# Patient Record
Sex: FEMALE | Race: WHITE | Employment: OTHER | URBAN - METROPOLITAN AREA
[De-identification: names, ages, dates, MRNs, and addresses within clinical notes are randomized per-mention and may not be internally consistent; named-entity substitution may affect disease eponyms.]

---

## 2017-01-04 ENCOUNTER — TELEPHONE (OUTPATIENT)
Dept: RHEUMATOLOGY | Age: 82
End: 2017-01-04

## 2017-01-04 NOTE — TELEPHONE ENCOUNTER
Patient informed she will need to send her bills to \[facility]\"" directly for the Actemra Infusion for patient assistance. Patient states she is aware of this,and has already signed proof of income for \[facility]\"" and sent the form back to the program. Patient informed the bills are handled by [address] there if she has any problems.

## 2017-01-09 RX ORDER — SODIUM CHLORIDE 9 MG/ML
25 INJECTION, SOLUTION INTRAVENOUS CONTINUOUS
Status: DISPENSED | OUTPATIENT
Start: 2017-01-12 | End: 2017-01-12

## 2017-01-09 RX ORDER — ACETAMINOPHEN 325 MG/1
650 TABLET ORAL
Status: ACTIVE | OUTPATIENT
Start: 2017-01-12 | End: 2017-01-12

## 2017-01-09 RX ORDER — DIPHENHYDRAMINE HYDROCHLORIDE 50 MG/ML
25 INJECTION, SOLUTION INTRAMUSCULAR; INTRAVENOUS
Status: ACTIVE | OUTPATIENT
Start: 2017-01-12 | End: 2017-01-12

## 2017-01-12 ENCOUNTER — HOSPITAL ENCOUNTER (EMERGENCY)
Age: 82
Discharge: HOME | End: 2017-01-12
Attending: EMERGENCY MEDICINE
Payer: MEDICARE

## 2017-01-12 ENCOUNTER — HOSPITAL ENCOUNTER (OUTPATIENT)
Dept: INFUSION THERAPY | Age: 82
Discharge: HOME | End: 2017-01-12
Payer: MEDICARE

## 2017-01-12 ENCOUNTER — TELEPHONE (OUTPATIENT)
Dept: RHEUMATOLOGY | Age: 82
End: 2017-01-12

## 2017-01-12 VITALS
RESPIRATION RATE: 18 BRPM | SYSTOLIC BLOOD PRESSURE: 232 MMHG | TEMPERATURE: 97.1 F | BODY MASS INDEX: 25.61 KG/M2 | DIASTOLIC BLOOD PRESSURE: 82 MMHG | WEIGHT: 140 LBS | HEART RATE: 93 BPM

## 2017-01-12 VITALS
HEIGHT: 62 IN | BODY MASS INDEX: 25.76 KG/M2 | SYSTOLIC BLOOD PRESSURE: 192 MMHG | RESPIRATION RATE: 15 BRPM | HEART RATE: 85 BPM | TEMPERATURE: 98.4 F | WEIGHT: 140 LBS | DIASTOLIC BLOOD PRESSURE: 77 MMHG | OXYGEN SATURATION: 98 %

## 2017-01-12 DIAGNOSIS — I15.9 SECONDARY HYPERTENSION: Primary | ICD-10-CM

## 2017-01-12 LAB
ALBUMIN SERPL BCP-MCNC: 3.5 G/DL (ref 3.5–5)
ALBUMIN/GLOB SERPL: 1 {RATIO} (ref 1.1–2.2)
ALP SERPL-CCNC: 80 U/L (ref 45–117)
ALT SERPL-CCNC: 22 U/L (ref 12–78)
ANION GAP BLD CALC-SCNC: 7 MMOL/L (ref 5–15)
AST SERPL W P-5'-P-CCNC: 19 U/L (ref 15–37)
BASOPHILS # BLD AUTO: 0 K/UL (ref 0–0.1)
BASOPHILS # BLD: 0 % (ref 0–1)
BILIRUB SERPL-MCNC: 0.5 MG/DL (ref 0.2–1)
BUN SERPL-MCNC: 16 MG/DL (ref 6–20)
BUN/CREAT SERPL: 17 (ref 12–20)
CALCIUM SERPL-MCNC: 8.5 MG/DL (ref 8.5–10.1)
CHLORIDE SERPL-SCNC: 103 MMOL/L (ref 97–108)
CO2 SERPL-SCNC: 30 MMOL/L (ref 21–32)
CREAT SERPL-MCNC: 0.95 MG/DL (ref 0.55–1.02)
CRP SERPL-MCNC: 2.69 MG/DL
EOSINOPHIL # BLD: 0.3 K/UL (ref 0–0.4)
EOSINOPHIL NFR BLD: 4 % (ref 0–7)
ERYTHROCYTE [DISTWIDTH] IN BLOOD BY AUTOMATED COUNT: 15.3 % (ref 11.5–14.5)
ERYTHROCYTE [SEDIMENTATION RATE] IN BLOOD: 52 MM/HR (ref 0–30)
GLOBULIN SER CALC-MCNC: 3.6 G/DL (ref 2–4)
GLUCOSE SERPL-MCNC: 112 MG/DL (ref 65–100)
HCT VFR BLD AUTO: 34.4 % (ref 35–47)
HGB BLD-MCNC: 10.8 G/DL (ref 11.5–16)
LYMPHOCYTES # BLD AUTO: 13 % (ref 12–49)
LYMPHOCYTES # BLD: 0.9 K/UL (ref 0.8–3.5)
MCH RBC QN AUTO: 29.3 PG (ref 26–34)
MCHC RBC AUTO-ENTMCNC: 31.4 G/DL (ref 30–36.5)
MCV RBC AUTO: 93.5 FL (ref 80–99)
MONOCYTES # BLD: 0.7 K/UL (ref 0–1)
MONOCYTES NFR BLD AUTO: 9 % (ref 5–13)
NEUTS SEG # BLD: 5.5 K/UL (ref 1.8–8)
NEUTS SEG NFR BLD AUTO: 74 % (ref 32–75)
PLATELET # BLD AUTO: 259 K/UL (ref 150–400)
POTASSIUM SERPL-SCNC: 4.2 MMOL/L (ref 3.5–5.1)
PROT SERPL-MCNC: 7.1 G/DL (ref 6.4–8.2)
RBC # BLD AUTO: 3.68 M/UL (ref 3.8–5.2)
SODIUM SERPL-SCNC: 140 MMOL/L (ref 136–145)
WBC # BLD AUTO: 7.5 K/UL (ref 3.6–11)

## 2017-01-12 PROCEDURE — 80053 COMPREHEN METABOLIC PANEL: CPT | Performed by: INTERNAL MEDICINE

## 2017-01-12 PROCEDURE — 99201 HC NEW PT LEVEL I: CPT

## 2017-01-12 PROCEDURE — 99285 EMERGENCY DEPT VISIT HI MDM: CPT

## 2017-01-12 PROCEDURE — 36415 COLL VENOUS BLD VENIPUNCTURE: CPT | Performed by: INTERNAL MEDICINE

## 2017-01-12 PROCEDURE — 96413 CHEMO IV INFUSION 1 HR: CPT

## 2017-01-12 PROCEDURE — 74011250636 HC RX REV CODE- 250/636

## 2017-01-12 PROCEDURE — 85652 RBC SED RATE AUTOMATED: CPT | Performed by: INTERNAL MEDICINE

## 2017-01-12 PROCEDURE — 74011250636 HC RX REV CODE- 250/636: Performed by: INTERNAL MEDICINE

## 2017-01-12 PROCEDURE — 86140 C-REACTIVE PROTEIN: CPT | Performed by: INTERNAL MEDICINE

## 2017-01-12 PROCEDURE — 85025 COMPLETE CBC W/AUTO DIFF WBC: CPT | Performed by: INTERNAL MEDICINE

## 2017-01-12 PROCEDURE — 74011250637 HC RX REV CODE- 250/637: Performed by: NURSE PRACTITIONER

## 2017-01-12 PROCEDURE — 74011000258 HC RX REV CODE- 258: Performed by: INTERNAL MEDICINE

## 2017-01-12 RX ORDER — LISINOPRIL 20 MG/1
20 TABLET ORAL
Status: COMPLETED | OUTPATIENT
Start: 2017-01-12 | End: 2017-01-12

## 2017-01-12 RX ORDER — AMLODIPINE BESYLATE 5 MG/1
5 TABLET ORAL
Status: COMPLETED | OUTPATIENT
Start: 2017-01-12 | End: 2017-01-12

## 2017-01-12 RX ADMIN — AMLODIPINE BESYLATE 5 MG: 5 TABLET ORAL at 18:08

## 2017-01-12 RX ADMIN — SODIUM CHLORIDE 25 ML/HR: 900 INJECTION, SOLUTION INTRAVENOUS at 12:40

## 2017-01-12 RX ADMIN — LISINOPRIL 20 MG: 20 TABLET ORAL at 18:08

## 2017-01-12 RX ADMIN — TOCILIZUMAB 250 MG: 20 INJECTION, SOLUTION, CONCENTRATE INTRAVENOUS at 14:40

## 2017-01-12 NOTE — PROGRESS NOTES
1400  Bedside shift report received from Nancy Scott RN. Patient is resting comfortably and waiting for medications to arrive from pharmacy. Medications received:  NS IV  Actemra IV    At completion of Actemra infusion patient is flushed and notes having a headache. Vitals obtained and BP is 187/83. Patient states that she missed her afternoon dose of BP medication today because she was here. Patient rested in reclined position for 30 minutes and BP re-checked. BP increased to 242/100. Rescue squad called and patient was escorted to [facility] by ambulance. Patient's daughter was notified and is meeting patient at the hospital.   PIV flushed and removed per protocol. Patient Vitals for the past 12 hrs:   Temp Pulse Resp BP   01/12/17 1650 - - - (!) 232/82   01/12/17 1622 - - - (!) 242/100   01/12/17 1540 97.1 °F (36.2 °C) 93 - 187/83   01/12/17 1158 97.7 °F (36.5 °C) 71 18 148/74       1700  Patient left [facility] via ambulance.

## 2017-01-12 NOTE — TELEPHONE ENCOUNTER
Pt presents for Actemra Infusion. Per Dr. Magui Silver, okay to treat as long as no fever, infection or any other acute problems. Order faxed.

## 2017-01-12 NOTE — DISCHARGE INSTRUCTIONS

## 2017-01-12 NOTE — PROGRESS NOTES
Outpatient Infusion Center Nursing Progress Note    1115  Patient arrived to [facility]  accompanied by self for scheduled actemra  Cycle 1 PT complaint of pain in left arm. PIV placed per policy    Vitals Blood pressure 148/74, pulse 71, temperature 97.7 °F (36.5 °C), resp. rate 18. Call made to MD office to request OK to treat, order received. labs   Recent Results (from the past 12 hour(s))   CBC WITH AUTOMATED DIFF    Collection Time: 01/12/17 12:36 PM   Result Value Ref Range    WBC 7.5 3.6 - 11.0 K/uL    RBC 3.68 (L) 3.80 - 5.20 M/uL    HGB 10.8 (L) 11.5 - 16.0 g/dL    HCT 34.4 (L) 35.0 - 47.0 %    MCV 93.5 80.0 - 99.0 FL    MCH 29.3 26.0 - 34.0 PG    MCHC 31.4 30.0 - 36.5 g/dL    RDW 15.3 (H) 11.5 - 14.5 %    PLATELET 947 799 - 118 K/uL    NEUTROPHILS 74 32 - 75 %    LYMPHOCYTES 13 12 - 49 %    MONOCYTES 9 5 - 13 %    EOSINOPHILS 4 0 - 7 %    BASOPHILS 0 0 - 1 %    ABS. NEUTROPHILS 5.5 1.8 - 8.0 K/UL    ABS. LYMPHOCYTES 0.9 0.8 - 3.5 K/UL    ABS. MONOCYTES 0.7 0.0 - 1.0 K/UL    ABS. EOSINOPHILS 0.3 0.0 - 0.4 K/UL    ABS. BASOPHILS 0.0 0.0 - 0.1 K/UL       1345  Labs reviewed and Actemra ordered. Chairside report given to Sheila Baker RN, who verbalized understanding.

## 2017-01-12 NOTE — ED TRIAGE NOTES
biba elevated bp over treatment after elevated bp at [facility]  Pt felt headache.  Pt has headache with atmeric before just restarted last month(dec 15) tolerated dec 15th dose no s/e

## 2017-01-12 NOTE — TELEPHONE ENCOUNTER
R'cvd v/m from [facility] @ [facility] requesting an Wilberto Lapidus to treat as needed\" order. Pt is having infusion today. Returned call and l/v/m in an attempt to obtain specifics.

## 2017-01-12 NOTE — TELEPHONE ENCOUNTER
Order for Actemra has been placed. Please clarify why we need an additional order. Patient was in ER at end of December: please make sure no acute infections or ongoing new concerns.

## 2017-01-13 ENCOUNTER — TELEPHONE (OUTPATIENT)
Dept: RHEUMATOLOGY | Age: 82
End: 2017-01-13

## 2017-01-13 NOTE — ED NOTES
Patient given discharge instruction by provider. Verbalized understanding, pt discharge home with family.

## 2017-01-13 NOTE — DISCHARGE INSTRUCTIONS
We hope that we have addressed all of your medical concerns. The examination and treatment you received in the Emergency Department were for an emergent problem and were not intended as complete care. It is important that you follow up with your healthcare provider(s) for ongoing care. If your symptoms worsen or do not improve as expected, and you are unable to reach your usual health care provider(s), you should return to the Emergency Department. Today's healthcare is undergoing tremendous change, and patient satisfaction surveys are one of the many tools to assess the quality of medical care. You may receive a survey from the [employer] regarding your experience in the Emergency Department. I hope that your experience has been completely positive, particularly the medical care that I provided. As such, please participate in the survey; anything less than excellent does not meet my expectations or intentions. [facility] and [employer] participate in nationally recognized quality of care measures. If your blood pressure is greater than 120/80, as reported below, we urge that you seek medical care to address the potential of high blood pressure, commonly known as hypertension. Hypertension can be hereditary or can be caused by certain medical conditions, pain, stress, or \"white coat syndrome. \"       Please make an appointment with your health care provider(s) for follow up of your Emergency Department visit. VITALS:   Patient Vitals for the past 8 hrs:   Temp Pulse Resp BP SpO2   01/12/17 1900 - 88 16 197/84 99 %   01/12/17 1830 - 87 17 195/76 100 %   01/12/17 1815 - 76 16 193/68 100 %   01/12/17 1800 - 82 14 199/76 97 %   01/12/17 1730 98.4 °F (36.9 °C) 88 22 197/79 99 %          Thank you for allowing us to provide you with medical care today. We realize that you have many choices for your emergency care needs.   Please choose us in the future for any continued health care needs. Ella Atkinson, [facility].   Office: [phone]            Recent Results (from the past 24 hour(s))   METABOLIC PANEL, COMPREHENSIVE    Collection Time: 01/12/17 12:26 PM   Result Value Ref Range    Sodium 140 136 - 145 mmol/L    Potassium 4.2 3.5 - 5.1 mmol/L    Chloride 103 97 - 108 mmol/L    CO2 30 21 - 32 mmol/L    Anion gap 7 5 - 15 mmol/L    Glucose 112 (H) 65 - 100 mg/dL    BUN 16 6 - 20 MG/DL    Creatinine 0.95 0.55 - 1.02 MG/DL    BUN/Creatinine ratio 17 12 - 20      GFR est AA >60 >60 ml/min/1.73m2    GFR est non-AA 56 (L) >60 ml/min/1.73m2    Calcium 8.5 8.5 - 10.1 MG/DL    Bilirubin, total 0.5 0.2 - 1.0 MG/DL    ALT 22 12 - 78 U/L    AST 19 15 - 37 U/L    Alk. phosphatase 80 45 - 117 U/L    Protein, total 7.1 6.4 - 8.2 g/dL    Albumin 3.5 3.5 - 5.0 g/dL    Globulin 3.6 2.0 - 4.0 g/dL    A-G Ratio 1.0 (L) 1.1 - 2.2     SED RATE (ESR)    Collection Time: 01/12/17 12:26 PM   Result Value Ref Range    Sed rate, automated 52 (H) 0 - 30 mm/hr   C REACTIVE PROTEIN, QT    Collection Time: 01/12/17 12:26 PM   Result Value Ref Range    C-Reactive protein 2.69 (H) <0.60 mg/dL   CBC WITH AUTOMATED DIFF    Collection Time: 01/12/17 12:36 PM   Result Value Ref Range    WBC 7.5 3.6 - 11.0 K/uL    RBC 3.68 (L) 3.80 - 5.20 M/uL    HGB 10.8 (L) 11.5 - 16.0 g/dL    HCT 34.4 (L) 35.0 - 47.0 %    MCV 93.5 80.0 - 99.0 FL    MCH 29.3 26.0 - 34.0 PG    MCHC 31.4 30.0 - 36.5 g/dL    RDW 15.3 (H) 11.5 - 14.5 %    PLATELET 993 497 - 511 K/uL    NEUTROPHILS 74 32 - 75 %    LYMPHOCYTES 13 12 - 49 %    MONOCYTES 9 5 - 13 %    EOSINOPHILS 4 0 - 7 %    BASOPHILS 0 0 - 1 %    ABS. NEUTROPHILS 5.5 1.8 - 8.0 K/UL    ABS. LYMPHOCYTES 0.9 0.8 - 3.5 K/UL    ABS. MONOCYTES 0.7 0.0 - 1.0 K/UL    ABS. EOSINOPHILS 0.3 0.0 - 0.4 K/UL    ABS. BASOPHILS 0.0 0.0 - 0.1 K/UL       No results found.

## 2017-01-13 NOTE — ED PROVIDER NOTES
HPI Comments: The pt is an 80year old female who presents from the infusion center having received her medication for RA suppression. She arrived at the center with a BP of 138/86 and then developed a BP of 198/99 after treatment. Pt is rather annoyed bc she was at the infusion center for six hours, missing lunch, dinner, and her afternoon and evening doses of antihypertensive agents. She states that one year ago when she was getting the infusions she was sent to the ED multiple times for HTN post infusions. Her cardiologist prescribed a regimen that had been working up until this afternoon. Pt denies fevers, chills, night sweats, chest pain, pressure, SOB, HAAS, PND, orthopnea, abdominal pain, n/v/d, melena, hematuria, dysuria, constipation, HA, dizziness, and syncope        Past Medical History:    Arthritis, rheumatoid (HCC)                                   Diabetes ([facility] [address]                                                Diverticulitis                                                Hard of hearing                                               Hypertension                                                  Hypothyroid                                                   Osteoporosis, post-menopausal                                 Past Surgical History:    HX HYSTERECTOMY                                                HX HEMORRHOIDECTOMY                                            HX THYROIDECTOMY                                               PCP:  Marquez Smith MD      Patient is a 80 y.o. female presenting with hypertension. The history is provided by the patient. Hypertension    This is a new problem. The current episode started 1 to 2 hours ago. The problem has not changed since onset. Pertinent negatives include no chest pain, no orthopnea, no palpitations, no PND, no anxiety, no confusion, no malaise/fatigue, no blurred vision, no headaches, no tinnitus, no neck pain, no peripheral edema, no dizziness, no shortness of breath, no nausea and no vomiting. Associated agents: transfusion for RA. Past Medical History:   Diagnosis Date    Arthritis, rheumatoid ([address]     Diabetes ([facility] [address]     Diverticulitis     Hard of hearing     Hypertension     Hypothyroid     Osteoporosis, post-menopausal        Past Surgical History:   Procedure Laterality Date    Hx hysterectomy      Hx hemorrhoidectomy      Hx thyroidectomy           Family History:   Problem Relation Age of Onset    Heart Disease Mother     Diabetes Father     Cancer Brother      brain cancer    Heart Disease Brother     Diabetes Brother     Other Other      scleroderma       Social History     Social History    Marital status:      Spouse name: N/A    Number of children: N/A    Years of education: N/A     Occupational History    Not on file. Social History Main Topics    Smoking status: Former Smoker     Types: Cigarettes     Quit date: 12/8/1976    Smokeless tobacco: Never Used    Alcohol use Yes      Comment: Waylon time 1 glass wine    Drug use: No    Sexual activity: No     Other Topics Concern    Not on file     Social History Narrative    ** Merged History Encounter **              ALLERGIES: Amoxicillin and Hydrochlorothiazide    Review of Systems   Constitutional: Negative for activity change, appetite change, chills, diaphoresis, fatigue, fever, malaise/fatigue and unexpected weight change. HENT: Negative for congestion, ear pain, rhinorrhea, sinus pressure, sore throat and tinnitus. Eyes: Negative for blurred vision, photophobia, pain, discharge and visual disturbance. Respiratory: Negative for apnea, cough, choking, chest tightness, shortness of breath, wheezing and stridor. Cardiovascular: Negative for chest pain, palpitations, orthopnea, leg swelling and PND. Gastrointestinal: Negative for abdominal pain, constipation, diarrhea, nausea and vomiting.    Endocrine: Negative for polydipsia, polyphagia and polyuria. Genitourinary: Negative for decreased urine volume, dyspareunia, dysuria, enuresis, flank pain, frequency, hematuria and urgency. Musculoskeletal: Positive for arthralgias. Negative for back pain, gait problem, myalgias and neck pain. Chronic   Skin: Negative for color change, pallor, rash and wound. Allergic/Immunologic: Negative for immunocompromised state. Neurological: Negative for dizziness, seizures, syncope, weakness, light-headedness and headaches. Hematological: Does not bruise/bleed easily. Psychiatric/Behavioral: Negative for agitation and confusion. The patient is not nervous/anxious. Vitals:    01/12/17 1815 01/12/17 1830 01/12/17 1900 01/12/17 1918   BP: 193/68 195/76 197/84 192/77   Pulse: 76 87 88 85   Resp: 16 17 16 15   Temp:       SpO2: 100% 100% 99% 98%   Weight:       Height:                Physical Exam   Constitutional: She is oriented to person, place, and time. She appears well-developed and well-nourished. No distress. HENT:   Head: Normocephalic. Right Ear: External ear normal.   Left Ear: External ear normal.   Mouth/Throat: Oropharynx is clear and moist. No oropharyngeal exudate. Eyes: Conjunctivae and EOM are normal. Pupils are equal, round, and reactive to light. Right eye exhibits no discharge. Left eye exhibits no discharge. No scleral icterus. Neck: Normal range of motion. Neck supple. No JVD present. No tracheal deviation present. No thyromegaly present. Cardiovascular: Normal rate, regular rhythm, normal heart sounds and intact distal pulses. Exam reveals no gallop and no friction rub. No murmur heard. Pulmonary/Chest: Effort normal and breath sounds normal. No stridor. No respiratory distress. She has no wheezes. She has no rales. She exhibits no tenderness. Abdominal: Soft. Bowel sounds are normal. She exhibits no distension and no mass. There is no tenderness. There is no rebound and no guarding.    Musculoskeletal: Normal range of motion. She exhibits no edema or tenderness. Lymphadenopathy:     She has no cervical adenopathy. Neurological: She is alert and oriented to person, place, and time. She displays normal reflexes. No cranial nerve deficit. Coordination normal.   Skin: Skin is warm and dry. No rash noted. She is not diaphoretic. No erythema. No pallor. Psychiatric: She has a normal mood and affect. Her behavior is normal. Judgment and thought content normal.   Nursing note and vitals reviewed. MDM  Number of Diagnoses or Management Options  Secondary hypertension:   Diagnosis management comments:    * norvasc and lisinopril   * meal tray       Amount and/or Complexity of Data Reviewed  Clinical lab tests: reviewed  Review and summarize past medical records: yes  Discuss the patient with other providers: yes    Risk of Complications, Morbidity, and/or Mortality  General comments:    - stable, ambulatory pt in NAD    Patient Progress  Patient progress: stable    ED Course       Procedures        1919 PM  Pt has been reevaluated. There are no new complaints, changes, or physical findings at this time. Medications have been reviewed w/ pt and/or family. Pt and/or family's questions have been answered. Pt and/or family expressed good understanding of the dx/tx/rx and is in agreement with plan of care. Pt instructed and agreed to f/u w/ PCP, Cardiologist, and Rheumatologist and to return to ED upon further deterioration. Pt is ready for discharge. LABORATORY TESTS:  No results found for this or any previous visit (from the past 12 hour(s)). IMAGING RESULTS:  No orders to display     No results found. MEDICATIONS GIVEN:  Medications   amLODIPine (NORVASC) tablet 5 mg (5 mg Oral Given 1/12/17 1808)   lisinopril (PRINIVIL, ZESTRIL) tablet 20 mg (20 mg Oral Given 1/12/17 1808)       IMPRESSION:  1. Secondary hypertension        PLAN:  1.    Discharge Medication List as of 1/12/2017  7:12 PM      CONTINUE these medications which have NOT CHANGED    Details   methotrexate (RHEUMATREX) 2.5 mg tablet Take 25 mg by mouth every Tuesday., Historical Med      amLODIPine (NORVASC) 5 mg tablet Take 5 mg by mouth daily. , Historical Med      folic acid (FOLVITE) 1 mg tablet Take 1 mg by mouth every fourty-eight (48) hours. , Historical Med      LISINOPRIL PO Take 20 mg by mouth two (2) times a day., Historical Med      esomeprazole (NEXIUM) 20 mg capsule Take 1 Cap by mouth daily. , Print, Disp-30 Cap, R-0      Menthol/Camphor/Antarth Cb#1 11-11 % crea by Apply Externally route as needed (sore neck/shoulder). , Historical Med      levothyroxine (SYNTHROID) 75 mcg tablet Take 75 mcg by mouth Daily (before breakfast). , Historical Med      acetaminophen (TYLENOL) 325 mg tablet Take 325 mg by mouth daily as needed for Pain., Historical Med      aspirin delayed-release 81 mg tablet Take 81 mg by mouth every other day., Historical Med      TOCILIZUMAB (ACTEMRA IV) by IntraVENous route every thirty (30) days. , Historical Med           2. Follow-up Information     Follow up With Details Comments [address] Angie Anders MD In 1 day  [address] [facility]  [phone]      Your Cardiologist In 1 day          3.  Supportive care     Return to ED if worse       Carlos Pepe NP  12:37 PM

## 2017-01-13 NOTE — TELEPHONE ENCOUNTER
----- Message from Nitin Gallardo MD sent at 1/12/2017  5:56 PM EST -----  Please check on her status (she was sent to ER instead of getting Actemra) due to elevated BP.

## 2017-01-19 ENCOUNTER — OFFICE VISIT (OUTPATIENT)
Dept: RHEUMATOLOGY | Age: 82
End: 2017-01-19

## 2017-01-19 VITALS
RESPIRATION RATE: 16 BRPM | TEMPERATURE: 97.8 F | HEIGHT: 62 IN | HEART RATE: 72 BPM | BODY MASS INDEX: 26.13 KG/M2 | DIASTOLIC BLOOD PRESSURE: 54 MMHG | OXYGEN SATURATION: 97 % | SYSTOLIC BLOOD PRESSURE: 140 MMHG | WEIGHT: 142 LBS

## 2017-01-19 DIAGNOSIS — I10 ESSENTIAL HYPERTENSION: Chronic | ICD-10-CM

## 2017-01-19 DIAGNOSIS — M81.0 OSTEOPOROSIS, POST-MENOPAUSAL: ICD-10-CM

## 2017-01-19 DIAGNOSIS — E78.5 HYPERLIPIDEMIA, UNSPECIFIED HYPERLIPIDEMIA TYPE: ICD-10-CM

## 2017-01-19 DIAGNOSIS — M05.79 RHEUMATOID ARTHRITIS INVOLVING MULTIPLE SITES WITH POSITIVE RHEUMATOID FACTOR (HCC): Primary | Chronic | ICD-10-CM

## 2017-01-19 DIAGNOSIS — Z79.60 LONG-TERM USE OF IMMUNOSUPPRESSANT MEDICATION: ICD-10-CM

## 2017-01-19 DIAGNOSIS — N18.3 CKD (CHRONIC KIDNEY DISEASE), STAGE 3 (MODERATE): ICD-10-CM

## 2017-01-19 RX ORDER — FLUTICASONE PROPIONATE 50 MCG
SPRAY, SUSPENSION (ML) NASAL
Refills: 1 | COMMUNITY
Start: 2016-12-12 | End: 2019-03-23 | Stop reason: SDUPTHER

## 2017-01-19 RX ORDER — PANTOPRAZOLE SODIUM 40 MG/1
TABLET, DELAYED RELEASE ORAL
Refills: 3 | COMMUNITY
Start: 2016-12-12 | End: 2017-06-23

## 2017-01-19 RX ORDER — RANITIDINE HCL 75 MG
75 TABLET ORAL
COMMUNITY
End: 2020-11-30

## 2017-01-19 RX ORDER — METHOTREXATE 2.5 MG/1
TABLET ORAL
Qty: 25 TAB | Refills: 3 | Status: SHIPPED | OUTPATIENT
Start: 2017-01-19 | End: 2017-09-05 | Stop reason: SDUPTHER

## 2017-01-19 RX ORDER — ALENDRONATE SODIUM 70 MG/1
TABLET ORAL
Qty: 4 TAB | Refills: 11 | Status: SHIPPED | OUTPATIENT
Start: 2017-01-19 | End: 2017-06-23

## 2017-01-19 NOTE — PROGRESS NOTES
HISTORY OF PRESENT ILLNESS  Patricia Enriquez is a 80 y.o. female. She presents with her daughter. HPI Patient presents for follow up of rheumatoid arthritis. \"I'm doing all right. \" She has pain in the left knee and left shoulder. She has no joint swelling. She has AM stiffness of 15-20 minutes. She has no fevers. She has had no recent infections. She is taking methotrexate 25 mg orally once weekly. She presented for her Actemra infusion last week and received her medication but then was sent to the ER due to a marked elevation in blood pressure. She had not taken her mid-day blood pressure medication and had not eaten before hand. Her blood pressure has been okay this week (130's-140's at home). Regarding osteoporosis, she gets perhaps 2-3 servings of a calcium rich food daily. She is taking a multivitamin daily. Current Outpatient Prescriptions   Medication Sig Dispense Refill    fluticasone (FLONASE) 50 mcg/actuation nasal spray INSTILL 1 SPRAY IN EACH NOSTRIL ONCE A DAY NASALLY 30 DAY(S)  1    pantoprazole (PROTONIX) 40 mg tablet 1 TABLET ONCE A DAY ORALLY 30 DAY(S)  3    raNITIdine (ZANTAC 75) 75 mg tablet Take 75 mg by mouth two (2) times a day.  methotrexate (RHEUMATREX) 2.5 mg tablet Take 25 mg by mouth every Tuesday.  amLODIPine (NORVASC) 5 mg tablet Take 5 mg by mouth daily.  folic acid (FOLVITE) 1 mg tablet Take 1 mg by mouth every fourty-eight (48) hours.  LISINOPRIL PO Take 20 mg by mouth two (2) times a day.  Menthol/Camphor/Antarth Cb#1 11-11 % crea by Apply Externally route as needed (sore neck/shoulder).  levothyroxine (SYNTHROID) 75 mcg tablet Take 75 mcg by mouth Daily (before breakfast).  acetaminophen (TYLENOL) 325 mg tablet Take 325 mg by mouth daily as needed for Pain.  aspirin delayed-release 81 mg tablet Take 81 mg by mouth every other day.  TOCILIZUMAB (ACTEMRA IV) by IntraVENous route every thirty (30) days.        Allergies   Allergen Reactions    Amoxicillin Rash    Hydrochlorothiazide Other (comments)     \"Caused pain everywhere\"     Review of Systems   Constitutional: Negative for fever and weight loss. Eyes: Negative for blurred vision. Cardiovascular: Negative for leg swelling. Gastrointestinal: Negative for abdominal pain. Musculoskeletal: Positive for joint pain. Skin: Negative for rash. Physical Exam   Vitals reviewed. Constitutional: She is oriented to person, place, and time. She appears well-developed and well-nourished. No distress. HENT:   Mouth/Throat: Oropharynx is clear and moist. No oropharyngeal exudate. Full dentures   Eyes: Conjunctivae are normal.   Neck: Normal range of motion. Neck supple. Cardiovascular: Regular rhythm.   -no edema   Pulmonary/Chest: Effort normal and breath sounds normal. No respiratory distress. Abdominal: Soft.   -mild distention  -no organomegaly  -no tenderness    Musculoskeletal:   -mild subluxation at [facility] each hand    -hyperextension left 2nd PIP (hand)  -synovitis right 3rd MCP with tenderness left knee  -peripheral joints FROM  Lymphadenopathy:   She has no cervical adenopathy. Neurological: She is alert and oriented to person, place, and time. She exhibits normal muscle tone. Gait normal   Skin: Skin is warm and dry. Faint, lightly scaled erythematous placque lateral aspect right lower thigh  Psychiatric: She has a normal mood and affect.  Judgment normal.   Lab Results   Component Value Date/Time    WBC 7.5 01/12/2017 12:36 PM    HGB 10.8 01/12/2017 12:36 PM    HCT 34.4 01/12/2017 12:36 PM    PLATELET 570 98/31/0696 12:36 PM    MCV 93.5 01/12/2017 12:36 PM     Lab Results   Component Value Date/Time    Sodium 140 01/12/2017 12:26 PM    Potassium 4.2 01/12/2017 12:26 PM    Chloride 103 01/12/2017 12:26 PM    CO2 30 01/12/2017 12:26 PM    Anion gap 7 01/12/2017 12:26 PM    Glucose 112 01/12/2017 12:26 PM    BUN 16 01/12/2017 12:26 PM    Creatinine 0.95 01/12/2017 12:26 PM    BUN/Creatinine ratio 17 01/12/2017 12:26 PM    GFR est AA >60 01/12/2017 12:26 PM    GFR est non-AA 56 01/12/2017 12:26 PM    Calcium 8.5 01/12/2017 12:26 PM    Bilirubin, total 0.5 01/12/2017 12:26 PM    ALT 22 01/12/2017 12:26 PM    AST 19 01/12/2017 12:26 PM    Alk. phosphatase 80 01/12/2017 12:26 PM    Protein, total 7.1 01/12/2017 12:26 PM    Albumin 3.5 01/12/2017 12:26 PM    Globulin 3.6 01/12/2017 12:26 PM    A-G Ratio 1.0 01/12/2017 12:26 PM     Lab Results   Component Value Date/Time    C-Reactive protein 2.69 01/12/2017 12:26 PM     ESR 52  Lab Results   Component Value Date/Time    Cholesterol, total 204 12/08/2016 10:27 AM    HDL Cholesterol 40 12/08/2016 10:27 AM    LDL, calculated 136 12/08/2016 10:27 AM    VLDL, calculated 28 12/08/2016 10:27 AM    Triglyceride 139 12/08/2016 10:27 AM     DXA recently with T-score 1/3 radius -3.2. L1-2 T-score -2.5    MHAQ 0.375  CDAI 1/19/2017   Swollen Joint Count 1   Tender Joint Count 2   Physician Assessment (0-10) 3   Patient Assessment (0-10) 2.5   CDAI 8.5       ASSESSMENT and PLAN    ICD-10-CM ICD-9-CM    1. Rheumatoid arthritis involving multiple sites with positive rheumatoid factor ([facility] [address]): CCP and RF positive by report. Deformities on hand exam. Methotrexate 25 mg weekly for many years. Actemra infusions since March (4 mg/kg) have helped. M05.79 714.0 C REACTIVE PROTEIN, QT  -given CKD and good joint exam, lower methotrexate to 12.5 mg weekly  -Actemra 4 mg/kg every 4 weeks      SED RATE (ESR)   2. Osteoporosis, post-menopausal: no fracture history. DXA recently with T-score 1/3 radius -3.2. L1-2 T-score -2.5 M81.0 733.01 -calcium 1200 mg daily total  -vitamin D3 5627-6036 units daily  -alendronate 70 mg weekly. Reviewed potential adverse effects and proper mode of taking. 3. Hyperlipidemia, unspecified hyperlipidemia type:  E78.5 272.4 LIPID PANEL-after Actemra  Review with PCP   4.  Essential hypertension: marked elevation after Actemra (but had not taken afternoon dose). Better recently. I10 401.9 -monitor BP at home and with PCP  -continue current therapy   5. CKD (chronic kidney disease), stage 3 (moderate): eGFR 40's O53.0 805.5 METABOLIC PANEL, COMPREHENSIVE  -monitor; no NSAIDS  -lower methotrexate as noted above   6.  Long-term use of immunosuppressant medication B88.907 F63.61 METABOLIC PANEL, COMPREHENSIVE      CBC WITH AUTOMATED DIFF      LIPID PANEL Labs prior to next Actemra

## 2017-01-19 NOTE — MR AVS SNAPSHOT
Visit Information     Date & Time Provider Department Dept. Phone Encounter #    1/19/2017  1:30 PM Angeline Britt MD [facility] [phone]      Upcoming Health Maintenance        Date Due    FOOT EXAM Q1 2/13/1940    MICROALBUMIN Q1 2/13/1940    EYE EXAM RETINAL OR DILATED Q1 2/13/1940    DTaP/Tdap/Td series (1 - Tdap) 2/13/1951    ZOSTER VACCINE AGE 60> 2/13/1990    GLAUCOMA SCREENING Q2Y 2/13/1995    Pneumococcal 65+ Low/Medium Risk (1 of 2 - PCV13) 2/13/1995    MEDICARE YEARLY EXAM 2/13/1995    HEMOGLOBIN A1C Q6M 10/9/2016    LIPID PANEL Q1 12/8/2017      Allergies as of 1/19/2017  Review Complete On: 1/19/2017 By: Angeline Britt MD       Severity Noted Reaction Type Reactions    Amoxicillin  08/08/2011    Rash    Hydrochlorothiazide  06/21/2016    Other (comments)    \"Caused pain everywhere\"      Current Immunizations  Reviewed on 1/19/2017    Name Date    Influenza Vaccine 10/8/2016       Reviewed by Angeline Britt MD on 1/19/2017 at  1:37 PM    Reviewed by Jona Shay LPN on 7/14/7450 at  1:42 PM   You Were Diagnosed With        Codes Comments    Rheumatoid arthritis involving multiple sites with positive rheumatoid factor ([facility]    -  Primary ICD-10-CM: M05.79  ICD-9-CM: 714.0     Osteoporosis, post-menopausal     ICD-10-CM: M81.0  ICD-9-CM: 733.01     Hyperlipidemia, unspecified hyperlipidemia type     ICD-10-CM: E78.5  ICD-9-CM: 272.4     Essential hypertension     ICD-10-CM: I10  ICD-9-CM: 401.9     CKD (chronic kidney disease), stage 3 (moderate)     ICD-10-CM: N18.3  ICD-9-CM: 922. 3     Long-term use of immunosuppressant medication     ICD-10-CM: Z79.899  ICD-9-CM: V58.69       Vitals     BP Pulse Temp Resp Height(growth percentile) Weight(growth percentile)    140/54 (BP 1 Location: Right arm, BP Patient Position: Sitting) 72 97.8 °F (36.6 °C) (Oral) 16 5' 2\" (1.575 m) 142 lb (64.4 kg)    SpO2 BMI OB Status Smoking Status          97% 25.97 kg/m2 Postmenopausal Former Smoker      Vitals History      BMI and BSA Data     Body Mass Index Body Surface Area    25.97 kg/m 2 1.68 m 2         Preferred Pharmacy       Pharmacy Name Phone    [facility] [address] [phone]         Your Updated Medication List          This list is accurate as of: 1/19/17  2:17 PM.  Always use your most recent med list.                acetaminophen 325 mg tablet   Commonly known as:  TYLENOL   Take 325 mg by mouth daily as needed for Pain. ACTEMRA IV   by IntraVENous route every thirty (30) days. alendronate 70 mg tablet   Commonly known as:  FOSAMAX   70 mg once weekly. Please review proper mode of taking       amLODIPine 5 mg tablet   Commonly known as:  NORVASC   Take 5 mg by mouth daily. aspirin delayed-release 81 mg tablet   Take 81 mg by mouth every other day. fluticasone 50 mcg/actuation nasal spray   Commonly known as:  FLONASE   INSTILL 1 SPRAY IN EACH NOSTRIL ONCE A DAY NASALLY 30 DAY(S)       folic acid 1 mg tablet   Commonly known as:  FOLVITE   Take 1 mg by mouth every fourty-eight (48) hours. levothyroxine 75 mcg tablet   Commonly known as:  SYNTHROID   Take 75 mcg by mouth Daily (before breakfast). LISINOPRIL PO   Take 20 mg by mouth two (2) times a day. Menthol/Camphor/Antarth Cb#1 11-11 % Crea   by Apply Externally route as needed (sore neck/shoulder). methotrexate 2.5 mg tablet   Commonly known as:  RHEUMATREX   New dose. Lower to 12.5 mg (5 tabs) once weekly. Please use new RX for next refill       pantoprazole 40 mg tablet   Commonly known as:  PROTONIX   1 TABLET ONCE A DAY ORALLY 30 DAY(S)       ZANTAC 75 75 mg tablet   Generic drug:  raNITIdine   Take 75 mg by mouth two (2) times a day. Prescriptions Sent to Pharmacy        Refills    methotrexate (RHEUMATREX) 2.5 mg tablet 3    Sig: New dose. Lower to 12.5 mg (5 tabs) once weekly.  Please use new RX for next refill    Class: Normal    Pharmacy:  [facility] [address] Ph #: [phone]    alendronate (FOSAMAX) 70 mg tablet 11    Si mg once weekly. Please review proper mode of taking    Class: Normal    Pharmacy: [address] [facility] Ph #: [phone]      To-Do List     2017     Lab:  C REACTIVE PROTEIN, QT        2017     Lab:  CBC WITH AUTOMATED DIFF        2017     Lab:  LIPID PANEL        2017     Lab:  METABOLIC PANEL, COMPREHENSIVE        2017     Lab:  SED RATE (ESR)        2017 10:00 AM     Appointment with [address] at [place] [address] ([phone])         Patient Instructions    Consider pneumonia vaccine    Methotrexate decrease to 5 pills once weekly    Alendronate 70 mg weekly: You would take this once weekly, first thing in the AM on an empty stomach (no other food, drink, or medication). Take with 8 ounces of plain water. Take medication sitting down or standing up. Do not lie down, eat anything, or take any medication for at least 30 minutes after taking the pill. Labs 2 days before infusion       Introducing hospitals & HEALTH SERVICES! [employer] introduces Department of Health and Human Services patient portal. Now you can access parts of your medical record, email your doctor's office, and request medication refills online. 1. In your internet browser, go to [email or web address]   2. Click on the First Time User? Click Here link in the Sign In box. You will see the New Member Sign Up page. 3. Enter your Department of Health and Human Services Access Code exactly as it appears below. You will not need to use this code after youve completed the sign-up process. If you do not sign up before the expiration date, you must request a new code. · Department of Health and Human Services Access Code: [email or web address]  Expires: 3/8/2017  8:38 AM    4.  Enter the last four digits of your Social Security Number (xxxx) and Date of Birth (mm/dd/yyyy) as indicated and click Submit. You will be taken to the next sign-up page. 5. Create a [employer] ID. This will be your [employer] login ID and cannot be changed, so think of one that is secure and easy to remember. 6. Create a [employer] password. You can change your password at any time. 7. Enter your Password Reset Question and Answer. This can be used at a later time if you forget your password. 8. Enter your e-mail address. You will receive e-mail notification when new information is available in [address] 9. Click Sign Up. You can now view and download portions of your medical record. 10. Click the Download Summary menu link to download a portable copy of your medical information. If you have questions, please visit the Frequently Asked Questions section of the [employer] website. Remember, [employer] is NOT to be used for urgent needs. For medical emergencies, dial 911. Now available from your iPhone and Android! Please provide this summary of care documentation to your next provider. Your primary care clinician is listed as Zaki Larios. If you have any questions after today's visit, please call [phone].

## 2017-01-19 NOTE — PATIENT INSTRUCTIONS
Consider pneumonia vaccine    Methotrexate decrease to 5 pills once weekly    Alendronate 70 mg weekly: You would take this once weekly, first thing in the AM on an empty stomach (no other food, drink, or medication). Take with 8 ounces of plain water. Take medication sitting down or standing up. Do not lie down, eat anything, or take any medication for at least 30 minutes after taking the pill.     Labs 2 days before infusion

## 2017-01-26 ENCOUNTER — APPOINTMENT (OUTPATIENT)
Dept: INFUSION THERAPY | Age: 82
End: 2017-01-26

## 2017-01-30 ENCOUNTER — DOCUMENTATION ONLY (OUTPATIENT)
Dept: RHEUMATOLOGY | Age: 82
End: 2017-01-30

## 2017-01-31 ENCOUNTER — TELEPHONE (OUTPATIENT)
Dept: RHEUMATOLOGY | Age: 82
End: 2017-01-31

## 2017-01-31 NOTE — TELEPHONE ENCOUNTER
Patient would like a call back from nurse regarding prescription fosamax states can't take it [phone]

## 2017-02-01 NOTE — TELEPHONE ENCOUNTER
L/v/m per Dr. Darren Beltrán, to hold off on taking the medication for now. Also stated treatment options will be discussed at the next office visit.

## 2017-02-01 NOTE — TELEPHONE ENCOUNTER
Returned call to patient and was informed that the patient has been experiencing GI upset with diarrhea after taking the fosamax. The patient has only taken 1 dose of the fosamax (1/21/17)and has not resumed since. The patient stated that her symptoms(nausea, upset stomach with diarrhea) have subsided.

## 2017-02-06 RX ORDER — ACETAMINOPHEN 325 MG/1
650 TABLET ORAL
Status: DISPENSED | OUTPATIENT
Start: 2017-02-09 | End: 2017-02-09

## 2017-02-06 RX ORDER — DIPHENHYDRAMINE HYDROCHLORIDE 50 MG/ML
25 INJECTION, SOLUTION INTRAMUSCULAR; INTRAVENOUS
Status: DISPENSED | OUTPATIENT
Start: 2017-02-09 | End: 2017-02-09

## 2017-02-06 RX ORDER — SODIUM CHLORIDE 9 MG/ML
25 INJECTION, SOLUTION INTRAVENOUS CONTINUOUS
Status: DISPENSED | OUTPATIENT
Start: 2017-02-09 | End: 2017-02-09

## 2017-02-07 LAB
ALBUMIN SERPL-MCNC: 4.1 G/DL (ref 3.5–4.7)
ALBUMIN/GLOB SERPL: 1.6 {RATIO} (ref 1.1–2.5)
ALP SERPL-CCNC: 75 IU/L (ref 39–117)
ALT SERPL-CCNC: 17 IU/L (ref 0–32)
AST SERPL-CCNC: 20 IU/L (ref 0–40)
BASOPHILS # BLD AUTO: 0 X10E3/UL (ref 0–0.2)
BASOPHILS NFR BLD AUTO: 0 %
BILIRUB SERPL-MCNC: 0.8 MG/DL (ref 0–1.2)
BUN SERPL-MCNC: 17 MG/DL (ref 8–27)
BUN/CREAT SERPL: 17 (ref 11–26)
CALCIUM SERPL-MCNC: 9.4 MG/DL (ref 8.7–10.3)
CHLORIDE SERPL-SCNC: 99 MMOL/L (ref 96–106)
CHOLEST SERPL-MCNC: 253 MG/DL (ref 100–199)
CO2 SERPL-SCNC: 25 MMOL/L (ref 18–29)
CREAT SERPL-MCNC: 1.01 MG/DL (ref 0.57–1)
CRP SERPL-MCNC: 2.7 MG/L (ref 0–4.9)
EOSINOPHIL # BLD AUTO: 0.3 X10E3/UL (ref 0–0.4)
EOSINOPHIL NFR BLD AUTO: 4 %
ERYTHROCYTE [DISTWIDTH] IN BLOOD BY AUTOMATED COUNT: 15.6 % (ref 12.3–15.4)
ERYTHROCYTE [SEDIMENTATION RATE] IN BLOOD BY WESTERGREN METHOD: 5 MM/HR (ref 0–40)
GLOBULIN SER CALC-MCNC: 2.6 G/DL (ref 1.5–4.5)
GLUCOSE SERPL-MCNC: 99 MG/DL (ref 65–99)
HCT VFR BLD AUTO: 35.2 % (ref 34–46.6)
HDLC SERPL-MCNC: 42 MG/DL
HGB BLD-MCNC: 11.7 G/DL (ref 11.1–15.9)
IMM GRANULOCYTES # BLD: 0 X10E3/UL (ref 0–0.1)
IMM GRANULOCYTES NFR BLD: 0 %
LDLC SERPL CALC-MCNC: 182 MG/DL (ref 0–99)
LYMPHOCYTES # BLD AUTO: 1.1 X10E3/UL (ref 0.7–3.1)
LYMPHOCYTES NFR BLD AUTO: 19 %
MCH RBC QN AUTO: 30.1 PG (ref 26.6–33)
MCHC RBC AUTO-ENTMCNC: 33.2 G/DL (ref 31.5–35.7)
MCV RBC AUTO: 91 FL (ref 79–97)
MONOCYTES # BLD AUTO: 0.5 X10E3/UL (ref 0.1–0.9)
MONOCYTES NFR BLD AUTO: 7 %
NEUTROPHILS # BLD AUTO: 4.2 X10E3/UL (ref 1.4–7)
NEUTROPHILS NFR BLD AUTO: 70 %
PLATELET # BLD AUTO: 202 X10E3/UL (ref 150–379)
POTASSIUM SERPL-SCNC: 4.3 MMOL/L (ref 3.5–5.2)
PROT SERPL-MCNC: 6.7 G/DL (ref 6–8.5)
RBC # BLD AUTO: 3.89 X10E6/UL (ref 3.77–5.28)
SODIUM SERPL-SCNC: 139 MMOL/L (ref 134–144)
TRIGL SERPL-MCNC: 143 MG/DL (ref 0–149)
VLDLC SERPL CALC-MCNC: 29 MG/DL (ref 5–40)
WBC # BLD AUTO: 6.1 X10E3/UL (ref 3.4–10.8)

## 2017-02-07 NOTE — PROGRESS NOTES
Called and spoke with Patient. Informed patient of results/instructions. Patient voiced understanding and agreed to follow Dr. Farhan Sanchez instructions. Labs forwarded to pcp.

## 2017-02-07 NOTE — PROGRESS NOTES
Please forward to PCP. Inflammation markers much improved. Kidney function mildly ow but stable. Increased cholesterol: please review with PCP.

## 2017-02-09 ENCOUNTER — HOSPITAL ENCOUNTER (OUTPATIENT)
Dept: INFUSION THERAPY | Age: 82
Discharge: HOME | End: 2017-02-09
Payer: MEDICARE

## 2017-02-09 VITALS
WEIGHT: 140 LBS | HEART RATE: 64 BPM | RESPIRATION RATE: 18 BRPM | TEMPERATURE: 97 F | OXYGEN SATURATION: 100 % | SYSTOLIC BLOOD PRESSURE: 134 MMHG | BODY MASS INDEX: 25.61 KG/M2 | DIASTOLIC BLOOD PRESSURE: 65 MMHG

## 2017-02-09 PROCEDURE — 74011250637 HC RX REV CODE- 250/637: Performed by: INTERNAL MEDICINE

## 2017-02-09 PROCEDURE — 74011000258 HC RX REV CODE- 258: Performed by: INTERNAL MEDICINE

## 2017-02-09 PROCEDURE — 96375 TX/PRO/DX INJ NEW DRUG ADDON: CPT

## 2017-02-09 PROCEDURE — 96413 CHEMO IV INFUSION 1 HR: CPT

## 2017-02-09 PROCEDURE — 74011250636 HC RX REV CODE- 250/636: Performed by: INTERNAL MEDICINE

## 2017-02-09 PROCEDURE — 74011250636 HC RX REV CODE- 250/636

## 2017-02-09 RX ORDER — SODIUM CHLORIDE 0.9 % (FLUSH) 0.9 %
5-10 SYRINGE (ML) INJECTION AS NEEDED
Status: ACTIVE | OUTPATIENT
Start: 2017-02-09 | End: 2017-02-09

## 2017-02-09 RX ADMIN — ACETAMINOPHEN 650 MG: 325 TABLET ORAL at 10:47

## 2017-02-09 RX ADMIN — SODIUM CHLORIDE 25 ML/HR: 900 INJECTION, SOLUTION INTRAVENOUS at 10:45

## 2017-02-09 RX ADMIN — TOCILIZUMAB 250 MG: 20 INJECTION, SOLUTION, CONCENTRATE INTRAVENOUS at 11:23

## 2017-02-09 RX ADMIN — DIPHENHYDRAMINE HYDROCHLORIDE 25 MG: 50 INJECTION INTRAMUSCULAR; INTRAVENOUS at 10:47

## 2017-02-09 RX ADMIN — Medication 10 ML: at 10:15

## 2017-02-09 NOTE — PROGRESS NOTES
Outpatient Infusion Center Short Visit Progress Note    1000 Pt admit to [facility] for Actemra ambulatory in stable condition. Assessment completed. No new concerns voiced. PIV with positive blood return and flush. Patient Vitals for the past 12 hrs:   Temp Pulse Resp BP SpO2   02/09/17 1300 97 °F (36.1 °C) 64 18 134/65 -   02/09/17 1200 97.1 °F (36.2 °C) 63 18 144/71 -   02/09/17 1015 97.9 °F (36.6 °C) 75 18 186/85 100 %       Medications:  NS IV  Benadryl IV  Tylenol PO  Actemra IV    1250 Pt tolerated treatment well. PIV with positive blood return and flush. D/c home ambulatory in no distress.  Pt aware of next appointment scheduled for 3/9/17 at 1000

## 2017-02-23 ENCOUNTER — TELEPHONE (OUTPATIENT)
Dept: RHEUMATOLOGY | Age: 82
End: 2017-02-23

## 2017-02-23 ENCOUNTER — APPOINTMENT (OUTPATIENT)
Dept: INFUSION THERAPY | Age: 82
End: 2017-02-23

## 2017-02-23 NOTE — TELEPHONE ENCOUNTER
Returned call to [place] and advised that medication delivery will be arranged with pharmacy at the [facility]).

## 2017-03-03 ENCOUNTER — DOCUMENTATION ONLY (OUTPATIENT)
Dept: RHEUMATOLOGY | Age: 82
End: 2017-03-03

## 2017-03-06 RX ORDER — ACETAMINOPHEN 325 MG/1
650 TABLET ORAL
Status: ACTIVE | OUTPATIENT
Start: 2017-03-09 | End: 2017-03-09

## 2017-03-06 RX ORDER — DIPHENHYDRAMINE HYDROCHLORIDE 50 MG/ML
25 INJECTION, SOLUTION INTRAMUSCULAR; INTRAVENOUS
Status: ACTIVE | OUTPATIENT
Start: 2017-03-09 | End: 2017-03-09

## 2017-03-06 RX ORDER — SODIUM CHLORIDE 9 MG/ML
25 INJECTION, SOLUTION INTRAVENOUS CONTINUOUS
Status: DISPENSED | OUTPATIENT
Start: 2017-03-09 | End: 2017-03-09

## 2017-03-08 ENCOUNTER — TELEPHONE (OUTPATIENT)
Dept: RHEUMATOLOGY | Age: 82
End: 2017-03-08

## 2017-03-08 NOTE — TELEPHONE ENCOUNTER
Returned call to patient and answered questions regarding her infusion which was scheduled for tomorrow. The patient is still on anti-biotic therapy for a bladder infection(last day) The patient was advised to wait a few more days until the antibiotics are finished and out of her system. The patient was also asked to make sure she has no signs of infection or fever and that she is feeling well. The patient stated she would contact the infusion center and have her infusion rescheduled. The patient voiced understanding of this.

## 2017-03-09 ENCOUNTER — HOSPITAL ENCOUNTER (OUTPATIENT)
Dept: INFUSION THERAPY | Age: 82
Discharge: HOME | End: 2017-03-09
Payer: MEDICARE

## 2017-03-15 RX ORDER — SODIUM CHLORIDE 9 MG/ML
25 INJECTION, SOLUTION INTRAVENOUS CONTINUOUS
Status: DISPENSED | OUTPATIENT
Start: 2017-03-21 | End: 2017-03-21

## 2017-03-15 RX ORDER — DIPHENHYDRAMINE HYDROCHLORIDE 50 MG/ML
25 INJECTION, SOLUTION INTRAMUSCULAR; INTRAVENOUS
Status: ACTIVE | OUTPATIENT
Start: 2017-03-21 | End: 2017-03-21

## 2017-03-15 RX ORDER — ACETAMINOPHEN 325 MG/1
650 TABLET ORAL
Status: ACTIVE | OUTPATIENT
Start: 2017-03-21 | End: 2017-03-21

## 2017-03-21 ENCOUNTER — HOSPITAL ENCOUNTER (OUTPATIENT)
Dept: INFUSION THERAPY | Age: 82
Discharge: HOME | End: 2017-03-21
Payer: MEDICARE

## 2017-03-21 VITALS
HEART RATE: 79 BPM | DIASTOLIC BLOOD PRESSURE: 80 MMHG | WEIGHT: 143.8 LBS | TEMPERATURE: 98.1 F | BODY MASS INDEX: 26.3 KG/M2 | RESPIRATION RATE: 18 BRPM | SYSTOLIC BLOOD PRESSURE: 196 MMHG | OXYGEN SATURATION: 98 %

## 2017-03-21 PROCEDURE — 74011250636 HC RX REV CODE- 250/636

## 2017-03-21 PROCEDURE — 74011250636 HC RX REV CODE- 250/636: Performed by: INTERNAL MEDICINE

## 2017-03-21 PROCEDURE — 74011000258 HC RX REV CODE- 258: Performed by: INTERNAL MEDICINE

## 2017-03-21 PROCEDURE — 96413 CHEMO IV INFUSION 1 HR: CPT

## 2017-03-21 RX ADMIN — SODIUM CHLORIDE 25 ML/HR: 900 INJECTION, SOLUTION INTRAVENOUS at 11:20

## 2017-03-21 RX ADMIN — TOCILIZUMAB 250 MG: 20 INJECTION, SOLUTION, CONCENTRATE INTRAVENOUS at 12:00

## 2017-03-21 NOTE — PROGRESS NOTES
Outpatient Infusion Center Short Visit Progress Note    7952 Pt admit to [facility] for Actemra ambulatory in stable condition. Assessment completed. No new concerns voiced. PIV with positive blood return and flush. Patient Vitals for the past 12 hrs:   Temp Pulse Resp BP SpO2   03/21/17 1318 98.1 °F (36.7 °C) 79 18 196/80 98 %   03/21/17 1101 98 °F (36.7 °C) 71 18 144/76 99 %       Medications:  NS IV  Actemra IV    1320 Pt tolerated treatment well. PIV with positive blood return and flush. D/c home ambulatory in no distress.  Pt aware of next appointment scheduled for 4/18 11am

## 2017-03-23 ENCOUNTER — APPOINTMENT (OUTPATIENT)
Dept: INFUSION THERAPY | Age: 82
End: 2017-03-23

## 2017-04-06 ENCOUNTER — TELEPHONE (OUTPATIENT)
Dept: RHEUMATOLOGY | Age: 82
End: 2017-04-06

## 2017-04-06 NOTE — TELEPHONE ENCOUNTER
Returned call to \[facility]\"" Access to Care and informed them that the patient is receiving their infusions [facility]). The \[facility]\"" Access to [address] [address]sem Yip stated that their faxes should have been address to [address] [place] [address] and not [facility]. I gave them the fax and telephone number for [address] [place] [address]. I was told to please disregard the fax rc'vd which stated they needed information on coordinating shipment for the patient's Actemra.

## 2017-04-12 RX ORDER — DIPHENHYDRAMINE HYDROCHLORIDE 50 MG/ML
25 INJECTION, SOLUTION INTRAMUSCULAR; INTRAVENOUS
Status: ACTIVE | OUTPATIENT
Start: 2017-04-18 | End: 2017-04-18

## 2017-04-12 RX ORDER — SODIUM CHLORIDE 9 MG/ML
25 INJECTION, SOLUTION INTRAVENOUS CONTINUOUS
Status: DISPENSED | OUTPATIENT
Start: 2017-04-18 | End: 2017-04-18

## 2017-04-12 RX ORDER — ACETAMINOPHEN 325 MG/1
650 TABLET ORAL
Status: ACTIVE | OUTPATIENT
Start: 2017-04-18 | End: 2017-04-18

## 2017-04-14 ENCOUNTER — HOSPITAL ENCOUNTER (EMERGENCY)
Age: 82
Discharge: HOME | End: 2017-04-14
Attending: EMERGENCY MEDICINE | Admitting: EMERGENCY MEDICINE
Payer: MEDICARE

## 2017-04-14 VITALS
HEIGHT: 62 IN | DIASTOLIC BLOOD PRESSURE: 65 MMHG | OXYGEN SATURATION: 98 % | HEART RATE: 70 BPM | TEMPERATURE: 98.5 F | BODY MASS INDEX: 27.05 KG/M2 | RESPIRATION RATE: 17 BRPM | WEIGHT: 147 LBS | SYSTOLIC BLOOD PRESSURE: 173 MMHG

## 2017-04-14 DIAGNOSIS — I15.9 SECONDARY HYPERTENSION: Primary | ICD-10-CM

## 2017-04-14 DIAGNOSIS — T50.905A MEDICATION SIDE EFFECT, INITIAL ENCOUNTER: ICD-10-CM

## 2017-04-14 PROCEDURE — 96374 THER/PROPH/DIAG INJ IV PUSH: CPT

## 2017-04-14 PROCEDURE — 99284 EMERGENCY DEPT VISIT MOD MDM: CPT

## 2017-04-14 PROCEDURE — 74011000250 HC RX REV CODE- 250: Performed by: EMERGENCY MEDICINE

## 2017-04-14 RX ORDER — AMLODIPINE BESYLATE 5 MG/1
10 TABLET ORAL DAILY
Qty: 60 TAB | Refills: 0 | Status: SHIPPED | OUTPATIENT
Start: 2017-04-14 | End: 2020-11-30

## 2017-04-14 RX ORDER — ENALAPRILAT 1.25 MG/ML
1.25 INJECTION INTRAVENOUS
Status: COMPLETED | OUTPATIENT
Start: 2017-04-14 | End: 2017-04-14

## 2017-04-14 RX ADMIN — ENALAPRILAT 1.25 MG: 2.5 INJECTION INTRAVENOUS at 12:41

## 2017-04-14 NOTE — ED TRIAGE NOTES
Patient presents with 4-5 day history of sinus pressure/sore throat. Has been taking allergy medication this week. This morning took own BP and it was 194/86. Patient experiencing anxiety.

## 2017-04-14 NOTE — DISCHARGE INSTRUCTIONS
Side Effects of Medicine: Care Instructions  Your Care Instructions  Medicines are a big part of treatment for many health problems. But all medicines have side effects. Often these are mild problems. They might include a dry mouth or upset stomach. But sometimes medicines can cause dangerous side effects. One example is a bad allergic reaction. The best treatment will depend on what side effects you have. If you have a serious side effect, you may need to stop taking the medicine. You may also need to take another medicine to treat the side effect. If you have a mild side effect, it may go away after you take the medicine for a while. The doctor has checked you carefully, but problems can develop later. If you notice any problems or new symptoms, get medical treatment right away. Follow-up care is a key part of your treatment and safety. Be sure to make and go to all appointments, and call your doctor if you are having problems. It's also a good idea to know your test results and keep a list of the medicines you take. How can you care for yourself at home? · Be safe with medicines. Take your medicines exactly as prescribed. Call your doctor if you think you are having a problem with your medicine. · Call your doctor if side effects bother you and you wonder if you should keep taking a medicine. Your doctor may be able to lower your dose or change your medicine. Do not suddenly quit taking your medicine unless a doctor tells you to. · Make sure your doctor has a list of all the medicines, vitamins, supplements, and herbal remedies you take. Ask about side effects. When should you call for help? Call 911 anytime you think you may need emergency care. For example, call if:  · You have symptoms of a severe allergic reaction. These may include:  ¨ Sudden raised, red areas (hives) all over your body. ¨ Swelling of the throat, mouth, lips, or tongue. ¨ Trouble breathing.   ¨ Passing out (losing consciousness). Or you may feel very lightheaded or suddenly feel weak, confused, or restless. Call your doctor now or seek immediate medical care if:  · You have symptoms of an allergic reaction, such as:  ¨ A rash or hives (raised, red areas on the skin). ¨ Itching. ¨ Swelling. ¨ Belly pain, nausea, or vomiting. Watch closely for changes in your health, and be sure to contact your doctor if:  · You think you are having a new problem with your medicine. · You do not get better as expected. Where can you learn more? Go to http://tej-leidy.info/. Enter D152 in the search box to learn more about \"Side Effects of Medicine: Care Instructions. \"  Current as of: August 14, 2016  Content Version: 11.2  © 3502-3114 [employer]. Care instructions adapted under license by [employer] (which disclaims liability or warranty for this information). If you have questions about a medical condition or this instruction, always ask your healthcare professional. [place] [address] any warranty or liability for your use of this information. We hope that we have addressed all of your medical concerns. The examination and treatment you received in the Emergency Department were for an emergent problem and were not intended as complete care. It is important that you follow up with your healthcare provider(s) for ongoing care. If your symptoms worsen or do not improve as expected, and you are unable to reach your usual health care provider(s), you should return to the Emergency Department. Today's healthcare is undergoing tremendous change, and patient satisfaction surveys are one of the many tools to assess the quality of medical care. You may receive a survey from the [employer] organization regarding your experience in the Emergency Department. I hope that your experience has been completely positive, particularly the medical care that I provided.   As such, please participate in the survey; anything less than excellent does not meet my expectations or intentions. [facility] and [facility]3] participate in nationally recognized quality of care measures. If your blood pressure is greater than 120/80, as reported below, we urge that you seek medical care to address the potential of high blood pressure, commonly known as hypertension. Hypertension can be hereditary or can be caused by certain medical conditions, pain, stress, or \"white coat syndrome. \"       Please make an appointment with your health care provider(s) for follow up of your Emergency Department visit. VITALS:   Patient Vitals for the past 8 hrs:   Temp Pulse Resp BP SpO2   04/14/17 1300 - 72 18 174/77 93 %   04/14/17 1245 - 79 16 178/82 96 %   04/14/17 1240 - - - - 97 %   04/14/17 1239 - - - 186/76 -   04/14/17 1215 - 79 17 (!) 207/94 98 %   04/14/17 1206 98.5 °F (36.9 °C) 92 18 (!) 249/94 98 %          Thank you for allowing us to provide you with medical care today. We realize that you have many choices for your emergency care needs. Please choose us in the future for any continued health care needs. Erasto Marsh, [address] [place] [address] [address]   Office: [phone]

## 2017-04-14 NOTE — ED PROVIDER NOTES
HPI Comments: 80 y.o. female with past medical history significant for rheumatoid arthritis, HTN, hypothyroid, diabetes, post-menopausal osteoporosis, and diverticulitis who presents from home via EMS with chief complaint of hypertension. Pt states that she has been having seasonal allergies for a couple weeks now and was prescribed an OTC allergy medication by Dr. Jackie Jose. Pt reports experiencing sx such as hearing loss in her R ear, throat tightness, and eye redness/itching. She reports taking one dose of the medication this morning, and started to feel anxious and states that she had some tingling and numbness in her L arm. Pt took her blood pressure and reports that it was 195/86 and took a half dose of xanax for the anxiety. Per EMS, patient's blood pressure was 203/82 en route. Pt states that she has had several episodes of elevated blood pressure since last May. She reports receiving an Actemra infusion once every month. There are no other acute medical concerns at this time. Social hx: former smoker, rare EtOH use, no drug use  PCP: Doug Torres MD  Cardiology: Sourav Mayberry MD    Note written by Darin Holcomb, as dictated by Layla Christianson MD 11:55 PM      The history is provided by the patient. No  was used.         Past Medical History:   Diagnosis Date    Arthritis, rheumatoid (HCC)     Diabetes ([address]     Diverticulitis     Hard of hearing     Hypertension     Hypothyroid     Osteoporosis, post-menopausal        Past Surgical History:   Procedure Laterality Date    HX HEMORRHOIDECTOMY      HX HYSTERECTOMY      HX THYROIDECTOMY           Family History:   Problem Relation Age of Onset    Heart Disease Mother     Diabetes Father     Cancer Brother      brain cancer    Heart Disease Brother     Diabetes Brother     Other Other      scleroderma       Social History     Social History    Marital status:      Spouse name: N/A  Number of children: N/A    Years of education: N/A     Occupational History    Not on file. Social History Main Topics    Smoking status: Former Smoker     Types: Cigarettes     Quit date: 12/8/1976    Smokeless tobacco: Never Used    Alcohol use Yes      Comment: Waylon time 1 glass wine    Drug use: No    Sexual activity: No     Other Topics Concern    Not on file     Social History Narrative    ** Merged History Encounter **              ALLERGIES: Alendronate; Amoxicillin; and Hydrochlorothiazide    Review of Systems   Constitutional: Negative for chills and fever. HENT: Positive for hearing loss (R ear). Negative for ear pain and sore throat. Throat tightness   Eyes: Positive for redness and itching. Negative for photophobia and pain. Respiratory: Negative for chest tightness and shortness of breath. Cardiovascular: Negative for chest pain and leg swelling. Gastrointestinal: Negative for abdominal pain, nausea and vomiting. Genitourinary: Negative for dysuria and flank pain. Musculoskeletal: Negative for back pain and neck pain. Skin: Negative for rash and wound. Neurological: Positive for numbness (L arm). Negative for dizziness, light-headedness and headaches. Psychiatric/Behavioral: The patient is nervous/anxious. All other systems reviewed and are negative. There were no vitals filed for this visit. Physical Exam   Constitutional: She is oriented to person, place, and time. She appears well-developed and well-nourished. No distress. HENT:   Head: Normocephalic and atraumatic. Mouth/Throat: Oropharynx is clear and moist.   Eyes: Conjunctivae and EOM are normal. Pupils are equal, round, and reactive to light. Neck: Normal range of motion. Cardiovascular: Normal rate, regular rhythm, normal heart sounds and intact distal pulses. No murmur heard. Pulmonary/Chest: Effort normal and breath sounds normal. No stridor. No respiratory distress. Abdominal: Soft. Bowel sounds are normal. There is no tenderness. Musculoskeletal: Normal range of motion. She exhibits no edema or tenderness. Neurological: She is alert and oriented to person, place, and time. No cranial nerve deficit. Skin: Skin is warm and dry. She is not diaphoretic. Psychiatric: Her mood appears anxious. Nursing note and vitals reviewed. MDM  Number of Diagnoses or Management Options  Medication side effect, initial encounter:   Secondary hypertension:   Diagnosis management comments: Patient with HTN and taking claritin and flonase - patient denies chest pain or SOB but got anxious when her BP went high and took ativan.     Patient improved after meds in the ED - d/c home to f/u with her doctor  Will have her stop claritin and will increase her amlodipine     ED Course       Procedures

## 2017-04-18 ENCOUNTER — HOSPITAL ENCOUNTER (OUTPATIENT)
Dept: INFUSION THERAPY | Age: 82
Discharge: HOME | End: 2017-04-18
Payer: MEDICARE

## 2017-04-18 VITALS
OXYGEN SATURATION: 98 % | TEMPERATURE: 97.8 F | BODY MASS INDEX: 28.86 KG/M2 | WEIGHT: 147 LBS | SYSTOLIC BLOOD PRESSURE: 159 MMHG | HEART RATE: 76 BPM | HEIGHT: 60 IN | DIASTOLIC BLOOD PRESSURE: 62 MMHG | RESPIRATION RATE: 16 BRPM

## 2017-04-18 LAB
ALBUMIN SERPL BCP-MCNC: 3.6 G/DL (ref 3.5–5)
ALBUMIN/GLOB SERPL: 0.9 {RATIO} (ref 1.1–2.2)
ALP SERPL-CCNC: 82 U/L (ref 45–117)
ALT SERPL-CCNC: 28 U/L (ref 12–78)
ANION GAP BLD CALC-SCNC: 8 MMOL/L (ref 5–15)
AST SERPL W P-5'-P-CCNC: 28 U/L (ref 15–37)
BASO+EOS+MONOS # BLD AUTO: 0.5 K/UL (ref 0.2–1.2)
BASO+EOS+MONOS # BLD AUTO: 7 % (ref 3.2–16.9)
BILIRUB SERPL-MCNC: 0.8 MG/DL (ref 0.2–1)
BUN SERPL-MCNC: 12 MG/DL (ref 6–20)
BUN/CREAT SERPL: 11 (ref 12–20)
CALCIUM SERPL-MCNC: 9.2 MG/DL (ref 8.5–10.1)
CHLORIDE SERPL-SCNC: 102 MMOL/L (ref 97–108)
CO2 SERPL-SCNC: 29 MMOL/L (ref 21–32)
CREAT SERPL-MCNC: 1.06 MG/DL (ref 0.55–1.02)
CRP SERPL-MCNC: 5.07 MG/DL
DIFFERENTIAL METHOD BLD: ABNORMAL
ERYTHROCYTE [DISTWIDTH] IN BLOOD BY AUTOMATED COUNT: 14.9 % (ref 11.8–15.8)
ERYTHROCYTE [SEDIMENTATION RATE] IN BLOOD: 44 MM/HR (ref 0–30)
GLOBULIN SER CALC-MCNC: 3.8 G/DL (ref 2–4)
GLUCOSE SERPL-MCNC: 110 MG/DL (ref 65–100)
HCT VFR BLD AUTO: 33.3 % (ref 35–47)
HGB BLD-MCNC: 11.1 G/DL (ref 11.5–16)
LYMPHOCYTES # BLD AUTO: 16 % (ref 12–49)
LYMPHOCYTES # BLD: 1.2 K/UL (ref 0.8–3.5)
MCH RBC QN AUTO: 30.3 PG (ref 26–34)
MCHC RBC AUTO-ENTMCNC: 33.3 G/DL (ref 30–36.5)
MCV RBC AUTO: 91 FL (ref 80–99)
NEUTS SEG # BLD: 5.4 K/UL (ref 1.8–8)
NEUTS SEG NFR BLD AUTO: 77 % (ref 32–75)
PLATELET # BLD AUTO: 244 K/UL (ref 150–400)
POTASSIUM SERPL-SCNC: 4.5 MMOL/L (ref 3.5–5.1)
PROT SERPL-MCNC: 7.4 G/DL (ref 6.4–8.2)
RBC # BLD AUTO: 3.66 M/UL (ref 3.8–5.2)
SODIUM SERPL-SCNC: 139 MMOL/L (ref 136–145)
WBC # BLD AUTO: 7.1 K/UL (ref 3.6–11)

## 2017-04-18 PROCEDURE — 74011250636 HC RX REV CODE- 250/636

## 2017-04-18 PROCEDURE — 36415 COLL VENOUS BLD VENIPUNCTURE: CPT | Performed by: INTERNAL MEDICINE

## 2017-04-18 PROCEDURE — 85652 RBC SED RATE AUTOMATED: CPT | Performed by: INTERNAL MEDICINE

## 2017-04-18 PROCEDURE — 74011250636 HC RX REV CODE- 250/636: Performed by: INTERNAL MEDICINE

## 2017-04-18 PROCEDURE — 74011000258 HC RX REV CODE- 258: Performed by: INTERNAL MEDICINE

## 2017-04-18 PROCEDURE — 85025 COMPLETE CBC W/AUTO DIFF WBC: CPT | Performed by: INTERNAL MEDICINE

## 2017-04-18 PROCEDURE — 86140 C-REACTIVE PROTEIN: CPT | Performed by: INTERNAL MEDICINE

## 2017-04-18 PROCEDURE — 80053 COMPREHEN METABOLIC PANEL: CPT | Performed by: INTERNAL MEDICINE

## 2017-04-18 PROCEDURE — 96413 CHEMO IV INFUSION 1 HR: CPT

## 2017-04-18 RX ADMIN — SODIUM CHLORIDE 25 ML/HR: 900 INJECTION, SOLUTION INTRAVENOUS at 14:00

## 2017-04-18 RX ADMIN — TOCILIZUMAB 250 MG: 20 INJECTION, SOLUTION, CONCENTRATE INTRAVENOUS at 14:53

## 2017-04-18 NOTE — PROGRESS NOTES
[facility] OPIC VISIT NOTE    1100 hrs   Pt arrived at [facility] ambulatory and in no distress for Tolicizumab (q 4 wks. )  Assessment completed, c/o intermittent knee pain r/t RA however denied pain during assessment; uses prescribed opiods for moderate to severe pain; educated pt on fall precautions; pt verbalized understanding. RAC PIV #22 established w/o difficulty. Positive blood return noted and labs drawn. Blood pressure 116/63, pulse 69, temperature 97.8 °F (36.6 °C), resp. rate 16, height 5' (1.524 m), weight 66.7 kg (147 lb), SpO2 96 %. Recent Results (from the past 12 hour(s))   CBC WITH 3 PART DIFF    Collection Time: 04/18/17 11:14 AM   Result Value Ref Range    WBC 7.1 3.6 - 11.0 K/uL    RBC 3.66 (L) 3.80 - 5.20 M/uL    HGB 11.1 (L) 11.5 - 16.0 g/dL    HCT 33.3 (L) 35.0 - 47.0 %    MCV 91.0 80.0 - 99.0 FL    MCH 30.3 26.0 - 34.0 PG    MCHC 33.3 30.0 - 36.5 g/dL    RDW 14.9 11.8 - 15.8 %    PLATELET 294 497 - 246 K/uL    NEUTROPHILS 77 (H) 32 - 75 %    MIXED CELLS 7 3.2 - 16.9 %    LYMPHOCYTES 16 12 - 49 %    ABS. NEUTROPHILS 5.4 1.8 - 8.0 K/UL    ABS. MIXED CELLS 0.5 0.2 - 1.2 K/uL    ABS.  LYMPHOCYTES 1.2 0.8 - 3.5 K/UL    DF AUTOMATED     METABOLIC PANEL, COMPREHENSIVE    Collection Time: 04/18/17 11:14 AM   Result Value Ref Range    Sodium 139 136 - 145 mmol/L    Potassium 4.5 3.5 - 5.1 mmol/L    Chloride 102 97 - 108 mmol/L    CO2 29 21 - 32 mmol/L    Anion gap 8 5 - 15 mmol/L    Glucose 110 (H) 65 - 100 mg/dL    BUN 12 6 - 20 MG/DL    Creatinine 1.06 (H) 0.55 - 1.02 MG/DL    BUN/Creatinine ratio 11 (L) 12 - 20      GFR est AA 59 (L) >60 ml/min/1.73m2    GFR est non-AA 49 (L) >60 ml/min/1.73m2    Calcium 9.2 8.5 - 10.1 MG/DL    Bilirubin, total 0.8 0.2 - 1.0 MG/DL    ALT (SGPT) 28 12 - 78 U/L    AST (SGOT) 28 15 - 37 U/L    Alk. phosphatase 82 45 - 117 U/L    Protein, total 7.4 6.4 - 8.2 g/dL    Albumin 3.6 3.5 - 5.0 g/dL    Globulin 3.8 2.0 - 4.0 g/dL    A-G Ratio 0.9 (L) 1.1 - 2.2     SED RATE (ESR)    Collection Time: 04/18/17 11:14 AM   Result Value Ref Range    Sed rate, automated 44 (H) 0 - 30 mm/hr   C REACTIVE PROTEIN, QT    Collection Time: 04/18/17 11:14 AM   Result Value Ref Range    C-Reactive protein 5.07 (H) <0.60 mg/dL         Medications received: Tolicizumab IV    Tolerated treatment well, no adverse reaction noted. PIV flushed, positive blood return noted. No redness or swelling noted. PIV removed; bandaid dressing applied. Blood pressure 159/62, pulse 76, temperature 97.8 °F (36.6 °C), resp. rate 16, height 5' (1.524 m), weight 66.7 kg (147 lb), SpO2 98 %. 1600 hrs   D/C'd from [facility] ambulatory and in no distress.  Next appointment is 05/16/17 @ 11 am.

## 2017-04-18 NOTE — PROGRESS NOTES
Inflammation markers elevated. Mild, stable anemia. Kidney function mildly low and stable. Please make sure she has follow up scheduled.

## 2017-04-19 NOTE — PROGRESS NOTES
Called and spoke with the patient. Informed them of the results/orders. Understanding was verbalized. Pt will be calling to make f/u appt.

## 2017-05-09 ENCOUNTER — OFFICE VISIT (OUTPATIENT)
Dept: RHEUMATOLOGY | Age: 82
End: 2017-05-09

## 2017-05-09 VITALS
HEIGHT: 61 IN | RESPIRATION RATE: 16 BRPM | SYSTOLIC BLOOD PRESSURE: 156 MMHG | DIASTOLIC BLOOD PRESSURE: 76 MMHG | BODY MASS INDEX: 27.94 KG/M2 | WEIGHT: 148 LBS | HEART RATE: 76 BPM | OXYGEN SATURATION: 99 % | TEMPERATURE: 96.7 F

## 2017-05-09 DIAGNOSIS — N18.3 CKD (CHRONIC KIDNEY DISEASE), STAGE 3 (MODERATE): ICD-10-CM

## 2017-05-09 DIAGNOSIS — Z79.899 LONG-TERM USE OF HIGH-RISK MEDICATION: ICD-10-CM

## 2017-05-09 DIAGNOSIS — M05.79 RHEUMATOID ARTHRITIS INVOLVING MULTIPLE SITES WITH POSITIVE RHEUMATOID FACTOR (HCC): Primary | Chronic | ICD-10-CM

## 2017-05-09 DIAGNOSIS — M81.0 OSTEOPOROSIS, POST-MENOPAUSAL: ICD-10-CM

## 2017-05-09 RX ORDER — LISINOPRIL 20 MG/1
TABLET ORAL
Refills: 1 | COMMUNITY
Start: 2017-01-30 | End: 2020-11-30

## 2017-05-09 NOTE — MR AVS SNAPSHOT
Visit Information     Date & Time Provider Department Dept. Phone Encounter #    5/9/2017 11:00 AM Errol Carter MD [facility] [id number]      Follow-up Instructions     Return in about 10 weeks (around 7/18/2017). Upcoming Health Maintenance        Date Due    FOOT EXAM Q1 2/13/1940    MICROALBUMIN Q1 2/13/1940    EYE EXAM RETINAL OR DILATED Q1 2/13/1940    DTaP/Tdap/Td series (1 - Tdap) 2/13/1951    ZOSTER VACCINE AGE 60> 2/13/1990    GLAUCOMA SCREENING Q2Y 2/13/1995    Pneumococcal 65+ Low/Medium Risk (1 of 2 - PCV13) 2/13/1995    MEDICARE YEARLY EXAM 2/13/1995    HEMOGLOBIN A1C Q6M 10/9/2016    INFLUENZA AGE 9 TO ADULT 8/1/2017    LIPID PANEL Q1 2/6/2018      Allergies as of 5/9/2017  Review Complete On: 5/9/2017 By: Brenda Abraham PA-C       Severity Noted Reaction Type Reactions    Alendronate  02/01/2017    Diarrhea    Amoxicillin  08/08/2011    Rash    Hydrochlorothiazide  06/21/2016    Other (comments)    \"Caused pain everywhere\"      Current Immunizations  Reviewed on 5/9/2017    Name Date    Influenza Vaccine 10/8/2016       Reviewed by Annia Mike LPN on 9/8/6893 at 97:76 AM    Reviewed by Annia Mike LPN on 3/1/8106 at 31:75 AM   You Were Diagnosed With        Codes Comments    Rheumatoid arthritis involving multiple sites with positive rheumatoid factor [facility]    -  Primary ICD-10-CM: M05.79  ICD-9-CM: 714.0     Long-term use of high-risk medication     ICD-10-CM: Z79.899  ICD-9-CM: V58.69     Osteoporosis, post-menopausal     ICD-10-CM: M81.0  ICD-9-CM: 733.01     CKD (chronic kidney disease), stage 3 (moderate)     ICD-10-CM: N18.3  ICD-9-CM: 013. 3       Vitals     BP Pulse Temp Resp Height(growth percentile) Weight(growth percentile)    156/76 (BP 1 Location: Right arm, BP Patient Position: Sitting) 76 96.7 °F (35.9 °C) (Oral) 16 5' 1\" (1.549 m) 148 lb (67.1 kg)    SpO2 BMI OB Status Smoking Status          99% 27.96 kg/m2 Postmenopausal Former Smoker      Vitals History      BMI and BSA Data     Body Mass Index Body Surface Area    27.96 kg/m 2 1.7 m 2         Preferred Pharmacy       Pharmacy Name Phone    [facility] [address] [phone]         Your Updated Medication List          This list is accurate as of: 5/9/17 12:36 PM.  Always use your most recent med list.                acetaminophen 325 mg tablet   Commonly known as:  TYLENOL   Take 325 mg by mouth daily as needed for Pain. ACTEMRA IV   by IntraVENous route every thirty (30) days. alendronate 70 mg tablet   Commonly known as:  FOSAMAX   70 mg once weekly. Please review proper mode of taking       amLODIPine 5 mg tablet   Commonly known as:  NORVASC   Take 2 Tabs by mouth daily. aspirin delayed-release 81 mg tablet   Take 81 mg by mouth every other day. fluticasone 50 mcg/actuation nasal spray   Commonly known as:  FLONASE   INSTILL 1 SPRAY IN EACH NOSTRIL ONCE A DAY NASALLY 30 DAY(S)       folic acid 1 mg tablet   Commonly known as:  FOLVITE   Take 1 mg by mouth every fourty-eight (48) hours. levothyroxine 75 mcg tablet   Commonly known as:  SYNTHROID   Take 75 mcg by mouth Daily (before breakfast). * LISINOPRIL PO   Take 20 mg by mouth two (2) times a day. * lisinopril 20 mg tablet   Commonly known as:  PRINIVIL, ZESTRIL   TAKE 1 TABLET BY MOUTH TWICE A DAY       Menthol/Camphor/Antarth Cb#1 11-11 % Crea   by Apply Externally route as needed (sore neck/shoulder). methotrexate 2.5 mg tablet   Commonly known as:  RHEUMATREX   New dose. Lower to 12.5 mg (5 tabs) once weekly. Please use new RX for next refill       pantoprazole 40 mg tablet   Commonly known as:  PROTONIX   1 TABLET ONCE A DAY ORALLY 30 DAY(S)       ZANTAC 75 75 mg tablet   Generic drug:  raNITIdine   Take 75 mg by mouth two (2) times a day. * Notice:   This list has 2 medication(s) that are the same as other medications prescribed for you. Read the directions carefully, and ask your doctor or other care provider to review them with you. Follow-up Instructions     Return in about 10 weeks (around 7/18/2017). To-Do List     05/09/2017     Lab:  C REACTIVE PROTEIN, QT        05/09/2017     Lab:  CBC WITH AUTOMATED DIFF        05/09/2017     Lab:  METABOLIC PANEL, COMPREHENSIVE        05/09/2017     Lab:  SED RATE (ESR)        05/16/2017 11:00 AM     Appointment with [address] at [place] [address] ([phone])         Patient Instructions    Labs 2 weeks after next Actemra infusion. Read about Prolia and we will check authorization. Stay on same dose of 5 pills methotrexate. Introducing [facility]! Joce Webb introduces [employer] patient portal. Now you can access parts of your medical record, email your doctor's office, and request medication refills online. 1. In your internet browser, go to [email or web address]   2. Click on the First Time User? Click Here link in the Sign In box. You will see the New Member Sign Up page. 3. Enter your [employer] Access Code exactly as it appears below. You will not need to use this code after youve completed the sign-up process. If you do not sign up before the expiration date, you must request a new code. · [employer] Access Code: [email or web address]  Expires: 6/8/2017 10:47 AM    4. Enter the last four digits of your Social Security Number (xxxx) and Date of Birth (mm/dd/yyyy) as indicated and click Submit. You will be taken to the next sign-up page. 5. Create a [employer] ID. This will be your [employer] login ID and cannot be changed, so think of one that is secure and easy to remember. 6. Create a [employer] password. You can change your password at any time. 7. Enter your Password Reset Question and Answer. This can be used at a later time if you forget your password. 8. Enter your e-mail address.  You will receive e-mail notification when new information is available in [address] 9. Click Sign Up. You can now view and download portions of your medical record. 10. Click the Download Summary menu link to download a portable copy of your medical information. If you have questions, please visit the Frequently Asked Questions section of the [employer] website. Remember, [employer] is NOT to be used for urgent needs. For medical emergencies, dial 911. Now available from your iPhone and Android! Please provide this summary of care documentation to your next provider. Your primary care clinician is listed as Lamberto Cohen. If you have any questions after today's visit, please call [phone].

## 2017-05-09 NOTE — PROGRESS NOTES
HISTORY OF PRESENT ILLNESS  Honey iL is a 80 y.o. female. She is accompanied by her daughter. HPI  She presents for follow up seropositive rheumatoid arthritis, on methotrexate 12.5 mg po (reduced from 25 mg after last visit in Jan), and Actemra q4wks (last infusion 4/18/17). No flare since last visit. No joint swelling. She will get some pain in left shoulder at full overhead motion, and some left knee pain. She has AM stiffness x 15-20 min. No recent fever or infection since last visit. Normal appetite and po intake. She will walk down stairs and inside the hallways at her senior apartment complex, and enjoys walking while shopping! Regarding osteoporosis, she gets perhaps 2-3 servings of a calcium rich food daily. She is taking a multivitamin daily. She tried the alendronate after last visit but had diarrhea, so she stopped it. She reports having an ER visit last month due to elevated blood pressure, thought to be related to allergy medicines. She has increased her amlodipine from 5 mg to 10 mg and stopped claritin. Review of Systems   Constitutional: Negative for fever and weight loss. HENT: Negative for sore throat. Eyes: Negative for blurred vision. Respiratory: Positive for cough. Negative for sputum production. Cardiovascular: Negative for leg swelling. Gastrointestinal: Negative for abdominal pain, blood in stool, melena, nausea and vomiting. Musculoskeletal: Negative for falls. Skin: Negative for rash. Endo/Heme/Allergies: Negative for polydipsia. Does not bruise/bleed easily. Allergies   Allergen Reactions    Alendronate Diarrhea    Amoxicillin Rash    Hydrochlorothiazide Other (comments)     \"Caused pain everywhere\"       Current Outpatient Prescriptions   Medication Sig Dispense Refill    lisinopril (PRINIVIL, ZESTRIL) 20 mg tablet TAKE 1 TABLET BY MOUTH TWICE A DAY  1    amLODIPine (NORVASC) 5 mg tablet Take 2 Tabs by mouth daily.  60 Tab 0    fluticasone (FLONASE) 50 mcg/actuation nasal spray INSTILL 1 SPRAY IN EACH NOSTRIL ONCE A DAY NASALLY 30 DAY(S)  1    raNITIdine (ZANTAC 75) 75 mg tablet Take 75 mg by mouth two (2) times a day.  methotrexate (RHEUMATREX) 2.5 mg tablet New dose. Lower to 12.5 mg (5 tabs) once weekly. Please use new RX for next refill 25 Tab 3    folic acid (FOLVITE) 1 mg tablet Take 1 mg by mouth every fourty-eight (48) hours.  LISINOPRIL PO Take 20 mg by mouth two (2) times a day.  levothyroxine (SYNTHROID) 75 mcg tablet Take 75 mcg by mouth Daily (before breakfast).  acetaminophen (TYLENOL) 325 mg tablet Take 325 mg by mouth daily as needed for Pain.  aspirin delayed-release 81 mg tablet Take 81 mg by mouth every other day.  TOCILIZUMAB (ACTEMRA IV) by IntraVENous route every thirty (30) days.  pantoprazole (PROTONIX) 40 mg tablet 1 TABLET ONCE A DAY ORALLY 30 DAY(S)  3    alendronate (FOSAMAX) 70 mg tablet 70 mg once weekly. Please review proper mode of taking 4 Tab 11    Menthol/Camphor/Antarth Cb#1 11-11 % crea by Apply Externally route as needed (sore neck/shoulder). Past medical, surgical, and family hx reviewed. Vitals:    05/09/17 1059   BP: 156/76   Pulse: 76   Resp: 16   Temp: 96.7 °F (35.9 °C)   TempSrc: Oral   SpO2: 99%   Weight: 148 lb (67.1 kg)   Height: 5' 1\" (1.549 m)   PainSc:   5   PainLoc: Shoulder       Physical Exam   Constitutional: She is oriented to person, place, and time. She appears well-developed and well-nourished. HENT:   Full dentures; mouth is dry   Eyes: Conjunctivae are normal. Pupils are equal, round, and reactive to light. Neck: Neck supple. Cardiovascular: Normal rate and regular rhythm. Pulmonary/Chest: Effort normal and breath sounds normal. She has no wheezes. Abdominal: Soft.  Bowel sounds are normal.   Musculoskeletal:   -no peripheral joint tenderness or acute synovitis  -chronic changes with synovial thickening B MCPs  -mild subluxation at [facility] each hand   -hyperextension left hand PIP 2   -left shoulder not tender with full overhead abduction  -left knee not tender, flexion 90 degrees, no swelling   Lymphadenopathy:     She has no cervical adenopathy. Neurological: She is alert and oriented to person, place, and time. Skin: Skin is warm and dry. No rash noted. Psychiatric: She has a normal mood and affect. Thought content normal.   Vitals reviewed. Lab Results   Component Value Date/Time    Sed rate (ESR) 5 02/06/2017 08:22 AM     Lab Results   Component Value Date/Time    C-Reactive protein 5.07 04/18/2017 11:14 AM         Lab Results   Component Value Date/Time    WBC 7.1 04/18/2017 11:14 AM    HGB 11.1 04/18/2017 11:14 AM    HCT 33.3 04/18/2017 11:14 AM    PLATELET 517 00/77/9525 11:14 AM    MCV 91.0 04/18/2017 11:14 AM     Lab Results   Component Value Date/Time    Sodium 139 04/18/2017 11:14 AM    Potassium 4.5 04/18/2017 11:14 AM    Chloride 102 04/18/2017 11:14 AM    CO2 29 04/18/2017 11:14 AM    Anion gap 8 04/18/2017 11:14 AM    Glucose 110 04/18/2017 11:14 AM    BUN 12 04/18/2017 11:14 AM    Creatinine 1.06 04/18/2017 11:14 AM    BUN/Creatinine ratio 11 04/18/2017 11:14 AM    GFR est AA 59 04/18/2017 11:14 AM    GFR est non-AA 49 04/18/2017 11:14 AM    Calcium 9.2 04/18/2017 11:14 AM    Bilirubin, total 0.8 04/18/2017 11:14 AM    AST (SGOT) 28 04/18/2017 11:14 AM    Alk. phosphatase 82 04/18/2017 11:14 AM    Protein, total 7.4 04/18/2017 11:14 AM    Albumin 3.6 04/18/2017 11:14 AM    Globulin 3.8 04/18/2017 11:14 AM    A-G Ratio 0.9 04/18/2017 11:14 AM    ALT (SGPT) 28 04/18/2017 11:14 AM       ASSESSMENT and PLAN    ICD-10-CM ICD-9-CM    1. Rheumatoid arthritis involving multiple sites with positive rheumatoid factor (HCC) - CCP and RF positive by report. Deformities on hand exam. Methotrexate 25 mg weekly for many years. Actemra infusions since March 2016 (4 mg/kg) have helped.  She lowered methotrexate to 12.5 mg after last visit in Jan without flare. M05.79 714.0 C REACTIVE PROTEIN, QT    Continue current methotrexate 12.5 mg    Continue Actemra infusions 4 mg/kg q4wks        SED RATE (ESR)   2. Long-term use of high-risk medication A61.345 S32.79 METABOLIC PANEL, COMPREHENSIVE      CBC WITH AUTOMATED DIFF    Next labs 2 wks after May Actemra infsuion, and then q8wks     3. Osteoporosis, post-menopausal - no fracture history. DXA recently with T-score 1/3 radius -3.2. L1-2 T-score -2.5. She tried alendronate starting Jan 2017, but stopped due to diarrhea. Leery of reclast due to CKD. M81.0 733.01 We reviewed Prolia, risks, benefits, side effects. She would like to proceed with Prolia investigation for administering at infusion center     Vitamin D3 1000 units daily    Moderate dietary calcium intake, suggest calcium 600 mg daily supplement 2 week prior and 1 week after if she does in fact begin prolia     4. CKD (chronic kidney disease), stage 3 (moderate) - eGFR 40's F12.2 977.9 METABOLIC PANEL, COMPREHENSIVE    Continue to monitor    Lowered methotrexate from 25 mg po to 12.5 mg in Jan 2017           Follow-up Disposition:  Return in about 10 weeks (around 7/18/2017). I have reviewed and discussed all findings with Dr. Brendon Villanueva, who also examined the patient, and he agrees with the assessment and plan. ADDENDUM: I have seen and examined the patient. I have discussed the relevant findings with Ms. Silva and concur with the assessment and plan. Arthritis well controlled with methotrexate 12.5 mg weekly and Actemra 4 mg/kg every 4 weeks. Tolerating therapy. As long as doing well with stable labs, can obtain these every 8 weeks (and in between infusions). Discussed care for osteoporosis and will investigate Prolia 60 mg every 6 months therapy.   Leslie Contreras MD

## 2017-05-09 NOTE — PATIENT INSTRUCTIONS
Labs 2 weeks after next Actemra infusion. Read about Prolia and we will check authorization. Stay on same dose of 5 pills methotrexate.

## 2017-05-11 RX ORDER — SODIUM CHLORIDE 9 MG/ML
25 INJECTION, SOLUTION INTRAVENOUS CONTINUOUS
Status: DISPENSED | OUTPATIENT
Start: 2017-05-16 | End: 2017-05-16

## 2017-05-11 RX ORDER — DIPHENHYDRAMINE HYDROCHLORIDE 50 MG/ML
25 INJECTION, SOLUTION INTRAMUSCULAR; INTRAVENOUS
Status: ACTIVE | OUTPATIENT
Start: 2017-05-16 | End: 2017-05-16

## 2017-05-11 RX ORDER — ACETAMINOPHEN 325 MG/1
650 TABLET ORAL
Status: ACTIVE | OUTPATIENT
Start: 2017-05-16 | End: 2017-05-16

## 2017-05-16 ENCOUNTER — HOSPITAL ENCOUNTER (OUTPATIENT)
Dept: INFUSION THERAPY | Age: 82
Discharge: HOME | End: 2017-05-16
Payer: MEDICARE

## 2017-05-16 VITALS
HEIGHT: 60 IN | RESPIRATION RATE: 16 BRPM | SYSTOLIC BLOOD PRESSURE: 150 MMHG | DIASTOLIC BLOOD PRESSURE: 69 MMHG | TEMPERATURE: 97.7 F | BODY MASS INDEX: 28.8 KG/M2 | OXYGEN SATURATION: 99 % | HEART RATE: 72 BPM | WEIGHT: 146.7 LBS

## 2017-05-16 LAB
ALBUMIN SERPL BCP-MCNC: 3.6 G/DL (ref 3.5–5)
ANION GAP BLD CALC-SCNC: 11 MMOL/L (ref 5–15)
BUN SERPL-MCNC: 17 MG/DL (ref 6–20)
BUN/CREAT SERPL: 17 (ref 12–20)
CALCIUM SERPL-MCNC: 8.9 MG/DL (ref 8.5–10.1)
CHLORIDE SERPL-SCNC: 103 MMOL/L (ref 97–108)
CO2 SERPL-SCNC: 25 MMOL/L (ref 21–32)
CREAT SERPL-MCNC: 1.03 MG/DL (ref 0.55–1.02)
GLUCOSE SERPL-MCNC: 97 MG/DL (ref 65–100)
PHOSPHATE SERPL-MCNC: 3.7 MG/DL (ref 2.6–4.7)
POTASSIUM SERPL-SCNC: 4.1 MMOL/L (ref 3.5–5.1)
SODIUM SERPL-SCNC: 139 MMOL/L (ref 136–145)

## 2017-05-16 PROCEDURE — 80069 RENAL FUNCTION PANEL: CPT | Performed by: INTERNAL MEDICINE

## 2017-05-16 PROCEDURE — 74011250636 HC RX REV CODE- 250/636

## 2017-05-16 PROCEDURE — 74011250636 HC RX REV CODE- 250/636: Performed by: INTERNAL MEDICINE

## 2017-05-16 PROCEDURE — 96372 THER/PROPH/DIAG INJ SC/IM: CPT

## 2017-05-16 PROCEDURE — 96365 THER/PROPH/DIAG IV INF INIT: CPT

## 2017-05-16 PROCEDURE — 74011000258 HC RX REV CODE- 258: Performed by: INTERNAL MEDICINE

## 2017-05-16 PROCEDURE — 36415 COLL VENOUS BLD VENIPUNCTURE: CPT | Performed by: INTERNAL MEDICINE

## 2017-05-16 RX ORDER — SODIUM CHLORIDE 0.9 % (FLUSH) 0.9 %
10 SYRINGE (ML) INJECTION AS NEEDED
Status: ACTIVE | OUTPATIENT
Start: 2017-05-16 | End: 2017-05-16

## 2017-05-16 RX ADMIN — Medication 10 ML: at 14:45

## 2017-05-16 RX ADMIN — TOCILIZUMAB 250 MG: 20 INJECTION, SOLUTION, CONCENTRATE INTRAVENOUS at 13:33

## 2017-05-16 RX ADMIN — DENOSUMAB 60 MG: 60 INJECTION SUBCUTANEOUS at 13:02

## 2017-05-16 RX ADMIN — SODIUM CHLORIDE 25 ML/HR: 900 INJECTION, SOLUTION INTRAVENOUS at 12:58

## 2017-05-16 NOTE — PROGRESS NOTES
[facility] VISIT NOTE    1050 hrs   Pt arrived at [facility] ambulatory and in no distress for Actemra/Prolia. Assessment completed, pt voiced no new concerns at this time. First dose education reviewed for Prolia; all questions and concerns addressed. Left #24 PIV established with no difficulty. Positive blood return noted and labs drawn. No redness or swelling noted. Recent Results (from the past 12 hour(s))   RENAL FUNCTION PANEL    Collection Time: 05/16/17 10:56 AM   Result Value Ref Range    Sodium 139 136 - 145 mmol/L    Potassium 4.1 3.5 - 5.1 mmol/L    Chloride 103 97 - 108 mmol/L    CO2 25 21 - 32 mmol/L    Anion gap 11 5 - 15 mmol/L    Glucose 97 65 - 100 mg/dL    BUN 17 6 - 20 MG/DL    Creatinine 1.03 (H) 0.55 - 1.02 MG/DL    BUN/Creatinine ratio 17 12 - 20      GFR est AA >60 >60 ml/min/1.73m2    GFR est non-AA 51 (L) >60 ml/min/1.73m2    Calcium 8.9 8.5 - 10.1 MG/DL    Phosphorus 3.7 2.6 - 4.7 MG/DL    Albumin 3.6 3.5 - 5.0 g/dL       Medications received:  Prolia SC  Actemra IV    Tolerated treatment well, no adverse reaction noted. PIV flushed per protocol. Positive blood return noted. Removed and bandaid dressing applied. Patient Vitals for the past 12 hrs:   Temp Pulse Resp BP SpO2   05/16/17 1445 97.7 °F (36.5 °C) 72 16 150/69 99 %   05/16/17 1127 98.3 °F (36.8 °C) 75 16 145/72 96 %       1450 hrs  D/C'd from [facility] ambulatory and in no distress.  Next appointment is 06/13/17 @ 11 am.

## 2017-05-17 ENCOUNTER — TELEPHONE (OUTPATIENT)
Dept: RHEUMATOLOGY | Age: 82
End: 2017-05-17

## 2017-05-17 NOTE — TELEPHONE ENCOUNTER
Called and spoke with Ms. Patsy Armendariz and gave instructions per Dr. Ramiro Brady:  [facility] sure eating well, no pain, no fevers. PCP if return of symptoms. Call with update tomorrow please. \"  She verbalized understanding of this.

## 2017-05-17 NOTE — TELEPHONE ENCOUNTER
Make sure eating well, no pain, no fevers. PCP if return of symptoms. Call with update tomorrow please.

## 2017-06-02 NOTE — LETTER
6/12/2017 4:28 PM    Ms. Peter Keith      Dear Noman Salvador:    Please find your most recent results below. Resulted Orders   CBC WITH AUTOMATED DIFF   Result Value Ref Range    WBC 6.0 3.4 - 10.8 x10E3/uL    RBC 3.90 3.77 - 5.28 x10E6/uL    HGB 11.8 11.1 - 15.9 g/dL    HCT 34.9 34.0 - 46.6 %    MCV 90 79 - 97 fL    MCH 30.3 26.6 - 33.0 pg    MCHC 33.8 31.5 - 35.7 g/dL    RDW 15.6 (H) 12.3 - 15.4 %    PLATELET 770 630 - 397 x10E3/uL    NEUTROPHILS 62 %    Lymphocytes 23 %    MONOCYTES 9 %    EOSINOPHILS 5 %    BASOPHILS 1 %    ABS. NEUTROPHILS 3.8 1.4 - 7.0 x10E3/uL    Abs Lymphocytes 1.4 0.7 - 3.1 x10E3/uL    ABS. MONOCYTES 0.5 0.1 - 0.9 x10E3/uL    ABS. EOSINOPHILS 0.3 0.0 - 0.4 x10E3/uL    ABS. BASOPHILS 0.0 0.0 - 0.2 x10E3/uL    IMMATURE GRANULOCYTES 0 %    ABS. IMM. GRANS. 0.0 0.0 - 0.1 x10E3/uL    Narrative    Performed at:  [address]  [id number]  : Rafa Jones MD, Phone:  [phone]   METABOLIC PANEL, COMPREHENSIVE   Result Value Ref Range    Glucose 99 65 - 99 mg/dL    BUN 11 8 - 27 mg/dL    Creatinine 0.93 0.57 - 1.00 mg/dL    GFR est non-AA 55 (L) >59 mL/min/1.73    GFR est AA 64 >59 mL/min/1.73    BUN/Creatinine ratio 12 12 - 28    Sodium 140 134 - 144 mmol/L    Potassium 4.6 3.5 - 5.2 mmol/L    Chloride 101 96 - 106 mmol/L    CO2 24 18 - 29 mmol/L    Calcium 9.0 8.7 - 10.3 mg/dL    Protein, total 6.8 6.0 - 8.5 g/dL    Albumin 4.2 3.5 - 4.7 g/dL    GLOBULIN, TOTAL 2.6 1.5 - 4.5 g/dL    A-G Ratio 1.6 1.2 - 2.2    Bilirubin, total 0.5 0.0 - 1.2 mg/dL    Alk.  phosphatase 85 39 - 117 IU/L    AST (SGOT) 20 0 - 40 IU/L    ALT (SGPT) 23 0 - 32 IU/L    Narrative    Performed at:  [address]  [id number]  : Rafa Jones MD, Phone:  [phone]   SED RATE (ESR)   Result Value Ref Range    Sed rate (ESR) 4 0 - 40 mm/hr    Narrative    Performed at:  [address]  [id number]  : Sagar Delacruz MD, Phone:  [phone]   C REACTIVE PROTEIN, QT   Result Value Ref Range    C-Reactive Protein, Qt 1.1 0.0 - 4.9 mg/L    Narrative    Performed at:  [address]  [id number]  : Sagar Dealcruz MD, Phone:  [phone]       RECOMMENDATIONS/COMMENTS:  Your labs look great, and inflammation markers are much improved when checked 2 weeks after your infusion.     Please call me if you have any questions: [phone]        Sincerely,      Jennifer Silva PA-C

## 2017-06-03 LAB
ALBUMIN SERPL-MCNC: 4.2 G/DL (ref 3.5–4.7)
ALBUMIN/GLOB SERPL: 1.6 {RATIO} (ref 1.2–2.2)
ALP SERPL-CCNC: 85 IU/L (ref 39–117)
ALT SERPL-CCNC: 23 IU/L (ref 0–32)
AST SERPL-CCNC: 20 IU/L (ref 0–40)
BASOPHILS # BLD AUTO: 0 X10E3/UL (ref 0–0.2)
BASOPHILS NFR BLD AUTO: 1 %
BILIRUB SERPL-MCNC: 0.5 MG/DL (ref 0–1.2)
BUN SERPL-MCNC: 11 MG/DL (ref 8–27)
BUN/CREAT SERPL: 12 (ref 12–28)
CALCIUM SERPL-MCNC: 9 MG/DL (ref 8.7–10.3)
CHLORIDE SERPL-SCNC: 101 MMOL/L (ref 96–106)
CO2 SERPL-SCNC: 24 MMOL/L (ref 18–29)
CREAT SERPL-MCNC: 0.93 MG/DL (ref 0.57–1)
CRP SERPL-MCNC: 1.1 MG/L (ref 0–4.9)
EOSINOPHIL # BLD AUTO: 0.3 X10E3/UL (ref 0–0.4)
EOSINOPHIL NFR BLD AUTO: 5 %
ERYTHROCYTE [DISTWIDTH] IN BLOOD BY AUTOMATED COUNT: 15.6 % (ref 12.3–15.4)
ERYTHROCYTE [SEDIMENTATION RATE] IN BLOOD BY WESTERGREN METHOD: 4 MM/HR (ref 0–40)
GLOBULIN SER CALC-MCNC: 2.6 G/DL (ref 1.5–4.5)
GLUCOSE SERPL-MCNC: 99 MG/DL (ref 65–99)
HCT VFR BLD AUTO: 34.9 % (ref 34–46.6)
HGB BLD-MCNC: 11.8 G/DL (ref 11.1–15.9)
IMM GRANULOCYTES # BLD: 0 X10E3/UL (ref 0–0.1)
IMM GRANULOCYTES NFR BLD: 0 %
LYMPHOCYTES # BLD AUTO: 1.4 X10E3/UL (ref 0.7–3.1)
LYMPHOCYTES NFR BLD AUTO: 23 %
MCH RBC QN AUTO: 30.3 PG (ref 26.6–33)
MCHC RBC AUTO-ENTMCNC: 33.8 G/DL (ref 31.5–35.7)
MCV RBC AUTO: 90 FL (ref 79–97)
MONOCYTES # BLD AUTO: 0.5 X10E3/UL (ref 0.1–0.9)
MONOCYTES NFR BLD AUTO: 9 %
NEUTROPHILS # BLD AUTO: 3.8 X10E3/UL (ref 1.4–7)
NEUTROPHILS NFR BLD AUTO: 62 %
PLATELET # BLD AUTO: 221 X10E3/UL (ref 150–379)
POTASSIUM SERPL-SCNC: 4.6 MMOL/L (ref 3.5–5.2)
PROT SERPL-MCNC: 6.8 G/DL (ref 6–8.5)
RBC # BLD AUTO: 3.9 X10E6/UL (ref 3.77–5.28)
SODIUM SERPL-SCNC: 140 MMOL/L (ref 134–144)
WBC # BLD AUTO: 6 X10E3/UL (ref 3.4–10.8)

## 2017-06-08 RX ORDER — ACETAMINOPHEN 325 MG/1
650 TABLET ORAL
Status: ACTIVE | OUTPATIENT
Start: 2017-06-13 | End: 2017-06-13

## 2017-06-08 RX ORDER — SODIUM CHLORIDE 9 MG/ML
25 INJECTION, SOLUTION INTRAVENOUS CONTINUOUS
Status: DISPENSED | OUTPATIENT
Start: 2017-06-13 | End: 2017-06-13

## 2017-06-08 RX ORDER — DIPHENHYDRAMINE HYDROCHLORIDE 50 MG/ML
25 INJECTION, SOLUTION INTRAMUSCULAR; INTRAVENOUS
Status: ACTIVE | OUTPATIENT
Start: 2017-06-13 | End: 2017-06-13

## 2017-06-08 NOTE — PROGRESS NOTES
Attempt to reach, and received message \"The person you are trying to reach is unavailable right now. Try your call again later\". I would like to tell her that her labs look great, and inflammation markers are much improved when checked 2 weeks after her infusion.

## 2017-06-12 NOTE — PROGRESS NOTES
Attempted to reach pt. Unable to leave message. Received message: \"The person you are trying to reach is unavailable right now. Try your call again later. \"  Letter sent.

## 2017-06-13 ENCOUNTER — HOSPITAL ENCOUNTER (OUTPATIENT)
Dept: INFUSION THERAPY | Age: 82
Discharge: HOME | End: 2017-06-13
Payer: MEDICARE

## 2017-06-13 VITALS
WEIGHT: 149.9 LBS | TEMPERATURE: 97.8 F | OXYGEN SATURATION: 100 % | DIASTOLIC BLOOD PRESSURE: 76 MMHG | RESPIRATION RATE: 16 BRPM | HEART RATE: 66 BPM | SYSTOLIC BLOOD PRESSURE: 155 MMHG | BODY MASS INDEX: 29.28 KG/M2

## 2017-06-13 PROCEDURE — 74011250636 HC RX REV CODE- 250/636: Performed by: INTERNAL MEDICINE

## 2017-06-13 PROCEDURE — 74011250636 HC RX REV CODE- 250/636

## 2017-06-13 PROCEDURE — 74011000258 HC RX REV CODE- 258: Performed by: INTERNAL MEDICINE

## 2017-06-13 PROCEDURE — 96365 THER/PROPH/DIAG IV INF INIT: CPT

## 2017-06-13 RX ADMIN — TOCILIZUMAB 250 MG: 20 INJECTION, SOLUTION, CONCENTRATE INTRAVENOUS at 12:18

## 2017-06-13 RX ADMIN — SODIUM CHLORIDE 25 ML/HR: 900 INJECTION, SOLUTION INTRAVENOUS at 12:20

## 2017-06-13 NOTE — PROGRESS NOTES
[facility] Nursing Progress Note:    1100 [facility] Screening assessment done/see flowsheet . Pt has had this before and knows what to expect. No signs/symptoms of infection. PIV placed and treatment begun. Patient received infusion without difficulty. Medications this visit:  -toclizumab IV    Patient Vitals for the past 12 hrs:   Temp Pulse Resp BP SpO2   06/13/17 1321 97.8 °F (36.6 °C) 66 16 155/76 100 %   06/13/17 1245 98.2 °F (36.8 °C) 67 16 153/70 -   06/13/17 1100 98.5 °F (36.9 °C) 75 16 124/71 -           1340 Patient left ambulatory in no distress, accompanied by family.

## 2017-06-23 ENCOUNTER — APPOINTMENT (OUTPATIENT)
Dept: GENERAL RADIOLOGY | Age: 82
End: 2017-06-23
Attending: EMERGENCY MEDICINE
Payer: MEDICARE

## 2017-06-23 ENCOUNTER — HOSPITAL ENCOUNTER (EMERGENCY)
Age: 82
Discharge: HOME | End: 2017-06-23
Attending: EMERGENCY MEDICINE
Payer: MEDICARE

## 2017-06-23 VITALS
SYSTOLIC BLOOD PRESSURE: 188 MMHG | RESPIRATION RATE: 16 BRPM | HEART RATE: 88 BPM | HEIGHT: 60 IN | TEMPERATURE: 98.1 F | OXYGEN SATURATION: 100 % | BODY MASS INDEX: 29.25 KG/M2 | DIASTOLIC BLOOD PRESSURE: 84 MMHG | WEIGHT: 149 LBS

## 2017-06-23 DIAGNOSIS — R10.30 ABDOMINAL PAIN, LOWER: Primary | ICD-10-CM

## 2017-06-23 LAB
ALBUMIN SERPL BCP-MCNC: 3.9 G/DL (ref 3.5–5)
ALBUMIN/GLOB SERPL: 1.1 {RATIO} (ref 1.1–2.2)
ALP SERPL-CCNC: 79 U/L (ref 45–117)
ALT SERPL-CCNC: 40 U/L (ref 12–78)
ANION GAP BLD CALC-SCNC: 9 MMOL/L (ref 5–15)
APPEARANCE UR: CLEAR
AST SERPL W P-5'-P-CCNC: 26 U/L (ref 15–37)
BACTERIA URNS QL MICRO: NEGATIVE /HPF
BASOPHILS # BLD AUTO: 0 K/UL (ref 0–0.1)
BASOPHILS # BLD: 0 % (ref 0–1)
BILIRUB SERPL-MCNC: 0.5 MG/DL (ref 0.2–1)
BILIRUB UR QL: NEGATIVE
BUN SERPL-MCNC: 15 MG/DL (ref 6–20)
BUN/CREAT SERPL: 13 (ref 12–20)
CALCIUM SERPL-MCNC: 10 MG/DL (ref 8.5–10.1)
CHLORIDE SERPL-SCNC: 101 MMOL/L (ref 97–108)
CO2 SERPL-SCNC: 29 MMOL/L (ref 21–32)
COLOR UR: NORMAL
CREAT SERPL-MCNC: 1.2 MG/DL (ref 0.55–1.02)
EOSINOPHIL # BLD: 0.2 K/UL (ref 0–0.4)
EOSINOPHIL NFR BLD: 3 % (ref 0–7)
EPITH CASTS URNS QL MICRO: NORMAL /LPF
ERYTHROCYTE [DISTWIDTH] IN BLOOD BY AUTOMATED COUNT: 14.5 % (ref 11.5–14.5)
GLOBULIN SER CALC-MCNC: 3.4 G/DL (ref 2–4)
GLUCOSE SERPL-MCNC: 125 MG/DL (ref 65–100)
GLUCOSE UR STRIP.AUTO-MCNC: NEGATIVE MG/DL
HCT VFR BLD AUTO: 38.3 % (ref 35–47)
HGB BLD-MCNC: 12.8 G/DL (ref 11.5–16)
HGB UR QL STRIP: NEGATIVE
HYALINE CASTS URNS QL MICRO: NORMAL /LPF (ref 0–5)
KETONES UR QL STRIP.AUTO: NEGATIVE MG/DL
LEUKOCYTE ESTERASE UR QL STRIP.AUTO: NEGATIVE
LIPASE SERPL-CCNC: 134 U/L (ref 73–393)
LYMPHOCYTES # BLD AUTO: 20 % (ref 12–49)
LYMPHOCYTES # BLD: 1.6 K/UL (ref 0.8–3.5)
MCH RBC QN AUTO: 30.6 PG (ref 26–34)
MCHC RBC AUTO-ENTMCNC: 33.4 G/DL (ref 30–36.5)
MCV RBC AUTO: 91.6 FL (ref 80–99)
MONOCYTES # BLD: 0.5 K/UL (ref 0–1)
MONOCYTES NFR BLD AUTO: 6 % (ref 5–13)
NEUTS SEG # BLD: 5.7 K/UL (ref 1.8–8)
NEUTS SEG NFR BLD AUTO: 71 % (ref 32–75)
NITRITE UR QL STRIP.AUTO: NEGATIVE
PH UR STRIP: 7 [PH] (ref 5–8)
PLATELET # BLD AUTO: 249 K/UL (ref 150–400)
POTASSIUM SERPL-SCNC: 4 MMOL/L (ref 3.5–5.1)
PROT SERPL-MCNC: 7.3 G/DL (ref 6.4–8.2)
PROT UR STRIP-MCNC: NEGATIVE MG/DL
RBC # BLD AUTO: 4.18 M/UL (ref 3.8–5.2)
RBC #/AREA URNS HPF: NORMAL /HPF (ref 0–5)
SODIUM SERPL-SCNC: 139 MMOL/L (ref 136–145)
SP GR UR REFRACTOMETRY: 1.01 (ref 1–1.03)
UROBILINOGEN UR QL STRIP.AUTO: 0.2 EU/DL (ref 0.2–1)
WBC # BLD AUTO: 8.1 K/UL (ref 3.6–11)
WBC URNS QL MICRO: NORMAL /HPF (ref 0–4)

## 2017-06-23 PROCEDURE — 83690 ASSAY OF LIPASE: CPT | Performed by: EMERGENCY MEDICINE

## 2017-06-23 PROCEDURE — 85025 COMPLETE CBC W/AUTO DIFF WBC: CPT | Performed by: EMERGENCY MEDICINE

## 2017-06-23 PROCEDURE — 99284 EMERGENCY DEPT VISIT MOD MDM: CPT

## 2017-06-23 PROCEDURE — 74011250636 HC RX REV CODE- 250/636: Performed by: EMERGENCY MEDICINE

## 2017-06-23 PROCEDURE — 96361 HYDRATE IV INFUSION ADD-ON: CPT

## 2017-06-23 PROCEDURE — 96375 TX/PRO/DX INJ NEW DRUG ADDON: CPT

## 2017-06-23 PROCEDURE — 96374 THER/PROPH/DIAG INJ IV PUSH: CPT

## 2017-06-23 PROCEDURE — 74020 XR ABD FLAT/ ERECT: CPT

## 2017-06-23 PROCEDURE — 80053 COMPREHEN METABOLIC PANEL: CPT | Performed by: EMERGENCY MEDICINE

## 2017-06-23 PROCEDURE — 36415 COLL VENOUS BLD VENIPUNCTURE: CPT | Performed by: EMERGENCY MEDICINE

## 2017-06-23 PROCEDURE — 74011250637 HC RX REV CODE- 250/637: Performed by: EMERGENCY MEDICINE

## 2017-06-23 PROCEDURE — 96372 THER/PROPH/DIAG INJ SC/IM: CPT

## 2017-06-23 PROCEDURE — 81001 URINALYSIS AUTO W/SCOPE: CPT | Performed by: EMERGENCY MEDICINE

## 2017-06-23 RX ORDER — ONDANSETRON 2 MG/ML
4 INJECTION INTRAMUSCULAR; INTRAVENOUS
Status: COMPLETED | OUTPATIENT
Start: 2017-06-23 | End: 2017-06-23

## 2017-06-23 RX ORDER — MORPHINE SULFATE 2 MG/ML
2 INJECTION, SOLUTION INTRAMUSCULAR; INTRAVENOUS
Status: COMPLETED | OUTPATIENT
Start: 2017-06-23 | End: 2017-06-23

## 2017-06-23 RX ORDER — ONDANSETRON 4 MG/1
4 TABLET, ORALLY DISINTEGRATING ORAL
Qty: 30 TAB | Refills: 0 | Status: SHIPPED | OUTPATIENT
Start: 2017-06-23 | End: 2017-09-18

## 2017-06-23 RX ORDER — ONDANSETRON 4 MG/1
4 TABLET, ORALLY DISINTEGRATING ORAL
Status: COMPLETED | OUTPATIENT
Start: 2017-06-23 | End: 2017-06-23

## 2017-06-23 RX ORDER — DICYCLOMINE HYDROCHLORIDE 20 MG/1
20 TABLET ORAL EVERY 6 HOURS
Qty: 20 TAB | Refills: 0 | Status: SHIPPED | OUTPATIENT
Start: 2017-06-23 | End: 2017-06-28

## 2017-06-23 RX ORDER — DICYCLOMINE HYDROCHLORIDE 10 MG/ML
20 INJECTION INTRAMUSCULAR
Status: COMPLETED | OUTPATIENT
Start: 2017-06-23 | End: 2017-06-23

## 2017-06-23 RX ADMIN — ONDANSETRON 4 MG: 2 INJECTION INTRAMUSCULAR; INTRAVENOUS at 18:38

## 2017-06-23 RX ADMIN — SODIUM CHLORIDE 1000 ML: 900 INJECTION, SOLUTION INTRAVENOUS at 18:08

## 2017-06-23 RX ADMIN — Medication 2 MG: at 18:40

## 2017-06-23 RX ADMIN — ONDANSETRON 4 MG: 4 TABLET, ORALLY DISINTEGRATING ORAL at 19:43

## 2017-06-23 RX ADMIN — DICYCLOMINE HYDROCHLORIDE 20 MG: 20 INJECTION, SOLUTION INTRAMUSCULAR at 18:44

## 2017-06-23 NOTE — ED PROVIDER NOTES
HPI Comments: 80 y.o. female with past medical history significant for HTN, diabetes diverticulitis who presents from home via EMS with chief complaint of abdominal pain. Patient complains of sharp, lower abdominal pain that began approx 1 hour ago. Patient denies any changes with her bowels, stating she has had 2 normal BM's today. Patient states she feels a \"pressure when I go and only a little comes out. \" Patient states her last colonoscopy found a polyp. Patient also complains of nausea and vomiting, stating she vomited green emesis. Patient received 4 mg of Zofran en route by EMS. There are no other acute medical concerns at this time. Social hx: former smoker, rare alcohol drinker  PCP: Gerson Avery MD    Note written by Darin Haque, as dictated by Elijah Otto MD 5:58 PM           The history is provided by the patient. No  was used. Past Medical History:   Diagnosis Date    Arthritis, rheumatoid (HCC)     Diabetes ([facility] [address]     Diverticulitis     Hard of hearing     Hypertension     Hypothyroid     Osteoporosis, post-menopausal        Past Surgical History:   Procedure Laterality Date    HX HEMORRHOIDECTOMY      HX HYSTERECTOMY      HX THYROIDECTOMY           Family History:   Problem Relation Age of Onset    Heart Disease Mother     Diabetes Father     Cancer Brother      brain cancer    Heart Disease Brother     Diabetes Brother     Other Other      scleroderma       Social History     Social History    Marital status:      Spouse name: N/A    Number of children: N/A    Years of education: N/A     Occupational History    Not on file.      Social History Main Topics    Smoking status: Former Smoker     Types: Cigarettes     Quit date: 12/8/1976    Smokeless tobacco: Never Used    Alcohol use Yes      Comment: Waylon time 1 glass wine    Drug use: No    Sexual activity: No     Other Topics Concern    Not on file Social History Narrative    ** Merged History Encounter **              ALLERGIES: Alendronate; Amoxicillin; and Hydrochlorothiazide    Review of Systems   Constitutional: Negative for chills and fever. HENT: Negative for ear pain and sore throat. Eyes: Negative for photophobia and pain. Respiratory: Negative for chest tightness and shortness of breath. Cardiovascular: Negative for chest pain and leg swelling. Gastrointestinal: Positive for abdominal pain, nausea and vomiting. Genitourinary: Negative for dysuria and flank pain. Musculoskeletal: Negative for back pain and neck pain. Skin: Negative for rash and wound. Neurological: Negative for dizziness, light-headedness and headaches. Vitals:    06/23/17 1747   BP: 188/84   Pulse: 88   Resp: 16   Temp: 98.1 °F (36.7 °C)   SpO2: 100%   Weight: 67.6 kg (149 lb)   Height: 5' (1.524 m)            Physical Exam   Constitutional: She is oriented to person, place, and time. She appears well-developed and well-nourished. HENT:   Head: Normocephalic and atraumatic. Mouth/Throat: Oropharynx is clear and moist.   Eyes: Conjunctivae and EOM are normal. Pupils are equal, round, and reactive to light. Neck: Normal range of motion. Cardiovascular: Normal rate, regular rhythm, normal heart sounds and intact distal pulses. No murmur heard. Pulmonary/Chest: Effort normal and breath sounds normal. No stridor. No respiratory distress. Abdominal: Soft. Bowel sounds are normal. There is tenderness. There is no rebound and no guarding. Musculoskeletal: Normal range of motion. She exhibits no edema, tenderness or deformity. Neurological: She is alert and oriented to person, place, and time. No cranial nerve deficit. Skin: Skin is warm and dry. She is not diaphoretic. Psychiatric: She has a normal mood and affect. Nursing note and vitals reviewed.        MDM  Number of Diagnoses or Management Options  Abdominal pain, lower:   Diagnosis management comments: Patient with lower abdominal pressure and pain - check labs, x-rays and re-eval.    Patient labs and x-rays neg, will be d/c home with meds for symptoms and have f/u with her doctors. Return if symptoms worsen       Amount and/or Complexity of Data Reviewed  Clinical lab tests: ordered and reviewed  Tests in the radiology section of CPT®: ordered and reviewed  Independent visualization of images, tracings, or specimens: yes      ED Course       Procedures    LABS REVIEWED:  Labs Reviewed   METABOLIC PANEL, COMPREHENSIVE - Abnormal; Notable for the following:        Result Value    Glucose 125 (*)     Creatinine 1.20 (*)     GFR est AA 52 (*)     GFR est non-AA 42 (*)     All other components within normal limits   CBC WITH AUTOMATED DIFF   LIPASE   URINALYSIS W/MICROSCOPIC       IMAGING REVIEWED:  Xr Abd Flat/ Erect    Result Date: 6/23/2017  Exam: 2 view abdomen Indication: Abdominal pain and vomiting There is a 5/6/2016 Supine and upright views of the abdomen demonstrate a normal bowel gas pattern. Lung bases are clear. No free air. S-shaped scoliosis of the thoracolumbar spine is again noted. Vascular calcification is evident. Impression: Normal bowel gas pattern.

## 2017-06-23 NOTE — DISCHARGE INSTRUCTIONS
Abdominal Pain: Care Instructions  Your Care Instructions    Abdominal pain has many possible causes. Some aren't serious and get better on their own in a few days. Others need more testing and treatment. If your pain continues or gets worse, you need to be rechecked and may need more tests to find out what is wrong. You may need surgery to correct the problem. Don't ignore new symptoms, such as fever, nausea and vomiting, urination problems, pain that gets worse, and dizziness. These may be signs of a more serious problem. Your doctor may have recommended a follow-up visit in the next 8 to 12 hours. If you are not getting better, you may need more tests or treatment. The doctor has checked you carefully, but problems can develop later. If you notice any problems or new symptoms, get medical treatment right away. Follow-up care is a key part of your treatment and safety. Be sure to make and go to all appointments, and call your doctor if you are having problems. It's also a good idea to know your test results and keep a list of the medicines you take. How can you care for yourself at home? · Rest until you feel better. · To prevent dehydration, drink plenty of fluids, enough so that your urine is light yellow or clear like water. Choose water and other caffeine-free clear liquids until you feel better. If you have kidney, heart, or liver disease and have to limit fluids, talk with your doctor before you increase the amount of fluids you drink. · If your stomach is upset, eat mild foods, such as rice, dry toast or crackers, bananas, and applesauce. Try eating several small meals instead of two or three large ones. · Wait until 48 hours after all symptoms have gone away before you have spicy foods, alcohol, and drinks that contain caffeine. · Do not eat foods that are high in fat. · Avoid anti-inflammatory medicines such as aspirin, ibuprofen (Advil, Motrin), and naproxen (Aleve).  These can cause stomach upset. Talk to your doctor if you take daily aspirin for another health problem. When should you call for help? Call 911 anytime you think you may need emergency care. For example, call if:  · You passed out (lost consciousness). · You pass maroon or very bloody stools. · You vomit blood or what looks like coffee grounds. · You have new, severe belly pain. Call your doctor now or seek immediate medical care if:  · Your pain gets worse, especially if it becomes focused in one area of your belly. · You have a new or higher fever. · Your stools are black and look like tar, or they have streaks of blood. · You have unexpected vaginal bleeding. · You have symptoms of a urinary tract infection. These may include:  ¨ Pain when you urinate. ¨ Urinating more often than usual.  ¨ Blood in your urine. · You are dizzy or lightheaded, or you feel like you may faint. Watch closely for changes in your health, and be sure to contact your doctor if:  · You are not getting better after 1 day (24 hours). Where can you learn more? Go to http://te[employer]Fundraiser.comleidy.info/. Enter Q857 in the search box to learn more about \"Abdominal Pain: Care Instructions. \"  Current as of: March 20, 2017  Content Version: 11.3  © 8947-9044 [employer]. Care instructions adapted under license by [employer] (which disclaims liability or warranty for this information). If you have questions about a medical condition or this instruction, always ask your healthcare professional. [place] [address] any warranty or liability for your use of this information. We hope that we have addressed all of your medical concerns. The examination and treatment you received in the Emergency Department were for an emergent problem and were not intended as complete care. It is important that you follow up with your healthcare provider(s) for ongoing care.  If your symptoms worsen or do not improve as expected, and you are unable to reach your usual health care provider(s), you should return to the Emergency Department. Today's healthcare is undergoing tremendous change, and patient satisfaction surveys are one of the many tools to assess the quality of medical care. You may receive a survey from the [employer] regarding your experience in the Emergency Department. I hope that your experience has been completely positive, particularly the medical care that I provided. As such, please participate in the survey; anything less than excellent does not meet my expectations or intentions. [facility] and [employer] participate in nationally recognized quality of care measures. If your blood pressure is greater than 120/80, as reported below, we urge that you seek medical care to address the potential of high blood pressure, commonly known as hypertension. Hypertension can be hereditary or can be caused by certain medical conditions, pain, stress, or \"white coat syndrome. \"       Please make an appointment with your health care provider(s) for follow up of your Emergency Department visit. VITALS:   Patient Vitals for the past 8 hrs:   Temp Pulse Resp BP SpO2   06/23/17 1747 98.1 °F (36.7 °C) 88 16 188/84 100 %          Thank you for allowing us to provide you with medical care today. We realize that you have many choices for your emergency care needs. Please choose us in the future for any continued health care needs. Juarez Acosta, [address] [place] [address] 20.   Office: [phone]            Recent Results (from the past 24 hour(s))   CBC WITH AUTOMATED DIFF    Collection Time: 06/23/17  6:04 PM   Result Value Ref Range    WBC 8.1 3.6 - 11.0 K/uL    RBC 4.18 3.80 - 5.20 M/uL    HGB 12.8 11.5 - 16.0 g/dL    HCT 38.3 35.0 - 47.0 %    MCV 91.6 80.0 - 99.0 FL    MCH 30.6 26.0 - 34.0 PG MCHC 33.4 30.0 - 36.5 g/dL    RDW 14.5 11.5 - 14.5 %    PLATELET 242 697 - 206 K/uL    NEUTROPHILS 71 32 - 75 %    LYMPHOCYTES 20 12 - 49 %    MONOCYTES 6 5 - 13 %    EOSINOPHILS 3 0 - 7 %    BASOPHILS 0 0 - 1 %    ABS. NEUTROPHILS 5.7 1.8 - 8.0 K/UL    ABS. LYMPHOCYTES 1.6 0.8 - 3.5 K/UL    ABS. MONOCYTES 0.5 0.0 - 1.0 K/UL    ABS. EOSINOPHILS 0.2 0.0 - 0.4 K/UL    ABS. BASOPHILS 0.0 0.0 - 0.1 K/UL   METABOLIC PANEL, COMPREHENSIVE    Collection Time: 06/23/17  6:04 PM   Result Value Ref Range    Sodium 139 136 - 145 mmol/L    Potassium 4.0 3.5 - 5.1 mmol/L    Chloride 101 97 - 108 mmol/L    CO2 29 21 - 32 mmol/L    Anion gap 9 5 - 15 mmol/L    Glucose 125 (H) 65 - 100 mg/dL    BUN 15 6 - 20 MG/DL    Creatinine 1.20 (H) 0.55 - 1.02 MG/DL    BUN/Creatinine ratio 13 12 - 20      GFR est AA 52 (L) >60 ml/min/1.73m2    GFR est non-AA 42 (L) >60 ml/min/1.73m2    Calcium 10.0 8.5 - 10.1 MG/DL    Bilirubin, total 0.5 0.2 - 1.0 MG/DL    ALT (SGPT) 40 12 - 78 U/L    AST (SGOT) 26 15 - 37 U/L    Alk. phosphatase 79 45 - 117 U/L    Protein, total 7.3 6.4 - 8.2 g/dL    Albumin 3.9 3.5 - 5.0 g/dL    Globulin 3.4 2.0 - 4.0 g/dL    A-G Ratio 1.1 1.1 - 2.2     LIPASE    Collection Time: 06/23/17  6:04 PM   Result Value Ref Range    Lipase 134 73 - 393 U/L       Xr Abd Flat/ Erect    Result Date: 6/23/2017  Exam: 2 view abdomen Indication: Abdominal pain and vomiting There is a 5/6/2016 Supine and upright views of the abdomen demonstrate a normal bowel gas pattern. Lung bases are clear. No free air. S-shaped scoliosis of the thoracolumbar spine is again noted. Vascular calcification is evident. Impression: Normal bowel gas pattern.

## 2017-07-03 ENCOUNTER — TELEPHONE (OUTPATIENT)
Dept: RHEUMATOLOGY | Age: 82
End: 2017-07-03

## 2017-07-03 DIAGNOSIS — R79.89 ABNORMAL BLOOD CREATININE LEVEL: Primary | ICD-10-CM

## 2017-07-03 NOTE — LETTER
7/3/2017 1:15 PM    MsAlonzo Green Inna      Dear Ms. Lay Box:    Barbara Ayala tried calling you to check on how you are feeling  Please call our office at [phone]. We have some questions regarding your Actemera. Also Dr. Jesus Cisneros has some lab work is wants you to have done soon. Thank you.         Sincerely,      Lars Morris MD

## 2017-07-03 NOTE — TELEPHONE ENCOUNTER
----- Message from Darren Zimmerman PA-C sent at 6/30/2017  3:49 PM EDT -----  Regarding: methotrexate refill  Di Glover, I reviewed her chart last night regarding the methotrexate refill and saw she was in ER with abdominal pain last week,. She is also on Actemra. And her Cr went up a little to 1.2. Dr. Marcia Rowe is aware and asked that we call and check on her - Is she better and has the abdominal pain resolved?  Is she doing normal po intake?   -she needs to be hydrating really well  -If all back to normal and hydrating well, then continue current meds and recheck BMP in 2 wks  -If still abdominal pain, then stop Actemra until that gets worked up by PCP, with recheck BMP in 2 wks  -if not normal po intake and thus can't stay well hydrated, then also stop methotrexate until resolved with recheck BMP in 2 wks

## 2017-07-03 NOTE — TELEPHONE ENCOUNTER
Thank you. Please try to reach throughout week (alternate number on contact form?). If unable to reach, please forward a letter.

## 2017-07-05 RX ORDER — METHOTREXATE 2.5 MG/1
TABLET ORAL
Qty: 25 TAB | Refills: 3 | OUTPATIENT
Start: 2017-07-05

## 2017-07-06 RX ORDER — SODIUM CHLORIDE 9 MG/ML
25 INJECTION, SOLUTION INTRAVENOUS CONTINUOUS
Status: DISPENSED | OUTPATIENT
Start: 2017-07-11 | End: 2017-07-11

## 2017-07-06 RX ORDER — ACETAMINOPHEN 325 MG/1
650 TABLET ORAL
Status: ACTIVE | OUTPATIENT
Start: 2017-07-11 | End: 2017-07-11

## 2017-07-06 RX ORDER — DIPHENHYDRAMINE HYDROCHLORIDE 50 MG/ML
25 INJECTION, SOLUTION INTRAMUSCULAR; INTRAVENOUS
Status: ACTIVE | OUTPATIENT
Start: 2017-07-11 | End: 2017-07-11

## 2017-07-06 NOTE — TELEPHONE ENCOUNTER
Spoke with patient states she feel better, and is back to drinking well, and abdominal pain is gone. Patient instructed to have BMP done on Friday. Patient states she is taking her Methotrexate without any problem. Patient is scheduled for Actemra infusion on Tuesday at 11:00 a.m if her labs are ok per Jennifer Silva's note.  Will fax [employer] [facility] slip today to [facility].

## 2017-07-08 LAB
BUN SERPL-MCNC: 12 MG/DL (ref 8–27)
BUN/CREAT SERPL: 14 (ref 12–28)
CALCIUM SERPL-MCNC: 8.9 MG/DL (ref 8.7–10.3)
CHLORIDE SERPL-SCNC: 101 MMOL/L (ref 96–106)
CO2 SERPL-SCNC: 26 MMOL/L (ref 18–29)
CREAT SERPL-MCNC: 0.83 MG/DL (ref 0.57–1)
GLUCOSE SERPL-MCNC: 100 MG/DL (ref 65–99)
POTASSIUM SERPL-SCNC: 4.7 MMOL/L (ref 3.5–5.2)
SODIUM SERPL-SCNC: 139 MMOL/L (ref 134–144)

## 2017-07-11 ENCOUNTER — HOSPITAL ENCOUNTER (OUTPATIENT)
Dept: INFUSION THERAPY | Age: 82
Discharge: HOME | End: 2017-07-11
Payer: MEDICARE

## 2017-07-11 NOTE — TELEPHONE ENCOUNTER
Her repeat BMP shows improved Cr. She may proceed with her Actemra infusion. Have labs (CBC, CMP, ESR, CRP) again 10-14 days after this infusion, and then if she is stable q8 wks.

## 2017-07-13 NOTE — TELEPHONE ENCOUNTER
Patient given Jennifer Silva's lab results, and  instructions per her note on 7/11/2017. Patient will call for lab slip to be faxed in 10-14 days to her choice of [facility]. Patient understood.

## 2017-07-14 RX ORDER — ACETAMINOPHEN 325 MG/1
650 TABLET ORAL
Status: ACTIVE | OUTPATIENT
Start: 2017-07-19 | End: 2017-07-19

## 2017-07-14 RX ORDER — SODIUM CHLORIDE 9 MG/ML
25 INJECTION, SOLUTION INTRAVENOUS CONTINUOUS
Status: DISPENSED | OUTPATIENT
Start: 2017-07-19 | End: 2017-07-19

## 2017-07-14 RX ORDER — DIPHENHYDRAMINE HYDROCHLORIDE 50 MG/ML
25 INJECTION, SOLUTION INTRAMUSCULAR; INTRAVENOUS
Status: ACTIVE | OUTPATIENT
Start: 2017-07-19 | End: 2017-07-19

## 2017-07-19 ENCOUNTER — HOSPITAL ENCOUNTER (OUTPATIENT)
Dept: INFUSION THERAPY | Age: 82
Discharge: HOME | End: 2017-07-19
Payer: MEDICARE

## 2017-07-19 VITALS
BODY MASS INDEX: 28.87 KG/M2 | WEIGHT: 147.8 LBS | SYSTOLIC BLOOD PRESSURE: 144 MMHG | TEMPERATURE: 98.5 F | HEART RATE: 70 BPM | DIASTOLIC BLOOD PRESSURE: 71 MMHG | RESPIRATION RATE: 16 BRPM

## 2017-07-19 PROCEDURE — 74011000258 HC RX REV CODE- 258: Performed by: INTERNAL MEDICINE

## 2017-07-19 PROCEDURE — 74011250636 HC RX REV CODE- 250/636

## 2017-07-19 PROCEDURE — 74011250636 HC RX REV CODE- 250/636: Performed by: INTERNAL MEDICINE

## 2017-07-19 PROCEDURE — 96413 CHEMO IV INFUSION 1 HR: CPT

## 2017-07-19 RX ADMIN — TOCILIZUMAB 250 MG: 20 INJECTION, SOLUTION, CONCENTRATE INTRAVENOUS at 11:56

## 2017-07-19 RX ADMIN — SODIUM CHLORIDE 25 ML/HR: 900 INJECTION, SOLUTION INTRAVENOUS at 11:00

## 2017-08-02 ENCOUNTER — TELEPHONE (OUTPATIENT)
Dept: RHEUMATOLOGY | Age: 82
End: 2017-08-02

## 2017-08-02 DIAGNOSIS — M05.79 RHEUMATOID ARTHRITIS INVOLVING MULTIPLE SITES WITH POSITIVE RHEUMATOID FACTOR (HCC): Primary | Chronic | ICD-10-CM

## 2017-08-02 NOTE — TELEPHONE ENCOUNTER
Pt states she was told to call back 14 days after infusion. Per notes written by JOSE RAUL Rodriguez, patient was to have labs after 10-14 days after infusion.  Lab slip faxed to [address] [facility] per pt request.    Genesis Vega RN

## 2017-08-02 NOTE — TELEPHONE ENCOUNTER
Patient would like a return call states she was suppose to call in 14 days after her infusion [phone]

## 2017-08-05 LAB
ALBUMIN SERPL-MCNC: 4.2 G/DL (ref 3.5–4.7)
ALBUMIN/GLOB SERPL: 1.7 {RATIO} (ref 1.2–2.2)
ALP SERPL-CCNC: 49 IU/L (ref 39–117)
ALT SERPL-CCNC: 34 IU/L (ref 0–32)
AST SERPL-CCNC: 30 IU/L (ref 0–40)
BASOPHILS # BLD AUTO: 0 X10E3/UL (ref 0–0.2)
BASOPHILS NFR BLD AUTO: 0 %
BILIRUB SERPL-MCNC: 0.7 MG/DL (ref 0–1.2)
BUN SERPL-MCNC: 11 MG/DL (ref 8–27)
BUN/CREAT SERPL: 12 (ref 12–28)
CALCIUM SERPL-MCNC: 9.2 MG/DL (ref 8.7–10.3)
CHLORIDE SERPL-SCNC: 99 MMOL/L (ref 96–106)
CO2 SERPL-SCNC: 23 MMOL/L (ref 18–29)
CREAT SERPL-MCNC: 0.91 MG/DL (ref 0.57–1)
CRP SERPL-MCNC: 1.4 MG/L (ref 0–4.9)
EOSINOPHIL # BLD AUTO: 0.2 X10E3/UL (ref 0–0.4)
EOSINOPHIL NFR BLD AUTO: 4 %
ERYTHROCYTE [DISTWIDTH] IN BLOOD BY AUTOMATED COUNT: 15.1 % (ref 12.3–15.4)
ERYTHROCYTE [SEDIMENTATION RATE] IN BLOOD BY WESTERGREN METHOD: 9 MM/HR (ref 0–40)
GLOBULIN SER CALC-MCNC: 2.5 G/DL (ref 1.5–4.5)
GLUCOSE SERPL-MCNC: 102 MG/DL (ref 65–99)
HCT VFR BLD AUTO: 35.9 % (ref 34–46.6)
HGB BLD-MCNC: 11.8 G/DL (ref 11.1–15.9)
IMM GRANULOCYTES # BLD: 0 X10E3/UL (ref 0–0.1)
IMM GRANULOCYTES NFR BLD: 0 %
LYMPHOCYTES # BLD AUTO: 1.1 X10E3/UL (ref 0.7–3.1)
LYMPHOCYTES NFR BLD AUTO: 20 %
MCH RBC QN AUTO: 29.9 PG (ref 26.6–33)
MCHC RBC AUTO-ENTMCNC: 32.9 G/DL (ref 31.5–35.7)
MCV RBC AUTO: 91 FL (ref 79–97)
MONOCYTES # BLD AUTO: 0.4 X10E3/UL (ref 0.1–0.9)
MONOCYTES NFR BLD AUTO: 8 %
NEUTROPHILS # BLD AUTO: 3.8 X10E3/UL (ref 1.4–7)
NEUTROPHILS NFR BLD AUTO: 68 %
PLATELET # BLD AUTO: 197 X10E3/UL (ref 150–379)
POTASSIUM SERPL-SCNC: 4.6 MMOL/L (ref 3.5–5.2)
PROT SERPL-MCNC: 6.7 G/DL (ref 6–8.5)
RBC # BLD AUTO: 3.94 X10E6/UL (ref 3.77–5.28)
SODIUM SERPL-SCNC: 137 MMOL/L (ref 134–144)
WBC # BLD AUTO: 5.6 X10E3/UL (ref 3.4–10.8)

## 2017-08-07 NOTE — TELEPHONE ENCOUNTER
Called patient at [phone] and made her aware that \" Glucose 102. Rest unremarkable. Please make sure she has follow up scheduled within next 4-6 weeks, sooner if needed. \" Patient voiced understanding and scheduled a follow up for Monday 9/18/17 at 1030.

## 2017-08-07 NOTE — TELEPHONE ENCOUNTER
Glucose 102. Rest unremarkable. Please make sure she has follow up scheduled within next 4-6 weeks, sooner if needed.

## 2017-08-08 ENCOUNTER — APPOINTMENT (OUTPATIENT)
Dept: INFUSION THERAPY | Age: 82
End: 2017-08-08
Payer: MEDICARE

## 2017-08-09 RX ORDER — DIPHENHYDRAMINE HYDROCHLORIDE 50 MG/ML
25 INJECTION, SOLUTION INTRAMUSCULAR; INTRAVENOUS
Status: DISPENSED | OUTPATIENT
Start: 2017-08-15 | End: 2017-08-15

## 2017-08-09 RX ORDER — SODIUM CHLORIDE 9 MG/ML
25 INJECTION, SOLUTION INTRAVENOUS CONTINUOUS
Status: DISPENSED | OUTPATIENT
Start: 2017-08-15 | End: 2017-08-15

## 2017-08-09 RX ORDER — ACETAMINOPHEN 325 MG/1
650 TABLET ORAL
Status: DISPENSED | OUTPATIENT
Start: 2017-08-15 | End: 2017-08-15

## 2017-08-15 ENCOUNTER — HOSPITAL ENCOUNTER (OUTPATIENT)
Dept: INFUSION THERAPY | Age: 82
Discharge: HOME | End: 2017-08-15
Payer: MEDICARE

## 2017-08-15 VITALS
TEMPERATURE: 96.9 F | HEART RATE: 72 BPM | HEIGHT: 60 IN | WEIGHT: 147.6 LBS | BODY MASS INDEX: 28.98 KG/M2 | RESPIRATION RATE: 18 BRPM | DIASTOLIC BLOOD PRESSURE: 62 MMHG | SYSTOLIC BLOOD PRESSURE: 128 MMHG

## 2017-08-15 PROCEDURE — 96375 TX/PRO/DX INJ NEW DRUG ADDON: CPT

## 2017-08-15 PROCEDURE — 74011250636 HC RX REV CODE- 250/636: Performed by: INTERNAL MEDICINE

## 2017-08-15 PROCEDURE — 74011250636 HC RX REV CODE- 250/636

## 2017-08-15 PROCEDURE — 96413 CHEMO IV INFUSION 1 HR: CPT

## 2017-08-15 PROCEDURE — 74011000258 HC RX REV CODE- 258: Performed by: INTERNAL MEDICINE

## 2017-08-15 RX ADMIN — SODIUM CHLORIDE 25 ML/HR: 900 INJECTION, SOLUTION INTRAVENOUS at 11:15

## 2017-08-15 RX ADMIN — TOCILIZUMAB 250 MG: 20 INJECTION, SOLUTION, CONCENTRATE INTRAVENOUS at 11:38

## 2017-08-15 RX ADMIN — DIPHENHYDRAMINE HYDROCHLORIDE 25 MG: 50 INJECTION INTRAMUSCULAR; INTRAVENOUS at 11:15

## 2017-08-15 NOTE — PROGRESS NOTES
[facility]fusion [facility] - Chemotherapy Progress Note    1100 Pt admit to [facility] for actemra q4 weeks ambulatory in stable condition. Assessment completed. No new concerns voiced. PIV with positive blood return and flush. Labs reviewed from 7/19/17    Patient Vitals for the past 12 hrs:   Temp Pulse Resp BP   08/15/17 1255 96.9 °F (36.1 °C) 72 18 128/62   08/15/17 1100 97 °F (36.1 °C) 87 18 151/75       Medications:  NS IV  Benadryl IVP  Actemra IV    1300 Pt tolerated treatment well. PIV maintained positive blood return throughout treatment, flushed with positive blood return at conclusion and deaccessed. Gauze and pressure bandage applied. D/c home ambulatory in no distress.  Pt aware of next appointment scheduled for 9/12/17 at 0900

## 2017-08-29 ENCOUNTER — TELEPHONE (OUTPATIENT)
Dept: RHEUMATOLOGY | Age: 82
End: 2017-08-29

## 2017-08-29 NOTE — TELEPHONE ENCOUNTER
Returned call gave patient number to [employer]ET[employer] NAG[facility] Patient Assistance program.

## 2017-09-04 RX ORDER — ACETAMINOPHEN 325 MG/1
650 TABLET ORAL
Status: DISCONTINUED | OUTPATIENT
Start: 2017-09-11 | End: 2017-09-04

## 2017-09-04 RX ORDER — DIPHENHYDRAMINE HYDROCHLORIDE 50 MG/ML
25 INJECTION, SOLUTION INTRAMUSCULAR; INTRAVENOUS
Status: DISCONTINUED | OUTPATIENT
Start: 2017-09-11 | End: 2017-09-04

## 2017-09-04 RX ORDER — ACETAMINOPHEN 325 MG/1
650 TABLET ORAL
Status: ACTIVE | OUTPATIENT
Start: 2017-09-12 | End: 2017-09-12

## 2017-09-04 RX ORDER — SODIUM CHLORIDE 9 MG/ML
25 INJECTION, SOLUTION INTRAVENOUS CONTINUOUS
Status: DISPENSED | OUTPATIENT
Start: 2017-09-12 | End: 2017-09-12

## 2017-09-04 RX ORDER — SODIUM CHLORIDE 9 MG/ML
25 INJECTION, SOLUTION INTRAVENOUS CONTINUOUS
Status: DISCONTINUED | OUTPATIENT
Start: 2017-09-11 | End: 2017-09-04

## 2017-09-04 RX ORDER — DIPHENHYDRAMINE HYDROCHLORIDE 50 MG/ML
25 INJECTION, SOLUTION INTRAMUSCULAR; INTRAVENOUS
Status: ACTIVE | OUTPATIENT
Start: 2017-09-12 | End: 2017-09-12

## 2017-09-05 ENCOUNTER — TELEPHONE (OUTPATIENT)
Dept: RHEUMATOLOGY | Age: 82
End: 2017-09-05

## 2017-09-05 RX ORDER — METHOTREXATE 2.5 MG/1
TABLET ORAL
Qty: 25 TAB | Refills: 2 | Status: SHIPPED | OUTPATIENT
Start: 2017-09-05 | End: 2020-11-30

## 2017-09-12 ENCOUNTER — HOSPITAL ENCOUNTER (OUTPATIENT)
Dept: INFUSION THERAPY | Age: 82
Discharge: HOME | End: 2017-09-12
Payer: MEDICARE

## 2017-09-15 RX ORDER — ACETAMINOPHEN 325 MG/1
650 TABLET ORAL
Status: ACTIVE | OUTPATIENT
Start: 2017-09-21 | End: 2017-09-21

## 2017-09-15 RX ORDER — DIPHENHYDRAMINE HYDROCHLORIDE 50 MG/ML
25 INJECTION, SOLUTION INTRAMUSCULAR; INTRAVENOUS
Status: ACTIVE | OUTPATIENT
Start: 2017-09-21 | End: 2017-09-21

## 2017-09-15 RX ORDER — SODIUM CHLORIDE 9 MG/ML
25 INJECTION, SOLUTION INTRAVENOUS CONTINUOUS
Status: DISPENSED | OUTPATIENT
Start: 2017-09-21 | End: 2017-09-21

## 2017-09-18 ENCOUNTER — OFFICE VISIT (OUTPATIENT)
Dept: RHEUMATOLOGY | Age: 82
End: 2017-09-18

## 2017-09-18 VITALS
HEART RATE: 80 BPM | DIASTOLIC BLOOD PRESSURE: 67 MMHG | BODY MASS INDEX: 29.17 KG/M2 | HEIGHT: 60 IN | WEIGHT: 148.6 LBS | TEMPERATURE: 98.2 F | OXYGEN SATURATION: 96 % | SYSTOLIC BLOOD PRESSURE: 153 MMHG | RESPIRATION RATE: 18 BRPM

## 2017-09-18 DIAGNOSIS — Z79.60 LONG-TERM USE OF IMMUNOSUPPRESSANT MEDICATION: ICD-10-CM

## 2017-09-18 DIAGNOSIS — R74.01 ELEVATED ALT MEASUREMENT: ICD-10-CM

## 2017-09-18 DIAGNOSIS — M05.79 RHEUMATOID ARTHRITIS INVOLVING MULTIPLE SITES WITH POSITIVE RHEUMATOID FACTOR (HCC): Primary | Chronic | ICD-10-CM

## 2017-09-18 DIAGNOSIS — E03.9 ACQUIRED HYPOTHYROIDISM: Chronic | ICD-10-CM

## 2017-09-18 DIAGNOSIS — M79.651 RIGHT THIGH PAIN: ICD-10-CM

## 2017-09-18 DIAGNOSIS — M81.0 OSTEOPOROSIS, POST-MENOPAUSAL: ICD-10-CM

## 2017-09-18 DIAGNOSIS — N18.3 CHRONIC KIDNEY DISEASE (CKD), STAGE 3 (MODERATE): ICD-10-CM

## 2017-09-18 PROBLEM — N18.9 CHRONIC KIDNEY DISEASE (CKD): Status: ACTIVE | Noted: 2017-09-18

## 2017-09-18 RX ORDER — GLUCOSAMINE SULFATE 1500 MG
2000 POWDER IN PACKET (EA) ORAL DAILY
COMMUNITY

## 2017-09-18 NOTE — MR AVS SNAPSHOT
Visit Information     Date & Time Provider Department Dept. Phone Encounter #    9/18/2017 10:30 AM Katherin Pallas, MD [address] [facility] [phone] [id number]      Follow-up Instructions     Return in about 3 months (around 12/18/2017). Upcoming Health Maintenance        Date Due    FOOT EXAM Q1 2/13/1940    MICROALBUMIN Q1 2/13/1940    EYE EXAM RETINAL OR DILATED Q1 [birth year]/1940    DTaP/Tdap/Td series (1 - Tdap) 2/13/1951    ZOSTER VACCINE AGE 60> 12/13/1989    GLAUCOMA SCREENING Q2Y 2/13/1995    Pneumococcal 65+ Low/Medium Risk (1 of 2 - PCV13) 2/13/1995    MEDICARE YEARLY EXAM 2/13/1995    HEMOGLOBIN A1C Q6M 10/9/2016    INFLUENZA AGE 9 TO ADULT 8/1/2017    LIPID PANEL Q1 2/6/2018      Allergies as of 9/18/2017  Review Complete On: 9/18/2017 By: Katherin Pallas, MD       Severity Noted Reaction Type Reactions    Alendronate  02/01/2017    Diarrhea    Amoxicillin  08/08/2011    Rash    Hydrochlorothiazide  06/21/2016    Other (comments)    \"Caused pain everywhere\"      Current Immunizations  Reviewed on 9/18/2017    Name Date    Influenza Vaccine 10/8/2016       Reviewed by Katherin Pallas, MD on 9/18/2017 at 11:34 AM   You Were Diagnosed With        Codes Comments    Rheumatoid arthritis involving multiple sites with positive rheumatoid factor ([facility]    -  Primary ICD-10-CM: M05.79  ICD-9-CM: 714.0     Osteoporosis, post-menopausal     ICD-10-CM: M81.0  ICD-9-CM: 733.01     Long-term use of immunosuppressant medication     ICD-10-CM: Z79.899  ICD-9-CM: V58.69     Chronic kidney disease (CKD), stage 3 (moderate)     ICD-10-CM: N18.3  ICD-9-CM: 364. 3     Elevated ALT measurement     ICD-10-CM: R74.0  ICD-9-CM: 790.4     Right thigh pain     ICD-10-CM: M79.651  ICD-9-CM: 729.5     Acquired hypothyroidism     ICD-10-CM: E03.9  ICD-9-CM: 244. 9       Vitals     BP Pulse Temp Resp Height(growth percentile) Weight(growth percentile)    153/67 (BP 1 Location: Left arm, BP Patient Position: Sitting) 80 98.2 °F (36.8 °C) (Oral) 18 5' (1.524 m) 148 lb 9.6 oz (67.4 kg)    SpO2 BMI OB Status Smoking Status          96% 29.02 kg/m2 Postmenopausal Former Smoker      Vitals History      BMI and BSA Data     Body Mass Index Body Surface Area    29.02 kg/m 2 1.69 m 2         Preferred Pharmacy       Pharmacy Name Phone    [facility]/PHARMACY [address], [address] [place] [address] [phone]         Your Updated Medication List          This list is accurate as of: 9/18/17 11:37 AM.  Always use your most recent med list.                acetaminophen 325 mg tablet   Commonly known as:  TYLENOL   Take 325 mg by mouth daily as needed for Pain. ACTEMRA IV   by IntraVENous route every thirty (30) days. amLODIPine 5 mg tablet   Commonly known as:  NORVASC   Take 2 Tabs by mouth daily. aspirin delayed-release 81 mg tablet   Take 81 mg by mouth every other day. fluticasone 50 mcg/actuation nasal spray   Commonly known as:  FLONASE   INSTILL 1 SPRAY IN EACH NOSTRIL ONCE A DAY NASALLY 30 DAY(S)       folic acid 1 mg tablet   Commonly known as:  FOLVITE   Take 1 mg by mouth daily. levothyroxine 75 mcg tablet   Commonly known as:  SYNTHROID   Take 75 mcg by mouth Daily (before breakfast). lisinopril 20 mg tablet   Commonly known as:  PRINIVIL, ZESTRIL   TAKE 1 TABLET BY MOUTH TWICE A DAY       methotrexate 2.5 mg tablet   Commonly known as:  RHEUMATREX   12.5 mg (5 tabs) once weekly       VITAMIN D3 1,000 unit Cap   Generic drug:  cholecalciferol   Take  by mouth daily. ZANTAC 75 75 mg tablet   Generic drug:  raNITIdine   Take 75 mg by mouth two (2) times daily as needed.                We Performed the Following     C REACTIVE PROTEIN, QT [48289 CPT(R)]     CBC WITH AUTOMATED DIFF [44424 CPT(R)]     LIPID PANEL [91685 CPT(R)]     METABOLIC PANEL, COMPREHENSIVE [98476 CPT(R)]     SED RATE (ESR) [17357 CPT(R)]     TSH 3RD GENERATION [41125 CPT(R)]     VITAMIN D, Anna Lynn [[id number] Custom]       Follow-up Instructions     Return in about 3 months (around 12/18/2017). To-Do List     09/18/2017     Imaging:  XR FEMUR RT 2 VS        09/21/2017 11:00 AM     Appointment with [address] at [place] [address] ([phone])       12/12/2017 1:00 PM     Appointment with [address] at [place] [address] ([phone])         Patient Instructions    Labs and xrays soon    Call if increasing right thigh pain    Discuss Prolia at next visit         Introducing [facility] SERVICES! Maggie Ybarra introduces [employer] patient portal. Now you can access parts of your medical record, email your doctor's office, and request medication refills online. 1. In your internet browser, go to [email or web address]   2. Click on the First Time User? Click Here link in the Sign In box. You will see the New Member Sign Up page. 3. Enter your [employer] Access Code exactly as it appears below. You will not need to use this code after youve completed the sign-up process. If you do not sign up before the expiration date, you must request a new code. · [employer] Access Code: [email or web address]  Expires: 12/17/2017 11:37 AM    4. Enter the last four digits of your Social Security Number (xxxx) and Date of Birth (mm/dd/yyyy) as indicated and click Submit. You will be taken to the next sign-up page. 5. Create a [employer] ID. This will be your [employer] login ID and cannot be changed, so think of one that is secure and easy to remember. 6. Create a [employer] password. You can change your password at any time. 7. Enter your Password Reset Question and Answer. This can be used at a later time if you forget your password. 8. Enter your e-mail address. You will receive e-mail notification when new information is available in [address] 9. Click Sign Up. You can now view and download portions of your medical record.   10. Click the Download Summary menu link to download a portable copy of your medical information. If you have questions, please visit the Frequently Asked Questions section of the [employer] website. Remember, [employer] is NOT to be used for urgent needs. For medical emergencies, dial 911. Now available from your iPhone and Android! Please provide this summary of care documentation to your next provider. Your primary care clinician is listed as Charlotte Styles. If you have any questions after today's visit, please call [phone].

## 2017-09-18 NOTE — PROGRESS NOTES
HISTORY OF PRESENT ILLNESS  Jose Ayers is a 80 y.o. female. HPI Patient presents for follow up of rheumatoid arthritis. She had to reschedule her infusion from the 12th (thought she had a bladder infection; she saw her PCP and was not found to have an infection; no current abdominal pain; mainly urinary frequency). She is scheduled for the infusion this Thursday. She has noted a flare of overall aching over the past 3 days. She notes right thigh pain since June (after Prolia). Pain improves with local massage. She has no joint swelling. She has AM stiffness of 35 minutes. She has no fevers. She is taking methotrexate 12.5 mg orally once weekly. She is receiving Actemra 4 mg/kg infusion every 4 weeks. Her blood pressure has been \"good\" at home (130's-140's). Regarding osteoporosis, she gets perhaps 1-3 servings of a calcium rich food daily. She is taking a multivitamin daily with vitamin D daily as well. Current Outpatient Prescriptions   Medication Sig Dispense Refill    cholecalciferol (VITAMIN D3) 1,000 unit cap Take  by mouth daily.  methotrexate (RHEUMATREX) 2.5 mg tablet 12.5 mg (5 tabs) once weekly 25 Tab 2    lisinopril (PRINIVIL, ZESTRIL) 20 mg tablet TAKE 1 TABLET BY MOUTH TWICE A DAY  1    amLODIPine (NORVASC) 5 mg tablet Take 2 Tabs by mouth daily. 60 Tab 0    fluticasone (FLONASE) 50 mcg/actuation nasal spray INSTILL 1 SPRAY IN EACH NOSTRIL ONCE A DAY NASALLY 30 DAY(S)  1    raNITIdine (ZANTAC 75) 75 mg tablet Take 75 mg by mouth two (2) times daily as needed.  folic acid (FOLVITE) 1 mg tablet Take 1 mg by mouth daily.  levothyroxine (SYNTHROID) 75 mcg tablet Take 75 mcg by mouth Daily (before breakfast).  acetaminophen (TYLENOL) 325 mg tablet Take 325 mg by mouth daily as needed for Pain.  aspirin delayed-release 81 mg tablet Take 81 mg by mouth every other day.  TOCILIZUMAB (ACTEMRA IV) by IntraVENous route every thirty (30) days. Facility-Administered Medications Ordered in Other Visits   Medication Dose Route Frequency Provider Last Rate Last Dose    [START ON 9/21/2017] tocilizumab (ACTEMRA) 250 mg in 0.9% sodium chloride 100 mL, overfill volume 10 mL infusion  250 mg IntraVENous ONCE MD Jhonny Terrazas ON 9/21/2017] diphenhydrAMINE (BENADRYL) injection 25 mg  25 mg IntraVENous Q4H PRN Dilcia Mendieta MD        [START ON 9/21/2017] acetaminophen (TYLENOL) tablet 650 mg  650 mg Oral Q4H PRN MD Jhonny Terrazas ON 9/21/2017] 0.9% sodium chloride infusion  25 mL/hr IntraVENous CONTINUOUS Dilcia Mendieta MD         Allergies   Allergen Reactions    Alendronate Diarrhea    Amoxicillin Rash    Hydrochlorothiazide Other (comments)     \"Caused pain everywhere\"       Review of Systems   Constitutional: Negative for fever and weight loss. Gastrointestinal: Positive for abdominal pain. Skin: Positive for rash. Physical Exam   Vitals reviewed. Constitutional: She is oriented to person, place, and time. She appears well-developed and well-nourished. No distress. HENT:   Mouth/Throat: Oropharynx is clear and moist. No oropharyngeal exudate. Full dentures   Eyes: Conjunctivae are normal.   Neck: Normal range of motion. Neck supple. Cardiovascular: Regular rhythm.   -no edema   Pulmonary/Chest: Effort normal and breath sounds normal. No respiratory distress. Abdominal: Soft.   -mild distention  -no organomegaly  -mild RLQ tenderness    Musculoskeletal:   -mild subluxation at [facility] each hand (L>R)    -hyperextension left 2nd-4th PIPs (hand)  -synovitis right 3rd MCP with synovial thickening 2nd and 4th MCPS  -peripheral joints FROM  -nodular thickening right second finger flexor tendon proximal to MCP  -no tenderness, erythema, or swelling over right thigh  Lymphadenopathy:   She has no cervical adenopathy. Neurological: She is alert and oriented to person, place, and time. She exhibits normal muscle tone. Gait normal   Skin: Skin is warm and dry. No acute rash  Psychiatric: She has a normal mood and affect. Judgment normal.     Lab Results   Component Value Date/Time    WBC 5.6 08/04/2017 08:46 AM    HGB 11.8 08/04/2017 08:46 AM    HCT 35.9 08/04/2017 08:46 AM    PLATELET 506 22/78/7416 08:46 AM    MCV 91 08/04/2017 08:46 AM     Lab Results   Component Value Date/Time    Sed rate (ESR) 9 08/04/2017 08:46 AM     Lab Results   Component Value Date/Time    C-Reactive protein 5.07 04/18/2017 11:14 AM     Lab Results   Component Value Date/Time    Sodium 137 08/04/2017 08:46 AM    Potassium 4.6 08/04/2017 08:46 AM    Chloride 99 08/04/2017 08:46 AM    CO2 23 08/04/2017 08:46 AM    Anion gap 9 06/23/2017 06:04 PM    Glucose 102 08/04/2017 08:46 AM    BUN 11 08/04/2017 08:46 AM    Creatinine 0.91 08/04/2017 08:46 AM    BUN/Creatinine ratio 12 08/04/2017 08:46 AM    GFR est AA 66 08/04/2017 08:46 AM    GFR est non-AA 57 08/04/2017 08:46 AM    Calcium 9.2 08/04/2017 08:46 AM    Bilirubin, total 0.7 08/04/2017 08:46 AM    AST (SGOT) 30 08/04/2017 08:46 AM    Alk. phosphatase 49 08/04/2017 08:46 AM    Protein, total 6.7 08/04/2017 08:46 AM    Albumin 4.2 08/04/2017 08:46 AM    Globulin 3.4 06/23/2017 06:04 PM    A-G Ratio 1.7 08/04/2017 08:46 AM    ALT (SGPT) 34 08/04/2017 08:46 AM     Lab Results   Component Value Date/Time    Cholesterol, total 253 02/06/2017 08:22 AM    HDL Cholesterol 42 02/06/2017 08:22 AM    LDL, calculated 182 02/06/2017 08:22 AM    VLDL, calculated 29 02/06/2017 08:22 AM    Triglyceride 143 02/06/2017 08:22 AM     MHAQ 1.000    CDAI 9/18/2017 1/19/2017   Swollen Joint Count 3 1   Tender Joint Count 1 2   Physician Assessment (0-10) 3 3   Patient Assessment (0-10) 6 2.5   CDAI 13 8.5       ASSESSMENT and PLAN    ICD-10-CM ICD-9-CM    1. Rheumatoid arthritis involving multiple sites with positive rheumatoid factor ([facility] [address]): CCP and RF positive by report.  Deformities on hand exam. Methotrexate 25 mg weekly for many years. Actemra infusions since March 2016 (4 mg/kg) have helped. She lowered methotrexate to 12.5 mg since earlier in the year. Overall doing well with some increased pain recently, pending infusion later in the week (see HPI). M05.79 714.0 C REACTIVE PROTEIN, QT  Methotrexate 12.5 mg weekly  Actemra 4 mg/kg every 4 weeks        SED RATE (ESR)      LIPID PANEL   2. Osteoporosis, post-menopausal: no fracture history. DXA most recently with T-score 1/3 radius -3.2. L1-2 T-score -2.5. She tried alendronate starting Jan 2017, but stopped due to diarrhea. Leery of reclast due to CKD. Had Prolia in June (see HPI). M81.0 733.01 VITAMIN D, 25 HYROXY PANEL  Calcium 1200 mg daily total  Vitamin D3 9947-9455 units daily  Review at next appointment regarding thigh pain prior to next Prolia   3. Long-term use of immunosuppressant medication O78.695 X57.73 METABOLIC PANEL, COMPREHENSIVE      CBC WITH AUTOMATED DIFF      LIPID PANEL  Flu shot soon. Make sure pneumonia vaccine up to date. 4. Chronic kidney disease (CKD), stage 3 (moderate) P17.7 364.3 METABOLIC PANEL, COMPREHENSIVE   5. Elevated ALT measurement: mild. Recent check. A52.7 424.0 METABOLIC PANEL, COMPREHENSIVE   6. Right thigh pain: noted since June. No focal tenderness or abnormality. Felt present since Prolia. Able to ameliorate by topical massage of area. Given duration of therapy and history, AFF low on DDX. M79.651 729.5 XR FEMUR RT 2 VS  Call if worsening pain   See above regarding Prolia   7.  Acquired hypothyroidism E03.9 244.9 TSH 3RD GENERATION

## 2017-09-20 ENCOUNTER — HOSPITAL ENCOUNTER (OUTPATIENT)
Dept: GENERAL RADIOLOGY | Age: 82
Discharge: HOME | End: 2017-09-20
Payer: MEDICARE

## 2017-09-20 DIAGNOSIS — M79.651 RIGHT THIGH PAIN: ICD-10-CM

## 2017-09-20 PROCEDURE — 73552 X-RAY EXAM OF FEMUR 2/>: CPT

## 2017-09-20 NOTE — PROGRESS NOTES
Femur xray no bony abnormalities reported. If persistent or increasing pain over anterior thigh, please call for further evaluation.

## 2017-09-21 ENCOUNTER — HOSPITAL ENCOUNTER (OUTPATIENT)
Dept: INFUSION THERAPY | Age: 82
Discharge: HOME | End: 2017-09-21
Payer: MEDICARE

## 2017-09-21 VITALS
OXYGEN SATURATION: 96 % | HEIGHT: 60 IN | TEMPERATURE: 98 F | BODY MASS INDEX: 28.78 KG/M2 | WEIGHT: 146.6 LBS | DIASTOLIC BLOOD PRESSURE: 58 MMHG | HEART RATE: 69 BPM | SYSTOLIC BLOOD PRESSURE: 123 MMHG | RESPIRATION RATE: 18 BRPM

## 2017-09-21 PROCEDURE — 74011250636 HC RX REV CODE- 250/636

## 2017-09-21 PROCEDURE — 74011250636 HC RX REV CODE- 250/636: Performed by: INTERNAL MEDICINE

## 2017-09-21 PROCEDURE — 74011000258 HC RX REV CODE- 258: Performed by: INTERNAL MEDICINE

## 2017-09-21 PROCEDURE — 96413 CHEMO IV INFUSION 1 HR: CPT

## 2017-09-21 RX ADMIN — SODIUM CHLORIDE 25 ML/HR: 900 INJECTION, SOLUTION INTRAVENOUS at 11:07

## 2017-09-21 RX ADMIN — TOCILIZUMAB 250 MG: 20 INJECTION, SOLUTION, CONCENTRATE INTRAVENOUS at 11:59

## 2017-09-21 NOTE — PROGRESS NOTES
Outpatient Infusion Center - Chemotherapy Progress Note    1100 Pt admit to [facility] for Actemra ambulatory in stable condition. Assessment completed. No new concerns voiced. PIV started in right arm with positive blood return. Line flushed, pt connected to IV fluids at [facility]. Visit Vitals    /58    Pulse 69    Temp 98 °F (36.7 °C)    Resp 18    Ht 5' (1.524 m)    Wt 66.5 kg (146 lb 9.6 oz)    SpO2 96%    Breastfeeding No    BMI 28.63 kg/m2       Medications:  NS @ KVO  Actemra    1315 Pt tolerated treatment well. PIV maintained positive blood return throughout treatment, flushed with positive blood return at conclusion and removed prior to discharge. D/c home ambulatory in no distress. Pt aware of next [facility] appointment scheduled for 10/19/17.

## 2017-09-22 NOTE — PROGRESS NOTES
Spoke with patient after verifying name and  regarding Dr. Isaac Sellers recommendations. Writer informed patient of Dr. Isaac Sellers recommendations. Patient given an opportunity to ask questions, repeated information, and verbalized understanding.

## 2017-09-23 LAB
25(OH)D2 SERPL-MCNC: 3.5 NG/ML
25(OH)D3 SERPL-MCNC: 21 NG/ML
25(OH)D3+25(OH)D2 SERPL-MCNC: 25 NG/ML
ALBUMIN SERPL-MCNC: 3.6 G/DL (ref 3.5–4.7)
ALBUMIN/GLOB SERPL: 1.3 {RATIO} (ref 1.2–2.2)
ALP SERPL-CCNC: 53 IU/L (ref 39–117)
ALT SERPL-CCNC: 18 IU/L (ref 0–32)
AST SERPL-CCNC: 19 IU/L (ref 0–40)
BASOPHILS # BLD AUTO: 0 X10E3/UL (ref 0–0.2)
BASOPHILS NFR BLD AUTO: 0 %
BILIRUB SERPL-MCNC: 0.4 MG/DL (ref 0–1.2)
BUN SERPL-MCNC: 12 MG/DL (ref 8–27)
BUN/CREAT SERPL: 13 (ref 12–28)
CALCIUM SERPL-MCNC: 8.6 MG/DL (ref 8.7–10.3)
CHLORIDE SERPL-SCNC: 96 MMOL/L (ref 96–106)
CHOLEST SERPL-MCNC: 191 MG/DL (ref 100–199)
CO2 SERPL-SCNC: 22 MMOL/L (ref 18–29)
CREAT SERPL-MCNC: 0.95 MG/DL (ref 0.57–1)
CRP SERPL-MCNC: 65.5 MG/L (ref 0–4.9)
EOSINOPHIL # BLD AUTO: 0.3 X10E3/UL (ref 0–0.4)
EOSINOPHIL NFR BLD AUTO: 4 %
ERYTHROCYTE [DISTWIDTH] IN BLOOD BY AUTOMATED COUNT: 15.4 % (ref 12.3–15.4)
ERYTHROCYTE [SEDIMENTATION RATE] IN BLOOD BY WESTERGREN METHOD: 43 MM/HR (ref 0–40)
GLOBULIN SER CALC-MCNC: 2.7 G/DL (ref 1.5–4.5)
GLUCOSE SERPL-MCNC: 105 MG/DL (ref 65–99)
HCT VFR BLD AUTO: 31.4 % (ref 34–46.6)
HDLC SERPL-MCNC: 33 MG/DL
HGB BLD-MCNC: 10.3 G/DL (ref 11.1–15.9)
IMM GRANULOCYTES # BLD: 0 X10E3/UL (ref 0–0.1)
IMM GRANULOCYTES NFR BLD: 0 %
LDLC SERPL CALC-MCNC: 128 MG/DL (ref 0–99)
LYMPHOCYTES # BLD AUTO: 0.7 X10E3/UL (ref 0.7–3.1)
LYMPHOCYTES NFR BLD AUTO: 11 %
MCH RBC QN AUTO: 28.7 PG (ref 26.6–33)
MCHC RBC AUTO-ENTMCNC: 32.8 G/DL (ref 31.5–35.7)
MCV RBC AUTO: 88 FL (ref 79–97)
MONOCYTES # BLD AUTO: 0.3 X10E3/UL (ref 0.1–0.9)
MONOCYTES NFR BLD AUTO: 4 %
NEUTROPHILS # BLD AUTO: 5.8 X10E3/UL (ref 1.4–7)
NEUTROPHILS NFR BLD AUTO: 81 %
PLATELET # BLD AUTO: 434 X10E3/UL (ref 150–379)
POTASSIUM SERPL-SCNC: 5.2 MMOL/L (ref 3.5–5.2)
PROT SERPL-MCNC: 6.3 G/DL (ref 6–8.5)
RBC # BLD AUTO: 3.59 X10E6/UL (ref 3.77–5.28)
SODIUM SERPL-SCNC: 133 MMOL/L (ref 134–144)
TRIGL SERPL-MCNC: 148 MG/DL (ref 0–149)
TSH SERPL DL<=0.005 MIU/L-ACNC: 1.03 UIU/ML (ref 0.45–4.5)
VLDLC SERPL CALC-MCNC: 30 MG/DL (ref 5–40)
WBC # BLD AUTO: 7.1 X10E3/UL (ref 3.4–10.8)

## 2017-09-25 NOTE — PROGRESS NOTES
Vitamin D mildly low. Please take 2000 units vitamin D3 twice daily for 3 months, then 2000 units once daily. Mildly low sodium: please review with PCP. Mild anemia: likely related to increased inflammation markers that have likely improved if feeling better after infusion of Actemra. If feeling better, I would still obtain CBC, CMP, ESR 3 days prior to next Actemra to make sure anemia has improved (hold off on infusion until labs reported).

## 2017-09-28 NOTE — PROGRESS NOTES
Spoke with patient using 2 identifiers informing her per Dr. Marcia Rowe, Vitamin D mildly low, take 2000 units of Vitamin D3 twice daily for 3 months, then 2000 units once daily. Mildly low sodium, review with PCP. Mild anemia: likely related increased inflammation markers that have likely improved if feeling better after infusion of Actemra. If feeling better(patient states she does), obtain CBC, CMP, ESR 3 days prior to next Actemra to make sure anemia has improved. Hold off on infusion until labs reported. Patient states understanding and agrees with plan.     Laureano Campbell RN

## 2017-10-16 RX ORDER — SODIUM CHLORIDE 9 MG/ML
25 INJECTION, SOLUTION INTRAVENOUS CONTINUOUS
Status: DISPENSED | OUTPATIENT
Start: 2017-10-19 | End: 2017-10-19

## 2017-10-16 RX ORDER — ACETAMINOPHEN 325 MG/1
650 TABLET ORAL
Status: ACTIVE | OUTPATIENT
Start: 2017-10-19 | End: 2017-10-19

## 2017-10-16 RX ORDER — DIPHENHYDRAMINE HYDROCHLORIDE 50 MG/ML
25 INJECTION, SOLUTION INTRAMUSCULAR; INTRAVENOUS
Status: ACTIVE | OUTPATIENT
Start: 2017-10-19 | End: 2017-10-19

## 2017-10-17 LAB
ALBUMIN SERPL-MCNC: 4.1 G/DL (ref 3.5–4.7)
ALBUMIN/GLOB SERPL: 1.5 {RATIO} (ref 1.2–2.2)
ALP SERPL-CCNC: 51 IU/L (ref 39–117)
ALT SERPL-CCNC: 20 IU/L (ref 0–32)
AST SERPL-CCNC: 17 IU/L (ref 0–40)
BASOPHILS # BLD AUTO: 0 X10E3/UL (ref 0–0.2)
BASOPHILS NFR BLD AUTO: 0 %
BILIRUB SERPL-MCNC: 0.6 MG/DL (ref 0–1.2)
BUN SERPL-MCNC: 12 MG/DL (ref 8–27)
BUN/CREAT SERPL: 14 (ref 12–28)
CALCIUM SERPL-MCNC: 9.2 MG/DL (ref 8.7–10.3)
CHLORIDE SERPL-SCNC: 97 MMOL/L (ref 96–106)
CO2 SERPL-SCNC: 24 MMOL/L (ref 18–29)
CREAT SERPL-MCNC: 0.85 MG/DL (ref 0.57–1)
CRP SERPL-MCNC: 33.3 MG/L (ref 0–4.9)
EOSINOPHIL # BLD AUTO: 0.3 X10E3/UL (ref 0–0.4)
EOSINOPHIL NFR BLD AUTO: 4 %
ERYTHROCYTE [DISTWIDTH] IN BLOOD BY AUTOMATED COUNT: 16.1 % (ref 12.3–15.4)
ERYTHROCYTE [SEDIMENTATION RATE] IN BLOOD BY WESTERGREN METHOD: 27 MM/HR (ref 0–40)
GLOBULIN SER CALC-MCNC: 2.7 G/DL (ref 1.5–4.5)
GLUCOSE SERPL-MCNC: 94 MG/DL (ref 65–99)
HCT VFR BLD AUTO: 34.9 % (ref 34–46.6)
HGB BLD-MCNC: 11.5 G/DL (ref 11.1–15.9)
IMM GRANULOCYTES # BLD: 0 X10E3/UL (ref 0–0.1)
IMM GRANULOCYTES NFR BLD: 0 %
LYMPHOCYTES # BLD AUTO: 1.2 X10E3/UL (ref 0.7–3.1)
LYMPHOCYTES NFR BLD AUTO: 14 %
MCH RBC QN AUTO: 29.6 PG (ref 26.6–33)
MCHC RBC AUTO-ENTMCNC: 33 G/DL (ref 31.5–35.7)
MCV RBC AUTO: 90 FL (ref 79–97)
MONOCYTES # BLD AUTO: 0.7 X10E3/UL (ref 0.1–0.9)
MONOCYTES NFR BLD AUTO: 9 %
NEUTROPHILS # BLD AUTO: 5.9 X10E3/UL (ref 1.4–7)
NEUTROPHILS NFR BLD AUTO: 73 %
PLATELET # BLD AUTO: 236 X10E3/UL (ref 150–379)
POTASSIUM SERPL-SCNC: 4.4 MMOL/L (ref 3.5–5.2)
PROT SERPL-MCNC: 6.8 G/DL (ref 6–8.5)
RBC # BLD AUTO: 3.89 X10E6/UL (ref 3.77–5.28)
SODIUM SERPL-SCNC: 138 MMOL/L (ref 134–144)
WBC # BLD AUTO: 8.1 X10E3/UL (ref 3.4–10.8)

## 2017-10-19 ENCOUNTER — HOSPITAL ENCOUNTER (OUTPATIENT)
Dept: INFUSION THERAPY | Age: 82
Discharge: HOME | End: 2017-10-19
Payer: MEDICARE

## 2017-10-19 VITALS
BODY MASS INDEX: 28.92 KG/M2 | TEMPERATURE: 98.1 F | HEART RATE: 73 BPM | DIASTOLIC BLOOD PRESSURE: 61 MMHG | OXYGEN SATURATION: 98 % | WEIGHT: 148.1 LBS | SYSTOLIC BLOOD PRESSURE: 146 MMHG | RESPIRATION RATE: 16 BRPM

## 2017-10-19 PROCEDURE — 74011250636 HC RX REV CODE- 250/636

## 2017-10-19 PROCEDURE — 96365 THER/PROPH/DIAG IV INF INIT: CPT

## 2017-10-19 PROCEDURE — 74011000258 HC RX REV CODE- 258: Performed by: INTERNAL MEDICINE

## 2017-10-19 PROCEDURE — 74011250636 HC RX REV CODE- 250/636: Performed by: INTERNAL MEDICINE

## 2017-10-19 RX ORDER — SODIUM CHLORIDE 0.9 % (FLUSH) 0.9 %
10 SYRINGE (ML) INJECTION AS NEEDED
Status: ACTIVE | OUTPATIENT
Start: 2017-10-19 | End: 2017-10-20

## 2017-10-19 RX ADMIN — Medication 10 ML: at 11:31

## 2017-10-19 RX ADMIN — TOCILIZUMAB 250 MG: 20 INJECTION, SOLUTION, CONCENTRATE INTRAVENOUS at 12:15

## 2017-10-19 RX ADMIN — Medication 10 ML: at 13:20

## 2017-10-19 NOTE — PROGRESS NOTES
[facility] VISIT NOTE    1110  Pt arrived at [facility] ambulatory and in no distress for Q 4 week Actemra. Assessment completed, pt c/o right hip and bilateral knee pain rated at 8/10 on numeric pain scale today. No other acute concerns voiced at this time. Blood pressure 157/75, pulse 82, temperature 98.7 °F (37.1 °C), resp. rate 18, weight 67.2 kg (148 lb 1.6 oz), SpO2 98 %. 24g PIV placed to right AC without difficulty. Positive blood return noted. Medications received:  NS IV  Actemra IV    Patient declined to stay for 30 minute observation following infusion. Tolerated treatment well, no adverse reaction noted. PIV flushed and removed per protocol. Patient Vitals for the past 12 hrs:   Temp Pulse Resp BP SpO2   10/19/17 1315 98.1 °F (36.7 °C) 73 16 146/61 -   10/19/17 1245 98.2 °F (36.8 °C) 72 16 131/76 -   10/19/17 1113 98.7 °F (37.1 °C) 82 18 157/75 98 %       1320  D/C'd from [facility] ambulatory and in no distress. Next appointment is 11/16/17 at 1100.

## 2017-10-20 NOTE — PROGRESS NOTES
The results were reviewed and a letter was sent. Elevated inflammatory marker (CRP), suggestive of active inflammation.  All remaining labs are normal.

## 2017-11-13 RX ORDER — ACETAMINOPHEN 325 MG/1
650 TABLET ORAL
Status: ACTIVE | OUTPATIENT
Start: 2017-11-16 | End: 2017-11-16

## 2017-11-13 RX ORDER — DIPHENHYDRAMINE HYDROCHLORIDE 50 MG/ML
25 INJECTION, SOLUTION INTRAMUSCULAR; INTRAVENOUS
Status: ACTIVE | OUTPATIENT
Start: 2017-11-16 | End: 2017-11-16

## 2017-11-16 ENCOUNTER — HOSPITAL ENCOUNTER (OUTPATIENT)
Dept: INFUSION THERAPY | Age: 82
Discharge: HOME | End: 2017-11-16
Payer: MEDICARE

## 2017-11-16 VITALS
RESPIRATION RATE: 16 BRPM | DIASTOLIC BLOOD PRESSURE: 64 MMHG | SYSTOLIC BLOOD PRESSURE: 148 MMHG | HEART RATE: 65 BPM | TEMPERATURE: 97.3 F

## 2017-11-16 LAB
ANION GAP BLD CALC-SCNC: 12 MMOL/L (ref 5–15)
BUN BLD-MCNC: 13 MG/DL (ref 9–20)
CA-I BLD-MCNC: 1.22 MMOL/L (ref 1.12–1.32)
CHLORIDE BLD-SCNC: 102 MMOL/L (ref 98–107)
CO2 BLD-SCNC: 28 MMOL/L (ref 21–32)
CREAT BLD-MCNC: 1 MG/DL (ref 0.6–1.3)
GLUCOSE BLD-MCNC: 120 MG/DL (ref 65–100)
HCT VFR BLD CALC: 34 % (ref 35–47)
HGB BLD-MCNC: 11.6 GM/DL (ref 11.5–16)
POTASSIUM BLD-SCNC: 4 MMOL/L (ref 3.5–5.1)
SERVICE CMNT-IMP: ABNORMAL
SODIUM BLD-SCNC: 137 MMOL/L (ref 136–145)

## 2017-11-16 PROCEDURE — 96413 CHEMO IV INFUSION 1 HR: CPT

## 2017-11-16 PROCEDURE — 96365 THER/PROPH/DIAG IV INF INIT: CPT

## 2017-11-16 PROCEDURE — 80047 BASIC METABLC PNL IONIZED CA: CPT

## 2017-11-16 PROCEDURE — 74011000258 HC RX REV CODE- 258: Performed by: INTERNAL MEDICINE

## 2017-11-16 PROCEDURE — 74011250636 HC RX REV CODE- 250/636: Performed by: INTERNAL MEDICINE

## 2017-11-16 PROCEDURE — 74011250636 HC RX REV CODE- 250/636

## 2017-11-16 RX ADMIN — TOCILIZUMAB 250 MG: 20 INJECTION, SOLUTION, CONCENTRATE INTRAVENOUS at 12:22

## 2017-11-16 NOTE — PROGRESS NOTES
Outpatient Infusion Center - Chemotherapy Progress Note    1100 Pt admit to [facility] for q4wk Actemra infusion. Pt ambulatory in stable condition. Assessment completed. No new concerns voiced. Continues with joint pain secondary to RA. Reports pain has been less the last two days. IV obtained in left FA #24 and BMP obtained for Prolia injection. Pt then refused Prolia injection stating \"it made my thighs hurt too bad the last time and I'm not taking it anymore\". Pt states she informed Dr. Anton Macdonald about not continuing Prolia and will remind on her next office visit in December. Chemotherapy Flowsheet 11/16/2017   Date 11/16/2017   Drug / Regimen Actemra   Notes Refused Prolia       Visit Vitals    /64    Pulse 65    Temp 97.3 °F (36.3 °C)    Resp 16    Breastfeeding No     Medications: Actemra IV    1325 Pt tolerated treatment well. Left FA IV maintained positive blood return throughout treatment. Flushed and discontinued at end of treatment. D/C home ambulatory in no distress.  Pt aware of next appointment scheduled for 12/12/17 @ 1pm.

## 2017-12-12 ENCOUNTER — HOSPITAL ENCOUNTER (OUTPATIENT)
Dept: INFUSION THERAPY | Age: 82
Discharge: HOME | End: 2017-12-12
Payer: MEDICARE

## 2017-12-12 VITALS
DIASTOLIC BLOOD PRESSURE: 68 MMHG | HEART RATE: 77 BPM | RESPIRATION RATE: 16 BRPM | SYSTOLIC BLOOD PRESSURE: 151 MMHG | TEMPERATURE: 98 F

## 2017-12-12 LAB
ANION GAP BLD CALC-SCNC: 16 MMOL/L (ref 5–15)
BUN BLD-MCNC: 14 MG/DL (ref 9–20)
CA-I BLD-MCNC: 1.2 MMOL/L (ref 1.12–1.32)
CHLORIDE BLD-SCNC: 100 MMOL/L (ref 98–107)
CO2 BLD-SCNC: 27 MMOL/L (ref 21–32)
CREAT BLD-MCNC: 1.1 MG/DL (ref 0.6–1.3)
GLUCOSE BLD-MCNC: 132 MG/DL (ref 65–100)
HCT VFR BLD CALC: 33 % (ref 35–47)
HGB BLD-MCNC: 11.2 GM/DL (ref 11.5–16)
PHOSPHATE SERPL-MCNC: 4.3 MG/DL (ref 2.6–4.7)
POTASSIUM BLD-SCNC: 3.7 MMOL/L (ref 3.5–5.1)
SERVICE CMNT-IMP: ABNORMAL
SODIUM BLD-SCNC: 138 MMOL/L (ref 136–145)

## 2017-12-12 PROCEDURE — 84100 ASSAY OF PHOSPHORUS: CPT | Performed by: INTERNAL MEDICINE

## 2017-12-12 PROCEDURE — 74011250636 HC RX REV CODE- 250/636: Performed by: INTERNAL MEDICINE

## 2017-12-12 PROCEDURE — 96372 THER/PROPH/DIAG INJ SC/IM: CPT

## 2017-12-12 PROCEDURE — 80047 BASIC METABLC PNL IONIZED CA: CPT

## 2017-12-12 PROCEDURE — 36415 COLL VENOUS BLD VENIPUNCTURE: CPT | Performed by: INTERNAL MEDICINE

## 2017-12-12 RX ADMIN — DENOSUMAB 60 MG: 60 INJECTION SUBCUTANEOUS at 13:28

## 2017-12-12 NOTE — PROGRESS NOTES
Outpatient Infusion Center Short Visit Progress Note    1300 Pt admit to [facility] for Prolia SQ ambulatory in stable condition. Assessment completed. No new concerns voiced. Visit Vitals    /68    Pulse 77    Temp 98 °F (36.7 °C)    Resp 16     Recent Results (from the past 12 hour(s))   POC CHEM8    Collection Time: 12/12/17  1:22 PM   Result Value Ref Range    Calcium, ionized (POC) 1.20 1. 12 - 1.32 MMOL/L    Sodium (POC) 138 136 - 145 MMOL/L    Potassium (POC) 3.7 3.5 - 5.1 MMOL/L    Chloride (POC) 100 98 - 107 MMOL/L    CO2 (POC) 27 21 - 32 MMOL/L    Anion gap (POC) 16 (H) 5 - 15 mmol/L    Glucose (POC) 132 (H) 65 - 100 MG/DL    BUN (POC) 14 9 - 20 MG/DL    Creatinine (POC) 1.1 0.6 - 1.3 MG/DL    GFRAA, POC 57 (L) >60 ml/min/1.73m2    GFRNA, POC 47 (L) >60 ml/min/1.73m2    Hemoglobin (POC) 11.2 (L) 11.5 - 16.0 GM/DL    Hematocrit (POC) 33 (L) 35.0 - 47.0 %    Comment Comment Not Indicated. L  Peripheral stick with positive blood return. Medications:  Prolia SQ    1335 Pt tolerated treatment well. D/c home ambulatory in no distress. Pt will be seeing a new rheumatologist in January and they will discuss the patients actemera infusions and prolia shots and wether the pt will be continuing.     Neftaly Ellison RN

## 2017-12-19 ENCOUNTER — APPOINTMENT (OUTPATIENT)
Dept: INFUSION THERAPY | Age: 82
End: 2017-12-19
Payer: MEDICARE

## 2018-01-07 ENCOUNTER — HOSPITAL ENCOUNTER (EMERGENCY)
Age: 83
Discharge: HOME | End: 2018-01-07
Attending: EMERGENCY MEDICINE
Payer: MEDICARE

## 2018-01-07 ENCOUNTER — APPOINTMENT (OUTPATIENT)
Dept: GENERAL RADIOLOGY | Age: 83
End: 2018-01-07
Attending: PHYSICIAN ASSISTANT
Payer: MEDICARE

## 2018-01-07 VITALS
SYSTOLIC BLOOD PRESSURE: 159 MMHG | OXYGEN SATURATION: 100 % | HEIGHT: 61 IN | BODY MASS INDEX: 27.75 KG/M2 | DIASTOLIC BLOOD PRESSURE: 61 MMHG | HEART RATE: 98 BPM | RESPIRATION RATE: 20 BRPM | TEMPERATURE: 97.8 F | WEIGHT: 147 LBS

## 2018-01-07 DIAGNOSIS — R52 BODY ACHES: ICD-10-CM

## 2018-01-07 DIAGNOSIS — R05.9 COUGH: Primary | ICD-10-CM

## 2018-01-07 LAB
ALBUMIN SERPL-MCNC: 3.1 G/DL (ref 3.5–5)
ALBUMIN/GLOB SERPL: 0.8 {RATIO} (ref 1.1–2.2)
ALP SERPL-CCNC: 65 U/L (ref 45–117)
ALT SERPL-CCNC: 20 U/L (ref 12–78)
ANION GAP SERPL CALC-SCNC: 6 MMOL/L (ref 5–15)
AST SERPL-CCNC: 19 U/L (ref 15–37)
BASOPHILS # BLD: 0 K/UL (ref 0–0.1)
BASOPHILS NFR BLD: 0 % (ref 0–1)
BILIRUB SERPL-MCNC: 0.4 MG/DL (ref 0.2–1)
BUN SERPL-MCNC: 10 MG/DL (ref 6–20)
BUN/CREAT SERPL: 10 (ref 12–20)
CALCIUM SERPL-MCNC: 8.9 MG/DL (ref 8.5–10.1)
CHLORIDE SERPL-SCNC: 102 MMOL/L (ref 97–108)
CO2 SERPL-SCNC: 28 MMOL/L (ref 21–32)
CREAT SERPL-MCNC: 1.05 MG/DL (ref 0.55–1.02)
EOSINOPHIL # BLD: 0.1 K/UL (ref 0–0.4)
EOSINOPHIL NFR BLD: 2 % (ref 0–7)
ERYTHROCYTE [DISTWIDTH] IN BLOOD BY AUTOMATED COUNT: 14.8 % (ref 11.5–14.5)
FLUAV AG NPH QL IA: NEGATIVE
FLUBV AG NOSE QL IA: NEGATIVE
GLOBULIN SER CALC-MCNC: 4 G/DL (ref 2–4)
GLUCOSE SERPL-MCNC: 127 MG/DL (ref 65–100)
HCT VFR BLD AUTO: 33.3 % (ref 35–47)
HGB BLD-MCNC: 10.8 G/DL (ref 11.5–16)
LYMPHOCYTES # BLD: 1.1 K/UL (ref 0.8–3.5)
LYMPHOCYTES NFR BLD: 12 % (ref 12–49)
MCH RBC QN AUTO: 28.7 PG (ref 26–34)
MCHC RBC AUTO-ENTMCNC: 32.4 G/DL (ref 30–36.5)
MCV RBC AUTO: 88.6 FL (ref 80–99)
MONOCYTES # BLD: 0.6 K/UL (ref 0–1)
MONOCYTES NFR BLD: 7 % (ref 5–13)
NEUTS SEG # BLD: 7.4 K/UL (ref 1.8–8)
NEUTS SEG NFR BLD: 79 % (ref 32–75)
PLATELET # BLD AUTO: 274 K/UL (ref 150–400)
POTASSIUM SERPL-SCNC: 4.8 MMOL/L (ref 3.5–5.1)
PROT SERPL-MCNC: 7.1 G/DL (ref 6.4–8.2)
RBC # BLD AUTO: 3.76 M/UL (ref 3.8–5.2)
SODIUM SERPL-SCNC: 136 MMOL/L (ref 136–145)
TROPONIN I SERPL-MCNC: <0.04 NG/ML
WBC # BLD AUTO: 9.3 K/UL (ref 3.6–11)

## 2018-01-07 PROCEDURE — 99285 EMERGENCY DEPT VISIT HI MDM: CPT

## 2018-01-07 PROCEDURE — 80053 COMPREHEN METABOLIC PANEL: CPT | Performed by: PHYSICIAN ASSISTANT

## 2018-01-07 PROCEDURE — 84484 ASSAY OF TROPONIN QUANT: CPT | Performed by: PHYSICIAN ASSISTANT

## 2018-01-07 PROCEDURE — 71046 X-RAY EXAM CHEST 2 VIEWS: CPT

## 2018-01-07 PROCEDURE — 93005 ELECTROCARDIOGRAM TRACING: CPT

## 2018-01-07 PROCEDURE — 36415 COLL VENOUS BLD VENIPUNCTURE: CPT | Performed by: PHYSICIAN ASSISTANT

## 2018-01-07 PROCEDURE — 85025 COMPLETE CBC W/AUTO DIFF WBC: CPT | Performed by: PHYSICIAN ASSISTANT

## 2018-01-07 PROCEDURE — 87804 INFLUENZA ASSAY W/OPTIC: CPT | Performed by: PHYSICIAN ASSISTANT

## 2018-01-07 RX ORDER — BENZONATATE 100 MG/1
100 CAPSULE ORAL
Qty: 30 CAP | Refills: 0 | Status: SHIPPED | OUTPATIENT
Start: 2018-01-07 | End: 2018-01-14

## 2018-01-07 RX ORDER — PREDNISONE 20 MG/1
TABLET ORAL
Qty: 30 TAB | Refills: 0 | Status: SHIPPED | OUTPATIENT
Start: 2018-01-07 | End: 2018-12-21

## 2018-01-07 NOTE — ED TRIAGE NOTES
Pt arrived via EMS from [facility]. C/o flu-like s/s, generalized body aches and CP x2 days, relieved by Tylenol. En route, EKG was NSR, CP 7/10, tx with 324 ASA.

## 2018-01-07 NOTE — DISCHARGE INSTRUCTIONS

## 2018-01-07 NOTE — ED PROVIDER NOTES
HPI Comments: 79 y/o female with PMHx of DM, HTN, hypothyroidism, rheumatoid arthritis and diverticulitis, presenting with complaint of cough and body aches. She reports an onset of cough, nasal congestion, post-nasal drip, generalized body aches and chest pain 2 days ago. She states her rheumatologist retired so she has been out of her Acetemra for the past month, and she thought maybe the chest pain and body aches were due to a flare in her RA. Her pain is 7/10, aching, and radiates to her shoulders and upper back. She endorses some diaphoresis yesterday, but denies fevers, chills, shortness of breath, nausea, vomiting or light-headedness. The history is provided by the patient. Past Medical History:   Diagnosis Date    Arthritis, rheumatoid (HCC)     Diabetes ([facility] [address]     Diverticulitis     Hard of hearing     Hypertension     Hypothyroid     Osteoporosis, post-menopausal        Past Surgical History:   Procedure Laterality Date    HX HEMORRHOIDECTOMY      HX HYSTERECTOMY      HX THYROIDECTOMY           Family History:   Problem Relation Age of Onset    Heart Disease Mother     Diabetes Father     Cancer Brother      brain cancer    Heart Disease Brother     Diabetes Brother     Other Other      scleroderma       Social History     Social History    Marital status:      Spouse name: N/A    Number of children: N/A    Years of education: N/A     Occupational History    Not on file. Social History Main Topics    Smoking status: Former Smoker     Types: Cigarettes     Quit date: 12/8/1976    Smokeless tobacco: Never Used    Alcohol use Yes      Comment: Waylon time 1 glass wine    Drug use: No    Sexual activity: No     Other Topics Concern    Not on file     Social History Narrative    ** Merged History Encounter **              ALLERGIES: Alendronate; Amoxicillin; and Hydrochlorothiazide    Review of Systems   Constitutional: Positive for diaphoresis.  Negative for chills and fever. HENT: Positive for congestion and postnasal drip. Respiratory: Positive for cough. Negative for shortness of breath. Cardiovascular: Positive for chest pain. Gastrointestinal: Negative for abdominal pain, diarrhea, nausea and vomiting. Musculoskeletal: Positive for myalgias. Skin: Negative for wound. Neurological: Negative for syncope and light-headedness. All other systems reviewed and are negative. Vitals:    01/07/18 1032 01/07/18 1043   BP: 145/61    Pulse: 89    Resp: 16    Temp: 97.8 °F (36.6 °C)    SpO2: 97% 99%   Weight: 66.7 kg (147 lb)    Height: 5' 1\" (1.549 m)             Physical Exam   Constitutional: She is oriented to person, place, and time. She appears well-developed and well-nourished. No distress. HENT:   Head: Normocephalic and atraumatic. Eyes: Conjunctivae and EOM are normal.   Neck: Normal range of motion. Neck supple. Cardiovascular: Normal rate, regular rhythm and normal heart sounds. Pulmonary/Chest: Effort normal. No respiratory distress. She has no wheezes. She has rales. Abdominal: Soft. She exhibits no distension. There is no tenderness. Musculoskeletal: Normal range of motion. Neurological: She is alert and oriented to person, place, and time. Skin: Skin is warm and dry. She is not diaphoretic. Nursing note and vitals reviewed. MDM  Number of Diagnoses or Management Options  Body aches:   Cough:      Amount and/or Complexity of Data Reviewed  Clinical lab tests: ordered and reviewed  Tests in the radiology section of CPT®: ordered and reviewed  Discuss the patient with other providers: yes (Dr. Sg Lopez, ED attending)  Independent visualization of images, tracings, or specimens: yes (CXR)    Patient Progress  Patient progress: stable    ED Course       Procedures      79 y/o female with PMHx of DM, HTN, hypothyroidism, rheumatoid arthritis and diverticulitis, presenting with complaint of cough and body aches.  Based on history and exam as described above, DDx includes influenza, non-specific viral infection, pneumonia, pain from rheumatoid arthritis, vs less likely ACS or arrhythmia. Patient given aspirin by EMS. Will obtain EKG, CXR, CBC, CMP, troponin. ED EKG interpretation:  Rhythm: normal sinus rhythm; and regular . Rate (approx.): 76; Axis: normal; ST/T wave: normal.  Interpreted by Dr. Oscar Robert, 11:45 AM     CXR, read by radiology and independently visualized and interpreted by myself, reveals no evidence of pneumonia or other acute cardiopulmonary abnormalities. Labs reveal baseline mild renal dysfunction, but are otherwise unremarkable. Influenza negative, troponin negative. Safe for discharge home, with Rx for tessalon for cough, and prednisone taper until the patient can follow up with a new rheumatologist. Strict ED return precautions discussed and provided in writing at time of discharge. The patient verbalized understanding and agreement with this plan.

## 2018-01-07 NOTE — ED NOTES
[facility] called for LENARD denson 30 mins. Pt in waiting room. MD reviewed discharge instructions and options with patient; patient verbalized understanding. Pt. Ambulated to exit without difficulty and in no signs of acute distress. Patient was counseled on medications prescribed at discharge and will follow up as discussed.

## 2018-01-08 LAB
ATRIAL RATE: 76 BPM
CALCULATED P AXIS, ECG09: 27 DEGREES
CALCULATED R AXIS, ECG10: 44 DEGREES
CALCULATED T AXIS, ECG11: 57 DEGREES
DIAGNOSIS, 93000: NORMAL
P-R INTERVAL, ECG05: 204 MS
Q-T INTERVAL, ECG07: 390 MS
QRS DURATION, ECG06: 76 MS
QTC CALCULATION (BEZET), ECG08: 438 MS
VENTRICULAR RATE, ECG03: 76 BPM

## 2018-03-09 ENCOUNTER — HOSPITAL ENCOUNTER (EMERGENCY)
Age: 83
Discharge: HOME | End: 2018-03-10
Attending: EMERGENCY MEDICINE
Payer: MEDICARE

## 2018-03-09 DIAGNOSIS — R79.89 ELEVATED SERUM CREATININE: ICD-10-CM

## 2018-03-09 DIAGNOSIS — E87.1 HYPONATREMIA: ICD-10-CM

## 2018-03-09 DIAGNOSIS — J06.9 ACUTE UPPER RESPIRATORY INFECTION: Primary | ICD-10-CM

## 2018-03-09 DIAGNOSIS — R42 VERTIGO: ICD-10-CM

## 2018-03-09 DIAGNOSIS — H93.8X3 CONGESTION OF BOTH EARS: ICD-10-CM

## 2018-03-09 LAB
ALBUMIN SERPL-MCNC: 3 G/DL (ref 3.5–5)
ALBUMIN/GLOB SERPL: 0.9 {RATIO} (ref 1.1–2.2)
ALP SERPL-CCNC: 49 U/L (ref 45–117)
ALT SERPL-CCNC: 17 U/L (ref 12–78)
ANION GAP SERPL CALC-SCNC: 14 MMOL/L (ref 5–15)
APPEARANCE UR: CLEAR
AST SERPL-CCNC: 14 U/L (ref 15–37)
BACTERIA URNS QL MICRO: NEGATIVE /HPF
BASOPHILS # BLD: 0.1 K/UL (ref 0–0.1)
BASOPHILS NFR BLD: 0 % (ref 0–1)
BILIRUB SERPL-MCNC: 1 MG/DL (ref 0.2–1)
BILIRUB UR QL: NEGATIVE
BUN SERPL-MCNC: 14 MG/DL (ref 6–20)
BUN/CREAT SERPL: 8 (ref 12–20)
CALCIUM SERPL-MCNC: 7.6 MG/DL (ref 8.5–10.1)
CHLORIDE SERPL-SCNC: 94 MMOL/L (ref 97–108)
CO2 SERPL-SCNC: 20 MMOL/L (ref 21–32)
COLOR UR: ABNORMAL
CREAT SERPL-MCNC: 1.74 MG/DL (ref 0.55–1.02)
DIFFERENTIAL METHOD BLD: ABNORMAL
EOSINOPHIL # BLD: 0.1 K/UL (ref 0–0.4)
EOSINOPHIL NFR BLD: 1 % (ref 0–7)
EPITH CASTS URNS QL MICRO: ABNORMAL /LPF
ERYTHROCYTE [DISTWIDTH] IN BLOOD BY AUTOMATED COUNT: 15.2 % (ref 11.5–14.5)
GLOBULIN SER CALC-MCNC: 3.5 G/DL (ref 2–4)
GLUCOSE SERPL-MCNC: 123 MG/DL (ref 65–100)
GLUCOSE UR STRIP.AUTO-MCNC: NEGATIVE MG/DL
HCT VFR BLD AUTO: 32.9 % (ref 35–47)
HGB BLD-MCNC: 11.4 G/DL (ref 11.5–16)
HGB UR QL STRIP: ABNORMAL
IMM GRANULOCYTES # BLD: 0.1 K/UL (ref 0–0.04)
IMM GRANULOCYTES NFR BLD AUTO: 1 % (ref 0–0.5)
KETONES UR QL STRIP.AUTO: NEGATIVE MG/DL
LEUKOCYTE ESTERASE UR QL STRIP.AUTO: NEGATIVE
LYMPHOCYTES # BLD: 1.5 K/UL (ref 0.8–3.5)
LYMPHOCYTES NFR BLD: 10 % (ref 12–49)
MCH RBC QN AUTO: 29.8 PG (ref 26–34)
MCHC RBC AUTO-ENTMCNC: 34.7 G/DL (ref 30–36.5)
MCV RBC AUTO: 85.9 FL (ref 80–99)
MONOCYTES # BLD: 1.2 K/UL (ref 0–1)
MONOCYTES NFR BLD: 8 % (ref 5–13)
NEUTS SEG # BLD: 11.9 K/UL (ref 1.8–8)
NEUTS SEG NFR BLD: 80 % (ref 32–75)
NITRITE UR QL STRIP.AUTO: NEGATIVE
NRBC # BLD: 0 K/UL (ref 0–0.01)
NRBC BLD-RTO: 0 PER 100 WBC
PH UR STRIP: 7 [PH] (ref 5–8)
PLATELET # BLD AUTO: 251 K/UL (ref 150–400)
PMV BLD AUTO: 9.8 FL (ref 8.9–12.9)
POTASSIUM SERPL-SCNC: 3.6 MMOL/L (ref 3.5–5.1)
PROT SERPL-MCNC: 6.5 G/DL (ref 6.4–8.2)
PROT UR STRIP-MCNC: NEGATIVE MG/DL
RBC # BLD AUTO: 3.83 M/UL (ref 3.8–5.2)
RBC #/AREA URNS HPF: ABNORMAL /HPF (ref 0–5)
SODIUM SERPL-SCNC: 128 MMOL/L (ref 136–145)
SP GR UR REFRACTOMETRY: <1.005 (ref 1–1.03)
UROBILINOGEN UR QL STRIP.AUTO: 0.2 EU/DL (ref 0.2–1)
WBC # BLD AUTO: 14.9 K/UL (ref 3.6–11)
WBC URNS QL MICRO: ABNORMAL /HPF (ref 0–4)

## 2018-03-09 PROCEDURE — 99285 EMERGENCY DEPT VISIT HI MDM: CPT

## 2018-03-09 PROCEDURE — 96374 THER/PROPH/DIAG INJ IV PUSH: CPT

## 2018-03-09 PROCEDURE — 96361 HYDRATE IV INFUSION ADD-ON: CPT

## 2018-03-09 PROCEDURE — 74011250636 HC RX REV CODE- 250/636: Performed by: EMERGENCY MEDICINE

## 2018-03-09 PROCEDURE — 81001 URINALYSIS AUTO W/SCOPE: CPT | Performed by: NURSE PRACTITIONER

## 2018-03-09 PROCEDURE — 85025 COMPLETE CBC W/AUTO DIFF WBC: CPT | Performed by: NURSE PRACTITIONER

## 2018-03-09 PROCEDURE — 36415 COLL VENOUS BLD VENIPUNCTURE: CPT | Performed by: NURSE PRACTITIONER

## 2018-03-09 PROCEDURE — 74011250636 HC RX REV CODE- 250/636: Performed by: NURSE PRACTITIONER

## 2018-03-09 PROCEDURE — 80053 COMPREHEN METABOLIC PANEL: CPT | Performed by: NURSE PRACTITIONER

## 2018-03-09 RX ORDER — ONDANSETRON 2 MG/ML
4 INJECTION INTRAMUSCULAR; INTRAVENOUS
Status: COMPLETED | OUTPATIENT
Start: 2018-03-09 | End: 2018-03-09

## 2018-03-09 RX ORDER — MECLIZINE HYDROCHLORIDE 25 MG/1
25 TABLET ORAL
Status: COMPLETED | OUTPATIENT
Start: 2018-03-09 | End: 2018-03-09

## 2018-03-09 RX ADMIN — SODIUM CHLORIDE 1000 ML: 900 INJECTION, SOLUTION INTRAVENOUS at 23:33

## 2018-03-09 RX ADMIN — MECLIZINE HYDROCHLORIDE 25 MG: 25 TABLET ORAL at 22:38

## 2018-03-09 RX ADMIN — ONDANSETRON 4 MG: 2 INJECTION INTRAMUSCULAR; INTRAVENOUS at 22:21

## 2018-03-10 VITALS
TEMPERATURE: 98.7 F | BODY MASS INDEX: 28.86 KG/M2 | DIASTOLIC BLOOD PRESSURE: 64 MMHG | SYSTOLIC BLOOD PRESSURE: 175 MMHG | RESPIRATION RATE: 17 BRPM | HEIGHT: 60 IN | OXYGEN SATURATION: 98 % | HEART RATE: 88 BPM | WEIGHT: 147 LBS

## 2018-03-10 RX ORDER — OXYMETAZOLINE HCL 0.05 %
2 SPRAY, NON-AEROSOL (ML) NASAL 2 TIMES DAILY
Qty: 1 EACH | Refills: 0 | Status: SHIPPED | OUTPATIENT
Start: 2018-03-10 | End: 2018-03-13

## 2018-03-10 RX ORDER — MECLIZINE HCL 25MG 25 MG/1
25 TABLET, CHEWABLE ORAL
Qty: 12 TAB | Refills: 0 | Status: SHIPPED | OUTPATIENT
Start: 2018-03-10 | End: 2018-03-20

## 2018-03-10 NOTE — ED NOTES
Assisted patient to [facility] at this time, yellow socks in place, steady gait, pt. States feels much better.

## 2018-03-10 NOTE — ED TRIAGE NOTES
Pt. Yue Bath in by EMS for right lower back pain, dizziness, states was dx with diverticulosis per her PCP, dry heaving in route. 99.1 per EMS temp.

## 2018-03-10 NOTE — DISCHARGE INSTRUCTIONS
Dizziness: Care Instructions  Your Care Instructions  Dizziness is the feeling of unsteadiness or fuzziness in your head. It is different than having vertigo, which is a feeling that the room is spinning or that you are moving or falling. It is also different from lightheadedness, which is the feeling that you are about to faint. It can be hard to know what causes dizziness. Some people feel dizzy when they have migraine headaches. Sometimes bouts of flu can make you feel dizzy. Some medical conditions, such as heart problems or high blood pressure, can make you feel dizzy. Many medicines can cause dizziness, including medicines for high blood pressure, pain, or anxiety. If a medicine causes your symptoms, your doctor may recommend that you stop or change the medicine. If it is a problem with your heart, you may need medicine to help your heart work better. If there is no clear reason for your symptoms, your doctor may suggest watching and waiting for a while to see if the dizziness goes away on its own. Follow-up care is a key part of your treatment and safety. Be sure to make and go to all appointments, and call your doctor if you are having problems. It's also a good idea to know your test results and keep a list of the medicines you take. How can you care for yourself at home? · If your doctor recommends or prescribes medicine, take it exactly as directed. Call your doctor if you think you are having a problem with your medicine. · Do not drive while you feel dizzy. · Try to prevent falls. Steps you can take include:  ¨ Using nonskid mats, adding grab bars near the tub, and using night-lights. ¨ Clearing your home so that walkways are free of anything you might trip on. ¨ Letting family and friends know that you have been feeling dizzy. This will help them know how to help you. When should you call for help? Call 911 anytime you think you may need emergency care.  For example, call if:  ? · You passed out (lost consciousness). ? · You have dizziness along with symptoms of a heart attack. These may include:  ¨ Chest pain or pressure, or a strange feeling in the chest.  ¨ Sweating. ¨ Shortness of breath. ¨ Nausea or vomiting. ¨ Pain, pressure, or a strange feeling in the back, neck, jaw, or upper belly or in one or both shoulders or arms. ¨ Lightheadedness or sudden weakness. ¨ A fast or irregular heartbeat. ? · You have symptoms of a stroke. These may include:  ¨ Sudden numbness, tingling, weakness, or loss of movement in your face, arm, or leg, especially on only one side of your body. ¨ Sudden vision changes. ¨ Sudden trouble speaking. ¨ Sudden confusion or trouble understanding simple statements. ¨ Sudden problems with walking or balance. ¨ A sudden, severe headache that is different from past headaches. ?Call your doctor now or seek immediate medical care if:  ? · You feel dizzy and have a fever, headache, or ringing in your ears. ? · You have new or increased nausea and vomiting. ? · Your dizziness does not go away or comes back. ? Watch closely for changes in your health, and be sure to contact your doctor if:  ? · You do not get better as expected. Where can you learn more? Go to http://tej-leidy.info/. Enter L670 in the search box to learn more about \"Dizziness: Care Instructions. \"  Current as of: March 20, 2017  Content Version: 11.4  © 4953-2804 [employer]. Care instructions adapted under license by [employer] (which disclaims liability or warranty for this information). If you have questions about a medical condition or this instruction, always ask your healthcare professional. [place] [address] any warranty or liability for your use of this information. Electrolyte Imbalance: Care Instructions  Your Care Instructions  Electrolytes are minerals in your blood.  They include sodium, potassium, calcium, and magnesium. When they are not at the right levels, you can feel very ill. You may not know what is causing it, but you know something is wrong. You may feel weak or numb, have muscle spasms, or twitch. Your heart may beat fast. Symptoms are different with each mineral. Too much is as bad as too little. Minerals help keep your body working as it should. Vomiting, diarrhea, and fever can cause you to lose minerals. A problem with your kidneys can tip a mineral out of balance. So can taking certain medicines. Your doctor may do more tests. He or she may change your medicine and diet. If you are low in one or more minerals, they may be given through a tube into your vein (IV). Your doctor may have you take or drink special fluids or pills. If your kidneys are failing, your blood may be filtered. This is called dialysis. It can put the minerals back in balance. Follow-up care is a key part of your treatment and safety. Be sure to make and go to all appointments, and call your doctor if you are having problems. It's also a good idea to know your test results and keep a list of the medicines you take. How can you care for yourself at home? · Take your medicines exactly as prescribed. Call your doctor if you have any problems with your medicines. You will get more details on the specific medicines your doctor prescribes. · Do not take any medicine without talking to your doctor first. This includes prescription, over-the-counter, and herbal medicines. · If you have kidney, heart, or liver disease and have to limit fluids, talk with your doctor before you increase the amount of fluids you drink. · Your doctor or dietitian may give you a diet plan to help balance your minerals. Follow the diet carefully. When should you call for help? Call 911 anytime you think you may need emergency care. For example, call if:  ? · You passed out (lost consciousness). ? · Your heartbeat seems to be irregular.  It might be speeding up and then slowing down or skipping beats. ?Call your doctor now or seek immediate medical care if:  ? · You have muscle aches. ? · You feel very weak. ? · You are confused or cannot think clearly. ? Watch closely for changes in your health, and be sure to contact your doctor if:  ? · You do not get better as expected. Where can you learn more? Go to http://tej-leidy.info/. Enter J386 in the search box to learn more about \"Electrolyte Imbalance: Care Instructions. \"  Current as of: May 12, 2017  Content Version: 11.4  © 5800-7871 [employer]. Care instructions adapted under license by [employer] (which disclaims liability or warranty for this information). If you have questions about a medical condition or this instruction, always ask your healthcare professional. [address] any warranty or liability for your use of this information. Saline Nasal Washes: Care Instructions  Your Care Instructions  Saline nasal washes help keep the nasal passages open by washing out thick or dried mucus. This simple remedy can help relieve symptoms of allergies, sinusitis, and colds. It also can make the nose feel more comfortable by keeping the mucous membranes moist. You may notice a little burning sensation in your nose the first few times you use the solution, but this usually gets better in a few days. Follow-up care is a key part of your treatment and safety. Be sure to make and go to all appointments, and call your doctor if you are having problems. It's also a good idea to know your test results and keep a list of the medicines you take. How can you care for yourself at home? · You can buy premixed saline solution in a squeeze bottle or other sinus rinse products at a drugstore. Read and follow the instructions on the label.   · You also can make your own saline solution by adding 1 teaspoon of salt and 1 teaspoon of baking soda to 2 cups of distilled water. · If you use a homemade solution, pour a small amount into a clean bowl. Using a rubber bulb syringe, squeeze the syringe and place the tip in the salt water. Pull a small amount of the salt water into the syringe by relaxing your hand. · Sit down with your head tilted slightly back. Do not lie down. Put the tip of the bulb syringe or the squeeze bottle a little way into one of your nostrils. Gently drip or squirt a few drops into the nostril. Repeat with the other nostril. Some sneezing and gagging are normal at first.  · Gently blow your nose. · Wipe the syringe or bottle tip clean after each use. · Repeat this 2 or 3 times a day. · Use nasal washes gently if you have nosebleeds often. When should you call for help? Watch closely for changes in your health, and be sure to contact your doctor if:  ? · You often get nosebleeds. ? · You have problems doing the nasal washes. Where can you learn more? Go to http://tej-leidy.info/. Enter [id number] in the search box to learn more about \"Saline Nasal Washes: Care Instructions. \"  Current as of: May 12, 2017  Content Version: 11.4  © 7517-1821 [employer]. Care instructions adapted under license by [employer] (which disclaims liability or warranty for this information). If you have questions about a medical condition or this instruction, always ask your healthcare professional. [place] [address] any warranty or liability for your use of this information. Upper Respiratory Infection (Cold): Care Instructions  Your Care Instructions    An upper respiratory infection, or URI, is an infection of the nose, sinuses, or throat. URIs are spread by coughs, sneezes, and direct contact. The common cold is the most frequent kind of URI. The flu and sinus infections are other kinds of URIs. Almost all URIs are caused by viruses. Antibiotics won't cure them.  But you can treat most infections with home care. This may include drinking lots of fluids and taking over-the-counter pain medicine. You will probably feel better in 4 to 10 days. The doctor has checked you carefully, but problems can develop later. If you notice any problems or new symptoms, get medical treatment right away. Follow-up care is a key part of your treatment and safety. Be sure to make and go to all appointments, and call your doctor if you are having problems. It's also a good idea to know your test results and keep a list of the medicines you take. How can you care for yourself at home? · To prevent dehydration, drink plenty of fluids, enough so that your urine is light yellow or clear like water. Choose water and other caffeine-free clear liquids until you feel better. If you have kidney, heart, or liver disease and have to limit fluids, talk with your doctor before you increase the amount of fluids you drink. · Take an over-the-counter pain medicine, such as acetaminophen (Tylenol), ibuprofen (Advil, Motrin), or naproxen (Aleve). Read and follow all instructions on the label. · Before you use cough and cold medicines, check the label. These medicines may not be safe for young children or for people with certain health problems. · Be careful when taking over-the-counter cold or flu medicines and Tylenol at the same time. Many of these medicines have acetaminophen, which is Tylenol. Read the labels to make sure that you are not taking more than the recommended dose. Too much acetaminophen (Tylenol) can be harmful. · Get plenty of rest.  · Do not smoke or allow others to smoke around you. If you need help quitting, talk to your doctor about stop-smoking programs and medicines. These can increase your chances of quitting for good. When should you call for help? Call 911 anytime you think you may need emergency care. For example, call if:  ? · You have severe trouble breathing.    ?Call your doctor now or seek immediate medical care if:  ? · You seem to be getting much sicker. ? · You have new or worse trouble breathing. ? · You have a new or higher fever. ? · You have a new rash. ? Watch closely for changes in your health, and be sure to contact your doctor if:  ? · You have a new symptom, such as a sore throat, an earache, or sinus pain. ? · You cough more deeply or more often, especially if you notice more mucus or a change in the color of your mucus. ? · You do not get better as expected. Where can you learn more? Go to http://tej-leidy.info/. Enter G296 in the search box to learn more about \"Upper Respiratory Infection (Cold): Care Instructions. \"  Current as of: May 12, 2017  Content Version: 11.4  © 1948-2555 [employer]. Care instructions adapted under license by [employer] (which disclaims liability or warranty for this information). If you have questions about a medical condition or this instruction, always ask your healthcare professional. [address] any warranty or liability for your use of this information.

## 2018-03-10 NOTE — ED PROVIDER NOTES
HPI Comments: Aneesh Montalvo is a 80 y.o. female with Hx of RA, [place],diveticulitis, [place], DMII, HTN, hypothyroid,  who presents via EMS to [facility] ED with cc of congestion, productive cough, and \" feeling woozy. \" Pt reports a \"rush\" of nasal congestion and rhinorrhea this morning w/ associative \"wooziness\" but not dizziness since this morning. Pt reports that she also feels fullness in her ears, but no ear pain or HA. She denies any fevers, chills, body aches. Onset of s/sx this morning. Of note, pt was also started on Cipro/ Flagyl yesterday afternoon for tx of presumed diverticulitis by her PCP. She states that she had LLQ abdominal pain with nausea/ NB emesis and loose stool which stated earlier this week. She did not have any confirming CT scan to verify diverticulitis. Pt states she has only taken 1 dose of Cipro/ Flagyl. Was able to tolerate PO w/o difficulty today. Had \"dry heaves\" en route to ED. Pt reports she also had a phone call from her PCP today stating that she had concerns about pt renal function. Pt reports that she \"drank at list 2 liters of water\" today and \" only peed 4 times\" which is concerning to her. No abdominal pain, pelvic pain, or urinary symptoms. PCP: Sanjeev Espinal MD    There are no other complaints, changes or physical findings at this time. The history is provided by the patient.         Past Medical History:   Diagnosis Date    Arthritis, rheumatoid (HCC)     Diabetes ([address]     Diverticulitis     Hard of hearing     Hypertension     Hypothyroid     Osteoporosis, post-menopausal        Past Surgical History:   Procedure Laterality Date    HX HEMORRHOIDECTOMY      HX HYSTERECTOMY      HX THYROIDECTOMY           Family History:   Problem Relation Age of Onset    Heart Disease Mother     Diabetes Father     Cancer Brother      brain cancer    Heart Disease Brother     Diabetes Brother     Other Other      scleroderma       Social History Social History    Marital status:      Spouse name: N/A    Number of children: N/A    Years of education: N/A     Occupational History    Not on file. Social History Main Topics    Smoking status: Former Smoker     Types: Cigarettes     Quit date: 12/8/1976    Smokeless tobacco: Never Used    Alcohol use Yes      Comment: Waylon time 1 glass wine    Drug use: No    Sexual activity: No     Other Topics Concern    Not on file     Social History Narrative    ** Merged History Encounter **              ALLERGIES: Alendronate; Amoxicillin; and Hydrochlorothiazide    Review of Systems   Constitutional: Negative for activity change, appetite change, chills and fever. HENT: Positive for congestion, ear pain (fullness ) and rhinorrhea. Negative for sinus pressure, sneezing and sore throat. Eyes: Negative for pain, discharge and visual disturbance. Respiratory: Negative for cough and shortness of breath. Cardiovascular: Negative for chest pain. Gastrointestinal: Negative for abdominal pain, diarrhea, nausea and vomiting. Genitourinary: Negative for dysuria, flank pain, frequency and urgency. Musculoskeletal: Negative for arthralgias, back pain, gait problem, joint swelling, myalgias and neck pain. Skin: Negative for color change and rash. Neurological: Positive for dizziness. Negative for speech difficulty, weakness, light-headedness, numbness and headaches. Psychiatric/Behavioral: Negative for agitation, behavioral problems and confusion. All other systems reviewed and are negative. Vitals:    03/09/18 2315 03/09/18 2330 03/09/18 2345 03/10/18 0000   BP: 150/72 138/64 152/60 175/64   Pulse:       Resp:       Temp:       SpO2: 95% 93% 98% 98%   Weight:       Height:                Physical Exam   Constitutional: She is oriented to person, place, and time. She appears well-developed and well-nourished. No distress. HENT:   Head: Normocephalic and atraumatic.    Right Ear: External ear normal. Tympanic membrane is injected. Left Ear: External ear normal. Tympanic membrane is injected. Nose: Mucosal edema and rhinorrhea present. Mouth/Throat: Oropharynx is clear and moist. No oropharyngeal exudate. Eyes: Conjunctivae and EOM are normal. Pupils are equal, round, and reactive to light. Neck: Normal range of motion. Neck supple. Cardiovascular: Normal rate, regular rhythm, normal heart sounds and intact distal pulses. Pulmonary/Chest: Effort normal and breath sounds normal.   Abdominal: Soft. Bowel sounds are normal. There is no tenderness. There is no rebound and no guarding. Musculoskeletal: Normal range of motion. Neurological: She is alert and oriented to person, place, and time. Skin: Skin is warm and dry. Psychiatric: She has a normal mood and affect. Her behavior is normal. Judgment and thought content normal.   Nursing note and vitals reviewed. MDM  Number of Diagnoses or Management Options  Acute upper respiratory infection:   Congestion of both ears:   Elevated serum creatinine:   Hyponatremia:   Vertigo:   Diagnosis management comments: DDx: URI, vertigo, ARF/ MIKE, UTI      79 yo F presents w/ multi-system complaints. Primarily felt dizzy and had congestion/ rhinorrhea this morning. Vertigo likely 2/2 sinus/ ear fullness. Pt given Meclizine w/ resolution of s/sx in ED. Low Na in ED, given NS/ 1 Liter. Had elevated SCr as well. Advised on salt intake, not overly drink h20 and f/u with PCP on Monday. Reasons to return to ED reviewed.         Amount and/or Complexity of Data Reviewed  Clinical lab tests: ordered and reviewed  Review and summarize past medical records: yes  Discuss the patient with other providers: yes          ED Course       Procedures    LABORATORY TESTS:  Recent Results (from the past 12 hour(s))   CBC WITH AUTOMATED DIFF    Collection Time: 03/09/18 10:22 PM   Result Value Ref Range    WBC 14.9 (H) 3.6 - 11.0 K/uL    RBC 3.83 3.80 - 5.20 M/uL    HGB 11.4 (L) 11.5 - 16.0 g/dL    HCT 32.9 (L) 35.0 - 47.0 %    MCV 85.9 80.0 - 99.0 FL    MCH 29.8 26.0 - 34.0 PG    MCHC 34.7 30.0 - 36.5 g/dL    RDW 15.2 (H) 11.5 - 14.5 %    PLATELET 458 245 - 315 K/uL    MPV 9.8 8.9 - 12.9 FL    NRBC 0.0 0  WBC    ABSOLUTE NRBC 0.00 0.00 - 0.01 K/uL    NEUTROPHILS 80 (H) 32 - 75 %    LYMPHOCYTES 10 (L) 12 - 49 %    MONOCYTES 8 5 - 13 %    EOSINOPHILS 1 0 - 7 %    BASOPHILS 0 0 - 1 %    IMMATURE GRANULOCYTES 1 (H) 0.0 - 0.5 %    ABS. NEUTROPHILS 11.9 (H) 1.8 - 8.0 K/UL    ABS. LYMPHOCYTES 1.5 0.8 - 3.5 K/UL    ABS. MONOCYTES 1.2 (H) 0.0 - 1.0 K/UL    ABS. EOSINOPHILS 0.1 0.0 - 0.4 K/UL    ABS. BASOPHILS 0.1 0.0 - 0.1 K/UL    ABS. IMM. GRANS. 0.1 (H) 0.00 - 0.04 K/UL    DF AUTOMATED     METABOLIC PANEL, COMPREHENSIVE    Collection Time: 03/09/18 10:22 PM   Result Value Ref Range    Sodium 128 (L) 136 - 145 mmol/L    Potassium 3.6 3.5 - 5.1 mmol/L    Chloride 94 (L) 97 - 108 mmol/L    CO2 20 (L) 21 - 32 mmol/L    Anion gap 14 5 - 15 mmol/L    Glucose 123 (H) 65 - 100 mg/dL    BUN 14 6 - 20 MG/DL    Creatinine 1.74 (H) 0.55 - 1.02 MG/DL    BUN/Creatinine ratio 8 (L) 12 - 20      GFR est AA 33 (L) >60 ml/min/1.73m2    GFR est non-AA 28 (L) >60 ml/min/1.73m2    Calcium 7.6 (L) 8.5 - 10.1 MG/DL    Bilirubin, total 1.0 0.2 - 1.0 MG/DL    ALT (SGPT) 17 12 - 78 U/L    AST (SGOT) 14 (L) 15 - 37 U/L    Alk.  phosphatase 49 45 - 117 U/L    Protein, total 6.5 6.4 - 8.2 g/dL    Albumin 3.0 (L) 3.5 - 5.0 g/dL    Globulin 3.5 2.0 - 4.0 g/dL    A-G Ratio 0.9 (L) 1.1 - 2.2     URINALYSIS W/ RFLX MICROSCOPIC    Collection Time: 03/09/18 10:37 PM   Result Value Ref Range    Color YELLOW/STRAW      Appearance CLEAR CLEAR      Specific gravity <1.005 1.003 - 1.030    pH (UA) 7.0 5.0 - 8.0      Protein NEGATIVE  NEG mg/dL    Glucose NEGATIVE  NEG mg/dL    Ketone NEGATIVE  NEG mg/dL    Bilirubin NEGATIVE  NEG      Blood MODERATE (A) NEG      Urobilinogen 0.2 0.2 - 1.0 EU/dL Nitrites NEGATIVE  NEG      Leukocyte Esterase NEGATIVE  NEG      WBC 0-4 0 - 4 /hpf    RBC 5-10 0 - 5 /hpf    Epithelial cells FEW FEW /lpf    Bacteria NEGATIVE  NEG /hpf       IMAGING RESULTS:  No orders to display       MEDICATIONS GIVEN:  Medications   meclizine (ANTIVERT) tablet 25 mg (25 mg Oral Given 3/9/18 2238)   ondansetron (ZOFRAN) injection 4 mg (4 mg IntraVENous Given 3/9/18 2221)   sodium chloride 0.9 % bolus infusion 1,000 mL (0 mL IntraVENous IV Completed 3/10/18 0025)       IMPRESSION:  1. Acute upper respiratory infection    2. Congestion of both ears    3. Vertigo    4. Hyponatremia    5. Elevated serum creatinine        PLAN:  1. Discharge Medication List as of 3/10/2018 12:25 AM      START taking these medications    Details   meclizine (ANTIVERT) 25 mg chewable tablet Take 1 Tab by mouth three (3) times daily as needed for up to 10 days. , Print, Disp-12 Tab, R-0      oxymetazoline (AFRIN, OXYMETAZOLINE,) 0.05 % nasal spray 2 Sprays by Both Nostrils route two (2) times a day for 3 days. , Print, Disp-1 Each, R-0         CONTINUE these medications which have NOT CHANGED    Details   predniSONE (DELTASONE) 20 mg tablet Take 3 pills for 5 days, then 2 pills for 5 days, then 1 pill for 5 days. Take with breakfast., Print, Disp-30 Tab, R-0      cholecalciferol (VITAMIN D3) 1,000 unit cap Take  by mouth daily. , Historical Med      methotrexate (RHEUMATREX) 2.5 mg tablet 12.5 mg (5 tabs) once weekly, Normal, Disp-25 Tab, R-2      lisinopril (PRINIVIL, ZESTRIL) 20 mg tablet TAKE 1 TABLET BY MOUTH TWICE A DAY, Historical Med, R-1      amLODIPine (NORVASC) 5 mg tablet Take 2 Tabs by mouth daily. , Normal, Disp-60 Tab, R-0      fluticasone (FLONASE) 50 mcg/actuation nasal spray INSTILL 1 SPRAY IN EACH NOSTRIL ONCE A DAY NASALLY 30 DAY(S), Historical Med, R-1      raNITIdine (ZANTAC 75) 75 mg tablet Take 75 mg by mouth two (2) times daily as needed., Historical Med      folic acid (FOLVITE) 1 mg tablet Take 1 mg by mouth daily. , Historical Med      levothyroxine (SYNTHROID) 75 mcg tablet Take 75 mcg by mouth Daily (before breakfast). , Historical Med      acetaminophen (TYLENOL) 325 mg tablet Take 325 mg by mouth daily as needed for Pain., Historical Med      aspirin delayed-release 81 mg tablet Take 81 mg by mouth every other day., Historical Med      TOCILIZUMAB (ACTEMRA IV) by IntraVENous route every thirty (30) days. , Historical Med           2. Follow-up Information     Follow up With Details Comments Contact Info    Gita Mccord MD Schedule an appointment as soon as possible for a visit please follow up with your primary care provider on Monday morning  [facility] [id number]  [phone]      [facility] EMERGENCY DEPT Go to As needed, If symptoms worsen [address] [facility]  [phone]        3. Return to ED if worse   Discharge Note:    The patient is ready for discharge. The patient's signs, symptoms, diagnosis, and discharge instruction have been discussed and the patient has conveyed their understanding. The patient is to follow up as recommended or return to the ER should their symptoms worsen. Plan has been discussed and the patient is in agreement.     Wilner Chaves NP

## 2018-06-12 ENCOUNTER — HOSPITAL ENCOUNTER (OUTPATIENT)
Dept: INFUSION THERAPY | Age: 83
End: 2018-06-12

## 2018-12-11 ENCOUNTER — APPOINTMENT (OUTPATIENT)
Dept: INFUSION THERAPY | Age: 83
End: 2018-12-11

## 2018-12-21 ENCOUNTER — HOSPITAL ENCOUNTER (EMERGENCY)
Age: 83
Discharge: HOME | End: 2018-12-21
Attending: EMERGENCY MEDICINE
Payer: MEDICARE

## 2018-12-21 ENCOUNTER — APPOINTMENT (OUTPATIENT)
Dept: GENERAL RADIOLOGY | Age: 83
End: 2018-12-21
Attending: NURSE PRACTITIONER
Payer: MEDICARE

## 2018-12-21 VITALS
HEIGHT: 61 IN | RESPIRATION RATE: 19 BRPM | TEMPERATURE: 98.6 F | BODY MASS INDEX: 26.81 KG/M2 | DIASTOLIC BLOOD PRESSURE: 53 MMHG | HEART RATE: 87 BPM | SYSTOLIC BLOOD PRESSURE: 156 MMHG | OXYGEN SATURATION: 97 % | WEIGHT: 142 LBS

## 2018-12-21 DIAGNOSIS — M79.604 RIGHT LEG PAIN: Primary | ICD-10-CM

## 2018-12-21 DIAGNOSIS — M05.79 RHEUMATOID ARTHRITIS INVOLVING MULTIPLE SITES WITH POSITIVE RHEUMATOID FACTOR (HCC): Chronic | ICD-10-CM

## 2018-12-21 LAB
ALBUMIN SERPL-MCNC: 2.9 G/DL (ref 3.5–5)
ALBUMIN/GLOB SERPL: 0.7 {RATIO} (ref 1.1–2.2)
ALP SERPL-CCNC: 75 U/L (ref 45–117)
ALT SERPL-CCNC: 28 U/L (ref 12–78)
ANION GAP SERPL CALC-SCNC: 7 MMOL/L (ref 5–15)
AST SERPL-CCNC: 16 U/L (ref 15–37)
BASOPHILS # BLD: 0 K/UL (ref 0–0.1)
BASOPHILS NFR BLD: 0 % (ref 0–1)
BILIRUB SERPL-MCNC: 0.4 MG/DL (ref 0.2–1)
BUN SERPL-MCNC: 14 MG/DL (ref 6–20)
BUN/CREAT SERPL: 12 (ref 12–20)
CALCIUM SERPL-MCNC: 8.9 MG/DL (ref 8.5–10.1)
CHLORIDE SERPL-SCNC: 97 MMOL/L (ref 97–108)
CO2 SERPL-SCNC: 27 MMOL/L (ref 21–32)
COMMENT, HOLDF: NORMAL
CREAT SERPL-MCNC: 1.15 MG/DL (ref 0.55–1.02)
DIFFERENTIAL METHOD BLD: ABNORMAL
EOSINOPHIL # BLD: 0.3 K/UL (ref 0–0.4)
EOSINOPHIL NFR BLD: 2 % (ref 0–7)
ERYTHROCYTE [DISTWIDTH] IN BLOOD BY AUTOMATED COUNT: 14.9 % (ref 11.5–14.5)
ERYTHROCYTE [SEDIMENTATION RATE] IN BLOOD: 67 MM/HR (ref 0–30)
GLOBULIN SER CALC-MCNC: 4 G/DL (ref 2–4)
GLUCOSE SERPL-MCNC: 143 MG/DL (ref 65–100)
HCT VFR BLD AUTO: 31.1 % (ref 35–47)
HGB BLD-MCNC: 10.2 G/DL (ref 11.5–16)
IMM GRANULOCYTES # BLD: 0.1 K/UL (ref 0–0.04)
IMM GRANULOCYTES NFR BLD AUTO: 1 % (ref 0–0.5)
LYMPHOCYTES # BLD: 1.2 K/UL (ref 0.8–3.5)
LYMPHOCYTES NFR BLD: 8 % (ref 12–49)
MCH RBC QN AUTO: 28.9 PG (ref 26–34)
MCHC RBC AUTO-ENTMCNC: 32.8 G/DL (ref 30–36.5)
MCV RBC AUTO: 88.1 FL (ref 80–99)
MONOCYTES # BLD: 1 K/UL (ref 0–1)
MONOCYTES NFR BLD: 7 % (ref 5–13)
NEUTS SEG # BLD: 12 K/UL (ref 1.8–8)
NEUTS SEG NFR BLD: 82 % (ref 32–75)
NRBC # BLD: 0 K/UL (ref 0–0.01)
NRBC BLD-RTO: 0 PER 100 WBC
PLATELET # BLD AUTO: 428 K/UL (ref 150–400)
PMV BLD AUTO: 9.2 FL (ref 8.9–12.9)
POTASSIUM SERPL-SCNC: 4.1 MMOL/L (ref 3.5–5.1)
PROT SERPL-MCNC: 6.9 G/DL (ref 6.4–8.2)
RBC # BLD AUTO: 3.53 M/UL (ref 3.8–5.2)
SAMPLES BEING HELD,HOLD: NORMAL
SODIUM SERPL-SCNC: 131 MMOL/L (ref 136–145)
URATE SERPL-MCNC: 4.3 MG/DL (ref 2.6–6)
WBC # BLD AUTO: 14.6 K/UL (ref 3.6–11)

## 2018-12-21 PROCEDURE — 85652 RBC SED RATE AUTOMATED: CPT

## 2018-12-21 PROCEDURE — 85025 COMPLETE CBC W/AUTO DIFF WBC: CPT

## 2018-12-21 PROCEDURE — 96374 THER/PROPH/DIAG INJ IV PUSH: CPT

## 2018-12-21 PROCEDURE — 73610 X-RAY EXAM OF ANKLE: CPT

## 2018-12-21 PROCEDURE — 84550 ASSAY OF BLOOD/URIC ACID: CPT

## 2018-12-21 PROCEDURE — 96375 TX/PRO/DX INJ NEW DRUG ADDON: CPT

## 2018-12-21 PROCEDURE — 99284 EMERGENCY DEPT VISIT MOD MDM: CPT

## 2018-12-21 PROCEDURE — 93971 EXTREMITY STUDY: CPT

## 2018-12-21 PROCEDURE — 74011250636 HC RX REV CODE- 250/636: Performed by: NURSE PRACTITIONER

## 2018-12-21 PROCEDURE — 74011250637 HC RX REV CODE- 250/637: Performed by: NURSE PRACTITIONER

## 2018-12-21 PROCEDURE — 86141 C-REACTIVE PROTEIN HS: CPT

## 2018-12-21 PROCEDURE — 80053 COMPREHEN METABOLIC PANEL: CPT

## 2018-12-21 PROCEDURE — 36415 COLL VENOUS BLD VENIPUNCTURE: CPT

## 2018-12-21 RX ORDER — METHYLPREDNISOLONE 4 MG/1
TABLET ORAL
Qty: 1 DOSE PACK | Refills: 0 | Status: SHIPPED | OUTPATIENT
Start: 2018-12-21 | End: 2020-11-30

## 2018-12-21 RX ORDER — OXYCODONE HYDROCHLORIDE 5 MG/1
10 TABLET ORAL
Status: COMPLETED | OUTPATIENT
Start: 2018-12-21 | End: 2018-12-21

## 2018-12-21 RX ORDER — KETOROLAC TROMETHAMINE 30 MG/ML
15 INJECTION, SOLUTION INTRAMUSCULAR; INTRAVENOUS
Status: COMPLETED | OUTPATIENT
Start: 2018-12-21 | End: 2018-12-21

## 2018-12-21 RX ADMIN — OXYCODONE HYDROCHLORIDE 10 MG: 5 TABLET ORAL at 20:23

## 2018-12-21 RX ADMIN — KETOROLAC TROMETHAMINE 15 MG: 30 INJECTION, SOLUTION INTRAMUSCULAR at 22:19

## 2018-12-21 RX ADMIN — METHYLPREDNISOLONE SODIUM SUCCINATE 20 MG: 40 INJECTION, POWDER, FOR SOLUTION INTRAMUSCULAR; INTRAVENOUS at 22:21

## 2018-12-22 LAB — CRP SERPL HS-MCNC: >9.5 MG/L

## 2018-12-22 NOTE — DISCHARGE INSTRUCTIONS
Rheumatoid Arthritis: Care Instructions  Your Care Instructions    Arthritis is a common health problem in which the joints are inflamed. There are many types of arthritis. In rheumatoid arthritis, the body's own immune system attacks the joints. This causes pain, stiffness, and swelling in the joints, especially in the hands and feet. It can become hard to open jars, write, and do other daily tasks. Sometimes rheumatoid arthritis can also cause bumps to form under the skin. Over time, rheumatoid arthritis can damage and deform joints. Early treatment with medicines may reduce your chances of having a lasting disability. Follow-up care is a key part of your treatment and safety. Be sure to make and go to all appointments, and call your doctor if you are having problems. It's also a good idea to know your test results and keep a list of the medicines you take. How can you care for yourself at home? · If your doctor recommends it, get more exercise. Walking is a good choice. If your knees or ankles hurt, try riding a stationary bike or swimming. · Move each joint gently through its full range of motion once or twice a day. · Rest joints when they are sore or overworked. Short rest breaks may help more than staying in bed. · Reach and stay at a healthy weight. Regular exercise and a healthy diet will help you do this. Extra weight can strain the joints, especially the knees and hips, and make the pain worse. Losing even a few pounds may help. · Get enough calcium and vitamin D to help prevent osteoporosis, which causes thin bones. Talk to your doctor about how much you should take. · Protect your joints from injury. Do not overuse them. Try to limit or avoid activities that cause joint pain or swelling. Use special kitchen tools and other self-help devices as well as walkers, splints, or canes if needed. · Use heat to ease pain. Take warm showers or baths. Use hot packs or a heating pad set on low.  Sleep under a warm electric blanket. · Put ice or a cold pack on the area for 10 to 20 minutes at a time. Put a thin cloth between the ice and your skin. · Take pain medicines exactly as directed. ? If the doctor gave you a prescription medicine for pain, take it as prescribed. ? If you are not taking a prescription pain medicine, ask your doctor if you can take an over-the-counter medicine. · Take an active role in managing your condition. Set up a treatment plan with your doctor, and learn as much as you can about rheumatoid arthritis. This will help you control pain and stay active. When should you call for help? Call your doctor now or seek immediate medical care if:    · You have a fever or a rash along with joint pain.     · You have joint pain that is so severe that you cannot use the joint at all.     · You have sudden swelling, redness, or pain in one or more joints, and you do not know why.     · You have back or neck pain along with weakness in your arms or legs.     · You have a loss of bowel or bladder control.    Watch closely for changes in your health, and be sure to contact your doctor if:    · You have joint pain that lasts for more than 6 weeks.     · You have side effects from your arthritis medicines, such as stomach pain, nausea, heartburn, or dark and tarlike stools. Where can you learn more? Go to http://tej-leidy.info/. Enter K205 in the search box to learn more about \"Rheumatoid Arthritis: Care Instructions. \"  Current as of: June 11, 2018  Content Version: 11.8  © 6456-5839 [employer]. Care instructions adapted under license by [email or web address] (which disclaims liability or warranty for this information). If you have questions about a medical condition or this instruction, always ask your healthcare professional. [place] [address] any warranty or liability for your use of this information.            Leg Pain: Care Instructions  Your Care Instructions  Many things can cause leg pain. Too much exercise or overuse can cause a muscle cramp (or charley horse). You can get leg cramps from not eating a balanced diet that has enough potassium, calcium, and other minerals. If you do not drink enough fluids or are taking certain medicines, you may develop leg cramps. Other causes of leg pain include injuries, blood flow problems, nerve damage, and twisted and enlarged veins (varicose veins). You can usually ease pain with self-care. Your doctor may recommend that you rest your leg and keep it elevated. Follow-up care is a key part of your treatment and safety. Be sure to make and go to all appointments, and call your doctor if you are having problems. It's also a good idea to know your test results and keep a list of the medicines you take. How can you care for yourself at home? · Take pain medicines exactly as directed. ? If the doctor gave you a prescription medicine for pain, take it as prescribed. ? If you are not taking a prescription pain medicine, ask your doctor if you can take an over-the-counter medicine. · Take any other medicines exactly as prescribed. Call your doctor if you think you are having a problem with your medicine. · Rest your leg while you have pain, and avoid standing for long periods of time. · Prop up your leg at or above the level of your heart when possible. · Make sure you are eating a balanced diet that is rich in calcium, potassium, and magnesium, especially if you are pregnant. · If directed by your doctor, put ice or a cold pack on the area for 10 to 20 minutes at a time. Put a thin cloth between the ice and your skin. · Your leg may be in a splint, a brace, or an elastic bandage, and you may have crutches to help you walk. Follow your doctor's directions about how long to wear supports and how to use the crutches. When should you call for help?   Call 911 anytime you think you may need emergency care. For example, call if:    · You have sudden chest pain and shortness of breath, or you cough up blood.     · Your leg is cool or pale or changes color.    Call your doctor now or seek immediate medical care if:    · You have increasing or severe pain.     · Your leg suddenly feels weak and you cannot move it.     · You have signs of a blood clot, such as:  ? Pain in your calf, back of the knee, thigh, or groin. ? Redness and swelling in your leg or groin.     · You have signs of infection, such as:  ? Increased pain, swelling, warmth, or redness. ? Red streaks leading from the sore area. ? Pus draining from a place on your leg. ? A fever.     · You cannot bear weight on your leg.    Watch closely for changes in your health, and be sure to contact your doctor if:    · You do not get better as expected. Where can you learn more? Go to http://tej-leidy.info/. Enter I577 in the search box to learn more about \"Leg Pain: Care Instructions. \"  Current as of: November 20, 2017  Content Version: 11.8  © 1428-2739 [employer]. Care instructions adapted under license by [facility] [facility] (which disclaims liability or warranty for this information). If you have questions about a medical condition or this instruction, always ask your healthcare professional. [address] any warranty or liability for your use of this information.

## 2018-12-22 NOTE — CONSULTS
ORTHO CONSULT    Subjective:     Date of Consultation:  2018    Referring Physician:  Dr. Neville Rowland is a 80 y.o. female we are consulted to see for R ankle pain/swelling. Requested to eval for septic joint. Pt. Denies pain at this time, states she woke up with pain this morning which has not improved over the day. States worse this evening but has improved since arrival to ER. Denies fever. States not seeing Rheum at this time. STates PCP has reduced her Methotrexate to 5mg from 10mg. Patient Active Problem List    Diagnosis Date Noted    Chronic kidney disease (CKD) 2017    Osteoporosis, post-menopausal     Chest pain 2016    DM type 2 (diabetes mellitus, type 2) ([facility] [address] 2016    HTN (hypertension) 2016    Hypothyroid 2016    RA (rheumatoid arthritis) ([facility] [address] 2016    Abnormal chest x-ray 2016     Family History   Problem Relation Age of Onset    Heart Disease Mother     Diabetes Father     Cancer Brother         brain cancer    Heart Disease Brother     Diabetes Brother     Other Other         scleroderma      Social History     Tobacco Use    Smoking status: Former Smoker     Types: Cigarettes     Last attempt to quit: 1976     Years since quittin.0    Smokeless tobacco: Never Used   Substance Use Topics    Alcohol use: Yes     Comment: [place] time 1 glass wine     Past Medical History:   Diagnosis Date    Arthritis, rheumatoid ([facility] [address]     Diabetes ([facility] [address]     Diverticulitis     Hard of hearing     Hypertension     Hypothyroid     Osteoporosis, post-menopausal       Past Surgical History:   Procedure Laterality Date    HX HEMORRHOIDECTOMY      HX HYSTERECTOMY      HX THYROIDECTOMY        Prior to Admission medications    Medication Sig Start Date End Date Taking?  Authorizing Provider   methylPREDNISolone (MEDROL, VITA,) 4 mg tablet Take by mouth as directed 18  Yes Brian Zavala NP cholecalciferol (VITAMIN D3) 1,000 unit cap Take  by mouth daily. Provider, Historical   methotrexate (RHEUMATREX) 2.5 mg tablet 12.5 mg (5 tabs) once weekly 9/5/17   Teresa Kendall MD   lisinopril (PRINIVIL, ZESTRIL) 20 mg tablet TAKE 1 TABLET BY MOUTH TWICE A DAY 1/30/17   Provider, Historical   amLODIPine (NORVASC) 5 mg tablet Take 2 Tabs by mouth daily. 4/14/17   Fabi Burr MD   fluticasone (FLONASE) 50 mcg/actuation nasal spray INSTILL 1 SPRAY IN EACH NOSTRIL ONCE A DAY NASALLY 30 DAY(S) 12/12/16   Provider, Historical   raNITIdine (ZANTAC 75) 75 mg tablet Take 75 mg by mouth two (2) times daily as needed. Provider, Historical   folic acid (FOLVITE) 1 mg tablet Take 1 mg by mouth daily. Provider, Historical   levothyroxine (SYNTHROID) 75 mcg tablet Take 75 mcg by mouth Daily (before breakfast). Provider, Historical   acetaminophen (TYLENOL) 325 mg tablet Take 325 mg by mouth daily as needed for Pain. Provider, Historical   aspirin delayed-release 81 mg tablet Take 81 mg by mouth every other day. Provider, Historical   TOCILIZUMAB (ACTEMRA IV) by IntraVENous route every thirty (30) days. Provider, Historical     Current Facility-Administered Medications   Medication Dose Route Frequency    ketorolac (TORADOL) injection 15 mg  15 mg IntraVENous NOW    methylPREDNISolone (PF) (SOLU-MEDROL) injection 20 mg  20 mg IntraVENous NOW     Current Outpatient Medications   Medication Sig    methylPREDNISolone (MEDROL, VITA,) 4 mg tablet Take by mouth as directed    cholecalciferol (VITAMIN D3) 1,000 unit cap Take  by mouth daily.  methotrexate (RHEUMATREX) 2.5 mg tablet 12.5 mg (5 tabs) once weekly    lisinopril (PRINIVIL, ZESTRIL) 20 mg tablet TAKE 1 TABLET BY MOUTH TWICE A DAY    amLODIPine (NORVASC) 5 mg tablet Take 2 Tabs by mouth daily.     fluticasone (FLONASE) 50 mcg/actuation nasal spray INSTILL 1 SPRAY IN EACH NOSTRIL ONCE A DAY NASALLY 30 DAY(S)    raNITIdine (ZANTAC 75) 75 mg tablet Take 75 mg by mouth two (2) times daily as needed.  folic acid (FOLVITE) 1 mg tablet Take 1 mg by mouth daily.  levothyroxine (SYNTHROID) 75 mcg tablet Take 75 mcg by mouth Daily (before breakfast).  acetaminophen (TYLENOL) 325 mg tablet Take 325 mg by mouth daily as needed for Pain.  aspirin delayed-release 81 mg tablet Take 81 mg by mouth every other day.  TOCILIZUMAB (ACTEMRA IV) by IntraVENous route every thirty (30) days. Allergies   Allergen Reactions    Alendronate Diarrhea    Amoxicillin Rash    Hydrochlorothiazide Other (comments)     \"Caused pain everywhere\"        Review of Systems:  A comprehensive review of systems was negative except for that written in the HPI. Objective:     Visit Vitals  /53   Pulse 87   Temp 98.6 °F (37 °C)   Resp 19   Ht 5' 1\" (1.549 m)   Wt 64.4 kg (142 lb)   SpO2 97%   BMI 26.83 kg/m²       EXAM: ESR 57, WBC 14, afebrile. I examined pt's R ankle. Mild erythema, no pain with ROM of the ankle on exam. Mild effusion, skin intact. DP pulses = BLE's. XR with OA of the ankle. XR Results (most recent):  Results from Hospital Encounter encounter on 12/21/18   XR ANKLE RT MIN 3 V    Narrative EXAM: XR ANKLE RT MIN 3 V    INDICATION: Sudden onset today of swelling/ pain. COMPARISON: None. FINDINGS: Three views of the right ankle demonstrate moderate diffuse osteopenia  without demonstration of acute fracture or dislocation. No ankle joint effusion  is shown. There is mild soft tissue swelling laterally and anteriorly at the  ankle. .      Impression IMPRESSION: Soft tissue swelling. Osteopenia. No acute fracture or dislocation. Assessment/Plan:     Encounter Diagnoses     ICD-10-CM ICD-9-CM   1. Right leg pain M79.604 729.5   2. Rheumatoid arthritis involving multiple sites with positive rheumatoid factor (HCC) M05.79 714.0     Rheumatologic flare up R ankle.     No evidence of septic joint on exal.    ER PA will prescribe Medrol for home, f/u next week with PCP to discuss Methotrexate dosage. Rest and ice. Boot for stability with ambulation since lives alone. Return to ER if symptoms worsen. Dr. Latosha Cowan agrees with plan. Thank you for allowing us to take part in this patients care.       CHINA Talbot-C    Orthopaedic Surgery PA

## 2018-12-22 NOTE — ED PROVIDER NOTES
80 y.o. female with past medical history significant for RA, HTN, s/p thyroidectomy, DM, and osteoporosis presents via EMS from [facility] with chief complaint of lateral RLE pain, 8/10 in severity, onset suddenly today w/ some redness. Pt reports associated right ankle swelling. She notes that the pain radiates up to her thigh with movement. Pt indicates that she has taken Tylenol for the pain, with last dose at 6 PM. Of note, pt has also been congested. She denies any h/o PE/DVT. Pt denies any recent antibiotics, or being on any anticoagulation or antiplatelet therapy. Pt denies any trauma, fevers, chills, numbness, loss of appetite, urinary sx, or constipation currently. There are no other acute medical concerns at this time. Social hx: Patient denies current Tobacco use. Reports occasional EtOH use. Denies illicit drug abuse. PCP: Anisa Maher MD    Note written by Roderick Webster, as dictated by Jia Mccullough NP, 7:43 PM        The history is provided by the patient. No  was used.         Past Medical History:   Diagnosis Date    Arthritis, rheumatoid (HCC)     Diabetes ([facility] [address]     Diverticulitis     Hard of hearing     Hypertension     Hypothyroid     Osteoporosis, post-menopausal        Past Surgical History:   Procedure Laterality Date    HX HEMORRHOIDECTOMY      HX HYSTERECTOMY      HX THYROIDECTOMY           Family History:   Problem Relation Age of Onset    Heart Disease Mother     Diabetes Father     Cancer Brother         brain cancer    Heart Disease Brother     Diabetes Brother     Other Other         scleroderma       Social History     Socioeconomic History    Marital status:      Spouse name: Not on file    Number of children: Not on file    Years of education: Not on file    Highest education level: Not on file   Social Needs    Financial resource strain: Not on file    Food insecurity - worry: Not on file    Food insecurity - inability: Not on file    Transportation needs - medical: Not on file   [employer] needs - non-medical: Not on file   Occupational History    Not on file   Tobacco Use    Smoking status: Former Smoker     Types: Cigarettes     Last attempt to quit: 1976     Years since quittin.0    Smokeless tobacco: Never Used   Substance and Sexual Activity    Alcohol use: Yes     Comment: [place] time 1 glass wine    Drug use: No    Sexual activity: No   Other Topics Concern    Not on file   Social History Narrative    ** Merged History Encounter **              ALLERGIES: Alendronate; Amoxicillin; and Hydrochlorothiazide    Review of Systems   Constitutional: Negative for appetite change, chills and fever. HENT: Positive for congestion. Gastrointestinal: Negative for constipation. Genitourinary: Negative for difficulty urinating, dysuria and hematuria. Musculoskeletal: Positive for joint swelling (right ankle) and myalgias (RLE). Neurological: Negative for numbness. All other systems reviewed and are negative. Vitals:    18 1933   BP: 147/64   Pulse: 87   Resp: 19   Temp: 98.6 °F (37 °C)   SpO2: 97%   Weight: 64.4 kg (142 lb)   Height: 5' 1\" (1.549 m)            Physical Exam   Constitutional: She is oriented to person, place, and time. She appears well-developed and well-nourished. No distress. HENT:   Head: Normocephalic and atraumatic. Right Ear: External ear normal.   Left Ear: External ear normal.   Nose: Nose normal.   Mouth/Throat: Oropharynx is clear and moist. No oropharyngeal exudate. Eyes: Conjunctivae and EOM are normal. Pupils are equal, round, and reactive to light. Neck: Normal range of motion. Neck supple. Cardiovascular: Normal rate, regular rhythm, normal heart sounds and intact distal pulses. Pulmonary/Chest: Effort normal and breath sounds normal.   Abdominal: Soft. There is no tenderness.  There is no rebound and no guarding. Musculoskeletal: Normal range of motion. See photo     Lateral redness w/ TTP present on the RLE; skin warm to touch; ROM WNR    Neurological: She is alert and oriented to person, place, and time. Skin: Skin is warm and dry. Psychiatric: She has a normal mood and affect. Her behavior is normal. Judgment and thought content normal.   Nursing note and vitals reviewed. MDM  Number of Diagnoses or Management Options  Rheumatoid arthritis involving multiple sites with positive rheumatoid factor [facility]):   Right leg pain:   Diagnosis management comments: DDx: thrombophlebitis, RA, septic joint     81 yo F presents w/ localized redness/ swelling to the R ankle w/o any hx of trauma. Felt to be r/t inflammatory process per ortho, not septic joint. Started on Steroids, advised pt on discretionary use of Motrin for pain management. (-) duplex and minimal effusion on ankle x-ray. Pt w/o any pain at time of discharge. Advised to see PCP ASAP (who manages her RA currently). Amount and/or Complexity of Data Reviewed  Clinical lab tests: ordered and reviewed  Tests in the radiology section of CPT®: ordered and reviewed  Review and summarize past medical records: yes  Discuss the patient with other providers: yes MD Yfn Becker Draft MD- ortho )           Procedures  CONSULT NOTE:  8:45 PM Raven Osorio NP, spoke with Dr. Manuela Cheung and CHINA Rodriguez, Consult for Orthopedics via [facility]. Discussed available diagnostic tests and clinical findings; they will see the patient. LABORATORY TESTS:  Recent Results (from the past 12 hour(s))   SAMPLES BEING HELD    Collection Time: 12/21/18  7:41 PM   Result Value Ref Range    SAMPLES BEING HELD SST. MINDI     COMMENT        Add-on orders for these samples will be processed based on acceptable specimen integrity and analyte stability, which may vary by analyte.    CBC WITH AUTOMATED DIFF    Collection Time: 12/21/18  7:41 PM Result Value Ref Range    WBC 14.6 (H) 3.6 - 11.0 K/uL    RBC 3.53 (L) 3.80 - 5.20 M/uL    HGB 10.2 (L) 11.5 - 16.0 g/dL    HCT 31.1 (L) 35.0 - 47.0 %    MCV 88.1 80.0 - 99.0 FL    MCH 28.9 26.0 - 34.0 PG    MCHC 32.8 30.0 - 36.5 g/dL    RDW 14.9 (H) 11.5 - 14.5 %    PLATELET 610 (H) 976 - 400 K/uL    MPV 9.2 8.9 - 12.9 FL    NRBC 0.0 0  WBC    ABSOLUTE NRBC 0.00 0.00 - 0.01 K/uL    NEUTROPHILS 82 (H) 32 - 75 %    LYMPHOCYTES 8 (L) 12 - 49 %    MONOCYTES 7 5 - 13 %    EOSINOPHILS 2 0 - 7 %    BASOPHILS 0 0 - 1 %    IMMATURE GRANULOCYTES 1 (H) 0.0 - 0.5 %    ABS. NEUTROPHILS 12.0 (H) 1.8 - 8.0 K/UL    ABS. LYMPHOCYTES 1.2 0.8 - 3.5 K/UL    ABS. MONOCYTES 1.0 0.0 - 1.0 K/UL    ABS. EOSINOPHILS 0.3 0.0 - 0.4 K/UL    ABS. BASOPHILS 0.0 0.0 - 0.1 K/UL    ABS. IMM. GRANS. 0.1 (H) 0.00 - 0.04 K/UL    DF AUTOMATED     METABOLIC PANEL, COMPREHENSIVE    Collection Time: 12/21/18  7:41 PM   Result Value Ref Range    Sodium 131 (L) 136 - 145 mmol/L    Potassium 4.1 3.5 - 5.1 mmol/L    Chloride 97 97 - 108 mmol/L    CO2 27 21 - 32 mmol/L    Anion gap 7 5 - 15 mmol/L    Glucose 143 (H) 65 - 100 mg/dL    BUN 14 6 - 20 MG/DL    Creatinine 1.15 (H) 0.55 - 1.02 MG/DL    BUN/Creatinine ratio 12 12 - 20      GFR est AA 54 (L) >60 ml/min/1.73m2    GFR est non-AA 45 (L) >60 ml/min/1.73m2    Calcium 8.9 8.5 - 10.1 MG/DL    Bilirubin, total 0.4 0.2 - 1.0 MG/DL    ALT (SGPT) 28 12 - 78 U/L    AST (SGOT) 16 15 - 37 U/L    Alk.  phosphatase 75 45 - 117 U/L    Protein, total 6.9 6.4 - 8.2 g/dL    Albumin 2.9 (L) 3.5 - 5.0 g/dL    Globulin 4.0 2.0 - 4.0 g/dL    A-G Ratio 0.7 (L) 1.1 - 2.2     SED RATE (ESR)    Collection Time: 12/21/18  7:41 PM   Result Value Ref Range    Sed rate, automated 67 (H) 0 - 30 mm/hr   CRP, HIGH SENSITIVITY    Collection Time: 12/21/18  7:41 PM   Result Value Ref Range    CRP, High sensitivity >9.5 mg/L   URIC ACID    Collection Time: 12/21/18  7:41 PM   Result Value Ref Range    Uric acid 4.3 2.6 - 6.0 MG/DL IMAGING RESULTS:  DUPLEX LOWER EXT VENOUS RIGHT         XR ANKLE RT MIN 3 V   Final Result   IMPRESSION: Soft tissue swelling. Osteopenia. No acute fracture or dislocation. MEDICATIONS GIVEN:  Medications   oxyCODONE IR (ROXICODONE) tablet 10 mg (10 mg Oral Given 12/21/18 2023)   ketorolac (TORADOL) injection 15 mg (15 mg IntraVENous Given 12/21/18 2219)   methylPREDNISolone (PF) (SOLU-MEDROL) injection 20 mg (20 mg IntraVENous Given 12/21/18 2221)       IMPRESSION:  1. Right leg pain    2. Rheumatoid arthritis involving multiple sites with positive rheumatoid factor ([facility] [address]        PLAN:  1. Discharge Medication List as of 12/21/2018 10:06 PM      START taking these medications    Details   methylPREDNISolone (MEDROL, VITA,) 4 mg tablet Take by mouth as directed, Print, Disp-1 Dose Pack, R-0         CONTINUE these medications which have NOT CHANGED    Details   cholecalciferol (VITAMIN D3) 1,000 unit cap Take  by mouth daily. , Historical Med      methotrexate (RHEUMATREX) 2.5 mg tablet 12.5 mg (5 tabs) once weekly, Normal, Disp-25 Tab, R-2      lisinopril (PRINIVIL, ZESTRIL) 20 mg tablet TAKE 1 TABLET BY MOUTH TWICE A DAY, Historical Med, R-1      amLODIPine (NORVASC) 5 mg tablet Take 2 Tabs by mouth daily. , Normal, Disp-60 Tab, R-0      fluticasone (FLONASE) 50 mcg/actuation nasal spray INSTILL 1 SPRAY IN EACH NOSTRIL ONCE A DAY NASALLY 30 DAY(S), Historical Med, R-1      raNITIdine (ZANTAC 75) 75 mg tablet Take 75 mg by mouth two (2) times daily as needed., Historical Med      folic acid (FOLVITE) 1 mg tablet Take 1 mg by mouth daily. , Historical Med      levothyroxine (SYNTHROID) 75 mcg tablet Take 75 mcg by mouth Daily (before breakfast). , Historical Med      acetaminophen (TYLENOL) 325 mg tablet Take 325 mg by mouth daily as needed for Pain., Historical Med      aspirin delayed-release 81 mg tablet Take 81 mg by mouth every other day., Historical Med      TOCILIZUMAB (ACTEMRA IV) by IntraVENous route every thirty (30) days. , Historical Med         STOP taking these medications       predniSONE (DELTASONE) 20 mg tablet Comments:   Reason for Stoppin.   Follow-up Information     Follow up With Specialties Details Why Contact Info    Samantha Crisostomo MD Family Practice Schedule an appointment as soon as possible for a visit  [address] [facility]  [phone] [address] [facility] DEPT Emergency Medicine Go to As needed, If symptoms worsen [address] [facility]  [phone]        3. Return to ED if worse   Discharge Note:    The patient is ready for discharge. The patient's signs, symptoms, diagnosis, and discharge instruction have been discussed and the patient has conveyed their understanding. The patient is to follow up as recommended or return to the ER should their symptoms worsen. Plan has been discussed and the patient is in agreement.     Jose E Whyte NP

## 2018-12-22 NOTE — PROCEDURES
[facility]  *** FINAL REPORT ***    Name: Km Pittman  MRN: [id number]    Inpatient  : 1930  HIS Order #: [id number]  [id number] [facility] Visit #: [id number]  Date: 21 Dec 2018    TYPE OF TEST: Peripheral Venous Testing    REASON FOR TEST  Pain in limb, Limb swelling    Right Leg:-  Deep venous thrombosis:           No  Superficial venous thrombosis:    No  Deep venous insufficiency:        No  Superficial venous insufficiency: No      INTERPRETATION/FINDINGS  PROCEDURE:  RIGHT LOWER EXTREMITY  VENOUS DUPLEX. Evaluation of lower  extremity veins with ultrasound (B-mode imaging, pulsed Doppler, color   Doppler). Includes the common femoral, deep femoral, femoral,  popliteal, posterior tibial, peroneal, and great saphenous veins. Other veins, for example the gastrocnemius and soleal veins, may also  be visualized. FINDINGS: Byron Soles scale and color flow duplex images of the veins in the  right lower extremity demonstrate normal compressibility, spontaneous  and augmented flow profiles, and absence of filling defects throughout   the deep and superficial veins in the right lower extremity. CONCLUSION:Right lower extremity venous duplex negative for deep  venous thrombosis or thrombophlebitis. Left common femoral vein is  thrombus free. ADDITIONAL COMMENTS    I have personally reviewed the data relevant to the interpretation of  this  study. TECHNOLOGIST: George Has  Signed: 2018 09:56 PM    PHYSICIAN: Serge Lucero.  Annabella Pascual MD  Signed: 2018 03:05 PM

## 2019-03-13 ENCOUNTER — APPOINTMENT (OUTPATIENT)
Dept: CT IMAGING | Age: 84
End: 2019-03-13
Attending: PHYSICIAN ASSISTANT
Payer: MEDICARE

## 2019-03-13 ENCOUNTER — APPOINTMENT (OUTPATIENT)
Dept: GENERAL RADIOLOGY | Age: 84
End: 2019-03-13
Attending: PHYSICIAN ASSISTANT
Payer: MEDICARE

## 2019-03-13 ENCOUNTER — HOSPITAL ENCOUNTER (EMERGENCY)
Age: 84
Discharge: HOME | End: 2019-03-13
Attending: EMERGENCY MEDICINE | Admitting: EMERGENCY MEDICINE
Payer: MEDICARE

## 2019-03-13 VITALS
RESPIRATION RATE: 19 BRPM | WEIGHT: 138 LBS | BODY MASS INDEX: 26.06 KG/M2 | TEMPERATURE: 99.3 F | HEIGHT: 61 IN | HEART RATE: 70 BPM | DIASTOLIC BLOOD PRESSURE: 63 MMHG | OXYGEN SATURATION: 97 % | SYSTOLIC BLOOD PRESSURE: 159 MMHG

## 2019-03-13 DIAGNOSIS — R42 DIZZINESS: Primary | ICD-10-CM

## 2019-03-13 LAB
ALBUMIN SERPL-MCNC: 3.5 G/DL (ref 3.5–5)
ALBUMIN/GLOB SERPL: 0.8 {RATIO} (ref 1.1–2.2)
ALP SERPL-CCNC: 86 U/L (ref 45–117)
ALT SERPL-CCNC: 31 U/L (ref 12–78)
ANION GAP SERPL CALC-SCNC: 6 MMOL/L (ref 5–15)
APPEARANCE UR: CLEAR
AST SERPL-CCNC: 29 U/L (ref 15–37)
BACTERIA URNS QL MICRO: NEGATIVE /HPF
BASOPHILS # BLD: 0 K/UL (ref 0–0.1)
BASOPHILS NFR BLD: 0 % (ref 0–1)
BILIRUB SERPL-MCNC: 0.5 MG/DL (ref 0.2–1)
BILIRUB UR QL: NEGATIVE
BNP SERPL-MCNC: 137 PG/ML
BUN SERPL-MCNC: 13 MG/DL (ref 6–20)
BUN/CREAT SERPL: 13 (ref 12–20)
CALCIUM SERPL-MCNC: 9 MG/DL (ref 8.5–10.1)
CHLORIDE SERPL-SCNC: 99 MMOL/L (ref 97–108)
CO2 SERPL-SCNC: 28 MMOL/L (ref 21–32)
COLOR UR: NORMAL
COMMENT, HOLDF: NORMAL
CREAT SERPL-MCNC: 1 MG/DL (ref 0.55–1.02)
DIFFERENTIAL METHOD BLD: ABNORMAL
EOSINOPHIL # BLD: 0.2 K/UL (ref 0–0.4)
EOSINOPHIL NFR BLD: 3 % (ref 0–7)
EPITH CASTS URNS QL MICRO: NORMAL /LPF
ERYTHROCYTE [DISTWIDTH] IN BLOOD BY AUTOMATED COUNT: 16 % (ref 11.5–14.5)
GLOBULIN SER CALC-MCNC: 4.3 G/DL (ref 2–4)
GLUCOSE SERPL-MCNC: 125 MG/DL (ref 65–100)
GLUCOSE UR STRIP.AUTO-MCNC: NEGATIVE MG/DL
HCT VFR BLD AUTO: 34.6 % (ref 35–47)
HGB BLD-MCNC: 11.2 G/DL (ref 11.5–16)
HGB UR QL STRIP: NEGATIVE
HYALINE CASTS URNS QL MICRO: NORMAL /LPF (ref 0–5)
IMM GRANULOCYTES # BLD AUTO: 0 K/UL (ref 0–0.04)
IMM GRANULOCYTES NFR BLD AUTO: 0 % (ref 0–0.5)
KETONES UR QL STRIP.AUTO: NEGATIVE MG/DL
LEUKOCYTE ESTERASE UR QL STRIP.AUTO: NEGATIVE
LYMPHOCYTES # BLD: 1.4 K/UL (ref 0.8–3.5)
LYMPHOCYTES NFR BLD: 19 % (ref 12–49)
MCH RBC QN AUTO: 28 PG (ref 26–34)
MCHC RBC AUTO-ENTMCNC: 32.4 G/DL (ref 30–36.5)
MCV RBC AUTO: 86.5 FL (ref 80–99)
MONOCYTES # BLD: 0.3 K/UL (ref 0–1)
MONOCYTES NFR BLD: 5 % (ref 5–13)
NEUTS SEG # BLD: 5.2 K/UL (ref 1.8–8)
NEUTS SEG NFR BLD: 73 % (ref 32–75)
NITRITE UR QL STRIP.AUTO: NEGATIVE
NRBC # BLD: 0 K/UL (ref 0–0.01)
NRBC BLD-RTO: 0 PER 100 WBC
PH UR STRIP: 7 [PH] (ref 5–8)
PLATELET # BLD AUTO: 328 K/UL (ref 150–400)
PMV BLD AUTO: 9.7 FL (ref 8.9–12.9)
POTASSIUM SERPL-SCNC: 4 MMOL/L (ref 3.5–5.1)
PROT SERPL-MCNC: 7.8 G/DL (ref 6.4–8.2)
PROT UR STRIP-MCNC: NEGATIVE MG/DL
RBC # BLD AUTO: 4 M/UL (ref 3.8–5.2)
RBC #/AREA URNS HPF: NORMAL /HPF (ref 0–5)
SAMPLES BEING HELD,HOLD: NORMAL
SODIUM SERPL-SCNC: 133 MMOL/L (ref 136–145)
SP GR UR REFRACTOMETRY: <1.005 (ref 1–1.03)
TROPONIN I SERPL-MCNC: <0.05 NG/ML
UR CULT HOLD, URHOLD: NORMAL
UROBILINOGEN UR QL STRIP.AUTO: 0.2 EU/DL (ref 0.2–1)
WBC # BLD AUTO: 7.2 K/UL (ref 3.6–11)
WBC URNS QL MICRO: NORMAL /HPF (ref 0–4)

## 2019-03-13 PROCEDURE — 83880 ASSAY OF NATRIURETIC PEPTIDE: CPT

## 2019-03-13 PROCEDURE — 70450 CT HEAD/BRAIN W/O DYE: CPT

## 2019-03-13 PROCEDURE — 99285 EMERGENCY DEPT VISIT HI MDM: CPT

## 2019-03-13 PROCEDURE — 80053 COMPREHEN METABOLIC PANEL: CPT

## 2019-03-13 PROCEDURE — 71046 X-RAY EXAM CHEST 2 VIEWS: CPT

## 2019-03-13 PROCEDURE — 81001 URINALYSIS AUTO W/SCOPE: CPT

## 2019-03-13 PROCEDURE — 84484 ASSAY OF TROPONIN QUANT: CPT

## 2019-03-13 PROCEDURE — 96374 THER/PROPH/DIAG INJ IV PUSH: CPT

## 2019-03-13 PROCEDURE — 74011250636 HC RX REV CODE- 250/636: Performed by: PHYSICIAN ASSISTANT

## 2019-03-13 PROCEDURE — 96361 HYDRATE IV INFUSION ADD-ON: CPT

## 2019-03-13 PROCEDURE — 85025 COMPLETE CBC W/AUTO DIFF WBC: CPT

## 2019-03-13 PROCEDURE — 93005 ELECTROCARDIOGRAM TRACING: CPT

## 2019-03-13 PROCEDURE — 36415 COLL VENOUS BLD VENIPUNCTURE: CPT

## 2019-03-13 RX ORDER — MECLIZINE HYDROCHLORIDE 25 MG/1
25 TABLET ORAL
Status: COMPLETED | OUTPATIENT
Start: 2019-03-13 | End: 2019-03-13

## 2019-03-13 RX ORDER — MECLIZINE HYDROCHLORIDE 25 MG/1
25 TABLET ORAL 2 TIMES DAILY
Qty: 20 TAB | Refills: 0 | Status: SHIPPED | OUTPATIENT
Start: 2019-03-13 | End: 2019-03-23

## 2019-03-13 RX ORDER — ONDANSETRON 2 MG/ML
4 INJECTION INTRAMUSCULAR; INTRAVENOUS
Status: COMPLETED | OUTPATIENT
Start: 2019-03-13 | End: 2019-03-13

## 2019-03-13 RX ADMIN — ONDANSETRON 4 MG: 2 INJECTION INTRAMUSCULAR; INTRAVENOUS at 19:00

## 2019-03-13 RX ADMIN — SODIUM CHLORIDE 1000 ML: 900 INJECTION, SOLUTION INTRAVENOUS at 19:04

## 2019-03-13 RX ADMIN — MECLIZINE HYDROCHLORIDE 25 MG: 25 TABLET ORAL at 18:59

## 2019-03-13 NOTE — ED TRIAGE NOTES
Pt arrives by EMS with c/o of having dizziness and lightheadedness that started today, pt also complaining of frontal headache. Pt states feeling like \"room is spinning\" and seeing spots. .

## 2019-03-13 NOTE — ED PROVIDER NOTES
80 y.o. female with past medical history significant for RA, HTN, DM, osteoporosis, diverticulitis, s/p thyroidectomy presents as transfer via EMS with chief complaint of dizziness and lightheadedness that started today. She reports associated frontal headache, adding that she feels as if the room is spinning. Pt also indicates that she is \"seeing spots. \" Pt denies any syncope, chest pain, or other symptoms at this time. There are no other acute medical concerns at this time. Social hx: Patient denies current Tobacco use; quit in 1976. Reports rare EtOH use. Denies illicit drug abuse. PCP: Rebecca Holbrook MD    Note written by Darin Gibson, as dictated by Namrata Lyn PA-C, 4:25 PM        The history is provided by the patient. No  was used.         Past Medical History:   Diagnosis Date    Arthritis, rheumatoid (HCC)     Diabetes ([facility] [address]     Diverticulitis     Hard of hearing     Hypertension     Hypothyroid     Osteoporosis, post-menopausal        Past Surgical History:   Procedure Laterality Date    HX HEMORRHOIDECTOMY      HX HYSTERECTOMY      HX THYROIDECTOMY           Family History:   Problem Relation Age of Onset    Heart Disease Mother     Diabetes Father     Cancer Brother         brain cancer    Heart Disease Brother     Diabetes Brother     Other Other         scleroderma       Social History     Socioeconomic History    Marital status:      Spouse name: Not on file    Number of children: Not on file    Years of education: Not on file    Highest education level: Not on file   Social Needs    Financial resource strain: Not on file    Food insecurity - worry: Not on file    Food insecurity - inability: Not on file   [employer] needs - medical: Not on file   [employer] needs - non-medical: Not on file   Occupational History    Not on file   Tobacco Use    Smoking status: Former Smoker     Types: Cigarettes     Last attempt to quit: 1976     Years since quittin.2    Smokeless tobacco: Never Used   Substance and Sexual Activity    Alcohol use: Yes     Comment: [place] time 1 glass wine    Drug use: No    Sexual activity: No   Other Topics Concern    Not on file   Social History Narrative    ** Merged History Encounter **              ALLERGIES: Alendronate; Amoxicillin; and Hydrochlorothiazide    Review of Systems   Constitutional: Negative for chills and fever. Eyes: Positive for visual disturbance. Respiratory: Negative for shortness of breath. Cardiovascular: Negative for chest pain. Gastrointestinal: Negative for abdominal pain, constipation, diarrhea and vomiting. Neurological: Positive for dizziness, light-headedness and headaches. All other systems reviewed and are negative. Vitals:    19 1645 19 1700 19 1715 19 1730   BP: 157/56 161/58 145/61 160/58   Pulse: 81 82 75 83   Resp: 20 18 16 20   Temp:       SpO2: 97% 93% 97% 97%   Weight:       Height:                Physical Exam   Constitutional: She is oriented to person, place, and time. She appears well-developed and well-nourished. HENT:   Head: Normocephalic and atraumatic. Eyes: Pupils are equal, round, and reactive to light. Neck: Normal range of motion. Neck supple. Cardiovascular: Normal rate and regular rhythm. Pulmonary/Chest: Effort normal and breath sounds normal.   Abdominal: Soft. She exhibits no distension. There is no tenderness. Neurological: She is alert and oriented to person, place, and time. Skin: Skin is warm and dry. Capillary refill takes less than 2 seconds. Psychiatric: She has a normal mood and affect. Her behavior is normal.   Nursing note and vitals reviewed.       Note written by Kadi Reyes, as dictated by Reynaldo Gilmore PA-C, 4:25 PM     MDM  Number of Diagnoses or Management Options  Dizziness:   Diagnosis management comments: Pt afebrile and nontoxic appearing. Vitals, labs, physical exam, and imaging studies all unremarkable. Pt reports resolution of symptoms after fluids and meclizine. HEART score 2. No signs of stroke, PE, PTX, ACS, esophageal rupture, dissection, pancreatitis, appendicitis, cholecystitis/cholagnitis, bowel obstruction/perforation, sepsis or any other acute life threatening condition. Will treat symptomatically and advise close follow up with family doctor. Dorcas DELFINO         Procedures  8:30 PM  The patient has been updated on results, and has been planned for discharge. Instructed the patient to follow-up with PCP, and to return to the ED should any new symptoms arise or worsen. Patient agreeable to plan. 8:30 PM  Patient's results have been reviewed with them. Patient and/or family have verbally conveyed their understanding and agreement of the patient's signs, symptoms, diagnosis, treatment and prognosis and additionally agree to follow up as recommended or return to the Emergency Room should their condition change prior to follow-up. Discharge instructions have also been provided to the patient with some educational information regarding their diagnosis as well a list of reasons why they would want to return to the ER prior to their follow-up appointment should their condition change. I was personally available for consultation in the emergency department. I have reviewed the chart and agree with the documentation recorded by the [facility], including the assessment, treatment plan, and disposition.   Lakeisha Trinh MD

## 2019-03-13 NOTE — ED NOTES
TRANSFER - OUT REPORT:    Verbal report given to Kallie Seals RN (name) on Jenaro Lynn  being transferred to ED (unit) for routine progression of care       Report consisted of patients Situation, Background, Assessment and   Recommendations(SBAR). Information from the following report(s) SBAR, ED Summary, [facility] and Recent Results was reviewed with the receiving nurse. Lines:   Peripheral IV 03/13/19 Right Antecubital (Active)   Site Assessment Clean, dry, & intact 3/13/2019  4:41 PM   Phlebitis Assessment 0 3/13/2019  4:41 PM   Infiltration Assessment 0 3/13/2019  4:41 PM   Dressing Status Clean, dry, & intact 3/13/2019  4:41 PM   Dressing Type Tape;Transparent 3/13/2019  4:41 PM   Hub Color/Line Status Pink;Flushed 3/13/2019  4:41 PM   Action Taken Blood drawn 3/13/2019  4:41 PM        Opportunity for questions and clarification was provided.       Patient transported with:   Monitor

## 2019-03-14 LAB
ATRIAL RATE: 80 BPM
CALCULATED P AXIS, ECG09: 26 DEGREES
CALCULATED R AXIS, ECG10: 46 DEGREES
CALCULATED T AXIS, ECG11: 61 DEGREES
DIAGNOSIS, 93000: NORMAL
P-R INTERVAL, ECG05: 196 MS
Q-T INTERVAL, ECG07: 394 MS
QRS DURATION, ECG06: 76 MS
QTC CALCULATION (BEZET), ECG08: 454 MS
VENTRICULAR RATE, ECG03: 80 BPM

## 2019-03-14 NOTE — DISCHARGE INSTRUCTIONS
Patient Education        Dizziness: Care Instructions  Your Care Instructions  Dizziness is the feeling of unsteadiness or fuzziness in your head. It is different than having vertigo, which is a feeling that the room is spinning or that you are moving or falling. It is also different from lightheadedness, which is the feeling that you are about to faint. It can be hard to know what causes dizziness. Some people feel dizzy when they have migraine headaches. Sometimes bouts of flu can make you feel dizzy. Some medical conditions, such as heart problems or high blood pressure, can make you feel dizzy. Many medicines can cause dizziness, including medicines for high blood pressure, pain, or anxiety. If a medicine causes your symptoms, your doctor may recommend that you stop or change the medicine. If it is a problem with your heart, you may need medicine to help your heart work better. If there is no clear reason for your symptoms, your doctor may suggest watching and waiting for a while to see if the dizziness goes away on its own. Follow-up care is a key part of your treatment and safety. Be sure to make and go to all appointments, and call your doctor if you are having problems. It's also a good idea to know your test results and keep a list of the medicines you take. How can you care for yourself at home? · If your doctor recommends or prescribes medicine, take it exactly as directed. Call your doctor if you think you are having a problem with your medicine. · Do not drive while you feel dizzy. · Try to prevent falls. Steps you can take include:  ? Using nonskid mats, adding grab bars near the tub, and using night-lights. ? Clearing your home so that walkways are free of anything you might trip on.  ? Letting family and friends know that you have been feeling dizzy. This will help them know how to help you. When should you call for help? Call 911 anytime you think you may need emergency care.  For example, call if:    · You passed out (lost consciousness).     · You have dizziness along with symptoms of a heart attack. These may include:  ? Chest pain or pressure, or a strange feeling in the chest.  ? Sweating. ? Shortness of breath. ? Nausea or vomiting. ? Pain, pressure, or a strange feeling in the back, neck, jaw, or upper belly or in one or both shoulders or arms. ? Lightheadedness or sudden weakness. ? A fast or irregular heartbeat.     · You have symptoms of a stroke. These may include:  ? Sudden numbness, tingling, weakness, or loss of movement in your face, arm, or leg, especially on only one side of your body. ? Sudden vision changes. ? Sudden trouble speaking. ? Sudden confusion or trouble understanding simple statements. ? Sudden problems with walking or balance. ? A sudden, severe headache that is different from past headaches.    Call your doctor now or seek immediate medical care if:    · You feel dizzy and have a fever, headache, or ringing in your ears.     · You have new or increased nausea and vomiting.     · Your dizziness does not go away or comes back.    Watch closely for changes in your health, and be sure to contact your doctor if:    · You do not get better as expected. Where can you learn more? Go to http://tej-leidy.info/. Enter G522 in the search box to learn more about \"Dizziness: Care Instructions. \"  Current as of: September 23, 2018  Content Version: 11.9  © 2698-6567 [employer]. Care instructions adapted under license by [facility] (which disclaims liability or warranty for this information). If you have questions about a medical condition or this instruction, always ask your healthcare professional. [place] [address] any warranty or liability for your use of this information. We hope that we have addressed all of your medical concerns.  The examination and treatment you received in the Emergency Department were for an emergent problem and were not intended as complete care. It is important that you follow up with your healthcare provider(s) for ongoing care. If your symptoms worsen or do not improve as expected, and you are unable to reach your usual health care provider(s), you should return to the Emergency Department. Today's healthcare is undergoing tremendous change, and patient satisfaction surveys are one of the many tools to assess the quality of medical care. You may receive a survey from the [employer] regarding your experience in the Emergency Department. I hope that your experience has been completely positive, particularly the medical care that I provided. As such, please participate in the survey; anything less than excellent does not meet my expectations or intentions. [address] [facility] and [facility] participate in nationally recognized quality of care measures. If your blood pressure is greater than 120/80, as reported below, we urge that you seek medical care to address the potential of high blood pressure, commonly known as hypertension. Hypertension can be hereditary or can be caused by certain medical conditions, pain, stress, or \"white coat syndrome. \"       Please make an appointment with your health care provider(s) for follow up of your Emergency Department visit. VITALS:   Patient Vitals for the past 8 hrs:   Temp Pulse Resp BP SpO2   03/13/19 1830 -- 83 15 169/66 95 %   03/13/19 1730 -- 83 20 160/58 97 %   03/13/19 1715 -- 75 16 145/61 97 %   03/13/19 1700 -- 82 18 161/58 93 %   03/13/19 1645 -- 81 20 157/56 97 %   03/13/19 1630 -- -- -- 165/84 98 %   03/13/19 1628 99.3 °F (37.4 °C) 85 18 165/84 97 %          Thank you for allowing us to provide you with medical care today. We realize that you have many choices for your emergency care needs.   Please choose us in the future for any continued health care needs. Matheus Dwyer Seek, [facility].   Office: [phone]            Recent Results (from the past 24 hour(s))   EKG, 12 LEAD, INITIAL    Collection Time: 03/13/19  4:32 PM   Result Value Ref Range    Ventricular Rate 80 BPM    Atrial Rate 80 BPM    P-R Interval 196 ms    QRS Duration 76 ms    Q-T Interval 394 ms    QTC Calculation (Bezet) 454 ms    Calculated P Axis 26 degrees    Calculated R Axis 46 degrees    Calculated T Axis 61 degrees    Diagnosis       Normal sinus rhythm  Normal ECG  When compared with ECG of 07-JAN-2018 11:42,  No significant change was found     CBC WITH AUTOMATED DIFF    Collection Time: 03/13/19  4:40 PM   Result Value Ref Range    WBC 7.2 3.6 - 11.0 K/uL    RBC 4.00 3.80 - 5.20 M/uL    HGB 11.2 (L) 11.5 - 16.0 g/dL    HCT 34.6 (L) 35.0 - 47.0 %    MCV 86.5 80.0 - 99.0 FL    MCH 28.0 26.0 - 34.0 PG    MCHC 32.4 30.0 - 36.5 g/dL    RDW 16.0 (H) 11.5 - 14.5 %    PLATELET 704 832 - 757 K/uL    MPV 9.7 8.9 - 12.9 FL    NRBC 0.0 0  WBC    ABSOLUTE NRBC 0.00 0.00 - 0.01 K/uL    NEUTROPHILS 73 32 - 75 %    LYMPHOCYTES 19 12 - 49 %    MONOCYTES 5 5 - 13 %    EOSINOPHILS 3 0 - 7 %    BASOPHILS 0 0 - 1 %    IMMATURE GRANULOCYTES 0 0.0 - 0.5 %    ABS. NEUTROPHILS 5.2 1.8 - 8.0 K/UL    ABS. LYMPHOCYTES 1.4 0.8 - 3.5 K/UL    ABS. MONOCYTES 0.3 0.0 - 1.0 K/UL    ABS. EOSINOPHILS 0.2 0.0 - 0.4 K/UL    ABS. BASOPHILS 0.0 0.0 - 0.1 K/UL    ABS. IMM.  GRANS. 0.0 0.00 - 0.04 K/UL    DF AUTOMATED     METABOLIC PANEL, COMPREHENSIVE    Collection Time: 03/13/19  4:40 PM   Result Value Ref Range    Sodium 133 (L) 136 - 145 mmol/L    Potassium 4.0 3.5 - 5.1 mmol/L    Chloride 99 97 - 108 mmol/L    CO2 28 21 - 32 mmol/L    Anion gap 6 5 - 15 mmol/L    Glucose 125 (H) 65 - 100 mg/dL    BUN 13 6 - 20 MG/DL    Creatinine 1.00 0.55 - 1.02 MG/DL    BUN/Creatinine ratio 13 12 - 20      GFR est AA >60 >60 ml/min/1.73m2    GFR est non-AA 52 (L) >60 ml/min/1.73m2    Calcium 9.0 8.5 - 10.1 MG/DL    Bilirubin, total 0.5 0.2 - 1.0 MG/DL    ALT (SGPT) 31 12 - 78 U/L    AST (SGOT) 29 15 - 37 U/L    Alk. phosphatase 86 45 - 117 U/L    Protein, total 7.8 6.4 - 8.2 g/dL    Albumin 3.5 3.5 - 5.0 g/dL    Globulin 4.3 (H) 2.0 - 4.0 g/dL    A-G Ratio 0.8 (L) 1.1 - 2.2     SAMPLES BEING HELD    Collection Time: 03/13/19  4:40 PM   Result Value Ref Range    SAMPLES BEING HELD [email or web address]     COMMENT        Add-on orders for these samples will be processed based on acceptable specimen integrity and analyte stability, which may vary by analyte. TROPONIN I    Collection Time: 03/13/19  4:40 PM   Result Value Ref Range    Troponin-I, Qt. <0.05 <0.05 ng/mL   NT-PRO BNP    Collection Time: 03/13/19  4:40 PM   Result Value Ref Range    NT pro- <450 PG/ML   URINALYSIS W/MICROSCOPIC    Collection Time: 03/13/19  6:34 PM   Result Value Ref Range    Color YELLOW/STRAW      Appearance CLEAR CLEAR      Specific gravity <1.005 1.003 - 1.030    pH (UA) 7.0 5.0 - 8.0      Protein NEGATIVE  NEG mg/dL    Glucose NEGATIVE  NEG mg/dL    Ketone NEGATIVE  NEG mg/dL    Bilirubin NEGATIVE  NEG      Blood NEGATIVE  NEG      Urobilinogen 0.2 0.2 - 1.0 EU/dL    Nitrites NEGATIVE  NEG      Leukocyte Esterase NEGATIVE  NEG      WBC 0-4 0 - 4 /hpf    RBC 0-5 0 - 5 /hpf    Epithelial cells FEW FEW /lpf    Bacteria NEGATIVE  NEG /hpf    Hyaline cast 0-2 0 - 5 /lpf   URINE CULTURE HOLD SAMPLE    Collection Time: 03/13/19  6:34 PM   Result Value Ref Range    Urine culture hold        URINE ON HOLD IN MICROBIOLOGY DEPT FOR 3 DAYS. IF UNPRESERVED URINE IS SUBMITTED, IT CANNOT BE USED FOR ADDITIONAL TESTING AFTER 24 HRS, RECOLLECTION WILL BE REQUIRED. Xr Chest Pa Lat    Result Date: 3/13/2019  EXAM: XR CHEST PA LAT INDICATION: dizziness COMPARISON: 1/7/2018. FINDINGS: PA and lateral radiographs of the chest demonstrate no acute finding in the lungs. . Heart and mediastinal shadows are stable Atherosclerotic change of the aorta is noted. . The bones and soft tissues are within normal limits. IMPRESSION: No acute finding or change    Ct Head Wo Cont    Result Date: 3/13/2019  EXAM: CT HEAD WO CONT INDICATION: dizziness COMPARISON: 5/2/2016. CONTRAST: None. TECHNIQUE: Unenhanced CT of the head was performed using 5 mm images. Brain and bone windows were generated. CT dose reduction was achieved through use of a standardized protocol tailored for this examination and automatic exposure control for dose modulation. FINDINGS: Ventricles and cortical sulci are appropriate in size and shape for patient's age. . There is no significant white matter disease. There is no intracranial hemorrhage, extra-axial collection, mass, mass effect or midline shift. The basilar cisterns are open. No acute infarct is identified. The bone windows demonstrate no abnormalities. The visualized portions of the paranasal sinuses and mastoid air cells are clear. IMPRESSION: Age-appropriate findings. No acute focal abnormality or hemorrhage. Derrick Saunas  No change

## 2019-03-21 ENCOUNTER — HOSPITAL ENCOUNTER (EMERGENCY)
Age: 84
Discharge: HOME | End: 2019-03-21
Attending: EMERGENCY MEDICINE
Payer: MEDICARE

## 2019-03-21 ENCOUNTER — APPOINTMENT (OUTPATIENT)
Dept: CT IMAGING | Age: 84
End: 2019-03-21
Attending: EMERGENCY MEDICINE
Payer: MEDICARE

## 2019-03-21 ENCOUNTER — APPOINTMENT (OUTPATIENT)
Dept: GENERAL RADIOLOGY | Age: 84
End: 2019-03-21
Attending: EMERGENCY MEDICINE
Payer: MEDICARE

## 2019-03-21 VITALS
OXYGEN SATURATION: 99 % | RESPIRATION RATE: 20 BRPM | DIASTOLIC BLOOD PRESSURE: 87 MMHG | BODY MASS INDEX: 26.06 KG/M2 | TEMPERATURE: 98.9 F | WEIGHT: 138 LBS | SYSTOLIC BLOOD PRESSURE: 142 MMHG | HEIGHT: 61 IN | HEART RATE: 84 BPM

## 2019-03-21 DIAGNOSIS — K57.92 DIVERTICULITIS: Primary | ICD-10-CM

## 2019-03-21 LAB
ALBUMIN SERPL-MCNC: 3.1 G/DL (ref 3.5–5)
ALBUMIN/GLOB SERPL: 0.8 {RATIO} (ref 1.1–2.2)
ALP SERPL-CCNC: 74 U/L (ref 45–117)
ALT SERPL-CCNC: 25 U/L (ref 12–78)
ANION GAP SERPL CALC-SCNC: 8 MMOL/L (ref 5–15)
APPEARANCE UR: CLEAR
AST SERPL-CCNC: 23 U/L (ref 15–37)
ATRIAL RATE: 74 BPM
BACTERIA URNS QL MICRO: NEGATIVE /HPF
BASOPHILS # BLD: 0 K/UL (ref 0–0.1)
BASOPHILS NFR BLD: 0 % (ref 0–1)
BILIRUB SERPL-MCNC: 0.4 MG/DL (ref 0.2–1)
BILIRUB UR QL: NEGATIVE
BUN SERPL-MCNC: 12 MG/DL (ref 6–20)
BUN/CREAT SERPL: 13 (ref 12–20)
CALCIUM SERPL-MCNC: 8.9 MG/DL (ref 8.5–10.1)
CALCULATED P AXIS, ECG09: 41 DEGREES
CALCULATED R AXIS, ECG10: 47 DEGREES
CALCULATED T AXIS, ECG11: 63 DEGREES
CHLORIDE SERPL-SCNC: 102 MMOL/L (ref 97–108)
CO2 SERPL-SCNC: 25 MMOL/L (ref 21–32)
COLOR UR: NORMAL
COMMENT, HOLDF: NORMAL
CREAT SERPL-MCNC: 0.96 MG/DL (ref 0.55–1.02)
DIAGNOSIS, 93000: NORMAL
DIFFERENTIAL METHOD BLD: ABNORMAL
EOSINOPHIL # BLD: 0.2 K/UL (ref 0–0.4)
EOSINOPHIL NFR BLD: 3 % (ref 0–7)
EPITH CASTS URNS QL MICRO: NORMAL /LPF
ERYTHROCYTE [DISTWIDTH] IN BLOOD BY AUTOMATED COUNT: 15.9 % (ref 11.5–14.5)
GLOBULIN SER CALC-MCNC: 4 G/DL (ref 2–4)
GLUCOSE SERPL-MCNC: 113 MG/DL (ref 65–100)
GLUCOSE UR STRIP.AUTO-MCNC: NEGATIVE MG/DL
HCT VFR BLD AUTO: 33.6 % (ref 35–47)
HGB BLD-MCNC: 10.6 G/DL (ref 11.5–16)
HGB UR QL STRIP: NEGATIVE
HYALINE CASTS URNS QL MICRO: NORMAL /LPF (ref 0–5)
IMM GRANULOCYTES # BLD AUTO: 0 K/UL (ref 0–0.04)
IMM GRANULOCYTES NFR BLD AUTO: 0 % (ref 0–0.5)
KETONES UR QL STRIP.AUTO: NEGATIVE MG/DL
LEUKOCYTE ESTERASE UR QL STRIP.AUTO: NEGATIVE
LIPASE SERPL-CCNC: 80 U/L (ref 73–393)
LYMPHOCYTES # BLD: 0.9 K/UL (ref 0.8–3.5)
LYMPHOCYTES NFR BLD: 10 % (ref 12–49)
MCH RBC QN AUTO: 27.7 PG (ref 26–34)
MCHC RBC AUTO-ENTMCNC: 31.5 G/DL (ref 30–36.5)
MCV RBC AUTO: 88 FL (ref 80–99)
MONOCYTES # BLD: 0.3 K/UL (ref 0–1)
MONOCYTES NFR BLD: 3 % (ref 5–13)
NEUTS SEG # BLD: 7.7 K/UL (ref 1.8–8)
NEUTS SEG NFR BLD: 84 % (ref 32–75)
NITRITE UR QL STRIP.AUTO: NEGATIVE
NRBC # BLD: 0 K/UL (ref 0–0.01)
NRBC BLD-RTO: 0 PER 100 WBC
P-R INTERVAL, ECG05: 218 MS
PH UR STRIP: 8 [PH] (ref 5–8)
PLATELET # BLD AUTO: 317 K/UL (ref 150–400)
PMV BLD AUTO: 9.8 FL (ref 8.9–12.9)
POTASSIUM SERPL-SCNC: 4 MMOL/L (ref 3.5–5.1)
PROT SERPL-MCNC: 7.1 G/DL (ref 6.4–8.2)
PROT UR STRIP-MCNC: NEGATIVE MG/DL
Q-T INTERVAL, ECG07: 414 MS
QRS DURATION, ECG06: 66 MS
QTC CALCULATION (BEZET), ECG08: 459 MS
RBC # BLD AUTO: 3.82 M/UL (ref 3.8–5.2)
RBC #/AREA URNS HPF: NORMAL /HPF (ref 0–5)
SAMPLES BEING HELD,HOLD: NORMAL
SODIUM SERPL-SCNC: 135 MMOL/L (ref 136–145)
SP GR UR REFRACTOMETRY: 1.01 (ref 1–1.03)
UR CULT HOLD, URHOLD: NORMAL
UROBILINOGEN UR QL STRIP.AUTO: 0.2 EU/DL (ref 0.2–1)
VENTRICULAR RATE, ECG03: 74 BPM
WBC # BLD AUTO: 9.1 K/UL (ref 3.6–11)
WBC URNS QL MICRO: NORMAL /HPF (ref 0–4)

## 2019-03-21 PROCEDURE — 81001 URINALYSIS AUTO W/SCOPE: CPT

## 2019-03-21 PROCEDURE — 85025 COMPLETE CBC W/AUTO DIFF WBC: CPT

## 2019-03-21 PROCEDURE — 74011250637 HC RX REV CODE- 250/637: Performed by: EMERGENCY MEDICINE

## 2019-03-21 PROCEDURE — 74177 CT ABD & PELVIS W/CONTRAST: CPT

## 2019-03-21 PROCEDURE — 80053 COMPREHEN METABOLIC PANEL: CPT

## 2019-03-21 PROCEDURE — 36415 COLL VENOUS BLD VENIPUNCTURE: CPT

## 2019-03-21 PROCEDURE — 74011636320 HC RX REV CODE- 636/320

## 2019-03-21 PROCEDURE — 83690 ASSAY OF LIPASE: CPT

## 2019-03-21 PROCEDURE — 99284 EMERGENCY DEPT VISIT MOD MDM: CPT

## 2019-03-21 PROCEDURE — 93005 ELECTROCARDIOGRAM TRACING: CPT

## 2019-03-21 PROCEDURE — 96360 HYDRATION IV INFUSION INIT: CPT

## 2019-03-21 PROCEDURE — 74011250636 HC RX REV CODE- 250/636: Performed by: EMERGENCY MEDICINE

## 2019-03-21 RX ORDER — CIPROFLOXACIN 500 MG/1
500 TABLET ORAL 2 TIMES DAILY
Qty: 20 TAB | Refills: 0 | Status: SHIPPED | OUTPATIENT
Start: 2019-03-21 | End: 2019-03-31

## 2019-03-21 RX ORDER — ONDANSETRON 2 MG/ML
8 INJECTION INTRAMUSCULAR; INTRAVENOUS
Status: ACTIVE | OUTPATIENT
Start: 2019-03-21 | End: 2019-03-21

## 2019-03-21 RX ORDER — MORPHINE SULFATE 4 MG/ML
2 INJECTION INTRAVENOUS
Status: ACTIVE | OUTPATIENT
Start: 2019-03-21 | End: 2019-03-21

## 2019-03-21 RX ORDER — ONDANSETRON 8 MG/1
8 TABLET, ORALLY DISINTEGRATING ORAL
Qty: 10 TAB | Refills: 0 | Status: SHIPPED | OUTPATIENT
Start: 2019-03-21 | End: 2020-11-30

## 2019-03-21 RX ORDER — METRONIDAZOLE 250 MG/1
500 TABLET ORAL
Status: COMPLETED | OUTPATIENT
Start: 2019-03-21 | End: 2019-03-21

## 2019-03-21 RX ORDER — CIPROFLOXACIN 500 MG/1
500 TABLET ORAL
Status: COMPLETED | OUTPATIENT
Start: 2019-03-21 | End: 2019-03-21

## 2019-03-21 RX ORDER — METRONIDAZOLE 500 MG/1
500 TABLET ORAL 2 TIMES DAILY
Qty: 20 TAB | Refills: 0 | Status: SHIPPED | OUTPATIENT
Start: 2019-03-21 | End: 2019-03-31

## 2019-03-21 RX ADMIN — IOPAMIDOL 100 ML: 755 INJECTION, SOLUTION INTRAVENOUS at 04:36

## 2019-03-21 RX ADMIN — SODIUM CHLORIDE 1000 ML: 900 INJECTION, SOLUTION INTRAVENOUS at 03:56

## 2019-03-21 RX ADMIN — CIPROFLOXACIN HYDROCHLORIDE 500 MG: 500 TABLET, FILM COATED ORAL at 05:22

## 2019-03-21 RX ADMIN — METRONIDAZOLE 500 MG: 250 TABLET ORAL at 05:22

## 2019-03-21 NOTE — ED NOTES
Pt states unable to void at this time. Pt instructed to call out on call bell when able to provide urine sample. Will continue to monitor.

## 2019-03-21 NOTE — ED PROVIDER NOTES
HPI     80year old female with DM,HTN, rheumatoid arthritis, diverticulitis, presents with 9/10 abdominal pain and bloating. Started 2 days ago. No fever. , positive nausea not vomiting, does have  loss of appetitive. Urianting ok. Slight cough/allergies, no chest pain or sob. Tylenol for pain. Increased gas and bloating.      Past Medical History:   Diagnosis Date    Arthritis, rheumatoid (HCC)     Diabetes ([facility] [address]     Diverticulitis     Hard of hearing     Hypertension     Hypothyroid     Osteoporosis, post-menopausal        Past Surgical History:   Procedure Laterality Date    HX HEMORRHOIDECTOMY      HX HYSTERECTOMY      HX THYROIDECTOMY           Family History:   Problem Relation Age of Onset    Heart Disease Mother     Diabetes Father     Cancer Brother         brain cancer    Heart Disease Brother     Diabetes Brother     Other Other         scleroderma       Social History     Socioeconomic History    Marital status:      Spouse name: Not on file    Number of children: Not on file    Years of education: Not on file    Highest education level: Not on file   Occupational History    Not on file   Social Needs    Financial resource strain: Not on file    Food insecurity:     Worry: Not on file     Inability: Not on file    Transportation needs:     Medical: Not on file     Non-medical: Not on file   Tobacco Use    Smoking status: Former Smoker     Types: Cigarettes     Last attempt to quit: 1976     Years since quittin.3    Smokeless tobacco: Never Used   Substance and Sexual Activity    Alcohol use: Yes     Comment: [place] time 1 glass wine    Drug use: No    Sexual activity: Never   Lifestyle    Physical activity:     Days per week: Not on file     Minutes per session: Not on file    Stress: Not on file   Relationships    Social connections:     Talks on phone: Not on file     Gets together: Not on file     Attends [employer] service: Not on file     Active member of club or organization: Not on file     Attends meetings of clubs or organizations: Not on file     Relationship status: Not on file    Intimate partner violence:     Fear of current or ex partner: Not on file     Emotionally abused: Not on file     Physically abused: Not on file     Forced sexual activity: Not on file   Other Topics Concern    Not on file   Social History Narrative    ** Merged History Encounter **              ALLERGIES: Alendronate; Amoxicillin; and Hydrochlorothiazide    Review of Systems   Constitutional: Negative for fever. HENT: Negative for congestion. Eyes: Negative for visual disturbance. Respiratory: Positive for cough. Negative for shortness of breath. Cardiovascular: Negative for chest pain and leg swelling. Gastrointestinal: Positive for abdominal pain and nausea. Negative for diarrhea and vomiting. Endocrine: Negative for polyuria. Genitourinary: Negative for dysuria. Musculoskeletal: Negative for gait problem. Neurological: Negative for headaches. Psychiatric/Behavioral: Negative for decreased concentration. Vitals:    03/21/19 0334   BP: 142/87   Pulse: 84   Resp: 20   Temp: 98.9 °F (37.2 °C)   SpO2: 99%   Weight: 62.6 kg (138 lb)   Height: 5' 1\" (1.549 m)            Physical Exam   Constitutional: She is oriented to person, place, and time. She appears well-developed and well-nourished. No distress. HENT:   Head: Normocephalic and atraumatic. Mouth/Throat: No oropharyngeal exudate. Eyes: Pupils are equal, round, and reactive to light. Right eye exhibits no discharge. Left eye exhibits no discharge. No scleral icterus. Neck: Normal range of motion. Neck supple. No JVD present. Cardiovascular: Normal rate, regular rhythm and normal heart sounds. No murmur heard. Pulmonary/Chest: Effort normal and breath sounds normal. No stridor. No respiratory distress. She has no wheezes. She has no rales. She exhibits no tenderness.    Abdominal: Soft. Bowel sounds are normal. She exhibits no distension and no mass. There is tenderness (diffuse, most tneder LLQ). There is no rebound and no guarding. Musculoskeletal: Normal range of motion. Neurological: She is oriented to person, place, and time. Skin: Skin is warm and dry. Capillary refill takes less than 2 seconds. No rash noted. Psychiatric: She has a normal mood and affect. Her behavior is normal. Judgment and thought content normal.        MDM       Procedures      ED EKG interpretation:  Rhythm: normal sinus rhythm; and regular . Rate (approx.): 74; Axis: normal; P wave: prolonged; QRS interval: normal ; ST/T wave: non-specific changes; i. This EKG was interpreted by Clara Bishop MD,ED Provider. No fever, normal labs, mild colitis/diverticulitis by CT scan. Tolerating po's in ED. Will d/c with cipro/flagyl, zofran and encouraged close follow up with pcp in 2 days.  Return to the ED if symptoms worsen, such as fever, increased pain, vomiting, etc.

## 2019-03-21 NOTE — ED TRIAGE NOTES
Pt arrives via EMS c/o LLQ abd pain x several days, no n/v, +d.   Hx diverticulitis, states pain worse with food, pain got worse last night at 1700, has been taking Gas X.  States is not passing gas

## 2019-03-21 NOTE — DISCHARGE INSTRUCTIONS
Patient Education     TAKE THE ANTIBIOTICS TWICE DAILY FOR 10 DAYS. YOU CAN USE THE ZOFRAN EVERY 6-8 HOURS IF NEEDED FOR NAUSEA. FOLLOW UP WITH YOUR DOCTOR IN 2 DAYS FOR RECHECK. TAKE THE COPY OF YOUR BLOOD WORK AND CAT SCAN WITH YOU FOR REVIEW. RETURN TO THE ED IF YOUR PAIN IS WORSENING, YOU GET A FEVER OR VOMITING. Diverticulitis: Care Instructions  Your Care Instructions    Diverticulitis occurs when pouches form in the wall of the colon and become inflamed or infected. It can be very painful. Doctors aren't sure what causes diverticulitis. There is no proof that foods such as nuts, seeds, or berries cause it or make it worse. A low-fiber diet may cause the colon to work harder to push stool forward. Pouches may form because of this extra work. It may be hard to think about healthy eating while you're in pain. But as you recover, you might think about how you can use healthy eating for overall better health. Healthy eating may help you avoid future attacks. Follow-up care is a key part of your treatment and safety. Be sure to make and go to all appointments, and call your doctor if you are having problems. It's also a good idea to know your test results and keep a list of the medicines you take. How can you care for yourself at home? · Drink plenty of fluids, enough so that your urine is light yellow or clear like water. If you have kidney, heart, or liver disease and have to limit fluids, talk with your doctor before you increase the amount of fluids you drink. · Stick to liquids or a bland diet (plain rice, bananas, dry toast or crackers, applesauce) until you are feeling better. Then you can return to regular foods and gradually increase the amount of fiber in your diet. · Use a heating pad set on low on your belly to relieve mild cramps and pain. · Get extra rest until you are feeling better. · Be safe with medicines. Read and follow all instructions on the label.   ? If the doctor gave you a prescription medicine for pain, take it as prescribed. ? If you are not taking a prescription pain medicine, ask your doctor if you can take an over-the-counter medicine. · If your doctor prescribed antibiotics, take them as directed. Do not stop taking them just because you feel better. You need to take the full course of antibiotics. To prevent future attacks of diverticulitis  · Avoid constipation:  ? Include fruits, vegetables, beans, and whole grains in your diet each day. These foods are high in fiber. ? Drink plenty of fluids, enough so that your urine is light yellow or clear like water. If you have kidney, heart, or liver disease and have to limit fluids, talk with your doctor before you increase the amount of fluids you drink. ? Get some exercise every day. Build up slowly to 30 to 60 minutes a day on 5 or more days of the week. ? Take a fiber supplement, such as Citrucel or Metamucil, every day if needed. Read and follow all instructions on the label. ? Schedule time each day for a bowel movement. Having a daily routine may help. Take your time and do not strain when having a bowel movement. When should you call for help? Call your doctor now or seek immediate medical care if:    · You have a fever.     · You are vomiting.     · You have new or worse belly pain.     · You cannot pass stools or gas.    Watch closely for changes in your health, and be sure to contact your doctor if you have any problems. Where can you learn more? Go to http://tej-leidy.info/. Enter H901 in the search box to learn more about \"Diverticulitis: Care Instructions. \"  Current as of: March 27, 2018  Content Version: 11.9  © 9132-4675 [employer]. Care instructions adapted under license by [employer] (which disclaims liability or warranty for this information).  If you have questions about a medical condition or this instruction, always ask your healthcare professional. [employer] disclaims any warranty or liability for your use of this information.

## 2019-03-23 ENCOUNTER — HOSPITAL ENCOUNTER (EMERGENCY)
Age: 84
Discharge: HOME | End: 2019-03-23
Attending: EMERGENCY MEDICINE
Payer: MEDICARE

## 2019-03-23 VITALS
SYSTOLIC BLOOD PRESSURE: 159 MMHG | HEART RATE: 90 BPM | DIASTOLIC BLOOD PRESSURE: 56 MMHG | HEIGHT: 61 IN | BODY MASS INDEX: 26.06 KG/M2 | WEIGHT: 138 LBS | OXYGEN SATURATION: 100 % | RESPIRATION RATE: 18 BRPM | TEMPERATURE: 98.1 F

## 2019-03-23 DIAGNOSIS — J10.1 INFLUENZA A: Primary | ICD-10-CM

## 2019-03-23 DIAGNOSIS — R09.82 POST-NASAL DRIP: ICD-10-CM

## 2019-03-23 DIAGNOSIS — R51.9 SINUS HEADACHE: ICD-10-CM

## 2019-03-23 LAB
FLUAV AG NPH QL IA: POSITIVE
FLUBV AG NOSE QL IA: NEGATIVE

## 2019-03-23 PROCEDURE — 87804 INFLUENZA ASSAY W/OPTIC: CPT

## 2019-03-23 PROCEDURE — 99284 EMERGENCY DEPT VISIT MOD MDM: CPT

## 2019-03-23 PROCEDURE — 74011250637 HC RX REV CODE- 250/637: Performed by: EMERGENCY MEDICINE

## 2019-03-23 RX ORDER — BENZONATATE 100 MG/1
100 CAPSULE ORAL ONCE
Status: COMPLETED | OUTPATIENT
Start: 2019-03-23 | End: 2019-03-23

## 2019-03-23 RX ORDER — OSELTAMIVIR PHOSPHATE 75 MG/1
75 CAPSULE ORAL ONCE
Status: COMPLETED | OUTPATIENT
Start: 2019-03-23 | End: 2019-03-23

## 2019-03-23 RX ORDER — OSELTAMIVIR PHOSPHATE 75 MG/1
CAPSULE ORAL
Status: DISCONTINUED
Start: 2019-03-23 | End: 2019-03-23 | Stop reason: HOSPADM

## 2019-03-23 RX ORDER — BENZONATATE 100 MG/1
100 CAPSULE ORAL
Qty: 30 CAP | Refills: 0 | Status: SHIPPED | OUTPATIENT
Start: 2019-03-23 | End: 2019-03-30

## 2019-03-23 RX ORDER — FLUTICASONE PROPIONATE 50 MCG
2 SPRAY, SUSPENSION (ML) NASAL DAILY
Qty: 1 BOTTLE | Refills: 0 | Status: SHIPPED | OUTPATIENT
Start: 2019-03-23 | End: 2020-11-30

## 2019-03-23 RX ORDER — OSELTAMIVIR PHOSPHATE 75 MG/1
75 CAPSULE ORAL 2 TIMES DAILY
Qty: 10 CAP | Refills: 0 | Status: SHIPPED | OUTPATIENT
Start: 2019-03-23 | End: 2019-03-28

## 2019-03-23 RX ADMIN — BENZONATATE 100 MG: 100 CAPSULE ORAL at 00:41

## 2019-03-23 RX ADMIN — OSELTAMIVIR PHOSPHATE 75 MG: 75 CAPSULE ORAL at 01:20

## 2019-03-23 NOTE — ED TRIAGE NOTES
Pt arrives via EMS from home c/o sore throat starting at 1900. Also c/o nasal congestion and itchy eyes and L ear ache. States that tonight she opened the windows and let pollen in, and that is when she noticed the sore throat.

## 2019-03-23 NOTE — ED PROVIDER NOTES
80 y.o. female with past medical history significant for Arthritis, HTN, Hypothyroid, DM, diverticulitis, who presents from home via EMS with chief complaint of sore throat. Pt reports the onset of a sore throat with a scratchy voice, bilateral ear pain, sinus pressure and generalized body aches at 1900 last night. She notes gargling salt water, using chloraseptic spray, and vitamin C drops, at home without any relief. Of note, the pt was evaluated in the ED on 3/21 for the complaint of abdominal pain. Abdominal CT showed mild colitis/diverticulitis, labs unremarkable; pt was discharged on Cipro, Flagyl and Zofran. She notes her diverticulitis symptoms have improved since taking the medications. Pt specifically denies any fever, chills, shortness of breath, chest pain, nausea, vomiting, diarrhea. There are no other acute medical concerns at this time. PSHx: Significant for hysterectomy, thyroidectomy   Social Hx: former tobacco use, denies EtOH use, denies Illicit Drug use    PCP: Natasha Martinez MD    Note written by Darin Nicolas, as dictated by Jaswant Guevara MD 12:31 AM    The history is provided by the patient and the EMS personnel. No  was used.         Past Medical History:   Diagnosis Date    Arthritis, rheumatoid (HCC)     Diabetes ([address]     Diverticulitis     Hard of hearing     Hypertension     Hypothyroid     Osteoporosis, post-menopausal        Past Surgical History:   Procedure Laterality Date    HX HEMORRHOIDECTOMY      HX HYSTERECTOMY      HX THYROIDECTOMY           Family History:   Problem Relation Age of Onset    Heart Disease Mother     Diabetes Father     Cancer Brother         brain cancer    Heart Disease Brother     Diabetes Brother     Other Other         scleroderma       Social History     Socioeconomic History    Marital status:      Spouse name: Not on file    Number of children: Not on file    Years of education: Not on file    Highest education level: Not on file   Occupational History    Not on file   Social Needs    Financial resource strain: Not on file    Food insecurity:     Worry: Not on file     Inability: Not on file    Transportation needs:     Medical: Not on file     Non-medical: Not on file   Tobacco Use    Smoking status: Former Smoker     Types: Cigarettes     Last attempt to quit: 1976     Years since quittin.3    Smokeless tobacco: Never Used   Substance and Sexual Activity    Alcohol use: Yes     Comment: Waylon time 1 glass wine    Drug use: No    Sexual activity: Never   Lifestyle    Physical activity:     Days per week: Not on file     Minutes per session: Not on file    Stress: Not on file   Relationships    Social connections:     Talks on phone: Not on file     Gets together: Not on file     Attends [employer] service: Not on file     Active member of club or organization: Not on file     Attends meetings of clubs or organizations: Not on file     Relationship status: Not on file    Intimate partner violence:     Fear of current or ex partner: Not on file     Emotionally abused: Not on file     Physically abused: Not on file     Forced sexual activity: Not on file   Other Topics Concern    Not on file   Social History Narrative    ** Merged History Encounter **              ALLERGIES: Alendronate; Amoxicillin; and Hydrochlorothiazide    Review of Systems   Constitutional: Negative for chills and fever. HENT: Positive for sinus pressure and voice change. Eyes: Negative for pain. Cardiovascular: Negative for chest pain. Gastrointestinal: Negative for abdominal pain and nausea. Genitourinary: Negative for dysuria and flank pain. Musculoskeletal: Positive for myalgias. Negative for back pain and neck pain. Skin: Negative for rash. Neurological: Negative for dizziness.        Vitals:    19 0008   BP: 159/56   Pulse: 90   Resp: 18   Temp: 98.1 °F (36.7 °C)   SpO2: 100%   Weight: 62.6 kg (138 lb)   Height: 5' 1\" (1.549 m)            Physical Exam   Constitutional: She is oriented to person, place, and time. She appears well-developed and well-nourished. HENT:   Head: Normocephalic. Right Ear: Tympanic membrane is not injected and not bulging. A middle ear effusion is present. Left Ear: Tympanic membrane is not injected and not bulging. A middle ear effusion is present. Mouth/Throat: No uvula swelling. Posterior oropharyngeal erythema present. No oropharyngeal exudate, posterior oropharyngeal edema or tonsillar abscesses. Nasal congestion   Eyes: Conjunctivae are normal.   Neck: Normal range of motion. Neck supple. Cardiovascular: Normal rate and regular rhythm. Pulmonary/Chest: Effort normal and breath sounds normal. No respiratory distress. She has no wheezes. She has no rhonchi. She has no rales. Abdominal: Soft. Bowel sounds are normal. There is no tenderness. Musculoskeletal: Normal range of motion. Neurological: She is alert and oriented to person, place, and time. No gross motor or sensory deficits   Skin: Skin is warm. Capillary refill takes less than 2 seconds. No rash noted. Note written by Darin Richard, as dictated by Nina Smith MD 12:31 AM     MDM  Number of Diagnoses or Management Options  Influenza A:   Post-nasal drip:   Sinus headache:   Diagnosis management comments: 77-year-old female presented year with URI-like symptoms and myalgias. Patient was seen in ER yesterday and diagnosed with diverticulitis started on antibiotics. She denies any shortness of breath or productive cough or chest pain. No report of fevers. No acute distress and well-appearing. Lungs clear to auscultation standing 100%. Unlikely to have pneumonia. Influenza A positive.  Patient patient's age and morbidity started on Tamiflu    ED Course as of Mar 23 0139   Sat Mar 23, 2019   0116 Influenza A Antigen(!): POSITIVE [ZD] ED Course User Index  [ZD] Giuseppe Shaver MD       Procedures      1:00 AM  Patient's results have been reviewed with them. Patient and/or family have verbally conveyed their understanding and agreement of the patient's signs, symptoms, diagnosis, treatment and prognosis and additionally agree to follow up as recommended or return to the Emergency Room should their condition change prior to follow-up. Discharge instructions have also been provided to the patient with some educational information regarding their diagnosis as well a list of reasons why they would want to return to the ER prior to their follow-up appointment should their condition change.

## 2019-03-23 NOTE — DISCHARGE INSTRUCTIONS
Influenza (Flu): Care Instructions  Your Care Instructions    Influenza (flu) is an infection in the lungs and breathing passages. It is caused by the influenza virus. There are different strains, or types, of the flu virus from year to year. Unlike the common cold, the flu comes on suddenly and the symptoms, such as a cough, congestion, fever, chills, fatigue, aches, and pains, are more severe. These symptoms may last up to 10 days. Although the flu can make you feel very sick, it usually doesn't cause serious health problems. Home treatment is usually all you need for flu symptoms. But your doctor may prescribe antiviral medicine to prevent other health problems, such as pneumonia, from developing. Older people and those who have a long-term health condition, such as lung disease, are most at risk for having pneumonia or other health problems. Follow-up care is a key part of your treatment and safety. Be sure to make and go to all appointments, and call your doctor if you are having problems. It's also a good idea to know your test results and keep a list of the medicines you take. How can you care for yourself at home? · Get plenty of rest.  · Drink plenty of fluids, enough so that your urine is light yellow or clear like water. If you have kidney, heart, or liver disease and have to limit fluids, talk with your doctor before you increase the amount of fluids you drink. · Take an over-the-counter pain medicine if needed, such as acetaminophen (Tylenol), ibuprofen (Advil, Motrin), or naproxen (Aleve), to relieve fever, headache, and muscle aches. Read and follow all instructions on the label. No one younger than 20 should take aspirin. It has been linked to Reye syndrome, a serious illness. · Do not smoke. Smoking can make the flu worse. If you need help quitting, talk to your doctor about stop-smoking programs and medicines. These can increase your chances of quitting for good.   · Breathe moist air from a hot shower or from a sink filled with hot water to help clear a stuffy nose. · Before you use cough and cold medicines, check the label. These medicines may not be safe for young children or for people with certain health problems. · If the skin around your nose and lips becomes sore, put some petroleum jelly on the area. · To ease coughing:  ? Drink fluids to soothe a scratchy throat. ? Suck on cough drops or plain hard candy. ? Take an over-the-counter cough medicine that contains dextromethorphan to help you get some sleep. Read and follow all instructions on the label. ? Raise your head at night with an extra pillow. This may help you rest if coughing keeps you awake. · Take any prescribed medicine exactly as directed. Call your doctor if you think you are having a problem with your medicine. To avoid spreading the flu  · Wash your hands regularly, and keep your hands away from your face. · Stay home from school, work, and other public places until you are feeling better and your fever has been gone for at least 24 hours. The fever needs to have gone away on its own without the help of medicine. · Ask people living with you to talk to their doctors about preventing the flu. They may get antiviral medicine to keep from getting the flu from you. · To prevent the flu in the future, get a flu vaccine every fall. Encourage people living with you to get the vaccine. · Cover your mouth when you cough or sneeze. When should you call for help? Call 911 anytime you think you may need emergency care.  For example, call if:    · You have severe trouble breathing.    Call your doctor now or seek immediate medical care if:    · You have new or worse trouble breathing.     · You seem to be getting much sicker.     · You feel very sleepy or confused.     · You have a new or higher fever.     · You get a new rash.    Watch closely for changes in your health, and be sure to contact your doctor if:    · You begin to get better and then get worse.     · You are not getting better after 1 week. Where can you learn more? Go to http://tej-leidy.info/. Enter D960 in the search box to learn more about \"Influenza (Flu): Care Instructions. \"  Current as of: September 5, 2018  Content Version: 11.9  © 3391-6812 [employer]. Care instructions adapted under license by [employer] (which disclaims liability or warranty for this information). If you have questions about a medical condition or this instruction, always ask your healthcare professional. [place] [address] any warranty or liability for your use of this information. Patient Education        Upper Respiratory Infection (Cold): Care Instructions  Your Care Instructions    An upper respiratory infection, or URI, is an infection of the nose, sinuses, or throat. URIs are spread by coughs, sneezes, and direct contact. The common cold is the most frequent kind of URI. The flu and sinus infections are other kinds of URIs. Almost all URIs are caused by viruses. Antibiotics won't cure them. But you can treat most infections with home care. This may include drinking lots of fluids and taking over-the-counter pain medicine. You will probably feel better in 4 to 10 days. The doctor has checked you carefully, but problems can develop later. If you notice any problems or new symptoms, get medical treatment right away. Follow-up care is a key part of your treatment and safety. Be sure to make and go to all appointments, and call your doctor if you are having problems. It's also a good idea to know your test results and keep a list of the medicines you take. How can you care for yourself at home? · To prevent dehydration, drink plenty of fluids, enough so that your urine is light yellow or clear like water. Choose water and other caffeine-free clear liquids until you feel better.  If you have kidney, heart, or liver disease and have to limit fluids, talk with your doctor before you increase the amount of fluids you drink. · Take an over-the-counter pain medicine, such as acetaminophen (Tylenol), ibuprofen (Advil, Motrin), or naproxen (Aleve). Read and follow all instructions on the label. · Before you use cough and cold medicines, check the label. These medicines may not be safe for young children or for people with certain health problems. · Be careful when taking over-the-counter cold or flu medicines and Tylenol at the same time. Many of these medicines have acetaminophen, which is Tylenol. Read the labels to make sure that you are not taking more than the recommended dose. Too much acetaminophen (Tylenol) can be harmful. · Get plenty of rest.  · Do not smoke or allow others to smoke around you. If you need help quitting, talk to your doctor about stop-smoking programs and medicines. These can increase your chances of quitting for good. When should you call for help? Call 911 anytime you think you may need emergency care. For example, call if:    · You have severe trouble breathing.    Call your doctor now or seek immediate medical care if:    · You seem to be getting much sicker.     · You have new or worse trouble breathing.     · You have a new or higher fever.     · You have a new rash.    Watch closely for changes in your health, and be sure to contact your doctor if:    · You have a new symptom, such as a sore throat, an earache, or sinus pain.     · You cough more deeply or more often, especially if you notice more mucus or a change in the color of your mucus.     · You do not get better as expected. Where can you learn more? Go to http://tej-leidy.info/. Enter J232 in the search box to learn more about \"Upper Respiratory Infection (Cold): Care Instructions. \"  Current as of: September 5, 2018  Content Version: 11.9  © 1216-2795 [employer].  Care instructions adapted under license by [address] (which disclaims liability or warranty for this information). If you have questions about a medical condition or this instruction, always ask your healthcare professional. [address] any warranty or liability for your use of this information.

## 2020-02-23 ENCOUNTER — HOSPITAL ENCOUNTER (EMERGENCY)
Age: 85
Discharge: HOME | End: 2020-02-23
Attending: EMERGENCY MEDICINE
Payer: MEDICARE

## 2020-02-23 ENCOUNTER — APPOINTMENT (OUTPATIENT)
Dept: CT IMAGING | Age: 85
End: 2020-02-23
Attending: EMERGENCY MEDICINE
Payer: MEDICARE

## 2020-02-23 VITALS
OXYGEN SATURATION: 97 % | RESPIRATION RATE: 16 BRPM | BODY MASS INDEX: 24.66 KG/M2 | HEART RATE: 86 BPM | DIASTOLIC BLOOD PRESSURE: 55 MMHG | TEMPERATURE: 98.5 F | SYSTOLIC BLOOD PRESSURE: 151 MMHG | WEIGHT: 134 LBS | HEIGHT: 62 IN

## 2020-02-23 DIAGNOSIS — K57.92 DIVERTICULITIS: Primary | ICD-10-CM

## 2020-02-23 DIAGNOSIS — K59.00 CONSTIPATION, UNSPECIFIED CONSTIPATION TYPE: ICD-10-CM

## 2020-02-23 LAB
ALBUMIN SERPL-MCNC: 3.1 G/DL (ref 3.5–5)
ALBUMIN/GLOB SERPL: 0.7 {RATIO} (ref 1.1–2.2)
ALP SERPL-CCNC: 80 U/L (ref 45–117)
ALT SERPL-CCNC: 18 U/L (ref 12–78)
ANION GAP SERPL CALC-SCNC: 5 MMOL/L (ref 5–15)
APPEARANCE UR: CLEAR
AST SERPL-CCNC: 19 U/L (ref 15–37)
BACTERIA URNS QL MICRO: NEGATIVE /HPF
BASOPHILS # BLD: 0.1 K/UL (ref 0–0.1)
BASOPHILS NFR BLD: 1 % (ref 0–1)
BILIRUB SERPL-MCNC: 0.5 MG/DL (ref 0.2–1)
BILIRUB UR QL: NEGATIVE
BUN SERPL-MCNC: 14 MG/DL (ref 6–20)
BUN/CREAT SERPL: 14 (ref 12–20)
CALCIUM SERPL-MCNC: 8.4 MG/DL (ref 8.5–10.1)
CHLORIDE SERPL-SCNC: 100 MMOL/L (ref 97–108)
CO2 SERPL-SCNC: 27 MMOL/L (ref 21–32)
COLOR UR: NORMAL
CREAT SERPL-MCNC: 1.03 MG/DL (ref 0.55–1.02)
DIFFERENTIAL METHOD BLD: ABNORMAL
EOSINOPHIL # BLD: 0.3 K/UL (ref 0–0.4)
EOSINOPHIL NFR BLD: 4 % (ref 0–7)
EPITH CASTS URNS QL MICRO: NORMAL /LPF
ERYTHROCYTE [DISTWIDTH] IN BLOOD BY AUTOMATED COUNT: 15.6 % (ref 11.5–14.5)
GLOBULIN SER CALC-MCNC: 4.4 G/DL (ref 2–4)
GLUCOSE SERPL-MCNC: 106 MG/DL (ref 65–100)
GLUCOSE UR STRIP.AUTO-MCNC: NEGATIVE MG/DL
HCT VFR BLD AUTO: 32.2 % (ref 35–47)
HGB BLD-MCNC: 10.3 G/DL (ref 11.5–16)
HGB UR QL STRIP: NEGATIVE
HYALINE CASTS URNS QL MICRO: NORMAL /LPF (ref 0–5)
IMM GRANULOCYTES # BLD AUTO: 0 K/UL (ref 0–0.04)
IMM GRANULOCYTES NFR BLD AUTO: 0 % (ref 0–0.5)
KETONES UR QL STRIP.AUTO: NEGATIVE MG/DL
LACTATE SERPL-SCNC: 0.9 MMOL/L (ref 0.4–2)
LEUKOCYTE ESTERASE UR QL STRIP.AUTO: NEGATIVE
LYMPHOCYTES # BLD: 1.3 K/UL (ref 0.8–3.5)
LYMPHOCYTES NFR BLD: 20 % (ref 12–49)
MCH RBC QN AUTO: 27.5 PG (ref 26–34)
MCHC RBC AUTO-ENTMCNC: 32 G/DL (ref 30–36.5)
MCV RBC AUTO: 85.9 FL (ref 80–99)
MONOCYTES # BLD: 0.5 K/UL (ref 0–1)
MONOCYTES NFR BLD: 7 % (ref 5–13)
NEUTS SEG # BLD: 4.4 K/UL (ref 1.8–8)
NEUTS SEG NFR BLD: 68 % (ref 32–75)
NITRITE UR QL STRIP.AUTO: NEGATIVE
NRBC # BLD: 0 K/UL (ref 0–0.01)
NRBC BLD-RTO: 0 PER 100 WBC
PH UR STRIP: 7.5 [PH] (ref 5–8)
PLATELET # BLD AUTO: 437 K/UL (ref 150–400)
PMV BLD AUTO: 9.2 FL (ref 8.9–12.9)
POTASSIUM SERPL-SCNC: 4 MMOL/L (ref 3.5–5.1)
PROT SERPL-MCNC: 7.5 G/DL (ref 6.4–8.2)
PROT UR STRIP-MCNC: NEGATIVE MG/DL
RBC # BLD AUTO: 3.75 M/UL (ref 3.8–5.2)
RBC #/AREA URNS HPF: NORMAL /HPF (ref 0–5)
SODIUM SERPL-SCNC: 132 MMOL/L (ref 136–145)
SP GR UR REFRACTOMETRY: <1.005 (ref 1–1.03)
UR CULT HOLD, URHOLD: NORMAL
UROBILINOGEN UR QL STRIP.AUTO: 0.2 EU/DL (ref 0.2–1)
WBC # BLD AUTO: 6.6 K/UL (ref 3.6–11)
WBC URNS QL MICRO: NORMAL /HPF (ref 0–4)

## 2020-02-23 PROCEDURE — 96360 HYDRATION IV INFUSION INIT: CPT

## 2020-02-23 PROCEDURE — 85025 COMPLETE CBC W/AUTO DIFF WBC: CPT

## 2020-02-23 PROCEDURE — 74177 CT ABD & PELVIS W/CONTRAST: CPT

## 2020-02-23 PROCEDURE — 80053 COMPREHEN METABOLIC PANEL: CPT

## 2020-02-23 PROCEDURE — 74011250636 HC RX REV CODE- 250/636: Performed by: EMERGENCY MEDICINE

## 2020-02-23 PROCEDURE — 99284 EMERGENCY DEPT VISIT MOD MDM: CPT

## 2020-02-23 PROCEDURE — 74011636320 HC RX REV CODE- 636/320: Performed by: RADIOLOGY

## 2020-02-23 PROCEDURE — 96361 HYDRATE IV INFUSION ADD-ON: CPT

## 2020-02-23 PROCEDURE — 36415 COLL VENOUS BLD VENIPUNCTURE: CPT

## 2020-02-23 PROCEDURE — 83605 ASSAY OF LACTIC ACID: CPT

## 2020-02-23 PROCEDURE — 81001 URINALYSIS AUTO W/SCOPE: CPT

## 2020-02-23 RX ORDER — LEVOFLOXACIN 750 MG/1
750 TABLET ORAL DAILY
Qty: 7 TAB | Refills: 0 | Status: SHIPPED | OUTPATIENT
Start: 2020-02-23 | End: 2020-11-30

## 2020-02-23 RX ORDER — POLYETHYLENE GLYCOL 3350 17 G/17G
17 POWDER, FOR SOLUTION ORAL DAILY
Qty: 120 G | Refills: 0 | Status: SHIPPED | OUTPATIENT
Start: 2020-02-23 | End: 2020-03-01

## 2020-02-23 RX ORDER — METRONIDAZOLE 500 MG/1
500 TABLET ORAL 2 TIMES DAILY
Qty: 14 TAB | Refills: 0 | Status: SHIPPED | OUTPATIENT
Start: 2020-02-23 | End: 2020-11-30

## 2020-02-23 RX ADMIN — SODIUM CHLORIDE 500 ML: 900 INJECTION, SOLUTION INTRAVENOUS at 18:53

## 2020-02-23 RX ADMIN — IOPAMIDOL 100 ML: 755 INJECTION, SOLUTION INTRAVENOUS at 19:32

## 2020-02-23 NOTE — ED PROVIDER NOTES
71-year-old female with history of rheumatoid arthritis, diabetes, diverticulitis, hypertension presents with complaint of abdominal pain and diarrhea. Reports she was unable to have a bowel movement. She reports on 2/19 he had a very small bowel movement and so on 2/21 since she had not had any more bowel movements she took magnesium citrate. She states she had no bowel movement yesterday however at 11 AM this morning \"everything let loose. \"  Patient reports she has had at least 6 bowel movements since then in room and 3 pairs of pajamas. She states the bowel movements are orange-yellow in color. She states she gets some lower abdominal pain and cramping and then has to go to the bathroom. She reports the pain is similar to her prior diverticulitis. She states the last time she was on any antibiotics was about 2 months ago. She denies any fevers, nausea, vomiting.            Past Medical History:   Diagnosis Date    Arthritis, rheumatoid (HCC)     Diabetes ([address]     Diverticulitis     Hard of hearing     Hypertension     Hypothyroid     Osteoporosis, post-menopausal        Past Surgical History:   Procedure Laterality Date    HX HEMORRHOIDECTOMY      HX HYSTERECTOMY      HX THYROIDECTOMY           Family History:   Problem Relation Age of Onset    Heart Disease Mother     Diabetes Father     Cancer Brother         brain cancer    Heart Disease Brother     Diabetes Brother     Other Other         scleroderma       Social History     Socioeconomic History    Marital status:      Spouse name: Not on file    Number of children: Not on file    Years of education: Not on file    Highest education level: Not on file   Occupational History    Not on file   Social Needs    Financial resource strain: Not on file    Food insecurity:     Worry: Not on file     Inability: Not on file    Transportation needs:     Medical: Not on file     Non-medical: Not on file   Tobacco Use    Smoking status: Former Smoker     Types: Cigarettes     Last attempt to quit: 1976     Years since quittin.2    Smokeless tobacco: Never Used   Substance and Sexual Activity    Alcohol use: Yes     Comment: Waylon time 1 glass wine    Drug use: No    Sexual activity: Never   Lifestyle    Physical activity:     Days per week: Not on file     Minutes per session: Not on file    Stress: Not on file   Relationships    Social connections:     Talks on phone: Not on file     Gets together: Not on file     Attends [employer] service: Not on file     Active member of club or organization: Not on file     Attends meetings of clubs or organizations: Not on file     Relationship status: Not on file    Intimate partner violence:     Fear of current or ex partner: Not on file     Emotionally abused: Not on file     Physically abused: Not on file     Forced sexual activity: Not on file   Other Topics Concern    Not on file   Social History Narrative    ** Merged History Encounter **              ALLERGIES: Alendronate; Amoxicillin; and Hydrochlorothiazide    Review of Systems   Constitutional: Negative for fever. Respiratory: Negative for shortness of breath. Cardiovascular: Negative for chest pain. Gastrointestinal: Positive for abdominal pain and diarrhea. Negative for nausea and vomiting. All other systems reviewed and are negative. Vitals:    20 1829   BP: 143/62   Pulse: 86   Resp: 16   Temp: 98.5 °F (36.9 °C)   SpO2: 99%   Weight: 60.8 kg (134 lb)   Height: 5' 2\" (1.575 m)            Physical Exam  Vitals signs and nursing note reviewed. Constitutional:       General: She is not in acute distress. Appearance: She is well-developed. She is not diaphoretic. HENT:      Head: Normocephalic and atraumatic. Neck:      Musculoskeletal: Normal range of motion and neck supple. Cardiovascular:      Rate and Rhythm: Normal rate and regular rhythm. Pulses: Normal pulses.       Heart sounds: Normal heart sounds. No murmur. No friction rub. Pulmonary:      Effort: Pulmonary effort is normal. No respiratory distress. Breath sounds: Normal breath sounds. No stridor. No wheezing, rhonchi or rales. Chest:      Chest wall: No tenderness. Abdominal:      General: Bowel sounds are normal. There is no distension. Palpations: Abdomen is soft. There is no mass. Tenderness: There is abdominal tenderness. There is no guarding or rebound. Hernia: No hernia is present. Comments: Suprapubic tenderness to palpation   Musculoskeletal: Normal range of motion. General: No tenderness. Skin:     General: Skin is warm and dry. Coloration: Skin is not pale. Neurological:      Mental Status: She is alert and oriented to person, place, and time. Motor: No abnormal muscle tone. Coordination: Coordination normal.   Psychiatric:         Behavior: Behavior normal.          MDM  Number of Diagnoses or Management Options  Constipation, unspecified constipation type:   Diverticulitis:   Diagnosis management comments: Diverticulitis versus colitis versus diarrhea from laxative and possible constipation       Amount and/or Complexity of Data Reviewed  Clinical lab tests: ordered and reviewed  Tests in the radiology section of CPT®: ordered and reviewed    Patient Progress  Patient progress: stable         Procedures  8:12 PM  Patient's results have been reviewed with them. Patient and/or family have verbally conveyed their understanding and agreement of the patient's signs, symptoms, diagnosis, treatment and prognosis and additionally agree to follow up as recommended or return to the Emergency Room should their condition change prior to follow-up.   Discharge instructions have also been provided to the patient with some educational information regarding their diagnosis as well a list of reasons why they would want to return to the ER prior to their follow-up appointment should their condition change. Discussed with patient findings on CT. I discussed with her possibility of underlying malignancy or colon cancer. She reports she sees Dr. Lakshmi Carvalho and last had a colonoscopy about 3 years ago. I have instructed her to follow-up as there was some concern on CT. Patient has expressed understanding.

## 2020-02-23 NOTE — ED TRIAGE NOTES
Patient arrives via EMS from home. Patient reports taking a laxative on Friday and today at 11:00 \" everything broke loose and michelle been having diarrhea all day\". Patient also reports slight LLQ pain related to her diverticulitis.

## 2020-02-24 NOTE — DISCHARGE INSTRUCTIONS
Patient Education        Constipation: Care Instructions  Your Care Instructions    Constipation means that you have a hard time passing stools (bowel movements). People pass stools from 3 times a day to once every 3 days. What is normal for you may be different. Constipation may occur with pain in the rectum and cramping. The pain may get worse when you try to pass stools. Sometimes there are small amounts of bright red blood on toilet paper or the surface of stools. This is because of enlarged veins near the rectum (hemorrhoids). A few changes in your diet and lifestyle may help you avoid ongoing constipation. Your doctor may also prescribe medicine to help loosen your stool. Some medicines can cause constipation. These include pain medicines and antidepressants. Tell your doctor about all the medicines you take. Your doctor may want to make a medicine change to ease your symptoms. Follow-up care is a key part of your treatment and safety. Be sure to make and go to all appointments, and call your doctor if you are having problems. It's also a good idea to know your test results and keep a list of the medicines you take. How can you care for yourself at home? · Drink plenty of fluids, enough so that your urine is light yellow or clear like water. If you have kidney, heart, or liver disease and have to limit fluids, talk with your doctor before you increase the amount of fluids you drink. · Include high-fiber foods in your diet each day. These include fruits, vegetables, beans, and whole grains. · Get at least 30 minutes of exercise on most days of the week. Walking is a good choice. You also may want to do other activities, such as running, swimming, cycling, or playing tennis or team sports. · Take a fiber supplement, such as Citrucel or Metamucil, every day. Read and follow all instructions on the label. · Schedule time each day for a bowel movement. A daily routine may help.  Take your time having your bowel movement. · Support your feet with a small step stool when you sit on the toilet. This helps flex your hips and places your pelvis in a squatting position. · Your doctor may recommend an over-the-counter laxative to relieve your constipation. Examples are Milk of Magnesia and MiraLax. Read and follow all instructions on the label. Do not use laxatives on a long-term basis. When should you call for help? Call your doctor now or seek immediate medical care if:    · You have new or worse belly pain.     · You have new or worse nausea or vomiting.     · You have blood in your stools.    Watch closely for changes in your health, and be sure to contact your doctor if:    · Your constipation is getting worse.     · You do not get better as expected. Where can you learn more? Go to http://tej-leidy.info/. Enter 21 [id number] in the search box to learn more about \"Constipation: Care Instructions. \"  Current as of: June 26, 2019  Content Version: 12.2  © 8685-3036 [employer]. Care instructions adapted under license by [employer] (which disclaims liability or warranty for this information). If you have questions about a medical condition or this instruction, always ask your healthcare professional. [place] [address] any warranty or liability for your use of this information. Patient Education        Diverticulitis: Care Instructions  Your Care Instructions    Diverticulitis occurs when pouches form in the wall of the colon and become inflamed or infected. It can be very painful. Doctors aren't sure what causes diverticulitis. There is no proof that foods such as nuts, seeds, or berries cause it or make it worse. A low-fiber diet may cause the colon to work harder to push stool forward. Pouches may form because of this extra work. It may be hard to think about healthy eating while you're in pain.  But as you recover, you might think about how you can use healthy eating for overall better health. Healthy eating may help you avoid future attacks. Follow-up care is a key part of your treatment and safety. Be sure to make and go to all appointments, and call your doctor if you are having problems. It's also a good idea to know your test results and keep a list of the medicines you take. How can you care for yourself at home? · Drink plenty of fluids, enough so that your urine is light yellow or clear like water. If you have kidney, heart, or liver disease and have to limit fluids, talk with your doctor before you increase the amount of fluids you drink. · Stick to liquids or a bland diet (plain rice, bananas, dry toast or crackers, applesauce) until you are feeling better. Then you can return to regular foods and gradually increase the amount of fiber in your diet. · Use a heating pad set on low on your belly to relieve mild cramps and pain. · Get extra rest until you are feeling better. · Be safe with medicines. Read and follow all instructions on the label. ? If the doctor gave you a prescription medicine for pain, take it as prescribed. ? If you are not taking a prescription pain medicine, ask your doctor if you can take an over-the-counter medicine. · If your doctor prescribed antibiotics, take them as directed. Do not stop taking them just because you feel better. You need to take the full course of antibiotics. To prevent future attacks of diverticulitis  · Avoid constipation:  ? Include fruits, vegetables, beans, and whole grains in your diet each day. These foods are high in fiber. ? Drink plenty of fluids, enough so that your urine is light yellow or clear like water. If you have kidney, heart, or liver disease and have to limit fluids, talk with your doctor before you increase the amount of fluids you drink. ? Get some exercise every day. Build up slowly to 30 to 60 minutes a day on 5 or more days of the week.   ? Take a fiber supplement, such as Citrucel or Metamucil, every day if needed. Read and follow all instructions on the label. ? Schedule time each day for a bowel movement. Having a daily routine may help. Take your time and do not strain when having a bowel movement. When should you call for help? Call your doctor now or seek immediate medical care if:    · You have a fever.     · You are vomiting.     · You have new or worse belly pain.     · You cannot pass stools or gas.    Watch closely for changes in your health, and be sure to contact your doctor if you have any problems. Where can you learn more? Go to http://tej-leidy.info/. Enter H901 in the search box to learn more about \"Diverticulitis: Care Instructions. \"  Current as of: November 7, 2018  Content Version: 12.2  © 0870-5559 [employer]. Care instructions adapted under license by [employer] (which disclaims liability or warranty for this information). If you have questions about a medical condition or this instruction, always ask your healthcare professional. [address] any warranty or liability for your use of this information.

## 2020-11-30 ENCOUNTER — HOSPITAL ENCOUNTER (INPATIENT)
Age: 85
LOS: 2 days | Discharge: HOME | DRG: 392 | End: 2020-12-02
Attending: EMERGENCY MEDICINE | Admitting: FAMILY MEDICINE
Payer: MEDICARE

## 2020-11-30 ENCOUNTER — APPOINTMENT (OUTPATIENT)
Dept: CT IMAGING | Age: 85
DRG: 392 | End: 2020-11-30
Attending: EMERGENCY MEDICINE
Payer: MEDICARE

## 2020-11-30 DIAGNOSIS — K63.89 COLONIC MASS: ICD-10-CM

## 2020-11-30 DIAGNOSIS — K56.609 SBO (SMALL BOWEL OBSTRUCTION) (HCC): Primary | ICD-10-CM

## 2020-11-30 DIAGNOSIS — D64.9 ANEMIA, UNSPECIFIED TYPE: ICD-10-CM

## 2020-11-30 LAB
ALBUMIN SERPL-MCNC: 3.3 G/DL (ref 3.5–5)
ALBUMIN/GLOB SERPL: 0.9 {RATIO} (ref 1.1–2.2)
ALP SERPL-CCNC: 73 U/L (ref 45–117)
ALT SERPL-CCNC: 17 U/L (ref 12–78)
ANION GAP SERPL CALC-SCNC: 7 MMOL/L (ref 5–15)
APPEARANCE UR: CLEAR
AST SERPL-CCNC: 12 U/L (ref 15–37)
ATRIAL RATE: 76 BPM
BACTERIA URNS QL MICRO: NEGATIVE /HPF
BASOPHILS # BLD: 0 K/UL (ref 0–0.1)
BASOPHILS NFR BLD: 1 % (ref 0–1)
BILIRUB SERPL-MCNC: 0.4 MG/DL (ref 0.2–1)
BILIRUB UR QL: NEGATIVE
BUN SERPL-MCNC: 9 MG/DL (ref 6–20)
BUN/CREAT SERPL: 12 (ref 12–20)
CALCIUM SERPL-MCNC: 8.7 MG/DL (ref 8.5–10.1)
CALCULATED P AXIS, ECG09: 10 DEGREES
CALCULATED R AXIS, ECG10: 50 DEGREES
CALCULATED T AXIS, ECG11: 74 DEGREES
CHLORIDE SERPL-SCNC: 100 MMOL/L (ref 97–108)
CO2 SERPL-SCNC: 27 MMOL/L (ref 21–32)
COLOR UR: ABNORMAL
COMMENT, HOLDF: NORMAL
CREAT SERPL-MCNC: 0.77 MG/DL (ref 0.55–1.02)
DIAGNOSIS, 93000: NORMAL
DIFFERENTIAL METHOD BLD: ABNORMAL
EOSINOPHIL # BLD: 0.2 K/UL (ref 0–0.4)
EOSINOPHIL NFR BLD: 2 % (ref 0–7)
EPITH CASTS URNS QL MICRO: ABNORMAL /LPF
ERYTHROCYTE [DISTWIDTH] IN BLOOD BY AUTOMATED COUNT: 16.5 % (ref 11.5–14.5)
GLOBULIN SER CALC-MCNC: 3.5 G/DL (ref 2–4)
GLUCOSE SERPL-MCNC: 109 MG/DL (ref 65–100)
GLUCOSE UR STRIP.AUTO-MCNC: NEGATIVE MG/DL
HCT VFR BLD AUTO: 32.1 % (ref 35–47)
HGB BLD-MCNC: 10.3 G/DL (ref 11.5–16)
HGB UR QL STRIP: ABNORMAL
IMM GRANULOCYTES # BLD AUTO: 0 K/UL (ref 0–0.04)
IMM GRANULOCYTES NFR BLD AUTO: 1 % (ref 0–0.5)
KETONES UR QL STRIP.AUTO: NEGATIVE MG/DL
LACTATE SERPL-SCNC: 0.6 MMOL/L (ref 0.4–2)
LEUKOCYTE ESTERASE UR QL STRIP.AUTO: NEGATIVE
LYMPHOCYTES # BLD: 1.2 K/UL (ref 0.8–3.5)
LYMPHOCYTES NFR BLD: 15 % (ref 12–49)
MAGNESIUM SERPL-MCNC: 2.4 MG/DL (ref 1.6–2.4)
MCH RBC QN AUTO: 27.5 PG (ref 26–34)
MCHC RBC AUTO-ENTMCNC: 32.1 G/DL (ref 30–36.5)
MCV RBC AUTO: 85.6 FL (ref 80–99)
MONOCYTES # BLD: 0.3 K/UL (ref 0–1)
MONOCYTES NFR BLD: 4 % (ref 5–13)
NEUTS SEG # BLD: 6.1 K/UL (ref 1.8–8)
NEUTS SEG NFR BLD: 77 % (ref 32–75)
NITRITE UR QL STRIP.AUTO: NEGATIVE
NRBC # BLD: 0 K/UL (ref 0–0.01)
NRBC BLD-RTO: 0 PER 100 WBC
P-R INTERVAL, ECG05: 196 MS
PH UR STRIP: 7 [PH] (ref 5–8)
PLATELET # BLD AUTO: 481 K/UL (ref 150–400)
PMV BLD AUTO: 9 FL (ref 8.9–12.9)
POTASSIUM SERPL-SCNC: 4 MMOL/L (ref 3.5–5.1)
PROT SERPL-MCNC: 6.8 G/DL (ref 6.4–8.2)
PROT UR STRIP-MCNC: NEGATIVE MG/DL
Q-T INTERVAL, ECG07: 410 MS
QRS DURATION, ECG06: 68 MS
QTC CALCULATION (BEZET), ECG08: 461 MS
RBC # BLD AUTO: 3.75 M/UL (ref 3.8–5.2)
RBC #/AREA URNS HPF: ABNORMAL /HPF (ref 0–5)
SAMPLES BEING HELD,HOLD: NORMAL
SODIUM SERPL-SCNC: 134 MMOL/L (ref 136–145)
SP GR UR REFRACTOMETRY: <1.005 (ref 1–1.03)
UROBILINOGEN UR QL STRIP.AUTO: 0.2 EU/DL (ref 0.2–1)
VENTRICULAR RATE, ECG03: 76 BPM
WBC # BLD AUTO: 7.7 K/UL (ref 3.6–11)
WBC URNS QL MICRO: ABNORMAL /HPF (ref 0–4)
YEAST URNS QL MICRO: PRESENT

## 2020-11-30 PROCEDURE — 74011250636 HC RX REV CODE- 250/636: Performed by: FAMILY MEDICINE

## 2020-11-30 PROCEDURE — 65270000029 HC RM PRIVATE

## 2020-11-30 PROCEDURE — 81001 URINALYSIS AUTO W/SCOPE: CPT

## 2020-11-30 PROCEDURE — 93005 ELECTROCARDIOGRAM TRACING: CPT

## 2020-11-30 PROCEDURE — 83735 ASSAY OF MAGNESIUM: CPT

## 2020-11-30 PROCEDURE — 74011000636 HC RX REV CODE- 636: Performed by: RADIOLOGY

## 2020-11-30 PROCEDURE — 74177 CT ABD & PELVIS W/CONTRAST: CPT

## 2020-11-30 PROCEDURE — 99285 EMERGENCY DEPT VISIT HI MDM: CPT

## 2020-11-30 PROCEDURE — 70450 CT HEAD/BRAIN W/O DYE: CPT

## 2020-11-30 PROCEDURE — 74011250636 HC RX REV CODE- 250/636: Performed by: EMERGENCY MEDICINE

## 2020-11-30 PROCEDURE — 83605 ASSAY OF LACTIC ACID: CPT

## 2020-11-30 PROCEDURE — 85025 COMPLETE CBC W/AUTO DIFF WBC: CPT

## 2020-11-30 PROCEDURE — 36415 COLL VENOUS BLD VENIPUNCTURE: CPT

## 2020-11-30 PROCEDURE — 80053 COMPREHEN METABOLIC PANEL: CPT

## 2020-11-30 PROCEDURE — 77030038269 HC DRN EXT URIN PURWCK BARD -A

## 2020-11-30 RX ORDER — ONDANSETRON 2 MG/ML
4 INJECTION INTRAMUSCULAR; INTRAVENOUS
Status: DISCONTINUED | OUTPATIENT
Start: 2020-11-30 | End: 2020-12-02 | Stop reason: HOSPADM

## 2020-11-30 RX ORDER — ACETAMINOPHEN 500 MG
500 TABLET ORAL
COMMUNITY

## 2020-11-30 RX ORDER — ACETAMINOPHEN 325 MG/1
650 TABLET ORAL
Status: DISCONTINUED | OUTPATIENT
Start: 2020-11-30 | End: 2020-12-02 | Stop reason: HOSPADM

## 2020-11-30 RX ORDER — SODIUM CHLORIDE 9 MG/ML
75 INJECTION, SOLUTION INTRAVENOUS CONTINUOUS
Status: DISCONTINUED | OUTPATIENT
Start: 2020-11-30 | End: 2020-12-01

## 2020-11-30 RX ORDER — POLYETHYLENE GLYCOL 3350 17 G/17G
17 POWDER, FOR SOLUTION ORAL DAILY PRN
Status: DISCONTINUED | OUTPATIENT
Start: 2020-11-30 | End: 2020-12-02 | Stop reason: HOSPADM

## 2020-11-30 RX ORDER — ENOXAPARIN SODIUM 100 MG/ML
40 INJECTION SUBCUTANEOUS DAILY
Status: DISCONTINUED | OUTPATIENT
Start: 2020-12-01 | End: 2020-12-02 | Stop reason: HOSPADM

## 2020-11-30 RX ORDER — SODIUM CHLORIDE 0.9 % (FLUSH) 0.9 %
5-40 SYRINGE (ML) INJECTION EVERY 8 HOURS
Status: DISCONTINUED | OUTPATIENT
Start: 2020-11-30 | End: 2020-12-02 | Stop reason: HOSPADM

## 2020-11-30 RX ORDER — METHOTREXATE 2.5 MG/1
20 TABLET ORAL
Status: ON HOLD | COMMUNITY
End: 2021-03-28 | Stop reason: CLARIF

## 2020-11-30 RX ORDER — SODIUM CHLORIDE 0.9 % (FLUSH) 0.9 %
5-40 SYRINGE (ML) INJECTION AS NEEDED
Status: DISCONTINUED | OUTPATIENT
Start: 2020-11-30 | End: 2020-12-02 | Stop reason: HOSPADM

## 2020-11-30 RX ORDER — HYDROMORPHONE HYDROCHLORIDE 1 MG/ML
1 INJECTION, SOLUTION INTRAMUSCULAR; INTRAVENOUS; SUBCUTANEOUS
Status: DISCONTINUED | OUTPATIENT
Start: 2020-11-30 | End: 2020-12-01

## 2020-11-30 RX ORDER — LISINOPRIL 40 MG/1
40 TABLET ORAL DAILY
COMMUNITY
End: 2021-04-02

## 2020-11-30 RX ORDER — AMLODIPINE BESYLATE 10 MG/1
10 TABLET ORAL DAILY
COMMUNITY

## 2020-11-30 RX ORDER — CEPHALEXIN 500 MG/1
500 CAPSULE ORAL 2 TIMES DAILY
COMMUNITY
Start: 2020-11-25 | End: 2020-12-02

## 2020-11-30 RX ORDER — LEVOTHYROXINE SODIUM 50 UG/1
50 TABLET ORAL
Status: ON HOLD | COMMUNITY
End: 2020-12-02 | Stop reason: SDUPTHER

## 2020-11-30 RX ADMIN — Medication 10 ML: at 22:41

## 2020-11-30 RX ADMIN — IOPAMIDOL 100 ML: 755 INJECTION, SOLUTION INTRAVENOUS at 17:31

## 2020-11-30 RX ADMIN — SODIUM CHLORIDE 75 ML/HR: 900 INJECTION, SOLUTION INTRAVENOUS at 19:47

## 2020-11-30 RX ADMIN — SODIUM CHLORIDE 1000 ML: 900 INJECTION, SOLUTION INTRAVENOUS at 15:46

## 2020-11-30 NOTE — ED PROVIDER NOTES
59-year-old female presents with a chief complaint of intermittent headache and abdominal pain. She has a history of diabetes, hypertension, rheumatoid arthritis and hypothyroidism. .  She currently denies any headache but states that earlier she had a pounding headache and abdominal pain. She has not had a fever, cough but has had some chills at home.   Denies any vomiting, diarrhea, black or bloody stools, constipation, urinary symptoms, chest pain or shortness of breath           Past Medical History:   Diagnosis Date    Arthritis, rheumatoid (HCC)     Diabetes ([facility] [address] [facility]     Diverticulitis     Hard of hearing     Hypertension     Hypothyroid     Osteoporosis, post-menopausal        Past Surgical History:   Procedure Laterality Date    HX HEMORRHOIDECTOMY      HX HYSTERECTOMY      HX THYROIDECTOMY           Family History:   Problem Relation Age of Onset    Heart Disease Mother     Diabetes Father     Cancer Brother         brain cancer    Heart Disease Brother     Diabetes Brother     Other Other         scleroderma       Social History     Socioeconomic History    Marital status:      Spouse name: Not on file    Number of children: Not on file    Years of education: Not on file    Highest education level: Not on file   Occupational History    Not on file   Social Needs    Financial resource strain: Not on file    Food insecurity     Worry: Not on file     Inability: Not on file    Transportation needs     Medical: Not on file     Non-medical: Not on file   Tobacco Use    Smoking status: Former Smoker     Types: Cigarettes     Last attempt to quit: 1976     Years since quittin.0    Smokeless tobacco: Never Used   Substance and Sexual Activity    Alcohol use: Yes     Comment: Waylon time 1 glass wine    Drug use: No    Sexual activity: Never   Lifestyle    Physical activity     Days per week: Not on file     Minutes per session: Not on file    Stress: Not on file Relationships    Social connections     Talks on phone: Not on file     Gets together: Not on file     Attends [employer] service: Not on file     Active member of club or organization: Not on file     Attends meetings of clubs or organizations: Not on file     Relationship status: Not on file    Intimate partner violence     Fear of current or ex partner: Not on file     Emotionally abused: Not on file     Physically abused: Not on file     Forced sexual activity: Not on file   Other Topics Concern    Not on file   Social History Narrative    ** Merged History Encounter **              ALLERGIES: Alendronate; Amoxicillin; and Hydrochlorothiazide    Review of Systems   Constitutional: Negative for fever. HENT: Negative for rhinorrhea. Respiratory: Negative for shortness of breath. Cardiovascular: Negative for chest pain. Gastrointestinal: Positive for abdominal pain. Genitourinary: Negative for dysuria. Musculoskeletal: Negative for back pain. Skin: Negative for wound. Neurological: Positive for headaches. Psychiatric/Behavioral: Negative for confusion. Vitals:    11/30/20 1411   BP: (!) 170/68   Pulse: 87   Resp: 16   Temp: 98 °F (36.7 °C)   SpO2: 100%   Weight: 60.1 kg (132 lb 8 oz)   Height: 4' 11\" (1.499 m)            Physical Exam  Vitals signs and nursing note reviewed. Constitutional:       General: She is not in acute distress. Appearance: Normal appearance. She is not ill-appearing, toxic-appearing or diaphoretic. HENT:      Head: Normocephalic and atraumatic. Eyes:      Extraocular Movements: Extraocular movements intact. Neck:      Musculoskeletal: Normal range of motion. Cardiovascular:      Rate and Rhythm: Normal rate and regular rhythm. Pulses: Normal pulses. Heart sounds: Normal heart sounds. No murmur. No friction rub. No gallop. Pulmonary:      Effort: Pulmonary effort is normal. No respiratory distress.       Breath sounds: Normal breath sounds. No wheezing, rhonchi or rales. Abdominal:      General: Abdomen is flat. Bowel sounds are normal. There is no distension. Palpations: Abdomen is soft. Tenderness: There is abdominal tenderness. Musculoskeletal: Normal range of motion. Skin:     General: Skin is warm and dry. Neurological:      General: No focal deficit present. Mental Status: She is alert and oriented to person, place, and time. Cranial Nerves: No cranial nerve deficit. Psychiatric:         Mood and Affect: Mood normal.          MDM  Number of Diagnoses or Management Options  SBO (small bowel obstruction) [facility]):   Diagnosis management comments: 29-year-old female presents with headache and abdominal pain. She has significant tenderness on exam.  CT of the head and abdomen are indicated to rule out hemorrhage and obstruction among other bowel pathology. Laboratory studies were obtained and were unremarkable. 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 This is causing proximal small bowel obstruction. I spoke with surgery who agreed to be on consult but did not feel there was any urgent surgical intervention. The patient is not requiring an NG tube at this time. GI was consulted for possible flex sig tomorrow. Patient will be admitted to hospital medicine. She is comfortable and agreeable to plan of care. EKG shows sinus rhythm at a rate of 76, normal intervals, normal axis, no ST elevation or depression.       Perfect Serve Consult for Admission  7:03 PM    ED Room Number: GX57/05  Patient Name and age:  Shankar Cortez 80 y.o.  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 Surgery on board. GI being consulted from the ED.            Procedures not examined

## 2020-11-30 NOTE — ED TRIAGE NOTES
Pt comes in via EMS from home. Pt c/o \"fullness and pressure in head and neck\" Denies any falls or injury to head. Denies any confusion or blurry vision. Pt has hx of hypothyroidism. Pt is able to ambulate to ER stretcher from EMS stretcher.

## 2020-12-01 ENCOUNTER — APPOINTMENT (OUTPATIENT)
Dept: CT IMAGING | Age: 85
DRG: 392 | End: 2020-12-01
Attending: NURSE PRACTITIONER
Payer: MEDICARE

## 2020-12-01 LAB
ALBUMIN SERPL-MCNC: 2.7 G/DL (ref 3.5–5)
ALBUMIN/GLOB SERPL: 0.7 {RATIO} (ref 1.1–2.2)
ALP SERPL-CCNC: 62 U/L (ref 45–117)
ALT SERPL-CCNC: 14 U/L (ref 12–78)
ANION GAP SERPL CALC-SCNC: 6 MMOL/L (ref 5–15)
AST SERPL-CCNC: 11 U/L (ref 15–37)
BILIRUB SERPL-MCNC: 0.3 MG/DL (ref 0.2–1)
BUN SERPL-MCNC: 7 MG/DL (ref 6–20)
BUN/CREAT SERPL: 11 (ref 12–20)
CALCIUM SERPL-MCNC: 8.3 MG/DL (ref 8.5–10.1)
CHLORIDE SERPL-SCNC: 106 MMOL/L (ref 97–108)
CO2 SERPL-SCNC: 25 MMOL/L (ref 21–32)
CREAT SERPL-MCNC: 0.61 MG/DL (ref 0.55–1.02)
ERYTHROCYTE [DISTWIDTH] IN BLOOD BY AUTOMATED COUNT: 16.7 % (ref 11.5–14.5)
EST. AVERAGE GLUCOSE BLD GHB EST-MCNC: 114 MG/DL
GLOBULIN SER CALC-MCNC: 3.7 G/DL (ref 2–4)
GLUCOSE SERPL-MCNC: 94 MG/DL (ref 65–100)
HBA1C MFR BLD: 5.6 % (ref 4–5.6)
HCT VFR BLD AUTO: 30.6 % (ref 35–47)
HGB BLD-MCNC: 9.9 G/DL (ref 11.5–16)
MAGNESIUM SERPL-MCNC: 2.3 MG/DL (ref 1.6–2.4)
MCH RBC QN AUTO: 28 PG (ref 26–34)
MCHC RBC AUTO-ENTMCNC: 32.4 G/DL (ref 30–36.5)
MCV RBC AUTO: 86.4 FL (ref 80–99)
NRBC # BLD: 0 K/UL (ref 0–0.01)
NRBC BLD-RTO: 0 PER 100 WBC
PHOSPHATE SERPL-MCNC: 3.3 MG/DL (ref 2.6–4.7)
PLATELET # BLD AUTO: 494 K/UL (ref 150–400)
PMV BLD AUTO: 9 FL (ref 8.9–12.9)
POTASSIUM SERPL-SCNC: 3.8 MMOL/L (ref 3.5–5.1)
PROT SERPL-MCNC: 6.4 G/DL (ref 6.4–8.2)
RBC # BLD AUTO: 3.54 M/UL (ref 3.8–5.2)
SODIUM SERPL-SCNC: 137 MMOL/L (ref 136–145)
TSH SERPL DL<=0.05 MIU/L-ACNC: 7.2 UIU/ML (ref 0.36–3.74)
WBC # BLD AUTO: 8.9 K/UL (ref 3.6–11)

## 2020-12-01 PROCEDURE — 71260 CT THORAX DX C+: CPT

## 2020-12-01 PROCEDURE — 74011250637 HC RX REV CODE- 250/637: Performed by: INTERNAL MEDICINE

## 2020-12-01 PROCEDURE — 85027 COMPLETE CBC AUTOMATED: CPT

## 2020-12-01 PROCEDURE — 83735 ASSAY OF MAGNESIUM: CPT

## 2020-12-01 PROCEDURE — 74011000636 HC RX REV CODE- 636

## 2020-12-01 PROCEDURE — 84443 ASSAY THYROID STIM HORMONE: CPT

## 2020-12-01 PROCEDURE — 80053 COMPREHEN METABOLIC PANEL: CPT

## 2020-12-01 PROCEDURE — 65270000029 HC RM PRIVATE

## 2020-12-01 PROCEDURE — 74011250636 HC RX REV CODE- 250/636: Performed by: INTERNAL MEDICINE

## 2020-12-01 PROCEDURE — 99223 1ST HOSP IP/OBS HIGH 75: CPT | Performed by: INTERNAL MEDICINE

## 2020-12-01 PROCEDURE — 36415 COLL VENOUS BLD VENIPUNCTURE: CPT

## 2020-12-01 PROCEDURE — 97161 PT EVAL LOW COMPLEX 20 MIN: CPT

## 2020-12-01 PROCEDURE — 74011000250 HC RX REV CODE- 250: Performed by: INTERNAL MEDICINE

## 2020-12-01 PROCEDURE — 74011250636 HC RX REV CODE- 250/636: Performed by: FAMILY MEDICINE

## 2020-12-01 PROCEDURE — 97116 GAIT TRAINING THERAPY: CPT

## 2020-12-01 PROCEDURE — 84100 ASSAY OF PHOSPHORUS: CPT

## 2020-12-01 PROCEDURE — 83036 HEMOGLOBIN GLYCOSYLATED A1C: CPT

## 2020-12-01 RX ORDER — HYDRALAZINE HYDROCHLORIDE 20 MG/ML
20 INJECTION INTRAMUSCULAR; INTRAVENOUS
Status: DISCONTINUED | OUTPATIENT
Start: 2020-12-01 | End: 2020-12-02 | Stop reason: HOSPADM

## 2020-12-01 RX ORDER — HYDROMORPHONE HYDROCHLORIDE 1 MG/ML
0.5 INJECTION, SOLUTION INTRAMUSCULAR; INTRAVENOUS; SUBCUTANEOUS
Status: DISCONTINUED | OUTPATIENT
Start: 2020-12-01 | End: 2020-12-02 | Stop reason: HOSPADM

## 2020-12-01 RX ORDER — POLYETHYLENE GLYCOL 3350 17 G/17G
17 POWDER, FOR SOLUTION ORAL
Status: COMPLETED | OUTPATIENT
Start: 2020-12-01 | End: 2020-12-01

## 2020-12-01 RX ORDER — DEXTROSE, SODIUM CHLORIDE, AND POTASSIUM CHLORIDE 5; .45; .15 G/100ML; G/100ML; G/100ML
75 INJECTION INTRAVENOUS CONTINUOUS
Status: DISCONTINUED | OUTPATIENT
Start: 2020-12-01 | End: 2020-12-02 | Stop reason: HOSPADM

## 2020-12-01 RX ADMIN — POLYETHYLENE GLYCOL 3350 17 G: 17 POWDER, FOR SOLUTION ORAL at 09:04

## 2020-12-01 RX ADMIN — POLYETHYLENE GLYCOL 3350 17 G: 17 POWDER, FOR SOLUTION ORAL at 11:35

## 2020-12-01 RX ADMIN — POLYETHYLENE GLYCOL 3350 17 G: 17 POWDER, FOR SOLUTION ORAL at 13:01

## 2020-12-01 RX ADMIN — LACTULOSE 45 ML: 20 SOLUTION ORAL at 17:30

## 2020-12-01 RX ADMIN — POLYETHYLENE GLYCOL 3350 17 G: 17 POWDER, FOR SOLUTION ORAL at 09:01

## 2020-12-01 RX ADMIN — POTASSIUM CHLORIDE, DEXTROSE MONOHYDRATE AND SODIUM CHLORIDE 75 ML/HR: 150; 5; 450 INJECTION, SOLUTION INTRAVENOUS at 09:00

## 2020-12-01 RX ADMIN — LACTULOSE 45 ML: 20 SOLUTION ORAL at 09:01

## 2020-12-01 RX ADMIN — Medication 10 ML: at 22:09

## 2020-12-01 RX ADMIN — LEVOTHYROXINE SODIUM ANHYDROUS 37.5 MCG: 100 INJECTION, POWDER, LYOPHILIZED, FOR SOLUTION INTRAVENOUS at 18:03

## 2020-12-01 RX ADMIN — POLYETHYLENE GLYCOL 3350 17 G: 17 POWDER, FOR SOLUTION ORAL at 11:46

## 2020-12-01 RX ADMIN — LACTULOSE 45 ML: 20 SOLUTION ORAL at 22:09

## 2020-12-01 RX ADMIN — POLYETHYLENE GLYCOL 3350 17 G: 17 POWDER, FOR SOLUTION ORAL at 14:24

## 2020-12-01 RX ADMIN — POLYETHYLENE GLYCOL 3350 17 G: 17 POWDER, FOR SOLUTION ORAL at 16:00

## 2020-12-01 RX ADMIN — IOPAMIDOL 90 ML: 612 INJECTION, SOLUTION INTRAVENOUS at 16:45

## 2020-12-01 RX ADMIN — ENOXAPARIN SODIUM 40 MG: 40 INJECTION SUBCUTANEOUS at 09:01

## 2020-12-01 RX ADMIN — POLYETHYLENE GLYCOL 3350 17 G: 17 POWDER, FOR SOLUTION ORAL at 14:00

## 2020-12-01 RX ADMIN — Medication 10 ML: at 05:15

## 2020-12-01 RX ADMIN — Medication 10 ML: at 14:24

## 2020-12-01 NOTE — PROGRESS NOTES
[facility]: MED RECONCILIATION      Information obtained from: Patient over the phone (ER 19). Patient knew her dosage. RX Query. Allergies: Alendronate; Amoxicillin; and Hydrochlorothiazide    Comment:  Cephalexin: Started for bladder infection on 11/25/20 for 7 days. Patient has three more doses left to finish the course, 1 dose tonight and 2 doses tomorrow. Prior to Admission Medications:     Medication Documentation Review Audit       Reviewed by SOLA RossD (Pharmacist) on 11/30/20 at 6282      Medication Sig Documenting Provider Last Dose Status Taking?   acetaminophen (Tylenol Extra Strength) 500 mg tablet Take 500 mg by mouth two (2) times a day. Provider, Historical  Active Yes   amLODIPine (NORVASC) 10 mg tablet Take 10 mg by mouth daily. Provider, Historical 11/30/2020 Active Yes   aspirin delayed-release 81 mg tablet Take 81 mg by mouth daily. Provider, Historical 11/30/2020 Active Yes   cephALEXin (KEFLEX) 500 mg capsule Take 500 mg by mouth two (2) times a day. Provider, Historical 11/30/2020 AM Active Yes   cholecalciferol (VITAMIN D3) 1,000 unit cap Take 2,000 Units by mouth daily. Provider, Historical 11/30/2020 Active Yes   levothyroxine (SYNTHROID) 50 mcg tablet Take 50 mcg by mouth Daily (before breakfast). Provider, Historical 11/30/2020 Active Yes   lisinopriL (PRINIVIL, ZESTRIL) 40 mg tablet Take 40 mg by mouth daily. Provider, Historical 11/30/2020 Active Yes   methotrexate (RHEUMATREX) 2.5 mg tablet Take 17.5 mg by mouth Every Friday. Provider, Historical 11/27/2020 Active Yes   multivit-min/iron/folic/lutein (CENTRUM SILVER WOMEN PO) Take 1 Tab by mouth every other day.  Provider, Historical  Active Yes                        [address] [facility]

## 2020-12-01 NOTE — CONSULTS
[facility]  [address] [facility]  [place]: [phone]  F: [phone]      Reason for Visit:   Jean Marie Conh is a 80 y.o. female who is seen in consultation at the request of Dr. Bpiin Aggarwal for evaluation of new sigmoid mass. Hematology / Oncology Treatment History:   N/A    History of Present Illness:     Jericho La was admitted on 2020 from the ED when she presented via EMS with dizziness, left lower abd  quadrant pain associated with constipation for months. Currently being treated for UTI with keflex. ED labs Hgb 10.3 ED imaging shows SBO with sigmoid neoplasm or stricture. She was admitted for further eval and management. Reports constipation for months with use of ex-lax, dulcolax and prune juice with relief. Had 2 BMs yesterday and several since coming into the hospital Has occasional nausea but no vomiting. Hx of diverticulitis; Weight loss of approx 4# over the last month. Denies SOB or pain at present. Hx of Fe def during pregnancy; took Fe pills but no other time. ETOH; no  Smoking: stopped in  after 21 yrs of smoking approx <1/4 ppd. Colonoscopy:  with polyp noted. Was due to repeat this past April but it was cancelled    Lives by herself in an apt complex  : 3 kids: 1 son locally. Sole Haskins who is POA; 1 son in [place] and 1 daughter in [address] [place] [address] with walker or cane     Family Hx of Cancer  Sister: lung cancer/  Brother: Brain cancer    No family at bedside.      Past Medical History:   Diagnosis Date    Arthritis, rheumatoid (HCC)     Diabetes ([address]     Diverticulitis     Hard of hearing     Hypertension     Hypothyroid     Osteoporosis, post-menopausal       Past Surgical History:   Procedure Laterality Date    HX HEMORRHOIDECTOMY      HX HYSTERECTOMY      HX THYROIDECTOMY        Social History     Tobacco Use    Smoking status: Former Smoker     Types: Cigarettes     Last attempt to quit: 1976     Years since quittin.0    Smokeless tobacco: Never Used   Substance Use Topics    Alcohol use: Yes     Comment: [place] time 1 glass wine      Family History   Problem Relation Age of Onset    Heart Disease Mother     Diabetes Father     Cancer Brother         brain cancer    Heart Disease Brother     Diabetes Brother     Other Other         scleroderma     Current Facility-Administered Medications   Medication Dose Route Frequency    dextrose 5% - 0.45% NaCl with KCl 20 mEq/L infusion  75 mL/hr IntraVENous CONTINUOUS    levothyroxine (SYNTHROID) injection 37.5 mcg  37.5 mcg IntraVENous Q24H    hydrALAZINE (APRESOLINE) 20 mg/mL injection 20 mg  20 mg IntraVENous Q6H PRN    polyethylene glycol (MIRALAX) packet 17 g  17 g Oral Q1H    lactulose (CHRONULAC) 10 gram/15 mL solution 45 mL  45 mL Oral TID    HYDROmorphone (DILAUDID) syringe 0.5 mg  0.5 mg IntraVENous Q4H PRN    sodium chloride (NS) flush 5-40 mL  5-40 mL IntraVENous Q8H    sodium chloride (NS) flush 5-40 mL  5-40 mL IntraVENous PRN    acetaminophen (TYLENOL) tablet 650 mg  650 mg Oral Q6H PRN    polyethylene glycol (MIRALAX) packet 17 g  17 g Oral DAILY PRN    ondansetron (ZOFRAN) injection 4 mg  4 mg IntraVENous Q6H PRN    enoxaparin (LOVENOX) injection 40 mg  40 mg SubCUTAneous DAILY      Allergies   Allergen Reactions    Alendronate Diarrhea    Amoxicillin Rash    Hydrochlorothiazide Other (comments)     \"Caused pain everywhere\"        Review of Systems: A complete review of systems was obtained, negative except as described above. Physical Exam:     Visit Vitals  /69 (BP 1 Location: Left arm, BP Patient Position: At rest)   Pulse 78   Temp 98.5 °F (36.9 °C)   Resp 16   Ht 4' 11\" (1.499 m)   Wt 132 lb 8 oz (60.1 kg)   SpO2 96%   BMI 26.76 kg/m²     ECOG PS: 1-2  General: elderly;  No distress  Eyes: PERRLA, anicteric sclerae  HENT: [place] Atraumatic with normal appearance of ears and nose; OP clear  Neck: Supple; no thyromegaly   Lymphatic: No cervical, supraclavicular, or axillary adenopathy  Respiratory: CTAB, normal respiratory effort  CV: Normal rate, regular rhythm, no murmurs, no peripheral edema  GI: Soft, nontender, nondistended, no masses, no hepatomegaly, no splenomegaly  MS: Digits without clubbing or cyanosis. Skin: No rashes, ecchymoses, or petechiae. Normal temperature, turgor, and texture. Neuro/Psych: Alert, oriented, appropriate affect, normal judgment/insight      Results:     Lab Results   Component Value Date/Time    WBC 8.9 12/01/2020 04:37 AM    HGB 9.9 (L) 12/01/2020 04:37 AM    HCT 30.6 (L) 12/01/2020 04:37 AM    PLATELET 508 (H) 25/70/6557 04:37 AM    MCV 86.4 12/01/2020 04:37 AM    ABS. NEUTROPHILS 6.1 11/30/2020 03:54 PM    Hemoglobin (POC) 11.2 (L) 12/12/2017 01:22 PM    Hematocrit (POC) 33 (L) 12/12/2017 01:22 PM     Lab Results   Component Value Date/Time    Sodium 137 12/01/2020 04:37 AM    Potassium 3.8 12/01/2020 04:37 AM    Chloride 106 12/01/2020 04:37 AM    CO2 25 12/01/2020 04:37 AM    Glucose 94 12/01/2020 04:37 AM    BUN 7 12/01/2020 04:37 AM    Creatinine 0.61 12/01/2020 04:37 AM    GFR est AA >60 12/01/2020 04:37 AM    GFR est non-AA >60 12/01/2020 04:37 AM    Calcium 8.3 (L) 12/01/2020 04:37 AM    Sodium (POC) 138 12/12/2017 01:22 PM    Potassium (POC) 3.7 12/12/2017 01:22 PM    Chloride (POC) 100 12/12/2017 01:22 PM    Glucose (POC) 132 (H) 12/12/2017 01:22 PM    Glucose (POC) 91 05/04/2016 11:27 AM    BUN (POC) 14 12/12/2017 01:22 PM    Creatinine (POC) 1.1 12/12/2017 01:22 PM    Calcium, ionized (POC) 1.20 12/12/2017 01:22 PM     Lab Results   Component Value Date/Time    Bilirubin, total 0.3 12/01/2020 04:37 AM    ALT (SGPT) 14 12/01/2020 04:37 AM    Alk.  phosphatase 62 12/01/2020 04:37 AM    Protein, total 6.4 12/01/2020 04:37 AM    Albumin 2.7 (L) 12/01/2020 04:37 AM    Globulin 3.7 12/01/2020 04:37 AM     Lab Results   Component Value Date/Time Sed rate (ESR) 27 10/16/2017 08:50 AM    Sed rate, automated 67 (H) 12/21/2018 07:41 PM    C-Reactive protein 5.07 (H) 04/18/2017 11:14 AM    C-Reactive Protein, Qt 33.3 (H) 10/16/2017 08:50 AM    TSH 7.20 (H) 12/01/2020 04:37 AM    Lipase 80 03/21/2019 03:38 AM     Lab Results   Component Value Date/Time    D-dimer 1.02 (H) 05/05/2016 12:00 AM     No results found for: CEA, 617712, C199LT, C125, GRFU112, 2729LT, C153LT, PSA, LDH, [id number], HCGTLT, HCGN, AFP, CHRGLT, CHRGA    11/30/2020 CT ABD PELV W CONT  IMPRESSION:  Bladder distention  Constipation, secondary to a long annular sigmoid neoplasm or stricture. Relative pocket proximal small bowel obstruction, either secondary to this  sigmoid process or an adhesion/internal hernia. 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 Assessment and Recommendations:     1. Small bowel obstruction  GI consulted: plan for colonoscopy in am 12/2  General surgery consulted    2. Questionable sigmoid mass  Will await findings from colonoscopy   Complete work up with CT Chest  Will add CEA     3. Anemia, normocytic  Add anemia labs to eval for correctable causes  Continue to monitor and transfuse for Hgb < 7     4. Elevated TSH  May need to adjust synthroid; will defer to primary team    5. RA  Reports takes MTX monthly; currently managed by PCP      I have personally seen and evaluated the patient in conjunction with Elijah Abdi NP. I performed the history and physical exam and rendered the above assessment and plan.       Signed By: Valentino Keenan MD

## 2020-12-01 NOTE — ED NOTES
TRANSFER - OUT REPORT:    Verbal report given to Ashly(name) on Ruiz Flood  being transferred to [facility](unit) for routine progression of care       Report consisted of patients Situation, Background, Assessment and   Recommendations(SBAR). Information from the following report(s) SBAR, ED Summary, [facility] and Recent Results was reviewed with the receiving nurse. Lines:   Peripheral IV 11/30/20 Right Antecubital (Active)   Site Assessment Clean, dry, & intact 11/30/20 1546   Phlebitis Assessment 0 11/30/20 1546   Infiltration Assessment 0 11/30/20 1546   Dressing Status Clean, dry, & intact 11/30/20 1546   Dressing Type Transparent 11/30/20 1546   Hub Color/Line Status Pink;Flushed;Patent 11/30/20 1546   Action Taken Blood drawn 11/30/20 1546        Opportunity for questions and clarification was provided.       Patient transported with:   Monitor  Registered Nurse

## 2020-12-01 NOTE — H&P
[address] Adult  Hospitalist [facility]    History & Physical    Date of service: 11/30/2020    Patient name: Hawa Cooley  MRN: [id number]  YOB: 1930  Age: 80 y.o. Primary care provider:  Herman Valentine MD     Source of Information: patient, medical records                                Chief complain: Headache and abdominal pain    History of present illness  Hawa Cooley is a 80 y.o. female who presented with dizziness. She has a history of dizziness and usually takes meclizine. She took meclizine today however started to have increased head pressure. She was diagnosed with a UTI approximately 1 week ago and was prescribed Keflex which she is still taking. She developed some left lower quadrant pain today which she thought was due to the urinary tract infection. She denies having any fever. She has been having off-and-on constipation for the last several months. She has occasional waves of nausea but no vomiting and has been eating normally. She had 2 bowel movements this morning. Her last colonoscopy was in 2017. Her headache and dizziness had resolved upon arrival to the ED and a CT scan of the abdomen showed small bowel obstruction and possible sigmoid mass. Past Medical History:   Diagnosis Date    Arthritis, rheumatoid ([address]     Diabetes ([address]     Diverticulitis     Hard of hearing     Hypertension     Hypothyroid     Osteoporosis, post-menopausal       Past Surgical History:   Procedure Laterality Date    HX HEMORRHOIDECTOMY      HX HYSTERECTOMY      HX THYROIDECTOMY       Prior to Admission medications    Medication Sig Start Date End Date Taking? Authorizing Provider   metroNIDAZOLE (FLAGYL) 500 mg tablet Take 1 Tab by mouth two (2) times a day. 2/23/20   Micaela Oliveira MD   levoFLOXacin (LEVAQUIN) 750 mg tablet Take 1 Tab by mouth daily.  2/23/20   Micaela Oliveira MD   fluticasone propionate (FLONASE) 50 mcg/actuation nasal spray 2 Sprays by Both Nostrils route daily. 3/23/19   Samia Ferris MD   ondansetron (ZOFRAN ODT) 8 mg disintegrating tablet Take 1 Tab by mouth every eight (8) hours as needed for Nausea. 3/21/19   Lennox Carter MD   methylPREDNISolone (MEDROL, VITA,) 4 mg tablet Take by mouth as directed 18   Corine Vega NP   cholecalciferol (VITAMIN D3) 1,000 unit cap Take  by mouth daily. Provider, Historical   methotrexate (RHEUMATREX) 2.5 mg tablet 12.5 mg (5 tabs) once weekly 17   Mo Granados MD   lisinopril (PRINIVIL, ZESTRIL) 20 mg tablet TAKE 1 TABLET BY MOUTH TWICE A DAY 17   Provider, Historical   amLODIPine (NORVASC) 5 mg tablet Take 2 Tabs by mouth daily. 17   Nikole Mccartney MD   raNITIdine (ZANTAC 75) 75 mg tablet Take 75 mg by mouth two (2) times daily as needed. Provider, Historical   folic acid (FOLVITE) 1 mg tablet Take 1 mg by mouth daily. Provider, Historical   levothyroxine (SYNTHROID) 75 mcg tablet Take 75 mcg by mouth Daily (before breakfast). Provider, Historical   acetaminophen (TYLENOL) 325 mg tablet Take 325 mg by mouth daily as needed for Pain. Provider, Historical   aspirin delayed-release 81 mg tablet Take 81 mg by mouth every other day. Provider, Historical   TOCILIZUMAB (ACTEMRA IV) by IntraVENous route every thirty (30) days.     Provider, Historical     Allergies   Allergen Reactions    Alendronate Diarrhea    Amoxicillin Rash    Hydrochlorothiazide Other (comments)     \"Caused pain everywhere\"      Family History   Problem Relation Age of Onset    Heart Disease Mother     Diabetes Father     Cancer Brother         brain cancer    Heart Disease Brother     Diabetes Brother     Other Other         scleroderma         Social history    Social History     Tobacco Use   Smoking Status Former Smoker    Types: Cigarettes    Last attempt to quit: 1976    Years since quittin.0   Smokeless Tobacco Never Used       Social History     Substance and Sexual Activity   Alcohol Use Yes    Comment: [place] time 1 glass wine       Code status  Code status discussed with the patient/caregivers. Full Code    Review of systems    A comprehensive review of systems was negative except for that written in the History of Present Illness. Physical Examination   Visit Vitals  BP (!) 162/64   Pulse 87   Temp 98 °F (36.7 °C)   Resp 16   Ht 4' 11\" (1.499 m)   Wt 60.1 kg (132 lb 8 oz)   SpO2 97%   BMI 26.76 kg/m²          O2 Device: Room air    General:  Alert, cooperative, no distress   Head:  Normocephalic, without obvious abnormality, atraumatic   Eyes:  Conjunctivae/corneas clear.  PERRL, EOMs intact   Head/Neck: Nares normal. No nasal drainage or sinus tenderness  Lips, mucosa, and tongue normal   Teeth and gums normal  Clear oropharynx  Trachea midline  No palpable adenopathy  No thyroid enlargement, tenderness     Lungs:   Symmetrical chest expansion and respiratory effort  Clear to auscultation bilaterally       Heart:  Regular rhythm   Sounds normal; no murmur, rub or gallop   Abdomen:   Soft, mild tenderness in the left lower quadrant with no guarding and no rigidity  Bowel sounds normal  No masses or hepatosplenomegaly     Back: No CVA tenderness   Extremities: Extremities normal, atraumatic  No cyanosis or edema     Skin: No rashes or ulcers   Musculo-      skeletal: Gait not tested  Normal symmetry, ROM, strength and tone   Neuro: Cranial nerves grossly normal     Psych: Alert, oriented x3  Normal affect, judgement and insight     Data Review    24 Hour Results:  Recent Results (from the past 24 hour(s))   EKG, 12 LEAD, INITIAL    Collection Time: 11/30/20  3:50 PM   Result Value Ref Range    Ventricular Rate 76 BPM    Atrial Rate 76 BPM    P-R Interval 196 ms    QRS Duration 68 ms    Q-T Interval 410 ms    QTC Calculation (Bezet) 461 ms    Calculated P Axis 10 degrees    Calculated R Axis 50 degrees    Calculated T Axis 74 degrees Diagnosis       Normal sinus rhythm  Normal ECG  When compared with ECG of 21-MAR-2019 04:11,  No significant change was found  Confirmed by Ana María Garner MD. ([id number]) on 11/30/2020 5:14:01 PM     CBC WITH AUTOMATED DIFF    Collection Time: 11/30/20  3:54 PM   Result Value Ref Range    WBC 7.7 3.6 - 11.0 K/uL    RBC 3.75 (L) 3.80 - 5.20 M/uL    HGB 10.3 (L) 11.5 - 16.0 g/dL    HCT 32.1 (L) 35.0 - 47.0 %    MCV 85.6 80.0 - 99.0 FL    MCH 27.5 26.0 - 34.0 PG    MCHC 32.1 30.0 - 36.5 g/dL    RDW 16.5 (H) 11.5 - 14.5 %    PLATELET 378 (H) 907 - 400 K/uL    MPV 9.0 8.9 - 12.9 FL    NRBC 0.0 0  WBC    ABSOLUTE NRBC 0.00 0.00 - 0.01 K/uL    NEUTROPHILS 77 (H) 32 - 75 %    LYMPHOCYTES 15 12 - 49 %    MONOCYTES 4 (L) 5 - 13 %    EOSINOPHILS 2 0 - 7 %    BASOPHILS 1 0 - 1 %    IMMATURE GRANULOCYTES 1 (H) 0.0 - 0.5 %    ABS. NEUTROPHILS 6.1 1.8 - 8.0 K/UL    ABS. LYMPHOCYTES 1.2 0.8 - 3.5 K/UL    ABS. MONOCYTES 0.3 0.0 - 1.0 K/UL    ABS. EOSINOPHILS 0.2 0.0 - 0.4 K/UL    ABS. BASOPHILS 0.0 0.0 - 0.1 K/UL    ABS. IMM. GRANS. 0.0 0.00 - 0.04 K/UL    DF AUTOMATED     METABOLIC PANEL, COMPREHENSIVE    Collection Time: 11/30/20  3:54 PM   Result Value Ref Range    Sodium 134 (L) 136 - 145 mmol/L    Potassium 4.0 3.5 - 5.1 mmol/L    Chloride 100 97 - 108 mmol/L    CO2 27 21 - 32 mmol/L    Anion gap 7 5 - 15 mmol/L    Glucose 109 (H) 65 - 100 mg/dL    BUN 9 6 - 20 MG/DL    Creatinine 0.77 0.55 - 1.02 MG/DL    BUN/Creatinine ratio 12 12 - 20      GFR est AA >60 >60 ml/min/1.73m2    GFR est non-AA >60 >60 ml/min/1.73m2    Calcium 8.7 8.5 - 10.1 MG/DL    Bilirubin, total 0.4 0.2 - 1.0 MG/DL    ALT (SGPT) 17 12 - 78 U/L    AST (SGOT) 12 (L) 15 - 37 U/L    Alk.  phosphatase 73 45 - 117 U/L    Protein, total 6.8 6.4 - 8.2 g/dL    Albumin 3.3 (L) 3.5 - 5.0 g/dL    Globulin 3.5 2.0 - 4.0 g/dL    A-G Ratio 0.9 (L) 1.1 - 2.2     MAGNESIUM    Collection Time: 11/30/20  3:54 PM   Result Value Ref Range    Magnesium 2.4 1.6 - 2.4 mg/dL LACTIC ACID    Collection Time: 11/30/20  3:54 PM   Result Value Ref Range    Lactic acid 0.6 0.4 - 2.0 MMOL/L   URINALYSIS W/ RFLX MICROSCOPIC    Collection Time: 11/30/20  3:54 PM   Result Value Ref Range    Color YELLOW/STRAW      Appearance CLEAR CLEAR      Specific gravity <1.005 1.003 - 1.030    pH (UA) 7.0 5.0 - 8.0      Protein Negative NEG mg/dL    Glucose Negative NEG mg/dL    Ketone Negative NEG mg/dL    Bilirubin Negative NEG      Blood TRACE (A) NEG      Urobilinogen 0.2 0.2 - 1.0 EU/dL    Nitrites Negative NEG      Leukocyte Esterase Negative NEG      WBC 0-4 0 - 4 /hpf    RBC 0-5 0 - 5 /hpf    Epithelial cells FEW FEW /lpf    Bacteria Negative NEG /hpf    Yeast PRESENT (A) NEG     SAMPLES BEING HELD    Collection Time: 11/30/20  3:54 PM   Result Value Ref Range    SAMPLES BEING HELD [email or web address]     COMMENT HOLD      Recent Labs     11/30/20  1554   WBC 7.7   HGB 10.3*   HCT 32.1*   *     Recent Labs     11/30/20  1554   *   K 4.0      CO2 27   *   BUN 9   CREA 0.77   CA 8.7   MG 2.4   ALB 3.3*   TBILI 0.4   ALT 17       Imaging  Ct Head Wo Cont    Result Date: 11/30/2020  EXAM: CT HEAD WO CONT INDICATION: headache COMPARISON: 3/13/2019. CONTRAST: None. TECHNIQUE: Unenhanced CT of the head was performed using 5 mm images. Brain and bone windows were generated. Coronal and sagittal reformats. CT dose reduction was achieved through use of a standardized protocol tailored for this examination and automatic exposure control for dose modulation. FINDINGS: The ventricles and sulci are normal in size, shape and configuration. There is stable hypodensities in both external capsules. There is no significant white matter disease. There is no intracranial hemorrhage, extra-axial collection, or mass effect. The basilar cisterns are open. No CT evidence of acute infarct. The bone windows demonstrate no abnormalities.  The visualized portions of the paranasal sinuses and mastoid air cells are clear. IMPRESSION: No acute intracranial process identified by noncontrast CT. Ct Abd Pelv W Cont    Result Date: 11/30/2020  EXAM: CT ABD PELV W CONT INDICATION: abdominal pain COMPARISON: 2/23/2020 CONTRAST: 100 mL of Isovue-370. TECHNIQUE: Following the uneventful intravenous administration of contrast, thin axial images were obtained through the abdomen and pelvis. Coronal and sagittal reconstructions were generated. Oral contrast was not administered. CT dose reduction was achieved through use of a standardized protocol tailored for this examination and automatic exposure control for dose modulation. FINDINGS: LOWER THORAX: Right lower lobe 3.7 mm granuloma (axial image 1). LIVER: No mass. Stable 5 mm hypodensity in hepatic segment 15. BILIARY TREE: Gallbladder is within normal limits. CBD is not dilated. SPLEEN: within normal limits. PANCREAS: No mass or ductal dilatation. ADRENALS: Unremarkable. KIDNEYS: No mass, calculus, or hydronephrosis. Distended bilateral renal pelvises sees. STOMACH: Unremarkable. SMALL BOWEL: Distended jejunal loops with moderate amount of edema of the associated mesentery (coronal image 34). . There is no twisting of the mesentery. COLON: Diffuse narrowing of a 4 cm segment of sigmoid (series 2, image 52). Moderate amount of stool in the colon. APPENDIX: Not visualized PERITONEUM: Haziness of the mesentery in the left upper quadrant. RETROPERITONEUM: Ectatic aorta with largest axial diameter of 2.7 cm (series 2, image 37). REPRODUCTIVE ORGANS: Prior hysterectomy URINARY BLADDER: No mass or calculus. There is moderate bladder distention. BONES: Disc desiccation at L3-4 and L4-5. 2 mm anterolisthesis of L5. There is a levoconvex lumbar curvature. ABDOMINAL WALL: No mass or hernia. ADDITIONAL COMMENTS: Partial atherosclerosis of the proximal SMA. IMPRESSION: Bladder distention Constipation, secondary to a long annular sigmoid neoplasm or stricture.  Relative pocket proximal small bowel obstruction, either secondary to this sigmoid process or an adhesion/internal hernia. 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 intake  -Gentle hydration and continue to monitor    Headache  -Now resolved  -CT scan unremarkable  -As needed Tylenol as needed    Hypothyroidism  -Check TSH  -Continue levothyroxine    Hypertension  -Continue home medications    Diet: N.p.o. Activity: As tolerated  DVT prophylaxis: Lovenox  Isolation precautions: None       Signed by:  Ant Robbins MD    November 30, 2020 at 7:10 PM

## 2020-12-01 NOTE — PROGRESS NOTES
Hansa 51 enema administered. Pieces broke off and enema fluid emptied from rectum. No BM yet. 1230 Large, hard, brown BM.

## 2020-12-01 NOTE — PROGRESS NOTES
Case Management Note      Patient is asleep. Will revisit again to do the assessment.       ROBIN Ojeda

## 2020-12-01 NOTE — CONSULTS
Gastroenterology Consult     Referring Physician: Aye Palmer    Consult Date: 2020     Subjective:     Chief Complaint: pain    History of Present Illness: Hawa Cooley is a 80 y.o. female who is seen in consultation for abnormal CT. Patient is admitted with a myriad of symptoms of which abdominal pain is included. She cannot state as to how long those pains might have been there; she does have irregular bowel movements. She has not had fever, vomiting, visible blood loss. She states she is likely lost some weight but cannot state as to how much that might have been.     Past Medical History:   Diagnosis Date    Arthritis, rheumatoid ([address]     Diabetes ([facility]     Diverticulitis     Hard of hearing     Hypertension     Hypothyroid     Osteoporosis, post-menopausal      Past Surgical History:   Procedure Laterality Date    HX HEMORRHOIDECTOMY      HX HYSTERECTOMY      HX THYROIDECTOMY        Family History   Problem Relation Age of Onset    Heart Disease Mother     Diabetes Father     Cancer Brother         brain cancer    Heart Disease Brother     Diabetes Brother     Other Other         scleroderma     Social History     Tobacco Use    Smoking status: Former Smoker     Types: Cigarettes     Last attempt to quit: 1976     Years since quittin.0    Smokeless tobacco: Never Used   Substance Use Topics    Alcohol use: Yes     Comment: Waylon time 1 glass wine      Allergies   Allergen Reactions    Alendronate Diarrhea    Amoxicillin Rash    Hydrochlorothiazide Other (comments)     \"Caused pain everywhere\"     Current Facility-Administered Medications   Medication Dose Route Frequency    dextrose 5% - 0.45% NaCl with KCl 20 mEq/L infusion  75 mL/hr IntraVENous CONTINUOUS    levothyroxine (SYNTHROID) injection 37.5 mcg  37.5 mcg IntraVENous Q24H    hydrALAZINE (APRESOLINE) 20 mg/mL injection 20 mg  20 mg IntraVENous Q6H PRN    polyethylene glycol (MIRALAX) packet 17 g  17 g Oral Q1H    lactulose (CHRONULAC) 10 gram/15 mL solution 45 mL  45 mL Oral TID    HYDROmorphone (DILAUDID) syringe 0.5 mg  0.5 mg IntraVENous Q4H PRN    iopamidoL (ISOVUE-M 300) 61 % contrast solution 100 mL  100 mL IntraVENous RAD ONCE    iopamidoL (ISOVUE 300) 61 % contrast injection        sodium chloride (NS) flush 5-40 mL  5-40 mL IntraVENous Q8H    sodium chloride (NS) flush 5-40 mL  5-40 mL IntraVENous PRN    acetaminophen (TYLENOL) tablet 650 mg  650 mg Oral Q6H PRN    polyethylene glycol (MIRALAX) packet 17 g  17 g Oral DAILY PRN    ondansetron (ZOFRAN) injection 4 mg  4 mg IntraVENous Q6H PRN    enoxaparin (LOVENOX) injection 40 mg  40 mg SubCUTAneous DAILY        Review of Systems:  A detailed 10 organ review of systems is obtained with pertinent positives as listed in the History of Present Illness and Past Medical History. All others are negative. Objective:     Physical Exam:  Visit Vitals  BP (!) 147/64 (BP 1 Location: Left arm, BP Patient Position: Post activity)   Pulse 76   Temp 98.1 °F (36.7 °C)   Resp 16   Ht 4' 11\" (1.499 m)   Wt 60.1 kg (132 lb 8 oz)   SpO2 95%   BMI 26.76 kg/m²        Skin:  Extremities and face reveal no rashes. No son erythema. No telangiectasias on the chest wall. HEENT: Sclerae anicteric. Extra-occular muscles are intact. No oral ulcers. No abnormal pigmentation of the lips. The neck is supple. Cardiovascular: Regular rate and rhythm. No murmurs, gallops, or rubs. PMI nondisplaced. Carotids without bruits. Respiratory:  Comfortable breathing with no accessory muscle use. Clear breath sounds with no wheezes, rales, or rhonchi. GI:  Abdomen nondistended, soft, and nontender. Normal active bowel sounds. No enlargement of the liver or spleen. LLQ mass effect palpable. Musculoskeletal:  No pitting edema of the lower legs. Extremities have good range of motion. No costovertebral tenderness. Neurological:  Gross memory appears intact.   Patient is alert and oriented. Psychiatric:  Mood appears appropriate with judgement intact. Lymphatic:  No cervical or supraclavicular adenopathy. Lab/Data Review:  CMP:   Lab Results   Component Value Date/Time     12/01/2020 04:37 AM    K 3.8 12/01/2020 04:37 AM     12/01/2020 04:37 AM    CO2 25 12/01/2020 04:37 AM    AGAP 6 12/01/2020 04:37 AM    GLU 94 12/01/2020 04:37 AM    BUN 7 12/01/2020 04:37 AM    CREA 0.61 12/01/2020 04:37 AM    GFRAA >60 12/01/2020 04:37 AM    GFRNA >60 12/01/2020 04:37 AM    CA 8.3 (L) 12/01/2020 04:37 AM    MG 2.3 12/01/2020 04:37 AM    PHOS 3.3 12/01/2020 04:37 AM    ALB 2.7 (L) 12/01/2020 04:37 AM    TP 6.4 12/01/2020 04:37 AM    GLOB 3.7 12/01/2020 04:37 AM    AGRAT 0.7 (L) 12/01/2020 04:37 AM    ALT 14 12/01/2020 04:37 AM     CBC:   Lab Results   Component Value Date/Time    WBC 8.9 12/01/2020 04:37 AM    HGB 9.9 (L) 12/01/2020 04:37 AM    HCT 30.6 (L) 12/01/2020 04:37 AM     (H) 12/01/2020 04:37 AM     CT: LOWER THORAX: Right lower lobe 3.7 mm granuloma (axial image 1). LIVER: No mass. Stable 5 mm hypodensity in hepatic segment 15. BILIARY TREE: Gallbladder is within normal limits. CBD is not dilated. SPLEEN: within normal limits. PANCREAS: No mass or ductal dilatation. ADRENALS: Unremarkable. KIDNEYS: No mass, calculus, or hydronephrosis. Distended bilateral renal  pelvises sees. STOMACH: Unremarkable. SMALL BOWEL: Distended jejunal loops with moderate amount of edema of the  associated mesentery (coronal image 34). . There is no twisting of the mesentery. COLON: Diffuse narrowing of a 4 cm segment of sigmoid (series 2, image 52). Moderate amount of stool in the colon.       Assessment/Plan:     Active Problems:    SBO (small bowel obstruction) ([facility] (11/30/2020)         I've discussed colonoscopy possible biopsy, polypectomy, cautery, injection, alternatives, complications including but not limited to pain, cardiopulmonary event, bleeding, perforation requiring additional blood transfusion or operative repair; all questions answered.

## 2020-12-01 NOTE — ED NOTES
Bedside and Verbal shift change report given to [facility] (oncoming nurse) by Inocencia Morales RN (offgoing nurse). Report included the following information SBAR, Kardex, ED Summary, Procedure Summary, Intake/Output, MAR and Recent Results.

## 2020-12-01 NOTE — ACP (ADVANCE CARE PLANNING)
Advance Care Planning     Advance Care Planning (ACP) Physician/NP/PA Conversation      Date of Conversation: 11/30/2020   Diagnosis: colonic mass    Conducted with: Patient with Decision Making Capacity    Healthcare Decision Maker: SonBeverley     Click here to complete [address] including selection of the Healthcare Decision Maker Relationship (ie \"Primary\")      Care Preferences:    Hospitalization: \"If your health worsens and it becomes clear that your chance of recovery is unlikely, what would be your preference regarding hospitalization? \"  Would want comfort care    Ventilation: \"If you were unable to breathe on your own and your chance of recovery was unlikely, what would be your preference about the use of a ventilator (breathing machine) if it was available to you? \"   DNR    Resuscitation: \"In the event your heart stopped as a result of an underlying serious health condition, would you want attempts to be made to restart your heart, or would you prefer a natural death? \"   DNR    Additional topics discussed: Discussed with patient and son regarding current medical issues, prognosis, and treaments. She has an old advance directive from 2006. Son, Beverley Valero, is POA. confirmed DNR status.   Goal is to return home    Conversation Outcomes / Follow-Up Plan:   DNR    Length of Voluntary ACP Conversation in minutes:  16 minutes    Estephania Beaulieu MD

## 2020-12-01 NOTE — PROGRESS NOTES
Bedside and Verbal shift change report given to Lindsey Paula RN   (oncoming nurse) by Allen Rincon RN (offgoing nurse). Report included the following information SBAR, Intake/Output, MAR and Recent Results.

## 2020-12-01 NOTE — PROGRESS NOTES
Problem: Mobility Impaired (Adult and Pediatric)  Goal: *Acute Goals and Plan of Care (Insert Text)  Description: FUNCTIONAL STATUS PRIOR TO ADMISSION: Patient was modified independent using a rollator for functional mobility. HOME SUPPORT PRIOR TO ADMISSION: The patient lived alone with neighbors and local son to provide assistance. Physical Therapy Goals  Initiated 12/1/2020  1. Patient will move from supine to sit and sit to supine , scoot up and down, and roll side to side in bed with modified independence within 7 day(s). 2.  Patient will transfer from bed to chair and chair to bed with modified independence using the least restrictive device within 7 day(s). 3.  Patient will perform sit to stand with modified independence within 7 day(s). 4.  Patient will ambulate with modified independence for 150 feet with the least restrictive device within 7 day(s). Note:   PHYSICAL THERAPY EVALUATION  Patient: Fish Greenfield (23 y.o. female)  Date: 12/1/2020  Primary Diagnosis: SBO (small bowel obstruction) ([facility] [K56.609]  Procedure(s) (LRB):  SIGMOIDOSCOPY FLEXIBLE (N/A)     Precautions:  Fall    ASSESSMENT  Based on the objective data described below, the patient presents with functional ROM strength endurance and balance that exceeds independence of the average 79 yo. She lives alone in apartment and other than RA been overall healthy, active, independent with use of rollator. She does have mild deficits with higher level balance tasks, is [place], and endurance is diminished from her baseline due to recent bout of HA and abdominal pain. Admitted for these symptoms and to have EGD and colonoscopy for small bowel obstruction/ questionable sigmoid mass. Patient should work with mobility team as well as nursing staff to ambulate hallways daily to prevent effects of immobility. Keeping patient on PT caseload, likely short term,  in case patient has further medical/surgical intervention.   Current Level of Function Impacting Discharge (mobility/balance): CG A-min A x 1 for amb 120' with RW    Functional Outcome Measure: The patient scored 22/28 on the Tinetti Test outcome measure which is indicative of moderate risk for falls. Other factors to consider for discharge: home alone     Patient will benefit from skilled therapy intervention to address the above noted impairments. PLAN :  Recommendations and Planned Interventions: gait training, patient and family training/education, and therapeutic activities      Frequency/Duration: Patient will be followed by physical therapy:  3 times a week to address goals. Recommendation for discharge: (in order for the patient to meet his/her long term goals)  To be determined: pending hospitalization and intervention    This discharge recommendation:  Has not yet been discussed the attending provider and/or case management    IF patient discharges home will need the following DME: none         SUBJECTIVE:   Patient stated I am getting up and down to bathroom because they are cleaning me out.     OBJECTIVE DATA SUMMARY:   HISTORY:    Past Medical History:   Diagnosis Date    Arthritis, rheumatoid ([address]     Diabetes ([address]     Diverticulitis     Hard of hearing     Hypertension     Hypothyroid     Osteoporosis, post-menopausal      Past Surgical History:   Procedure Laterality Date    HX HEMORRHOIDECTOMY      HX HYSTERECTOMY      HX THYROIDECTOMY         Personal factors and/or comorbidities impacting plan of care:     Home Situation  Home Environment: Apartment  One/Two Story Residence: Other (Comment)(lives on 3rd floor but uses elevator)  Living Alone: Yes  Support Systems: Family member(s), Friends \ neighbors  Patient Expects to be Discharged to[de-identified] Apartment  Current DME Used/Available at Home: None    EXAMINATION/PRESENTATION/DECISION MAKING:   Critical Behavior:              Hearing:   Auditory  Auditory Impairment: Hard of hearing, bilateral  Skin: Edema:   Range Of Motion:  AROM: Within functional limits           PROM: Within functional limits           Strength:    Strength: Within functional limits                    Tone & Sensation:   Tone: Normal              Sensation: Intact               Coordination:  Coordination: Within functional limits  Vision:      Functional Mobility:  Bed Mobility:  Rolling: Modified independent  Supine to Sit: Modified independent  Sit to Supine: Modified independent  Scooting: Modified independent  Transfers:  Sit to Stand: Supervision  Stand to Sit: Supervision  Stand Pivot Transfers: Supervision                    Balance:   Sitting: Intact; Without support  Standing: Intact; With support  Ambulation/Gait Training:  Distance (ft): 120 Feet (ft)  Assistive Device: Gait belt;Walker, rolling  Ambulation - Level of Assistance: Contact guard assistance        Gait Abnormalities: Path deviations                                       Stairs:     Stairs - Level of Assistance: (NT)          Functional Measure:  Tinetti test:    Sitting Balance: 1  Arises: 1  Attempts to Rise: 2  Immediate Standing Balance: 2  Standing Balance: 1  Nudged: 2  Eyes Closed: 1  Turn 360 Degrees - Continuous/Discontinuous: 1  Turn 360 Degrees - Steady/Unsteady: 1  Sitting Down: 1  Balance Score: 13 Balance total score  Indication of Gait: 1  R Step Length/Height: 1  L Step Length/Height: 1  R Foot Clearance: 1  L Foot Clearance: 1  Step Symmetry: 1  Step Continuity: 1  Path: 1  Trunk: 0  Walking Time: 1  Gait Score: 9 Gait total score  Total Score: 22/28 Overall total score         Tinetti Tool Score Risk of Falls  <19 = High Fall Risk  19-24 = Moderate Fall Risk  25-28 = Low Fall Risk  Tinetti ME. Performance-Oriented Assessment of Mobility Problems in Elderly Patients. [address]; [id number].  (Scoring Description: PT Bulletin Feb. 10, 1993)    Older adults: Brandan Segura et al, 2009; n = [address] [employer] elderly evaluated with ABC, CHENTE, ADL, and IADL)  · Mean CHENTE score for males aged 69-68 years = 26.21(3.40)  · Mean CHENTE score for females age 69-68 years = 25.16(4.30)  · Mean CHENTE score for males over [de-identified] years = 23.29(6.02)  · Mean CHENTE score for females over [de-identified] years = 17.20(8.32)           Physical Therapy Evaluation Charge Determination   History Examination Presentation Decision-Making   MEDIUM  Complexity : 1-2 comorbidities / personal factors will impact the outcome/ POC  MEDIUM Complexity : 3 Standardized tests and measures addressing body structure, function, activity limitation and / or participation in recreation  MEDIUM Complexity : Evolving with changing characteristics  Other outcome measures Tinetti Test  MEDIUM      Based on the above components, the patient evaluation is determined to be of the following complexity level: MEDIUM    Pain Rating:  Mild abdominal pain - did not rate    Activity Tolerance:   Good    After treatment patient left in no apparent distress:   Supine in bed, Call bell within reach, and Caregiver / family present    COMMUNICATION/EDUCATION:   The patients plan of care was discussed with: Registered nurse. Fall prevention education was provided and the patient/caregiver indicated understanding. and Patient/family have participated as able in goal setting and plan of care.     Thank you for this referral.  Milla Jay, PT, DPT   Time Calculation: 27 mins

## 2020-12-01 NOTE — PROGRESS NOTES
[address] visit. Mrs. Benita Kirkpatrick was asleep. She is Hinduism. Prayer for spiritual communion offered.     DELONTE Faulkner, RN, ACSW, LCSW   Page:  [phone]

## 2020-12-01 NOTE — PROGRESS NOTES
[facility]  [address] [place] [address], [address] [place] [address]  [phone]      Medical Progress Note      NAME: Homar Llanos   :  1930  MRM:  [id number]    Date/Time of service: 2020  1:08 PM       Subjective:     Chief Complaint:  Patient was personally seen and examined by me during this time period. Chart reviewed. Still with mild abd pain. No N/V        Objective:       Vitals:       Last 24hrs VS reviewed since prior progress note.  Most recent are:    Visit Vitals  /69 (BP 1 Location: Left arm, BP Patient Position: At rest)   Pulse 78   Temp 98.5 °F (36.9 °C)   Resp 16   Ht 4' 11\" (1.499 m)   Wt 60.1 kg (132 lb 8 oz)   SpO2 96%   BMI 26.76 kg/m²     SpO2 Readings from Last 6 Encounters:   20 96%   20 97%   19 100%   19 99%   19 97%   18 97%            Intake/Output Summary (Last 24 hours) at 2020 1308  Last data filed at 2020 1228  Gross per 24 hour   Intake 1980 ml   Output 400 ml   Net 1580 ml        Exam:     Physical Exam:    Gen:  Elderly, frail, pleasant, NAD  HEENT:  Pink conjunctivae, PERRL, hard of hearing, moist mucous membranes  Neck:  Supple, without masses, thyroid non-tender  Resp:  No accessory muscle use, clear breath sounds without wheezes rales or rhonchi  Card:  No murmurs, normal S1, S2 without thrills, bruits or peripheral edema  Abd:  Soft, generalized tenderness, non-distended, decreased BS  Musc:  No cyanosis or clubbing  Skin:  No rashes   Neuro:  Cranial nerves 3-12 are grossly intact, follows commands appropriately  Psych:  Good insight, oriented to person, place and time, alert    Medications Reviewed: (see below)    Lab Data Reviewed: (see below)    ______________________________________________________________________    Medications:     Current Facility-Administered Medications   Medication Dose Route Frequency    dextrose 5% - 0.45% NaCl with KCl 20 mEq/L infusion  75 mL/hr IntraVENous CONTINUOUS    levothyroxine (SYNTHROID) injection 37.5 mcg  37.5 mcg IntraVENous Q24H    hydrALAZINE (APRESOLINE) 20 mg/mL injection 20 mg  20 mg IntraVENous Q6H PRN    polyethylene glycol (MIRALAX) packet 17 g  17 g Oral Q1H    lactulose (CHRONULAC) 10 gram/15 mL solution 45 mL  45 mL Oral TID    sodium chloride (NS) flush 5-40 mL  5-40 mL IntraVENous Q8H    sodium chloride (NS) flush 5-40 mL  5-40 mL IntraVENous PRN    acetaminophen (TYLENOL) tablet 650 mg  650 mg Oral Q6H PRN    polyethylene glycol (MIRALAX) packet 17 g  17 g Oral DAILY PRN    ondansetron (ZOFRAN) injection 4 mg  4 mg IntraVENous Q6H PRN    enoxaparin (LOVENOX) injection 40 mg  40 mg SubCUTAneous DAILY    HYDROmorphone (DILAUDID) syringe 1 mg  1 mg IntraVENous Q4H PRN          Lab Review:     Recent Labs     12/01/20  0437 11/30/20  1554   WBC 8.9 7.7   HGB 9.9* 10.3*   HCT 30.6* 32.1*   * 481*     Recent Labs     12/01/20  0437 11/30/20  1554    134*   K 3.8 4.0    100   CO2 25 27   GLU 94 109*   BUN 7 9   CREA 0.61 0.77   CA 8.3* 8.7   MG 2.3 2.4   PHOS 3.3  --    ALB 2.7* 3.3*   TBILI 0.3 0.4   ALT 14 17     Lab Results   Component Value Date/Time    Glucose (POC) 132 (H) 12/12/2017 01:22 PM    Glucose (POC) 120 (H) 11/16/2017 11:33 AM    Glucose (POC) 91 05/04/2016 11:27 AM    Glucose (POC) 110 (H) 05/04/2016 07:31 AM    Glucose (POC) 113 (H) 05/03/2016 09:56 PM    Glucose (POC) 105 (H) 05/03/2016 03:56 PM    Glucose (POC) 117 (H) 05/03/2016 11:19 AM          Assessment / Plan:     81 yo hx of HTN, DM, RA, presented w/ dizziness, HA, abd pain, found to have a SBO, new sigmoid mass    1) SBO: due to sigmoid mass. Cont NPO, IVF, IV dilaudid prn severe pain. 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 Will consult GI, Gen surg, Oncology    3) Dizziness/HA: now resolved. Likely from dehydration. Head CT neg    4) Hypothyroid: TSH 7.2.   Will start IV synthroid due to NPO    5) HTN: BP stable. Hold lisinopril, norvasc. Start IV hydralazine prn    6) DM type 2: A1C 5.6.   No further management     7) RA: hold MTX for now    Total time spent with patient: 35 min **I personally saw and examined the patient during this time period**                 Care Plan discussed with: Patient, family, nursing     Discussed:  Care Plan    Prophylaxis:  Lovenox    Disposition:  Home w/Family           ___________________________________________________    Attending Physician: Rosa Timmons MD

## 2020-12-02 ENCOUNTER — ANESTHESIA EVENT (OUTPATIENT)
Dept: ENDOSCOPY | Age: 85
DRG: 392 | End: 2020-12-02
Payer: MEDICARE

## 2020-12-02 ENCOUNTER — ANESTHESIA (OUTPATIENT)
Dept: ENDOSCOPY | Age: 85
DRG: 392 | End: 2020-12-02
Payer: MEDICARE

## 2020-12-02 VITALS
WEIGHT: 132.5 LBS | OXYGEN SATURATION: 99 % | BODY MASS INDEX: 26.71 KG/M2 | DIASTOLIC BLOOD PRESSURE: 66 MMHG | HEIGHT: 59 IN | RESPIRATION RATE: 18 BRPM | TEMPERATURE: 98 F | SYSTOLIC BLOOD PRESSURE: 144 MMHG | HEART RATE: 83 BPM

## 2020-12-02 PROBLEM — K57.92 ACUTE DIVERTICULITIS: Status: ACTIVE | Noted: 2020-12-02

## 2020-12-02 LAB
ANION GAP SERPL CALC-SCNC: 5 MMOL/L (ref 5–15)
BUN SERPL-MCNC: 5 MG/DL (ref 6–20)
BUN/CREAT SERPL: 8 (ref 12–20)
CALCIUM SERPL-MCNC: 8.7 MG/DL (ref 8.5–10.1)
CEA SERPL-MCNC: 1.5 NG/ML
CHLORIDE SERPL-SCNC: 106 MMOL/L (ref 97–108)
CO2 SERPL-SCNC: 25 MMOL/L (ref 21–32)
CREAT SERPL-MCNC: 0.64 MG/DL (ref 0.55–1.02)
ERYTHROCYTE [DISTWIDTH] IN BLOOD BY AUTOMATED COUNT: 17 % (ref 11.5–14.5)
FERRITIN SERPL-MCNC: 222 NG/ML (ref 8–252)
FOLATE SERPL-MCNC: 15.4 NG/ML (ref 5–21)
GLUCOSE SERPL-MCNC: 94 MG/DL (ref 65–100)
HCT VFR BLD AUTO: 30.3 % (ref 35–47)
HGB BLD-MCNC: 9.6 G/DL (ref 11.5–16)
IRON SATN MFR SERPL: 12 % (ref 20–50)
IRON SERPL-MCNC: 28 UG/DL (ref 35–150)
MAGNESIUM SERPL-MCNC: 2.4 MG/DL (ref 1.6–2.4)
MCH RBC QN AUTO: 28 PG (ref 26–34)
MCHC RBC AUTO-ENTMCNC: 31.7 G/DL (ref 30–36.5)
MCV RBC AUTO: 88.3 FL (ref 80–99)
NRBC # BLD: 0 K/UL (ref 0–0.01)
NRBC BLD-RTO: 0 PER 100 WBC
PHOSPHATE SERPL-MCNC: 2.6 MG/DL (ref 2.6–4.7)
PLATELET # BLD AUTO: 479 K/UL (ref 150–400)
PMV BLD AUTO: 8.8 FL (ref 8.9–12.9)
POTASSIUM SERPL-SCNC: 3.7 MMOL/L (ref 3.5–5.1)
RBC # BLD AUTO: 3.43 M/UL (ref 3.8–5.2)
SODIUM SERPL-SCNC: 136 MMOL/L (ref 136–145)
TIBC SERPL-MCNC: 239 UG/DL (ref 250–450)
VIT B12 SERPL-MCNC: 359 PG/ML (ref 193–986)
WBC # BLD AUTO: 7.9 K/UL (ref 3.6–11)

## 2020-12-02 PROCEDURE — 74011250636 HC RX REV CODE- 250/636: Performed by: INTERNAL MEDICINE

## 2020-12-02 PROCEDURE — 76040000019: Performed by: INTERNAL MEDICINE

## 2020-12-02 PROCEDURE — 99232 SBSQ HOSP IP/OBS MODERATE 35: CPT | Performed by: INTERNAL MEDICINE

## 2020-12-02 PROCEDURE — 85027 COMPLETE CBC AUTOMATED: CPT

## 2020-12-02 PROCEDURE — 82607 VITAMIN B-12: CPT

## 2020-12-02 PROCEDURE — 84100 ASSAY OF PHOSPHORUS: CPT

## 2020-12-02 PROCEDURE — 74011250636 HC RX REV CODE- 250/636: Performed by: NURSE ANESTHETIST, CERTIFIED REGISTERED

## 2020-12-02 PROCEDURE — 74011000250 HC RX REV CODE- 250: Performed by: NURSE ANESTHETIST, CERTIFIED REGISTERED

## 2020-12-02 PROCEDURE — 0DJD8ZZ INSPECTION OF LOWER INTESTINAL TRACT, VIA NATURAL OR ARTIFICIAL OPENING ENDOSCOPIC: ICD-10-PCS | Performed by: INTERNAL MEDICINE

## 2020-12-02 PROCEDURE — 76060000031 HC ANESTHESIA FIRST 0.5 HR: Performed by: INTERNAL MEDICINE

## 2020-12-02 PROCEDURE — 83735 ASSAY OF MAGNESIUM: CPT

## 2020-12-02 PROCEDURE — 82728 ASSAY OF FERRITIN: CPT

## 2020-12-02 PROCEDURE — 82746 ASSAY OF FOLIC ACID SERUM: CPT

## 2020-12-02 PROCEDURE — 82378 CARCINOEMBRYONIC ANTIGEN: CPT

## 2020-12-02 PROCEDURE — 80048 BASIC METABOLIC PNL TOTAL CA: CPT

## 2020-12-02 PROCEDURE — 74011250636 HC RX REV CODE- 250/636: Performed by: FAMILY MEDICINE

## 2020-12-02 PROCEDURE — 2709999900 HC NON-CHARGEABLE SUPPLY: Performed by: INTERNAL MEDICINE

## 2020-12-02 PROCEDURE — 83540 ASSAY OF IRON: CPT

## 2020-12-02 PROCEDURE — 36415 COLL VENOUS BLD VENIPUNCTURE: CPT

## 2020-12-02 RX ORDER — LIDOCAINE HYDROCHLORIDE 20 MG/ML
INJECTION, SOLUTION EPIDURAL; INFILTRATION; INTRACAUDAL; PERINEURAL AS NEEDED
Status: DISCONTINUED | OUTPATIENT
Start: 2020-12-02 | End: 2020-12-02 | Stop reason: HOSPADM

## 2020-12-02 RX ORDER — PROPOFOL 10 MG/ML
INJECTION, EMULSION INTRAVENOUS AS NEEDED
Status: DISCONTINUED | OUTPATIENT
Start: 2020-12-02 | End: 2020-12-02 | Stop reason: HOSPADM

## 2020-12-02 RX ORDER — ONDANSETRON 4 MG/1
4 TABLET, ORALLY DISINTEGRATING ORAL
Qty: 20 TAB | Refills: 0 | Status: ON HOLD | OUTPATIENT
Start: 2020-12-02 | End: 2021-03-28

## 2020-12-02 RX ORDER — DOCUSATE SODIUM 100 MG/1
100 CAPSULE, LIQUID FILLED ORAL 2 TIMES DAILY
Qty: 60 CAP | Refills: 1 | Status: ON HOLD | OUTPATIENT
Start: 2020-12-02 | End: 2021-03-28

## 2020-12-02 RX ORDER — FENTANYL CITRATE 50 UG/ML
100 INJECTION, SOLUTION INTRAMUSCULAR; INTRAVENOUS
Status: DISCONTINUED | OUTPATIENT
Start: 2020-12-02 | End: 2020-12-02 | Stop reason: HOSPADM

## 2020-12-02 RX ORDER — LEVOTHYROXINE SODIUM 75 UG/1
75 TABLET ORAL
Qty: 30 TAB | Refills: 1 | Status: SHIPPED | OUTPATIENT
Start: 2020-12-02 | End: 2022-03-30 | Stop reason: ALTCHOICE

## 2020-12-02 RX ORDER — SODIUM CHLORIDE 9 MG/ML
50 INJECTION, SOLUTION INTRAVENOUS CONTINUOUS
Status: DISPENSED | OUTPATIENT
Start: 2020-12-02 | End: 2020-12-02

## 2020-12-02 RX ORDER — METRONIDAZOLE 500 MG/100ML
500 INJECTION, SOLUTION INTRAVENOUS EVERY 12 HOURS
Status: DISCONTINUED | OUTPATIENT
Start: 2020-12-02 | End: 2020-12-02 | Stop reason: HOSPADM

## 2020-12-02 RX ORDER — SODIUM CHLORIDE 9 MG/ML
INJECTION, SOLUTION INTRAVENOUS
Status: DISCONTINUED | OUTPATIENT
Start: 2020-12-02 | End: 2020-12-02 | Stop reason: HOSPADM

## 2020-12-02 RX ORDER — POLYETHYLENE GLYCOL 3350 17 G/17G
17 POWDER, FOR SOLUTION ORAL DAILY
Qty: 30 PACKET | Refills: 1 | Status: SHIPPED | OUTPATIENT
Start: 2020-12-02

## 2020-12-02 RX ORDER — ATROPINE SULFATE 0.1 MG/ML
0.5 INJECTION INTRAVENOUS
Status: DISCONTINUED | OUTPATIENT
Start: 2020-12-02 | End: 2020-12-02 | Stop reason: HOSPADM

## 2020-12-02 RX ORDER — EPINEPHRINE 0.1 MG/ML
1 INJECTION INTRACARDIAC; INTRAVENOUS
Status: DISCONTINUED | OUTPATIENT
Start: 2020-12-02 | End: 2020-12-02 | Stop reason: HOSPADM

## 2020-12-02 RX ORDER — LEVOFLOXACIN 500 MG/1
500 TABLET, FILM COATED ORAL
Qty: 5 TAB | Refills: 0 | Status: SHIPPED | OUTPATIENT
Start: 2020-12-02 | End: 2020-12-12

## 2020-12-02 RX ORDER — PROPOFOL 10 MG/ML
INJECTION, EMULSION INTRAVENOUS
Status: DISCONTINUED | OUTPATIENT
Start: 2020-12-02 | End: 2020-12-02 | Stop reason: HOSPADM

## 2020-12-02 RX ORDER — NALOXONE HYDROCHLORIDE 0.4 MG/ML
0.4 INJECTION, SOLUTION INTRAMUSCULAR; INTRAVENOUS; SUBCUTANEOUS
Status: DISCONTINUED | OUTPATIENT
Start: 2020-12-02 | End: 2020-12-02 | Stop reason: HOSPADM

## 2020-12-02 RX ORDER — DEXTROMETHORPHAN/PSEUDOEPHED 2.5-7.5/.8
1.2 DROPS ORAL
Status: DISCONTINUED | OUTPATIENT
Start: 2020-12-02 | End: 2020-12-02 | Stop reason: HOSPADM

## 2020-12-02 RX ORDER — LEVOFLOXACIN 5 MG/ML
500 INJECTION, SOLUTION INTRAVENOUS
Status: DISCONTINUED | OUTPATIENT
Start: 2020-12-02 | End: 2020-12-02 | Stop reason: HOSPADM

## 2020-12-02 RX ORDER — METRONIDAZOLE 500 MG/1
500 TABLET ORAL 2 TIMES DAILY
Qty: 20 TAB | Refills: 0 | Status: SHIPPED | OUTPATIENT
Start: 2020-12-02 | End: 2020-12-12

## 2020-12-02 RX ORDER — MIDAZOLAM HYDROCHLORIDE 1 MG/ML
.25-5 INJECTION, SOLUTION INTRAMUSCULAR; INTRAVENOUS
Status: DISCONTINUED | OUTPATIENT
Start: 2020-12-02 | End: 2020-12-02 | Stop reason: HOSPADM

## 2020-12-02 RX ORDER — FLUMAZENIL 0.1 MG/ML
0.2 INJECTION INTRAVENOUS
Status: DISCONTINUED | OUTPATIENT
Start: 2020-12-02 | End: 2020-12-02 | Stop reason: HOSPADM

## 2020-12-02 RX ADMIN — METRONIDAZOLE 500 MG: 500 INJECTION, SOLUTION INTRAVENOUS at 08:59

## 2020-12-02 RX ADMIN — POTASSIUM CHLORIDE, DEXTROSE MONOHYDRATE AND SODIUM CHLORIDE 75 ML/HR: 150; 5; 450 INJECTION, SOLUTION INTRAVENOUS at 00:18

## 2020-12-02 RX ADMIN — SODIUM CHLORIDE 100 MCG: 9 INJECTION INTRAMUSCULAR; INTRAVENOUS; SUBCUTANEOUS at 07:24

## 2020-12-02 RX ADMIN — ENOXAPARIN SODIUM 40 MG: 40 INJECTION SUBCUTANEOUS at 08:55

## 2020-12-02 RX ADMIN — SODIUM CHLORIDE: 9 INJECTION, SOLUTION INTRAVENOUS at 06:56

## 2020-12-02 RX ADMIN — SODIUM CHLORIDE 50 MCG: 9 INJECTION INTRAMUSCULAR; INTRAVENOUS; SUBCUTANEOUS at 07:26

## 2020-12-02 RX ADMIN — PROPOFOL 65 MCG/KG/MIN: 10 INJECTION, EMULSION INTRAVENOUS at 07:03

## 2020-12-02 RX ADMIN — LIDOCAINE HYDROCHLORIDE 10 MG: 20 INJECTION, SOLUTION INTRAVENOUS at 07:03

## 2020-12-02 RX ADMIN — LIDOCAINE HYDROCHLORIDE 10 MG: 20 INJECTION, SOLUTION INTRAVENOUS at 07:11

## 2020-12-02 RX ADMIN — LIDOCAINE HYDROCHLORIDE 10 MG: 20 INJECTION, SOLUTION INTRAVENOUS at 07:07

## 2020-12-02 RX ADMIN — LEVOFLOXACIN 500 MG: 5 INJECTION, SOLUTION INTRAVENOUS at 08:55

## 2020-12-02 RX ADMIN — SODIUM CHLORIDE 50 ML/HR: 900 INJECTION, SOLUTION INTRAVENOUS at 06:53

## 2020-12-02 RX ADMIN — PROPOFOL 50 MG: 10 INJECTION, EMULSION INTRAVENOUS at 07:03

## 2020-12-02 RX ADMIN — SODIUM CHLORIDE 50 MCG: 9 INJECTION INTRAMUSCULAR; INTRAVENOUS; SUBCUTANEOUS at 07:22

## 2020-12-02 NOTE — ROUTINE PROCESS
Piero Maine  2/13/1930  [id number]    Situation:  Verbal report received from: [facility]  Procedure: Procedure(s):  SIGMOIDOSCOPY FLEXIBLE    Background:    Preoperative diagnosis: abdominal pain  Postoperative diagnosis: 1. diverticulitis    :  Dr. Win Brown  Assistant(s): Endoscopy Technician-1: Harland Boast  Endoscopy RN-1: Richmond Burrell    Specimens: * No specimens in log *  H. Pylori  no    Assessment:  Intra-procedure medications   Anesthesia gave intra-procedure sedation and medications, see anesthesia flow sheet yes    Intravenous fluids: NS@ KVO     Vital signs stable     Abdominal assessment: round and soft     Recommendation:.   Return to floor

## 2020-12-02 NOTE — PROGRESS NOTES
Pharmacy Dosing Note    Ordered Regimen: Levofloxacin 500 mg IV every 24 hours    New Regimen: Change to levofloxacin 500 mg IV every 48 hours for CrCl between 20 and 50 mL/min per protocol. Estimated Creatinine Clearance: 44.9 mL/min (by C-G formula based on SCr of 0.64 mg/dL).     Thank you,  Oksana Meraz, PHARMD

## 2020-12-02 NOTE — PROGRESS NOTES
Bedside shift change report given to Maxine Harrison RN (oncoming nurse) by Giles Degroot RN (offgoing nurse). Report included the following information SBAR, Kardex, Procedure Summary, Intake/Output and Recent Results.

## 2020-12-02 NOTE — ADT AUTH CERT NOTES
MESSAGE FROM NURSE:    I've got nothing else to add except the patient is 80years old and not a big complainer. CT evidence of bowel obstruction, possible mass following c/o lower quad pain, nausea and months of occasional constipation. Severe restriction noted during colonoscopy, luckily just needs antibiotic IV/po to treat. Facility Name: [address]                               Patient Demographics     Patient Name   Emy Cruz  Sex   Female     1930  Address   [facility] [address] [facility] [address] [id number]  Phone   [phone] (Home)   [phone] (Mobile) *[facility]     Name  Acct ID  Class  Status  Primary Coverage    Emy Cruz  [id number]  [address] HMO            Guarantor Account (for Hospital Account [de-identified])     Name  Relation to Pt  Service Area  Active? Acct Type    Emy Cruz  [facility]  Yes  Personal/Family    Address  Phone      [address] [facility]   [address], [address] [facility]  [phone](V)              Coverage Information (for Hospital Account [de-identified])     F/O Payor/Plan  Subscriber   Subscriber Sex  Precert #    [facility] MED[facility] MEDICARE O  30  F     Subscriber  Subscriber #    Emy Cruz  [id number]    [facility] #  Group Name    [id number]  [facility]    Address  Phone    PO  [facility], [address] [place] [address]  [phone]    Policy Number  Status  Effective Date  Benefits Phone    [id number]  -   -    Auth/Cert    REF# [id number]            Diagnosis      Codes  Comments    [facility]) [facility]  ICD-10-CM: F53.695   ICD-9-CM: 487. 9             Admission Information     Arrival Date/Time:  2020 1405  Admit Date/Time:  2020 1405  IP Adm.  Date/Time:  2020    Admission Type:  Emergency  Point of Origin:  Non-health Care Facility/self  Admit Category:  Home    Means of Arrival:  Ambulance  Primary Service:  Medicine  Secondary Service:  N/A    Transfer Source:   Service Area:  [address] [place] [address]  Unit:  [facility]  MED SURG 2    Admit Provider:  Kirby Jeffries MD  Attending Provider:  Juanita Jacobs MD  Referring Provider:     Admission Information     Attending Provider  Admission Dx  Admitted on     SBO (small bowel obstruction) ([facility]  11/30/20    Service  Isolation  Code Status    MEDICINE   Prior    Allergies  Advance Care Planning     Alendronate, Amoxicillin, Hydrochlorothiazide  Jump to the Activity      Admission Information     [facility]Bed[facility]  [facility] 5M2 MED SURG 2/01  Service:  MEDICINE    Admitting provider:  Kirby Jeffries MD  Phone:  [phone]    Attending provider:   Phone:     PCP:  Jelani Martinez MD  Phone:  [phone]    Admission dx:   Patient class:  I    Admission type:  ER

## 2020-12-02 NOTE — PROGRESS NOTES
TRANSFER - OUT REPORT:    Verbal report given to Sharon(name) on Walker Lara  being transferred to [facility](unit) for routine post - op       Report consisted of patients Situation, Background, Assessment and   Recommendations(SBAR). Information from the following report(s) SBAR, Procedure Summary, Recent Results, Med Rec Status and Cardiac Rhythm SR was reviewed with the receiving nurse. Lines:   Peripheral IV 12/01/20 Anterior;Right Forearm (Active)   Site Assessment Clean, dry, & intact 12/02/20 0640   Phlebitis Assessment 0 12/02/20 0640   Infiltration Assessment 0 12/02/20 0640   Dressing Status Clean, dry, & intact 12/02/20 0640   Dressing Type Tape;Transparent 12/02/20 0640   Hub Color/Line Status Pink; Infusing;Flushed 12/02/20 0640   Action Taken Open ports on tubing capped 12/02/20 0640   Alcohol Cap Used Yes 12/02/20 0640        Opportunity for questions and clarification was provided.       Patient transported with:   [employer]

## 2020-12-02 NOTE — DISCHARGE INSTRUCTIONS
HOSPITALIST DISCHARGE INSTRUCTIONS  NAME: Michael Carvalho   :  1930   MRN:  [id number]     Date/Time:  2020 11:45 AM    ADMIT DATE: 2020     DISCHARGE DATE: 2020     ADMITTING DIAGNOSIS:  Small bowel obstruction, acute diverticulitis     DISCHARGE DIAGNOSIS:  same    MEDICATIONS:  See after visit summary       · It is important that you take the medication exactly as they are prescribed. · Keep your medication in the bottles provided by the pharmacist and keep a list of the medication names, dosages, and times to be taken in your wallet. · Do not take other medications without consulting your doctor     Pain Management: per above medications    What to do at Home    Recommended diet:  Regular Diet    Recommended activity: Activity as tolerated    1) Return to the hospital if you feel worse    2) If you experience any of the following symptoms then please call your primary care physician or return to the emergency room if you cannot get hold of your doctor:  Fever, chills, nausea, vomiting, diarrhea, change in mentation, falling, bleeding, shortness of breath, chest pain, severe headache, severe abdominal pain,     3) Avoid constipation. Drink plenty of fluids    4) Finish antibiotics as directed    Follow Up: Follow-up Information     Follow up With Specialties Details Why Contact Info    Andie Hammer MD Family Medicine Schedule an appointment as soon as possible for a visit in 1 week  [phone]      Yehuda Calvert MD Gastroenterology Schedule an appointment as soon as possible for a visit in 2 weeks As needed, If symptoms worsen [address] [facility]  [phone]              Information obtained by :  I understand that if any problems occur once I am at home I am to contact my physician. I understand and acknowledge receipt of the instructions indicated above. [de-identified] or R.N.'s Signature                                                                  Date/Time                                                                                                                                              Patient or Representative Signature                                                          Date/Time    Patient Education        Diverticulitis: Care Instructions  Overview     Diverticulitis occurs when pouches form in the wall of the colon and become inflamed or infected. It can be very painful. Doctors aren't sure what causes diverticulitis. There is no proof that foods such as nuts, seeds, or berries cause it or make it worse. A low-fiber diet may cause the colon to work harder to push stool forward. Pouches may form because of this extra work. It may be hard to think about healthy eating while you're in pain. But as you recover, you might think about how you can use healthy eating for overall better health. Healthy eating may help you avoid future attacks. Follow-up care is a key part of your treatment and safety. Be sure to make and go to all appointments, and call your doctor if you are having problems. It's also a good idea to know your test results and keep a list of the medicines you take. How can you care for yourself at home? · Drink plenty of fluids, enough so that your urine is light yellow or clear like water. If you have kidney, heart, or liver disease and have to limit fluids, talk with your doctor before you increase the amount of fluids you drink. · Stay with liquids or a bland diet (plain rice, bananas, dry toast or crackers, applesauce) until you are feeling better. Then you can return to regular foods and slowly increase the amount of fiber in your diet. · Use a heating pad set on low on your belly to relieve mild cramps and pain.   · Get extra rest until you are feeling better. · Be safe with medicines. Read and follow all instructions on the label. ? If the doctor gave you a prescription medicine for pain, take it as prescribed. ? If you are not taking a prescription pain medicine, ask your doctor if you can take an over-the-counter medicine. · If your doctor prescribed antibiotics, take them as directed. Do not stop taking them just because you feel better. You need to take the full course of antibiotics. · Do not use laxatives or enemas unless your doctor tells you to use them. When should you call for help? Call your doctor now or seek immediate medical care if:    · You have a fever.     · You are vomiting.     · You have new or worse belly pain.     · You cannot pass stools or gas. Watch closely for changes in your health, and be sure to contact your doctor if you have any problems. Where can you learn more? Go to http://www.gray.com/  Enter H901 in the search box to learn more about \"Diverticulitis: Care Instructions. \"  Current as of: April 15, 2020               Content Version: 12.6  © 3032-6307 [employer]. Care instructions adapted under license by [employer] (which disclaims liability or warranty for this information). If you have questions about a medical condition or this instruction, always ask your healthcare professional. [address] any warranty or liability for your use of this information.

## 2020-12-02 NOTE — PROGRESS NOTES
Hospital follow-up PCP transitional care appointment has been scheduled with Dr. Elizabeth Henriquez  for Tuesday, 12/8/20 at 11:45 a.m. Pending patient discharge.   Judith Ovalle, Care Management Specialist.

## 2020-12-02 NOTE — ANESTHESIA PREPROCEDURE EVALUATION
Relevant Problems   No relevant active problems       Anesthetic History   No history of anesthetic complications            Review of Systems / Medical History  Patient summary reviewed and pertinent labs reviewed    Pulmonary  Within defined limits                 Neuro/Psych           Pertinent negatives: No seizures, TIA and CVA   Cardiovascular    Hypertension            Pertinent negatives: No past MI, angina and CHF       GI/Hepatic/Renal  Within defined limits           Pertinent negatives: No renal disease   Endo/Other    Diabetes  Hypothyroidism  Arthritis and anemia     Other Findings   Comments: RA on MTX           Physical Exam    Airway  Mallampati: II  TM Distance: 4 - 6 cm    Mouth opening: Normal     Cardiovascular      Rate: normal         Dental    Dentition: Edentulous     Pulmonary  Breath sounds clear to auscultation               Abdominal  GI exam deferred       Other Findings            Anesthetic Plan    ASA: 3  Anesthesia type: MAC          Induction: Intravenous  Anesthetic plan and risks discussed with: Patient

## 2020-12-02 NOTE — ANESTHESIA POSTPROCEDURE EVALUATION
Procedure(s):  SIGMOIDOSCOPY FLEXIBLE. MAC    Anesthesia Post Evaluation        Patient location during evaluation: PACU  Patient participation: complete - patient participated  Level of consciousness: sleepy but conscious  Pain management: adequate  Airway patency: patent  Anesthetic complications: no  Cardiovascular status: acceptable and stable  Respiratory status: acceptable and unassisted  Hydration status: acceptable  Post anesthesia nausea and vomiting:  none  Final Post Anesthesia Temperature Assessment:  Normothermia (36.0-37.5 degrees C)      INITIAL Post-op Vital signs:   Vitals Value Taken Time   /57 12/2/2020  7:40 AM   Temp 36.6 °C (97.8 °F) 12/2/2020  7:35 AM   Pulse 65 12/2/2020  7:41 AM   Resp 18 12/2/2020  7:41 AM   SpO2 100 % 12/2/2020  7:41 AM   Vitals shown include unvalidated device data.

## 2020-12-02 NOTE — PERIOP NOTES
[facility]  2/13/1930  [id number]    Situation:    Scheduled Procedure: Procedure(s):  SIGMOIDOSCOPY FLEXIBLE  Verbal report received from: CORI Rodas  Preoperative diagnosis: abdominal pain    Background:    Procedure: Procedure(s):  [address]  Physician performing procedure; Dr. Jeanette Steward RN    NPO Status/Last PO Intake: midnight    Pregnancy Test:Not applicable If yes, result: none    Is the patient taking Blood Thinners: NO If yes, list:  and last taken   Is the patient diabetic:no          Does the patient have a Pacemaker/Defibrillator in place?: no   Does the patient need antibiotics before/during/after procedure: no   If the patient is having a colon, How much prep was drank? What were the Colon prep results? Does the patient have SCD in place:no   Is patient on CONTACT precautions:no        If yes, what kind of CONTACT precautions:     Assessment:  Are the vital signs stable prior to patient coming to ENDO?  yes  Is the patient alert/oriented and able to sign consent for the procedures:yes  How does the patient's abdomen feel prior to coming to ENDO? round and soft yes   Does the patient have a patient IV in place?  yes     Recommendation:  Family or Friend present no     Permission to share finding with Family or Friend yes

## 2020-12-02 NOTE — PROGRESS NOTES
Discharge information provided to patient and son. IV removed. Time for questions and concerns allowed. Prescriptions provided. Family will transport home. RN will continue to monitor until discharge. 1225 Patient dressed and ready to transport home with son. Volunteer request placed.

## 2020-12-02 NOTE — PROCEDURES
[address]  [phone]      2020    Colonoscopy Procedure Note  Yuliya Perrin  :  1930  [place] Medical Record Number: [id number]    Indications:     Abdominal pain, LLQ, abnormal CT scan  PCP:  Whitney Sims MD  Anesthesia/Sedation: see nursing notes  Endoscopist:  Dr. Annette Owen  Assistants: None  Complications:  None  Estimate Blood Loss:  None    Permit:  The indications, risks, benefits and alternatives were reviewed with the patient or their decision maker who was provided an opportunity to ask questions and all questions were answered. The specific risks of colonoscopy with conscious sedation were reviewed, including but not limited to anesthetic complication, bleeding, adverse drug reaction, missed lesion, infection, IV site reactions, and intestinal perforation which would lead to the need for surgical repair. Alternatives to colonoscopy including radiographic imaging, observation without testing, or laboratory testing were reviewed including the limitations of those alternatives. After considering the options and having all their questions answered, the patient or their decision maker provided both verbal and written consent to proceed. Procedure in Detail:  After obtaining informed consent, positioning of the patient in the left lateral decubitus position, and conduction of a pre-procedure pause or \"time out\" the endoscope was introduced into the anus and advanced to the hepatic flexure. The quality of the colonic preparation was adequate. A careful inspection was made as the colonoscope was withdrawn, findings and interventions are described below.     Because of marked sigmoid narrowing seen by CT GIF scope used instead of CF scope; scope length is shorter limiting length of exam     Findings:   Numerous sigmoid diverticulosis with additional diverticulosis elsewhere. Marked colonic wall edema narrows sigmoid lumen. No primary mucosal mass identified. Specimens:    none    Implants: none    Complications:   None; patient tolerated the procedure well. Estimated blood loss: none    Impression:  colonic diverticulitis    Recommendations:      - IV antibiotics; repeat image after total three weeks combination IV then oral antibiotics   Other recommendations depend on follow up imaging after antibiotics. Length of IV therapy on your discretion  Thanks    Thank you for entrusting me with this patient's care. Please do not hesitate to contact me with any questions or if I can be of assistance with any of your other patients' GI needs.     Signed By: Clovis De Guzman MD                        December 2, 2020

## 2020-12-02 NOTE — ADT AUTH CERT NOTES
Patient Demographics     Patient Name   Carlie Moore   [id number]  Sex   Female     1930  Address   [address] [facility] [place] [address]  Phone   [phone] (Home)   [phone] (Mobile) *Preferred*    CSN:    [id number]    Admit Date:  Admit Time  Room  Bed    2020   2:05 PM  535 [[id number]]  01 [[id number]]    Attending Providers     Provider  Pager  From  To    Jessica Stevenson MD   20    Alla Lopez MD   20    Kalyan Triplett MD   20    Emergency Contact(s)     Name  Janine Urias  [phone]    Utilization Reviews         Intestinal Obstruction - Care Day 3 (2020) by Shelbie Cushing, RN         Review Entered  Review Status    2020 12:51  Completed        Criteria Review       Care Day: 3 Care Date: 2020 Level of Care: Inpatient Floor    Guideline Day 3    Level Of Care    (X) Floor to discharge    2020 12:51:14 EST by Geo Meyer      dc today    Clinical Status    (X) * Hemodynamic stability    2020 12:51:14 EST by Geo Meyer      T 09, P 83, /66, R 18, 99% RA    (X) * Nausea and vomiting absent or controlled and acceptable for next level of care    (X) * Electrolyte abnormalities absent or acceptable for next level of care    (X) * Oral hydration maintained    2020 12:51:14 EST by Geo Meyer      Scope today consistent with diverticulitis  Tolerating diet    (X) * Pain absent or managed    (X) * Surgery not indicated    2020 12:51:14 EST by Geo Meyer      No acute surgical intervention    (X) * Discharge plans and education understood    Activity    (X) * Ambulatory or acceptable for next level of care [C]    2020 12:51:14 EST by Geo Meyer      Current Level of Function Impacting Discharge (mobility/balance): CG A-min A x 1 for amb 120' with RW    Routes    (X) * Oral hydration, medications, and diet    12/2/2020 12:51:14 EST by Blake Ordonez tolerating po diet/meds  Cardiac diet    * Milestone    Additional Notes    12/2/20    Colonoscopy Procedure Note    Indications:     Abdominal pain, LLQ, abnormal CT scan    Complications:  None    Estimate Blood Loss:  None    Findings:    Numerous sigmoid diverticulosis with additional diverticulosis elsewhere. Jenni Burr colonic wall edema narrows sigmoid lumen.  No primary mucosal mass identified.           Specimens:     none         Implants: none         Complications:    None; patient tolerated the procedure well. Estimated blood loss: none         Impression:    colonic diverticulitis         Recommendations:       - IV antibiotics; repeat image after total three weeks combination IV then oral antibiotics    Other recommendations depend on follow up imaging after antibiotics.  Length of IV therapy on your discretion       GI Note    Colon exam today:  Compatible with colonic diverticulitis not primary colonic cancer.            Recommend:  - IV antibiotics; repeat image after total three weeks combination IV then oral antibiotics    Other recommendations depend on follow up imaging after completion of antibiotics.          Length of IV therapy on your discretion    Thanks; see again as needed          Heme/Onc Note    Colonoscopy negative for primary colon cancer, more consistent with diverticulitis. Perri Carpenter recommending treatment for this, with follow up imaging in the future, and I agree.           As there is no definite evidence of malignancy at this time, I will sign off.  However, I remain available if needed in the future.        General Surgery Note    Assessment/Plan    Scope today consistent with diverticulitis    Tolerating diet    Home with abx regimen and repeat imaging in 3 weeks    Ok for d/c home when medically cleared    No acute surgical intervention    Please call with further questions or concerns          12/2 Discharge Summary    Admit date: 11/30/2020         Discharge date and time: 12/2/2020 11:46 AM         Admission Diagnoses: SBO (small bowel obstruction) ([facility] [K56.609]         Discharge Diagnoses:  Principal Diagnosis Acute diverticulitis                                             Principal Problem:      Acute diverticulitis (12/2/2020)         Active Problems:      SBO (small bowel obstruction) ([facility] (11/30/2020)              12/2/20      NS IV @ 50 ml/h, flagyl 500 mg IV q12h, levaquin 500 mg IV q48h         Hospital Course:         81 yo hx of HTN, DM, RA, presented w/ dizziness, HA, abd pain, found to have a SBO, acute diverticulitis         1) Acute diverticulitis/abd pain: much improved.  Colonoscopy consistent with diverticulitis rather than mass.  Will treat with levaquin/flagyl x10 days, pro-biotics, bowel regimen.  Educated patient on diverticulitis.  F/u with GI prn         2) SBO: due to diverticulitis.  Now resolved         3) Dizziness/HA: now resolved.  Likely from dehydration.  Head CT neg         4) Hypothyroid: TSH 7.2.  Synthroid was increased         5) HTN: BP stable.  Cont lisinopril, norvasc         6) DM type 2: A1C 5.6.  No further management         7) RA: cont MTX.  F/u with Rheum         PCP: Herman Valentine MD         Consults: GI, gen surg, oncology         Significant Diagnostic Studies: colonoscopy         Discharge Exam:    Physical Exam:         Gen:    Elderly, frail, pleasant, NAD    HEENT:  Pink conjunctivae, PERRL, hearing intact to voice, moist mucous membranes    Neck:  Supple, without masses, thyroid non-tender    Resp:  No accessory muscle use, clear breath sounds without wheezes rales or rhonchi    Card:   No murmurs, normal S1, S2 without thrills, bruits or peripheral edema    Abd:  Soft, non-tender, non-distended, normoactive bowel sounds are present    Lymph:  No cervical or inguinal adenopathy    Musc:  No cyanosis or clubbing    Skin:   No rashes    Neuro:  Cranial nerves are grossly intact, no focal motor weakness, follows commands appropriately    Psych:  Good insight, oriented to person, place and time, alert         Disposition: home    Discharge Condition: Stable    START taking these medications         Details    levoFLOXacin (LEVAQUIN) 500 mg tablet    Take 1 Tab by mouth every fourty-eight (48) hours for 10 days. Qty: 5 Tab, Refills: 0         metroNIDAZOLE (FlagyL) 500 mg tablet    Take 1 Tab by mouth two (2) times a day for 10 days. Qty: 20 Tab, Refills: 0         docusate sodium (COLACE) 100 mg capsule    Take 1 Cap by mouth two (2) times a day. Qty: 60 Cap, Refills: 1         polyethylene glycol (Miralax) 17 gram packet    Take 1 Packet by mouth daily. Qty: 30 Packet, Refills: 1         ondansetron (Zofran ODT) 4 mg disintegrating tablet    Take 1 Tab by mouth every eight (8) hours as needed for Nausea or Vomiting. Qty: 20 Tab, Refills: 0         L.acidoph & parac-S.therm-Bifido (BRENNEN Q2/RISAQUAD-2) 16 billion cell cap cap    Take 1 Cap by mouth daily. Qty: 30 Cap, Refills: 0        Intestinal Obstruction - Care Day 2 (12/1/2020) by Charlene Bolivar RN         Review Entered  Review Status    12/2/2020 12:41  Completed        Criteria Review       Care Day: 2 Care Date: 12/1/2020 Level of Care: Inpatient Floor    Guideline Day 2    Level Of Care    (X) Floor    12/2/2020 12:41:39 EST by Nura Grain      medical    Clinical Status    (X) * Hypotension absent    12/2/2020 12:41:39 EST by Nura Grain      VS  T 98.2   P 77   B/P 148/72   R 16   SpO2 98 %   RA    (X) * Nausea and vomiting absent or improved    12/2/2020 12:41:39 EST by Nura Grain      no antiemetics noted    (X) * Pain absent or managed    12/2/2020 12:41:39 EST by Nura Grain      no pain meds given    ( ) * Abdominal exam stable or improved    12/2/2020 12:41:39 EST by Nura Grain      GI:  Abdomen nondistended, soft, and nontender.  Normal active bowel sounds.  No enlargement of the liver or spleen. LLQ mass effect palpable. Activity    (X) Activity as tolerated    12/2/2020 12:41:39 EST by Harman Ly as tj w/assist    Routes    (X) IV fluids, medications    12/2/2020 12:41:39 EST by Kenyetta Head      D5 1/2 NS wKCL 20 mEq IV @ 75 ml/h    ( ) Diet as tolerated    12/2/2020 12:41:39 EST by Kenyetta Head      NPO prep    Interventions    (X) * NG tube absent    (X) Surgical re-evaluation    ( ) Possible follow-up abdominal imaging    12/2/2020 12:41:39 EST by Kenyetta Head      colonoscopy tomorrow  CT CHEST W CONT IMPRESSION:  No evidence of metastatic disease or acute abnormality    * Milestone    Additional Notes    Day 2 12/1       GI Consult    80 y.o. female who is seen in consultation for abnormal CT. Patient is admitted with a myriad of symptoms of which abdominal pain is included.  She cannot state as to how long those pains might have been there; she does have irregular bowel movements.  She has not had fever, vomiting, visible blood loss.  She states she is likely lost some weight but cannot state as to how much that might have been. GI:  Abdomen nondistended, soft, and nontender.  Normal active bowel sounds. No enlargement of the liver or spleen. LLQ mass effect palpable.        Active Problems:      SBO (small bowel obstruction) ([address] (11/30/2020)         I've discussed colonoscopy possible biopsy, polypectomy, cautery, injection, alternatives, complications including but not limited to pain, cardiopulmonary event, bleeding, perforation requiring additional blood transfusion or operative repair; all questions answered       12/1 IM attending note    Physical Exam:         Gen:  Elderly, frail, pleasant, NAD    HEENT:  Pink conjunctivae, PERRL, hard of hearing, moist mucous membranes    Neck:  Supple, without masses, thyroid non-tender    Resp:  No accessory muscle use, clear breath sounds without wheezes rales or rhonchi    Card:  No murmurs, normal S1, S2 without thrills, bruits or peripheral edema    Abd:  Soft, generalized tenderness, non-distended, decreased BS    Musc:  No cyanosis or clubbing    Skin:  No rashes    Neuro:  Cranial nerves 3-12 are grossly intact, follows commands appropriately    Psych:  Good insight, oriented to person, place and time, alert       79 yo hx of HTN, DM, RA, presented w/ dizziness, HA, abd pain, found to have a SBO, new sigmoid mass         1) SBO: due to sigmoid mass.  Cont NPO, IVF, IV dilaudid prn severe pain.  Use NGT if needed         2) New sigmoid mass: likely colon CA.  Will consult GI, Gen surg, Oncology         3) Dizziness/HA: now resolved.  Likely from dehydration.  Head CT neg         4) Hypothyroid: TSH 7.2.  Will start IV synthroid due to NPO         5) HTN: BP stable.  Hold lisinopril, norvasc.  Start IV hydralazine prn         6) DM type 2: A1C 5.6.  No further management         7) RA: hold MTX for now          Hematology Consult 12/1    Assessment/Plan    1. Small bowel obstruction    GI consulted: plan for colonoscopy in am 12/2    General surgery consulted         2. Questionable sigmoid mass    Will await findings from colonoscopy    Complete work up with CT Chest    Will add CEA         3. Anemia, normocytic    Add anemia labs to eval for correctable causes    Continue to monitor and transfuse for Hgb < 7         4. Elevated TSH    May need to adjust synthroid; will defer to primary team         5. RA    Reports takes MTX monthly; currently managed by PCP       General Surgery Note 12/1    All questions answered. Citlali Dubose [facility] colonoscopy tomorrow.        Results:      Hgb 9.9    Hct 30.6    Plate  710    TSH 4.81       Medications:       Lovenox 40 mg sq daily    Miralax 17g po q1h x 8, Lactulose 45 ml po x 3       Orders:     Soap suds enema

## 2020-12-02 NOTE — PROGRESS NOTES
Assessment / Plan:   Scope today consistent with diverticulitis  Tolerating diet  Home with abx regimen and repeat imaging in 3 weeks  Ok for d/c home when medically cleared  No acute surgical intervention  Please call with further questions or concerns      Kaleb Quintana MD  [facility]  Office:  [phone]        General Surgery Daily Progress Note      Patient: Maldonado Crowder MRN: [id number]  SSN: [id number]    YOB: 1930  Age: 80 y.o. Sex: female        Subjective:   Patient feels well today after procedure      Current Facility-Administered Medications   Medication Dose Route Frequency    0.9% sodium chloride infusion  50 mL/hr IntraVENous CONTINUOUS    metroNIDAZOLE (FLAGYL) IVPB premix 500 mg  500 mg IntraVENous Q12H    levoFLOXacin (LEVAQUIN) 500 mg in D5W IVPB  500 mg IntraVENous Q48H    dextrose 5% - 0.45% NaCl with KCl 20 mEq/L infusion  75 mL/hr IntraVENous CONTINUOUS    levothyroxine (SYNTHROID) injection 37.5 mcg  37.5 mcg IntraVENous Q24H    hydrALAZINE (APRESOLINE) 20 mg/mL injection 20 mg  20 mg IntraVENous Q6H PRN    HYDROmorphone (DILAUDID) syringe 0.5 mg  0.5 mg IntraVENous Q4H PRN    sodium chloride (NS) flush 5-40 mL  5-40 mL IntraVENous Q8H    sodium chloride (NS) flush 5-40 mL  5-40 mL IntraVENous PRN    acetaminophen (TYLENOL) tablet 650 mg  650 mg Oral Q6H PRN    polyethylene glycol (MIRALAX) packet 17 g  17 g Oral DAILY PRN    ondansetron (ZOFRAN) injection 4 mg  4 mg IntraVENous Q6H PRN    enoxaparin (LOVENOX) injection 40 mg  40 mg SubCUTAneous DAILY        Objective:   12/02 0701 - 12/02 1900  In: 200 [I.V.:200]  Out: -   11/30 1901 - 12/02 0700  In: 2398.8 [P.O.:100;  I.V.:2298.8]  Out: 400 [Urine:400]  Patient Vitals for the past 8 hrs:   BP Temp Pulse Resp SpO2   12/02/20 0844 (!) 138/58 98.1 °F (36.7 °C) 60 18 100 %   12/02/20 0745 (!) 145/53 -- 63 19 100 %   12/02/20 0740 (!) 123/57 -- 64 17 100 %   12/02/20 0735 (!) 126/48 97.8 °F (36.6 °C) 66 13 98 %   12/02/20 0635 (!) 154/56 98.4 °F (36.9 °C) 83 20 98 %   12/02/20 0417 -- 97.4 °F (36.3 °C) 82 16 96 %       Physical Exam:  General: Alert, cooperative, no distress, appears stated age. Neck:  Supple, symmetrical, trachea midline  Lungs: Equal expansion, no distress  Heart:  Regular rate and rhythm,   Abdomen: Soft, non tender. Bowel sounds normal.  Extremities: Extremities normal, atraumatic, no cyanosis or edema.   Skin:  Skin color, texture, turgor normal. No rashes or lesions    Labs:   Recent Labs     12/02/20 0428   WBC 7.9   HGB 9.6*   HCT 30.3*   *     Recent Labs     12/02/20 0428 12/01/20 0437    137   K 3.7 3.8    106   CO2 25 25   GLU 94 94   BUN 5* 7   CREA 0.64 0.61   CA 8.7 8.3*   MG 2.4 2.3   PHOS 2.6 3.3   ALB  --  2.7*   TBILI  --  0.3   ALT  --  14       ·     Active Problems:    SBO (small bowel obstruction) ([facility] (11/30/2020)        Problem List Items Addressed This Visit     SBO (small bowel obstruction) ([facility] - Primary      Other Visit Diagnoses     Colonic mass        Anemia, unspecified type        Relevant Medications    multivit-min/iron/folic/lutein (CENTRUM SILVER WOMEN PO)

## 2020-12-02 NOTE — DISCHARGE SUMMARY
[facility]  [address] [facility], [address] [place] [address]  [phone]    Physician Discharge Summary     Patient ID:  Neda Vasques  [id number]  80 y.o.  2/13/1930    Admit date: 11/30/2020    Discharge date and time: 12/2/2020 11:46 AM    Admission Diagnoses: SBO (small bowel obstruction) ([facility] [K56.609]    Discharge Diagnoses:  Principal Diagnosis Acute diverticulitis                                            Principal Problem:    Acute diverticulitis (12/2/2020)    Active Problems:    SBO (small bowel obstruction) ([facility] (11/30/2020)           Hospital Course:     79 yo hx of HTN, DM, RA, presented w/ dizziness, HA, abd pain, found to have a SBO, acute diverticulitis    1) Acute diverticulitis/abd pain: much improved. Colonoscopy consistent with diverticulitis rather than mass. Will treat with levaquin/flagyl x10 days, pro-biotics, bowel regimen. Educated patient on diverticulitis. F/u with GI prn     2) SBO: due to diverticulitis. Now resolved     3) Dizziness/HA: now resolved. Likely from dehydration. Head CT neg     4) Hypothyroid: TSH 7.2. Synthroid was increased     5) HTN: BP stable. Cont lisinopril, norvasc     6) DM type 2: A1C 5.6.   No further management      7) RA: cont MTX.  F/u with Rheum     PCP: Alin Nelson MD     Consults: GI, gen surg, oncology    Significant Diagnostic Studies: colonoscopy     Discharge Exam:  Physical Exam:    Gen: Elderly, frail, pleasant, NAD  HEENT:  Pink conjunctivae, PERRL, hearing intact to voice, moist mucous membranes  Neck: Supple, without masses, thyroid non-tender  Resp: No accessory muscle use, clear breath sounds without wheezes rales or rhonchi  Card: No murmurs, normal S1, S2 without thrills, bruits or peripheral edema  Abd:  Soft, non-tender, non-distended, normoactive bowel sounds are present  Lymph:  No cervical or inguinal adenopathy  Musc: No cyanosis or clubbing  Skin: No rashes   Neuro:  Cranial nerves are grossly intact, no focal motor weakness, follows commands appropriately  Psych:  Good insight, oriented to person, place and time, alert    Disposition: home  Discharge Condition: Stable    Patient Instructions:   Current Discharge Medication List      START taking these medications    Details   levoFLOXacin (LEVAQUIN) 500 mg tablet Take 1 Tab by mouth every fourty-eight (48) hours for 10 days. Qty: 5 Tab, Refills: 0      metroNIDAZOLE (FlagyL) 500 mg tablet Take 1 Tab by mouth two (2) times a day for 10 days. Qty: 20 Tab, Refills: 0      docusate sodium (COLACE) 100 mg capsule Take 1 Cap by mouth two (2) times a day. Qty: 60 Cap, Refills: 1      polyethylene glycol (Miralax) 17 gram packet Take 1 Packet by mouth daily. Qty: 30 Packet, Refills: 1      ondansetron (Zofran ODT) 4 mg disintegrating tablet Take 1 Tab by mouth every eight (8) hours as needed for Nausea or Vomiting. Qty: 20 Tab, Refills: 0      L.acidoph & parac-S.therm-Bifido (BRENNEN Q2/RISAQUAD-2) 16 billion cell cap cap Take 1 Cap by mouth daily. Qty: 30 Cap, Refills: 0         CONTINUE these medications which have CHANGED    Details   levothyroxine (SYNTHROID) 75 mcg tablet Take 1 Tab by mouth Daily (before breakfast). Qty: 30 Tab, Refills: 1         CONTINUE these medications which have NOT CHANGED    Details   methotrexate (RHEUMATREX) 2.5 mg tablet Take 17.5 mg by mouth Every Friday. lisinopriL (PRINIVIL, ZESTRIL) 40 mg tablet Take 40 mg by mouth daily. amLODIPine (NORVASC) 10 mg tablet Take 10 mg by mouth daily. acetaminophen (Tylenol Extra Strength) 500 mg tablet Take 500 mg by mouth two (2) times a day. multivit-min/iron/folic/lutein (CENTRUM SILVER WOMEN PO) Take 1 Tab by mouth every other day. cholecalciferol (VITAMIN D3) 1,000 unit cap Take 2,000 Units by mouth daily. aspirin delayed-release 81 mg tablet Take 81 mg by mouth daily.          STOP taking these medications       cephALEXin (KEFLEX) 500 mg capsule Comments:   Reason for Stopping:             Activity: Activity as tolerated  Diet: Regular Diet  Wound Care: None needed    Follow-up with  Follow-up Information     Follow up With Specialties Details Why Contact Info    Yolande Quinones MD Family Medicine Schedule an appointment as soon as possible for a visit in 1 week  [id number] Camejo MD Gastroenterology Schedule an appointment as soon as possible for a visit in 2 weeks As needed, If symptoms worsen [address] [facility]  [phone]            Follow-up tests/labs none    Signed:  Estephnaia Beaulieu MD  12/2/2020  11:46 AM  **I personally spent 35 min on discharge**

## 2020-12-02 NOTE — PROGRESS NOTES
GI    Colon exam today:  Compatible with colonic diverticulitis not primary colonic cancer. Recommend:  - IV antibiotics; repeat image after total three weeks combination IV then oral antibiotics   Other recommendations depend on follow up imaging after completion of antibiotics.       Length of IV therapy on your discretion  Thanks; see again as needed

## 2020-12-02 NOTE — PROGRESS NOTES
Anesthesia staff at patient's bedside administering anesthesia and monitoring patients vital signs throughout procedure. See anesthesia note. ABD remains soft and non-tender post procedure. Pt has no complaints at this time and tolerated the procedure well. Endoscope was pre-cleaned at bedside immediately following procedure by Yulia.

## 2020-12-02 NOTE — PROGRESS NOTES
[facility] [place] [address] [address] [facility] [facility] [address] [facility]  Isidoro Zenia: [phone]  F: [phone]      Reason for Visit:   Hawa Cooley is a 80 y.o. female who is seen in consultation at the request of Dr. Juni Lord for evaluation of new sigmoid mass. Hematology / Oncology Treatment History:   N/A    History of Present Illness:     Katya Campo was admitted on 2020 from the ED when she presented via EMS with dizziness, left lower abd  quadrant pain associated with constipation for months. Currently being treated for UTI with keflex. ED labs Hgb 10.3 ED imaging shows SBO with sigmoid neoplasm or stricture. She was admitted for further eval and management. Reports constipation for months with use of ex-lax, dulcolax and prune juice with relief. Had 2 BMs yesterday and several since coming into the hospital Has occasional nausea but no vomiting. Hx of diverticulitis; Weight loss of approx 4# over the last month. Denies SOB or pain at present. Hx of Fe def during pregnancy; took Fe pills but no other time. ETOH; no  Smoking: stopped in  after 21 yrs of smoking approx <1/4 ppd. Colonoscopy:  with polyp noted. Was due to repeat this past April but it was cancelled    Lives by herself in an apt complex  : 3 kids: 1 son locally. Abebe Thompson who is POA; 1 son in [place] and 1 daughter in [address] [place] [address] with walker or cane     Family Hx of Cancer  Sister: lung cancer/  Brother: Brain cancer    No family at bedside. Interval History:     Feeling good: denies any pain. Glad it doesn't appear to be cancer. No family at bedside.        Past Medical History:   Diagnosis Date    Arthritis, rheumatoid (HCC)     Diabetes ([address]     Diverticulitis     Hard of hearing     Hypertension     Hypothyroid     Osteoporosis, post-menopausal       Past Surgical History:   Procedure Laterality Date    HX HEMORRHOIDECTOMY      HX HYSTERECTOMY  HX THYROIDECTOMY        Social History     Tobacco Use    Smoking status: Former Smoker     Types: Cigarettes     Last attempt to quit: 1976     Years since quittin.0    Smokeless tobacco: Never Used   Substance Use Topics    Alcohol use: Yes     Comment: Waylon time 1 glass wine      Family History   Problem Relation Age of Onset    Heart Disease Mother     Diabetes Father     Cancer Brother         brain cancer    Heart Disease Brother     Diabetes Brother     Other Other         scleroderma     Current Facility-Administered Medications   Medication Dose Route Frequency    0.9% sodium chloride infusion  50 mL/hr IntraVENous CONTINUOUS    metroNIDAZOLE (FLAGYL) IVPB premix 500 mg  500 mg IntraVENous Q12H    levoFLOXacin (LEVAQUIN) 500 mg in D5W IVPB  500 mg IntraVENous Q48H    dextrose 5% - 0.45% NaCl with KCl 20 mEq/L infusion  75 mL/hr IntraVENous CONTINUOUS    levothyroxine (SYNTHROID) injection 37.5 mcg  37.5 mcg IntraVENous Q24H    hydrALAZINE (APRESOLINE) 20 mg/mL injection 20 mg  20 mg IntraVENous Q6H PRN    HYDROmorphone (DILAUDID) syringe 0.5 mg  0.5 mg IntraVENous Q4H PRN    sodium chloride (NS) flush 5-40 mL  5-40 mL IntraVENous Q8H    sodium chloride (NS) flush 5-40 mL  5-40 mL IntraVENous PRN    acetaminophen (TYLENOL) tablet 650 mg  650 mg Oral Q6H PRN    polyethylene glycol (MIRALAX) packet 17 g  17 g Oral DAILY PRN    ondansetron (ZOFRAN) injection 4 mg  4 mg IntraVENous Q6H PRN    enoxaparin (LOVENOX) injection 40 mg  40 mg SubCUTAneous DAILY      Allergies   Allergen Reactions    Alendronate Diarrhea    Amoxicillin Rash    Hydrochlorothiazide Other (comments)     \"Caused pain everywhere\"        Review of Systems: A complete review of systems was obtained, negative except as described above.       Physical Exam:     Visit Vitals  BP (!) 138/58 (BP 1 Location: Left arm, BP Patient Position: At rest)   Pulse 60   Temp 98.1 °F (36.7 °C)   Resp 18   Ht 4' 11\" (1.499 m)   Wt 60.1 kg (132 lb 8 oz)   SpO2 100%   BMI 26.76 kg/m²       General: No distress  Eyes: Anicteric sclerae  HENT: Atraumatic  Neck: Supple  Respiratory: Normal respiratory effort  CV: No peripheral edema  GI: Soft, nontender, nondistended, no masses, no hepatomegaly, no splenomegaly  Skin: No rashes, ecchymoses, or petechiae  Psych: Alert, oriented, appropriate affect, normal judgment/insight      Results:     Lab Results   Component Value Date/Time    WBC 7.9 12/02/2020 04:28 AM    HGB 9.6 (L) 12/02/2020 04:28 AM    HCT 30.3 (L) 12/02/2020 04:28 AM    PLATELET 397 (H) 76/43/4445 04:28 AM    MCV 88.3 12/02/2020 04:28 AM    ABS. NEUTROPHILS 6.1 11/30/2020 03:54 PM    Hemoglobin (POC) 11.2 (L) 12/12/2017 01:22 PM    Hematocrit (POC) 33 (L) 12/12/2017 01:22 PM     Lab Results   Component Value Date/Time    Sodium 136 12/02/2020 04:28 AM    Potassium 3.7 12/02/2020 04:28 AM    Chloride 106 12/02/2020 04:28 AM    CO2 25 12/02/2020 04:28 AM    Glucose 94 12/02/2020 04:28 AM    BUN 5 (L) 12/02/2020 04:28 AM    Creatinine 0.64 12/02/2020 04:28 AM    GFR est AA >60 12/02/2020 04:28 AM    GFR est non-AA >60 12/02/2020 04:28 AM    Calcium 8.7 12/02/2020 04:28 AM    Sodium (POC) 138 12/12/2017 01:22 PM    Potassium (POC) 3.7 12/12/2017 01:22 PM    Chloride (POC) 100 12/12/2017 01:22 PM    Glucose (POC) 132 (H) 12/12/2017 01:22 PM    Glucose (POC) 91 05/04/2016 11:27 AM    BUN (POC) 14 12/12/2017 01:22 PM    Creatinine (POC) 1.1 12/12/2017 01:22 PM    Calcium, ionized (POC) 1.20 12/12/2017 01:22 PM     Lab Results   Component Value Date/Time    Bilirubin, total 0.3 12/01/2020 04:37 AM    ALT (SGPT) 14 12/01/2020 04:37 AM    Alk.  phosphatase 62 12/01/2020 04:37 AM    Protein, total 6.4 12/01/2020 04:37 AM    Albumin 2.7 (L) 12/01/2020 04:37 AM    Globulin 3.7 12/01/2020 04:37 AM     Lab Results   Component Value Date/Time    Iron % saturation 12 (L) 12/02/2020 04:28 AM    TIBC 239 (L) 12/02/2020 04:28 AM    Sed rate (ESR) 27 10/16/2017 08:50 AM    Sed rate, automated 67 (H) 12/21/2018 07:41 PM    C-Reactive protein 5.07 (H) 04/18/2017 11:14 AM    C-Reactive Protein, Qt 33.3 (H) 10/16/2017 08:50 AM    TSH 7.20 (H) 12/01/2020 04:37 AM    Lipase 80 03/21/2019 03:38 AM     Lab Results   Component Value Date/Time    D-dimer 1.02 (H) 05/05/2016 12:00 AM     No results found for: CEA, 433066, C199LT, C125, BEIB800, 2729LT, C153LT, PSA, LDH, [id number], HCGTLT, HCGN, AFP, CHRGLT, CHRGA    11/30/2020 CT ABD PELV W CONT  IMPRESSION:  Bladder distention  Constipation, secondary to a long annular sigmoid neoplasm or stricture. Relative pocket proximal small bowel obstruction, either secondary to this  sigmoid process or an adhesion/internal hernia. 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    12/1/20202 Colonoscopy/Brian  Findings:   Numerous sigmoid diverticulosis with additional diverticulosis elsewhere. Marked colonic wall edema narrows sigmoid lumen. No primary mucosal mass identified. Impression:  colonic diverticulitis    12/1/2020 CT CHEST  IMPRESSION:  No evidence of metastatic disease or acute abnormality. Assessment and Recommendations:     1. Small bowel obstruction  GI consulted: 12/2 colonoscopy: findings consistent with colonic diverticulitis: recommending combo  IV abx then oral abx with repeat imaging in 3 weeks. General surgery following    2. Questionable sigmoid mass  12/2  colonoscopy reveals colonic diverticulitis   12/1/CT Chest; no evidence of metastatic disease/ CEA pending    3. Anemia, normocytic  anemia labs pending  Continue to monitor and transfuse for Hgb < 7     4. Elevated TSH  May need to adjust synthroid; will defer to primary team    5.  RA  Reports takes MTX monthly; currently managed by PCP    Plan reviewed with Dr Karol Hodge By: Kevin Meyer, JOSE RAUL

## 2020-12-02 NOTE — PROGRESS NOTES
Transition Plan of Care      Patient is ready for discharge with no needs  Family to transport.       Dispatch health updated on AVROBIN Martinez

## 2020-12-03 NOTE — CONSULTS
[address]     Name:  Geovanna Martinez  MR#:  [id number]  :  1930  ACCOUNT #:  [de-identified]  DATE OF SERVICE:  2020    PRIMARY CARE PROVIDER:  Maycol Chun MD    GI PHYSICIAN:  Don Chaudhari MD    REASON FOR CONSULTATION:  Consult is for small bowel obstruction and constipation with a question of long annular sigmoid neoplasm versus stricture. HISTORY OF PRESENT ILLNESS:  The patient is a 80-year-old female, who presented with headaches and dizziness. She was evaluated for UTI approximately one week ago and was prescribed Keflex, which she is not taking. In the emergency department at [facility], she had left lower quadrant abdominal pain that she thought was associated with urinary tract infection. She has had constipation for the past several months. This has been associated with nausea, but no vomiting. She actually feels better after her initial workup in the emergency department. Due to the abdominal pain, CT of the abdomen and pelvis was performed. A read revealed bladder distention with constipation secondary to a long annular sigmoid stricture versus neoplasm. There is a proximal small bowel obstruction noted secondary to sigmoid process. A consult was placed for the CT scan findings and possible sigmoid neoplasm. REVIEW OF SYSTEMS:  Positive for headache, abdominal pain, nausea, urinary tract issues. Negative for general, skin, respiratory, cardiovascular, musculoskeletal, neurologic, psychiatric, and hematology. PAST MEDICAL HISTORY:  1. Rheumatoid arthritis. 2.  Diabetes. 3.  Diverticulitis. 4.  Hard of hearing. 5.  Hypotension. 6.  Hypothyroid. 7.  Osteoporosis. PAST SURGICAL HISTORY:  1. Hemorrhoidectomy. 2.  Hysterectomy. 3.  Thyroidectomy. PAST FAMILY HISTORY:  Heart disease, diabetes, cancer, scleroderma. SOCIAL HISTORY:  She is a former smoker. She drinks alcohol rarely.   Children are healthy. HOME MEDICATIONS:  1. Metronidazole. 2.  Flagyl. 3.  Flonase. 4.  Zofran. 5.  Methylprednisolone. 6.  Lisinopril. 7.  Folic acid. 8.  Levothyroxine. 9.  Acetaminophen. 10.  Aspirin. 11.  Actemra IV. ALLERGIES:  1. ALENDRONATE. 2.  AMOXICILLIN. 3.  HYDROCHLOROTHIAZIDE. PHYSICAL EXAMINATION:  VITAL SIGNS:  Temperature 98.1, pulse 72, blood pressure is 147/64, height is 4 feet 11 inches, weight 50 kilos. GENERAL:  She is an age-appropriate, non-ill-appearing female, in no apparent distress. HEENT:  Normocephalic, atraumatic. NECK:  Supple. Trachea midline. CHEST:  Clear. Nonlabored breathing. HEART:  Rate is regular. No murmurs or rubs. ABDOMEN:  Soft, nondistended. No rebound or guarding. She has well-healed surgical scars. She does have fullness in the left lower quadrant. MUSCULOSKELETAL:  No clubbing, cyanosis, or edema. She has swan neck deformities of the extensor surfaces of the fingers. NEUROLOGIC:  Grossly nonfocal.  PSYCHIATRIC:  She is appropriate to questioning and pleasant. LABORATORY DATA:  White blood cell count 8.9, hemoglobin 9.9, BUN and creatinine 7 and 0.64 respectively. CT of the abdomen and pelvis reviewed, images as well as the report. ASSESSMENT:  Small bowel obstruction with question of annular sigmoid stricture versus neoplasm and worsening constipation, which may be causing some relative small bowel obstruction as well. PLAN:  Dr. Kash Delvalle is planning to perform a colonoscopy on the patient tomorrow at 6 o' clock. I will be available afterwards. It was a pleasure to participate in her care.       Nora Watkins MD      [id number]  D:  12/02/2020 13:47  T:  12/02/2020 21:35  JOB #:  [id number]  CC:  MD Jessica Miller MD Vicente Meadows, MD       [address] [place] [address]

## 2021-03-27 ENCOUNTER — APPOINTMENT (OUTPATIENT)
Dept: GENERAL RADIOLOGY | Age: 86
DRG: 854 | End: 2021-03-27
Attending: EMERGENCY MEDICINE
Payer: MEDICARE

## 2021-03-27 ENCOUNTER — HOSPITAL ENCOUNTER (INPATIENT)
Age: 86
LOS: 5 days | Discharge: NURSING, REHAB OR LONG-TERM CARE | DRG: 854 | End: 2021-04-02
Attending: EMERGENCY MEDICINE | Admitting: HOSPITALIST
Payer: MEDICARE

## 2021-03-27 ENCOUNTER — APPOINTMENT (OUTPATIENT)
Dept: CT IMAGING | Age: 86
DRG: 854 | End: 2021-03-27
Attending: EMERGENCY MEDICINE
Payer: MEDICARE

## 2021-03-27 DIAGNOSIS — D84.821 IMMUNOCOMPROMISED STATE DUE TO DRUG THERAPY (HCC): ICD-10-CM

## 2021-03-27 DIAGNOSIS — M00.9 PYOGENIC ARTHRITIS OF LEFT ELBOW, DUE TO UNSPECIFIED ORGANISM (HCC): ICD-10-CM

## 2021-03-27 DIAGNOSIS — K57.92 ACUTE DIVERTICULITIS: ICD-10-CM

## 2021-03-27 DIAGNOSIS — A41.9 SEVERE SEPSIS (HCC): Primary | ICD-10-CM

## 2021-03-27 DIAGNOSIS — K57.32 SIGMOID DIVERTICULITIS: ICD-10-CM

## 2021-03-27 DIAGNOSIS — M05.79 RHEUMATOID ARTHRITIS INVOLVING MULTIPLE SITES WITH POSITIVE RHEUMATOID FACTOR (HCC): Chronic | ICD-10-CM

## 2021-03-27 DIAGNOSIS — Z79.899 IMMUNOCOMPROMISED STATE DUE TO DRUG THERAPY (HCC): ICD-10-CM

## 2021-03-27 DIAGNOSIS — Z20.822 SUSPECTED COVID-19 VIRUS INFECTION: ICD-10-CM

## 2021-03-27 DIAGNOSIS — R65.20 SEVERE SEPSIS (HCC): Primary | ICD-10-CM

## 2021-03-27 DIAGNOSIS — M10.9 ACUTE GOUTY ARTHROPATHY: ICD-10-CM

## 2021-03-27 LAB
ALBUMIN SERPL-MCNC: 3.4 G/DL (ref 3.5–5)
ALBUMIN/GLOB SERPL: 0.9 {RATIO} (ref 1.1–2.2)
ALP SERPL-CCNC: 76 U/L (ref 45–117)
ALT SERPL-CCNC: 25 U/L (ref 12–78)
AMYLASE SERPL-CCNC: 56 U/L (ref 25–115)
ANION GAP SERPL CALC-SCNC: 9 MMOL/L (ref 5–15)
APPEARANCE UR: CLEAR
AST SERPL-CCNC: 16 U/L (ref 15–37)
BACTERIA URNS QL MICRO: NEGATIVE /HPF
BASOPHILS # BLD: 0 K/UL (ref 0–0.1)
BASOPHILS NFR BLD: 0 % (ref 0–1)
BILIRUB SERPL-MCNC: 0.5 MG/DL (ref 0.2–1)
BILIRUB UR QL: NEGATIVE
BUN SERPL-MCNC: 16 MG/DL (ref 6–20)
BUN/CREAT SERPL: 18 (ref 12–20)
CALCIUM SERPL-MCNC: 8.6 MG/DL (ref 8.5–10.1)
CHLORIDE SERPL-SCNC: 99 MMOL/L (ref 97–108)
CO2 SERPL-SCNC: 24 MMOL/L (ref 21–32)
COLOR UR: ABNORMAL
COMMENT, HOLDF: NORMAL
CREAT SERPL-MCNC: 0.89 MG/DL (ref 0.55–1.02)
CRP SERPL-MCNC: 1.17 MG/DL (ref 0–0.6)
DIFFERENTIAL METHOD BLD: ABNORMAL
EOSINOPHIL # BLD: 0 K/UL (ref 0–0.4)
EOSINOPHIL NFR BLD: 0 % (ref 0–7)
EPITH CASTS URNS QL MICRO: ABNORMAL /LPF
ERYTHROCYTE [DISTWIDTH] IN BLOOD BY AUTOMATED COUNT: 16.3 % (ref 11.5–14.5)
GLOBULIN SER CALC-MCNC: 3.8 G/DL (ref 2–4)
GLUCOSE SERPL-MCNC: 121 MG/DL (ref 65–100)
GLUCOSE UR STRIP.AUTO-MCNC: NEGATIVE MG/DL
HCT VFR BLD AUTO: 34 % (ref 35–47)
HGB BLD-MCNC: 11.2 G/DL (ref 11.5–16)
HGB UR QL STRIP: NEGATIVE
IMM GRANULOCYTES # BLD AUTO: 0.2 K/UL (ref 0–0.04)
IMM GRANULOCYTES NFR BLD AUTO: 1 % (ref 0–0.5)
KETONES UR QL STRIP.AUTO: NEGATIVE MG/DL
LACTATE BLD-SCNC: 3.64 MMOL/L (ref 0.4–2)
LEUKOCYTE ESTERASE UR QL STRIP.AUTO: NEGATIVE
LIPASE SERPL-CCNC: 73 U/L (ref 73–393)
LYMPHOCYTES # BLD: 0.7 K/UL (ref 0.8–3.5)
LYMPHOCYTES NFR BLD: 4 % (ref 12–49)
MCH RBC QN AUTO: 28.1 PG (ref 26–34)
MCHC RBC AUTO-ENTMCNC: 32.9 G/DL (ref 30–36.5)
MCV RBC AUTO: 85.2 FL (ref 80–99)
MONOCYTES # BLD: 0.3 K/UL (ref 0–1)
MONOCYTES NFR BLD: 2 % (ref 5–13)
NEUTS SEG # BLD: 15.3 K/UL (ref 1.8–8)
NEUTS SEG NFR BLD: 93 % (ref 32–75)
NITRITE UR QL STRIP.AUTO: NEGATIVE
NRBC # BLD: 0 K/UL (ref 0–0.01)
NRBC BLD-RTO: 0 PER 100 WBC
PH UR STRIP: 7.5 [PH] (ref 5–8)
PLATELET # BLD AUTO: 367 K/UL (ref 150–400)
PMV BLD AUTO: 9.1 FL (ref 8.9–12.9)
POTASSIUM SERPL-SCNC: 3.6 MMOL/L (ref 3.5–5.1)
PROT SERPL-MCNC: 7.2 G/DL (ref 6.4–8.2)
PROT UR STRIP-MCNC: ABNORMAL MG/DL
RBC # BLD AUTO: 3.99 M/UL (ref 3.8–5.2)
RBC #/AREA URNS HPF: ABNORMAL /HPF (ref 0–5)
RBC MORPH BLD: ABNORMAL
SAMPLES BEING HELD,HOLD: NORMAL
SODIUM SERPL-SCNC: 132 MMOL/L (ref 136–145)
SP GR UR REFRACTOMETRY: 1.01 (ref 1–1.03)
UA: UC IF INDICATED,UAUC: ABNORMAL
UROBILINOGEN UR QL STRIP.AUTO: 0.2 EU/DL (ref 0.2–1)
WBC # BLD AUTO: 16.5 K/UL (ref 3.6–11)
WBC URNS QL MICRO: ABNORMAL /HPF (ref 0–4)

## 2021-03-27 PROCEDURE — 86140 C-REACTIVE PROTEIN: CPT

## 2021-03-27 PROCEDURE — 84443 ASSAY THYROID STIM HORMONE: CPT

## 2021-03-27 PROCEDURE — 84550 ASSAY OF BLOOD/URIC ACID: CPT

## 2021-03-27 PROCEDURE — 73080 X-RAY EXAM OF ELBOW: CPT

## 2021-03-27 PROCEDURE — 94760 N-INVAS EAR/PLS OXIMETRY 1: CPT

## 2021-03-27 PROCEDURE — 80053 COMPREHEN METABOLIC PANEL: CPT

## 2021-03-27 PROCEDURE — 81001 URINALYSIS AUTO W/SCOPE: CPT

## 2021-03-27 PROCEDURE — 99285 EMERGENCY DEPT VISIT HI MDM: CPT

## 2021-03-27 PROCEDURE — 36415 COLL VENOUS BLD VENIPUNCTURE: CPT

## 2021-03-27 PROCEDURE — 84484 ASSAY OF TROPONIN QUANT: CPT

## 2021-03-27 PROCEDURE — 74011250636 HC RX REV CODE- 250/636: Performed by: EMERGENCY MEDICINE

## 2021-03-27 PROCEDURE — 74177 CT ABD & PELVIS W/CONTRAST: CPT

## 2021-03-27 PROCEDURE — 85025 COMPLETE CBC W/AUTO DIFF WBC: CPT

## 2021-03-27 PROCEDURE — 96374 THER/PROPH/DIAG INJ IV PUSH: CPT

## 2021-03-27 PROCEDURE — 96375 TX/PRO/DX INJ NEW DRUG ADDON: CPT

## 2021-03-27 PROCEDURE — 83605 ASSAY OF LACTIC ACID: CPT

## 2021-03-27 PROCEDURE — 87040 BLOOD CULTURE FOR BACTERIA: CPT

## 2021-03-27 PROCEDURE — 71045 X-RAY EXAM CHEST 1 VIEW: CPT

## 2021-03-27 PROCEDURE — 83690 ASSAY OF LIPASE: CPT

## 2021-03-27 PROCEDURE — 74011250637 HC RX REV CODE- 250/637: Performed by: EMERGENCY MEDICINE

## 2021-03-27 PROCEDURE — 82150 ASSAY OF AMYLASE: CPT

## 2021-03-27 PROCEDURE — 74011000250 HC RX REV CODE- 250: Performed by: EMERGENCY MEDICINE

## 2021-03-27 PROCEDURE — 93005 ELECTROCARDIOGRAM TRACING: CPT

## 2021-03-27 RX ORDER — KETOROLAC TROMETHAMINE 30 MG/ML
15 INJECTION, SOLUTION INTRAMUSCULAR; INTRAVENOUS
Status: COMPLETED | OUTPATIENT
Start: 2021-03-27 | End: 2021-03-27

## 2021-03-27 RX ORDER — SODIUM CHLORIDE 0.9 % (FLUSH) 0.9 %
5-10 SYRINGE (ML) INJECTION AS NEEDED
Status: DISCONTINUED | OUTPATIENT
Start: 2021-03-27 | End: 2021-04-02 | Stop reason: HOSPADM

## 2021-03-27 RX ORDER — ACETAMINOPHEN 500 MG
1000 TABLET ORAL
Status: COMPLETED | OUTPATIENT
Start: 2021-03-27 | End: 2021-03-27

## 2021-03-27 RX ORDER — ONDANSETRON 2 MG/ML
8 INJECTION INTRAMUSCULAR; INTRAVENOUS
Status: COMPLETED | OUTPATIENT
Start: 2021-03-27 | End: 2021-03-27

## 2021-03-27 RX ADMIN — SODIUM CHLORIDE 500 ML: 9 INJECTION, SOLUTION INTRAVENOUS at 22:37

## 2021-03-27 RX ADMIN — SODIUM CHLORIDE 1000 ML: 9 INJECTION, SOLUTION INTRAVENOUS at 23:17

## 2021-03-27 RX ADMIN — SODIUM CHLORIDE 500 ML: 9 INJECTION, SOLUTION INTRAVENOUS at 23:38

## 2021-03-27 RX ADMIN — ACETAMINOPHEN 1000 MG: 500 TABLET, FILM COATED ORAL at 23:16

## 2021-03-27 RX ADMIN — KETOROLAC TROMETHAMINE 15 MG: 30 INJECTION, SOLUTION INTRAMUSCULAR at 22:38

## 2021-03-27 RX ADMIN — ONDANSETRON 8 MG: 2 INJECTION INTRAMUSCULAR; INTRAVENOUS at 22:37

## 2021-03-27 RX ADMIN — CEFEPIME HYDROCHLORIDE 2 G: 2 INJECTION, POWDER, FOR SOLUTION INTRAVENOUS at 23:37

## 2021-03-28 ENCOUNTER — ANESTHESIA EVENT (OUTPATIENT)
Dept: SURGERY | Age: 86
DRG: 854 | End: 2021-03-28
Payer: MEDICARE

## 2021-03-28 ENCOUNTER — APPOINTMENT (OUTPATIENT)
Dept: CT IMAGING | Age: 86
DRG: 854 | End: 2021-03-28
Attending: PHYSICIAN ASSISTANT
Payer: MEDICARE

## 2021-03-28 ENCOUNTER — ANESTHESIA (OUTPATIENT)
Dept: SURGERY | Age: 86
DRG: 854 | End: 2021-03-28
Payer: MEDICARE

## 2021-03-28 PROBLEM — M19.022 ARTHRITIS OF LEFT ELBOW: Status: ACTIVE | Noted: 2021-03-28

## 2021-03-28 PROBLEM — E87.1 HYPONATREMIA: Status: ACTIVE | Noted: 2021-03-28

## 2021-03-28 PROBLEM — E87.20 LACTIC ACIDOSIS: Status: ACTIVE | Noted: 2021-03-28

## 2021-03-28 PROBLEM — R65.20 SEVERE SEPSIS (HCC): Status: ACTIVE | Noted: 2021-03-28

## 2021-03-28 PROBLEM — M00.9 SEPTIC ARTHRITIS OF ELBOW, LEFT (HCC): Status: ACTIVE | Noted: 2021-03-28

## 2021-03-28 PROBLEM — R10.32 LLQ PAIN: Status: ACTIVE | Noted: 2021-03-28

## 2021-03-28 PROBLEM — D84.821 IMMUNOCOMPROMISED STATE DUE TO DRUG THERAPY (HCC): Status: ACTIVE | Noted: 2021-03-28

## 2021-03-28 PROBLEM — R11.2 INTRACTABLE NAUSEA AND VOMITING: Status: ACTIVE | Noted: 2021-03-28

## 2021-03-28 PROBLEM — A41.9 SEVERE SEPSIS (HCC): Status: ACTIVE | Noted: 2021-03-28

## 2021-03-28 PROBLEM — K57.32 DIVERTICULITIS OF SIGMOID COLON: Status: ACTIVE | Noted: 2021-03-28

## 2021-03-28 PROBLEM — D72.829 LEUKOCYTOSIS: Status: ACTIVE | Noted: 2021-03-28

## 2021-03-28 PROBLEM — E87.1 HYPONATREMIA: Status: RESOLVED | Noted: 2021-03-28 | Resolved: 2021-03-28

## 2021-03-28 PROBLEM — Z79.899 IMMUNOCOMPROMISED STATE DUE TO DRUG THERAPY (HCC): Status: ACTIVE | Noted: 2021-03-28

## 2021-03-28 LAB
ALBUMIN SERPL-MCNC: 2.8 G/DL (ref 3.5–5)
ALBUMIN/GLOB SERPL: 0.8 {RATIO} (ref 1.1–2.2)
ALP SERPL-CCNC: 59 U/L (ref 45–117)
ALT SERPL-CCNC: 20 U/L (ref 12–78)
ANION GAP SERPL CALC-SCNC: 8 MMOL/L (ref 5–15)
APPEARANCE SNV: ABNORMAL
AST SERPL-CCNC: 16 U/L (ref 15–37)
ATRIAL RATE: 97 BPM
BASOPHILS # BLD: 0 K/UL (ref 0–0.1)
BASOPHILS NFR BLD: 0 % (ref 0–1)
BILIRUB SERPL-MCNC: 0.4 MG/DL (ref 0.2–1)
BODY FLD TYPE: NORMAL
BUN SERPL-MCNC: 14 MG/DL (ref 6–20)
BUN/CREAT SERPL: 16 (ref 12–20)
CALCIUM SERPL-MCNC: 8.1 MG/DL (ref 8.5–10.1)
CALCULATED P AXIS, ECG09: 33 DEGREES
CALCULATED R AXIS, ECG10: 50 DEGREES
CALCULATED T AXIS, ECG11: 69 DEGREES
CHLORIDE SERPL-SCNC: 102 MMOL/L (ref 97–108)
CO2 SERPL-SCNC: 25 MMOL/L (ref 21–32)
COLOR SNV: YELLOW
COMMENT, HOLDF: NORMAL
COMMENT, HOLDF: NORMAL
COVID-19 RAPID TEST, COVR: NOT DETECTED
CREAT SERPL-MCNC: 0.87 MG/DL (ref 0.55–1.02)
CRYSTALS FLD MICRO: NORMAL
D DIMER PPP FEU-MCNC: 2.73 MG/L FEU (ref 0–0.65)
DIAGNOSIS, 93000: NORMAL
DIFFERENTIAL METHOD BLD: ABNORMAL
EOSINOPHIL # BLD: 0 K/UL (ref 0–0.4)
EOSINOPHIL NFR BLD: 0 % (ref 0–7)
EOSINOPHIL NFR FLD MANUAL: 0 %
ERYTHROCYTE [DISTWIDTH] IN BLOOD BY AUTOMATED COUNT: 16.5 % (ref 11.5–14.5)
ERYTHROCYTE [SEDIMENTATION RATE] IN BLOOD: 39 MM/HR (ref 0–30)
EST. AVERAGE GLUCOSE BLD GHB EST-MCNC: 108 MG/DL
GLOBULIN SER CALC-MCNC: 3.3 G/DL (ref 2–4)
GLUCOSE BLD STRIP.AUTO-MCNC: 100 MG/DL (ref 65–100)
GLUCOSE BLD STRIP.AUTO-MCNC: 105 MG/DL (ref 65–100)
GLUCOSE BLD STRIP.AUTO-MCNC: 112 MG/DL (ref 65–100)
GLUCOSE BLD STRIP.AUTO-MCNC: 113 MG/DL (ref 65–100)
GLUCOSE BLD STRIP.AUTO-MCNC: 117 MG/DL (ref 65–100)
GLUCOSE BLD STRIP.AUTO-MCNC: 98 MG/DL (ref 65–100)
GLUCOSE SERPL-MCNC: 109 MG/DL (ref 65–100)
HBA1C MFR BLD: 5.4 % (ref 4–5.6)
HCT VFR BLD AUTO: 30.8 % (ref 35–47)
HGB BLD-MCNC: 10 G/DL (ref 11.5–16)
IMM GRANULOCYTES # BLD AUTO: 0.3 K/UL (ref 0–0.04)
IMM GRANULOCYTES NFR BLD AUTO: 1 % (ref 0–0.5)
LACTATE BLD-SCNC: 2.27 MMOL/L (ref 0.4–2)
LACTATE SERPL-SCNC: 1.8 MMOL/L (ref 0.4–2)
LACTATE SERPL-SCNC: 4.1 MMOL/L (ref 0.4–2)
LYMPHOCYTES # BLD: 0.5 K/UL (ref 0.8–3.5)
LYMPHOCYTES NFR BLD: 2 % (ref 12–49)
LYMPHOCYTES NFR FLD: 6 %
MCH RBC QN AUTO: 28.5 PG (ref 26–34)
MCHC RBC AUTO-ENTMCNC: 32.5 G/DL (ref 30–36.5)
MCV RBC AUTO: 87.7 FL (ref 80–99)
MESOTHL CELL NFR FLD: 4 %
MONOCYTES # BLD: 1 K/UL (ref 0–1)
MONOCYTES NFR BLD: 4 % (ref 5–13)
MONOS+MACROS NFR FLD: 5 %
NEUTROPHILS NFR FLD: 85 %
NEUTS SEG # BLD: 23.6 K/UL (ref 1.8–8)
NEUTS SEG NFR BLD: 93 % (ref 32–75)
NRBC # BLD: 0 K/UL (ref 0–0.01)
NRBC BLD-RTO: 0 PER 100 WBC
OSMOLALITY SERPL: 281 MOSM/KG H2O
OSMOLALITY UR: 409 MOSM/KG H2O
OTHER CELL,FOTHC: 0 %
P-R INTERVAL, ECG05: 182 MS
PLATELET # BLD AUTO: 345 K/UL (ref 150–400)
POTASSIUM SERPL-SCNC: 3.5 MMOL/L (ref 3.5–5.1)
PROT SERPL-MCNC: 6.1 G/DL (ref 6.4–8.2)
Q-T INTERVAL, ECG07: 368 MS
QRS DURATION, ECG06: 80 MS
QTC CALCULATION (BEZET), ECG08: 464 MS
RBC # BLD AUTO: 3.51 M/UL (ref 3.8–5.2)
RBC # SNV: >100 /CU MM
RBC MORPH BLD: ABNORMAL
SAMPLES BEING HELD,HOLD: NORMAL
SAMPLES BEING HELD,HOLD: NORMAL
SARS-COV-2, COV2: NORMAL
SARS-COV-2, COV2: NOT DETECTED
SERVICE CMNT-IMP: ABNORMAL
SERVICE CMNT-IMP: NORMAL
SERVICE CMNT-IMP: NORMAL
SODIUM SERPL-SCNC: 135 MMOL/L (ref 136–145)
SODIUM UR-SCNC: 105 MMOL/L
SOURCE, COVRS: NORMAL
SPECIMEN SOURCE FLD: ABNORMAL
SPECIMEN SOURCE, FCOV2M: NORMAL
TROPONIN I SERPL-MCNC: <0.05 NG/ML
TSH SERPL DL<=0.05 MIU/L-ACNC: 3.44 UIU/ML (ref 0.36–3.74)
URATE SERPL-MCNC: 3.6 MG/DL (ref 2.6–6)
VENTRICULAR RATE, ECG03: 96 BPM
WBC # BLD AUTO: 25.4 K/UL (ref 3.6–11)
WBC # SNV: ABNORMAL /CU MM (ref 0–150)
WBC MORPH BLD: ABNORMAL

## 2021-03-28 PROCEDURE — 74011250636 HC RX REV CODE- 250/636: Performed by: HOSPITALIST

## 2021-03-28 PROCEDURE — 74011250636 HC RX REV CODE- 250/636: Performed by: ORTHOPAEDIC SURGERY

## 2021-03-28 PROCEDURE — 85652 RBC SED RATE AUTOMATED: CPT

## 2021-03-28 PROCEDURE — 82962 GLUCOSE BLOOD TEST: CPT

## 2021-03-28 PROCEDURE — 74011000250 HC RX REV CODE- 250: Performed by: NURSE ANESTHETIST, CERTIFIED REGISTERED

## 2021-03-28 PROCEDURE — 89050 BODY FLUID CELL COUNT: CPT

## 2021-03-28 PROCEDURE — 83930 ASSAY OF BLOOD OSMOLALITY: CPT

## 2021-03-28 PROCEDURE — 85379 FIBRIN DEGRADATION QUANT: CPT

## 2021-03-28 PROCEDURE — 76210000063 HC OR PH I REC FIRST 0.5 HR: Performed by: ORTHOPAEDIC SURGERY

## 2021-03-28 PROCEDURE — 94760 N-INVAS EAR/PLS OXIMETRY 1: CPT

## 2021-03-28 PROCEDURE — 74011250637 HC RX REV CODE- 250/637: Performed by: HOSPITALIST

## 2021-03-28 PROCEDURE — U0005 INFEC AGEN DETEC AMPLI PROBE: HCPCS

## 2021-03-28 PROCEDURE — 85025 COMPLETE CBC W/AUTO DIFF WBC: CPT

## 2021-03-28 PROCEDURE — 74011000250 HC RX REV CODE- 250: Performed by: PHYSICIAN ASSISTANT

## 2021-03-28 PROCEDURE — 87205 SMEAR GRAM STAIN: CPT

## 2021-03-28 PROCEDURE — 0R9M3ZZ DRAINAGE OF LEFT ELBOW JOINT, PERCUTANEOUS APPROACH: ICD-10-PCS | Performed by: ORTHOPAEDIC SURGERY

## 2021-03-28 PROCEDURE — 74011250636 HC RX REV CODE- 250/636: Performed by: EMERGENCY MEDICINE

## 2021-03-28 PROCEDURE — 89060 EXAM SYNOVIAL FLUID CRYSTALS: CPT

## 2021-03-28 PROCEDURE — 74011000250 HC RX REV CODE- 250: Performed by: INTERNAL MEDICINE

## 2021-03-28 PROCEDURE — 87635 SARS-COV-2 COVID-19 AMP PRB: CPT

## 2021-03-28 PROCEDURE — 74011000636 HC RX REV CODE- 636: Performed by: INTERNAL MEDICINE

## 2021-03-28 PROCEDURE — 74011250636 HC RX REV CODE- 250/636: Performed by: NURSE ANESTHETIST, CERTIFIED REGISTERED

## 2021-03-28 PROCEDURE — 76060000033 HC ANESTHESIA 1 TO 1.5 HR: Performed by: ORTHOPAEDIC SURGERY

## 2021-03-28 PROCEDURE — 74011250636 HC RX REV CODE- 250/636: Performed by: INTERNAL MEDICINE

## 2021-03-28 PROCEDURE — 76010000149 HC OR TIME 1 TO 1.5 HR: Performed by: ORTHOPAEDIC SURGERY

## 2021-03-28 PROCEDURE — 87075 CULTR BACTERIA EXCEPT BLOOD: CPT

## 2021-03-28 PROCEDURE — 77030020143 HC AIRWY LARYN INTUB CGAS -A: Performed by: STUDENT IN AN ORGANIZED HEALTH CARE EDUCATION/TRAINING PROGRAM

## 2021-03-28 PROCEDURE — 36415 COLL VENOUS BLD VENIPUNCTURE: CPT

## 2021-03-28 PROCEDURE — 74011250637 HC RX REV CODE- 250/637: Performed by: INTERNAL MEDICINE

## 2021-03-28 PROCEDURE — 77030002933 HC SUT MCRYL J&J -A: Performed by: ORTHOPAEDIC SURGERY

## 2021-03-28 PROCEDURE — 77030031139 HC SUT VCRL2 J&J -A: Performed by: ORTHOPAEDIC SURGERY

## 2021-03-28 PROCEDURE — 0JDH0ZZ EXTRACTION OF LEFT LOWER ARM SUBCUTANEOUS TISSUE AND FASCIA, OPEN APPROACH: ICD-10-PCS | Performed by: ORTHOPAEDIC SURGERY

## 2021-03-28 PROCEDURE — 83605 ASSAY OF LACTIC ACID: CPT

## 2021-03-28 PROCEDURE — 73201 CT UPPER EXTREMITY W/DYE: CPT

## 2021-03-28 PROCEDURE — 74011000636 HC RX REV CODE- 636: Performed by: RADIOLOGY

## 2021-03-28 PROCEDURE — 84300 ASSAY OF URINE SODIUM: CPT

## 2021-03-28 PROCEDURE — 2709999900 HC NON-CHARGEABLE SUPPLY: Performed by: ORTHOPAEDIC SURGERY

## 2021-03-28 PROCEDURE — 83935 ASSAY OF URINE OSMOLALITY: CPT

## 2021-03-28 PROCEDURE — 65660000000 HC RM CCU STEPDOWN

## 2021-03-28 PROCEDURE — 80053 COMPREHEN METABOLIC PANEL: CPT

## 2021-03-28 PROCEDURE — 77030000032 HC CUF TRNQT ZIMM -B: Performed by: ORTHOPAEDIC SURGERY

## 2021-03-28 PROCEDURE — 83036 HEMOGLOBIN GLYCOSYLATED A1C: CPT

## 2021-03-28 RX ORDER — ACETAMINOPHEN 650 MG/1
650 SUPPOSITORY RECTAL
Status: DISCONTINUED | OUTPATIENT
Start: 2021-03-28 | End: 2021-04-02 | Stop reason: HOSPADM

## 2021-03-28 RX ORDER — SODIUM CHLORIDE 0.9 % (FLUSH) 0.9 %
5-40 SYRINGE (ML) INJECTION AS NEEDED
Status: DISCONTINUED | OUTPATIENT
Start: 2021-03-28 | End: 2021-04-02 | Stop reason: HOSPADM

## 2021-03-28 RX ORDER — HYDRALAZINE HYDROCHLORIDE 20 MG/ML
20 INJECTION INTRAMUSCULAR; INTRAVENOUS
Status: DISCONTINUED | OUTPATIENT
Start: 2021-03-28 | End: 2021-04-02 | Stop reason: HOSPADM

## 2021-03-28 RX ORDER — PHENYLEPHRINE HCL IN 0.9% NACL 0.4MG/10ML
SYRINGE (ML) INTRAVENOUS AS NEEDED
Status: DISCONTINUED | OUTPATIENT
Start: 2021-03-28 | End: 2021-03-28 | Stop reason: HOSPADM

## 2021-03-28 RX ORDER — LIDOCAINE HYDROCHLORIDE 10 MG/ML
10 INJECTION INFILTRATION; PERINEURAL ONCE
Status: COMPLETED | OUTPATIENT
Start: 2021-03-28 | End: 2021-03-28

## 2021-03-28 RX ORDER — LEVOTHYROXINE SODIUM 75 UG/1
75 TABLET ORAL
Status: DISCONTINUED | OUTPATIENT
Start: 2021-03-28 | End: 2021-04-02 | Stop reason: HOSPADM

## 2021-03-28 RX ORDER — FACIAL-BODY WIPES
10 EACH TOPICAL DAILY PRN
Status: DISCONTINUED | OUTPATIENT
Start: 2021-03-28 | End: 2021-04-02 | Stop reason: HOSPADM

## 2021-03-28 RX ORDER — SODIUM CHLORIDE 0.9 % (FLUSH) 0.9 %
5-40 SYRINGE (ML) INJECTION EVERY 8 HOURS
Status: DISCONTINUED | OUTPATIENT
Start: 2021-03-28 | End: 2021-04-02 | Stop reason: HOSPADM

## 2021-03-28 RX ORDER — MORPHINE SULFATE 2 MG/ML
2 INJECTION, SOLUTION INTRAMUSCULAR; INTRAVENOUS
Status: DISCONTINUED | OUTPATIENT
Start: 2021-03-28 | End: 2021-04-02 | Stop reason: HOSPADM

## 2021-03-28 RX ORDER — DOCUSATE SODIUM 100 MG/1
100 CAPSULE, LIQUID FILLED ORAL
COMMUNITY

## 2021-03-28 RX ORDER — FENTANYL CITRATE 50 UG/ML
INJECTION, SOLUTION INTRAMUSCULAR; INTRAVENOUS AS NEEDED
Status: DISCONTINUED | OUTPATIENT
Start: 2021-03-28 | End: 2021-03-28 | Stop reason: HOSPADM

## 2021-03-28 RX ORDER — INSULIN LISPRO 100 [IU]/ML
INJECTION, SOLUTION INTRAVENOUS; SUBCUTANEOUS EVERY 6 HOURS
Status: DISCONTINUED | OUTPATIENT
Start: 2021-03-28 | End: 2021-03-29

## 2021-03-28 RX ORDER — LEVOFLOXACIN 5 MG/ML
750 INJECTION, SOLUTION INTRAVENOUS
Status: DISCONTINUED | OUTPATIENT
Start: 2021-03-28 | End: 2021-03-28

## 2021-03-28 RX ORDER — OXYCODONE HYDROCHLORIDE 5 MG/1
2.5 TABLET ORAL
Status: CANCELLED | OUTPATIENT
Start: 2021-03-28

## 2021-03-28 RX ORDER — FOLIC ACID 1 MG/1
1 TABLET ORAL DAILY
Status: DISCONTINUED | OUTPATIENT
Start: 2021-03-28 | End: 2021-04-02 | Stop reason: HOSPADM

## 2021-03-28 RX ORDER — MAG HYDROX/ALUMINUM HYD/SIMETH 200-200-20
30 SUSPENSION, ORAL (FINAL DOSE FORM) ORAL
Status: DISCONTINUED | OUTPATIENT
Start: 2021-03-28 | End: 2021-04-02 | Stop reason: HOSPADM

## 2021-03-28 RX ORDER — MIDAZOLAM HYDROCHLORIDE 1 MG/ML
INJECTION, SOLUTION INTRAMUSCULAR; INTRAVENOUS AS NEEDED
Status: DISCONTINUED | OUTPATIENT
Start: 2021-03-28 | End: 2021-03-28 | Stop reason: HOSPADM

## 2021-03-28 RX ORDER — METRONIDAZOLE 500 MG/100ML
500 INJECTION, SOLUTION INTRAVENOUS EVERY 6 HOURS
Status: DISCONTINUED | OUTPATIENT
Start: 2021-03-28 | End: 2021-03-28

## 2021-03-28 RX ORDER — LEVOFLOXACIN 5 MG/ML
500 INJECTION, SOLUTION INTRAVENOUS EVERY 24 HOURS
Status: DISCONTINUED | OUTPATIENT
Start: 2021-03-28 | End: 2021-03-28

## 2021-03-28 RX ORDER — SODIUM CHLORIDE, SODIUM LACTATE, POTASSIUM CHLORIDE, CALCIUM CHLORIDE 600; 310; 30; 20 MG/100ML; MG/100ML; MG/100ML; MG/100ML
INJECTION, SOLUTION INTRAVENOUS
Status: DISCONTINUED | OUTPATIENT
Start: 2021-03-28 | End: 2021-03-28 | Stop reason: HOSPADM

## 2021-03-28 RX ORDER — FOLIC ACID 1 MG/1
1 TABLET ORAL DAILY
COMMUNITY

## 2021-03-28 RX ORDER — METRONIDAZOLE 500 MG/100ML
500 INJECTION, SOLUTION INTRAVENOUS EVERY 12 HOURS
Status: DISCONTINUED | OUTPATIENT
Start: 2021-03-28 | End: 2021-04-02

## 2021-03-28 RX ORDER — LIDOCAINE HYDROCHLORIDE 20 MG/ML
INJECTION, SOLUTION EPIDURAL; INFILTRATION; INTRACAUDAL; PERINEURAL AS NEEDED
Status: DISCONTINUED | OUTPATIENT
Start: 2021-03-28 | End: 2021-03-28 | Stop reason: HOSPADM

## 2021-03-28 RX ORDER — SODIUM CHLORIDE, SODIUM LACTATE, POTASSIUM CHLORIDE, CALCIUM CHLORIDE 600; 310; 30; 20 MG/100ML; MG/100ML; MG/100ML; MG/100ML
125 INJECTION, SOLUTION INTRAVENOUS CONTINUOUS
Status: CANCELLED | OUTPATIENT
Start: 2021-03-28

## 2021-03-28 RX ORDER — DEXTROSE 50 % IN WATER (D50W) INTRAVENOUS SYRINGE
25-50 AS NEEDED
Status: DISCONTINUED | OUTPATIENT
Start: 2021-03-28 | End: 2021-04-02 | Stop reason: HOSPADM

## 2021-03-28 RX ORDER — PROPOFOL 10 MG/ML
INJECTION, EMULSION INTRAVENOUS AS NEEDED
Status: DISCONTINUED | OUTPATIENT
Start: 2021-03-28 | End: 2021-03-28 | Stop reason: HOSPADM

## 2021-03-28 RX ORDER — OXYCODONE HYDROCHLORIDE 5 MG/1
5 TABLET ORAL
Status: DISCONTINUED | OUTPATIENT
Start: 2021-03-28 | End: 2021-04-02 | Stop reason: HOSPADM

## 2021-03-28 RX ORDER — MAGNESIUM SULFATE 100 %
4 CRYSTALS MISCELLANEOUS AS NEEDED
Status: DISCONTINUED | OUTPATIENT
Start: 2021-03-28 | End: 2021-04-02 | Stop reason: HOSPADM

## 2021-03-28 RX ORDER — SIMETHICONE 80 MG
80 TABLET,CHEWABLE ORAL
Status: DISCONTINUED | OUTPATIENT
Start: 2021-03-28 | End: 2021-04-02 | Stop reason: HOSPADM

## 2021-03-28 RX ORDER — DIPHENHYDRAMINE HCL 25 MG
25 CAPSULE ORAL
Status: DISCONTINUED | OUTPATIENT
Start: 2021-03-28 | End: 2021-04-02 | Stop reason: HOSPADM

## 2021-03-28 RX ORDER — ONDANSETRON 2 MG/ML
4 INJECTION INTRAMUSCULAR; INTRAVENOUS AS NEEDED
Status: CANCELLED | OUTPATIENT
Start: 2021-03-28

## 2021-03-28 RX ORDER — CALCIUM CARB/MAGNESIUM CARB 311-232MG
5 TABLET ORAL
Status: DISCONTINUED | OUTPATIENT
Start: 2021-03-28 | End: 2021-04-02 | Stop reason: HOSPADM

## 2021-03-28 RX ORDER — DIPHENHYDRAMINE HYDROCHLORIDE 50 MG/ML
25 INJECTION, SOLUTION INTRAMUSCULAR; INTRAVENOUS
Status: DISCONTINUED | OUTPATIENT
Start: 2021-03-28 | End: 2021-04-02 | Stop reason: HOSPADM

## 2021-03-28 RX ORDER — METRONIDAZOLE 500 MG/100ML
500 INJECTION, SOLUTION INTRAVENOUS
Status: COMPLETED | OUTPATIENT
Start: 2021-03-28 | End: 2021-03-28

## 2021-03-28 RX ORDER — SODIUM CHLORIDE AND POTASSIUM CHLORIDE .9; .15 G/100ML; G/100ML
SOLUTION INTRAVENOUS CONTINUOUS
Status: DISCONTINUED | OUTPATIENT
Start: 2021-03-28 | End: 2021-03-31

## 2021-03-28 RX ORDER — LISINOPRIL 20 MG/1
40 TABLET ORAL DAILY
Status: DISCONTINUED | OUTPATIENT
Start: 2021-03-28 | End: 2021-04-02 | Stop reason: HOSPADM

## 2021-03-28 RX ORDER — ACETAMINOPHEN 325 MG/1
650 TABLET ORAL
Status: DISCONTINUED | OUTPATIENT
Start: 2021-03-28 | End: 2021-04-02 | Stop reason: HOSPADM

## 2021-03-28 RX ORDER — LIDOCAINE HYDROCHLORIDE 10 MG/ML
0.1 INJECTION, SOLUTION EPIDURAL; INFILTRATION; INTRACAUDAL; PERINEURAL AS NEEDED
Status: CANCELLED | OUTPATIENT
Start: 2021-03-28

## 2021-03-28 RX ORDER — EPHEDRINE SULFATE/0.9% NACL/PF 50 MG/5 ML
SYRINGE (ML) INTRAVENOUS AS NEEDED
Status: DISCONTINUED | OUTPATIENT
Start: 2021-03-28 | End: 2021-03-28 | Stop reason: HOSPADM

## 2021-03-28 RX ORDER — HEPARIN SODIUM 5000 [USP'U]/ML
5000 INJECTION, SOLUTION INTRAVENOUS; SUBCUTANEOUS EVERY 8 HOURS
Status: DISCONTINUED | OUTPATIENT
Start: 2021-03-29 | End: 2021-04-02 | Stop reason: HOSPADM

## 2021-03-28 RX ORDER — HYDROMORPHONE HYDROCHLORIDE 1 MG/ML
.5-1 INJECTION, SOLUTION INTRAMUSCULAR; INTRAVENOUS; SUBCUTANEOUS
Status: CANCELLED | OUTPATIENT
Start: 2021-03-28

## 2021-03-28 RX ORDER — ONDANSETRON 2 MG/ML
4 INJECTION INTRAMUSCULAR; INTRAVENOUS
Status: DISCONTINUED | OUTPATIENT
Start: 2021-03-28 | End: 2021-04-02 | Stop reason: HOSPADM

## 2021-03-28 RX ORDER — METHOTREXATE 2.5 MG/1
20 TABLET ORAL
COMMUNITY
End: 2021-04-02

## 2021-03-28 RX ORDER — POLYETHYLENE GLYCOL 3350 17 G/17G
17 POWDER, FOR SOLUTION ORAL DAILY PRN
Status: DISCONTINUED | OUTPATIENT
Start: 2021-03-28 | End: 2021-04-02 | Stop reason: HOSPADM

## 2021-03-28 RX ORDER — ROPIVACAINE HYDROCHLORIDE 5 MG/ML
INJECTION, SOLUTION EPIDURAL; INFILTRATION; PERINEURAL AS NEEDED
Status: DISCONTINUED | OUTPATIENT
Start: 2021-03-28 | End: 2021-03-28 | Stop reason: HOSPADM

## 2021-03-28 RX ORDER — SODIUM CHLORIDE, SODIUM LACTATE, POTASSIUM CHLORIDE, CALCIUM CHLORIDE 600; 310; 30; 20 MG/100ML; MG/100ML; MG/100ML; MG/100ML
125 INJECTION, SOLUTION INTRAVENOUS CONTINUOUS
Status: CANCELLED | OUTPATIENT
Start: 2021-03-28 | End: 2021-03-29

## 2021-03-28 RX ORDER — ASPIRIN 81 MG/1
81 TABLET ORAL DAILY
Status: DISCONTINUED | OUTPATIENT
Start: 2021-03-28 | End: 2021-04-02 | Stop reason: HOSPADM

## 2021-03-28 RX ORDER — LEVOFLOXACIN 5 MG/ML
500 INJECTION, SOLUTION INTRAVENOUS
Status: DISCONTINUED | OUTPATIENT
Start: 2021-03-28 | End: 2021-03-28

## 2021-03-28 RX ORDER — AMLODIPINE BESYLATE 5 MG/1
10 TABLET ORAL DAILY
Status: DISCONTINUED | OUTPATIENT
Start: 2021-03-28 | End: 2021-03-29

## 2021-03-28 RX ADMIN — METRONIDAZOLE 500 MG: 500 INJECTION, SOLUTION INTRAVENOUS at 01:46

## 2021-03-28 RX ADMIN — Medication 40 MCG: at 18:51

## 2021-03-28 RX ADMIN — FENTANYL CITRATE 25 MCG: 0.05 INJECTION, SOLUTION INTRAMUSCULAR; INTRAVENOUS at 19:21

## 2021-03-28 RX ADMIN — ACETAMINOPHEN 650 MG: 325 TABLET, FILM COATED ORAL at 12:03

## 2021-03-28 RX ADMIN — LIDOCAINE HYDROCHLORIDE 40 MG: 20 INJECTION, SOLUTION EPIDURAL; INFILTRATION; INTRACAUDAL; PERINEURAL at 18:43

## 2021-03-28 RX ADMIN — FENTANYL CITRATE 25 MCG: 0.05 INJECTION, SOLUTION INTRAMUSCULAR; INTRAVENOUS at 18:49

## 2021-03-28 RX ADMIN — SODIUM CHLORIDE, POTASSIUM CHLORIDE, SODIUM LACTATE AND CALCIUM CHLORIDE: 600; 310; 30; 20 INJECTION, SOLUTION INTRAVENOUS at 18:36

## 2021-03-28 RX ADMIN — Medication 10 ML: at 05:23

## 2021-03-28 RX ADMIN — PROPOFOL 50 MG: 10 INJECTION, EMULSION INTRAVENOUS at 18:44

## 2021-03-28 RX ADMIN — FOLIC ACID 1 MG: 1 TABLET ORAL at 09:42

## 2021-03-28 RX ADMIN — FAMOTIDINE 20 MG: 10 INJECTION INTRAVENOUS at 08:21

## 2021-03-28 RX ADMIN — METRONIDAZOLE 500 MG: 500 INJECTION, SOLUTION INTRAVENOUS at 13:58

## 2021-03-28 RX ADMIN — ACETAMINOPHEN 650 MG: 325 TABLET, FILM COATED ORAL at 05:22

## 2021-03-28 RX ADMIN — MIDAZOLAM HYDROCHLORIDE 0.5 MG: 2 INJECTION, SOLUTION INTRAMUSCULAR; INTRAVENOUS at 18:39

## 2021-03-28 RX ADMIN — IOPAMIDOL 100 ML: 612 INJECTION, SOLUTION INTRAVENOUS at 14:17

## 2021-03-28 RX ADMIN — FENTANYL CITRATE 25 MCG: 0.05 INJECTION, SOLUTION INTRAMUSCULAR; INTRAVENOUS at 19:39

## 2021-03-28 RX ADMIN — PHENYLEPHRINE HYDROCHLORIDE 10 MCG/MIN: 10 INJECTION INTRAVENOUS at 19:01

## 2021-03-28 RX ADMIN — IOPAMIDOL 100 ML: 755 INJECTION, SOLUTION INTRAVENOUS at 00:07

## 2021-03-28 RX ADMIN — FENTANYL CITRATE 25 MCG: 0.05 INJECTION, SOLUTION INTRAMUSCULAR; INTRAVENOUS at 19:17

## 2021-03-28 RX ADMIN — POTASSIUM CHLORIDE AND SODIUM CHLORIDE 100 ML: 900; 150 INJECTION, SOLUTION INTRAVENOUS at 05:23

## 2021-03-28 RX ADMIN — LEVOFLOXACIN 750 MG: 5 INJECTION, SOLUTION INTRAVENOUS at 01:46

## 2021-03-28 RX ADMIN — Medication 10 ML: at 13:58

## 2021-03-28 RX ADMIN — VANCOMYCIN HYDROCHLORIDE 1250 MG: 1.25 INJECTION, POWDER, LYOPHILIZED, FOR SOLUTION INTRAVENOUS at 15:38

## 2021-03-28 RX ADMIN — LIDOCAINE HYDROCHLORIDE 10 ML: 10 INJECTION, SOLUTION INFILTRATION; PERINEURAL at 15:37

## 2021-03-28 RX ADMIN — Medication 10 MG: at 19:04

## 2021-03-28 RX ADMIN — Medication 1 CAPSULE: at 11:57

## 2021-03-28 RX ADMIN — CEFEPIME HYDROCHLORIDE 2 G: 2 INJECTION, POWDER, FOR SOLUTION INTRAVENOUS at 11:57

## 2021-03-28 RX ADMIN — Medication 40 MCG: at 18:57

## 2021-03-28 RX ADMIN — Medication 10 ML: at 21:15

## 2021-03-28 NOTE — PROGRESS NOTES
Pharmacy changed famotidine to 20 mg daily, Metronidazole to 500 mg IV q12H and Levaquin to 750 mg IV q48H, for crcl of 35 ml/min, per protocol. Will follow daily.

## 2021-03-28 NOTE — H&P
[facility]  [address] [facility], [facility] [address]  [phone]     Hospitalist Admission Note      NAME: Sue King   :  1930   MRN:  [id number]     Date/Time:  3/28/2021 1:22 AM    Patient PCP: Rivas Vargas MD    Emergency Contact:    Extended Emergency Contact Information  Primary Emergency Contact: John Randolphing  Address: [address] [place] [address], [place] [address] [address] [facility] Phone: [phone]  Mobile Phone: [phone]  Relation: Son      Code: Full Code     Isolation Precautions: There are currently no Active Isolations        Subjective:     CHIEF COMPLAINT: Abdominal pain     HISTORY OF PRESENT ILLNESS:     Ms. Twin Valdes is a 80 y.o. female from [facility] with history that includes RA on MTX, DM 2, HTN and discharged 20 for SBO and diverticulitis returns to ER today with  a gradual onset of cramping mild-moderate LLQ abdominal pain. Pain started 2 days ago and symptoms have been gradually worsening since. Pain does not radiate and is associated with chills, fever, nausea and vomiting. Aggravating factors: none. Alleviating factors: acetaminophen. Pain appears to be due to sigmoid diverticulitis. Patient also complains of constant severe left elbow pain with swelling which occurred after bumping her elbow about a month ago. X-ray was done showing nonspecific inflammatory changes with effusion possibly gout. In ER CT of the abdomen showed mild sigmoid diverticulitis with multiple abscess or obstruction. Labs showed showing leukocytosis along with lactic acidosis, elevated CRP, and hyponatremia. Based on her diverticulitis along with the temperature, heart rate, white count, and lactic acidosis patient met criteria for severe sepsis. She was started on the sepsis bundle and given a liter of fluids. Her lactic acid has gone from 3.64-2.27 and recheck.       Allergies   Allergen Reactions    Alendronate Diarrhea    Amoxicillin Rash    Hydrochlorothiazide Other (comments)     \"Caused pain everywhere\"       Prior to Admission medications    Medication Sig Start Date End Date Taking? Authorizing Provider   levothyroxine (SYNTHROID) 75 mcg tablet Take 1 Tab by mouth Daily (before breakfast). 12/2/20   Nguyen Rojo MD   docusate sodium (COLACE) 100 mg capsule Take 1 Cap by mouth two (2) times a day. 12/2/20   Mckenna Rojo MD   polyethylene glycol (Miralax) 17 gram packet Take 1 Packet by mouth daily. 12/2/20   , Nguyen HAN MD   ondansetron (Zofran ODT) 4 mg disintegrating tablet Take 1 Tab by mouth every eight (8) hours as needed for Nausea or Vomiting. 12/2/20   , Mckenna Baker MD   L.acidoph & parac-S.therm-Bifido (BRENNEN Q2/RISAQUAD-2) 16 billion cell cap cap Take 1 Cap by mouth daily. 12/2/20   , Mckenna Baker MD   methotrexate (RHEUMATREX) 2.5 mg tablet Take 17.5 mg by mouth Every Friday. Provider, Historical   lisinopriL (PRINIVIL, ZESTRIL) 40 mg tablet Take 40 mg by mouth daily. Provider, Historical   amLODIPine (NORVASC) 10 mg tablet Take 10 mg by mouth daily. Provider, Historical   acetaminophen (Tylenol Extra Strength) 500 mg tablet Take 500 mg by mouth two (2) times a day. Provider, Historical   multivit-min/iron/folic/lutein (CENTRUM SILVER WOMEN PO) Take 1 Tab by mouth every other day. Provider, Historical   cholecalciferol (VITAMIN D3) 1,000 unit cap Take 2,000 Units by mouth daily. Provider, Historical   aspirin delayed-release 81 mg tablet Take 81 mg by mouth daily.     Provider, Historical       Past Medical History:   Diagnosis Date    Arthritis, rheumatoid ([facility]     Diabetes ([facility]     Diverticulitis     Hard of hearing     Hypertension     Hypothyroid     Osteoporosis, post-menopausal         Past Surgical History:   Procedure Laterality Date    FLEXIBLE SIGMOIDOSCOPY N/A 12/2/2020    SIGMOIDOSCOPY FLEXIBLE performed by Carmen Kamara MD at [place] [address] HX HEMORRHOIDECTOMY      HX HYSTERECTOMY      HX THYROIDECTOMY         Social History     Tobacco Use    Smoking status: Former Smoker     Types: Cigarettes     Quit date: 1976     Years since quittin.3    Smokeless tobacco: Never Used   Substance Use Topics    Alcohol use: Yes     Comment: Waylon time 1 glass wine        Family History   Problem Relation Age of Onset    Heart Disease Mother     Diabetes Father     Cancer Brother         brain cancer    Heart Disease Brother     Diabetes Brother     Other Other         scleroderma        PFMSH and medications were reviewed. Review of Systems (14 point ROS):  (bold if positive, if negative)    Gen:   , , fever, chills, fatigueEyes:  , , ENT:   , rhinorrhea, , , CVS:   , , , , , Pulm: , , , , GI:       Abd pain, nausea, vomit, , , , , :     , , , , MS:     Left elbow pain, , left elbow swelling, Tessa:   , , , , Psy:    , , , , , , Endo: , , , Hem:  , , , Hari:   , , , , Rubin:   , , , , , , ,         Objective:       Visit Vitals  BP (!) 124/109   Pulse (!) 105   Temp (!) 102.2 °F (39 °C)   Resp 22   Ht 4' 11\" (1.499 m)   Wt 60 kg (132 lb 4.4 oz)   SpO2 97%   BMI 26.72 kg/m²       Exam:     Physical Exam:    General:  Alert, cooperative, NAD  Head: Normocephalic, atraumatic  Eyes: PERRL and EOMI sclera clear  ENT: Lips, mucosa, and tongue normal.   Neck: supple, no tenderness  Lungs:  CTA with good BS and normal effort  Heart: S1-S2, RRR   Abd: Not distended with LLQ tenderness on palpation with guarding but no rebound. Bowel sounds are present and normoactive. Ext: no cyanosis, no edema. Rheumatoid nodules felt on the left forearm. I was not able to fully examine her elbow due to the pain. Did appear slightly warm but I do not see swelling or erythema at this time.   Pulses: 2+ and symmetric  Skin: Skin color, texture, turgor normal. No rashes or lesions  Neuro:   alert, oriented x3, affect appropriate, no involuntary movements  Psych: Not anxious, cooperative, normal affect    Labs:    Recent Labs     03/27/21  2230   WBC 16.5*   HGB 11.2*   HCT 34.0*        Recent Labs     03/27/21  2230   *   K 3.6   CL 99   CO2 24   *   BUN 16   CREA 0.89   CA 8.6   ALB 3.4*   TBILI 0.5   ALT 25     Lab Results   Component Value Date/Time    Glucose (POC) 132 (H) 12/12/2017 01:22 PM    Glucose (POC) 120 (H) 11/16/2017 11:33 AM    Glucose (POC) 91 05/04/2016 11:27 AM    Glucose (POC) 110 (H) 05/04/2016 07:31 AM       Radiology and EKG reviewed:       Xr Elbow Lt Min 3 V    Result Date: 3/28/2021  1. Radiocapitellar nonspecific inflammatory arthritis and joint effusion. Consider gout. 2. No fracture line. Ct Abd Pelv W Cont    Result Date: 3/28/2021  1. Mild sigmoid colon diverticulitis. No abscess. 2. No bowel obstruction. 3. 3 cm infrarenal abdominal aortic aneurysm has slightly increased since last year. Xr Chest Port    Result Date: 3/28/2021  No acute process on portable chest. No significant change. I personally reviewed and interpreted the imaging studies and agree with official reading. **Chart reviewed in [facility]**       Assessment/Plan:       Severe sepsis ([address] (3/28/2021) POA / Leukocytosis (3/28/2021) / Lactic acidosis (3/28/2021): Likely due to the diverticulitis. Patient will be admitted for work-up and treatment of the severe sepsis and diverticulitis to prevent further deterioration. Will need IV antibiotic therapy which is already initiated. Sepsis bundle was initiated. Blood cultures pending. Supportive therapy. IVF. Monitor labs closely. Given that she is from shelter with fever and sepsis a rapid COVID-19 was ordered which was negative. I will follow it up with a PCR if negative. Denies chest pain shortness of breath but did report rhinorrhea which may be associated to allergies she feels.     Diverticulitis of sigmoid colon (3/28/2021) / LLQ pain (3/28/2021) / Intractable nausea and vomiting (3/28/2021): Admit for further workup and treatment. NPO.  IVF. I&Os. Supportive care. Antiemetic. Pain control oxycodone for moderate pain and morphine for severe pain. IV antibiotics: Levaquin IV and metronidazole IV. .   Labs:  CMP, CBC, C diff toxin, FOBT, O&P, fecal WBCs, stool cultures, stool lactoferrin  Consult(s): GI. Hyponatremia (3/28/2021): Admit to telemetry to avoid further deterioration workup and treatment of symptomatic hyponatremia. Labs: Urine sodium, urine osmolarity, serum osmolality, and uric acid. BMP checks every 6-8 hours. Will also check CoV-19 status given that its the most common electrolyte abnormality with this infection. IV fluid replacement with goal correction of 0.2 to 0.3 mEq/h or 6 to 8 mEq in 24 hours to avoid central pontine myelinolysis. Consult: Do not feel need to consult nephrology at this time. Arthritis of left elbow (3/28/2021) / RA (rheumatoid arthritis) ([facility] (4/9/2016) / Immunocompromised state due to drug therapy (3/28/2021): Patient uses MTX for her RA which makes her immune compromised. With regards to the elbow pain and arthritic changes appears consistent with gout. Will check a uric acid level, CRP already slightly elevated will check ESR. Given her degree of pain will go ahead and consult orthopedics. DM type 2 (diabetes mellitus, type 2) ([facility] (4/9/2016): She is no longer on diabetic medications but I will go ahead and monitor blood glucose levels with insulin sliding scale coverage. No basal coverage for now. Will be checking A1C. Consider stopping glucose monitoring and insulin if blood sugars are consistently normal.       HTN (hypertension) (4/9/2016): Ordered home amlodipine and lisinopril along with as needed hydralazine. Hypothyroid (4/9/2016): Check TSH in the meantime continue with home dose of levothyroxine 75 mcg daily.     Body mass index is 26.72 kg/m².: 25.0 - 29.9 Overweight        Risk of deterioration: high Total time spent with patient: 79 Minutes **I personally saw and examined the patient during this time period**    Discussed:  Code Status and Care Plan discussed with:  [x]Patient  []Family  []Care Giver  [x]ED Doc  [x]RN  []Specialist  []Care Manager     Prophylaxis:  Lovenox and H2B/PPI    Probable disposition:  Back to [facility]    Date of service:    3/28/2021                  ___________________________________________________    Admitting Physician: Maggy Fuentes MD

## 2021-03-28 NOTE — PROGRESS NOTES
Repeat vital signs, repeat lactate. Change Levaquin to cefepime (no quinolones with hx of aneurysm). Monitor closely  Discussed with bedside nurse    ADDENDUM:  Pt evaluated at bedside. Pt reported severe L elbow pain and only mild abd pain. Also noted that the reason which led her to come to ED was her elbow pain, not her abdominal pain. Concern with septic joint/elbow. Case discussed with ortho. Added vanc. CT LUE showed the following: Findings consistent with septic arthritis with soft tissue extension proximally  into the anconeus and overlying subcutaneous fat, and distally into the supinator muscle and adjacent fascial planes. Joint aspirated by ortho, purulent appearing    Source of infection is likely septic joint and not mild diverticulitis. Plan for left elbow I&D tonight.   Appreciate CHINA Rizo / ortho team.

## 2021-03-28 NOTE — CONSULTS
ORTHOPEDIC SURGERY CONSULT    Subjective:     Date of Consultation:  March 28, 2021    Referring Physician:  Dr. Hank Heller is a 80 y.o. female who is being seen for left elbow pain. She is right hand dominant. She has a significant past medical history of diverticulosis with diverticulitis, rheumatoid arthritis on methotrexate, DM-2, CKD, HTN, and hypothyroidism. She presented to the [facility] last night after have LLQ pain and nausea with vomiting x 2 times during the day. She also complained of severe left elbow pain that worsened since hitting it on a door 1.5 months ago. She states she was walking in her house when she caught the lateral aspect of her left elbow on the door frame. She had immediate pain and ecchymosis did develop over the lateral elbow over the course of the next week. She reports she had worsening pain over the next month. 2 days ago the lateral elbow began to get hot, erythematous, and incredibly painful. This was one of the main reasons for coming to the ER last night. In the ER xrays showed an effusion of the left elbow with erosive changes consistent with inflammatory arthritis. She complains of consistent left elbow pain at rest and with movement. She denies any other trauma. She has no history of severe left elbow pain prior to this injury.       Patient Active Problem List    Diagnosis Date Noted    Lactic acidosis 03/28/2021    Hyponatremia 03/28/2021    Leukocytosis 03/28/2021    LLQ pain 03/28/2021    Diverticulitis of sigmoid colon 03/28/2021    Severe sepsis ([address] 03/28/2021    Intractable nausea and vomiting 03/28/2021    Arthritis of left elbow 03/28/2021    Immunocompromised state due to drug therapy 03/28/2021    Acute diverticulitis 12/02/2020    SBO (small bowel obstruction) ([address] 11/30/2020    Chronic kidney disease (CKD) 09/18/2017    Osteoporosis, post-menopausal     Chest pain 04/09/2016    DM type 2 (diabetes mellitus, type 2) ([facility] 2016    HTN (hypertension) 2016    Hypothyroid 2016    RA (rheumatoid arthritis) ([facility] 2016    Abnormal chest x-ray 2016     Family History   Problem Relation Age of Onset    Heart Disease Mother     Diabetes Father     Cancer Brother         brain cancer    Heart Disease Brother     Diabetes Brother     Other Other         scleroderma      Social History     Tobacco Use    Smoking status: Former Smoker     Types: Cigarettes     Quit date: 1976     Years since quittin.3    Smokeless tobacco: Never Used   Substance Use Topics    Alcohol use: Yes     Comment: Waylon time 1 glass wine     Past Medical History:   Diagnosis Date    Arthritis, rheumatoid ([facility]     Diabetes ([facility]     Diverticulitis     Hard of hearing     Hypertension     Hypothyroid     Osteoporosis, post-menopausal       Past Surgical History:   Procedure Laterality Date    FLEXIBLE SIGMOIDOSCOPY N/A 2020    SIGMOIDOSCOPY FLEXIBLE performed by Farooq Rivas MD at [address] [facility] HX HEMORRHOIDECTOMY      HX HYSTERECTOMY      HX THYROIDECTOMY        Prior to Admission medications    Medication Sig Start Date End Date Taking? Authorizing Provider   folic acid (FOLVITE) 1 mg tablet Take 1 mg by mouth daily. Yes Provider, Historical   docusate sodium (Colace) 100 mg capsule Take 100 mg by mouth two (2) times daily as needed for Constipation. Yes Provider, Historical   methotrexate (RHEUMATREX) 2.5 mg tablet Take 20 mg by mouth every . Yes Provider, Historical   levothyroxine (SYNTHROID) 75 mcg tablet Take 1 Tab by mouth Daily (before breakfast). 20  Yes Estefania Rojo MD   polyethylene glycol (Miralax) 17 gram packet Take 1 Packet by mouth daily. 20  Yes , Estefania Walsh MD   L.acidoph & parac-S.therm-Bifido (BRENNEN Q2/RISAQUAD-2) 16 billion cell cap cap Take 1 Cap by mouth daily.  20  Yes Shay Rojo MD   lisinopriL (PRINIVIL, ZESTRIL) 40 mg tablet Take 40 mg by mouth daily. Yes Provider, Historical   amLODIPine (NORVASC) 10 mg tablet Take 10 mg by mouth daily. Yes Provider, Historical   acetaminophen (Tylenol Extra Strength) 500 mg tablet Take 500 mg by mouth two (2) times daily as needed for Pain. Yes Provider, Historical   multivit-min/iron/folic/lutein (CENTRUM SILVER WOMEN PO) Take 1 Tab by mouth every other day. Yes Provider, Historical   cholecalciferol (VITAMIN D3) 1,000 unit cap Take 2,000 Units by mouth daily. Yes Provider, Historical   aspirin delayed-release 81 mg tablet Take 81 mg by mouth daily.    Yes Provider, Historical     Current Facility-Administered Medications   Medication Dose Route Frequency    [Held by provider] amLODIPine (NORVASC) tablet 10 mg  10 mg Oral DAILY    aspirin delayed-release tablet 81 mg  81 mg Oral DAILY    levothyroxine (SYNTHROID) tablet 75 mcg  75 mcg Oral ACB    [Held by provider] lisinopriL (PRINIVIL, ZESTRIL) tablet 40 mg  40 mg Oral DAILY    glucose chewable tablet 16 g  4 Tab Oral PRN    dextrose (D50W) injection syrg 12.5-25 g  25-50 mL IntraVENous PRN    glucagon (GLUCAGEN) injection 1 mg  1 mg IntraMUSCular PRN    sodium chloride (NS) flush 5-40 mL  5-40 mL IntraVENous Q8H    sodium chloride (NS) flush 5-40 mL  5-40 mL IntraVENous PRN    acetaminophen (TYLENOL) tablet 650 mg  650 mg Oral Q6H PRN    Or    acetaminophen (TYLENOL) suppository 650 mg  650 mg Rectal Q6H PRN    polyethylene glycol (MIRALAX) packet 17 g  17 g Oral DAILY PRN    bisacodyL (DULCOLAX) suppository 10 mg  10 mg Rectal DAILY PRN    ondansetron (ZOFRAN) injection 4 mg  4 mg IntraVENous Q6H PRN    insulin lispro (HUMALOG) injection   SubCUTAneous Q6H    0.9% sodium chloride with KCl 20 mEq/L infusion   IntraVENous CONTINUOUS    hydrALAZINE (APRESOLINE) 20 mg/mL injection 20 mg  20 mg IntraVENous Q4H PRN    melatonin (rapid dissolve) tablet 5 mg  5 mg Oral QHS PRN    alum-mag hydroxide-simeth (MYLANTA) oral suspension 30 mL  30 mL Oral Q4H PRN    oxyCODONE IR (ROXICODONE) tablet 5 mg  5 mg Oral Q4H PRN    morphine injection 2 mg  2 mg IntraVENous Q4H PRN    diphenhydrAMINE (BENADRYL) injection 25 mg  25 mg IntraVENous Q6H PRN    diphenhydrAMINE (BENADRYL) capsule 25 mg  25 mg Oral Q6H PRN    simethicone (MYLICON) tablet 80 mg  80 mg Oral QID PRN    famotidine (PF) (PEPCID) 20 mg in 0.9% sodium chloride 10 mL injection  20 mg IntraVENous DAILY    metroNIDAZOLE (FLAGYL) IVPB premix 500 mg  500 mg IntraVENous Q12H    cefepime (MAXIPIME) 2 g in sterile water (preservative free) 10 mL IV syringe  2 g IntraVENous A94G    folic acid (FOLVITE) tablet 1 mg  1 mg Oral DAILY    L.acidophilus-paracasei-S.thermophil-bifidobacter (RISAQUAD) 8 billion cell capsule  1 Cap Oral DAILY    iopamidoL (ISOVUE 300) 61 % contrast injection 100 mL  100 mL IntraVENous RAD ONCE    sodium chloride (NS) flush 5-10 mL  5-10 mL IntraVENous PRN     Allergies   Allergen Reactions    Alendronate Diarrhea    Amoxicillin Rash     Tolerated cefepime 3/27/21    Hydrochlorothiazide Other (comments)     \"Caused pain everywhere\"        Review of Systems:  Pertinent items are noted in HPI. Objective:     Patient Vitals for the past 8 hrs:   BP Temp Pulse Resp SpO2   21 1145 (!) 142/63 98.5 °F (36.9 °C) 75 20 95 %   21 0820 132/63 98.4 °F (36.9 °C) 73 18 97 %   21 0800 -- -- 73 -- --   21 0712 -- -- 77 -- --     Temp (24hrs), Av.3 °F (37.4 °C), Min:98.2 °F (36.8 °C), Max:102.2 °F (39 °C)        EXAM: Gen: Well developed and nurtured in NAD  PSYCH: AAO X 4  MUSC: Left elbow with erythema laterally without streaking. There is bogginess laterally. There is no olecranon bursitis. There is no lymphangitis. She holds the elbow in a flexed position most comfortable at 95 degrees. She has severe pain with elbow flexion and extension. Passive extension to -30 degrees and flexion to 110 degrees with severe pain throughout that arc.   There is no joint crepitus. No pain with supination or pronation at the wrist.  + BCR of all digits with positive radial and ulnar pulses. Data Review   Recent Results (from the past 24 hour(s))   C REACTIVE PROTEIN, QT    Collection Time: 03/27/21 10:30 PM   Result Value Ref Range    C-Reactive protein 1.17 (H) 0.00 - 0.60 mg/dL   CBC WITH AUTOMATED DIFF    Collection Time: 03/27/21 10:30 PM   Result Value Ref Range    WBC 16.5 (H) 3.6 - 11.0 K/uL    RBC 3.99 3.80 - 5.20 M/uL    HGB 11.2 (L) 11.5 - 16.0 g/dL    HCT 34.0 (L) 35.0 - 47.0 %    MCV 85.2 80.0 - 99.0 FL    MCH 28.1 26.0 - 34.0 PG    MCHC 32.9 30.0 - 36.5 g/dL    RDW 16.3 (H) 11.5 - 14.5 %    PLATELET 553 133 - 948 K/uL    MPV 9.1 8.9 - 12.9 FL    NRBC 0.0 0  WBC    ABSOLUTE NRBC 0.00 0.00 - 0.01 K/uL    NEUTROPHILS 93 (H) 32 - 75 %    LYMPHOCYTES 4 (L) 12 - 49 %    MONOCYTES 2 (L) 5 - 13 %    EOSINOPHILS 0 0 - 7 %    BASOPHILS 0 0 - 1 %    IMMATURE GRANULOCYTES 1 (H) 0.0 - 0.5 %    ABS. NEUTROPHILS 15.3 (H) 1.8 - 8.0 K/UL    ABS. LYMPHOCYTES 0.7 (L) 0.8 - 3.5 K/UL    ABS. MONOCYTES 0.3 0.0 - 1.0 K/UL    ABS. EOSINOPHILS 0.0 0.0 - 0.4 K/UL    ABS. BASOPHILS 0.0 0.0 - 0.1 K/UL    ABS. IMM. GRANS. 0.2 (H) 0.00 - 0.04 K/UL    DF AUTOMATED      RBC COMMENTS ANISOCYTOSIS  1+       METABOLIC PANEL, COMPREHENSIVE    Collection Time: 03/27/21 10:30 PM   Result Value Ref Range    Sodium 132 (L) 136 - 145 mmol/L    Potassium 3.6 3.5 - 5.1 mmol/L    Chloride 99 97 - 108 mmol/L    CO2 24 21 - 32 mmol/L    Anion gap 9 5 - 15 mmol/L    Glucose 121 (H) 65 - 100 mg/dL    BUN 16 6 - 20 MG/DL    Creatinine 0.89 0.55 - 1.02 MG/DL    BUN/Creatinine ratio 18 12 - 20      GFR est AA >60 >60 ml/min/1.73m2    GFR est non-AA 59 (L) >60 ml/min/1.73m2    Calcium 8.6 8.5 - 10.1 MG/DL    Bilirubin, total 0.5 0.2 - 1.0 MG/DL    ALT (SGPT) 25 12 - 78 U/L    AST (SGOT) 16 15 - 37 U/L    Alk.  phosphatase 76 45 - 117 U/L    Protein, total 7.2 6.4 - 8.2 g/dL    Albumin 3.4 (L) 3.5 - 5.0 g/dL    Globulin 3.8 2.0 - 4.0 g/dL    A-G Ratio 0.9 (L) 1.1 - 2.2     LIPASE    Collection Time: 03/27/21 10:30 PM   Result Value Ref Range    Lipase 73 73 - 393 U/L   AMYLASE    Collection Time: 03/27/21 10:30 PM   Result Value Ref Range    Amylase 56 25 - 115 U/L   URINALYSIS W/ REFLEX CULTURE    Collection Time: 03/27/21 10:30 PM    Specimen: Urine   Result Value Ref Range    Color YELLOW/STRAW      Appearance CLEAR CLEAR      Specific gravity 1.009 1.003 - 1.030      pH (UA) 7.5 5.0 - 8.0      Protein TRACE (A) NEG mg/dL    Glucose Negative NEG mg/dL    Ketone Negative NEG mg/dL    Bilirubin Negative NEG      Blood Negative NEG      Urobilinogen 0.2 0.2 - 1.0 EU/dL    Nitrites Negative NEG      Leukocyte Esterase Negative NEG      WBC 0-4 0 - 4 /hpf    RBC 0-5 0 - 5 /hpf    Epithelial cells FEW FEW /lpf    Bacteria Negative NEG /hpf    UA:UC IF INDICATED CULTURE NOT INDICATED BY UA RESULT CNI     SAMPLES BEING HELD    Collection Time: 03/27/21 10:30 PM   Result Value Ref Range    SAMPLES BEING HELD [email or web address]     COMMENT        Add-on orders for these samples will be processed based on acceptable specimen integrity and analyte stability, which may vary by analyte.    TROPONIN I    Collection Time: 03/27/21 10:30 PM   Result Value Ref Range    Troponin-I, Qt. <0.05 <0.05 ng/mL   URIC ACID    Collection Time: 03/27/21 10:30 PM   Result Value Ref Range    Uric acid 3.6 2.6 - 6.0 MG/DL   TSH 3RD GENERATION    Collection Time: 03/27/21 10:30 PM   Result Value Ref Range    TSH 3.44 0.36 - 3.74 uIU/mL   POC LACTIC ACID    Collection Time: 03/27/21 10:39 PM   Result Value Ref Range    Lactic Acid (POC) 3.64 (HH) 0.40 - 2.00 mmol/L   CULTURE, BLOOD    Collection Time: 03/27/21 11:20 PM    Specimen: Blood   Result Value Ref Range    Special Requests: NO SPECIAL REQUESTS      Culture result: NO GROWTH AFTER 8 HOURS     EKG, 12 LEAD, INITIAL    Collection Time: 03/27/21 11:22 PM   Result Value Ref Range Ventricular Rate 96 BPM    Atrial Rate 97 BPM    P-R Interval 182 ms    QRS Duration 80 ms    Q-T Interval 368 ms    QTC Calculation (Bezet) 464 ms    Calculated P Axis 33 degrees    Calculated R Axis 50 degrees    Calculated T Axis 69 degrees    Diagnosis       Sinus rhythm with premature atrial complexes  Nonspecific ST and T wave abnormality  Abnormal ECG  When compared with ECG of 30-NOV-2020 15:50,  premature atrial complexes are now present  Nonspecific T wave abnormality now evident in Anterior leads     COVID-19 RAPID TEST    Collection Time: 03/28/21 12:36 AM   Result Value Ref Range    Specimen source Nasopharyngeal      COVID-19 rapid test Not detected NOTD     POC LACTIC ACID    Collection Time: 03/28/21 12:41 AM   Result Value Ref Range    Lactic Acid (POC) 2.27 (HH) 0.40 - 2.00 mmol/L   GLUCOSE, POC    Collection Time: 03/28/21  1:51 AM   Result Value Ref Range    Glucose (POC) 105 (H) 65 - 100 mg/dL    Performed by Cesar Narayanan, UR    Collection Time: 03/28/21  2:10 AM   Result Value Ref Range    Osmolality,urine 409 MOSM/kg H2O   SODIUM, UR, RANDOM    Collection Time: 03/28/21  2:18 AM   Result Value Ref Range    Sodium,urine random 487 MMOL/L   METABOLIC PANEL, COMPREHENSIVE    Collection Time: 03/28/21  3:46 AM   Result Value Ref Range    Sodium 135 (L) 136 - 145 mmol/L    Potassium 3.5 3.5 - 5.1 mmol/L    Chloride 102 97 - 108 mmol/L    CO2 25 21 - 32 mmol/L    Anion gap 8 5 - 15 mmol/L    Glucose 109 (H) 65 - 100 mg/dL    BUN 14 6 - 20 MG/DL    Creatinine 0.87 0.55 - 1.02 MG/DL    BUN/Creatinine ratio 16 12 - 20      GFR est AA >60 >60 ml/min/1.73m2    GFR est non-AA >60 >60 ml/min/1.73m2    Calcium 8.1 (L) 8.5 - 10.1 MG/DL    Bilirubin, total 0.4 0.2 - 1.0 MG/DL    ALT (SGPT) 20 12 - 78 U/L    AST (SGOT) 16 15 - 37 U/L    Alk.  phosphatase 59 45 - 117 U/L    Protein, total 6.1 (L) 6.4 - 8.2 g/dL    Albumin 2.8 (L) 3.5 - 5.0 g/dL    Globulin 3.3 2.0 - 4.0 g/dL    A-G Ratio 0.8 (L) 1.1 - 2.2     CBC WITH AUTOMATED DIFF    Collection Time: 03/28/21  3:46 AM   Result Value Ref Range    WBC 25.4 (H) 3.6 - 11.0 K/uL    RBC 3.51 (L) 3.80 - 5.20 M/uL    HGB 10.0 (L) 11.5 - 16.0 g/dL    HCT 30.8 (L) 35.0 - 47.0 %    MCV 87.7 80.0 - 99.0 FL    MCH 28.5 26.0 - 34.0 PG    MCHC 32.5 30.0 - 36.5 g/dL    RDW 16.5 (H) 11.5 - 14.5 %    PLATELET 268 521 - 902 K/uL    NRBC 0.0 0  WBC    ABSOLUTE NRBC 0.00 0.00 - 0.01 K/uL    NEUTROPHILS 93 (H) 32 - 75 %    LYMPHOCYTES 2 (L) 12 - 49 %    MONOCYTES 4 (L) 5 - 13 %    EOSINOPHILS 0 0 - 7 %    BASOPHILS 0 0 - 1 %    IMMATURE GRANULOCYTES 1 (H) 0.0 - 0.5 %    ABS. NEUTROPHILS 23.6 (H) 1.8 - 8.0 K/UL    ABS. LYMPHOCYTES 0.5 (L) 0.8 - 3.5 K/UL    ABS. MONOCYTES 1.0 0.0 - 1.0 K/UL    ABS. EOSINOPHILS 0.0 0.0 - 0.4 K/UL    ABS. BASOPHILS 0.0 0.0 - 0.1 K/UL    ABS. IMM.  GRANS. 0.3 (H) 0.00 - 0.04 K/UL    DF SMEAR SCANNED      RBC COMMENTS ANISOCYTOSIS  1+        WBC COMMENTS REACTIVE LYMPHS     SED RATE (ESR)    Collection Time: 03/28/21  3:46 AM   Result Value Ref Range    Sed rate, automated 39 (H) 0 - 30 mm/hr   D DIMER    Collection Time: 03/28/21  3:46 AM   Result Value Ref Range    D-dimer 2.73 (H) 0.00 - 0.65 mg/L FEU   HEMOGLOBIN A1C WITH EAG    Collection Time: 03/28/21  3:47 AM   Result Value Ref Range    Hemoglobin A1c 5.4 4.0 - 5.6 %    Est. average glucose 108 mg/dL   OSMOLALITY, SERUM/PLASMA    Collection Time: 03/28/21  3:47 AM   Result Value Ref Range    Osmolality, serum/plasma 281 mOsm/kg H2O   SARS-COV-2    Collection Time: 03/28/21  3:47 AM   Result Value Ref Range    SARS-CoV-2 Please find results under separate order     LACTIC ACID    Collection Time: 03/28/21  4:15 AM   Result Value Ref Range    Lactic acid 4.1 (HH) 0.4 - 2.0 MMOL/L   GLUCOSE, POC    Collection Time: 03/28/21  5:19 AM   Result Value Ref Range    Glucose (POC) 112 (H) 65 - 100 mg/dL    Performed by Dat Delgado    LACTIC ACID    Collection Time: 03/28/21  8:25 AM   Result Value Ref Range    Lactic acid 1.8 0.4 - 2.0 MMOL/L   GLUCOSE, POC    Collection Time: 03/28/21 11:45 AM   Result Value Ref Range    Glucose (POC) 113 (H) 65 - 100 mg/dL    Performed by Anita Houston (PCT)          Assessment/Plan:   A: 1. Left elbow effusion ?septic arthritis versus inflammatory arthropathy  2. Left elbow cellulitis    P: 1. Will obtain a CT w/wo contrast of the left elbow to evaluate for superficial abscess (septic hematoma from trauma) and for further diagnostic evaluation of the elbow joint. 2. Will proceed with aspiration of the left elbow once CT is complete. 3. Placed in a posterior long arm splint at position of comfort. 4. Continue IV antibiotics. Dr. Akbar Marques to add Vancomycin to regimen. 5. Keep NPO in case she needs I and D tonight. Discussed case with Dr. Yan Farr who agrees with plan.     Iman Calle, [address] [facility]   Orthopaedic Surgery [facility]  [address] [facility]

## 2021-03-28 NOTE — PROGRESS NOTES
0700: Bedside shift change report given to [address] [place] [address] (oncoming nurse) by Alek Rees (offgoing nurse). Report included the following information SBAR, Kardex, Procedure Summary, Intake/Output, MAR, Accordion and Cardiac Rhythm NSR. This patient was assisted with Intentional Toileting every 2 hours during this shift. Documentation of ambulation and output reflected on Flowsheet. 1645: Per Dr. Wilson Prabhakar, rapid covid test which was negative is adequate at this time as he does not believe she has covid, and patient is able to go to the OR without a need for precautions. 1800: Patient off floor to OR.

## 2021-03-28 NOTE — ED TRIAGE NOTES
Pt arrives with nausea and vomiting. Went to lie down related to nausea and vomiting since 1730 and could not get comfortable. Denies chest pain or shortness of breath, covid type symptoms. Vomit x2 PTA.

## 2021-03-28 NOTE — ED PROVIDER NOTES
The patient is a 26-year-old female who presents to the ED with a complaint of fever, chills, abdominal pain with intractable nausea and vomiting x2 today. The patient also complained of persistent right elbow pain that began approximately a month ago after she accidentally hit her elbow against a door. She was seen by PCP and was told that this is probably a cyst with likely arthritis of her elbow. She denies any headache, neck pain, sore throat, congestion, chest pain, shortness of breath, diarrhea, constipation, dysuria, hematuria, dizziness, extremity weakness numbness, sick contact, skin rash, recent travel. The patient resides at home by herself.            Past Medical History:   Diagnosis Date    Arthritis, rheumatoid (HCC)     Diabetes ([facility] [address]     Diverticulitis     Hard of hearing     Hypertension     Hypothyroid     Osteoporosis, post-menopausal        Past Surgical History:   Procedure Laterality Date    FLEXIBLE SIGMOIDOSCOPY N/A 2020    SIGMOIDOSCOPY FLEXIBLE performed by Aurea Hanna MD at [address] [facility] HX HEMORRHOIDECTOMY      HX HYSTERECTOMY      HX THYROIDECTOMY           Family History:   Problem Relation Age of Onset    Heart Disease Mother     Diabetes Father     Cancer Brother         brain cancer    Heart Disease Brother     Diabetes Brother     Other Other         scleroderma       Social History     Socioeconomic History    Marital status:      Spouse name: Not on file    Number of children: Not on file    Years of education: Not on file    Highest education level: Not on file   Occupational History    Not on file   Social Needs    Financial resource strain: Not on file    Food insecurity     Worry: Not on file     Inability: Not on file    Transportation needs     Medical: Not on file     Non-medical: Not on file   Tobacco Use    Smoking status: Former Smoker     Types: Cigarettes     Quit date: 1976     Years since quittin.3    Smokeless tobacco: Never Used   Substance and Sexual Activity    Alcohol use: Yes     Comment: [place] time 1 glass wine    Drug use: No    Sexual activity: Never   Lifestyle    Physical activity     Days per week: Not on file     Minutes per session: Not on file    Stress: Not on file   Relationships    Social connections     Talks on phone: Not on file     Gets together: Not on file     Attends [employer] service: Not on file     Active member of club or organization: Not on file     Attends meetings of clubs or organizations: Not on file     Relationship status: Not on file    Intimate partner violence     Fear of current or ex partner: Not on file     Emotionally abused: Not on file     Physically abused: Not on file     Forced sexual activity: Not on file   Other Topics Concern    Not on file   Social History Narrative    ** Merged History Encounter **              ALLERGIES: Alendronate, Amoxicillin, and Hydrochlorothiazide    Review of Systems   All other systems reviewed and are negative. Vitals:    03/27/21 2221 03/27/21 2222   BP: (!) 124/109 (!) 124/109   Pulse: (!) 111 (!) 105   Resp: 20 22   Temp: 99.9 °F (37.7 °C) (!) 102.2 °F (39 °C)   SpO2: 97% 97%   Weight: 60 kg (132 lb 4.4 oz)    Height: 4' 11\" (1.499 m)             Physical Exam  Vitals signs and nursing note reviewed. Exam conducted with a chaperone present. CONSTITUTIONAL: Well-appearing; well-nourished; in no apparent distress  HEAD: Normocephalic; atraumatic  EYES: PERRL; EOM intact; conjunctiva and sclera are clear bilaterally. ENT: No rhinorrhea; normal pharynx with no tonsillar hypertrophy; mucous membranes pink/moist, no erythema, no exudate. NECK: Supple; non-tender; no cervical lymphadenopathy  CARD: Normal S1, S2; no murmurs, rubs, or gallops. Regular rate and rhythm. RESP: Normal respiratory effort; breath sounds clear and equal bilaterally; no wheezes, rhonchi, or rales.   ABD: Normal bowel sounds; non-distended; non-tender; no palpable organomegaly, no masses, no bruits. Back Exam: Normal inspection; no vertebral point tenderness, no CVA tenderness. Normal range of motion. EXT: Normal ROM in all four extremities; non-tender to palpation; no swelling or deformity; distal pulses are normal, no edema. SKIN: Warm; dry; no rash. NEURO:Alert and oriented x 3, coherent, STACEY-XII grossly intact, sensory and motor are non-focal.        MDM  Number of Diagnoses or Management Options  Acute gouty arthropathy  Severe sepsis ([address]  Sigmoid diverticulitis  Suspected COVID-19 virus infection  Diagnosis management comments: Assessment: 26-year-old female who presents to ED for evaluation for acute febrile illness with abdominal pain and intractable nausea and vomiting without bowel obstruction, sepsis with occult bacteremia, colitis, COVID-19 infection. The patient also has swelling and tenderness to the left elbow with a severe history of rheumatoid arthritis currently on methotrexate suspect acute exacerbation of same. rule out acute exacerbation of inflammatory arthropathy including fracture    Plan: Sepsis resuscitation with lab/EKG/chest x-ray/CT scan of the abdomen pelvis/IV fluid/antipyretic/broad-spectrum antibiotics/consult hospitalist/ Monitor and Reevaluate.          Amount and/or Complexity of Data Reviewed  Clinical lab tests: ordered and reviewed  Tests in the radiology section of CPT®: ordered and reviewed  Tests in the medicine section of CPT®: reviewed and ordered  Discussion of test results with the performing providers: yes  Decide to obtain previous medical records or to obtain history from someone other than the patient: yes  Obtain history from someone other than the patient: yes  Review and summarize past medical records: yes  Discuss the patient with other providers: yes  Independent visualization of images, tracings, or specimens: yes    Risk of Complications, Morbidity, and/or Mortality  Presenting problems: moderate  Diagnostic procedures: moderate  Management options: moderate           Procedures    ED EKG interpretation:  Rhythm: normal sinus rhythm; and irregular with PACs. Rate (approx.): 96; Axis: normal; P wave: normal; QRS interval: normal ; ST/T wave: non-specific changes; in  Lead: Diffusely; Other findings: abnormal ekg. This EKG was interpreted by Rick Barahona MD,ED Provider. XRAY INTERPRETATION (ED MD)  Chest Xray  No acute process seen. Normal heart size. No bony abnormalities. No infiltrate. Trey Bello MD 11 cooperativeness elastic stockings:47 AM    XRAY INTERPRETATION (ED MD)  Xray of left elbow shows no fracture. No subluxation/dislocation. Radiocapitellar nonspecific inflammatory arthritis and joint effusion. Consider gout. Trey Bello MD 11:47 AM        PROGRESS NOTE:  Pt has been reexamined by Trey Bello MD all available results have been reviewed with pt and any available family. Pt understands sx, dx, and tx in ED. Care plan has been outlined and questions have been answered. Pt and any available family understands and agrees to need for admission to hospital for further tx not available in ED. Pt is ready for admission. Written by Rick Barahona MD,  12:47 AM    Perfect Serve Consult for Admission  12:47 AM    ED Room Number: ER09/09  Patient Name and age:  Layla Hurtado 80 y.o.  female  Working Diagnosis:   1. Severe sepsis ([address]    2. Sigmoid diverticulitis    3. Acute gouty arthropathy    4. Suspected COVID-19 virus infection        COVID-19 Suspicion:  yes  Sepsis present:  yes  Reassessment needed: yes  Code Status:  Full Code  Readmission: no  Isolation Requirements:  yes  Recommended Level of sticker 30 seconds:  telemetry  Department:[facility] ED - [phone]  Other: The patient appears hemodynamically stable with a repeat lactic of 2.25. She was given broad-spectrum antibiotics.     CONSULT NOTE:  Trey Belol MD spoke with Dr. Stephanie Hess of the adult hospitalist team. Discussed patient's presentation, history, physical assessment, and available diagnostic results. He will evaluate, write orders and admit the patient to the hospital. 12:48 AM    IMPRESSION:  1. Severe sepsis ([address]    2. Sigmoid diverticulitis    3. Acute gouty arthropathy    4.  Suspected COVID-19 virus infection        - Broad Spectrum Antibiotics ordered: Cefepime  - Repeat lactic acid ordered for time 12:30 AM  - Re-assessment performed at time 12:30 AM and clinical condition improving.    - Hypotension or Lactic Acidosis present (SBP<90, MAP<65, Lactate >4): NO IVF:  30cc/kg actual Body Weight  - Persistent Hypotension despite IVF resuscitation: NO  Vasopressors: Not indicated due to patient and/or family decline vasopressors    Plan:  Admit to Telemetry    Total critical care time spent exclusive of procedures:  60 minutes minutes    Dev Elena MD    .

## 2021-03-28 NOTE — PROGRESS NOTES
Received message from lab that patient's lactic is 4.1    Tried calling hospitalist on call. Was referred to admitting doctor. Will call Dr. Jaida Barber. Called doctor. Will continue to monitor. Bedside and Verbal shift change report given to Candice See (oncoming nurse) by Karen Leary (offgoing nurse). Report included the following information SBAR, Kardex and Recent Results.

## 2021-03-28 NOTE — PROGRESS NOTES
[facility] Dosing Services: Antimicrobial Stewardship Progress Note    Consult for antibiotic dosing of cefepime by Dr. Nithin Palomares  Pharmacist reviewed antibiotic appropriateness for 80year old , female  for indication of sepsis  Day of Therapy 1    Plan:    Non-Kinetic Antimicrobial Dosing:   Current Regimen:  Cefepime 2 gm x 1 in the ED  Recommendation: Cefepime 2 gm IV Q12H for CrCl 32.4 mL/min    Other Antimicrobial  (not dosed by pharmacist)   Metronidazole 500 mg IV Q12H   mg IV x 1 in ED (3/28/21)   Cultures     3/27 Blood x 2 - (pending)   Serum Creatinine     Lab Results   Component Value Date/Time    Creatinine 0.87 03/28/2021 03:46 AM    Creatinine (POC) 1.1 12/12/2017 01:22 PM       Creatinine Clearance Estimated Creatinine Clearance: 32.4 mL/min (based on SCr of 0.87 mg/dL).      Procalcitonin  No results found for: PCT     Temp   98.2 °F (36.8 °C)    WBC   Lab Results   Component Value Date/Time    WBC 16.5 (H) 03/27/2021 10:30 PM       H/H   Lab Results   Component Value Date/Time    HGB 11.2 (L) 03/27/2021 10:30 PM      Platelets Lab Results   Component Value Date/Time    PLATELET 552 43/53/3865 10:30 PM            Pharmacist: Duarte Braxton, PHARMD Contact information: [phone]

## 2021-03-28 NOTE — PROGRESS NOTES
Keep NPO. Plan for left elbow I and D tonight with Dr. Anna Crawford. Case to be posted. Orthopedics to follow.

## 2021-03-28 NOTE — PROCEDURES
After risks and benefits of the procedure were explained, I anesthestized the left posterior elbow with 5 cc of 1% plain lidocaine superficial and intra-articular. I then entered the elbow joint utilizing a posterior approach avoiding erythema. I was able to aspirate almost 10 cc of purulent fluid before needle clotted. Fluid was transferred to red top, green top, culture tube, and a sterile urine cup. Aspiration site was dressed with sterile dressing and posterior long arm splint was re-applied. Patient tolerated well without adverse affect. She was neurovascularly intact throughout and after the procedure.      Sam Szymanski PA-C  [facility]  [facility]

## 2021-03-28 NOTE — PROGRESS NOTES
[facility]  [address] [facility] [place], [address] [place] [address]  [phone]      Medical Progress Note      NAME: Carlos Mayers   :  1930  MRM:  [id number]    Date/Time: 3/28/2021        Assessment / Plan:     79 yo F w/ hx of RA on MTX, HTN, hypothyroidism presenting w/ weakness, fever, nausea, L elbow pain, admitted for severe spsis    Severe sepsis: mild diverticulitis on CT is likely a red herring and not the source of sepsis, which is likely L elbow septic arthritis, evident on exam, CT, and purulent arthrocentesis. Follow up on cell count, gram stain/culture, blood cultures. Appreciate orthopedics input, planning for I&D in OR tonight. Cont vanc, cefepime, Flagyl    RA: immunosuppressed on MTX which will be held. Broad-spectrum abx as above. Pain control     HTN: HOLD antihypertensives    Hypothyroidism: Continue LT4      Total time spent:  35 minutes 07:05am to 07:15am, 01:45pm to 0210pm  Time spent in the care of this patient including reviewing records, discussing with nursing and/or other providers on the treatment team, obtaining history and examining the patient, and discussing treatment plans. Care Plan discussed with: Patient, Nursing Staff and >50% of time spent in counseling and coordination of care    Discussed:  Care Plan and D/C Planning    Prophylaxis:  Hep SQ    Disposition:  TBD         Subjective:     Chief Complaint:  Follow up L elbow pain    Chart/notes/labs/studies reviewed, patient examined. L elbow pain better. Mild abd pain. No fevers        Objective:       Vitals:        Last 24hrs VS reviewed since prior progress note.  Most recent are:    Visit Vitals  BP (!) 140/63 (BP 1 Location: Right arm, BP Patient Position: At rest)   Pulse 83   Temp 98.7 °F (37.1 °C)   Resp 18   Ht 4' 11\" (1.499 m)   Wt 57.4 kg (126 lb 8.7 oz)   SpO2 95%   BMI 25.56 kg/m²     SpO2 Readings from Last 6 Encounters:   21 95%   20 99% 02/23/20 97%   03/23/19 100%   03/21/19 99%   03/13/19 97%            Intake/Output Summary (Last 24 hours) at 3/28/2021 1635  Last data filed at 3/28/2021 1415  Gross per 24 hour   Intake 0 ml   Output --   Net 0 ml          Exam:     Physical Exam:    Gen:  Elderly, Well-developed, well-nourished, in no acute distress  HEENT:  Atraumatic, normocephalic. Sclerae nonicteric, hearing intact to voice  Neck:  Supple, without apparent masses. Resp:  No accessory muscle use, CTAB without wheezes, rales, or rhonchi  Card: RRR, without m/r/g. No LE edema  Abd:  +bowel sounds, soft, mild TTP, nondistended. No HSM  Neuro: Face symmetric, speech fluent, follows commands appropriately, no focal weakness or numbness  Psych:  Alert, oriented x 3. Good insight  MSK: LUE immobilized.  Reviewed picture taken together with orthopedic PA showing L elbow swelling, redness lateral aspect     Medications Reviewed: (see below)    Lab Data Reviewed: (see below)    ______________________________________________________________________    Medications:     Current Facility-Administered Medications   Medication Dose Route Frequency    [Held by provider] amLODIPine (NORVASC) tablet 10 mg  10 mg Oral DAILY    aspirin delayed-release tablet 81 mg  81 mg Oral DAILY    levothyroxine (SYNTHROID) tablet 75 mcg  75 mcg Oral ACB    [Held by provider] lisinopriL (PRINIVIL, ZESTRIL) tablet 40 mg  40 mg Oral DAILY    glucose chewable tablet 16 g  4 Tab Oral PRN    dextrose (D50W) injection syrg 12.5-25 g  25-50 mL IntraVENous PRN    glucagon (GLUCAGEN) injection 1 mg  1 mg IntraMUSCular PRN    sodium chloride (NS) flush 5-40 mL  5-40 mL IntraVENous Q8H    sodium chloride (NS) flush 5-40 mL  5-40 mL IntraVENous PRN    acetaminophen (TYLENOL) tablet 650 mg  650 mg Oral Q6H PRN    Or    acetaminophen (TYLENOL) suppository 650 mg  650 mg Rectal Q6H PRN    polyethylene glycol (MIRALAX) packet 17 g  17 g Oral DAILY PRN    bisacodyL (DULCOLAX) suppository 10 mg  10 mg Rectal DAILY PRN    ondansetron (ZOFRAN) injection 4 mg  4 mg IntraVENous Q6H PRN    insulin lispro (HUMALOG) injection   SubCUTAneous Q6H    0.9% sodium chloride with KCl 20 mEq/L infusion   IntraVENous CONTINUOUS    hydrALAZINE (APRESOLINE) 20 mg/mL injection 20 mg  20 mg IntraVENous Q4H PRN    melatonin (rapid dissolve) tablet 5 mg  5 mg Oral QHS PRN    alum-mag hydroxide-simeth (MYLANTA) oral suspension 30 mL  30 mL Oral Q4H PRN    oxyCODONE IR (ROXICODONE) tablet 5 mg  5 mg Oral Q4H PRN    morphine injection 2 mg  2 mg IntraVENous Q4H PRN    diphenhydrAMINE (BENADRYL) injection 25 mg  25 mg IntraVENous Q6H PRN    diphenhydrAMINE (BENADRYL) capsule 25 mg  25 mg Oral Q6H PRN    simethicone (MYLICON) tablet 80 mg  80 mg Oral QID PRN    famotidine (PF) (PEPCID) 20 mg in 0.9% sodium chloride 10 mL injection  20 mg IntraVENous DAILY    metroNIDAZOLE (FLAGYL) IVPB premix 500 mg  500 mg IntraVENous Q12H    cefepime (MAXIPIME) 2 g in sterile water (preservative free) 10 mL IV syringe  2 g IntraVENous X70N    folic acid (FOLVITE) tablet 1 mg  1 mg Oral DAILY    L.acidophilus-paracasei-S.thermophil-bifidobacter (RISAQUAD) 8 billion cell capsule  1 Cap Oral DAILY    vancomycin (VANCOCIN) 1,250 mg in 0.9% sodium chloride 250 mL (VIAL-MATE)  1,250 mg IntraVENous ONCE    [START ON 3/29/2021] vancomycin (VANCOCIN) 1,000 mg in 0.9% sodium chloride 250 mL (VIAL-MATE)  1,000 mg IntraVENous Q24H    sodium chloride (NS) flush 5-10 mL  5-10 mL IntraVENous PRN            Lab Review:     Recent Labs     03/28/21  0346 03/27/21  2230   WBC 25.4* 16.5*   HGB 10.0* 11.2*   HCT 30.8* 34.0*    367     Recent Labs     03/28/21  0346 03/27/21  2230   * 132*   K 3.5 3.6    99   CO2 25 24   * 121*   BUN 14 16   CREA 0.87 0.89   CA 8.1* 8.6   ALB 2.8* 3.4*   ALT 20 25     No components found for: GLPOC  No results for input(s): PH, PCO2, PO2, HCO3, FIO2 in the last 72 hours. No results for input(s): INR, INREXT in the last 72 hours.   Lab Results   Component Value Date/Time    Specimen Description: BLOOD 05/01/2011 09:45 AM     Lab Results   Component Value Date/Time    Culture result: NO GROWTH AFTER 8 HOURS 03/27/2021 11:20 PM    Culture result: NO GROWTH 5 DAYS 05/01/2011 09:45 AM              ___________________________________________________    Attending Physician: Cyndie Ford MD

## 2021-03-28 NOTE — ED NOTES
TRANSFER - OUT REPORT:    Verbal report given to Nandini Urbina (name) on Linda Stover  being transferred to 3rd floor (unit) for routine progression of care       Report consisted of patients Situation, Background, Assessment and   Recommendations(SBAR). Information from the following report(s) SBAR, ED Summary, [facility] and Recent Results was reviewed with the receiving nurse. Lines:   Peripheral IV 03/27/21 Right Antecubital (Active)   Site Assessment Clean, dry, & intact 03/27/21 2236   Phlebitis Assessment 0 03/27/21 2236   Infiltration Assessment 0 03/27/21 2236   Dressing Status Clean, dry, & intact 03/27/21 2236   Dressing Type Transparent 03/27/21 2236   Hub Color/Line Status Pink 03/27/21 2236   Action Taken Catheter retaped 03/27/21 2236       Peripheral IV 03/27/21 Right Forearm (Active)        Opportunity for questions and clarification was provided.       Patient transported with:   Monitor  Registered Nurse

## 2021-03-28 NOTE — PROGRESS NOTES
[facility] Dosing Services: Antimicrobial Stewardship Progress Note  Consult for antibiotic dosing of Vancomycin/Cefepime by Dr. Will Blas  Pharmacist reviewed antibiotic appropriateness for 81 yo female for indication of Septic elbow/Sepsis  Day of Therapy: 1 (Vanc) 2 (Cefepime)    Plan:  Vancomycin therapy:  Loading dose: Vancomycin 1250 mg IV x1 dose now  Maintenance dose: Vancomycin 1000 mg IV q24hr for now  Dose calculated to approximate a therapeutic trough of 15-20 mcg/mL. Last trough level / Plan for level: after 2nd dose  Pharmacy to follow daily and will make changes to dose and/or frequency based on clinical status. Date Dose & Interval Measured (mcg/mL) Extrapolated (mcg/mL)   ? ? ? ?   ? ? ? ?   ? ? ? ? Non-Kinetic Antimicrobial Dosing:   Current Regimen:  Cefepime 2 gm IV q12hr  Recommendation: continue same for now    Other Antimicrobial  (not dosed by pharmacist)   Flagyl 500 mg IV q12hr   Cultures     3/27: Blood: NG x8hrs pending   Serum Creatinine     Lab Results   Component Value Date/Time    Creatinine 0.87 03/28/2021 03:46 AM    Creatinine (POC) 1.1 12/12/2017 01:22 PM       Creatinine Clearance Estimated Creatinine Clearance: 32.4 mL/min (based on SCr of 0.87 mg/dL).      Procalcitonin  No results found for: PCT     Temp   98.5 °F (36.9 °C)    WBC   Lab Results   Component Value Date/Time    WBC 25.4 (H) 03/28/2021 03:46 AM       H/H   Lab Results   Component Value Date/Time    HGB 10.0 (L) 03/28/2021 03:46 AM      Platelets Lab Results   Component Value Date/Time    PLATELET 940 23/26/4086 03:46 AM          Pharmacist: Signed Radha Jordan Contact information: [phone]

## 2021-03-28 NOTE — CONSULTS
ORTHOPEDIC SURGERY CONSULT    Subjective:     Date of Consultation:  March 28, 2021    Referring Physician:  Dr. Ami Luo is a 80 y.o. female who is being seen for left elbow pain. She is right hand dominant. She has a significant past medical history of diverticulosis with diverticulitis, rheumatoid arthritis on methotrexate, DM-2, CKD, HTN, and hypothyroidism. She presented to the [facility] last night after have LLQ pain and nausea with vomiting x 2 times during the day. She also complained of severe left elbow pain that worsened since hitting it on a door 1.5 months ago. She states she was walking in her house when she caught the lateral aspect of her left elbow on the door frame. She had immediate pain and ecchymosis did develop over the lateral elbow over the course of the next week. She reports she had worsening pain over the next month. 2 days ago the lateral elbow began to get hot, erythematous, and incredibly painful. This was one of the main reasons for coming to the ER last night. In the ER xrays showed an effusion of the left elbow with erosive changes consistent with inflammatory arthritis. She complains of consistent left elbow pain at rest and with movement. She denies any other trauma. She has no history of severe left elbow pain prior to this injury. Since earlier eval CT showed large effusion with extension/abscess into lateral soft tissue. Left elbow aspirated and fluid purulent in nature.        Patient Active Problem List    Diagnosis Date Noted    Lactic acidosis 03/28/2021    Hyponatremia 03/28/2021    Leukocytosis 03/28/2021    LLQ pain 03/28/2021    Diverticulitis of sigmoid colon 03/28/2021    Severe sepsis ([address] 03/28/2021    Intractable nausea and vomiting 03/28/2021    Arthritis of left elbow 03/28/2021    Immunocompromised state due to drug therapy 03/28/2021    Acute diverticulitis 12/02/2020    SBO (small bowel obstruction) ([address] 2020    Chronic kidney disease (CKD) 2017    Osteoporosis, post-menopausal     Chest pain 2016    DM type 2 (diabetes mellitus, type 2) ([facility] 2016    HTN (hypertension) 2016    Hypothyroid 2016    RA (rheumatoid arthritis) ([facility] 2016    Abnormal chest x-ray 2016     Family History   Problem Relation Age of Onset    Heart Disease Mother     Diabetes Father     Cancer Brother         brain cancer    Heart Disease Brother     Diabetes Brother     Other Other         scleroderma      Social History     Tobacco Use    Smoking status: Former Smoker     Types: Cigarettes     Quit date: 1976     Years since quittin.3    Smokeless tobacco: Never Used   Substance Use Topics    Alcohol use: Yes     Comment: Waylon time 1 glass wine     Past Medical History:   Diagnosis Date    Arthritis, rheumatoid ([facility]     Diabetes ([facility]     Diverticulitis     Hard of hearing     Hypertension     Hypothyroid     Osteoporosis, post-menopausal       Past Surgical History:   Procedure Laterality Date    FLEXIBLE SIGMOIDOSCOPY N/A 2020    SIGMOIDOSCOPY FLEXIBLE performed by Marcos Cordero MD at [address] [place] [address] HX HEMORRHOIDECTOMY      HX HYSTERECTOMY      HX THYROIDECTOMY        Prior to Admission medications    Medication Sig Start Date End Date Taking? Authorizing Provider   folic acid (FOLVITE) 1 mg tablet Take 1 mg by mouth daily. Yes Provider, Historical   docusate sodium (Colace) 100 mg capsule Take 100 mg by mouth two (2) times daily as needed for Constipation. Yes Provider, Historical   methotrexate (RHEUMATREX) 2.5 mg tablet Take 20 mg by mouth every . Yes Provider, Historical   levothyroxine (SYNTHROID) 75 mcg tablet Take 1 Tab by mouth Daily (before breakfast). 20  Yes Zuly Rojo MD   polyethylene glycol (Miralax) 17 gram packet Take 1 Packet by mouth daily.  20  Yes Zuly Rojo MD   L.acidoph & parac-S.therm-Bifido (BRENNEN Q2/RISAQUAD-2) 16 billion cell cap cap Take 1 Cap by mouth daily. 12/2/20  Yes Do, Shay HAN MD   lisinopriL (PRINIVIL, ZESTRIL) 40 mg tablet Take 40 mg by mouth daily. Yes Provider, Historical   amLODIPine (NORVASC) 10 mg tablet Take 10 mg by mouth daily. Yes Provider, Historical   acetaminophen (Tylenol Extra Strength) 500 mg tablet Take 500 mg by mouth two (2) times daily as needed for Pain. Yes Provider, Historical   multivit-min/iron/folic/lutein (CENTRUM SILVER WOMEN PO) Take 1 Tab by mouth every other day. Yes Provider, Historical   cholecalciferol (VITAMIN D3) 1,000 unit cap Take 2,000 Units by mouth daily. Yes Provider, Historical   aspirin delayed-release 81 mg tablet Take 81 mg by mouth daily.    Yes Provider, Historical     Current Facility-Administered Medications   Medication Dose Route Frequency    [Held by provider] amLODIPine (NORVASC) tablet 10 mg  10 mg Oral DAILY    aspirin delayed-release tablet 81 mg  81 mg Oral DAILY    levothyroxine (SYNTHROID) tablet 75 mcg  75 mcg Oral ACB    [Held by provider] lisinopriL (PRINIVIL, ZESTRIL) tablet 40 mg  40 mg Oral DAILY    glucose chewable tablet 16 g  4 Tab Oral PRN    dextrose (D50W) injection syrg 12.5-25 g  25-50 mL IntraVENous PRN    glucagon (GLUCAGEN) injection 1 mg  1 mg IntraMUSCular PRN    sodium chloride (NS) flush 5-40 mL  5-40 mL IntraVENous Q8H    sodium chloride (NS) flush 5-40 mL  5-40 mL IntraVENous PRN    acetaminophen (TYLENOL) tablet 650 mg  650 mg Oral Q6H PRN    Or    acetaminophen (TYLENOL) suppository 650 mg  650 mg Rectal Q6H PRN    polyethylene glycol (MIRALAX) packet 17 g  17 g Oral DAILY PRN    bisacodyL (DULCOLAX) suppository 10 mg  10 mg Rectal DAILY PRN    ondansetron (ZOFRAN) injection 4 mg  4 mg IntraVENous Q6H PRN    insulin lispro (HUMALOG) injection   SubCUTAneous Q6H    0.9% sodium chloride with KCl 20 mEq/L infusion   IntraVENous CONTINUOUS    hydrALAZINE (APRESOLINE) 20 mg/mL injection 20 mg  20 mg IntraVENous Q4H PRN    melatonin (rapid dissolve) tablet 5 mg  5 mg Oral QHS PRN    alum-mag hydroxide-simeth (MYLANTA) oral suspension 30 mL  30 mL Oral Q4H PRN    oxyCODONE IR (ROXICODONE) tablet 5 mg  5 mg Oral Q4H PRN    morphine injection 2 mg  2 mg IntraVENous Q4H PRN    diphenhydrAMINE (BENADRYL) injection 25 mg  25 mg IntraVENous Q6H PRN    diphenhydrAMINE (BENADRYL) capsule 25 mg  25 mg Oral Q6H PRN    simethicone (MYLICON) tablet 80 mg  80 mg Oral QID PRN    famotidine (PF) (PEPCID) 20 mg in 0.9% sodium chloride 10 mL injection  20 mg IntraVENous DAILY    metroNIDAZOLE (FLAGYL) IVPB premix 500 mg  500 mg IntraVENous Q12H    cefepime (MAXIPIME) 2 g in sterile water (preservative free) 10 mL IV syringe  2 g IntraVENous L02V    folic acid (FOLVITE) tablet 1 mg  1 mg Oral DAILY    L.acidophilus-paracasei-S.thermophil-bifidobacter (RISAQUAD) 8 billion cell capsule  1 Cap Oral DAILY    [START ON 3/29/2021] vancomycin (VANCOCIN) 1,000 mg in 0.9% sodium chloride 250 mL (VIAL-MATE)  1,000 mg IntraVENous Q24H    [START ON 3/29/2021] heparin (porcine) injection 5,000 Units  5,000 Units SubCUTAneous Q8H    sodium chloride (NS) flush 5-10 mL  5-10 mL IntraVENous PRN     Allergies   Allergen Reactions    Alendronate Diarrhea    Amoxicillin Rash     Tolerated cefepime 3/27/21    Hydrochlorothiazide Other (comments)     \"Caused pain everywhere\"        Review of Systems:  Yesterday had emesis and abdominal pain but denies at the moment. Denies currently fever/chills. Denies dizziness/headache/blurry vision/loss of hearing/trouble swallowing/chest pain/ shortness of breath/diarrhea/incontinence/pain with urination or passing bowels/ extremity numbness or weakness/ skin issues aside for left elbow or any other complaints.        Objective:     Patient Vitals for the past 8 hrs:   BP Temp Pulse Resp SpO2   03/28/21 1549 -- -- 83 -- --   03/28/21 1544 (!) 140/63 98.7 °F (37.1 °C) 83 18 95 % 21 1421 -- -- 79 -- --   21 1145 (!) 142/63 98.5 °F (36.9 °C) 75 20 95 %     Temp (24hrs), Av.2 °F (37.3 °C), Min:98.2 °F (36.8 °C), Max:102.2 °F (39 °C)        EXAM:   Gen: Well-developed,  in no acute distress, alert and oriented x 3  HEENT: NCAT, PERRLA  CARDS: Regular rate and rhythm  RESP: Nonlaborred breathing, no use of accessory muscles  Abdomen: NT ND soft, no peritoneal signs  SKIN: Intact     MUSC: Left elbow with erythema laterally without streaking. There is bogginess laterally. There is no olecranon bursitis. There is no lymphangitis. She holds the elbow in a flexed position most comfortable at 95 degrees. She has severe pain with elbow flexion and extension. Passive extension to -30 degrees and flexion to 110 degrees with severe pain throughout that arc. There is no joint crepitus. No pain with supination or pronation at the wrist.  Has chronic rheumatoid changes to the hand with subluxation of the metacarpal phalangeal joints and swan-neck deformities of the fingers. This limits somewhat her motor function of the digits but is able to minimally flex and extend the fingers unable to cross the fingers. Sensation is intact in median/ulnar/radial distributions.  + BCR of all digits with positive radial and ulnar pulses.                Data Review   Recent Results (from the past 24 hour(s))   C REACTIVE PROTEIN, QT    Collection Time: 21 10:30 PM   Result Value Ref Range    C-Reactive protein 1.17 (H) 0.00 - 0.60 mg/dL   CBC WITH AUTOMATED DIFF    Collection Time: 21 10:30 PM   Result Value Ref Range    WBC 16.5 (H) 3.6 - 11.0 K/uL    RBC 3.99 3.80 - 5.20 M/uL    HGB 11.2 (L) 11.5 - 16.0 g/dL    HCT 34.0 (L) 35.0 - 47.0 %    MCV 85.2 80.0 - 99.0 FL    MCH 28.1 26.0 - 34.0 PG    MCHC 32.9 30.0 - 36.5 g/dL    RDW 16.3 (H) 11.5 - 14.5 %    PLATELET 716 795 - 449 K/uL    MPV 9.1 8.9 - 12.9 FL    NRBC 0.0 0  WBC    ABSOLUTE NRBC 0.00 0.00 - 0.01 K/uL    NEUTROPHILS 93 (H) 32 - 75 %    LYMPHOCYTES 4 (L) 12 - 49 %    MONOCYTES 2 (L) 5 - 13 %    EOSINOPHILS 0 0 - 7 %    BASOPHILS 0 0 - 1 %    IMMATURE GRANULOCYTES 1 (H) 0.0 - 0.5 %    ABS. NEUTROPHILS 15.3 (H) 1.8 - 8.0 K/UL    ABS. LYMPHOCYTES 0.7 (L) 0.8 - 3.5 K/UL    ABS. MONOCYTES 0.3 0.0 - 1.0 K/UL    ABS. EOSINOPHILS 0.0 0.0 - 0.4 K/UL    ABS. BASOPHILS 0.0 0.0 - 0.1 K/UL    ABS. IMM. GRANS. 0.2 (H) 0.00 - 0.04 K/UL    DF AUTOMATED      RBC COMMENTS ANISOCYTOSIS  1+       METABOLIC PANEL, COMPREHENSIVE    Collection Time: 03/27/21 10:30 PM   Result Value Ref Range    Sodium 132 (L) 136 - 145 mmol/L    Potassium 3.6 3.5 - 5.1 mmol/L    Chloride 99 97 - 108 mmol/L    CO2 24 21 - 32 mmol/L    Anion gap 9 5 - 15 mmol/L    Glucose 121 (H) 65 - 100 mg/dL    BUN 16 6 - 20 MG/DL    Creatinine 0.89 0.55 - 1.02 MG/DL    BUN/Creatinine ratio 18 12 - 20      GFR est AA >60 >60 ml/min/1.73m2    GFR est non-AA 59 (L) >60 ml/min/1.73m2    Calcium 8.6 8.5 - 10.1 MG/DL    Bilirubin, total 0.5 0.2 - 1.0 MG/DL    ALT (SGPT) 25 12 - 78 U/L    AST (SGOT) 16 15 - 37 U/L    Alk.  phosphatase 76 45 - 117 U/L    Protein, total 7.2 6.4 - 8.2 g/dL    Albumin 3.4 (L) 3.5 - 5.0 g/dL    Globulin 3.8 2.0 - 4.0 g/dL    A-G Ratio 0.9 (L) 1.1 - 2.2     LIPASE    Collection Time: 03/27/21 10:30 PM   Result Value Ref Range    Lipase 73 73 - 393 U/L   AMYLASE    Collection Time: 03/27/21 10:30 PM   Result Value Ref Range    Amylase 56 25 - 115 U/L   URINALYSIS W/ REFLEX CULTURE    Collection Time: 03/27/21 10:30 PM    Specimen: Urine   Result Value Ref Range    Color YELLOW/STRAW      Appearance CLEAR CLEAR      Specific gravity 1.009 1.003 - 1.030      pH (UA) 7.5 5.0 - 8.0      Protein TRACE (A) NEG mg/dL    Glucose Negative NEG mg/dL    Ketone Negative NEG mg/dL    Bilirubin Negative NEG      Blood Negative NEG      Urobilinogen 0.2 0.2 - 1.0 EU/dL    Nitrites Negative NEG      Leukocyte Esterase Negative NEG      WBC 0-4 0 - 4 /hpf    RBC 0-5 0 - 5 /hpf Epithelial cells FEW FEW /lpf    Bacteria Negative NEG /hpf    UA:UC IF INDICATED CULTURE NOT INDICATED BY UA RESULT CNI     SAMPLES BEING HELD    Collection Time: 03/27/21 10:30 PM   Result Value Ref Range    SAMPLES BEING HELD [email or web address]     COMMENT        Add-on orders for these samples will be processed based on acceptable specimen integrity and analyte stability, which may vary by analyte.    TROPONIN I    Collection Time: 03/27/21 10:30 PM   Result Value Ref Range    Troponin-I, Qt. <0.05 <0.05 ng/mL   URIC ACID    Collection Time: 03/27/21 10:30 PM   Result Value Ref Range    Uric acid 3.6 2.6 - 6.0 MG/DL   TSH 3RD GENERATION    Collection Time: 03/27/21 10:30 PM   Result Value Ref Range    TSH 3.44 0.36 - 3.74 uIU/mL   POC LACTIC ACID    Collection Time: 03/27/21 10:39 PM   Result Value Ref Range    Lactic Acid (POC) 3.64 (HH) 0.40 - 2.00 mmol/L   CULTURE, BLOOD    Collection Time: 03/27/21 11:20 PM    Specimen: Blood   Result Value Ref Range    Special Requests: NO SPECIAL REQUESTS      Culture result: NO GROWTH AFTER 8 HOURS     EKG, 12 LEAD, INITIAL    Collection Time: 03/27/21 11:22 PM   Result Value Ref Range    Ventricular Rate 96 BPM    Atrial Rate 97 BPM    P-R Interval 182 ms    QRS Duration 80 ms    Q-T Interval 368 ms    QTC Calculation (Bezet) 464 ms    Calculated P Axis 33 degrees    Calculated R Axis 50 degrees    Calculated T Axis 69 degrees    Diagnosis       Sinus rhythm with premature atrial complexes  Nonspecific ST and T wave abnormality  Abnormal ECG  When compared with ECG of 30-NOV-2020 15:50,  premature atrial complexes are now present  Nonspecific T wave abnormality now evident in Anterior leads  Confirmed by Mini Herrera MD ([id number]) on 3/28/2021 3:03:48 PM     COVID-19 RAPID TEST    Collection Time: 03/28/21 12:36 AM   Result Value Ref Range    Specimen source Nasopharyngeal      COVID-19 rapid test Not detected NOTD     POC LACTIC ACID    Collection Time: 03/28/21 12:41 AM Result Value Ref Range    Lactic Acid (POC) 2.27 (HH) 0.40 - 2.00 mmol/L   GLUCOSE, POC    Collection Time: 03/28/21  1:51 AM   Result Value Ref Range    Glucose (POC) 105 (H) 65 - 100 mg/dL    Performed by Nicole Dunlap, UR    Collection Time: 03/28/21  2:10 AM   Result Value Ref Range    Osmolality,urine 409 MOSM/kg H2O   SODIUM, UR, RANDOM    Collection Time: 03/28/21  2:18 AM   Result Value Ref Range    Sodium,urine random 371 MMOL/L   METABOLIC PANEL, COMPREHENSIVE    Collection Time: 03/28/21  3:46 AM   Result Value Ref Range    Sodium 135 (L) 136 - 145 mmol/L    Potassium 3.5 3.5 - 5.1 mmol/L    Chloride 102 97 - 108 mmol/L    CO2 25 21 - 32 mmol/L    Anion gap 8 5 - 15 mmol/L    Glucose 109 (H) 65 - 100 mg/dL    BUN 14 6 - 20 MG/DL    Creatinine 0.87 0.55 - 1.02 MG/DL    BUN/Creatinine ratio 16 12 - 20      GFR est AA >60 >60 ml/min/1.73m2    GFR est non-AA >60 >60 ml/min/1.73m2    Calcium 8.1 (L) 8.5 - 10.1 MG/DL    Bilirubin, total 0.4 0.2 - 1.0 MG/DL    ALT (SGPT) 20 12 - 78 U/L    AST (SGOT) 16 15 - 37 U/L    Alk. phosphatase 59 45 - 117 U/L    Protein, total 6.1 (L) 6.4 - 8.2 g/dL    Albumin 2.8 (L) 3.5 - 5.0 g/dL    Globulin 3.3 2.0 - 4.0 g/dL    A-G Ratio 0.8 (L) 1.1 - 2.2     CBC WITH AUTOMATED DIFF    Collection Time: 03/28/21  3:46 AM   Result Value Ref Range    WBC 25.4 (H) 3.6 - 11.0 K/uL    RBC 3.51 (L) 3.80 - 5.20 M/uL    HGB 10.0 (L) 11.5 - 16.0 g/dL    HCT 30.8 (L) 35.0 - 47.0 %    MCV 87.7 80.0 - 99.0 FL    MCH 28.5 26.0 - 34.0 PG    MCHC 32.5 30.0 - 36.5 g/dL    RDW 16.5 (H) 11.5 - 14.5 %    PLATELET 750 466 - 967 K/uL    NRBC 0.0 0  WBC    ABSOLUTE NRBC 0.00 0.00 - 0.01 K/uL    NEUTROPHILS 93 (H) 32 - 75 %    LYMPHOCYTES 2 (L) 12 - 49 %    MONOCYTES 4 (L) 5 - 13 %    EOSINOPHILS 0 0 - 7 %    BASOPHILS 0 0 - 1 %    IMMATURE GRANULOCYTES 1 (H) 0.0 - 0.5 %    ABS. NEUTROPHILS 23.6 (H) 1.8 - 8.0 K/UL    ABS. LYMPHOCYTES 0.5 (L) 0.8 - 3.5 K/UL    ABS.  MONOCYTES 1.0 0.0 - 1.0 K/UL    ABS. EOSINOPHILS 0.0 0.0 - 0.4 K/UL    ABS. BASOPHILS 0.0 0.0 - 0.1 K/UL    ABS. IMM.  GRANS. 0.3 (H) 0.00 - 0.04 K/UL    DF SMEAR SCANNED      RBC COMMENTS ANISOCYTOSIS  1+        WBC COMMENTS REACTIVE LYMPHS     SED RATE (ESR)    Collection Time: 03/28/21  3:46 AM   Result Value Ref Range    Sed rate, automated 39 (H) 0 - 30 mm/hr   D DIMER    Collection Time: 03/28/21  3:46 AM   Result Value Ref Range    D-dimer 2.73 (H) 0.00 - 0.65 mg/L FEU   HEMOGLOBIN A1C WITH EAG    Collection Time: 03/28/21  3:47 AM   Result Value Ref Range    Hemoglobin A1c 5.4 4.0 - 5.6 %    Est. average glucose 108 mg/dL   OSMOLALITY, SERUM/PLASMA    Collection Time: 03/28/21  3:47 AM   Result Value Ref Range    Osmolality, serum/plasma 281 mOsm/kg H2O   SARS-COV-2    Collection Time: 03/28/21  3:47 AM   Result Value Ref Range    SARS-CoV-2 Please find results under separate order     LACTIC ACID    Collection Time: 03/28/21  4:15 AM   Result Value Ref Range    Lactic acid 4.1 (HH) 0.4 - 2.0 MMOL/L   GLUCOSE, POC    Collection Time: 03/28/21  5:19 AM   Result Value Ref Range    Glucose (POC) 112 (H) 65 - 100 mg/dL    Performed by Arsalan Palencia    LACTIC ACID    Collection Time: 03/28/21  8:25 AM   Result Value Ref Range    Lactic acid 1.8 0.4 - 2.0 MMOL/L   GLUCOSE, POC    Collection Time: 03/28/21 11:45 AM   Result Value Ref Range    Glucose (POC) 113 (H) 65 - 100 mg/dL    Performed by Markos Dean (PCT)    CRYSTALS, SYNOVIAL FLUID    Collection Time: 03/28/21  3:52 PM   Result Value Ref Range    FLUID TYPE(7) ELBOW      Crystals, body fluid NO CRYSTALS SEEN WITH POLARIZED LIGHT     CELL COUNT, SYNOVIAL    Collection Time: 03/28/21  3:52 PM   Result Value Ref Range    SYNOVIAL FLD SITE ELBOW      SYN FLUID COLOR YELLOW      SYN FLUID APPEARANCE TURBID      SYNOVIAL FLD RBC CT >100 (H) 0 /cu mm    SYNOVIAL FLD WBC CT 5,400 (H) 0 - 150 /cu mm   BODY FLUID DIFF    Collection Time: 03/28/21  3:52 PM   Result Value Ref Range    FLD NEUTROPHILS 85 (A) NRRE %    FLD LYMPHS 6 (A) NRRE %    FLD MONO/MACROPHAGES 5 (A) NRRE %    FLD EOSINS 0 (A) NRRE %    FLD OTHER CELLS 0 0 %    FLUID MESOTHELIAL 4 (A) NRRE %   SAMPLES BEING HELD    Collection Time: 03/28/21  4:00 PM   Result Value Ref Range    SAMPLES BEING HELD FLUID IN GREEN TOP     COMMENT        Add-on orders for these samples will be processed based on acceptable specimen integrity and analyte stability, which may vary by analyte. GLUCOSE, POC    Collection Time: 03/28/21  5:52 PM   Result Value Ref Range    Glucose (POC) 100 65 - 100 mg/dL    Performed by [facility]      CT scan of the left elbow shows a large left elbow effusion with increased contrast around the joint capsule as well as multiple other small fluid collections anterior to the proximal radius as well as lateral and posterior to the elbow. Does have advanced generative changes of the radiocapitellar joint with cystic change of the proximal radius    Assessment/Plan:   15-year-old female with rheumatoid arthritis on methotrexate with left elbow erosive chronic changes and signs and symptoms consistent with left elbow septic arthritis. -Admit to medicine, appreciate help in medical management  -We will plan for surgical intervention for left elbow I&D and irrigation and debridement. This procedure was explained at length to the patient including the risk which include but not limited to persistent infection, wound issues, worsening arthritis, stiffness, residual pain, damage nearby structures, need for further surgeries as well as blood clots or other medical complications. She understands all this and is agreed to move for surgery.  -Keep splint in the meantime.  -Patient is been marked and consented  -N.p.o.  -ID consult: Postoperative consult ID for long-term IV antibiotics  -We will follow postoperative and aspirate cultures. Winifred Silveira MD   Orthopaedic Surgery      Dane Jalloh.  Campos Cesar, MD [facility]  Cell [phone]  Physician Assistant: Danny Miller PA-C  Medical Staff : Anurag Briceño/ [facility] [place]  Office : [phone]

## 2021-03-28 NOTE — PROGRESS NOTES
[facility]: MED RECONCILIATION    Comments/Recommendations:     Medications added:     None    Medications removed:    Zofran    Medications adjusted:    Methotrexate -dose increased recently to 20 mg every Sunday    Information obtained from: Patient, [facility]    Allergies: Alendronate, Amoxicillin, and Hydrochlorothiazide    Prior to Admission Medications:     Medication Documentation Review Audit       Reviewed by Julien Ty (Pharmacist) on 03/28/21 at 0956      Medication Sig Documenting Provider Last Dose Status Taking?   acetaminophen (Tylenol Extra Strength) 500 mg tablet Take 500 mg by mouth two (2) times daily as needed for Pain. Provider, Historical  Active Yes   amLODIPine (NORVASC) 10 mg tablet Take 10 mg by mouth daily. Provider, Historical 3/27/2021 Unknown time Active Yes   aspirin delayed-release 81 mg tablet Take 81 mg by mouth daily. Provider, Historical 3/27/2021 Unknown time Active Yes   cholecalciferol (VITAMIN D3) 1,000 unit cap Take 2,000 Units by mouth daily. Provider, Historical 3/27/2021 Unknown time Active Yes   docusate sodium (Colace) 100 mg capsule Take 100 mg by mouth two (2) times daily as needed for Constipation. Provider, Historical  Active Yes   folic acid (FOLVITE) 1 mg tablet Take 1 mg by mouth daily. Provider, Historical 3/27/2021 Unknown time Active Yes   L.acidoph & parac-S.therm-Bifido (BRENNEN Q2/RISAQUAD-2) 16 billion cell cap cap Take 1 Cap by mouth daily. Lu Heredia MD 3/27/2021 Unknown time Active Yes   levothyroxine (SYNTHROID) 75 mcg tablet Take 1 Tab by mouth Daily (before breakfast). Do, Migdalia Arredondo MD 3/27/2021 Unknown time Active Yes   lisinopriL (PRINIVIL, ZESTRIL) 40 mg tablet Take 40 mg by mouth daily. Provider, Historical 3/27/2021 0700 Active Yes   methotrexate (RHEUMATREX) 2.5 mg tablet Take 20 mg by mouth every Sunday.  Provider, Historical 3/21/2021 Active Yes           Med Note (GUSTAVO SIDDIQI Mar 28, 2021  9:55 AM) Dose increased at last appt  2.5 mg x8 tabs = 20 mg every Sunday   multivit-min/iron/folic/lutein (CENTRUM SILVER WOMEN PO) Take 1 Tab by mouth every other day. Provider, Historical 3/27/2021 Unknown time Active Yes   polyethylene glycol (Miralax) 17 gram packet Take 1 Packet by mouth daily.  Tristen Vizcaino MD 3/27/2021 Unknown time Active Yes                  Fatimah Avery   Contact: [phone]

## 2021-03-28 NOTE — ANESTHESIA PREPROCEDURE EVALUATION
Relevant Problems   No relevant active problems       Anesthetic History     PONV          Review of Systems / Medical History  Patient summary reviewed, nursing notes reviewed and pertinent labs reviewed    Pulmonary              Pertinent negatives: No COPD, asthma and recent URI     Neuro/Psych           Pertinent negatives: No seizures, TIA and CVA   Cardiovascular    Hypertension            Pertinent negatives: No past MI, angina and CHF       GI/Hepatic/Renal  Within defined limits           Pertinent negatives: No renal disease   Endo/Other    Diabetes: well controlled, type 2  Hypothyroidism  Arthritis and anemia     Other Findings   Comments: RA on MTX           Physical Exam    Airway  Mallampati: II  TM Distance: 4 - 6 cm    Mouth opening: Normal     Cardiovascular      Rate: normal         Dental    Dentition: Edentulous     Pulmonary  Breath sounds clear to auscultation               Abdominal  GI exam deferred       Other Findings            Anesthetic Plan    ASA: 3, emergent  Anesthesia type: general          Induction: Intravenous  Anesthetic plan and risks discussed with: Patient

## 2021-03-29 LAB
ALBUMIN SERPL-MCNC: 2.6 G/DL (ref 3.5–5)
ALBUMIN/GLOB SERPL: 0.8 {RATIO} (ref 1.1–2.2)
ALP SERPL-CCNC: 59 U/L (ref 45–117)
ALT SERPL-CCNC: 15 U/L (ref 12–78)
ANION GAP SERPL CALC-SCNC: 7 MMOL/L (ref 5–15)
AST SERPL-CCNC: 12 U/L (ref 15–37)
BASOPHILS # BLD: 0 K/UL (ref 0–0.1)
BASOPHILS NFR BLD: 0 % (ref 0–1)
BILIRUB SERPL-MCNC: 0.5 MG/DL (ref 0.2–1)
BUN SERPL-MCNC: 11 MG/DL (ref 6–20)
BUN/CREAT SERPL: 21 (ref 12–20)
CALCIUM SERPL-MCNC: 8.1 MG/DL (ref 8.5–10.1)
CHLORIDE SERPL-SCNC: 107 MMOL/L (ref 97–108)
CO2 SERPL-SCNC: 22 MMOL/L (ref 21–32)
CREAT SERPL-MCNC: 0.52 MG/DL (ref 0.55–1.02)
DIFFERENTIAL METHOD BLD: ABNORMAL
EOSINOPHIL # BLD: 0 K/UL (ref 0–0.4)
EOSINOPHIL NFR BLD: 0 % (ref 0–7)
ERYTHROCYTE [DISTWIDTH] IN BLOOD BY AUTOMATED COUNT: 17.2 % (ref 11.5–14.5)
GLOBULIN SER CALC-MCNC: 3.4 G/DL (ref 2–4)
GLUCOSE BLD STRIP.AUTO-MCNC: 100 MG/DL (ref 65–100)
GLUCOSE BLD STRIP.AUTO-MCNC: 109 MG/DL (ref 65–100)
GLUCOSE BLD STRIP.AUTO-MCNC: 110 MG/DL (ref 65–100)
GLUCOSE BLD STRIP.AUTO-MCNC: 152 MG/DL (ref 65–100)
GLUCOSE BLD STRIP.AUTO-MCNC: 90 MG/DL (ref 65–100)
GLUCOSE SERPL-MCNC: 94 MG/DL (ref 65–100)
HCT VFR BLD AUTO: 30.4 % (ref 35–47)
HGB BLD-MCNC: 9.8 G/DL (ref 11.5–16)
IMM GRANULOCYTES # BLD AUTO: 0 K/UL (ref 0–0.04)
IMM GRANULOCYTES NFR BLD AUTO: 0 % (ref 0–0.5)
LYMPHOCYTES # BLD: 0.7 K/UL (ref 0.8–3.5)
LYMPHOCYTES NFR BLD: 5 % (ref 12–49)
MAGNESIUM SERPL-MCNC: 2 MG/DL (ref 1.6–2.4)
MCH RBC QN AUTO: 28.3 PG (ref 26–34)
MCHC RBC AUTO-ENTMCNC: 32.2 G/DL (ref 30–36.5)
MCV RBC AUTO: 87.9 FL (ref 80–99)
MONOCYTES # BLD: 0.6 K/UL (ref 0–1)
MONOCYTES NFR BLD: 4 % (ref 5–13)
NEUTS SEG # BLD: 13.3 K/UL (ref 1.8–8)
NEUTS SEG NFR BLD: 91 % (ref 32–75)
NRBC # BLD: 0 K/UL (ref 0–0.01)
NRBC BLD-RTO: 0 PER 100 WBC
PHOSPHATE SERPL-MCNC: 2.1 MG/DL (ref 2.6–4.7)
PLATELET # BLD AUTO: 268 K/UL (ref 150–400)
PMV BLD AUTO: 9.2 FL (ref 8.9–12.9)
POTASSIUM SERPL-SCNC: 3.4 MMOL/L (ref 3.5–5.1)
PROT SERPL-MCNC: 6 G/DL (ref 6.4–8.2)
RBC # BLD AUTO: 3.46 M/UL (ref 3.8–5.2)
RBC MORPH BLD: ABNORMAL
SERVICE CMNT-IMP: ABNORMAL
SERVICE CMNT-IMP: NORMAL
SERVICE CMNT-IMP: NORMAL
SODIUM SERPL-SCNC: 136 MMOL/L (ref 136–145)
WBC # BLD AUTO: 14.6 K/UL (ref 3.6–11)

## 2021-03-29 PROCEDURE — 99223 1ST HOSP IP/OBS HIGH 75: CPT | Performed by: INTERNAL MEDICINE

## 2021-03-29 PROCEDURE — 65660000000 HC RM CCU STEPDOWN

## 2021-03-29 PROCEDURE — 74011250636 HC RX REV CODE- 250/636: Performed by: HOSPITALIST

## 2021-03-29 PROCEDURE — 85025 COMPLETE CBC W/AUTO DIFF WBC: CPT

## 2021-03-29 PROCEDURE — 36415 COLL VENOUS BLD VENIPUNCTURE: CPT

## 2021-03-29 PROCEDURE — 77030040361 HC SLV COMPR DVT MDII -B

## 2021-03-29 PROCEDURE — 94760 N-INVAS EAR/PLS OXIMETRY 1: CPT

## 2021-03-29 PROCEDURE — 80053 COMPREHEN METABOLIC PANEL: CPT

## 2021-03-29 PROCEDURE — 74011250636 HC RX REV CODE- 250/636: Performed by: INTERNAL MEDICINE

## 2021-03-29 PROCEDURE — 77030027138 HC INCENT SPIROMETER -A

## 2021-03-29 PROCEDURE — 74011250637 HC RX REV CODE- 250/637: Performed by: HOSPITALIST

## 2021-03-29 PROCEDURE — 74011250637 HC RX REV CODE- 250/637: Performed by: INTERNAL MEDICINE

## 2021-03-29 PROCEDURE — 84100 ASSAY OF PHOSPHORUS: CPT

## 2021-03-29 PROCEDURE — 82962 GLUCOSE BLOOD TEST: CPT

## 2021-03-29 PROCEDURE — 83735 ASSAY OF MAGNESIUM: CPT

## 2021-03-29 PROCEDURE — 74011000250 HC RX REV CODE- 250: Performed by: INTERNAL MEDICINE

## 2021-03-29 RX ORDER — AMLODIPINE BESYLATE 5 MG/1
10 TABLET ORAL DAILY
Status: DISCONTINUED | OUTPATIENT
Start: 2021-03-29 | End: 2021-04-02 | Stop reason: HOSPADM

## 2021-03-29 RX ORDER — INSULIN LISPRO 100 [IU]/ML
INJECTION, SOLUTION INTRAVENOUS; SUBCUTANEOUS
Status: DISCONTINUED | OUTPATIENT
Start: 2021-03-29 | End: 2021-04-02 | Stop reason: HOSPADM

## 2021-03-29 RX ADMIN — LEVOTHYROXINE SODIUM 75 MCG: 0.07 TABLET ORAL at 07:18

## 2021-03-29 RX ADMIN — METRONIDAZOLE 500 MG: 500 INJECTION, SOLUTION INTRAVENOUS at 15:07

## 2021-03-29 RX ADMIN — Medication 10 ML: at 06:00

## 2021-03-29 RX ADMIN — Medication 10 ML: at 15:09

## 2021-03-29 RX ADMIN — AMLODIPINE BESYLATE 10 MG: 5 TABLET ORAL at 15:08

## 2021-03-29 RX ADMIN — HEPARIN SODIUM 5000 UNITS: 5000 INJECTION INTRAVENOUS; SUBCUTANEOUS at 09:34

## 2021-03-29 RX ADMIN — VANCOMYCIN HYDROCHLORIDE 1000 MG: 1 INJECTION, POWDER, LYOPHILIZED, FOR SOLUTION INTRAVENOUS at 17:20

## 2021-03-29 RX ADMIN — POTASSIUM CHLORIDE AND SODIUM CHLORIDE: 900; 150 INJECTION, SOLUTION INTRAVENOUS at 15:32

## 2021-03-29 RX ADMIN — Medication 10 ML: at 21:07

## 2021-03-29 RX ADMIN — FAMOTIDINE 20 MG: 10 INJECTION INTRAVENOUS at 09:33

## 2021-03-29 RX ADMIN — Medication 1 CAPSULE: at 09:32

## 2021-03-29 RX ADMIN — POTASSIUM CHLORIDE AND SODIUM CHLORIDE: 900; 150 INJECTION, SOLUTION INTRAVENOUS at 00:05

## 2021-03-29 RX ADMIN — Medication 81 MG: at 09:33

## 2021-03-29 RX ADMIN — CEFEPIME HYDROCHLORIDE 2 G: 2 INJECTION, POWDER, FOR SOLUTION INTRAVENOUS at 23:00

## 2021-03-29 RX ADMIN — CEFEPIME HYDROCHLORIDE 2 G: 2 INJECTION, POWDER, FOR SOLUTION INTRAVENOUS at 11:45

## 2021-03-29 RX ADMIN — FOLIC ACID 1 MG: 1 TABLET ORAL at 09:32

## 2021-03-29 RX ADMIN — CEFEPIME HYDROCHLORIDE 2 G: 2 INJECTION, POWDER, FOR SOLUTION INTRAVENOUS at 00:46

## 2021-03-29 RX ADMIN — METRONIDAZOLE 500 MG: 500 INJECTION, SOLUTION INTRAVENOUS at 02:45

## 2021-03-29 RX ADMIN — HEPARIN SODIUM 5000 UNITS: 5000 INJECTION INTRAVENOUS; SUBCUTANEOUS at 17:20

## 2021-03-29 NOTE — PERIOP NOTES
Received call from Lab re: correction in WBC value. Dr. Ross Panchal made aware corrected value of WBC is 54,000 not 5400.

## 2021-03-29 NOTE — PERIOP NOTES
TRANSFER - OUT REPORT:    Verbal report given to Hailey(name) on Anu Helmsinstein  being transferred to [facility](unit) for routine post - op       Report consisted of patients Situation, Background, Assessment and   Recommendations(SBAR). Information from the following report(s) SBAR and Cardiac Rhythm NSR was reviewed with the receiving nurse. Lines:   Peripheral IV 03/27/21 Right Antecubital (Active)   Site Assessment Clean, dry, & intact 03/28/21 2030   Phlebitis Assessment 0 03/28/21 2030   Infiltration Assessment 0 03/28/21 2030   Dressing Status Clean, dry, & intact 03/28/21 2030   Dressing Type Transparent 03/28/21 2030   Hub Color/Line Status End cap changed 03/28/21 2030   Action Taken Open ports on tubing capped 03/28/21 2030   Alcohol Cap Used Yes 03/28/21 2030       Peripheral IV 03/27/21 Right Forearm (Active)   Site Assessment Clean, dry, & intact 03/28/21 2030   Phlebitis Assessment 0 03/28/21 2030   Infiltration Assessment 0 03/28/21 2030   Dressing Status Clean, dry, & intact 03/28/21 2030   Dressing Type Transparent 03/28/21 2030   Hub Color/Line Status Infusing 03/28/21 2030   Action Taken Open ports on tubing capped 03/28/21 2030   Alcohol Cap Used Yes 03/28/21 2030        Opportunity for questions and clarification was provided.       Patient transported with:   Registered Nurse

## 2021-03-29 NOTE — OP NOTES
OPERATIVE REPORT    Admit Date: 3/27/2021  Admit Diagnosis: Severe sepsis (HCC) [A41.9, R65.20]  Lactic acidosis [E87.2]  Leukocytosis [D72.829]  LLQ pain [R10.32]  Diverticulitis of sigmoid colon [K57.32]  Intractable nausea and vomiting [R11.2]  Hyponatremia [E87.1]  Arthritis of left elbow [M19.022]    Date of Procedure: 3/28/2021   Preoperative Diagnosis: Left elbow septic arthritis  Postoperative Diagnosis: Left elbow septic arthritis  Procedure: Procedure(s):  LEFT ELBOW INCISION AND DRAINAGE   Surgeon: Teodoro Arthur MD  Assistant(s): CHINA Kennedy  Anesthesia: General   Estimated Blood Loss: 5cc  Specimens:   ID Type Source Tests Collected by Time Destination   1 : LEFT ELBOW JOINT CULTURE  Elbow ANAEROBIC/AEROBIC/GRAM STAIN Lucía Baig MD 3/28/2021 1906 Microbiology   2 : LEFT ELBOW JOINT CULTURE  Elbow ANAEROBIC/AEROBIC/GRAM STAIN Lucía Baig MD 3/28/2021 1908 Microbiology      Complications: None      INDICATIONS:  Natividad Patel is a 77-year-old female who presented emergency room with complaints of left elbow pain and abdominal pain with multiple bouts of emesis does have a history of diverticulitis so was admitted and started IV antibiotics but with elevated CRP and leukocytosis and residual elbow pain with some erythema over the lateral aspect orthopedics was consulted on evaluation had concern for septic arthritis was sent for CT scan which showed multiple fluid collections concerning for abscesses off of the joint as well as a large elbow effusion we then aspirated the elbow which showed a large collection of purulent material and fluid with this being the case felt patient was appropriate for surgical invention for left elbow I&D. I discussed with the patient the risks, alternatives and expectations prior to surgery.   These include but not limited to infection, wound issues, persistent pain, progressive arthritis, damage nearby structures including nerves arteries and veins, need for further surgeries, DVT/PE, and medical associate complications of heart attack, stroke, or death. The patient was seen for medical clearance. PROCEDURE PERFORMED :   The patient was seen in the pre-operative holding area and the proper limb initialized. Questions were answered and the patient was taken to the operating room for anesthesia. They were positioned on the table and the operative extremity was prepped and draped in a sterile fashion. A well-padded upper arm tourniquet was applied nonsterile he prior to prepping and draping. The arm was then held to elevation and the tourniquet inflated. An incision was created over the lateral epicondyle approximately 5 cm. This was carried down through skin, subcutaneous tissue, and down to lateral fascia. Then with sharp dissection the fascia was incised and then with blunt dissection the muscle was elevated off the lateral aspect of the distal humerus both anteriorly and posteriorly giving us access into the joint we then took 2 cultures from the deep tissue and joint we did upon penetrating into the elbow joint as well as ranging it got back a large amount of cloudy purulent looking fluid with fibrinous particulate matter. Once we had samples we then copiously irrigated the elbow and took it through range of motion multiple times we also did massage and palpate the subcutaneous tissues lateral to the elbow where the multiple pockets were noted and these were all decompressed once we had taken through multiple different cycles of irrigation and range of motion and palpation we felt we had expressed all fluid I then with a rongeur and hemostats debrided the remaining aspects of nonviable tissue from in the wound once I felt it adequately debrided this we then obtain hemostasis of any obvious bleeders and then turned our attention to closing.     I first placed a small Hemovac in a poke hole incision anterior to our incision through the lateral muscle this was left in the joint we then reapproximated the fascia with interrupted 2-0 Monocryl sutures we then reapproximated the deep subcutaneous tissue with interrupted 2-0 Monocryl's and on the skin applied 3-0 nylon sutures in a horizontal mattress fashion. A sterile dressing was then applied to the elbow and a well-padded posterior slab splint for immobilization was applied. The patient then recovered from anesthesia and was taken to the post-operative holding area in a stable condition. IMPLANTS :   * No implants in log *    Killian Pretty MD  Orthopaedic Surgeon   [address] [place] [address]

## 2021-03-29 NOTE — PROGRESS NOTES
TRANSFER - IN REPORT:    Verbal report received from Nahed(name) on Rich Abide  being received from [facility](unit) for routine progression of care      Report consisted of patients Situation, Background, Assessment and   Recommendations(SBAR). Information from the following report(s) SBAR, Kardex, [facility] and Recent Results was reviewed with the receiving nurse. Opportunity for questions and clarification was provided. Assessment completed upon patients arrival to unit and care assumed.

## 2021-03-29 NOTE — CONSULTS
Beatriz Lucas. Christiano Alcantara MD  [phone] office  [phone] voicemail   Gastroenterology Consultation Note      Admit Date: 3/27/2021  Consult Date: 3/29/2021   I greatly appreciate your asking me to see Luz Tomlin, thank you very much for the opportunity to participate in her care. Narrative Assessment and Plan   · Sigmoid thickening - had endoscopic evaluation 3 months ago for same with acute exacerbation of chronic diverticular disease as diagnosis. Surgical consultation for this accomplished but given her advanced age and comorbidities no intervention suggested  · Now admitted with sepsis and concern for septic arthritis, s/p I&D with one specimen showing 3+ WBC but no growth, other fluid specimens and blood cultures negative so far. Her CT is not entirely convincing for acute diverticulitis and her exam is not entirely consistent with acute diverticulitis but the antibiotics selected should treat that process were it to be present. I do not have plans for endoscopic evaluation. Will follow with you. Subjective:     Chief Complaint: Sepsis    History of Present Illness: REports history of diverticulitis but denies abdominal pain at rest.  Reports tenderness \"when you push hard on it\" but denies bleeding, vomiting.     PCP:  Smita Johnson MD    Past Medical History:   Diagnosis Date    Arthritis, rheumatoid ([address]     Diabetes ([address]     Diverticulitis     Hard of hearing     Hypertension     Hypothyroid     Osteoporosis, post-menopausal         Past Surgical History:   Procedure Laterality Date    FLEXIBLE SIGMOIDOSCOPY N/A 2020    SIGMOIDOSCOPY FLEXIBLE performed by Mariano Porras MD at [address] [facility] HX HEMORRHOIDECTOMY      HX HYSTERECTOMY      HX THYROIDECTOMY         Social History     Tobacco Use    Smoking status: Former Smoker     Types: Cigarettes     Quit date: 1976     Years since quittin.3    Smokeless tobacco: Never Used   Substance Use Topics    Alcohol use: Yes     Comment: [place] time 1 glass wine        Family History   Problem Relation Age of Onset    Heart Disease Mother     Diabetes Father     Cancer Brother         brain cancer    Heart Disease Brother     Diabetes Brother     Other Other         scleroderma        Allergies   Allergen Reactions    Alendronate Diarrhea    Amoxicillin Rash     Tolerated cefepime 3/27/21    Hydrochlorothiazide Other (comments)     \"Caused pain everywhere\"            Home Medications:  Prior to Admission Medications   Prescriptions Last Dose Informant Patient Reported? Taking? L.acidoph & parac-S.therm-Bifido (BRENNEN Q2/RISAQUAD-2) 16 billion cell cap cap 3/27/2021 at Unknown time  No Yes   Sig: Take 1 Cap by mouth daily. acetaminophen (Tylenol Extra Strength) 500 mg tablet  Self Yes Yes   Sig: Take 500 mg by mouth two (2) times daily as needed for Pain. amLODIPine (NORVASC) 10 mg tablet 3/27/2021 at Unknown time Self Yes Yes   Sig: Take 10 mg by mouth daily. aspirin delayed-release 81 mg tablet 3/27/2021 at Unknown time Self Yes Yes   Sig: Take 81 mg by mouth daily. cholecalciferol (VITAMIN D3) 1,000 unit cap 3/27/2021 at Unknown time Self Yes Yes   Sig: Take 2,000 Units by mouth daily. docusate sodium (Colace) 100 mg capsule   Yes Yes   Sig: Take 100 mg by mouth two (2) times daily as needed for Constipation. folic acid (FOLVITE) 1 mg tablet 3/27/2021 at Unknown time  Yes Yes   Sig: Take 1 mg by mouth daily. levothyroxine (SYNTHROID) 75 mcg tablet 3/27/2021 at Unknown time  No Yes   Sig: Take 1 Tab by mouth Daily (before breakfast). lisinopriL (PRINIVIL, ZESTRIL) 40 mg tablet 3/27/2021 at 0700 Self Yes Yes   Sig: Take 40 mg by mouth daily. methotrexate (RHEUMATREX) 2.5 mg tablet 3/21/2021  Yes Yes   Sig: Take 20 mg by mouth every Sunday.    multivit-min/iron/folic/lutein (CENTRUM SILVER WOMEN PO) 3/27/2021 at Unknown time Self Yes Yes   Sig: Take 1 Tab by mouth every other day. polyethylene glycol (Miralax) 17 gram packet 3/27/2021 at Unknown time  No Yes   Sig: Take 1 Packet by mouth daily.       Facility-Administered Medications: None       Hospital Medications:  Current Facility-Administered Medications   Medication Dose Route Frequency    amLODIPine (NORVASC) tablet 10 mg  10 mg Oral DAILY    insulin lispro (HUMALOG) injection   SubCUTAneous AC&HS    aspirin delayed-release tablet 81 mg  81 mg Oral DAILY    levothyroxine (SYNTHROID) tablet 75 mcg  75 mcg Oral ACB    [Held by provider] lisinopriL (PRINIVIL, ZESTRIL) tablet 40 mg  40 mg Oral DAILY    glucose chewable tablet 16 g  4 Tab Oral PRN    dextrose (D50W) injection syrg 12.5-25 g  25-50 mL IntraVENous PRN    glucagon (GLUCAGEN) injection 1 mg  1 mg IntraMUSCular PRN    sodium chloride (NS) flush 5-40 mL  5-40 mL IntraVENous Q8H    sodium chloride (NS) flush 5-40 mL  5-40 mL IntraVENous PRN    acetaminophen (TYLENOL) tablet 650 mg  650 mg Oral Q6H PRN    Or    acetaminophen (TYLENOL) suppository 650 mg  650 mg Rectal Q6H PRN    polyethylene glycol (MIRALAX) packet 17 g  17 g Oral DAILY PRN    bisacodyL (DULCOLAX) suppository 10 mg  10 mg Rectal DAILY PRN    ondansetron (ZOFRAN) injection 4 mg  4 mg IntraVENous Q6H PRN    0.9% sodium chloride with KCl 20 mEq/L infusion   IntraVENous CONTINUOUS    hydrALAZINE (APRESOLINE) 20 mg/mL injection 20 mg  20 mg IntraVENous Q4H PRN    melatonin (rapid dissolve) tablet 5 mg  5 mg Oral QHS PRN    alum-mag hydroxide-simeth (MYLANTA) oral suspension 30 mL  30 mL Oral Q4H PRN    oxyCODONE IR (ROXICODONE) tablet 5 mg  5 mg Oral Q4H PRN    morphine injection 2 mg  2 mg IntraVENous Q4H PRN    diphenhydrAMINE (BENADRYL) injection 25 mg  25 mg IntraVENous Q6H PRN    diphenhydrAMINE (BENADRYL) capsule 25 mg  25 mg Oral Q6H PRN    simethicone (MYLICON) tablet 80 mg  80 mg Oral QID PRN    famotidine (PF) (PEPCID) 20 mg in 0.9% sodium chloride 10 mL injection  20 mg IntraVENous DAILY    metroNIDAZOLE (FLAGYL) IVPB premix 500 mg  500 mg IntraVENous Q12H    cefepime (MAXIPIME) 2 g in sterile water (preservative free) 10 mL IV syringe  2 g IntraVENous P99U    folic acid (FOLVITE) tablet 1 mg  1 mg Oral DAILY    L.acidophilus-paracasei-S.thermophil-bifidobacter (RISAQUAD) 8 billion cell capsule  1 Cap Oral DAILY    vancomycin (VANCOCIN) 1,000 mg in 0.9% sodium chloride 250 mL (VIAL-MATE)  1,000 mg IntraVENous Q24H    heparin (porcine) injection 5,000 Units  5,000 Units SubCUTAneous Q8H    sodium chloride (NS) flush 5-10 mL  5-10 mL IntraVENous PRN       Review of Systems: Admission ROS by Rosa Torre MD from 3/27/2021 were reviewed with the patient and changes (other than per HPI) include: none      Objective:     Physical Exam:  Visit Vitals  BP (!) 175/77 (BP 1 Location: Right arm, BP Patient Position: At rest)   Pulse (!) 104   Temp 97.7 °F (36.5 °C)   Resp 19   Ht 4' 11\" (1.499 m)   Wt 60.5 kg (133 lb 6.1 oz)   SpO2 98%   BMI 26.94 kg/m²     SpO2 Readings from Last 6 Encounters:   03/29/21 98%   12/02/20 99%   02/23/20 97%   03/23/19 100%   03/21/19 99%   03/13/19 97%    O2 Flow Rate (L/min): 2 l/min       Intake/Output Summary (Last 24 hours) at 3/29/2021 1804  Last data filed at 3/29/2021 1445  Gross per 24 hour   Intake 1080 ml   Output 900 ml   Net 180 ml      General: no distress, comfortable  Skin:  No rash or palpable dermatologic mass lesions  HEENT: Pupils equal, sclera anicteric, oropharynx with no gross lesions  Cardiovascular: No abnormal audible heart sounds, well perfused, no edema  Respiratory:  No abnormal audible breath sounds, normal respiratory effort, no throacic deformity  GI:  Abdomen nondistended, tender only to deep palpation, no mass, no free fluid, no rebound or guarding. Musculoskeletal:  No skeletal deformity nor acute arthritis noted.   Neurological:  Motor and sensory function intact in upper extremeties  Psychiatric: Normal affect, memory intact, appears to have insight into current illness  Lymphatic:  No cervical, supraclavicular, or periumbilic lymphadenopathy    Laboratory:    Recent Results (from the past 24 hour(s))   GLUCOSE, POC    Collection Time: 03/28/21  6:18 PM   Result Value Ref Range    Glucose (POC) 98 65 - 100 mg/dL    Performed by Carlo Harry    CULTURE, WOUND Viridiana Grain STAIN    Collection Time: 03/28/21  7:06 PM    Specimen: Elbow   Result Value Ref Range    Special Requests: ANTERIOR HUMERUS     GRAM STAIN NO WBC'S SEEN      GRAM STAIN NO ORGANISMS SEEN      Culture result: PENDING    CULTURE, WOUND Viridiana Grain STAIN    Collection Time: 03/28/21  7:08 PM    Specimen: Elbow   Result Value Ref Range    Special Requests: LEFT ELBOW JOINT CULTURE     GRAM STAIN OCCASIONAL WBCS SEEN      GRAM STAIN NO ORGANISMS SEEN      Culture result: PENDING    GLUCOSE, POC    Collection Time: 03/28/21  8:09 PM   Result Value Ref Range    Glucose (POC) 117 (H) 65 - 100 mg/dL    Performed by [facility] Srinivasa Díaz, POC    Collection Time: 03/29/21 12:18 AM   Result Value Ref Range    Glucose (POC) 109 (H) 65 - 100 mg/dL    Performed by [facility]    CBC WITH AUTOMATED DIFF    Collection Time: 03/29/21  3:45 AM   Result Value Ref Range    WBC 14.6 (H) 3.6 - 11.0 K/uL    RBC 3.46 (L) 3.80 - 5.20 M/uL    HGB 9.8 (L) 11.5 - 16.0 g/dL    HCT 30.4 (L) 35.0 - 47.0 %    MCV 87.9 80.0 - 99.0 FL    MCH 28.3 26.0 - 34.0 PG    MCHC 32.2 30.0 - 36.5 g/dL    RDW 17.2 (H) 11.5 - 14.5 %    PLATELET 536 044 - 052 K/uL    MPV 9.2 8.9 - 12.9 FL    NRBC 0.0 0  WBC    ABSOLUTE NRBC 0.00 0.00 - 0.01 K/uL    NEUTROPHILS 91 (H) 32 - 75 %    LYMPHOCYTES 5 (L) 12 - 49 %    MONOCYTES 4 (L) 5 - 13 %    EOSINOPHILS 0 0 - 7 %    BASOPHILS 0 0 - 1 %    IMMATURE GRANULOCYTES 0 0.0 - 0.5 %    ABS. NEUTROPHILS 13.3 (H) 1.8 - 8.0 K/UL    ABS. LYMPHOCYTES 0.7 (L) 0.8 - 3.5 K/UL    ABS. MONOCYTES 0.6 0.0 - 1.0 K/UL    ABS.  EOSINOPHILS 0.0 0.0 - 0.4 K/UL ABS. BASOPHILS 0.0 0.0 - 0.1 K/UL    ABS. IMM. GRANS. 0.0 0.00 - 0.04 K/UL    DF SMEAR SCANNED      RBC COMMENTS NORMOCYTIC, NORMOCHROMIC     METABOLIC PANEL, COMPREHENSIVE    Collection Time: 03/29/21  3:45 AM   Result Value Ref Range    Sodium 136 136 - 145 mmol/L    Potassium 3.4 (L) 3.5 - 5.1 mmol/L    Chloride 107 97 - 108 mmol/L    CO2 22 21 - 32 mmol/L    Anion gap 7 5 - 15 mmol/L    Glucose 94 65 - 100 mg/dL    BUN 11 6 - 20 MG/DL    Creatinine 0.52 (L) 0.55 - 1.02 MG/DL    BUN/Creatinine ratio 21 (H) 12 - 20      GFR est AA >60 >60 ml/min/1.73m2    GFR est non-AA >60 >60 ml/min/1.73m2    Calcium 8.1 (L) 8.5 - 10.1 MG/DL    Bilirubin, total 0.5 0.2 - 1.0 MG/DL    ALT (SGPT) 15 12 - 78 U/L    AST (SGOT) 12 (L) 15 - 37 U/L    Alk.  phosphatase 59 45 - 117 U/L    Protein, total 6.0 (L) 6.4 - 8.2 g/dL    Albumin 2.6 (L) 3.5 - 5.0 g/dL    Globulin 3.4 2.0 - 4.0 g/dL    A-G Ratio 0.8 (L) 1.1 - 2.2     MAGNESIUM    Collection Time: 03/29/21  3:45 AM   Result Value Ref Range    Magnesium 2.0 1.6 - 2.4 mg/dL   PHOSPHORUS    Collection Time: 03/29/21  3:45 AM   Result Value Ref Range    Phosphorus 2.1 (L) 2.6 - 4.7 MG/DL   GLUCOSE, POC    Collection Time: 03/29/21  5:58 AM   Result Value Ref Range    Glucose (POC) 90 65 - 100 mg/dL    Performed by Merary Hernández (PCT)    GLUCOSE, POC    Collection Time: 03/29/21 11:36 AM   Result Value Ref Range    Glucose (POC) 110 (H) 65 - 100 mg/dL    Performed by Mj Ramos    GLUCOSE, POC    Collection Time: 03/29/21  5:37 PM   Result Value Ref Range    Glucose (POC) 100 65 - 100 mg/dL    Performed by Margot Bilberry          Assessment/Plan:     Principal Problem:    Severe sepsis ([facility] (3/28/2021)    Active Problems:    DM type 2 (diabetes mellitus, type 2) ([facility] (4/9/2016)      HTN (hypertension), benign (4/9/2016)      Hypothyroid (4/9/2016)      RA (rheumatoid arthritis) ([facility] (4/9/2016)      Diverticulitis of sigmoid colon (3/28/2021)      Intractable nausea and vomiting (3/28/2021)      Immunocompromised state due to drug therapy (3/28/2021)      Septic arthritis of elbow, left ([address] (3/28/2021)         See above narrative for full detail.

## 2021-03-29 NOTE — ANESTHESIA POSTPROCEDURE EVALUATION
Procedure(s):  LEFT ELBOW INCISION AND DRAINAGE. general    Anesthesia Post Evaluation      Multimodal analgesia: multimodal analgesia used between 6 hours prior to anesthesia start to PACU discharge  Patient location during evaluation: PACU  Patient participation: complete - patient participated  Level of consciousness: awake and alert  Pain management: adequate  Airway patency: patent  Anesthetic complications: no  Cardiovascular status: acceptable  Respiratory status: acceptable  Hydration status: acceptable  Post anesthesia nausea and vomiting:  none  Final Post Anesthesia Temperature Assessment:  Normothermia (36.0-37.5 degrees C)      INITIAL Post-op Vital signs:   Vitals Value Taken Time   BP 99/42 03/28/21 2015   Temp 36.7 °C (98 °F) 03/28/21 2010   Pulse 85 03/28/21 2020   Resp 22 03/28/21 2020   SpO2 98 % 03/28/21 2020   Vitals shown include unvalidated device data.

## 2021-03-29 NOTE — PROGRESS NOTES
Jose E Linares [facility] 79  [address] [facility], [place], [address] [place] [address]  [phone]      Medical Progress Note      NAME:         Jelly Haynes   :        1930  MRM:        [id number]    Date of service: 3/29/2021      Subjective: Patient has been seen and examined as a follow up for multiple medical issues. Chart, labs, diagnostics reviewed. Has aching left elbow area. No abdominal discomfort. No fever or chills. No diarrhea. Objective:    Vital Signs:    Visit Vitals  BP (!) 164/77 (BP 1 Location: Right arm, BP Patient Position: At rest)   Pulse 92   Temp 98.1 °F (36.7 °C)   Resp 16   Ht 4' 11\" (1.499 m)   Wt 60.5 kg (133 lb 6.1 oz)   SpO2 99%   BMI 26.94 kg/m²          Intake/Output Summary (Last 24 hours) at 3/29/2021 1354  Last data filed at 3/29/2021 1051  Gross per 24 hour   Intake 1080 ml   Output 600 ml   Net 480 ml        Physical Examination:    General:   Weak and ill looking, not in any acute distress   Eyes:   pink conjunctivae, PERRLA with no discharge. ENT:   no ottorrhea or rhinorrhea with dry mucous membranes  Neck: no masses, thyroid non-tender and trachea central.  Pulm: clear breath sounds without crackles or wheezes  Card:  no JVD or murmurs, has regular and normal S1, S2 without thrills, bruits or peripheral edema  Abd:  Soft, LLQ mild discomfort, non-distended, normoactive bowel sounds  Musc:  No cyanosis, clubbing, atrophy or deformities. Dressed left UE  Skin:  No rashes, bruising or ulcers. Neuro: Awake and alert.  Generally a non focal exam. Follows commands appropriately  Psych:  Has a fair insight to her illness     Current Facility-Administered Medications   Medication Dose Route Frequency    [Held by provider] amLODIPine (NORVASC) tablet 10 mg  10 mg Oral DAILY    aspirin delayed-release tablet 81 mg  81 mg Oral DAILY    levothyroxine (SYNTHROID) tablet 75 mcg  75 mcg Oral ACB    [Held by provider] lisinopriL (PRINIVIL, ZESTRIL) tablet 40 mg  40 mg Oral DAILY    glucose chewable tablet 16 g  4 Tab Oral PRN    dextrose (D50W) injection syrg 12.5-25 g  25-50 mL IntraVENous PRN    glucagon (GLUCAGEN) injection 1 mg  1 mg IntraMUSCular PRN    sodium chloride (NS) flush 5-40 mL  5-40 mL IntraVENous Q8H    sodium chloride (NS) flush 5-40 mL  5-40 mL IntraVENous PRN    acetaminophen (TYLENOL) tablet 650 mg  650 mg Oral Q6H PRN    Or    acetaminophen (TYLENOL) suppository 650 mg  650 mg Rectal Q6H PRN    polyethylene glycol (MIRALAX) packet 17 g  17 g Oral DAILY PRN    bisacodyL (DULCOLAX) suppository 10 mg  10 mg Rectal DAILY PRN    ondansetron (ZOFRAN) injection 4 mg  4 mg IntraVENous Q6H PRN    insulin lispro (HUMALOG) injection   SubCUTAneous Q6H    0.9% sodium chloride with KCl 20 mEq/L infusion   IntraVENous CONTINUOUS    hydrALAZINE (APRESOLINE) 20 mg/mL injection 20 mg  20 mg IntraVENous Q4H PRN    melatonin (rapid dissolve) tablet 5 mg  5 mg Oral QHS PRN    alum-mag hydroxide-simeth (MYLANTA) oral suspension 30 mL  30 mL Oral Q4H PRN    oxyCODONE IR (ROXICODONE) tablet 5 mg  5 mg Oral Q4H PRN    morphine injection 2 mg  2 mg IntraVENous Q4H PRN    diphenhydrAMINE (BENADRYL) injection 25 mg  25 mg IntraVENous Q6H PRN    diphenhydrAMINE (BENADRYL) capsule 25 mg  25 mg Oral Q6H PRN    simethicone (MYLICON) tablet 80 mg  80 mg Oral QID PRN    famotidine (PF) (PEPCID) 20 mg in 0.9% sodium chloride 10 mL injection  20 mg IntraVENous DAILY    metroNIDAZOLE (FLAGYL) IVPB premix 500 mg  500 mg IntraVENous Q12H    cefepime (MAXIPIME) 2 g in sterile water (preservative free) 10 mL IV syringe  2 g IntraVENous F52L    folic acid (FOLVITE) tablet 1 mg  1 mg Oral DAILY    L.acidophilus-paracasei-S.thermophil-bifidobacter (RISAQUAD) 8 billion cell capsule  1 Cap Oral DAILY    vancomycin (VANCOCIN) 1,000 mg in 0.9% sodium chloride 250 mL (VIAL-MATE)  1,000 mg IntraVENous Q24H    heparin (porcine) injection 5,000 Units  5,000 Units SubCUTAneous Q8H    sodium chloride (NS) flush 5-10 mL  5-10 mL IntraVENous PRN        Laboratory data and review:    Recent Labs     03/29/21 0345 03/28/21 0346 03/27/21  2230   WBC 14.6* 25.4* 16.5*   HGB 9.8* 10.0* 11.2*   HCT 30.4* 30.8* 34.0*    345 367     Recent Labs     03/29/21  0345 03/28/21  0346 03/27/21  2230    135* 132*   K 3.4* 3.5 3.6    102 99   CO2 22 25 24   GLU 94 109* 121*   BUN 11 14 16   CREA 0.52* 0.87 0.89   CA 8.1* 8.1* 8.6   MG 2.0  --   --    PHOS 2.1*  --   --    ALB 2.6* 2.8* 3.4*   ALT 15 20 25     No components found for: [place]    Diagnostics: Imaging studies have been reviewed    Telemetry reviewed by me:   normal sinus rhythm    Assessment and Plan:    Severe sepsis ([facility] (3/28/2021) POA: due to the left septic arthritis and mild diverticulitis. Improving. Blood cultures neg. Continue empiric IV Cefepime, Metronidazole and treat etiologies. ID following    Septic arthritis of elbow, left ([facility] (3/28/2021) POA: seen by ortho and she is sp I&D done 3/28. Cultures pending. Seen by ID. Will need a prolonged IV antibiotic course. On Cefepime, metronidazole and Vancomycin for now      Diverticulitis of sigmoid colon (3/28/2021) POA: mild and uncomplicated. Noted on CT scan abdomen. Continue IV antibiotics as noted above    DM type 2 (diabetes mellitus, type 2) ([facility] (4/9/2016) POA: A1c 5.4. Now on clears. Monitor blood glucose, SSi per protocol    HTN (hypertension), benign (4/9/2016) POA: BP uncontrolled. Resume Amlodipine. Decrease IV fluids. Resume ACEi if BP remains elevated    RA (rheumatoid arthritis) ([facility] (4/9/2016) / Immunocompromised state due to drug therapy (3/28/2021) POA: continue pain control.  Methotrexate on hold    Hypothyroid (4/9/2016)  POA: Continue Levothyroxine    Total time spent for the patient's care: [address] discussed with: Patient, Family, Care Manager and Nursing Staff    Discussed:  Care Plan and D/C Planning    Prophylaxis:  Hep SQ    Anticipated Disposition:  SNF/LTC           ___________________________________________________    Attending Physician:   Hayden Benoit MD

## 2021-03-29 NOTE — PROGRESS NOTES
Reason for Admission:   Severe sepsis, septic left elbow with I&D 3/27/21. Abdominal pain, diverticulitis. Hx rheumatoid arthritis, diabetes, HTN, hypothyroid. RUR Score:                  PCP: First and Last name:   Cindy Us MD     Name of Practice:    Are you a current patient: Yes/No: yes    Approximate date of last visit:   1 week ago   Can you participate in a virtual visit if needed:  no    Do you (patient/family) have any concerns for transition/discharge? Patient will need IV antibiotic therapy post discharge, likely SNF placement. Patient lives alone and will be unable to give IV antibiotics. Her son works and will not be able to assist on a daily basis. No other friends/family available to assist.                Plan for utilizing home health:  Unsure at this time. Current Advanced Directive/Advance Care Plan:  Full Code    Healthcare Decision Maker:   Click here to complete HealthCare Decision Makers including selection of the Healthcare Decision Maker Relationship (ie \"Primary\")            Adonay Mccauley [phone]    Transition of Care Plan:    Chart reviewed, demographics verified. CM role and follow up discussed. Met with patient and her son Roberto Carlos Oliver at bedside, face mask and goggles on. Patient lives alone. Patient lives in a one story home with 0 steps to enter home. Patient has prescription drug coverage, uses [facility] on [address]. Patient is independent, and provides self care. Son assists with transportation needs. Current status:  Patient currently requiring medical management including IV antibiotics. Status post I&D left elbow. Talked with patient about SNF placement for continued IV antibiotics, she lives alone and has rheumatoid arthritis, she is unable to perform administration of IV antibiotics. No friends/family available to assist at home.  Provider list given to patient, will need to determine specific IV antibiotic needed before SNF referral and acceptance can be made. PLAN:  1. Monitor patient response to treatment and recommendations. 2. Medical management continues. 3. Patient is appropriate for SNF placement, provider list given to patient and her son. 4. Patient transport home per son vs transport at discharge. 5. CM to monitor clinical progress and disposition recommendations. Care Management Interventions  PCP Verified by CM: Yes(Dr. Danielle Tsai)  Palliative Care Criteria Met (RRAT>21 & CHF Dx)?: No  Mode of Transport at Discharge:  Other (see comment)(per son)  Transition of Care Consult (CM Consult): Discharge Planning  Current Support Network: Family Lives [place], Lives Alone  Confirm Follow Up Transport: Family  The Plan for Transition of Care is Related to the Following Treatment Goals : post hospital IV antibiotics  Discharge Location  Discharge Placement: Unable to determine at this time    Sandeep Strong, RN, MSN, Care manager

## 2021-03-29 NOTE — PROGRESS NOTES
Bedside and Verbal shift change report given to Corey Zamudio RN (oncoming nurse) by Fran Sheets RN (offgoing nurse). Report included the following information SBAR, Kardex, Intake/Output, MAR, Recent Results, Med Rec Status and Cardiac Rhythm NSR to sinus tach. Patient is currently in the [facility] [address].    2043 TRANSFER - IN REPORT:    Verbal report received from [address] [place] [address], RN (name) on Roni Terry  being received from OR (unit) for routine post - op      Report consisted of patients Situation, Background, Assessment and   Recommendations(SBAR). Information from the following report(s) SBAR, Kardex, OR Summary, Procedure Summary, Intake/Output, Recent Results, Med Rec Status and Cardiac Rhythm NSR was reviewed with the receiving nurse. Opportunity for questions and clarification was provided. 2100 Assessment completed upon patients arrival to unit and care assumed. 0745 Bedside and Verbal shift change report given to Jorge Schneider RN (oncoming nurse) by Corey Zamudio RN (offgoing nurse). Report included the following information SBAR, Kardex, Intake/Output, MAR, Med Rec Status, Cardiac Rhythm NSR and Alarm Parameters .

## 2021-03-29 NOTE — PROGRESS NOTES
Orthopedic Progress Note    POD 1 Day Post-Op    Procedure:  Procedure(s):  LEFT ELBOW INCISION AND DRAINAGE    Subjective:     Patient doing well. Reports no significant elbow pain. Minimal drainage out of the drain. Ambulating to the bathroom. No fevers or chills. Tolerating regular diet. Voiding without difficulty. Denies any new numbness or weakness distally. Baseline disability of the left hand from rheumatoid arthritis    Objective:     Blood pressure (!) 159/63, pulse 82, temperature 98 °F (36.7 °C), resp. rate 16, height 4' 11\" (1.499 m), weight 60.5 kg (133 lb 6.1 oz), SpO2 96 %. Temp (24hrs), Av °F (36.7 °C), Min:97.3 °F (36.3 °C), Max:98.7 °F (37.1 °C)      Physical Exam:  Examination of the left elbow reveals that the splint/dressing is clean, dry and intact. Hemovac drain with sanguinous drainage in the tubing nothing in the container sensation is intact to light touch median/ulnar/radial.  Rheumatoid changes to the fingers and hand with swan-neck deformities of the fingers is able to minimally flex and extend the fingers. Labs:   Recent Results (from the past 24 hour(s))   LACTIC ACID    Collection Time: 21  8:25 AM   Result Value Ref Range    Lactic acid 1.8 0.4 - 2.0 MMOL/L   GLUCOSE, POC    Collection Time: 21 11:45 AM   Result Value Ref Range    Glucose (POC) 113 (H) 65 - 100 mg/dL    Performed by Trupti Flowers (PCT)    CULTURE, BODY FLUID Alena Shaker STAIN    Collection Time: 21  3:52 PM    Specimen: Joint Fluid;  Body Fluid   Result Value Ref Range    Special Requests: NO SPECIAL REQUESTS      GRAM STAIN 3+ WBCS SEEN      GRAM STAIN NO ORGANISMS SEEN      Culture result: PENDING    CRYSTALS, SYNOVIAL FLUID    Collection Time: 21  3:52 PM   Result Value Ref Range    FLUID TYPE(7) ELBOW      Crystals, body fluid NO CRYSTALS SEEN WITH POLARIZED LIGHT     CELL COUNT, SYNOVIAL    Collection Time: 21  3:52 PM   Result Value Ref Range    SYNOVIAL FLD SITE ELBOW      SYN FLUID COLOR YELLOW      SYN FLUID APPEARANCE TURBID      SYNOVIAL FLD RBC CT >100 (H) 0 /cu mm    SYNOVIAL FLD WBC CT 54,000 (H) 0 - 150 /cu mm   BODY FLUID DIFF    Collection Time: 03/28/21  3:52 PM   Result Value Ref Range    FLD NEUTROPHILS 85 (A) NRRE %    FLD LYMPHS 6 (A) NRRE %    FLD MONO/MACROPHAGES 5 (A) NRRE %    FLD EOSINS 0 (A) NRRE %    FLD OTHER CELLS 0 0 %    FLUID MESOTHELIAL 4 (A) NRRE %   SAMPLES BEING HELD    Collection Time: 03/28/21  3:52 PM   Result Value Ref Range    SAMPLES BEING HELD SWAB EXTRA     COMMENT        Add-on orders for these samples will be processed based on acceptable specimen integrity and analyte stability, which may vary by analyte. SAMPLES BEING HELD    Collection Time: 03/28/21  4:00 PM   Result Value Ref Range    SAMPLES BEING HELD FLUID IN GREEN TOP     COMMENT        Add-on orders for these samples will be processed based on acceptable specimen integrity and analyte stability, which may vary by analyte.    GLUCOSE, POC    Collection Time: 03/28/21  5:52 PM   Result Value Ref Range    Glucose (POC) 100 65 - 100 mg/dL    Performed by Layla Valle    GLUCOSE, POC    Collection Time: 03/28/21  6:18 PM   Result Value Ref Range    Glucose (POC) 98 65 - 100 mg/dL    Performed by Edgardo Olvera    GLUCOSE, POC    Collection Time: 03/28/21  8:09 PM   Result Value Ref Range    Glucose (POC) 117 (H) 65 - 100 mg/dL    Performed by [facility] D[facility], POC    Collection Time: 03/29/21 12:18 AM   Result Value Ref Range    Glucose (POC) 109 (H) 65 - 100 mg/dL    Performed by [facility]    CBC WITH AUTOMATED DIFF    Collection Time: 03/29/21  3:45 AM   Result Value Ref Range    WBC 14.6 (H) 3.6 - 11.0 K/uL    RBC 3.46 (L) 3.80 - 5.20 M/uL    HGB 9.8 (L) 11.5 - 16.0 g/dL    HCT 30.4 (L) 35.0 - 47.0 %    MCV 87.9 80.0 - 99.0 FL    MCH 28.3 26.0 - 34.0 PG    MCHC 32.2 30.0 - 36.5 g/dL    RDW 17.2 (H) 11.5 - 14.5 %    PLATELET 685 317 - 343 K/uL    MPV 9.2 8.9 - 12.9 FL    NRBC 0.0 0  WBC    ABSOLUTE NRBC 0.00 0.00 - 0.01 K/uL    NEUTROPHILS 91 (H) 32 - 75 %    LYMPHOCYTES 5 (L) 12 - 49 %    MONOCYTES 4 (L) 5 - 13 %    EOSINOPHILS 0 0 - 7 %    BASOPHILS 0 0 - 1 %    IMMATURE GRANULOCYTES 0 0.0 - 0.5 %    ABS. NEUTROPHILS 13.3 (H) 1.8 - 8.0 K/UL    ABS. LYMPHOCYTES 0.7 (L) 0.8 - 3.5 K/UL    ABS. MONOCYTES 0.6 0.0 - 1.0 K/UL    ABS. EOSINOPHILS 0.0 0.0 - 0.4 K/UL    ABS. BASOPHILS 0.0 0.0 - 0.1 K/UL    ABS. IMM. GRANS. 0.0 0.00 - 0.04 K/UL    DF SMEAR SCANNED      RBC COMMENTS NORMOCYTIC, NORMOCHROMIC     METABOLIC PANEL, COMPREHENSIVE    Collection Time: 03/29/21  3:45 AM   Result Value Ref Range    Sodium 136 136 - 145 mmol/L    Potassium 3.4 (L) 3.5 - 5.1 mmol/L    Chloride 107 97 - 108 mmol/L    CO2 22 21 - 32 mmol/L    Anion gap 7 5 - 15 mmol/L    Glucose 94 65 - 100 mg/dL    BUN 11 6 - 20 MG/DL    Creatinine 0.52 (L) 0.55 - 1.02 MG/DL    BUN/Creatinine ratio 21 (H) 12 - 20      GFR est AA >60 >60 ml/min/1.73m2    GFR est non-AA >60 >60 ml/min/1.73m2    Calcium 8.1 (L) 8.5 - 10.1 MG/DL    Bilirubin, total 0.5 0.2 - 1.0 MG/DL    ALT (SGPT) 15 12 - 78 U/L    AST (SGOT) 12 (L) 15 - 37 U/L    Alk.  phosphatase 59 45 - 117 U/L    Protein, total 6.0 (L) 6.4 - 8.2 g/dL    Albumin 2.6 (L) 3.5 - 5.0 g/dL    Globulin 3.4 2.0 - 4.0 g/dL    A-G Ratio 0.8 (L) 1.1 - 2.2     MAGNESIUM    Collection Time: 03/29/21  3:45 AM   Result Value Ref Range    Magnesium 2.0 1.6 - 2.4 mg/dL   PHOSPHORUS    Collection Time: 03/29/21  3:45 AM   Result Value Ref Range    Phosphorus 2.1 (L) 2.6 - 4.7 MG/DL   GLUCOSE, POC    Collection Time: 03/29/21  5:58 AM   Result Value Ref Range    Glucose (POC) 90 65 - 100 mg/dL    Performed by Eloy Nash (PCT)       No growth to date from cultures    Assessment:     Principal Problem:    Severe sepsis ([facility] (3/28/2021)    Active Problems:    DM type 2 (diabetes mellitus, type 2) ([facility] (4/9/2016)      HTN (hypertension), benign (4/9/2016) Hypothyroid (2016)      RA (rheumatoid arthritis) ([facility] (2016)      Diverticulitis of sigmoid colon (3/28/2021)      Intractable nausea and vomiting (3/28/2021)      Immunocompromised state due to drug therapy (3/28/2021)      Septic arthritis of elbow, left ([facility] (3/28/2021)        Plan/Recommendations/Medical Decision Makin-year-old female with rheumatoid arthritis admitted for diverticulitis but concern for left elbow septic arthritis status post left elbow I&D, postop day #1    -Admit to medicine, appreciate help in medical management  -P.o. diet  -P.o. pain medication  -Continue broad-spectrum IV antibiotics, ID consult  -Continue splint and sling for soft tissue rest  -Hemovac drain: Continue for 24 hours then DC  -Cultures: Continue to follow cultures no growth to date  -Nonweightbearing left upper extremity while in splint for soft tissue mobilization  -Ice and elevate for pain relief. -PT/OT: Evaluation for safety for discharge  -Dispo: Patient will likely require skilled nursing facility as lives alone and son is not able to be with her  will need prolonged IV antibiotics.  -Follow-up: 2 weeks postop at [facility] with Mary Logan PA-C for wound check and suture removal call [phone]      MD Red Rios Caro.  Danielle He MD  [facility] [phone]  Physician Assistant: Mary Logan PA-C  Medical Staff : Rogesr Briceño/ Bernardino Snow  Office : [phone] ext [phone]

## 2021-03-29 NOTE — CONSULTS
Infectious Disease Consult    Impression/Plan   · Left elbow septic arthritis. Status post I&D 3/28/2021. Cultures pending. Due to chronicity and underlying immunosuppression the patient will need PICC line and extended course of intravenous antibiotics. Continue current empiric antibiotics. Discussed with patient who is agreeable  · Diverticulitis. ABX as above. Patient will need 10 days of treatment for the diverticulitis  · Leukocytosis  · Rheumatoid arthritis. On methotrexate  · Chronic immunosuppression  · Abdominal aortic aneurysm. Avoid quinolone antibiotics  · Amoxicillin allergy. This caused a rash in the past.  Currently tolerating cefepime  · Diabetes mellitus. Hemoglobin A1c 5.4        Anti-infectives:   1. Vancomycin  2. Cefepime  3. Metronidazole    Subjective:   Date of Consultation:  March 29, 2021  Date of Admission: 3/27/2021   Referring Physician:     Patient is a 80 y.o. female with a past medical history significant for rheumatoid arthritis on methotrexate, diabetes mellitus, and hypertension who is being seen for septic arthritis. The patient presents to [facility] with complaints of slowly progressive abdominal cramping and left lower quadrant abdominal pain. She also complains of fever, chills nausea and vomiting. CT scan of the abdomen pelvis revealed acute diverticulitis. Patient also is complaining of left elbow pain and swelling which has been ongoing for approximately 1 month after bumping her elbow. CT scan was obtained which raised concern for septic arthritis. She underwent aspiration by orthopedic surgery followed by formal incision and drainage on 3/28. Cultures are pending. She is currently on vancomycin, cefepime, and metronidazole.   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    Chronic kidney disease (CKD) N18.9    SBO (small bowel obstruction) ([facility] K56.609    Acute diverticulitis K57.92    Diverticulitis of sigmoid colon K57.32    Severe sepsis ([facility]) A41.9, R65.20    Intractable nausea and vomiting R11.2    Immunocompromised state due to drug therapy D84.821, Z79.899    Septic arthritis of elbow, left ([facility]) M00.9     Past Medical History:   Diagnosis Date    Arthritis, rheumatoid ([facility]     Diabetes ([facility]     Diverticulitis     Hard of hearing     Hypertension     Hypothyroid     Osteoporosis, post-menopausal       Family History   Problem Relation Age of Onset    Heart Disease Mother     Diabetes Father     Cancer Brother         brain cancer    Heart Disease Brother     Diabetes Brother     Other Other         scleroderma      Social History     Tobacco Use    Smoking status: Former Smoker     Types: Cigarettes     Quit date: 1976     Years since quittin.3    Smokeless tobacco: Never Used   Substance Use Topics    Alcohol use: Yes     Comment: Waylon time 1 glass wine     Past Surgical History:   Procedure Laterality Date    FLEXIBLE SIGMOIDOSCOPY N/A 2020    SIGMOIDOSCOPY FLEXIBLE performed by Ami Huston MD at [facility] ENDOSCOPY    HX HEMORRHOIDECTOMY      HX HYSTERECTOMY      HX THYROIDECTOMY        Prior to Admission medications    Medication Sig Start Date End Date Taking? Authorizing Provider   folic acid (FOLVITE) 1 mg tablet Take 1 mg by mouth daily. Yes Provider, Historical   docusate sodium (Colace) 100 mg capsule Take 100 mg by mouth two (2) times daily as needed for Constipation. Yes Provider, Historical   methotrexate (RHEUMATREX) 2.5 mg tablet Take 20 mg by mouth every .    Yes Provider, Historical   levothyroxine (SYNTHROID) 75 mcg tablet Take 1 Tab by mouth Daily (before breakfast). 20  Yes Do, Hayden Buerger, MD   polyethylene glycol (Miralax) 17 gram packet Take 1 Packet by mouth daily. 20  Yes Do, Hayden Buerger, MD   L.acidoph & parac-S.therm-Bifido (BRENNEN Q2/RISAQUAD-2) 16 billion cell cap cap Take 1 Cap by mouth daily. 20  Yes Do, Shay HAN MD   lisinopriL (PRINIVIL, ZESTRIL) 40 mg tablet Take 40 mg by mouth daily. Yes Provider, Historical   amLODIPine (NORVASC) 10 mg tablet Take 10 mg by mouth daily. Yes Provider, Historical   acetaminophen (Tylenol Extra Strength) 500 mg tablet Take 500 mg by mouth two (2) times daily as needed for Pain. Yes Provider, Historical   multivit-min/iron/folic/lutein (CENTRUM SILVER WOMEN PO) Take 1 Tab by mouth every other day. Yes Provider, Historical   cholecalciferol (VITAMIN D3) 1,000 unit cap Take 2,000 Units by mouth daily. Yes Provider, Historical   aspirin delayed-release 81 mg tablet Take 81 mg by mouth daily. Yes Provider, Historical     Allergies   Allergen Reactions    Alendronate Diarrhea    Amoxicillin Rash     Tolerated cefepime 3/27/21    Hydrochlorothiazide Other (comments)     \"Caused pain everywhere\"        Review of Systems:  A comprehensive review of systems was negative except for that written in the History of Present Illness. Objective:   Blood pressure (!) 153/70, pulse 87, temperature 98 °F (36.7 °C), resp. rate 16, height 4' 11\" (1.499 m), weight 133 lb 6.1 oz (60.5 kg), SpO2 96 %. Temp (24hrs), Av °F (36.7 °C), Min:97.3 °F (36.3 °C), Max:98.7 °F (37.1 °C)       Exam:    General:  Alert, cooperative, well noursished, well developed, appears stated age   Eyes:  Sclera anicteric. Mouth/Throat: Mucous membranes moist   Neck: Supple   Lungs:    No distress   CV:     Abdomen:   non-distended   Extremities:  Left upper extremity dressed.   Drain in place with sanguinous drainage   Skin:  No rash   Lymph nodes:    Musculoskeletal:    Lines/Devices:   Peripheral   Psych: Alert and oriented, normal mood affect given the setting       Data Review:   Recent Results (from the past 24 hour(s))   GLUCOSE, POC    Collection Time: 03/28/21 11:45 AM   Result Value Ref Range    Glucose (POC) 113 (H) 65 - 100 mg/dL    Performed by Aranza Duffy (PCT)    CULTURE, BODY FLUID Jennifer Quant STAIN    Collection Time: 03/28/21  3:52 PM    Specimen: Joint Fluid; Body Fluid   Result Value Ref Range    Special Requests: NO SPECIAL REQUESTS      GRAM STAIN 3+ WBCS SEEN      GRAM STAIN NO ORGANISMS SEEN      Culture result: PENDING    CRYSTALS, SYNOVIAL FLUID    Collection Time: 03/28/21  3:52 PM   Result Value Ref Range    FLUID TYPE(7) ELBOW      Crystals, body fluid NO CRYSTALS SEEN WITH POLARIZED LIGHT     CELL COUNT, SYNOVIAL    Collection Time: 03/28/21  3:52 PM   Result Value Ref Range    SYNOVIAL FLD SITE ELBOW      SYN FLUID COLOR YELLOW      SYN FLUID APPEARANCE TURBID      SYNOVIAL FLD RBC CT >100 (H) 0 /cu mm    SYNOVIAL FLD WBC CT 54,000 (H) 0 - 150 /cu mm   BODY FLUID DIFF    Collection Time: 03/28/21  3:52 PM   Result Value Ref Range    FLD NEUTROPHILS 85 (A) NRRE %    FLD LYMPHS 6 (A) NRRE %    FLD MONO/MACROPHAGES 5 (A) NRRE %    FLD EOSINS 0 (A) NRRE %    FLD OTHER CELLS 0 0 %    FLUID MESOTHELIAL 4 (A) NRRE %   SAMPLES BEING HELD    Collection Time: 03/28/21  3:52 PM   Result Value Ref Range    SAMPLES BEING HELD SWAB EXTRA     COMMENT        Add-on orders for these samples will be processed based on acceptable specimen integrity and analyte stability, which may vary by analyte. SAMPLES BEING HELD    Collection Time: 03/28/21  4:00 PM   Result Value Ref Range    SAMPLES BEING HELD FLUID IN GREEN TOP     COMMENT        Add-on orders for these samples will be processed based on acceptable specimen integrity and analyte stability, which may vary by analyte.    GLUCOSE, POC    Collection Time: 03/28/21  5:52 PM   Result Value Ref Range    Glucose (POC) 100 65 - 100 mg/dL    Performed by Alan Solis    GLUCOSE, POC    Collection Time: 03/28/21  6:18 PM   Result Value Ref Range    Glucose (POC) 98 65 - 100 mg/dL    Performed by [place] Rao    GLUCOSE, POC    Collection Time: 03/28/21  8:09 PM   Result Value Ref Range    Glucose (POC) 117 (H) 65 - 100 mg/dL    Performed by Amos Wisdom, POC    Collection Time: 03/29/21 12:18 AM   Result Value Ref Range    Glucose (POC) 109 (H) 65 - 100 mg/dL    Performed by [facility] [place], operated by [facility]    CBC WITH AUTOMATED DIFF    Collection Time: 03/29/21  3:45 AM   Result Value Ref Range    WBC 14.6 (H) 3.6 - 11.0 K/uL    RBC 3.46 (L) 3.80 - 5.20 M/uL    HGB 9.8 (L) 11.5 - 16.0 g/dL    HCT 30.4 (L) 35.0 - 47.0 %    MCV 87.9 80.0 - 99.0 FL    MCH 28.3 26.0 - 34.0 PG    MCHC 32.2 30.0 - 36.5 g/dL    RDW 17.2 (H) 11.5 - 14.5 %    PLATELET 834 329 - 347 K/uL    MPV 9.2 8.9 - 12.9 FL    NRBC 0.0 0  WBC    ABSOLUTE NRBC 0.00 0.00 - 0.01 K/uL    NEUTROPHILS 91 (H) 32 - 75 %    LYMPHOCYTES 5 (L) 12 - 49 %    MONOCYTES 4 (L) 5 - 13 %    EOSINOPHILS 0 0 - 7 %    BASOPHILS 0 0 - 1 %    IMMATURE GRANULOCYTES 0 0.0 - 0.5 %    ABS. NEUTROPHILS 13.3 (H) 1.8 - 8.0 K/UL    ABS. LYMPHOCYTES 0.7 (L) 0.8 - 3.5 K/UL    ABS. MONOCYTES 0.6 0.0 - 1.0 K/UL    ABS. EOSINOPHILS 0.0 0.0 - 0.4 K/UL    ABS. BASOPHILS 0.0 0.0 - 0.1 K/UL    ABS. IMM.  GRANS. 0.0 0.00 - 0.04 K/UL    DF SMEAR SCANNED      RBC COMMENTS NORMOCYTIC, NORMOCHROMIC     METABOLIC PANEL, COMPREHENSIVE    Collection Time: 03/29/21  3:45 AM   Result Value Ref Range    Sodium 136 136 - 145 mmol/L    Potassium 3.4 (L) 3.5 - 5.1 mmol/L    Chloride 107 97 - 108 mmol/L    CO2 22 21 - 32 mmol/L    Anion gap 7 5 - 15 mmol/L    Glucose 94 65 - 100 mg/dL    BUN 11 6 - 20 MG/DL    Creatinine 0.52 (L) 0.55 - 1.02 MG/DL    BUN/Creatinine ratio 21 (H) 12 - 20      GFR est AA >60 >60 ml/min/1.73m2    GFR est non-AA >60 >60 ml/min/1.73m2    Calcium 8.1 (L) 8.5 - 10.1 MG/DL    Bilirubin, total 0.5 0.2 - 1.0 MG/DL    ALT (SGPT) 15 12 - 78 U/L    AST (SGOT) 12 (L) 15 - 37 U/L    Alk.  phosphatase 59 45 - 117 U/L    Protein, total 6.0 (L) 6.4 - 8.2 g/dL    Albumin 2.6 (L) 3.5 - 5.0 g/dL    Globulin 3.4 2.0 - 4.0 g/dL    A-G Ratio 0.8 (L) 1.1 - 2.2     MAGNESIUM    Collection Time: 03/29/21  3:45 AM   Result Value Ref Range    Magnesium 2.0 1.6 - 2.4 mg/dL   PHOSPHORUS    Collection Time: 03/29/21  3:45 AM   Result Value Ref Range    Phosphorus 2.1 (L) 2.6 - 4.7 MG/DL   GLUCOSE, POC    Collection Time: 03/29/21  5:58 AM   Result Value Ref Range    Glucose (POC) 90 65 - 100 mg/dL    Performed by Adela Gilman (PCT)         Microbiology:      Studies:      Signed By: Rolando Hope DO     March 29, 2021

## 2021-03-29 NOTE — PROGRESS NOTES
Bedside and Verbal shift change report receive from to ,RN (oncoming nurse) by ,RN (offgoing nurse).  Report included the following information SBAR, ED Summary, MAR, Recent Results and Cardiac Rhythm

## 2021-03-29 NOTE — PROGRESS NOTES
Bedside shift change report given to Black Handy [id number] (oncoming nurse) by Citlali Faustin (offgoing nurse). Report included the following information SBAR, Kardex, MAR and Recent Results.

## 2021-03-29 NOTE — PROGRESS NOTES
Interdisciplinary Rounds were completed on the patient concurrent with CHF rounds. Patient's needs for facilitating discharge were discussed as were potential discharge medications, needs, equipment, follow up appointments, and education.

## 2021-03-30 LAB
ANION GAP SERPL CALC-SCNC: 6 MMOL/L (ref 5–15)
BASOPHILS # BLD: 0 K/UL (ref 0–0.1)
BASOPHILS NFR BLD: 0 % (ref 0–1)
BUN SERPL-MCNC: 7 MG/DL (ref 6–20)
BUN/CREAT SERPL: 12 (ref 12–20)
CALCIUM SERPL-MCNC: 8 MG/DL (ref 8.5–10.1)
CHLORIDE SERPL-SCNC: 106 MMOL/L (ref 97–108)
CO2 SERPL-SCNC: 24 MMOL/L (ref 21–32)
CREAT SERPL-MCNC: 0.6 MG/DL (ref 0.55–1.02)
DATE LAST DOSE: NORMAL
DIFFERENTIAL METHOD BLD: ABNORMAL
EOSINOPHIL # BLD: 0.4 K/UL (ref 0–0.4)
EOSINOPHIL NFR BLD: 3 % (ref 0–7)
ERYTHROCYTE [DISTWIDTH] IN BLOOD BY AUTOMATED COUNT: 17.1 % (ref 11.5–14.5)
GLUCOSE BLD STRIP.AUTO-MCNC: 100 MG/DL (ref 65–100)
GLUCOSE BLD STRIP.AUTO-MCNC: 106 MG/DL (ref 65–100)
GLUCOSE BLD STRIP.AUTO-MCNC: 125 MG/DL (ref 65–100)
GLUCOSE BLD STRIP.AUTO-MCNC: 80 MG/DL (ref 65–100)
GLUCOSE SERPL-MCNC: 98 MG/DL (ref 65–100)
HCT VFR BLD AUTO: 32.3 % (ref 35–47)
HGB BLD-MCNC: 10.1 G/DL (ref 11.5–16)
IMM GRANULOCYTES # BLD AUTO: 0.1 K/UL (ref 0–0.04)
IMM GRANULOCYTES NFR BLD AUTO: 1 % (ref 0–0.5)
LYMPHOCYTES # BLD: 1.1 K/UL (ref 0.8–3.5)
LYMPHOCYTES NFR BLD: 9 % (ref 12–49)
MCH RBC QN AUTO: 28.1 PG (ref 26–34)
MCHC RBC AUTO-ENTMCNC: 31.3 G/DL (ref 30–36.5)
MCV RBC AUTO: 89.7 FL (ref 80–99)
MONOCYTES # BLD: 0.6 K/UL (ref 0–1)
MONOCYTES NFR BLD: 4 % (ref 5–13)
NEUTS SEG # BLD: 10.7 K/UL (ref 1.8–8)
NEUTS SEG NFR BLD: 83 % (ref 32–75)
NRBC # BLD: 0 K/UL (ref 0–0.01)
NRBC BLD-RTO: 0 PER 100 WBC
PLATELET # BLD AUTO: 280 K/UL (ref 150–400)
PMV BLD AUTO: 9.4 FL (ref 8.9–12.9)
POTASSIUM SERPL-SCNC: 3.3 MMOL/L (ref 3.5–5.1)
RBC # BLD AUTO: 3.6 M/UL (ref 3.8–5.2)
REPORTED DOSE,DOSE: NORMAL UNITS
REPORTED DOSE/TIME,TMG: 1700
SERVICE CMNT-IMP: ABNORMAL
SERVICE CMNT-IMP: ABNORMAL
SERVICE CMNT-IMP: NORMAL
SERVICE CMNT-IMP: NORMAL
SODIUM SERPL-SCNC: 136 MMOL/L (ref 136–145)
VANCOMYCIN TROUGH SERPL-MCNC: 5.1 UG/ML (ref 5–10)
WBC # BLD AUTO: 12.9 K/UL (ref 3.6–11)

## 2021-03-30 PROCEDURE — 74011250637 HC RX REV CODE- 250/637: Performed by: HOSPITALIST

## 2021-03-30 PROCEDURE — 74011250636 HC RX REV CODE- 250/636: Performed by: INTERNAL MEDICINE

## 2021-03-30 PROCEDURE — 74011000250 HC RX REV CODE- 250: Performed by: INTERNAL MEDICINE

## 2021-03-30 PROCEDURE — 77030019905 HC CATH URETH INTMIT MDII -A

## 2021-03-30 PROCEDURE — 65660000000 HC RM CCU STEPDOWN

## 2021-03-30 PROCEDURE — 74011250637 HC RX REV CODE- 250/637: Performed by: INTERNAL MEDICINE

## 2021-03-30 PROCEDURE — 99232 SBSQ HOSP IP/OBS MODERATE 35: CPT | Performed by: INTERNAL MEDICINE

## 2021-03-30 PROCEDURE — 85025 COMPLETE CBC W/AUTO DIFF WBC: CPT

## 2021-03-30 PROCEDURE — 74011250636 HC RX REV CODE- 250/636: Performed by: HOSPITALIST

## 2021-03-30 PROCEDURE — 80202 ASSAY OF VANCOMYCIN: CPT

## 2021-03-30 PROCEDURE — 77030038269 HC DRN EXT URIN PURWCK BARD -A

## 2021-03-30 PROCEDURE — 82962 GLUCOSE BLOOD TEST: CPT

## 2021-03-30 PROCEDURE — 36415 COLL VENOUS BLD VENIPUNCTURE: CPT

## 2021-03-30 PROCEDURE — 94760 N-INVAS EAR/PLS OXIMETRY 1: CPT

## 2021-03-30 PROCEDURE — 80048 BASIC METABOLIC PNL TOTAL CA: CPT

## 2021-03-30 RX ORDER — SODIUM,POTASSIUM PHOSPHATES 280-250MG
2 POWDER IN PACKET (EA) ORAL EVERY 4 HOURS
Status: COMPLETED | OUTPATIENT
Start: 2021-03-30 | End: 2021-03-30

## 2021-03-30 RX ADMIN — FAMOTIDINE 20 MG: 10 INJECTION INTRAVENOUS at 08:22

## 2021-03-30 RX ADMIN — LEVOTHYROXINE SODIUM 75 MCG: 0.07 TABLET ORAL at 05:45

## 2021-03-30 RX ADMIN — Medication 10 ML: at 22:24

## 2021-03-30 RX ADMIN — HEPARIN SODIUM 5000 UNITS: 5000 INJECTION INTRAVENOUS; SUBCUTANEOUS at 08:22

## 2021-03-30 RX ADMIN — Medication 10 ML: at 05:45

## 2021-03-30 RX ADMIN — Medication 81 MG: at 08:22

## 2021-03-30 RX ADMIN — HEPARIN SODIUM 5000 UNITS: 5000 INJECTION INTRAVENOUS; SUBCUTANEOUS at 18:27

## 2021-03-30 RX ADMIN — POTASSIUM & SODIUM PHOSPHATES POWDER PACK 280-160-250 MG 2 PACKET: 280-160-250 PACK at 08:22

## 2021-03-30 RX ADMIN — METRONIDAZOLE 500 MG: 500 INJECTION, SOLUTION INTRAVENOUS at 14:21

## 2021-03-30 RX ADMIN — POTASSIUM CHLORIDE AND SODIUM CHLORIDE: 900; 150 INJECTION, SOLUTION INTRAVENOUS at 14:21

## 2021-03-30 RX ADMIN — METRONIDAZOLE 500 MG: 500 INJECTION, SOLUTION INTRAVENOUS at 01:00

## 2021-03-30 RX ADMIN — VANCOMYCIN HYDROCHLORIDE 1000 MG: 1 INJECTION, POWDER, LYOPHILIZED, FOR SOLUTION INTRAVENOUS at 18:27

## 2021-03-30 RX ADMIN — HEPARIN SODIUM 5000 UNITS: 5000 INJECTION INTRAVENOUS; SUBCUTANEOUS at 01:00

## 2021-03-30 RX ADMIN — Medication 10 ML: at 14:22

## 2021-03-30 RX ADMIN — Medication 1 CAPSULE: at 08:22

## 2021-03-30 RX ADMIN — FOLIC ACID 1 MG: 1 TABLET ORAL at 08:22

## 2021-03-30 RX ADMIN — AMLODIPINE BESYLATE 10 MG: 5 TABLET ORAL at 08:22

## 2021-03-30 RX ADMIN — CEFEPIME HYDROCHLORIDE 2 G: 2 INJECTION, POWDER, FOR SOLUTION INTRAVENOUS at 14:21

## 2021-03-30 RX ADMIN — POTASSIUM & SODIUM PHOSPHATES POWDER PACK 280-160-250 MG 2 PACKET: 280-160-250 PACK at 14:21

## 2021-03-30 NOTE — PROGRESS NOTES
Greg Granados. Darby Barroso MD  [phone] office  [phone] voicemail     Gastroenterology Progress Note    March 30, 2021  Admit Date: 3/27/2021         Narrative Assessment and Plan   · Chronic diverticular disease of sigmoid - yesterday denied pain, today reports some crampy LLQ pain. Exam shows no peritoneal signs and tender only to deep palpation. I see plans for long term antibiotic therapy for presumptive septic arthropathy, which should handle any hypothetical diverticular infection as well. Her WBC is declining nicely. I have no new recommendations. So long as she can eat without problems, I have no plans for additional GI testing. I will sign off, reconsult as needed. Subjective:   · abd pain    Location:  llq  Duration: day  Timing: intermittent, crampy  Radiation: none   Associated Symptoms: none  Aggravating/Alleviating factors:     ROS:  The previous review of systems on initial consultation / H&P is noted and reviewed. Specific changes noted above in HPI.     Current Medications:     Current Facility-Administered Medications   Medication Dose Route Frequency    potassium, sodium phosphates (NEUTRA-PHOS) packet 2 Packet  2 Packet Oral Q4H    Vancomycin trough - 3/30 @ 1630   Other ONCE    amLODIPine (NORVASC) tablet 10 mg  10 mg Oral DAILY    insulin lispro (HUMALOG) injection   SubCUTAneous AC&HS    aspirin delayed-release tablet 81 mg  81 mg Oral DAILY    levothyroxine (SYNTHROID) tablet 75 mcg  75 mcg Oral ACB    [Held by provider] lisinopriL (PRINIVIL, ZESTRIL) tablet 40 mg  40 mg Oral DAILY    glucose chewable tablet 16 g  4 Tab Oral PRN    dextrose (D50W) injection syrg 12.5-25 g  25-50 mL IntraVENous PRN    glucagon (GLUCAGEN) injection 1 mg  1 mg IntraMUSCular PRN    sodium chloride (NS) flush 5-40 mL  5-40 mL IntraVENous Q8H    sodium chloride (NS) flush 5-40 mL  5-40 mL IntraVENous PRN    acetaminophen (TYLENOL) tablet 650 mg  650 mg Oral Q6H PRN Or    acetaminophen (TYLENOL) suppository 650 mg  650 mg Rectal Q6H PRN    polyethylene glycol (MIRALAX) packet 17 g  17 g Oral DAILY PRN    bisacodyL (DULCOLAX) suppository 10 mg  10 mg Rectal DAILY PRN    ondansetron (ZOFRAN) injection 4 mg  4 mg IntraVENous Q6H PRN    0.9% sodium chloride with KCl 20 mEq/L infusion   IntraVENous CONTINUOUS    hydrALAZINE (APRESOLINE) 20 mg/mL injection 20 mg  20 mg IntraVENous Q4H PRN    melatonin (rapid dissolve) tablet 5 mg  5 mg Oral QHS PRN    alum-mag hydroxide-simeth (MYLANTA) oral suspension 30 mL  30 mL Oral Q4H PRN    oxyCODONE IR (ROXICODONE) tablet 5 mg  5 mg Oral Q4H PRN    morphine injection 2 mg  2 mg IntraVENous Q4H PRN    diphenhydrAMINE (BENADRYL) injection 25 mg  25 mg IntraVENous Q6H PRN    diphenhydrAMINE (BENADRYL) capsule 25 mg  25 mg Oral Q6H PRN    simethicone (MYLICON) tablet 80 mg  80 mg Oral QID PRN    famotidine (PF) (PEPCID) 20 mg in 0.9% sodium chloride 10 mL injection  20 mg IntraVENous DAILY    metroNIDAZOLE (FLAGYL) IVPB premix 500 mg  500 mg IntraVENous Q12H    cefepime (MAXIPIME) 2 g in sterile water (preservative free) 10 mL IV syringe  2 g IntraVENous P01G    folic acid (FOLVITE) tablet 1 mg  1 mg Oral DAILY    L.acidophilus-paracasei-S.thermophil-bifidobacter (RISAQUAD) 8 billion cell capsule  1 Cap Oral DAILY    vancomycin (VANCOCIN) 1,000 mg in 0.9% sodium chloride 250 mL (VIAL-MATE)  1,000 mg IntraVENous Q24H    heparin (porcine) injection 5,000 Units  5,000 Units SubCUTAneous Q8H    sodium chloride (NS) flush 5-10 mL  5-10 mL IntraVENous PRN       Objective:     VITALS:   Last 24hrs VS reviewed since prior progress note.  Most recent are:  Visit Vitals  /63 (BP 1 Location: Right upper arm, BP Patient Position: At rest)   Pulse 78   Temp 98 °F (36.7 °C)   Resp 18   Ht 4' 11\" (1.499 m)   Wt 62.1 kg (136 lb 14.5 oz)   SpO2 97%   BMI 27.65 kg/m²     Temp (24hrs), Av °F (36.7 °C), Min:97.4 °F (36.3 °C), Max:98.3 °F (36.8 °C)      Intake/Output Summary (Last 24 hours) at 3/30/2021 1152  Last data filed at 3/30/2021 1005  Gross per 24 hour   Intake 867.5 ml   Output 1150 ml   Net -282.5 ml       EXAM:  General:  Comfortable, no distress    HEENT:  Atraumatic skull, pupils equal  Lungs:   No abnormal audible breath sounds. Speaking in complete sentences  Heart:   No abnormal audible heart sounds. Well perfused  Abdomen:   Nondistended, nontender. No mass, guarding or rebound  Musc:   No skeletal defects or deformities  Neurologic:   Cranial nerves grossly intact, moves all 4 extremities  Psych:    Good insight. Not anxious nor agitated  Derm:   No rash, jaundice, nor palpable dermatologic mass    Lab Data Reviewed:   Recent Labs     03/30/21 0255 03/29/21 0345 03/28/21 0346   WBC 12.9* 14.6* 25.4*   HGB 10.1* 9.8* 10.0*   HCT 32.3* 30.4* 30.8*    268 345     Recent Labs     03/30/21 0255 03/29/21 0345 03/28/21 0346 03/27/21  2230    136 135* 132*   K 3.3* 3.4* 3.5 3.6    107 102 99   CO2 24 22 25 24   GLU 98 94 109* 121*   BUN 7 11 14 16   CREA 0.60 0.52* 0.87 0.89   CA 8.0* 8.1* 8.1* 8.6   MG  --  2.0  --   --    PHOS  --  2.1*  --   --    ALB  --  2.6* 2.8* 3.4*   TBILI  --  0.5 0.4 0.5   ALT  --  15 20 25     Lab Results   Component Value Date/Time    Glucose (POC) 80 03/30/2021 11:03 AM    Glucose (POC) 100 03/30/2021 07:08 AM    Glucose (POC) 152 (H) 03/29/2021 09:06 PM    Glucose (POC) 100 03/29/2021 05:37 PM    Glucose (POC) 110 (H) 03/29/2021 11:36 AM     No results for input(s): PH, PCO2, PO2, HCO3, FIO2 in the last 72 hours. No results for input(s): INR, INREXT in the last 72 hours.         Assessment:   (See above)  Principal Problem:    Severe sepsis ([facility] (3/28/2021)    Active Problems:    DM type 2 (diabetes mellitus, type 2) ([facility] (4/9/2016)      HTN (hypertension), benign (4/9/2016)      Hypothyroid (4/9/2016)      RA (rheumatoid arthritis) ([place] [address] (4/9/2016) Diverticulitis of sigmoid colon (3/28/2021)      Intractable nausea and vomiting (3/28/2021)      Immunocompromised state due to drug therapy (3/28/2021)      Septic arthritis of elbow, left ([address] (3/28/2021)        Plan:   (See above)      Signed By: Olya Doan MD     3/30/2021  11:52 AM

## 2021-03-30 NOTE — PROGRESS NOTES
Bedside and Verbal shift change report given to Melissa Black (oncoming nurse) by Ysabel Payne RN (offgoing nurse). Report included the following information SBAR, Kardex, Intake/Output, MAR, Accordion and Recent Results.

## 2021-03-30 NOTE — PROGRESS NOTES
[address] [place] [address] [facility] Progress Note           Catrina Prasad DO    [phone] Office  [phone]  Fax    3/30/2021      Assessment & Plan:   · Left elbow septic arthritis. Status post I&D 3/28/2021. Cultures NGSF. Due to chronicity and underlying immunosuppression the patient will need PICC line and extended course of intravenous antibiotics. Continue current empiric antibiotics. Discussed with patient who is agreeable  · Diverticulitis. ABX as above. Patient will need 10 days of treatment for the diverticulitis  · Leukocytosis. Due to above. Trending down  · Rheumatoid arthritis. On methotrexate  · Chronic immunosuppression  · Abdominal aortic aneurysm. Avoid quinolone antibiotics  · Amoxicillin allergy. This caused a rash in the past.  Currently tolerating cefepime  · Diabetes mellitus. Hemoglobin A1c 5.4          Subjective:     No new complaints    Objective:     Vitals:   Visit Vitals  /63 (BP 1 Location: Right upper arm, BP Patient Position: At rest)   Pulse 78   Temp 98 °F (36.7 °C)   Resp 18   Ht 4' 11\" (1.499 m)   Wt 136 lb 14.5 oz (62.1 kg)   SpO2 97%   BMI 27.65 kg/m²        Tmax:  Temp (24hrs), Av °F (36.7 °C), Min:97.4 °F (36.3 °C), Max:98.3 °F (36.8 °C)      Exam:   Patient is intubated:  no    Physical Examination:   General:  Alert, cooperative, no distress   Head:  Normocephalic, atraumatic. Eyes:  Conjunctivae clear   Neck: Supple       Lungs:   No distress. Chest wall:     Heart:     Abdomen:    Nondistended   Extremities: Moves all.   LUE dressed   Skin:  No rash   Neurologic: CNII-XII intact     Labs:        No lab exists for component: ITNL   Recent Labs     21  2230   TROIQ <0.05     Recent Labs     21  0255 21  0345 21  0346 21  2230    136 135* 132*   K 3.3* 3.4* 3.5 3.6    107 102 99   CO2 24 22 25 24   BUN 7 11 14 16   CREA 0.60 0.52* 0.87 0.89   GLU 98 94 109* 121*   PHOS  --  2.1*  --   --    MG  -- 2.0  --   --    ALB  --  2.6* 2.8* 3.4*   WBC 12.9* 14.6* 25.4* 16.5*   HGB 10.1* 9.8* 10.0* 11.2*   HCT 32.3* 30.4* 30.8* 34.0*    268 345 367     No results for input(s): INR, PTP, APTT, INREXT in the last 72 hours.   Needs: urine analysis, urine sodium, protein and creatinine  Lab Results   Component Value Date/Time    Sodium,urine random 105 03/28/2021 02:18 AM         Cultures:     Lab Results   Component Value Date/Time    Specimen Description: BLOOD 05/01/2011 09:45 AM     Lab Results   Component Value Date/Time    Culture result: NO GROWTH 1 DAY 03/28/2021 07:08 PM    Culture result: NO GROWTH 1 DAY 03/28/2021 07:06 PM    Culture result: Culture performed on Unspun Fluid 03/28/2021 03:52 PM    Culture result: NO GROWTH 2 DAYS 03/28/2021 03:52 PM       Radiology:     Medications       Current Facility-Administered Medications   Medication Dose Route Frequency Last Admin    Vancomycin trough - 3/30 @ 1630   Other ONCE      amLODIPine (NORVASC) tablet 10 mg  10 mg Oral DAILY 10 mg at 03/30/21 0957    insulin lispro (HUMALOG) injection   SubCUTAneous AC&HS Stopped at 03/29/21 2107    aspirin delayed-release tablet 81 mg  81 mg Oral DAILY 81 mg at 03/30/21 9857    levothyroxine (SYNTHROID) tablet 75 mcg  75 mcg Oral ACB 75 mcg at 03/30/21 0545    [Held by provider] lisinopriL (PRINIVIL, ZESTRIL) tablet 40 mg  40 mg Oral DAILY      glucose chewable tablet 16 g  4 Tab Oral PRN      dextrose (D50W) injection syrg 12.5-25 g  25-50 mL IntraVENous PRN      glucagon (GLUCAGEN) injection 1 mg  1 mg IntraMUSCular PRN      sodium chloride (NS) flush 5-40 mL  5-40 mL IntraVENous Q8H 10 mL at 03/30/21 1422    sodium chloride (NS) flush 5-40 mL  5-40 mL IntraVENous PRN      acetaminophen (TYLENOL) tablet 650 mg  650 mg Oral Q6H  mg at 03/28/21 1203    Or    acetaminophen (TYLENOL) suppository 650 mg  650 mg Rectal Q6H PRN      polyethylene glycol (MIRALAX) packet 17 g  17 g Oral DAILY PRN      bisacodyL (DULCOLAX) suppository 10 mg  10 mg Rectal DAILY PRN      ondansetron (ZOFRAN) injection 4 mg  4 mg IntraVENous Q6H PRN      0.9% sodium chloride with KCl 20 mEq/L infusion   IntraVENous CONTINUOUS New Bag at 03/30/21 1421    hydrALAZINE (APRESOLINE) 20 mg/mL injection 20 mg  20 mg IntraVENous Q4H PRN      melatonin (rapid dissolve) tablet 5 mg  5 mg Oral QHS PRN      alum-mag hydroxide-simeth (MYLANTA) oral suspension 30 mL  30 mL Oral Q4H PRN      oxyCODONE IR (ROXICODONE) tablet 5 mg  5 mg Oral Q4H PRN      morphine injection 2 mg  2 mg IntraVENous Q4H PRN      diphenhydrAMINE (BENADRYL) injection 25 mg  25 mg IntraVENous Q6H PRN      diphenhydrAMINE (BENADRYL) capsule 25 mg  25 mg Oral Q6H PRN      simethicone (MYLICON) tablet 80 mg  80 mg Oral QID PRN      famotidine (PF) (PEPCID) 20 mg in 0.9% sodium chloride 10 mL injection  20 mg IntraVENous DAILY 20 mg at 03/30/21 4576    metroNIDAZOLE (FLAGYL) IVPB premix 500 mg  500 mg IntraVENous Q12H 500 mg at 03/30/21 1421    cefepime (MAXIPIME) 2 g in sterile water (preservative free) 10 mL IV syringe  2 g IntraVENous Q12H 2 g at 66/49/67 6845    folic acid (FOLVITE) tablet 1 mg  1 mg Oral DAILY 1 mg at 03/30/21 0628    L.acidophilus-paracasei-S.thermophil-bifidobacter (RISAQUAD) 8 billion cell capsule  1 Cap Oral DAILY 1 Cap at 03/30/21 1836    vancomycin (VANCOCIN) 1,000 mg in 0.9% sodium chloride 250 mL (VIAL-MATE)  1,000 mg IntraVENous Q24H 1,000 mg at 03/29/21 1720    heparin (porcine) injection 5,000 Units  5,000 Units SubCUTAneous Q8H 5,000 Units at 03/30/21 9083    sodium chloride (NS) flush 5-10 mL  5-10 mL IntraVENous PRN             Case discussed with:      Modesto Dominguez DO

## 2021-03-30 NOTE — PROGRESS NOTES
Ortho:    I met with patient who states Renee Flores PA-C, (with Dr. Mariano Chong) examined her this morning. CHINA Silva    Addendum:  [facility] PROGRESS NOTE      SUBJECTIVE:  Luz Tomlin states her elbow pain is controlled. She's working on digit AROM. OBJECTIVE:  Patient Vitals for the past 24 hrs:   Temp Pulse BP   03/30/21 1606 98.2 °F (36.8 °C) 75 135/66   03/30/21 1501 -- 85 --   03/30/21 1101 98 °F (36.7 °C) 78 139/63   03/30/21 0713 98 °F (36.7 °C) 74 127/67   03/30/21 0700 -- 81 --   03/30/21 0242 97.4 °F (36.3 °C) 88 (!) 150/66   03/29/21 2301 -- 82 --   03/29/21 2300 98.3 °F (36.8 °C) 93 (!) 147/73   03/29/21 2028 98 °F (36.7 °C) 91 (!) 155/63       Alert, no distress. Lying in bed. No family present. Respirations unlabored. Dressing/splint CDI. HV intact. Moves digits some with limitations d/t chronic deformities. Recent Labs     03/30/21  0255   HGB 10.1*   HCT 32.3*      BUN 7   CREA 0.60   GFRAA >60   GFRNA >60     WBC 12.9  Aspirate and OR cultures NGTD. ASSESSMENT:  Procedure: Procedure(s):  LEFT ELBOW INCISION AND DRAINAGE  Post Op day: 2 Days Post-Op    PLAN:  I removed HV drain while maintaining splint/dressing. Abx per ID. Ice/elevation. PT/OT. NWB LUE. OK digit ROM. Analgesics: Tylenol. DVT proph: SCDs. ASA and Heparin per Hospitalist.  Disp planning. Likely SNF  F/U: 2 weeks [facility], [facility].     CHINA Silva  Orthopedic Trauma Service  [facility]

## 2021-03-30 NOTE — PROGRESS NOTES
[facility] Vancomycin Pharmacy Consult 03/30/21  Vancomycin trough = 5.1 mcg/ml (drawn 25hrs post dose), extrapolates to a trough of 5.47 mcg/ml. Level is subtherapeutic   Goal Trough: 15-20 mcg/ml    Plan: Will change Vancomycin to 750 mg IV q12hr and recheck level soon.     Thank you  Mandeep Holder, PharmD  [phone]

## 2021-03-30 NOTE — PROGRESS NOTES
TRANSFER - OUT REPORT:    Verbal report given to [facility]) on  Cory Mitchell (patient name)  being transferred to [facility] (unit) for routine progression of care   Report consisted of patients Situation, Background, Assessment and   Recommendations(SBAR). Discussed need for SNF placement for long term IV antibiotics, SNF list given to patient yesterday.     Henna Hetlon RN, MSN/Care manager  [phone]

## 2021-03-30 NOTE — PROGRESS NOTES
Physician Progress Note      PATIENT:               Shellie Seth  CSN #:                  [id number]  :                       1930  ADMIT DATE:       3/27/2021 10:12 PM  [address] [place] DATE:  RESPONDING  PROVIDER #:        Francia Avendaño MD          QUERY TEXT:    Patient admitted with Left elbow septic arthritis. Per I&D Op note dated 3/28/21 noted  documentation of debridement. To accurately reflect the procedure performed please document if debridement was excisional or nonexcisional and the deepest depth of tissue removed as down to and including: The medical record reflects the following:  Risk Factors: 79 yo F admitted with Left elbow septic arthritis and is s/p I&D  Clinical Indicators: 3/28.21 I&D op report note states \"Once we had samples we then copiously irrigated the elbow and took it through range of motion multiple times we also did massage and palpate the subcutaneous tissues lateral to the elbow where the multiple pockets were noted and these were all decompressed once we had taken through multiple different cycles of irrigation and range of motion and palpation we felt we had expressed all fluid I then with a rongeur and hemostats debrided the remaining aspects of nonviable tissue from in the wound once I felt it adequately debrided this we then obtain hemostasis of any obvious bleeders and then turned our attention to closing. \"  Treatment: IV maxipime, IV Vanc  Options provided:  -- Nonexcisional debridement of subcutaneous tissue  -- Excisional debridement of subcutaneous tissue  -- Nonexcisional debridement of fascia  -- Excisional debridement of fascia  -- Other - I will add my own diagnosis  -- Disagree - Not applicable / Not valid  -- Disagree - Clinically unable to determine / Unknown  -- Refer to Clinical Documentation Reviewer    PROVIDER RESPONSE TEXT:    Non-excisional debridement of subcutaneous tissue of left elbow was performed during procedure on 3/28/21.     Query created by: Ruiz Penn on 3/29/2021 12:14 PM      Electronically signed by:  Grady Peña MD 3/30/2021 10:45 AM

## 2021-03-30 NOTE — PROGRESS NOTES
Spiritual Care Assessment/Progress Note  [facility]      NAME: Jonatan Bonilla      MRN: [id number]  AGE: 80 y.o.  SEX: female  Pentecostalism Affiliation: Baptist   Language: English     3/30/2021     Total Time (in minutes): 10     Spiritual Assessment begun in [facility] 5M1 MED SURG 1 through conversation with:         [x]Patient        [] Family    [] Friend(s)        Reason for Consult: [facility]     Spiritual beliefs: (Please include comment if needed)     [x] Identifies with a mellissa tradition:  Baptist      [x] Supported by a mellissa community: [employer]        [] Claims no spiritual orientation:           [] Seeking spiritual identity:                [] Adheres to an individual form of spirituality:           [] Not able to assess:                           Identified resources for coping:      [x] Prayer                               [x] Music                  [] Guided Imagery     [x] Family/friends                 [] Pet visits     [] Devotional reading                         [] Unknown     [] Other:                                              Interventions offered during this visit: (See comments for more details)    Patient Interventions: Affirmation of mellissa, Catharsis/review of pertinent events in supportive environment, Communion (Baptist), Initial/Spiritual assessment, patient floor, Prayer (actual), Prayer (assurance of)           Plan of Care:     [x] Support spiritual and/or cultural needs    [] Support AMD and/or advance care planning process      [] Support grieving process   [] Coordinate Rites and/or Rituals    [] Coordination with community clergy   [] No spiritual needs identified at this time   [] Detailed Plan of Care below (See Comments)  [] Make referral to Music Therapy  [] Make referral to Pet Therapy     [] Make referral to Addiction services  [] Make referral to [facility]  [] Make referral to Spiritual Care Partner  [] No future visits requested        [] Follow up upon further referrals     Comments: Mrs. Anne Rodriguez lives alone. She shared the circumstances leading up to her needing to be hospitalized. She says she is going to need to go to a rehab until she can take care of herself. She shared about her [facility]. Prayer and communion offered.       DELONTE Mercado, RN, ACSW, LCSW   Page:  [phone])

## 2021-03-30 NOTE — PROGRESS NOTES
Bedside shift change report given to Jie Gould RN (oncoming nurse) by Louis Rodriguez RN (offgoing nurse). Report included the following information SBAR, Kardex, Intake/Output and MAR.

## 2021-03-30 NOTE — PROGRESS NOTES
[facility] [place] [address]  [address] [facility], [address] [place] [address]  [phone]      Medical Progress Note      NAME:         Avel Lamar   :        1930  MRM:        [id number]    Date of service: 3/30/2021      Subjective: Patient has been seen and examined as a follow up for multiple medical issues. Chart, labs, diagnostics reviewed. She says she feels well. Denies any discomfort. No fever or chills. No nausea or vomiting. No diarrhea. Objective:    Vital Signs:    Visit Vitals  /67 (BP 1 Location: Right upper arm, BP Patient Position: At rest;Lying)   Pulse 74   Temp 98 °F (36.7 °C)   Resp 16   Ht 4' 11\" (1.499 m)   Wt 62.1 kg (136 lb 14.5 oz)   SpO2 97%   BMI 27.65 kg/m²          Intake/Output Summary (Last 24 hours) at 3/30/2021 0839  Last data filed at 3/30/2021 0804  Gross per 24 hour   Intake 530 ml   Output 900 ml   Net -370 ml        Physical Examination:    General:   Weak and ill looking but not in any acute distress   Eyes:   pink conjunctivae, PERRLA with no discharge. ENT:   no ottorrhea or rhinorrhea with dry mucous membranes  Neck: no masses, thyroid non-tender and trachea central.  Pulm: clear breath sounds without crackles or wheezes  Card:  has regular and normal S1, S2 without thrills, bruits or peripheral edema  Abd:  Soft, non tender, non-distended, normoactive bowel sounds  Musc:  No cyanosis, clubbing, atrophy or deformities. Dressed left UE  Skin:  No rashes, bruising or ulcers. Neuro: Awake and alert.  Generally a non focal exam.   Psych:  Has a fair insight to her illness     Current Facility-Administered Medications   Medication Dose Route Frequency    potassium, sodium phosphates (NEUTRA-PHOS) packet 2 Packet  2 Packet Oral Q4H    Vancomycin trough - 3/30 @ 1630   Other ONCE    amLODIPine (NORVASC) tablet 10 mg  10 mg Oral DAILY    insulin lispro (HUMALOG) injection SubCUTAneous AC&HS    aspirin delayed-release tablet 81 mg  81 mg Oral DAILY    levothyroxine (SYNTHROID) tablet 75 mcg  75 mcg Oral ACB    [Held by provider] lisinopriL (PRINIVIL, ZESTRIL) tablet 40 mg  40 mg Oral DAILY    glucose chewable tablet 16 g  4 Tab Oral PRN    dextrose (D50W) injection syrg 12.5-25 g  25-50 mL IntraVENous PRN    glucagon (GLUCAGEN) injection 1 mg  1 mg IntraMUSCular PRN    sodium chloride (NS) flush 5-40 mL  5-40 mL IntraVENous Q8H    sodium chloride (NS) flush 5-40 mL  5-40 mL IntraVENous PRN    acetaminophen (TYLENOL) tablet 650 mg  650 mg Oral Q6H PRN    Or    acetaminophen (TYLENOL) suppository 650 mg  650 mg Rectal Q6H PRN    polyethylene glycol (MIRALAX) packet 17 g  17 g Oral DAILY PRN    bisacodyL (DULCOLAX) suppository 10 mg  10 mg Rectal DAILY PRN    ondansetron (ZOFRAN) injection 4 mg  4 mg IntraVENous Q6H PRN    0.9% sodium chloride with KCl 20 mEq/L infusion   IntraVENous CONTINUOUS    hydrALAZINE (APRESOLINE) 20 mg/mL injection 20 mg  20 mg IntraVENous Q4H PRN    melatonin (rapid dissolve) tablet 5 mg  5 mg Oral QHS PRN    alum-mag hydroxide-simeth (MYLANTA) oral suspension 30 mL  30 mL Oral Q4H PRN    oxyCODONE IR (ROXICODONE) tablet 5 mg  5 mg Oral Q4H PRN    morphine injection 2 mg  2 mg IntraVENous Q4H PRN    diphenhydrAMINE (BENADRYL) injection 25 mg  25 mg IntraVENous Q6H PRN    diphenhydrAMINE (BENADRYL) capsule 25 mg  25 mg Oral Q6H PRN    simethicone (MYLICON) tablet 80 mg  80 mg Oral QID PRN    famotidine (PF) (PEPCID) 20 mg in 0.9% sodium chloride 10 mL injection  20 mg IntraVENous DAILY    metroNIDAZOLE (FLAGYL) IVPB premix 500 mg  500 mg IntraVENous Q12H    cefepime (MAXIPIME) 2 g in sterile water (preservative free) 10 mL IV syringe  2 g IntraVENous L63Y    folic acid (FOLVITE) tablet 1 mg  1 mg Oral DAILY    L.acidophilus-paracasei-S.thermophil-bifidobacter (RISAQUAD) 8 billion cell capsule  1 Cap Oral DAILY    vancomycin (VANCOCIN) 1,000 mg in 0.9% sodium chloride 250 mL (VIAL-MATE)  1,000 mg IntraVENous Q24H    heparin (porcine) injection 5,000 Units  5,000 Units SubCUTAneous Q8H    sodium chloride (NS) flush 5-10 mL  5-10 mL IntraVENous PRN        Laboratory data and review:    Recent Labs     03/30/21  0255 03/29/21 0345 03/28/21  0346   WBC 12.9* 14.6* 25.4*   HGB 10.1* 9.8* 10.0*   HCT 32.3* 30.4* 30.8*    268 345     Recent Labs     03/30/21  0255 03/29/21 0345 03/28/21  0346 03/27/21  2230    136 135* 132*   K 3.3* 3.4* 3.5 3.6    107 102 99   CO2 24 22 25 24   GLU 98 94 109* 121*   BUN 7 11 14 16   CREA 0.60 0.52* 0.87 0.89   CA 8.0* 8.1* 8.1* 8.6   MG  --  2.0  --   --    PHOS  --  2.1*  --   --    ALB  --  2.6* 2.8* 3.4*   ALT  --  15 20 25     No components found for: [place]    Diagnostics: Imaging studies have been reviewed    Telemetry reviewed by me:   normal sinus rhythm    Assessment and Plan:    Septic arthritis of elbow, left ([facility] [address] (3/28/2021) /  Severe sepsis ([facility] [address] (3/28/2021) POA: improving. Seen by ortho and she is sp I&D done 3/28. Cultures so far no growth. Seen by ID. Will need a prolonged IV antibiotic course. On Cefepime, Metronidazole and Vancomycin. Pain control PRN      Diverticulitis of sigmoid colon (3/28/2021) POA: mild and uncomplicated. Noted on CT scan abdomen. Seen by Gi who doubts diagnosis. She is nonetheless well covered. Continue IV antibiotics as noted above    DM type 2 (diabetes mellitus, type 2) ([facility] [address] (4/9/2016) POA: A1c 5.4. Start a DM diet. Monitor blood glucose, SSi per protocol    HTN (hypertension), benign (4/9/2016) POA: BP better controlled. Continue Amlodipine. Resume ACEi if BP remains elevated    RA (rheumatoid arthritis) ([facility] [address] (4/9/2016) / Immunocompromised state due to drug therapy (3/28/2021) POA: continue pain control.  Methotrexate on hold    Hypothyroid (4/9/2016)  POA: Continue Levothyroxine    Hypokalemia: replete some more    Total time spent for the patient's care: [address] discussed with: Patient, Family, Care Manager and Nursing Staff    Discussed:  Care Plan and D/C Planning    Prophylaxis:  Hep SQ    Anticipated Disposition:  SNF/LTC           ___________________________________________________    Attending Physician:   Kaitlin Villarreal MD

## 2021-03-30 NOTE — PROGRESS NOTES
10:09 AM    TRANSFER - IN REPORT:    Verbal report received from Nish Guillory (name) on Christy Childress(patient name) being transferred to [phone] ([facility]) for routine progression of care   Report consisted of patients Situation, Background, Assessment and   Recommendations(SBAR). Transition of Care Plan from Nish Pastora (name)    RUR 25 (Score %) moderate   Is This a Readmission NO  Is this a Bundle NO    PLAN:  1. Monitor patient response to treatment and recommendations. 2. Medical management continues. 3. Patient is appropriate for SNF placement, provider list given to patient and her son. 4. Patient transport home per son vs transport at discharge. 5. CM to monitor clinical progress and disposition recommendations.      Leta Munoz

## 2021-03-30 NOTE — PROGRESS NOTES
1337:  Pt accepted by [facility]. Pt and her son were notified and would like her to go there. CM Note:  Met with pt and her son for choices for snf. Choices were [facility] and [facility]. Referrals were sent in CC link.   CORI Cruz

## 2021-03-31 LAB
ANION GAP SERPL CALC-SCNC: 6 MMOL/L (ref 5–15)
BASOPHILS # BLD: 0 K/UL (ref 0–0.1)
BASOPHILS NFR BLD: 0 % (ref 0–1)
BUN SERPL-MCNC: 8 MG/DL (ref 6–20)
BUN/CREAT SERPL: 12 (ref 12–20)
CALCIUM SERPL-MCNC: 8 MG/DL (ref 8.5–10.1)
CHLORIDE SERPL-SCNC: 107 MMOL/L (ref 97–108)
CO2 SERPL-SCNC: 25 MMOL/L (ref 21–32)
CREAT SERPL-MCNC: 0.66 MG/DL (ref 0.55–1.02)
DIFFERENTIAL METHOD BLD: ABNORMAL
EOSINOPHIL # BLD: 0.5 K/UL (ref 0–0.4)
EOSINOPHIL NFR BLD: 6 % (ref 0–7)
ERYTHROCYTE [DISTWIDTH] IN BLOOD BY AUTOMATED COUNT: 17 % (ref 11.5–14.5)
GLUCOSE BLD STRIP.AUTO-MCNC: 130 MG/DL (ref 65–100)
GLUCOSE BLD STRIP.AUTO-MCNC: 131 MG/DL (ref 65–100)
GLUCOSE BLD STRIP.AUTO-MCNC: 99 MG/DL (ref 65–100)
GLUCOSE BLD STRIP.AUTO-MCNC: 99 MG/DL (ref 65–100)
GLUCOSE SERPL-MCNC: 93 MG/DL (ref 65–100)
HCT VFR BLD AUTO: 28.5 % (ref 35–47)
HGB BLD-MCNC: 8.9 G/DL (ref 11.5–16)
IMM GRANULOCYTES # BLD AUTO: 0.1 K/UL (ref 0–0.04)
IMM GRANULOCYTES NFR BLD AUTO: 1 % (ref 0–0.5)
LYMPHOCYTES # BLD: 1.2 K/UL (ref 0.8–3.5)
LYMPHOCYTES NFR BLD: 14 % (ref 12–49)
MCH RBC QN AUTO: 27.7 PG (ref 26–34)
MCHC RBC AUTO-ENTMCNC: 31.2 G/DL (ref 30–36.5)
MCV RBC AUTO: 88.8 FL (ref 80–99)
MONOCYTES # BLD: 0.7 K/UL (ref 0–1)
MONOCYTES NFR BLD: 8 % (ref 5–13)
NEUTS SEG # BLD: 5.8 K/UL (ref 1.8–8)
NEUTS SEG NFR BLD: 71 % (ref 32–75)
NRBC # BLD: 0 K/UL (ref 0–0.01)
NRBC BLD-RTO: 0 PER 100 WBC
PLATELET # BLD AUTO: 289 K/UL (ref 150–400)
PMV BLD AUTO: 9.7 FL (ref 8.9–12.9)
POTASSIUM SERPL-SCNC: 3.5 MMOL/L (ref 3.5–5.1)
RBC # BLD AUTO: 3.21 M/UL (ref 3.8–5.2)
SERVICE CMNT-IMP: ABNORMAL
SERVICE CMNT-IMP: ABNORMAL
SERVICE CMNT-IMP: NORMAL
SERVICE CMNT-IMP: NORMAL
SODIUM SERPL-SCNC: 138 MMOL/L (ref 136–145)
WBC # BLD AUTO: 8.2 K/UL (ref 3.6–11)

## 2021-03-31 PROCEDURE — 02HV33Z INSERTION OF INFUSION DEVICE INTO SUPERIOR VENA CAVA, PERCUTANEOUS APPROACH: ICD-10-PCS | Performed by: INTERNAL MEDICINE

## 2021-03-31 PROCEDURE — 36573 INSJ PICC RS&I 5 YR+: CPT | Performed by: INTERNAL MEDICINE

## 2021-03-31 PROCEDURE — 74011250636 HC RX REV CODE- 250/636: Performed by: HOSPITALIST

## 2021-03-31 PROCEDURE — 94760 N-INVAS EAR/PLS OXIMETRY 1: CPT

## 2021-03-31 PROCEDURE — 74011250637 HC RX REV CODE- 250/637: Performed by: INTERNAL MEDICINE

## 2021-03-31 PROCEDURE — 82962 GLUCOSE BLOOD TEST: CPT

## 2021-03-31 PROCEDURE — 74011250636 HC RX REV CODE- 250/636: Performed by: INTERNAL MEDICINE

## 2021-03-31 PROCEDURE — 97161 PT EVAL LOW COMPLEX 20 MIN: CPT

## 2021-03-31 PROCEDURE — 36415 COLL VENOUS BLD VENIPUNCTURE: CPT

## 2021-03-31 PROCEDURE — 85025 COMPLETE CBC W/AUTO DIFF WBC: CPT

## 2021-03-31 PROCEDURE — 74011000250 HC RX REV CODE- 250: Performed by: INTERNAL MEDICINE

## 2021-03-31 PROCEDURE — 80048 BASIC METABOLIC PNL TOTAL CA: CPT

## 2021-03-31 PROCEDURE — 74011250637 HC RX REV CODE- 250/637: Performed by: HOSPITALIST

## 2021-03-31 PROCEDURE — 65270000029 HC RM PRIVATE

## 2021-03-31 RX ORDER — FAMOTIDINE 20 MG/1
20 TABLET, FILM COATED ORAL DAILY
Status: DISCONTINUED | OUTPATIENT
Start: 2021-04-01 | End: 2021-04-02 | Stop reason: HOSPADM

## 2021-03-31 RX ORDER — POTASSIUM CHLORIDE 750 MG/1
40 TABLET, FILM COATED, EXTENDED RELEASE ORAL EVERY 4 HOURS
Status: COMPLETED | OUTPATIENT
Start: 2021-03-31 | End: 2021-03-31

## 2021-03-31 RX ADMIN — HEPARIN SODIUM 5000 UNITS: 5000 INJECTION INTRAVENOUS; SUBCUTANEOUS at 02:25

## 2021-03-31 RX ADMIN — VANCOMYCIN HYDROCHLORIDE 750 MG: 750 INJECTION, POWDER, LYOPHILIZED, FOR SOLUTION INTRAVENOUS at 10:07

## 2021-03-31 RX ADMIN — Medication 10 ML: at 05:39

## 2021-03-31 RX ADMIN — CEFEPIME HYDROCHLORIDE 2 G: 2 INJECTION, POWDER, FOR SOLUTION INTRAVENOUS at 13:06

## 2021-03-31 RX ADMIN — LEVOTHYROXINE SODIUM 75 MCG: 0.07 TABLET ORAL at 10:07

## 2021-03-31 RX ADMIN — FOLIC ACID 1 MG: 1 TABLET ORAL at 10:06

## 2021-03-31 RX ADMIN — CEFEPIME HYDROCHLORIDE 2 G: 2 INJECTION, POWDER, FOR SOLUTION INTRAVENOUS at 23:31

## 2021-03-31 RX ADMIN — POTASSIUM CHLORIDE 40 MEQ: 750 TABLET, EXTENDED RELEASE ORAL at 10:06

## 2021-03-31 RX ADMIN — HEPARIN SODIUM 5000 UNITS: 5000 INJECTION INTRAVENOUS; SUBCUTANEOUS at 17:23

## 2021-03-31 RX ADMIN — POTASSIUM CHLORIDE 40 MEQ: 750 TABLET, EXTENDED RELEASE ORAL at 13:06

## 2021-03-31 RX ADMIN — FAMOTIDINE 20 MG: 10 INJECTION INTRAVENOUS at 10:07

## 2021-03-31 RX ADMIN — METRONIDAZOLE 500 MG: 500 INJECTION, SOLUTION INTRAVENOUS at 13:06

## 2021-03-31 RX ADMIN — Medication 1 CAPSULE: at 10:06

## 2021-03-31 RX ADMIN — CEFEPIME HYDROCHLORIDE 2 G: 2 INJECTION, POWDER, FOR SOLUTION INTRAVENOUS at 00:18

## 2021-03-31 RX ADMIN — VANCOMYCIN HYDROCHLORIDE 750 MG: 750 INJECTION, POWDER, LYOPHILIZED, FOR SOLUTION INTRAVENOUS at 21:24

## 2021-03-31 RX ADMIN — AMLODIPINE BESYLATE 10 MG: 5 TABLET ORAL at 10:07

## 2021-03-31 RX ADMIN — METRONIDAZOLE 500 MG: 500 INJECTION, SOLUTION INTRAVENOUS at 02:25

## 2021-03-31 RX ADMIN — Medication 10 ML: at 21:31

## 2021-03-31 RX ADMIN — HEPARIN SODIUM 5000 UNITS: 5000 INJECTION INTRAVENOUS; SUBCUTANEOUS at 10:08

## 2021-03-31 RX ADMIN — Medication 81 MG: at 10:07

## 2021-03-31 NOTE — PROGRESS NOTES
Rounded on Hindu patients and provided Anointing of the Sick at request of patient.     Joesph Gutiérrez

## 2021-03-31 NOTE — PROGRESS NOTES
12:20 PM    Transition of Care Plan      RUR 25 (Score %) moderate    Is This a Readmission NO  Is this a Bundle NO     PLAN:  1. Monitor patient response to treatment and recommendations. 2. Medical management continues. 3. Pt accepted at [address]-- called to confirm they can accept on Friday- they can accept  4. Pt will need [employer] Auth - pt does not have PT and OT evals- notified MD   4. Patient transport home per son vs transport at discharge.   5. CM to monitor clinical progress and disposition recommendations.     Tanisha Groves

## 2021-03-31 NOTE — PROGRESS NOTES
Bedside and Verbal shift change report given to Sathish Patton (oncoming nurse) by Dima Wick RN (offgoing nurse). Report included the following information SBAR, Kardex, Intake/Output, MAR, Accordion and Recent Results.

## 2021-03-31 NOTE — PROGRESS NOTES
Problem: Mobility Impaired (Adult and Pediatric)  Goal: *Acute Goals and Plan of Care (Insert Text)  Description: FUNCTIONAL STATUS PRIOR TO ADMISSION: Patient was modified independent using a rolling walker or SPC for functional mobility. HOME SUPPORT PRIOR TO ADMISSION: The patient lived alone with her son available for local support, groceries. Physical Therapy Goals  Initiated 3/31/2021  1. Patient will move from supine to sit and sit to supine , scoot up and down, and roll side to side in bed with modified independence within 7 day(s). 2.  Patient will transfer from bed to chair and chair to bed with modified independence using the least restrictive device within 7 day(s). 3.  Patient will perform sit to stand with modified independence within 7 day(s). 4.  Patient will ambulate with modified independence for 300 feet with the least restrictive device within 7 day(s). Outcome: Progressing Towards Goal   PHYSICAL THERAPY EVALUATION  Patient: Providence Rubinstein (21 y.o. female)  Date: 3/31/2021  Primary Diagnosis: Severe sepsis ([facility] [address] [A41.9, R65.20]  Lactic acidosis [E87.2]  Leukocytosis [D72.829]  LLQ pain [R10.32]  Diverticulitis of sigmoid colon [K57.32]  Intractable nausea and vomiting [R11.2]  Hyponatremia [E87.1]  Arthritis of left elbow [M19.022]  Procedure(s) (LRB):  LEFT ELBOW INCISION AND DRAINAGE (Left) 3 Days Post-Op   Precautions:        ASSESSMENT  Based on the objective data described below, the patient presents with mildly impaired functional mobility relative to baseline due to left elbow immobilization, impaired dynamic balance, L UE and  weakness and impaired gait s/p I&D for septic arthritis L elbow. Patient received with PCT in bathroom, ambulating with close SBA and L arm in hard splint and sling. Sling removed and patient given RW, exhibited sufficient  on L and appropriate angle of splint to control device well during gait with only light CGA.   Occasional reminder to bring body closer into walker frame as caught her L foot twice on leg, externally rotated in that LE. Patient lives alone in senior apartment complex on 3rd floor, elevator. Uses RW or SPC for ambulation and is indep with all ADLs, son is nearby and brings her groceries. Patient will need 24/7 supervision/assist at d/c due to L UE immobilization or if that is not available, recommend SNF rehab. Current Level of Function Impacting Discharge (mobility/balance): CGA for transfers and gait with RW     Functional Outcome Measure: The patient scored 19 on the Tinetti outcome measure which is indicative of moderate fall risk. Other factors to consider for discharge: lives alone     Patient will benefit from skilled therapy intervention to address the above noted impairments. PLAN :  Recommendations and Planned Interventions: bed mobility training, transfer training, gait training, therapeutic exercises, patient and family training/education, and therapeutic activities      Frequency/Duration: Patient will be followed by physical therapy:  3 times a week to address goals. Recommendation for discharge: (in order for the patient to meet his/her long term goals)  Therapy up to 5 days/week in SNF setting or intensive home health therapy program with 24/7 supervision and assist    This discharge recommendation:  Has been made in collaboration with the attending provider and/or case management    IF patient discharges home will need the following DME: patient owns DME required for discharge         SUBJECTIVE:   Patient stated I've always kept active, don't like to sit.     OBJECTIVE DATA SUMMARY:   HISTORY:    Past Medical History:   Diagnosis Date    Arthritis, rheumatoid ([address]     Diabetes ([facility] [address]     Diverticulitis     Hard of hearing     Hypertension     Hypothyroid     Osteoporosis, post-menopausal      Past Surgical History:   Procedure Laterality Date    FLEXIBLE SIGMOIDOSCOPY N/A 12/2/2020 SIGMOIDOSCOPY FLEXIBLE performed by Ned Liriano MD at [facility] ENDOSCOPY    HX HEMORRHOIDECTOMY      HX HYSTERECTOMY      HX THYROIDECTOMY         Personal factors and/or comorbidities impacting plan of care:     Home Situation  Home Environment: Apartment(senior)  # Steps to Enter: 0  One/Two Story Residence: (3rd floor apt, uses elevator)  Living Alone: Yes  Support Systems: Child(dhaval), Friends \ neighbors  Patient Expects to be Discharged to[de-identified] Rehabilitation facility  Current DME Used/Available at Home: Christian Maravilla, straight, Walker, rolling    EXAMINATION/PRESENTATION/DECISION MAKING:   Critical Behavior:  Neurologic State: Alert  Orientation Level: Oriented X4  Cognition: Appropriate decision making, Appropriate safety awareness     Hearing: Auditory  Auditory Impairment: Hard of hearing, bilateral    Range Of Motion:  AROM: Within functional limits(Except L elbow splinted)           PROM: Within functional limits           Strength:    Strength: Within functional limits(Except L UE NT)                    Tone & Sensation:   Tone: Normal              Sensation: Intact               Coordination:  Coordination: Within functional limits  Vision:      Functional Mobility:  Bed Mobility:  Rolling: (received up with PCT in bathroom)           Transfers:  Sit to Stand: Contact guard assistance  Stand to Sit: Contact guard assistance        Bed to Chair: Contact guard assistance              Balance:   Sitting: Intact  Standing: Intact; With support  Ambulation/Gait Training:  Distance (ft): 150 Feet (ft)  Assistive Device: Gait belt;Walker, rolling  Ambulation - Level of Assistance: Contact guard assistance        Gait Abnormalities: Decreased step clearance        Base of Support: Widened     Speed/Karena: Fluctuations                     Functional Measure:  Tinetti test:    Sitting Balance: 1  Arises: 1  Attempts to Rise: 2  Immediate Standing Balance: 1  Standing Balance: 1  Nudged: 1  Eyes Closed: 0  Turn 360 Degrees - Continuous/Discontinuous: 1  Turn 360 Degrees - Steady/Unsteady: 1  Sitting Down: 1  Balance Score: 10 Balance total score  Indication of Gait: 1  R Step Length/Height: 1  L Step Length/Height: 1  R Foot Clearance: 1  L Foot Clearance: 1  Step Symmetry: 1  Step Continuity: 1  Path: 1  Trunk: 1  Walking Time: 0  Gait Score: 9 Gait total score  Total Score: 19/28 Overall total score         Tinetti Tool Score Risk of Falls  <19 = High Fall Risk  19-24 = Moderate Fall Risk  25-28 = Low Fall Risk  Tinetti ME. Performance-Oriented Assessment of Mobility Problems in Elderly Patients. [facility]; [id number]. (Scoring Description: PT Bulletin Feb. 10, 1993)    Older adults: Jose David Woodward et al, 2009; n = 1000 [facility] elderly evaluated with ABC, CHENTE, ADL, and IADL)  · Mean CHENTE score for males aged 69-68 years = 26.21(3.40)  · Mean CHENTE score for females age 69-68 years = 25.16(4.30)  · Mean CHENTE score for males over 80 years = 23.29(6.02)  · Mean CHENTE score for females over 80 years = 17.20(8.32)            Physical Therapy Evaluation Charge Determination   History Examination Presentation Decision-Making   HIGH Complexity :3+ comorbidities / personal factors will impact the outcome/ POC  LOW Complexity : 1-2 Standardized tests and measures addressing body structure, function, activity limitation and / or participation in recreation  LOW Complexity : Stable, uncomplicated  Other outcome measures Tinetti 19  LOW       Based on the above components, the patient evaluation is determined to be of the following complexity level: LOW     Pain Rating:  None reported    Activity Tolerance:   Fair    After treatment patient left in no apparent distress:   Sitting in chair and Call bell within reach    COMMUNICATION/EDUCATION:   The patients plan of care was discussed with: Registered nurse. Fall prevention education was provided and the patient/caregiver indicated understanding.  and Patient/family agree to work toward stated goals and plan of care.     Thank you for this referral.  Deisy Greenfield, PT   Time Calculation: 17 mins

## 2021-03-31 NOTE — PROGRESS NOTES
[facility] Dosing Services: Antimicrobial Stewardship Daily Doc    Consult for antibiotic dosing of vancomycin by Dr. Tad Newell  Indication: Left elbow septic arthritis s/p I&D 3/28 in setting of chronic immunosuppression  Day of Therapy: 4    Ht Readings from Last 1 Encounters:   03/27/21 149.9 cm (59\")        Wt Readings from Last 1 Encounters:   03/30/21 62.1 kg (136 lb 14.5 oz)     Vancomycin therapy:  Current maintenance dose: 750 mg IV every 12 hours  Dose calculated to approximate a therapeutic trough of 15-20 mcg/mL. Assessment/Plan:  SCr is stable and likely at baseline, leukocytosis resolved WBC = 8.2, no procalcitonin, afebrile in the past 24 hours no growth on any cultures to date, urine output appears excellent at 1.1 mL/kg/hr  Continue current regimen and plan to obtain trough level prior to 4th dose which will be tomorrow 4/1 at 2000 (not ordered yet - may need to adjust admin times tomorrow)  SCr ordered daily with AM labs per protocol  Dose administration notes:   Doses given appropriately as scheduled    Date Dose & Interval Measured (mcg/mL) Extrapolated (mcg/mL)   ? 3/30 at 1820 ?1000 mg IV q24h ?5.1 ? 5.47   ? ? ? ?   ? ? ? ? Other Antimicrobial   (not dosed by pharmacist) Cefepime 2 g IV every 12 hours  Metronidazole 500 mg IV every 12 hours     Cultures 3/27 - Blood - NGTD x 4 days - Prelim  3/28 - Body Fluid - NGTD x 2 days - Prelim  3/28 - Wound - NGTD x 1 day - Prelim  3/28 - Wound, anaerobic - Pending  3/28 - Wound - NGTD x 1 day - Prelim  3/28 - Wound, anaerobic - Pending   Serum Creatinine Lab Results   Component Value Date/Time    Creatinine 0.66 03/31/2021 05:46 AM    Creatinine (POC) 1.1 12/12/2017 01:22 PM         Creatinine Clearance Estimated Creatinine Clearance: 44.7 mL/min (by C-G formula based on SCr of 0.66 mg/dL).      Temp Temp: 99.1 °F (37.3 °C)       WBC Lab Results   Component Value Date/Time    WBC 8.2 03/31/2021 05:46 AM        H/H Lab Results   Component Value Date/Time    HGB 8.9 (L) 03/31/2021 05:46 AM        Platelets    Lab Results   Component Value Date/Time    PLATELET 905 98/81/2017 05:46 AM          For Antifungals, Metronidazole, and Nafcillin:  ALT: 15  AST: 12   Alk Phos: 59  T Bili: 0.5    Pharmacist Jie Jorgensen, PHARMD Contact information:

## 2021-03-31 NOTE — PROGRESS NOTES
Bedside and Verbal shift change report given to Jie Gould RN (oncoming nurse) by Rajwinder Barney RN (offgoing nurse). Report included the following information SBAR, Kardex, Intake/Output, MAR, Accordion, Recent Results, Med Rec Status and Cardiac Rhythm NSR.

## 2021-03-31 NOTE — PROGRESS NOTES
[facility]  [address] [facility], [address] [place] [address]  [phone]      Medical Progress Note      NAME:         Leia Laws   :        1930  MRM:        [id number]    Date of service: 3/31/2021      Subjective: Patient has been seen and examined as a follow up for multiple medical issues. Chart, labs, diagnostics reviewed. She says she feels weak but only unable to sleep well last night. No fever or chills. No nausea or vomiting. Objective:    Vital Signs:    Visit Vitals  /61 (BP 1 Location: Right upper arm, BP Patient Position: At rest)   Pulse 74   Temp 98 °F (36.7 °C)   Resp 18   Ht 4' 11\" (1.499 m)   Wt 63 kg (138 lb 14.2 oz)   SpO2 98%   BMI 28.05 kg/m²          Intake/Output Summary (Last 24 hours) at 3/31/2021 1251  Last data filed at 3/31/2021 0800  Gross per 24 hour   Intake 2321.25 ml   Output 1650 ml   Net 671.25 ml        Physical Examination:    General:   Weak and ill looking but not in any acute distress   Eyes:   pink conjunctivae, PERRLA with no discharge. ENT:   no ottorrhea or rhinorrhea with dry mucous membranes  Pulm: clear breath sounds without crackles or wheezes  Card:  has regular and normal S1, S2 without thrills, bruits or peripheral edema  Abd:  Soft, non tender, non-distended, normoactive bowel sounds  Musc:  No cyanosis, clubbing, atrophy or deformities. Dressed left UE  Neuro: Awake and alert.  Generally a non focal exam.   Psych:  Has a fair insight to her illness     Current Facility-Administered Medications   Medication Dose Route Frequency    potassium chloride SR (KLOR-CON 10) tablet 40 mEq  40 mEq Oral Q4H    [START ON 2021] famotidine (PEPCID) tablet 20 mg  20 mg Oral DAILY    vancomycin (VANCOCIN) 750 mg in 0.9% sodium chloride 250 mL (VIAL-MATE)  750 mg IntraVENous Q12H    amLODIPine (NORVASC) tablet 10 mg  10 mg Oral DAILY    insulin lispro (HUMALOG) injection   SubCUTAneous AC&HS    aspirin delayed-release tablet 81 mg  81 mg Oral DAILY    levothyroxine (SYNTHROID) tablet 75 mcg  75 mcg Oral ACB    [Held by provider] lisinopriL (PRINIVIL, ZESTRIL) tablet 40 mg  40 mg Oral DAILY    glucose chewable tablet 16 g  4 Tab Oral PRN    dextrose (D50W) injection syrg 12.5-25 g  25-50 mL IntraVENous PRN    glucagon (GLUCAGEN) injection 1 mg  1 mg IntraMUSCular PRN    sodium chloride (NS) flush 5-40 mL  5-40 mL IntraVENous Q8H    sodium chloride (NS) flush 5-40 mL  5-40 mL IntraVENous PRN    acetaminophen (TYLENOL) tablet 650 mg  650 mg Oral Q6H PRN    Or    acetaminophen (TYLENOL) suppository 650 mg  650 mg Rectal Q6H PRN    polyethylene glycol (MIRALAX) packet 17 g  17 g Oral DAILY PRN    bisacodyL (DULCOLAX) suppository 10 mg  10 mg Rectal DAILY PRN    ondansetron (ZOFRAN) injection 4 mg  4 mg IntraVENous Q6H PRN    hydrALAZINE (APRESOLINE) 20 mg/mL injection 20 mg  20 mg IntraVENous Q4H PRN    melatonin (rapid dissolve) tablet 5 mg  5 mg Oral QHS PRN    alum-mag hydroxide-simeth (MYLANTA) oral suspension 30 mL  30 mL Oral Q4H PRN    oxyCODONE IR (ROXICODONE) tablet 5 mg  5 mg Oral Q4H PRN    morphine injection 2 mg  2 mg IntraVENous Q4H PRN    diphenhydrAMINE (BENADRYL) injection 25 mg  25 mg IntraVENous Q6H PRN    diphenhydrAMINE (BENADRYL) capsule 25 mg  25 mg Oral Q6H PRN    simethicone (MYLICON) tablet 80 mg  80 mg Oral QID PRN    metroNIDAZOLE (FLAGYL) IVPB premix 500 mg  500 mg IntraVENous Q12H    cefepime (MAXIPIME) 2 g in sterile water (preservative free) 10 mL IV syringe  2 g IntraVENous A36F    folic acid (FOLVITE) tablet 1 mg  1 mg Oral DAILY    L.acidophilus-paracasei-S.thermophil-bifidobacter (RISAQUAD) 8 billion cell capsule  1 Cap Oral DAILY    heparin (porcine) injection 5,000 Units  5,000 Units SubCUTAneous Q8H    sodium chloride (NS) flush 5-10 mL  5-10 mL IntraVENous PRN        Laboratory data and review:    Recent Labs     03/31/21  0546 03/30/21  0255 03/29/21  0345   WBC 8.2 12.9* 14.6*   HGB 8.9* 10.1* 9.8*   HCT 28.5* 32.3* 30.4*    280 268     Recent Labs     03/31/21  0546 03/30/21  0255 03/29/21  0345    136 136   K 3.5 3.3* 3.4*    106 107   CO2 25 24 22   GLU 93 98 94   BUN 8 7 11   CREA 0.66 0.60 0.52*   CA 8.0* 8.0* 8.1*   MG  --   --  2.0   PHOS  --   --  2.1*   ALB  --   --  2.6*   ALT  --   --  15     No components found for: [place]    Diagnostics: Imaging studies have been reviewed    Telemetry reviewed by me:   normal sinus rhythm    Assessment and Plan:    Septic arthritis of elbow, left ([email or web address] (3/28/2021) /  Severe sepsis ([facility] (3/28/2021) POA: improving. Seen by ortho and she is sp I&D done 3/28. Cultures remains neg. Seen by ID. Will need a prolonged IV antibiotic course. On Cefepime, Metronidazole and Vancomycin. Pain control PRN. Get a PICC line today given she will need a prolonged antibiotic course      Diverticulitis of sigmoid colon (3/28/2021) POA: mild and uncomplicated. Noted on CT scan abdomen. Seen by Gi who doubts diagnosis. She is nonetheless well covered. Continue IV antibiotics as noted above    DM type 2 (diabetes mellitus, type 2) ([facility] (4/9/2016) POA: A1c 5.4. Continue DM diet. Monitor blood glucose, SSi per protocol. No home medications    HTN (hypertension), benign (4/9/2016) POA: BP stable. Continue Amlodipine. Resume ACEi if BP remains elevated    RA (rheumatoid arthritis) ([facility] (4/9/2016) / Immunocompromised state due to drug therapy (3/28/2021) POA: continue pain control.  Methotrexate on hold    Hypothyroid (4/9/2016)  POA: Continue Levothyroxine    Hypokalemia: repleted     Total time spent for the patient's care: [facility] discussed with: Patient, Family, Care Manager and Nursing Staff    Discussed:  Care Plan and D/C Planning    Prophylaxis:  Hep SQ    Anticipated Disposition: SNF/LTC           ___________________________________________________    Attending Physician:   Jignesh Fall MD

## 2021-03-31 NOTE — PROGRESS NOTES
Orthopaedic Progress Note  Post Op day: 3 Days Post-Op    March 31, 2021 12:17 PM     Patient: Laurell Lesch MRN: [id number]  SSN: [id number]    YOB: 1930  Age: 80 y.o. Sex: female      Admit date:  3/27/2021  Date of Surgery:  3/28/2021   Procedures:  Procedure(s):  LEFT ELBOW INCISION AND DRAINAGE  Admitting Physician:  Bing Garvin MD   Surgeon:  Raymond Arzate) and Role:     Herbert Barahona MD - Primary    Consulting Physician(s): Treatment Team: Attending Provider: Koko Shaw MD; Consulting Provider: Marko Mendoza MD; Staff Nurse: Linda Campo; Consulting Provider: Mirella Garcia, [address]; Surgeon: Marko Mendoza MD; Consulting Provider: Marylu Tanner DO; Utilization Review: Victorino Brennan RN; Care Manager: Michael Flores; Primary Nurse: Riley Song    SUBJECTIVE:     Laurell Lesch is a 80 y.o. female is 3 Days Post-Op s/p Procedure(s):  LEFT ELBOW INCISION AND DRAINAGE with an appropriate level of post-operative pain. She is resting in bed comfortably. No left elbow pain. No complaints of nausea, vomiting, dizziness, lightheadedness, chest pain, or shortness of breath. OBJECTIVE:       Physical Exam:  General: Alert, cooperative, no distress. Respiratory: Respirations unlabored  Neurological:  Neurovascular exam within normal limits. Motor: + DF/PF. Wiggles all of her fingers. BCR of all digits. Musculoskeletal: Calves soft, supple, non-tender upon palpation. Dressing/Wound:  Clean, dry and intact. Posterior long arm splint in place. No drainage on the splint.        Vital Signs:        Patient Vitals for the past 8 hrs:   BP Temp Pulse Resp SpO2 Weight   03/31/21 1157 134/61 98 °F (36.7 °C) 74 18 98 % --   03/31/21 0837 123/63 98 °F (36.7 °C) 87 18 99 % 63 kg (138 lb 14.2 oz)   03/31/21 0708 -- -- 92 -- -- --   03/31/21 0538 (!) 147/81 99.1 °F (37.3 °C) 75 18 97 % --                                          Temp (24hrs), Av.5 °F (36.9 °C), Min:98 °F (36.7 °C), Max:99.1 °F (37.3 °C)      Labs:        Recent Labs     21  0546   HCT 28.5*   HGB 8.9*     Lab Results   Component Value Date/Time    Sodium 138 2021 05:46 AM    Potassium 3.5 2021 05:46 AM    Chloride 107 2021 05:46 AM    CO2 25 2021 05:46 AM    Glucose 93 2021 05:46 AM    BUN 8 2021 05:46 AM    Creatinine 0.66 2021 05:46 AM    Calcium 8.0 (L) 2021 05:46 AM       PT/OT:                Patient mobility                         ASSESSMENT / PLAN:   Principal Problem:    Severe sepsis ([facility] (3/28/2021)    Active Problems:    DM type 2 (diabetes mellitus, type 2) ([facility] [address] (2016)      HTN (hypertension), benign (2016)      Hypothyroid (2016)      RA (rheumatoid arthritis) ([facility] [address] [address] (2016)      Diverticulitis of sigmoid colon (3/28/2021)      Intractable nausea and vomiting (3/28/2021)      Immunocompromised state due to drug therapy (3/28/2021)      Septic arthritis of elbow, left ([facility] [address] (3/28/2021)         A: 1. S/P left elbow I and D POD 3  2. Septic arthritis, left elbow    P: 1. Continue current antibiotic regimen per Dr. Velia Cortez. NGSF on culture. Will need long term IV abx due to immunosuppression. 2. Continue splint. Do not remove. Will change dressing on followup. 3. DVT ppx: SCDs and heparin 5000 SC q 8 hours. 4. Orthopedics to follow. 5. Followup with Christian Fernandes PA-C in 2 weeks.       Signed By:  Aleyda Barnes, [address] [address]

## 2021-04-01 ENCOUNTER — APPOINTMENT (OUTPATIENT)
Dept: GENERAL RADIOLOGY | Age: 86
DRG: 854 | End: 2021-04-01
Attending: INTERNAL MEDICINE
Payer: MEDICARE

## 2021-04-01 LAB
BACTERIA SPEC CULT: NORMAL
BACTERIA SPEC CULT: NORMAL
CREAT SERPL-MCNC: 0.64 MG/DL (ref 0.55–1.02)
DATE LAST DOSE: ABNORMAL
GLUCOSE BLD STRIP.AUTO-MCNC: 108 MG/DL (ref 65–100)
GLUCOSE BLD STRIP.AUTO-MCNC: 114 MG/DL (ref 65–100)
GLUCOSE BLD STRIP.AUTO-MCNC: 119 MG/DL (ref 65–100)
GLUCOSE BLD STRIP.AUTO-MCNC: 124 MG/DL (ref 65–100)
GRAM STN SPEC: NORMAL
GRAM STN SPEC: NORMAL
REPORTED DOSE,DOSE: ABNORMAL UNITS
REPORTED DOSE/TIME,TMG: ABNORMAL
SERVICE CMNT-IMP: ABNORMAL
SERVICE CMNT-IMP: NORMAL
VANCOMYCIN TROUGH SERPL-MCNC: 13.7 UG/ML (ref 5–10)

## 2021-04-01 PROCEDURE — 74011250636 HC RX REV CODE- 250/636: Performed by: INTERNAL MEDICINE

## 2021-04-01 PROCEDURE — 74011250637 HC RX REV CODE- 250/637: Performed by: INTERNAL MEDICINE

## 2021-04-01 PROCEDURE — 74011250637 HC RX REV CODE- 250/637: Performed by: HOSPITALIST

## 2021-04-01 PROCEDURE — 94760 N-INVAS EAR/PLS OXIMETRY 1: CPT

## 2021-04-01 PROCEDURE — 36415 COLL VENOUS BLD VENIPUNCTURE: CPT

## 2021-04-01 PROCEDURE — 82565 ASSAY OF CREATININE: CPT

## 2021-04-01 PROCEDURE — 97165 OT EVAL LOW COMPLEX 30 MIN: CPT

## 2021-04-01 PROCEDURE — 74011000250 HC RX REV CODE- 250: Performed by: INTERNAL MEDICINE

## 2021-04-01 PROCEDURE — 2709999900 HC NON-CHARGEABLE SUPPLY

## 2021-04-01 PROCEDURE — 65270000029 HC RM PRIVATE

## 2021-04-01 PROCEDURE — 77030018786 HC NDL GD F/USND BARD -B

## 2021-04-01 PROCEDURE — 80202 ASSAY OF VANCOMYCIN: CPT

## 2021-04-01 PROCEDURE — 76937 US GUIDE VASCULAR ACCESS: CPT

## 2021-04-01 PROCEDURE — 82962 GLUCOSE BLOOD TEST: CPT

## 2021-04-01 PROCEDURE — 97535 SELF CARE MNGMENT TRAINING: CPT

## 2021-04-01 PROCEDURE — 74011250636 HC RX REV CODE- 250/636: Performed by: HOSPITALIST

## 2021-04-01 PROCEDURE — C1751 CATH, INF, PER/CENT/MIDLINE: HCPCS

## 2021-04-01 RX ADMIN — Medication 81 MG: at 09:40

## 2021-04-01 RX ADMIN — Medication 10 ML: at 05:28

## 2021-04-01 RX ADMIN — Medication 10 ML: at 13:48

## 2021-04-01 RX ADMIN — HEPARIN SODIUM 5000 UNITS: 5000 INJECTION INTRAVENOUS; SUBCUTANEOUS at 01:50

## 2021-04-01 RX ADMIN — VANCOMYCIN HYDROCHLORIDE 750 MG: 750 INJECTION, POWDER, LYOPHILIZED, FOR SOLUTION INTRAVENOUS at 09:40

## 2021-04-01 RX ADMIN — VANCOMYCIN HYDROCHLORIDE 750 MG: 750 INJECTION, POWDER, LYOPHILIZED, FOR SOLUTION INTRAVENOUS at 22:54

## 2021-04-01 RX ADMIN — FAMOTIDINE 20 MG: 20 TABLET, FILM COATED ORAL at 09:40

## 2021-04-01 RX ADMIN — HEPARIN SODIUM 5000 UNITS: 5000 INJECTION INTRAVENOUS; SUBCUTANEOUS at 11:11

## 2021-04-01 RX ADMIN — FOLIC ACID 1 MG: 1 TABLET ORAL at 09:41

## 2021-04-01 RX ADMIN — METRONIDAZOLE 500 MG: 500 INJECTION, SOLUTION INTRAVENOUS at 01:48

## 2021-04-01 RX ADMIN — LEVOTHYROXINE SODIUM 75 MCG: 0.07 TABLET ORAL at 06:30

## 2021-04-01 RX ADMIN — HEPARIN SODIUM 5000 UNITS: 5000 INJECTION INTRAVENOUS; SUBCUTANEOUS at 18:25

## 2021-04-01 RX ADMIN — AMLODIPINE BESYLATE 10 MG: 5 TABLET ORAL at 09:40

## 2021-04-01 RX ADMIN — Medication 1 CAPSULE: at 09:40

## 2021-04-01 RX ADMIN — Medication 10 ML: at 22:11

## 2021-04-01 RX ADMIN — METRONIDAZOLE 500 MG: 500 INJECTION, SOLUTION INTRAVENOUS at 13:44

## 2021-04-01 RX ADMIN — CEFEPIME HYDROCHLORIDE 2 G: 2 INJECTION, POWDER, FOR SOLUTION INTRAVENOUS at 13:44

## 2021-04-01 NOTE — PROGRESS NOTES
VAT Note - To acknowledge order for PICC placement for long term antibiotics. Chart reviewed for PICC placement evaluation. Areas reviewed include, but are not limited to, patient's current and past H&P, Lab and Procedure Results, all notes, media records and medications. Rec'd call from pt's RN that PIV has been \"lost\" and would like PICC placement now. Call placed for Dr. Nica Fontanez thru [employer] requesting callback to PICC team regarding which antibiotics at discharge in order to place particular PICC pt needs. Awaiting callback from [facility].

## 2021-04-01 NOTE — PROGRESS NOTES
Problem: Pressure Injury - Risk of  Goal: *Prevention of pressure injury  Description: Document Maco Scale and appropriate interventions in the flowsheet. Outcome: Progressing Towards Goal  Note: Pressure Injury Interventions:  Sensory Interventions: Assess changes in LOC, Assess need for specialty bed, Minimize linen layers, Keep linens dry and wrinkle-free, Check visual cues for pain    Moisture Interventions: Absorbent underpads, Apply protective barrier, creams and emollients, Check for incontinence Q2 hours and as needed    Activity Interventions: Increase time out of bed, Pressure redistribution bed/mattress(bed type), PT/OT evaluation    Mobility Interventions: HOB 30 degrees or less, Pressure redistribution bed/mattress (bed type), Turn and reposition approx. every two hours(pillow and wedges)    Nutrition Interventions: Offer support with meals,snacks and hydration, Document food/fluid/supplement intake, Discuss nutritional consult with provider    Friction and Shear Interventions: HOB 30 degrees or less, Apply protective barrier, creams and emollients, Minimize layers, Transferring/repositioning devices                Problem: Falls - Risk of  Goal: *Absence of Falls  Description: Document Jina Fall Risk and appropriate interventions in the flowsheet. Outcome: Progressing Towards Goal  Note: Fall Risk Interventions:  Mobility Interventions: Bed/chair exit alarm, Communicate number of staff needed for ambulation/transfer, Patient to call before getting OOB         Medication Interventions: Bed/chair exit alarm, Evaluate medications/consider consulting pharmacy, Patient to call before getting OOB, Teach patient to arise slowly    Elimination Interventions:  Toileting schedule/hourly rounds, Patient to call for help with toileting needs, Call light in reach, Bed/chair exit alarm    History of Falls Interventions: Bed/chair exit alarm         Problem: Patient Education: Go to Patient Education Activity  Goal: Patient/Family Education  Outcome: Progressing Towards Goal     Problem: Patient Education: Go to Patient Education Activity  Goal: Patient/Family Education  Outcome: Progressing Towards Goal     Problem: Patient Education: Go to Patient Education Activity  Goal: Patient/Family Education  Outcome: Progressing Towards Goal     Problem: Patient Education: Go to Patient Education Activity  Goal: Patient/Family Education  Outcome: Progressing Towards Goal

## 2021-04-01 NOTE — PROGRESS NOTES
[facility] visit. Mrs. Talisha Fofana was unable to have a visit because of a sterile procedure. Prayer for spiritual communion offered for her well-being outside her room.     DELONTE Weber, RN, ACSW, LCSW   Page:  [phone]

## 2021-04-01 NOTE — PROGRESS NOTES
Physical Therapy    PT attempted to see patient for routine therapy session. Patient currently getting ready to undergo PICC line placement. Will defer at this time, try to see later as able and appropriate.     Enrike Seo MS, PT

## 2021-04-01 NOTE — PROGRESS NOTES
Problem: Self Care Deficits Care Plan (Adult)  Goal: *Acute Goals and Plan of Care (Insert Text)  Description:   FUNCTIONAL STATUS PRIOR TO ADMISSION: Patient was modified independent using a rolling walker for functional mobility. HOME SUPPORT: The patient lived alone with family nearby to provide assistance. Occupational Therapy Goals  Initiated 4/1/2021  1. Patient will perform lower body dressing with supervision/set-up within 7 day(s). 2.  Patient will perform grooming, standing at sink, with supervision/set-up within 7 day(s). 3.  Patient will perform bathing with supervision/set-up within 7 day(s). 4.  Patient will perform toilet transfers with supervision/set-up within 7 day(s). 5.  Patient will perform all aspects of toileting with supervision/set-up within 7 day(s). Outcome: Progressing Towards Goal   OCCUPATIONAL THERAPY EVALUATION  Patient: Nina Wing (58 y.o. female)  Date: 4/1/2021  Primary Diagnosis: Severe sepsis ([facility] [address] [A41.9, R65.20]  Lactic acidosis [E87.2]  Leukocytosis [D72.829]  LLQ pain [R10.32]  Diverticulitis of sigmoid colon [K57.32]  Intractable nausea and vomiting [R11.2]  Hyponatremia [E87.1]  Arthritis of left elbow [M19.022]  Procedure(s) (LRB):  LEFT ELBOW INCISION AND DRAINAGE (Left) 4 Days Post-Op   Precautions: fall        ASSESSMENT  Based on the objective data described below, the patient presents with decreased functional mobility, decreased ability to perform ADL tasks secondary to LUE immobilization following I &D. Patient received supine in bed, pleasant and agreeable to activity. Patient is able to move to EOB with CGA. Patient removes socks using cross leg technique and dons slip on shoes without assist, however when donning underwear, patient requires min assist to bring underwear over hips due to LUE immobilization in hard splint. Patient able to use RW for support with transfers and manages with CGA.   Patient currently limited greatly by decreased use of L arm and unable to complete basic ADLs on her own. .    Current Level of Function Impacting Discharge (ADLs/self-care): patient requires up to min assist for ADLs     Functional Outcome Measure: The patient scored 60/100 on the Barthel Index outcome measure. Other factors to consider for discharge: lives alone     Patient will benefit from skilled therapy intervention to address the above noted impairments. PLAN :  Recommendations and Planned Interventions: self care training, functional mobility training, therapeutic exercise, balance training, therapeutic activities, endurance activities, patient education, home safety training, and family training/education    Frequency/Duration: Patient will be followed by occupational therapy 5 times a week to address goals. Recommendation for discharge: (in order for the patient to meet his/her long term goals)  Therapy up to 5 days/week in SNF setting    This discharge recommendation:  Has been made in collaboration with the attending provider and/or case management    IF patient discharges home will need the following DME: TBD       SUBJECTIVE:   Patient stated I would like to get up. I'm normally up by now.     OBJECTIVE DATA SUMMARY:   HISTORY:   Past Medical History:   Diagnosis Date    Arthritis, rheumatoid ([address]     Diabetes ([address]     Diverticulitis     Hard of hearing     Hypertension     Hypothyroid     Osteoporosis, post-menopausal      Past Surgical History:   Procedure Laterality Date    FLEXIBLE SIGMOIDOSCOPY N/A 12/2/2020    SIGMOIDOSCOPY FLEXIBLE performed by Otf Head MD at [facility] [address]    HX HEMORRHOIDECTOMY      HX HYSTERECTOMY      HX THYROIDECTOMY         Expanded or extensive additional review of patient history:     Home Situation  Home Environment: Apartment  # Steps to Enter: 0  One/Two Story Residence: (3rd floor apt, uses elevator)  Living Alone: Yes  Support Systems: Child(dhaval), Friends \ neighbors  Patient Expects to be Discharged to[de-identified] Rehabilitation facility  Current DME Used/Available at Home: Thurlow Slates, straight, Walker, rolling  Tub or Shower Type: Shower    Hand dominance: Right    EXAMINATION OF PERFORMANCE DEFICITS:  Cognitive/Behavioral Status:  Neurologic State: Alert  Orientation Level: Oriented X4  Cognition: Follows commands; Appropriate decision making  Perception: Appears intact  Perseveration: No perseveration noted  Safety/Judgement: Awareness of environment    Skin: intact as seen, ace bandage covering L splint    Edema: LUE    Hearing: Auditory  Auditory Impairment: Hard of hearing, bilateral    Vision/Perceptual:                                Corrective Lenses: Glasses(poor vision to R eye)    Range of Motion:  AROM: Within functional limits(with exception of L elbow)  PROM: Within functional limits                      Strength:  Strength: Within functional limits                Coordination:  Coordination: Within functional limits  Fine Motor Skills-Upper: Left Intact; Right Intact    Gross Motor Skills-Upper: Left Impaired;Right Intact    Tone & Sensation:  Tone: Normal  Sensation: Intact                      Balance:  Sitting: Intact  Standing: Intact; With support    Functional Mobility and Transfers for ADLs:  Bed Mobility:  Supine to Sit: Contact guard assistance  Scooting: Contact guard assistance    Transfers:  Sit to Stand: Contact guard assistance  Stand to Sit: Contact guard assistance  Bed to Chair: Contact guard assistance  Bathroom Mobility: Contact guard assistance  Toilet Transfer : Contact guard assistance  Assistive Device : Walker, rolling    ADL Assessment:  Feeding: Setup    Oral Facial Hygiene/Grooming: Minimum assistance    Bathing: Minimum assistance(limited use of L UE due to splint)    Upper Body Dressing: Minimum assistance    Lower Body Dressing: Minimum assistance    Toileting: Minimum assistance                ADL Intervention and task modifications: Cognitive Retraining  Safety/Judgement: Awareness of environment    Therapeutic Exercise:     Functional Measure:  Barthel Index:    Bathin  Bladder: 10  Bowels: 10  Groomin  Dressin  Feedin  Mobility: 10  Stairs: 5  Toilet Use: 5  Transfer (Bed to Chair and Back): 10  Total: 60/100        The Barthel ADL Index: Guidelines  1. The index should be used as a record of what a patient does, not as a record of what a patient could do. 2. The main aim is to establish degree of independence from any help, physical or verbal, however minor and for whatever reason. 3. The need for supervision renders the patient not independent. 4. A patient's performance should be established using the best available evidence. Asking the patient, friends/relatives and nurses are the usual sources, but direct observation and common sense are also important. However direct testing is not needed. 5. Usually the patient's performance over the preceding 24-48 hours is important, but occasionally longer periods will be relevant. 6. Middle categories imply that the patient supplies over 50 per cent of the effort. 7. Use of aids to be independent is allowed. Jen Mcdonald., Barthel, D.W. (4401). Functional evaluation: the Barthel Index. [facility] (14)2. WALE Rahman, Nathaly Bates., Melody Hamilton., Amina Pembroke, [address] [place] [address] (). Measuring the change indisability after inpatient rehabilitation; comparison of the responsiveness of the Barthel Index and Functional [place] Measure. Journal of Neurology, Neurosurgery, and Psychiatry, 66(4), 182-426. Aminah Moya NHALLIE.BRIDGETTE, VINAY Rangel, & Tasha Vences MRADHA. (2004.) Assessment of post-stroke quality of life in cost-effectiveness studies: The usefulness of the Barthel Index and the EuroQoL-5D.  Quality of Life Research, 15, 349-01         Occupational Therapy Evaluation Charge Determination   History Examination Decision-Making   LOW Complexity : Brief history review  LOW Complexity : 1-3 performance deficits relating to physical, cognitive , or psychosocial skils that result in activity limitations and / or participation restrictions  LOW Complexity : No comorbidities that affect functional and no verbal or physical assistance needed to complete eval tasks       Based on the above components, the patient evaluation is determined to be of the following complexity level: LOW   Pain Rating:  Patient without complaints of pain    Activity Tolerance:   Good    After treatment patient left in no apparent distress:    Sitting in chair, Call bell within reach, and Bed / chair alarm activated    COMMUNICATION/EDUCATION:   The patients plan of care was discussed with: Physical therapist and Registered nurse. Home safety education was provided and the patient/caregiver indicated understanding., Patient/family have participated as able in goal setting and plan of care. , and Patient/family agree to work toward stated goals and plan of care. This patients plan of care is appropriate for delegation to [facility].     Thank you for this referral.  Rukhsana Rahman OTR/L  Time Calculation: 30 mins

## 2021-04-01 NOTE — PROGRESS NOTES
Bedside shift change report given to Noe Rojas RN (oncoming nurse) by CORI WHITMORE (offgoing nurse). Report included the following information SBAR, Kardex, Intake/Output, MAR, Accordion and Recent Results.

## 2021-04-01 NOTE — PROGRESS NOTES
Provided pastoral care visit to [facility] patient. Did not include sacramental care.     Delbert Barreto

## 2021-04-01 NOTE — PROGRESS NOTES
Orthopaedic Progress Note  Post Op day: 4 Days Post-Op    2021 10:55 AM     Patient: Linda Stover MRN: [id number]  SSN: [id number]    YOB: 1930  Age: 80 y.o. Sex: female      Admit date:  3/27/2021  Date of Surgery:  3/28/2021   Procedures:  Procedure(s):  LEFT ELBOW INCISION AND DRAINAGE  Admitting Physician:  Nani Cox MD   Surgeon:  Barbi Chris) and Role:     Cleveland Montenegro MD - Primary    Consulting Physician(s): Treatment Team: Attending Provider: Luis Miguel Lees MD; Consulting Provider: Alfredo Beck MD; Consulting Provider: CHINA Bianchi; Surgeon: Alfredo Beck MD; Consulting Provider: Tayler Cheney DO; Utilization Review: Kathryn Ying RN; Care Manager: Karen Roberts; Primary Nurse: Gopal Doll    SUBJECTIVE:     Linda Stover is a 80 y.o. female is 4 Days Post-Op s/p Procedure(s):  LEFT ELBOW INCISION AND DRAINAGE with an appropriate level of post-operative pain. No complaints of nausea, vomiting, dizziness, lightheadedness, chest pain, or shortness of breath. OBJECTIVE:       Physical Exam:  General: Alert, cooperative, no distress. Respiratory: Respirations unlabored  Neurological:  Neurovascular exam within normal limits. Motor: + DF/PF. Musculoskeletal: Calves soft, supple, non-tender upon palpation. Dressing/Wound:  Clean, dry and intact. No significant erythema or swelling.       Vital Signs:        Patient Vitals for the past 8 hrs:   BP Temp Pulse Resp SpO2   21 0901 134/74 98.3 °F (36.8 °C) 93 18 99 %   21 0358 138/60 98.1 °F (36.7 °C) 71 18 98 %                                          Temp (24hrs), Av.1 °F (36.7 °C), Min:97.8 °F (36.6 °C), Max:98.3 °F (36.8 °C)      Labs:        Recent Labs     21  0546   HCT 28.5*   HGB 8.9*     Lab Results   Component Value Date/Time    Sodium 138 2021 05:46 AM    Potassium 3.5 2021 05:46 AM    Chloride 107 03/31/2021 05:46 AM    CO2 25 03/31/2021 05:46 AM    Glucose 93 03/31/2021 05:46 AM    BUN 8 03/31/2021 05:46 AM    Creatinine 0.64 04/01/2021 05:21 AM    Calcium 8.0 (L) 03/31/2021 05:46 AM       PT/OT:        Gait  Base of Support: Widened  Speed/Karena: Fluctuations  Gait Abnormalities: Decreased step clearance  Ambulation - Level of Assistance: Contact guard assistance  Distance (ft): 150 Feet (ft)  Assistive Device: Gait belt, Walker, rolling       Patient mobility  Bed Mobility Training  Rolling: (received up with PCT in bathroom)  Transfer Training  Sit to Stand: Contact guard assistance  Stand to Sit: Contact guard assistance  Bed to Chair: Contact guard assistance      Gait Training  Assistive Device: Gait belt, Walker, rolling  Ambulation - Level of Assistance: Contact guard assistance  Distance (ft): 150 Feet (ft)            ASSESSMENT / PLAN:   Principal Problem:    Severe sepsis ([address] (3/28/2021)    Active Problems:    DM type 2 (diabetes mellitus, type 2) ([address] (4/9/2016)      HTN (hypertension), benign (4/9/2016)      Hypothyroid (4/9/2016)      RA (rheumatoid arthritis) ([address] (4/9/2016)      Diverticulitis of sigmoid colon (3/28/2021)      Intractable nausea and vomiting (3/28/2021)      Immunocompromised state due to drug therapy ([address] (3/28/2021)      Septic arthritis of elbow, left ([address] (3/28/2021)         A: 1. S/P left elbow I and D POD 4  2. Septic arthritis, left elbow     P: 1. Continue current antibiotic regimen per Dr. Marianna Meraz. NGSF on culture. Will need long term IV abx due to immunosuppression. 2. Continue splint. Do not remove. Will change dressing on followup. 3. DVT ppx: SCDs and heparin 5000 SC q 8 hours. 4. Orthopedics to follow.    5. Followup with Renee Flores PA-C in 2 weeks.       Signed By:  Ankit Atkinson PA-C    Orthopedic Surgery   [address] [place] [address]

## 2021-04-01 NOTE — PROGRESS NOTES
PICC Placement Note  4 FR SingleLumen POWER PICC    PRE-PROCEDURE VERIFICATION  Correct Procedure: yes  Correct Site:  yes  Temperature: Temp: 98.2 °F (36.8 °C), Temperature Source: Temp Source: Oral  Recent Labs     04/01/21  0521 03/31/21  0546   BUN  --  8   CREA 0.64 0.66   PLT  --  289   WBC  --  8.2     Allergies: Alendronate, Amoxicillin, and Hydrochlorothiazide  Education materials for PICC Care given: yes. See Patient Education activity for further details. PICC Booklet placed at bedside: yes    Closed Ended PICC Catheters:  Flush Lumens as Follows:  Intermittent Medication:   Flush before and after each medication with 10 ml NS. Unused Ports:  Flush every 8 hours with 10 ml NS.  TPN Ports:  Flush every 24 hours with 20 ml NS prior to hanging new bag. Blood Draws: Stop infusion, draw off and waste 10 ml of blood. Draw sample with 10cc syringe or greater. DO NOT USE VACUTAINER . Transfer with appropriate device to lab  tubes. Flush with 20 ml NS. Dressing Change:  Every 7 days, and PRN using sterile technique if integrity of dressing is compromised. Initial dressing change for central line 24-48 hours post insertion if gauze is used. Apply new dressing per policy. PROCEDURE DETAIL  Consent was obtained and all questions were answered related to risks and benefits. A single lumen PICC line was inserted, as a sterile procedure using ultrasound and modified Seldinger technique for Home IV Therapy. The following documentation is in addition to the PICC properties in the lines/airways flowsheet :  Lot #: [id number]  Lidocaine 1% administered intradermally :yes  Internal Catheter Total Length: 39 (cm)/ 0 out  Vein Selection for PICC:right basilic  Central Line Bundle followed yes  Complication Related to Insertion:no    The placement was verified by EKG, MAX P WAVE @ 39 (cm). PER EKG PICC TIP @ C/A junction.       X-ray: no.  Line is okay to use: yes    Maribel Wang RN

## 2021-04-01 NOTE — PROGRESS NOTES
1612:  [facility], Corey Carter) called on auth. She cannot give auth until IV abx orders have been written. CM will call in morning at [phone]. 1231:  PICC team arrived to place line. Updated pt and her son on auth and transport. Pt's son works 2-11 pm.  Pt would need w/c Pasqual Honour transport. Transition of Care Plan:  RUR-23%  1. Monitor patient response to treatment and recommendations. 2. PICC line needs to be placed; ID orders pending  3. Pt accepted at [facility]-- called to confirm they can accept on Friday- they can accept  4. Pt will need Marek Baker -updates faxed to [phone] to start auth  4. Patient transport home per son vs transport at discharge. 5. CM to monitor clinical progress and disposition recommendations.   ZOEY Viera, RN

## 2021-04-01 NOTE — PROGRESS NOTES
[facility]  [address] [facility], [address] [place] [address]  [phone]      Medical Progress Note      NAME:         Providence Rubinstein   :        1930  MRM:        [id number]    Date of service: 2021      Subjective: Patient has been seen and examined as a follow up for multiple medical issues. Chart, labs, diagnostics reviewed. She says she feels well. Mild aching left elbow. No fever or chills. No nausea or vomiting. Objective:    Vital Signs:    Visit Vitals  /74 (BP 1 Location: Right arm, BP Patient Position: At rest)   Pulse 93   Temp 98.3 °F (36.8 °C)   Resp 18   Ht 4' 11\" (1.499 m)   Wt 63 kg (138 lb 14.2 oz)   SpO2 99%   BMI 28.05 kg/m²          Intake/Output Summary (Last 24 hours) at 2021 8657  Last data filed at 2021 0668  Gross per 24 hour   Intake 1570 ml   Output 950 ml   Net 620 ml        Physical Examination:    General:   Well looking and not in any acute distress    Eyes:   pink conjunctivae, PERRLA with no discharge. Pulm: clear breath sounds without crackles or wheezes  Card:  has regular and normal S1, S2 without thrills, bruits or peripheral edema  Abd:  Soft, non tender, non-distended, normoactive bowel sounds  Musc:  No cyanosis, clubbing, atrophy or deformities. Dressed left UE  Neuro: Awake and alert.  Generally a non focal exam.   Psych:  Has a fair insight to her illness     Current Facility-Administered Medications   Medication Dose Route Frequency    famotidine (PEPCID) tablet 20 mg  20 mg Oral DAILY    vancomycin (VANCOCIN) 750 mg in 0.9% sodium chloride 250 mL (VIAL-MATE)  750 mg IntraVENous Q12H    amLODIPine (NORVASC) tablet 10 mg  10 mg Oral DAILY    insulin lispro (HUMALOG) injection   SubCUTAneous AC&HS    aspirin delayed-release tablet 81 mg  81 mg Oral DAILY    levothyroxine (SYNTHROID) tablet 75 mcg  75 mcg Oral ACB    [Held by provider] lisinopriL (PRINIVIL, ZESTRIL) tablet 40 mg  40 mg Oral DAILY    glucose chewable tablet 16 g  4 Tab Oral PRN    dextrose (D50W) injection syrg 12.5-25 g  25-50 mL IntraVENous PRN    glucagon (GLUCAGEN) injection 1 mg  1 mg IntraMUSCular PRN    sodium chloride (NS) flush 5-40 mL  5-40 mL IntraVENous Q8H    sodium chloride (NS) flush 5-40 mL  5-40 mL IntraVENous PRN    acetaminophen (TYLENOL) tablet 650 mg  650 mg Oral Q6H PRN    Or    acetaminophen (TYLENOL) suppository 650 mg  650 mg Rectal Q6H PRN    polyethylene glycol (MIRALAX) packet 17 g  17 g Oral DAILY PRN    bisacodyL (DULCOLAX) suppository 10 mg  10 mg Rectal DAILY PRN    ondansetron (ZOFRAN) injection 4 mg  4 mg IntraVENous Q6H PRN    hydrALAZINE (APRESOLINE) 20 mg/mL injection 20 mg  20 mg IntraVENous Q4H PRN    melatonin (rapid dissolve) tablet 5 mg  5 mg Oral QHS PRN    alum-mag hydroxide-simeth (MYLANTA) oral suspension 30 mL  30 mL Oral Q4H PRN    oxyCODONE IR (ROXICODONE) tablet 5 mg  5 mg Oral Q4H PRN    morphine injection 2 mg  2 mg IntraVENous Q4H PRN    diphenhydrAMINE (BENADRYL) injection 25 mg  25 mg IntraVENous Q6H PRN    diphenhydrAMINE (BENADRYL) capsule 25 mg  25 mg Oral Q6H PRN    simethicone (MYLICON) tablet 80 mg  80 mg Oral QID PRN    metroNIDAZOLE (FLAGYL) IVPB premix 500 mg  500 mg IntraVENous Q12H    cefepime (MAXIPIME) 2 g in sterile water (preservative free) 10 mL IV syringe  2 g IntraVENous B16F    folic acid (FOLVITE) tablet 1 mg  1 mg Oral DAILY    L.acidophilus-paracasei-S.thermophil-bifidobacter (RISAQUAD) 8 billion cell capsule  1 Cap Oral DAILY    heparin (porcine) injection 5,000 Units  5,000 Units SubCUTAneous Q8H    sodium chloride (NS) flush 5-10 mL  5-10 mL IntraVENous PRN        Laboratory data and review:    Recent Labs     03/31/21  0546 03/30/21  0255   WBC 8.2 12.9*   HGB 8.9* 10.1*   HCT 28.5* 32.3*    280     Recent Labs     04/01/21  0521 03/31/21  0546 03/30/21  0255   NA  -- 138 136   K  --  3.5 3.3*   CL  --  107 106   CO2  --  25 24   GLU  --  93 98   BUN  --  8 7   CREA 0.64 0.66 0.60   CA  --  8.0* 8.0*     No components found for: [place]    Diagnostics: Imaging studies have been reviewed    Telemetry reviewed by me:   normal sinus rhythm    Assessment and Plan:    Septic arthritis of elbow, left ([facility] (3/28/2021) /  Severe sepsis ([facility] (3/28/2021) POA: improving. Seen by ortho and she is sp I&D done 3/28. Cultures still have no growth. Seen by ID who will guide on the antibiotic regimen Continue Cefepime, Metronidazole and Vancomycin for now. Pain control PRN. Get a PICC line today given she will need a prolonged antibiotic course      Diverticulitis of sigmoid colon (3/28/2021) POA: mild and uncomplicated. Noted on CT scan abdomen. Seen by Gi who doubts diagnosis. No new symptoms. Continue above antibiotics. DM type 2 (diabetes mellitus, type 2) ([facility] (4/9/2016) POA: A1c 5.4. Continue DM diet. Monitor blood glucose, SSi per protocol. No home medications    HTN (hypertension), benign (4/9/2016) POA: BP stable. Continue Amlodipine. Resume ACEi if BP remains elevated    RA (rheumatoid arthritis) ([facility] (4/9/2016) / Immunocompromised state due to drug therapy (3/28/2021) POA: continue pain control.  Methotrexate on hold    Hypothyroid (4/9/2016)  POA: Continue Levothyroxine    Total time spent for the patient's care: [address] discussed with: Patient, Family, Care Manager and Nursing Staff    Discussed:  Care Plan and D/C Planning    Prophylaxis:  Hep SQ    Anticipated Disposition:  SNF/LTC           ___________________________________________________    Attending Physician:   Angelic Helton MD

## 2021-04-01 NOTE — PROGRESS NOTES
Bedside shift change report given to Johnathon Arias RN (oncoming nurse) by Alvin Lawrence RN (offgoing nurse). Report included the following information SBAR, Kardex, MAR, Accordion, Recent Results and Med Rec Status.

## 2021-04-02 LAB
BACTERIA SPEC CULT: NORMAL
COMMENT, HOLDF: NORMAL
COVID-19 RAPID TEST, COVR: NOT DETECTED
CREAT SERPL-MCNC: 0.6 MG/DL (ref 0.55–1.02)
GLUCOSE BLD STRIP.AUTO-MCNC: 106 MG/DL (ref 65–100)
GLUCOSE BLD STRIP.AUTO-MCNC: 114 MG/DL (ref 65–100)
GRAM STN SPEC: NORMAL
SAMPLES BEING HELD,HOLD: NORMAL
SERVICE CMNT-IMP: ABNORMAL
SERVICE CMNT-IMP: ABNORMAL
SERVICE CMNT-IMP: NORMAL
SOURCE, COVRS: NORMAL

## 2021-04-02 PROCEDURE — 82962 GLUCOSE BLOOD TEST: CPT

## 2021-04-02 PROCEDURE — 82565 ASSAY OF CREATININE: CPT

## 2021-04-02 PROCEDURE — 97116 GAIT TRAINING THERAPY: CPT

## 2021-04-02 PROCEDURE — 36415 COLL VENOUS BLD VENIPUNCTURE: CPT

## 2021-04-02 PROCEDURE — 74011000250 HC RX REV CODE- 250: Performed by: INTERNAL MEDICINE

## 2021-04-02 PROCEDURE — 74011250637 HC RX REV CODE- 250/637: Performed by: HOSPITALIST

## 2021-04-02 PROCEDURE — 94760 N-INVAS EAR/PLS OXIMETRY 1: CPT

## 2021-04-02 PROCEDURE — 74011250637 HC RX REV CODE- 250/637: Performed by: INTERNAL MEDICINE

## 2021-04-02 PROCEDURE — 74011250636 HC RX REV CODE- 250/636: Performed by: INTERNAL MEDICINE

## 2021-04-02 PROCEDURE — 74011250636 HC RX REV CODE- 250/636: Performed by: HOSPITALIST

## 2021-04-02 PROCEDURE — 87635 SARS-COV-2 COVID-19 AMP PRB: CPT

## 2021-04-02 PROCEDURE — 99232 SBSQ HOSP IP/OBS MODERATE 35: CPT | Performed by: INTERNAL MEDICINE

## 2021-04-02 RX ORDER — CALCIUM CARB/MAGNESIUM CARB 311-232MG
5 TABLET ORAL
Qty: 10 TAB | Refills: 0 | Status: SHIPPED
Start: 2021-04-02 | End: 2021-04-12

## 2021-04-02 RX ORDER — METRONIDAZOLE 250 MG/1
500 TABLET ORAL EVERY 12 HOURS
Status: DISCONTINUED | OUTPATIENT
Start: 2021-04-02 | End: 2021-04-02 | Stop reason: HOSPADM

## 2021-04-02 RX ORDER — METRONIDAZOLE 500 MG/1
500 TABLET ORAL EVERY 12 HOURS
Qty: 14 TAB | Refills: 0 | Status: SHIPPED
Start: 2021-04-02 | End: 2021-04-09

## 2021-04-02 RX ORDER — OXYCODONE HYDROCHLORIDE 5 MG/1
5 TABLET ORAL
Qty: 5 TAB | Refills: 0 | Status: SHIPPED | OUTPATIENT
Start: 2021-04-02 | End: 2021-04-12

## 2021-04-02 RX ADMIN — Medication 81 MG: at 10:34

## 2021-04-02 RX ADMIN — Medication 1 CAPSULE: at 10:36

## 2021-04-02 RX ADMIN — LEVOTHYROXINE SODIUM 75 MCG: 0.07 TABLET ORAL at 06:10

## 2021-04-02 RX ADMIN — FAMOTIDINE 20 MG: 20 TABLET, FILM COATED ORAL at 10:34

## 2021-04-02 RX ADMIN — METRONIDAZOLE 500 MG: 250 TABLET ORAL at 10:57

## 2021-04-02 RX ADMIN — HEPARIN SODIUM 5000 UNITS: 5000 INJECTION INTRAVENOUS; SUBCUTANEOUS at 01:53

## 2021-04-02 RX ADMIN — CEFEPIME HYDROCHLORIDE 2 G: 2 INJECTION, POWDER, FOR SOLUTION INTRAVENOUS at 01:53

## 2021-04-02 RX ADMIN — CEFTRIAXONE 2 G: 2 INJECTION, POWDER, FOR SOLUTION INTRAMUSCULAR; INTRAVENOUS at 10:58

## 2021-04-02 RX ADMIN — HEPARIN SODIUM 5000 UNITS: 5000 INJECTION INTRAVENOUS; SUBCUTANEOUS at 10:35

## 2021-04-02 RX ADMIN — METRONIDAZOLE 500 MG: 500 INJECTION, SOLUTION INTRAVENOUS at 01:53

## 2021-04-02 RX ADMIN — FOLIC ACID 1 MG: 1 TABLET ORAL at 10:34

## 2021-04-02 RX ADMIN — Medication 10 ML: at 06:12

## 2021-04-02 RX ADMIN — AMLODIPINE BESYLATE 10 MG: 5 TABLET ORAL at 10:34

## 2021-04-02 NOTE — PROGRESS NOTES
[facility] Dosing Services: Antimicrobial Stewardship Daily Doc    Consult for antibiotic dosing of vancomycin by Dr. Jeramy Webber  Indication: Left elbow septic arthritis s/p I&D 3/28 in setting of chronic immunosuppression  Day of Therapy: 6    Ht Readings from Last 1 Encounters:   03/27/21 149.9 cm (59\")        Wt Readings from Last 1 Encounters:   04/01/21 63 kg (138 lb 14.2 oz)     Vancomycin therapy:  Current maintenance dose: 750 mg IV every 12 hours  Dose calculated to approximate a therapeutic trough of 15-20 mcg/mL. Assessment/Plan:  SCr is stable and likely at baseline, leukocytosis resolved WBC = 8.2, no procalcitonin, afebrile in the past 24 hours, no growth on any cultures to date, urine output appears adequate at 1 mL/kg/hr  Last night's vancomycin trough extrapolated to be slightly below the target trough goal, although AUC above MAGALIE assuming staph MAGALIE of 1 is within target of 400-600. Individual kinetics (influenced by age of 80) show that slight adjustments to current regimen would result in supra-therapeutic trough level and if we are anticipating a longer duration of treatment for septic arthritis, this would increase risk of nephrotoxicity. I would anticipate that after 5-6 doses of this regimen as opposed to the 4 doses prior to last night's level, the steady state trough level might inch above 15 mcg/mL as well. SCr ordered daily with AM labs per protocol  Dose administration notes:   Doses given appropriately as scheduled    Date Dose & Interval Measured (mcg/mL) Extrapolated (mcg/mL)   ? 3/30 at 1820 ?1000 mg IV q24h ?5.1 ? 5.47   ?4/1 at 2213 ?750 mg IV q12h ?13.7 ?14.2   ? ? ? ?        Other Antimicrobial   (not dosed by pharmacist) Cefepime 2 g IV every 12 hours  Metronidazole 500 mg IV every 12 hours     Cultures 3/27 - Blood - NGTD x 6 days - FINAL  3/28 - Body Fluid - NGTD x 4 days - FINAL  3/28 - Wound - NGTD x 3 days - Prelim  3/28 - Wound, anaerobic - NGTD x 3 days - Prelim  3/28 - Wound - NGTD x 3 days - Prelim  3/28 - Wound, anaerobic - NGTD x 3 days - Prelim   Serum Creatinine Lab Results   Component Value Date/Time    Creatinine 0.60 04/02/2021 02:13 AM    Creatinine (POC) 1.1 12/12/2017 01:22 PM         Creatinine Clearance Estimated Creatinine Clearance: 45 mL/min (by C-G formula based on SCr of 0.6 mg/dL).      Temp Temp: 97.1 °F (36.2 °C)       WBC Lab Results   Component Value Date/Time    WBC 8.2 03/31/2021 05:46 AM        H/H Lab Results   Component Value Date/Time    HGB 8.9 (L) 03/31/2021 05:46 AM        Platelets    Lab Results   Component Value Date/Time    PLATELET 893 79/65/2997 05:46 AM          For Antifungals, Metronidazole, and Nafcillin:  ALT: 15  AST: 12   Alk Phos: 59  T Bili: 0.5    Pharmacist Stan Johns PHARMD Contact information:

## 2021-04-02 NOTE — PROGRESS NOTES
1255:  Negative covid results faxed to [facility]. Transition of Care Plan to SNF/Rehab    Communication to Patient/Family:  Met with patient and family and they are agreeable to the transition plan. The Plan for Transition of Care is related to the following treatment goals: The Patient and/or patient representative was provided with a choice of provider and agrees  with the discharge plan. Yes [x] No []    A Freedom of choice list was provided with basic dialogue that supports the patient's individualized plan of care/goals and shares the quality data associated with the providers. Yes [x] No []    SNF/Rehab Transition:  Patient has been accepted to [facility] SNF/Rehab and meets criteria for admission. Patient will transported by [facility] w/c Colleen Fletcher and expected to leave at 2-2:30 pm.    Communication to SNF/Rehab:  Bedside RN, Dread Palomo, has been notified to update the transition plan to the facility and call report ([phone], ask for [facility] H[facility]ITAL wing; pt going to room 116B). Discharge information has been updated on the AVS. And communicated to facility via [employer], or CC link. Nursing Please include all hard scripts for controlled substances, med rec and dc summary, and AVS in packet. Reviewed and confirmed with facility, [facility], can manage the patient care needs for the following:     Charleen Trevizo with (X) only those applicable:  Medication:  [x]Medications are available at the facility  [x]IV Antibiotics    []Controlled Substance - hard copies available sent.   [x]Weekly Labs    Equipment:  []CPAP/BiPAP  []Wound Vacuum  []Miller or Urinary Device  [x]PICC/Central Line  []Nebulizer  []Ventilator    Treatment:  []Isolation (for MRSA, VRE, etc.)  []Surgical Drain Management  []Tracheostomy Care  []Dressing Changes  []Dialysis with transportation  []PEG Care  []Oxygen  []Daily Weights for Heart Failure    Dietary:  []Any diet limitations  []Tube Feedings   []Total Parenteral Management (TPN)    Financial Resources:  []Medicaid Application Completed    []UAI Completed and copy given to pt/family  and copy given to pt/family  []A screening has previously been completed. []Level II Completed    [] Private pay individual who will not become   financially eligible for Medicaid within 6 months from admission to a [address] [place] [address] facility. [] Individual refused to have screening conducted. []Medicaid Application Completed    []The screening denied because it was determined individual did not need/did not qualify for nursing facility level of care. [] Out of state residents seeking direct admission to a [address] facility. [] Individuals who are inpatients of an out of state hospital, or in state or out of state veterans/ hospital and seek direct admission to a [address] facility  [] Individuals who are pateints or residents of a state owned/operated facility that is licensed by [employer] of [employer] ([email or web address]) and seek direct admission to [address] [place] [address]  [] A screening not required for enrollment in [facility] services as set out in [facility] ED 30-  [] [facility] staff shall perform screenings of the [facility] clients. Advanced Care Plan:  []Surrogate Decision Maker of Care  []POA  []Communicated Code Status and copy sent. Other: Porfirio Cortés 118:  Dave Ventura called to give Halle Negron: #[phone]. They will call [facility] with the Halle Negron #. [facility] Home was called ([phone]) for w/c [facility] transport. Cost is $50; this was relayed to pt and her son.  time is 2-2:30 pm.    1113: Three calls have been placed to Dave Ventura to obtain auth for snf. Each time the call went to voice mail because of another incoming call. CM Note:  [facility] [employer] was called to give IV abx orders to get auth. Waiting on a call back. Met with pt to update her on today's plan.   Will arrange for pt to go via w/c Payton Reddy to [facility]. Pt to have a rapid covid test prior to d/c. ZOEY Melchor

## 2021-04-02 NOTE — PROGRESS NOTES
Problem: Mobility Impaired (Adult and Pediatric)  Goal: *Acute Goals and Plan of Care (Insert Text)  Description: FUNCTIONAL STATUS PRIOR TO ADMISSION: Patient was modified independent using a rolling walker or SPC for functional mobility. HOME SUPPORT PRIOR TO ADMISSION: The patient lived alone with her son available for local support, groceries. Physical Therapy Goals  Initiated 3/31/2021  1. Patient will move from supine to sit and sit to supine , scoot up and down, and roll side to side in bed with modified independence within 7 day(s). 2.  Patient will transfer from bed to chair and chair to bed with modified independence using the least restrictive device within 7 day(s). 3.  Patient will perform sit to stand with modified independence within 7 day(s). 4.  Patient will ambulate with modified independence for 300 feet with the least restrictive device within 7 day(s). Outcome: Progressing Towards Goal   PHYSICAL THERAPY TREATMENT  Patient: Smiley Howard (16 y.o. female)  Date: 4/2/2021  Diagnosis: Severe sepsis ([address] [A41.9, R65.20]  Lactic acidosis [E87.2]  Leukocytosis [D72.829]  LLQ pain [R10.32]  Diverticulitis of sigmoid colon [K57.32]  Intractable nausea and vomiting [R11.2]  Hyponatremia [E87.1]  Arthritis of left elbow [M19.022] Severe sepsis (HCC)  Procedure(s) (LRB):  LEFT ELBOW INCISION AND DRAINAGE (Left) 5 Days Post-Op  Precautions:    Chart, physical therapy assessment, plan of care and goals were reviewed. ASSESSMENT  Patient continues with skilled PT services and is progressing towards goals. Pt received supine in bed with LUE in soft cast/dsg. She only needed SBA to EOB, CGA with sit to stand with RW.  on L is obviously compromised due to casting, yet pt able to guide RW despite this for 150' with CGA. Placed in chair in NAD. Son present visiting. Discharge plan to SNF for antibx tx then back to ALU apt.      Current Level of Function Impacting Discharge (mobility/balance): per above    Other factors to consider for discharge: per above         PLAN :  Patient continues to benefit from skilled intervention to address the above impairments. Continue treatment per established plan of care. to address goals. Recommendation for discharge: (in order for the patient to meet his/her long term goals)  Therapy up to 5 days/week in SNF setting    This discharge recommendation:  Has been made in collaboration with the attending provider and/or case management    IF patient discharges home will need the following DME: to be determined (TBD)       SUBJECTIVE:   Patient stated I am doing much better.     OBJECTIVE DATA SUMMARY:   Critical Behavior:  Neurologic State: Alert  Orientation Level: Oriented X4  Cognition: Follows commands  Safety/Judgement: Awareness of environment  Functional Mobility Training:  Bed Mobility:     Supine to Sit: Stand-by assistance     Scooting: Stand-by assistance        Transfers:  Sit to Stand: Contact guard assistance  Stand to Sit: Contact guard assistance                             Balance:  Sitting: Intact  Standing: Intact; With support  Ambulation/Gait Training:  Distance (ft): 150 Feet (ft)  Assistive Device: Gait belt;Walker, rolling  Ambulation - Level of Assistance: Contact guard assistance                                           Activity Tolerance:   Good    After treatment patient left in no apparent distress:   Sitting in chair, Call bell within reach, Bed / chair alarm activated, and Caregiver / family present    COMMUNICATION/COLLABORATION:   The patients plan of care was discussed with: Registered nurse.      Jhoana Loera, PT   Time Calculation: 15 mins

## 2021-04-02 NOTE — PROGRESS NOTES
Hospital to SNF SBAR Handoff - Sera Caldwell                                                                        80 y.o.   female    [facility]   Room: 502/01    [facility]  MED SURG 2  Unit Phone# :  [phone]      [address] [facility] [facility]  [place] [address]  Dept: [phone]  Loc: [phone]                    SITUATION     Admitted:  3/27/2021         Attending Provider:  Klaus Newman MD       Consultations:  IP CONSULT TO GASTROENTEROLOGY  IP CONSULT TO INFECTIOUS DISEASES  IP CONSULT TO ORTHOPEDIC SURGERY    PCP:  Hosea Salcedo MD   [phone]    Treatment Team: Attending Provider: Klaus Newman MD; Consulting Provider: Adrian Blum DO; Utilization Review: Iwona Isbell RN; Care Manager: Jolie Peter Admitting Dx: Severe sepsis (HCC) [A41.9, R65.20]  Lactic acidosis [E87.2]  Leukocytosis [D72.829]  LLQ pain [R10.32]  Diverticulitis of sigmoid colon [K57.32]  Intractable nausea and vomiting [R11.2]  Hyponatremia [E87.1]  Arthritis of left elbow [M19.022]       Principal Problem: Severe sepsis (HCC)    5 Days Post-Op of   Procedure(s):  LEFT ELBOW INCISION AND DRAINAGE   BY: Brooklyn Palencia MD             ON: 3/28/2021                  Code Status: Full Code                Advance Directives:   Advance Care Planning 3/28/2021   Patient's Healthcare Decision Maker is: -   Primary Decision Maker Name -   Confirm Advance Directive Yes, not on file   Patient Would Like to Complete Advance Directive -   Does the patient have other document types -    (Send w/patient)   Not Received       Isolation:  There are currently no Active Isolations       MDRO: No current active infections    Pain Medications given:  Tylenol      Special Equipment needed: no  Type of equipment:      (Not currently on dialysis)  (Not currently on dialysis)  (Not currently on dialysis)     BACKGROUND     Allergies:   Allergies   Allergen Reactions    Alendronate Diarrhea    Amoxicillin Rash     Tolerated cefepime 3/27/21    Hydrochlorothiazide Other (comments)     \"Caused pain everywhere\"       Past Medical History:   Diagnosis Date    Arthritis, rheumatoid (HCC)     Diabetes ([address]     Diverticulitis     Hard of hearing     History of vascular access device 04/01/2021    [facility] VAT 4 FR R Basilic 24/0 LTAbx    Hypertension     Hypothyroid     Osteoporosis, post-menopausal        Past Surgical History:   Procedure Laterality Date    FLEXIBLE SIGMOIDOSCOPY N/A 12/2/2020    SIGMOIDOSCOPY FLEXIBLE performed by Trav Garrison MD at [facility] HX HEMORRHOIDECTOMY      HX HYSTERECTOMY      HX THYROIDECTOMY         Medications Prior to Admission   Medication Sig    folic acid (FOLVITE) 1 mg tablet Take 1 mg by mouth daily.  docusate sodium (Colace) 100 mg capsule Take 100 mg by mouth two (2) times daily as needed for Constipation.  methotrexate (RHEUMATREX) 2.5 mg tablet Take 20 mg by mouth every Sunday.  levothyroxine (SYNTHROID) 75 mcg tablet Take 1 Tab by mouth Daily (before breakfast).  polyethylene glycol (Miralax) 17 gram packet Take 1 Packet by mouth daily.  L.acidoph & parac-S.therm-Bifido (BRENNEN Q2/RISAQUAD-2) 16 billion cell cap cap Take 1 Cap by mouth daily.  lisinopriL (PRINIVIL, ZESTRIL) 40 mg tablet Take 40 mg by mouth daily.  amLODIPine (NORVASC) 10 mg tablet Take 10 mg by mouth daily.  acetaminophen (Tylenol Extra Strength) 500 mg tablet Take 500 mg by mouth two (2) times daily as needed for Pain.  multivit-min/iron/folic/lutein (CENTRUM SILVER WOMEN PO) Take 1 Tab by mouth every other day.  cholecalciferol (VITAMIN D3) 1,000 unit cap Take 2,000 Units by mouth daily.  aspirin delayed-release 81 mg tablet Take 81 mg by mouth daily.        Hard scripts included in transfer packet yes    Vaccinations:    Immunization History   Administered Date(s) Administered    Influenza Vaccine 10/08/2016, 10/03/2017       Readmission Risks:    Known Risks: Falls         The Charlson CoMorbitiy Index tool is an evidenced based tool that has more automatic generated information. The tool looks at many different items such as the age of the patient, how many times they were admitted in the last calendar year, current length of stay in the hospital and their diagnosis. All of these items are pulled automatically from information documented in the chart from various places and will generate a score that predicts whether a patient is at low (less than 13), medium (13-20) or high (21 or greater) risk of being readmitted.         ASSESSMENT                Temp: 97.1 °F (36.2 °C) (04/02/21 0823) Pulse (Heart Rate): 86 (04/02/21 0823)     Resp Rate: 16 (04/02/21 0823)           BP: 136/63 (04/02/21 0823)     O2 Sat (%): 99 % (04/02/21 0823)     Weight: 63 kg (138 lb 14.2 oz)    Height: 4' 11\" (149.9 cm) (03/27/21 2221)       If above not within 1 hour of discharge:    BP:_____  P:____  R:____ T:_____ O2 Sat: ___%  O2: ______    Active Orders   Diet    DIET DIABETIC CONSISTENT CARB Regular         Orientation: oriented to time, place, person and situation     Active Behaviors: None                                   Active Lines/Drains:  (Peg Tube / Miller / CL or S/L?): no    Urinary Status: Voiding     Last BM: Last Bowel Movement Date: 04/01/21     Skin Integrity: Incision (comment)(I & D)             Mobility: Slightly limited   Weight Bearing Status: WBAT (Weight Bearing as Tolerated), NWB (Non Weight Bearing)      Gait Training  Assistive Device: Gait belt, Walker, rolling  Ambulation - Level of Assistance: Contact guard assistance  Distance (ft): 150 Feet (ft)         Lab Results   Component Value Date/Time    Glucose 93 03/31/2021 05:46 AM    Hemoglobin A1c 5.4 03/28/2021 03:47 AM    HGB 8.9 (L) 03/31/2021 05:46 AM    HGB 10.1 (L) 03/30/2021 02:55 AM        RECOMMENDATION     See After Visit Summary (AVS) for:  · Discharge instructions  · After [address]   · Special equipment needed (entered pre-discharge by Care Management)  · Medication Reconciliation    · Follow up Appointment(s)         Report given/sent by:  Selena Fletcher RN                    Verbal report given to: Anna Giles  FAXED to:           Estimated discharge time:  4/2/2021 at 1400

## 2021-04-02 NOTE — PROGRESS NOTES
Post Discharge PICC and Antibiotic Orders    1. Diagnosis: Septic arthritis elbow. Culture negative  2. Antibiotic: Ceftriaxone 2 g IV daily through 5/10/2021  3. Routine PICC/ Jemima/ Portacath Care including PRN catheter flow management  4. Weekly labs:   _x__CBC/diff/platelets   _x__BUN/Creatinine   ___CPK   _x__AST/TotalBilirubin/AlkalinePhosphatase   _x__CRP  5. Fax lab to [phone]  Call Critical Lab Values to [phone]  6. May send to IR for line evaluation or replacement [phone]  7. Home Health to pull PIC line at end of therapy or send to IR for Jemima removal  8. Allergies:     Allergies   Allergen Reactions    Alendronate Diarrhea    Amoxicillin Rash     Tolerated cefepime 3/27/21    Hydrochlorothiazide Other (comments)     \"Caused pain everywhere\"         Rolando Hope, DO

## 2021-04-02 NOTE — PROGRESS NOTES
[facility] Progress Note           Maikel Garcia DO    [phone] Office  [phone]  Fax    2021      Assessment & Plan:   · Left elbow septic arthritis. Status post I&D 3/28/2021. Cultures sterile at final reading however patient received antibiotics prior to cultures being obtained. As no MRSA or Pseudomonas were isolated will de-escalate  antibiotics to ceftriaxone to complete antibiotic course. If no continued improvement will need to consider repeat aspiration with AFB and fungal cultures due to immunosuppression however low suspicion at this time on methotrexate monotherapy for RA IV antibiotic orders are in the chart   · Diverticulitis. Continue metronidazole 500 mg p.o. every 12 through 2021   · Leukocytosis. Resolved. · Rheumatoid arthritis. On methotrexate  · Chronic immunosuppression  · Abdominal aortic aneurysm. Avoid quinolone antibiotics  · Amoxicillin allergy. This caused a rash in the past.  Currently tolerating cefepime  · Diabetes mellitus. Hemoglobin A1c 5.4          Subjective:     No new complaints    Objective:     Vitals:   Visit Vitals  /63 (BP 1 Location: Right lower arm, BP Patient Position: At rest)   Pulse 86   Temp 97.1 °F (36.2 °C)   Resp 16   Ht 4' 11\" (1.499 m)   Wt 138 lb 14.2 oz (63 kg)   SpO2 99%   BMI 28.05 kg/m²        Tmax:  Temp (24hrs), Av.8 °F (36.6 °C), Min:97.1 °F (36.2 °C), Max:98.2 °F (36.8 °C)      Exam:   Patient is intubated:  no    Physical Examination:   General:  Alert, cooperative, no distress   Head:  Normocephalic, atraumatic. Eyes:  Conjunctivae clear   Neck: Supple       Lungs:   No distress. Chest wall:     Heart:     Abdomen:    Nondistended   Extremities: Moves all. LUE dressed   Skin:  No rash   Neurologic: CNII-XII intact     Labs:        No lab exists for component: ITNL   No results for input(s): CPK, CKMB, TROIQ in the last 72 hours.   Recent Labs     21  3504 21  9775 03/31/21  0546   NA  --   --  138   K  --   --  3.5   CL  --   --  107   CO2  --   --  25   BUN  --   --  8   CREA 0.60 0.64 0.66   GLU  --   --  93   WBC  --   --  8.2   HGB  --   --  8.9*   HCT  --   --  28.5*   PLT  --   --  289     No results for input(s): INR, PTP, APTT, INREXT, INREXT in the last 72 hours.   Needs: urine analysis, urine sodium, protein and creatinine  Lab Results   Component Value Date/Time    Sodium,urine random 105 03/28/2021 02:18 AM         Cultures:     Lab Results   Component Value Date/Time    Specimen Description: BLOOD 05/01/2011 09:45 AM     Lab Results   Component Value Date/Time    Culture result: NO GROWTH 3 DAYS 03/28/2021 07:08 PM    Culture result: NO GROWTH 3 DAYS 03/28/2021 07:08 PM    Culture result: NO GROWTH 3 DAYS 03/28/2021 07:06 PM    Culture result: NO GROWTH 3 DAYS 03/28/2021 07:06 PM       Radiology:     Medications       Current Facility-Administered Medications   Medication Dose Route Frequency Last Admin    alteplase (CATHFLO) 1 mg in sterile water (preservative free) 1 mL injection  1 mg InterCATHeter PRN      famotidine (PEPCID) tablet 20 mg  20 mg Oral DAILY 20 mg at 04/01/21 0940    vancomycin (VANCOCIN) 750 mg in 0.9% sodium chloride 250 mL (VIAL-MATE)  750 mg IntraVENous Q12H 750 mg at 04/01/21 2254    amLODIPine (NORVASC) tablet 10 mg  10 mg Oral DAILY 10 mg at 04/01/21 0940    insulin lispro (HUMALOG) injection   SubCUTAneous AC&HS Stopped at 03/29/21 2107    aspirin delayed-release tablet 81 mg  81 mg Oral DAILY 81 mg at 04/01/21 0940    levothyroxine (SYNTHROID) tablet 75 mcg  75 mcg Oral ACB 75 mcg at 04/02/21 0610    [Held by provider] lisinopriL (PRINIVIL, ZESTRIL) tablet 40 mg  40 mg Oral DAILY      glucose chewable tablet 16 g  4 Tab Oral PRN      dextrose (D50W) injection syrg 12.5-25 g  25-50 mL IntraVENous PRN      glucagon (GLUCAGEN) injection 1 mg  1 mg IntraMUSCular PRN      sodium chloride (NS) flush 5-40 mL  5-40 mL IntraVENous Q8H 10 mL at 04/02/21 0612    sodium chloride (NS) flush 5-40 mL  5-40 mL IntraVENous PRN      acetaminophen (TYLENOL) tablet 650 mg  650 mg Oral Q6H  mg at 03/28/21 1203    Or    acetaminophen (TYLENOL) suppository 650 mg  650 mg Rectal Q6H PRN      polyethylene glycol (MIRALAX) packet 17 g  17 g Oral DAILY PRN      bisacodyL (DULCOLAX) suppository 10 mg  10 mg Rectal DAILY PRN      ondansetron (ZOFRAN) injection 4 mg  4 mg IntraVENous Q6H PRN      hydrALAZINE (APRESOLINE) 20 mg/mL injection 20 mg  20 mg IntraVENous Q4H PRN      melatonin (rapid dissolve) tablet 5 mg  5 mg Oral QHS PRN      alum-mag hydroxide-simeth (MYLANTA) oral suspension 30 mL  30 mL Oral Q4H PRN      oxyCODONE IR (ROXICODONE) tablet 5 mg  5 mg Oral Q4H PRN      morphine injection 2 mg  2 mg IntraVENous Q4H PRN      diphenhydrAMINE (BENADRYL) injection 25 mg  25 mg IntraVENous Q6H PRN      diphenhydrAMINE (BENADRYL) capsule 25 mg  25 mg Oral Q6H PRN      simethicone (MYLICON) tablet 80 mg  80 mg Oral QID PRN      metroNIDAZOLE (FLAGYL) IVPB premix 500 mg  500 mg IntraVENous Q12H 500 mg at 04/02/21 0153    cefepime (MAXIPIME) 2 g in sterile water (preservative free) 10 mL IV syringe  2 g IntraVENous Q12H 2 g at 15/35/98 4899    folic acid (FOLVITE) tablet 1 mg  1 mg Oral DAILY 1 mg at 04/01/21 0941    L.acidophilus-paracasei-S.thermophil-bifidobacter (RISAQUAD) 8 billion cell capsule  1 Cap Oral DAILY 1 Cap at 04/01/21 0940    heparin (porcine) injection 5,000 Units  5,000 Units SubCUTAneous Q8H 5,000 Units at 04/02/21 0153    sodium chloride (NS) flush 5-10 mL  5-10 mL IntraVENous PRN             Case discussed with:      Lia Garcia DO

## 2021-04-02 NOTE — PROGRESS NOTES
[address] Dosing Services: Vancomycin Level Evaluation 4/1/21     Consult for antibiotic dosing of vancomycin by Dr. Wilson Prabhakar  Indication: Left elbow septic arthritis s/p I&D 3/28 in setting of chronic immunosuppression  Day of Therapy: 5    Vancomycin trough: 13.7mcg/mL drawn late extrapolates to 14.2mcg/mL     Level is slightly below goal (15-20mcg/mL), however, AUC is between 400-600. Increasing the dose at any rate would predict a supratherapeutic trough, therefore for now, it is best to keep the     same dose/schedule unless patient's condition worsens. Pharmacy to continue to follow daily. Thank you,    Brittni Sweet. Diane, Pharm D. Contact: [phone]      Date Dose & Interval Measured (mcg/mL) Extrapolated (mcg/mL)   ? 3/30 at 1820 ?1000 mg IV q24h ?5.1 ? 5.47   ?3/31  ? 750mg q12h ? ?   ?4/1 ? 750mg q12h ?13.7 ?14.16

## 2021-04-02 NOTE — DISCHARGE INSTRUCTIONS
TRANSFER  INSTRUCTIONS    NAME: Leia Laws   :  1930   MRN:  [id number]     Date:    2021     ADMIT DATE: 3/27/2021     TRANSFER DATE: 2021     DISPOSITION: SNF    DISCHARGE DIAGNOSIS:  Principal Problem:     Septic arthritis of elbow, left ([facility] (3/28/2021)    DM type 2 (diabetes mellitus, type 2) ([facility] (2016)    HTN (hypertension), benign (2016)    Hypothyroid (2016)    RA (rheumatoid arthritis) ([facility] (2016)    Diverticulitis of sigmoid colon (3/28/2021)    Immunocompromised state due to drug therapy ([facility] (3/28/2021)    MEDICATIONS: See medication list on the AVS. Resume Methotrexate once IV antibiotics are completed - 21    Recommended diet:  Diabetic Diet    Recommended activity: Activity as tolerated    Follow Up: Follow-up Information     Follow up With Specialties Details Why Laila Moraes 39. [address], [address] [place] [address]  [phone]      Lonnie Drake Physician Assistant, Orthopedic Surgery, Physician Assistant Call to schedule follow up and review  [address] [place] [address]  [phone]            Information obtained by :  I understand that if any problems occur once I am at home I am to contact my physician. I understand and acknowledge receipt of the instructions indicated above.                                                                                                                                            Physician's or R.N.'s Signature                                                                  Date/Time                                                                                                                                              Patient or Representative Signature Date/Time                           Patient Name:Christy Childress   Date of procedure:3/28/2021   Procedure:Procedure(s):  LEFT ELBOW INCISION AND DRAINAGE   Surgeon:Surgeon(s) and Role:     Elier Womack MD - Primary   PCP: Ayde Torre MD   Date of discharge: No discharge date for patient encounter. For nursing staff, please review the special instructions below. If you have any questions you may contact my office at [phone]. Follow up appointment:  Please call our office at [phone] for your follow up appointment 4/12/2021 13:00. Physical Therapy / Nursing:  Physical Therapy following surgery will be arranged at your care facility or though Home Health. Please do not bear weight on your left arm. You may work on left digit motion. Continue elevation of your left arm. Keep the splint/dressing clean, dry, and intact. Showering/ Bathing:  Please keep your dressings/incisions dry until cleared by your surgeon(s). DRIVING  You should not return to driving until you are off all opioid pain medications and able to safely and quickly apply the brakes. Please discuss further with your surgeon at the office appointment. Important Signs and Symptoms:  If any of the following signs or symptoms occur, you should contact your surgeons office. Please be advised if a problem arises which you feel requires immediate medical attention, you should seek immediate medical attention at the ER or other health care facility you have access to.    1. A sudden increase in swelling and/or redness or warmth at the area your surgery was performed which isnt relieved by rest, ice, and elevation. 2. Oral temperature greater than 101 degrees for 12 hours or more which isnt relieved by an increase in fluid intake and over-the-counter medicines. 3. Excessive drainage from your incisions, or drainage which hasnt stopped by 72 hours after your surgery.   4. Fever, chills, shortness of breath, chest pain, nausea, vomiting or other signs and symptoms which are of concern to you.         Troy Webster MD  [facility]  Office [phone]

## 2021-04-02 NOTE — PROGRESS NOTES
ORTHO PROGRESS NOTE      SUBJECTIVE:  Leia Laws has min elbow pain. OBJECTIVE:  Patient Vitals for the past 24 hrs:   Temp Pulse BP   04/02/21 0823 97.1 °F (36.2 °C) 86 136/63   04/02/21 0337 98 °F (36.7 °C) 78 (!) 150/67   04/01/21 2317 98 °F (36.7 °C) 78 (!) 158/70   04/01/21 1950 98 °F (36.7 °C) 76 (!) 155/68   04/01/21 1518 97.7 °F (36.5 °C) 77 139/62       Alert, no distress. Sitting in chair. Family present. Respirations unlabored. Dressing/splint CDI. Moves fingers some but some chronic deformities and flexion limitation. Digits SILT. CR < 2 secs. Recent Labs     04/02/21  0213 03/31/21  0546 03/31/21  0546   HGB  --   --  8.9*   HCT  --   --  28.5*   PLT  --   --  289   BUN  --   --  8   CREA 0.60   < > 0.66   GFRAA >60   < > >60   GFRNA >60   < > >60    < > = values in this interval not displayed. Cultures NGTD. PT/OT:   Gait:  Gait  Base of Support: Widened  Speed/Karena: Fluctuations  Gait Abnormalities: Decreased step clearance  Ambulation - Level of Assistance: Contact guard assistance  Distance (ft): 150 Feet (ft)  Assistive Device: Gait belt, Walker, rolling                 ASSESSMENT:  Procedure: Procedure(s):  LEFT ELBOW INCISION AND DRAINAGE  Post Op day: 5 Days Post-Op  Suspected septic arthritis, left elbow    PLAN:  1. Continue current antibiotic regimen per Dr. Ismael Anthony. Elmer Hopkins need long term IV abx due to immunosuppression. 2. Continue splint.  Do not remove.  Will change dressing on office followup.  OK digit AROM. 3. DVT ppx: per medicine. 4. Followup with Clarence Landa PA-C, [facility] 4/12/2021 13:00.      Please call again if questions.     Cecilie Leyden, PA  Orthopedic Trauma Service  [facility]

## 2021-04-02 NOTE — PROGRESS NOTES
Bedside and Verbal shift change report given to [facility] (oncoming nurse) by William Early (offgoing nurse). Report included the following information SBAR, Kardex, Intake/Output, MAR, Recent Results and Med Rec Status.

## 2021-04-02 NOTE — PROGRESS NOTES
Jose E Linares thomas [place] [address]  [address] [facility], [address] [place] [address]  [phone]      Medical Progress Note      NAME:         Natividad Patel   :        1930  MRM:        [id number]    Date of service: 2021      Subjective: Patient has been seen and examined as a follow up for multiple medical issues. Chart, labs, diagnostics reviewed. She remains well. No new symptoms. No fever or chills. Objective:    Vital Signs:    Visit Vitals  /63 (BP 1 Location: Right lower arm, BP Patient Position: At rest)   Pulse 86   Temp 97.1 °F (36.2 °C)   Resp 16   Ht 4' 11\" (1.499 m)   Wt 63 kg (138 lb 14.2 oz)   SpO2 99%   BMI 28.05 kg/m²          Intake/Output Summary (Last 24 hours) at 2021 1113  Last data filed at 2021 0537  Gross per 24 hour   Intake 360 ml   Output 1450 ml   Net -1090 ml        Physical Examination:    General:   Well looking and not in any acute distress    Eyes:   pink conjunctivae, PERRLA with no discharge. Pulm: clear breath sounds without crackles or wheezes  Card:  normal S1, S2 without thrills, bruits or peripheral edema  Abd:  Soft, non tender, non-distended, normoactive bowel sounds  Musc:  No cyanosis, clubbing, atrophy or deformities. Dressed left UE  Neuro: Awake and alert.  Generally a non focal exam.   Psych:  Has a fair insight to her illness     Current Facility-Administered Medications   Medication Dose Route Frequency    cefTRIAXone (ROCEPHIN) 2 g in sterile water (preservative free) 20 mL IV syringe  2 g IntraVENous Q24H    metroNIDAZOLE (FLAGYL) tablet 500 mg  500 mg Oral Q12H    alteplase (CATHFLO) 1 mg in sterile water (preservative free) 1 mL injection  1 mg InterCATHeter PRN    famotidine (PEPCID) tablet 20 mg  20 mg Oral DAILY    amLODIPine (NORVASC) tablet 10 mg  10 mg Oral DAILY    insulin lispro (HUMALOG) injection   SubCUTAneous AC&HS    aspirin delayed-release tablet 81 mg  81 mg Oral DAILY    levothyroxine (SYNTHROID) tablet 75 mcg  75 mcg Oral ACB    [Held by provider] lisinopriL (PRINIVIL, ZESTRIL) tablet 40 mg  40 mg Oral DAILY    glucose chewable tablet 16 g  4 Tab Oral PRN    dextrose (D50W) injection syrg 12.5-25 g  25-50 mL IntraVENous PRN    glucagon (GLUCAGEN) injection 1 mg  1 mg IntraMUSCular PRN    sodium chloride (NS) flush 5-40 mL  5-40 mL IntraVENous Q8H    sodium chloride (NS) flush 5-40 mL  5-40 mL IntraVENous PRN    acetaminophen (TYLENOL) tablet 650 mg  650 mg Oral Q6H PRN    Or    acetaminophen (TYLENOL) suppository 650 mg  650 mg Rectal Q6H PRN    polyethylene glycol (MIRALAX) packet 17 g  17 g Oral DAILY PRN    bisacodyL (DULCOLAX) suppository 10 mg  10 mg Rectal DAILY PRN    ondansetron (ZOFRAN) injection 4 mg  4 mg IntraVENous Q6H PRN    hydrALAZINE (APRESOLINE) 20 mg/mL injection 20 mg  20 mg IntraVENous Q4H PRN    melatonin (rapid dissolve) tablet 5 mg  5 mg Oral QHS PRN    alum-mag hydroxide-simeth (MYLANTA) oral suspension 30 mL  30 mL Oral Q4H PRN    oxyCODONE IR (ROXICODONE) tablet 5 mg  5 mg Oral Q4H PRN    morphine injection 2 mg  2 mg IntraVENous Q4H PRN    diphenhydrAMINE (BENADRYL) injection 25 mg  25 mg IntraVENous Q6H PRN    diphenhydrAMINE (BENADRYL) capsule 25 mg  25 mg Oral Q6H PRN    simethicone (MYLICON) tablet 80 mg  80 mg Oral QID PRN    folic acid (FOLVITE) tablet 1 mg  1 mg Oral DAILY    L.acidophilus-paracasei-S.thermophil-bifidobacter (RISAQUAD) 8 billion cell capsule  1 Cap Oral DAILY    heparin (porcine) injection 5,000 Units  5,000 Units SubCUTAneous Q8H    sodium chloride (NS) flush 5-10 mL  5-10 mL IntraVENous PRN        Laboratory data and review:    Recent Labs     03/31/21  0546   WBC 8.2   HGB 8.9*   HCT 28.5*        Recent Labs     04/02/21  0213 04/01/21  0521 03/31/21  0546   NA  --   --  138   K  --   --  3.5   CL  --   --  107   CO2  --   --  25   GLU  --   --  93   BUN  --   -- 8   CREA 0.60 0.64 0.66   CA  --   --  8.0*     No components found for: Ana 91: Imaging studies have been reviewed    Telemetry reviewed by me:   normal sinus rhythm    Assessment and Plan:    Septic arthritis of elbow, left ([facility] (3/28/2021) /  Severe sepsis ([facility] (3/28/2021) POA: improving. Seen by ortho and she is sp I&D done 3/28. Cultures still have no growth. Seen by ID who recommended IV Ceftriaxone through 5/10/21. She already has a PICC line. Awaiting insurance authorization for SNF placement. Diverticulitis of sigmoid colon (3/28/2021) POA: mild and uncomplicated. Noted on CT scan abdomen. Seen by Gi who doubts diagnosis. No new symptoms. Continue IV Ceftriaxone and Metronidazole      DM type 2 (diabetes mellitus, type 2) ([facility] (4/9/2016) POA: A1c 5.4. Continue DM diet. Monitor blood glucose, SSi per protocol. No home medications    HTN (hypertension), benign (4/9/2016) POA: BP has remained stable. Continue Amlodipine. Resume ACEi if BP remains elevated    RA (rheumatoid arthritis) ([facility] (4/9/2016) / Immunocompromised state due to drug therapy (3/28/2021) POA: continue pain control.  Methotrexate on hold    Hypothyroid (4/9/2016)  POA: Continue Levothyroxine    Total time spent for the patient's care: [address] discussed with: Patient, Family, Care Manager and Nursing Staff    Discussed:  Care Plan and D/C Planning    Prophylaxis:  Hep SQ    Anticipated Disposition:  SNF/LTC           ___________________________________________________    Attending Physician:   Hayden Benoit MD

## 2021-04-02 NOTE — DISCHARGE SUMMARY
Jose E Linares [facility]  [address] [facility], [address] [place] [address]  Tel: [phone]    Physician Discharge Summary    Patient ID:    Jelly Haynes  Age:              80 y.o.    : 1930  MRN:             [id number]     PCP: Daniel Torres MD     Date of Admission: 3/27/2021    Date of Discharge:  2021    Principal admission Diagnosis:   Severe sepsis ([address] [A41.9, R65.20]  Lactic acidosis [E87.2]  Leukocytosis [D72.829]  LLQ pain [R10.32]  Diverticulitis of sigmoid colon [K57.32]  Intractable nausea and vomiting [R11.2]  Hyponatremia [E87.1]  Arthritis of left elbow [M19.022]    Discharge Diagnoses:  Principal Problem:    Septic arthritis of elbow, left ([address] (3/28/2021)    Diverticulitis of sigmoid colon (3/28/2021)    DM type 2 (diabetes mellitus, type 2) ([address] (2016)    HTN (hypertension), benign (2016)    Hypothyroid (2016)    RA (rheumatoid arthritis) ([address] (2016)    Immunocompromised state due to drug therapy ([address] (3/28/2021)    Consults: ID and Orthopedic Surgery    Hospital Course:     Ms. Karine Ramos is a 80 y.o. admitted to [facility] and treated for the following:    Septic arthritis of elbow, left ([address] (3/28/2021) /  Severe sepsis ([address] (3/28/2021) POA: improving. Seen by ortho and she is sp I&D done 3/28. Cultures unremarkable. Seen by ID. Continue IV Ceftriaxone through 5/10/21. She already has a PICC line. She will be  Discharged to the [facility] [place] today. Outpatient follow up with orthopedic surgery        Diverticulitis of sigmoid colon (3/28/2021) POA: mild and uncomplicated. Noted on CT scan abdomen. Seen by Gi who doubts diagnosis. No new symptoms. Continue IV Ceftriaxone as above as well as Metronidazole through 21      DM type 2 (diabetes mellitus, type 2) ([facility] (2016) POA: A1c 5.4. Continue DM diet.  No home medications     HTN (hypertension), benign (2016) POA: BP has remained stable. Continue Amlodipine. Hold Lisinopril but could be resumed if BP trends upwards      RA (rheumatoid arthritis) ([facility] (4/9/2016) / Immunocompromised state due to drug therapy (3/28/2021) POA: continue pain control. Resume Methotrexate once she has completed the IV antibiotic course.      Hypothyroid (4/9/2016)  POA: Continue Levothyroxine     Discharge Exam:    Visit Vitals  /63 (BP 1 Location: Right lower arm, BP Patient Position: At rest)   Pulse 86   Temp 97.1 °F (36.2 °C)   Resp 16   Ht 4' 11\" (1.499 m)   Wt 63 kg (138 lb 14.2 oz)   SpO2 99%   BMI 28.05 kg/m²      Patient has been seen and examined. See my earlier progress note    Activity: Activity as tolerated    Diet: Diabetic Diet    Current Discharge Medication List      START taking these medications    Details   cefTRIAXone 2 gram 2 g IV syringe 2 g by IntraVENous route every twenty-four (24) hours for 37 days. Qty: 37 Dose, Refills: 0      melatonin, rapid dissolve, 5 mg TbDi tablet Take 1 Tab by mouth nightly as needed for Other (insomnia) for up to 10 days. Qty: 10 Tab, Refills: 0      metroNIDAZOLE (FLAGYL) 500 mg tablet Take 1 Tab by mouth every twelve (12) hours for 7 days. Qty: 14 Tab, Refills: 0      oxyCODONE IR (ROXICODONE) 5 mg immediate release tablet Take 1 Tab by mouth every four (4) hours as needed for Pain for up to 5 doses. Max Daily Amount: 30 mg.  Qty: 5 Tab, Refills: 0    Associated Diagnoses: Pyogenic arthritis of left elbow, due to unspecified organism ([facility]         CONTINUE these medications which have NOT CHANGED    Details   folic acid (FOLVITE) 1 mg tablet Take 1 mg by mouth daily. docusate sodium (Colace) 100 mg capsule Take 100 mg by mouth two (2) times daily as needed for Constipation. levothyroxine (SYNTHROID) 75 mcg tablet Take 1 Tab by mouth Daily (before breakfast). Qty: 30 Tab, Refills: 1      polyethylene glycol (Miralax) 17 gram packet Take 1 Packet by mouth daily.   Qty: 30 Packet, Refills: 1      L.acidoph & parac-S.therm-Bifido (BRENNEN Q2/RISAQUAD-2) 16 billion cell cap cap Take 1 Cap by mouth daily. Qty: 30 Cap, Refills: 0      amLODIPine (NORVASC) 10 mg tablet Take 10 mg by mouth daily. acetaminophen (Tylenol Extra Strength) 500 mg tablet Take 500 mg by mouth two (2) times daily as needed for Pain.      multivit-min/iron/folic/lutein (CENTRUM SILVER WOMEN PO) Take 1 Tab by mouth every other day. cholecalciferol (VITAMIN D3) 1,000 unit cap Take 2,000 Units by mouth daily. aspirin delayed-release 81 mg tablet Take 81 mg by mouth daily. STOP taking these medications       methotrexate (RHEUMATREX) 2.5 mg tablet Comments:   Reason for Stopping:         lisinopriL (PRINIVIL, ZESTRIL) 40 mg tablet Comments:   Reason for Stopping:         ondansetron (Zofran ODT) 4 mg disintegrating tablet Comments:   Reason for Stopping: Follow-up Information     Follow up With Specialties Details Why Laila Garciaeric 39. [address]  [phone]      Kwadwo Hunter Physician Assistant, Orthopedic Surgery, Physician Assistant Call to schedule follow up and review  [address] [place] [address] [phone]            Follow-up tests or labs: None    Discharge Condition: Stable    Disposition: SNF    Time taken to arrange discharge:  35 minutes.     Signed:  Gibran Hoyt MD     [facility]  4/2/2021   12:19 PM

## 2021-04-06 VITALS
HEIGHT: 59 IN | HEART RATE: 86 BPM | OXYGEN SATURATION: 99 % | WEIGHT: 138.89 LBS | DIASTOLIC BLOOD PRESSURE: 63 MMHG | RESPIRATION RATE: 16 BRPM | BODY MASS INDEX: 28 KG/M2 | TEMPERATURE: 97.1 F | SYSTOLIC BLOOD PRESSURE: 136 MMHG

## 2021-04-07 ENCOUNTER — PATIENT OUTREACH (OUTPATIENT)
Dept: CASE MANAGEMENT | Age: 86
End: 2021-04-07

## 2021-04-07 NOTE — PROGRESS NOTES
Post Acute Facility Update     *The information contained in this note was received during a weekly care coordination call with the post acute facility*    Current Facility: [facility] [address] (SNF)  Anticipated Discharge Date: TBD    Facility Nursing Update: IV abx scheduled until 5/10  Facility Social Work Update: No current updates   Upper Extremity Assistance: Minimal Assistance   Lower Extremity Assistance: Minimal Assistance   Bed Mobility: Independent  Transfers: Contact Guard Assist - hands on patient for balance   Ambulation: Contact Guard Assist - hands on patient for balance   How far (in feet) is the patient ambulating?  150 ft  Device Used: Walker   Barriers to Discharge: CHAKA Ackerman MSW  [facility]

## 2021-04-14 ENCOUNTER — PATIENT OUTREACH (OUTPATIENT)
Dept: CASE MANAGEMENT | Age: 86
End: 2021-04-14

## 2021-04-14 NOTE — PROGRESS NOTES
Post Acute Facility Update     *The information contained in this note was received during a weekly care coordination call with the post acute facility*    Current Facility: [address] (SNF)  Anticipated Discharge Date: TBD    Facility Nursing Update: Continue IV abx until 5/10  Facility Social Work Update: No current updates   Upper Extremity Assistance: Contact Guard Assist - hands on patient for balance   Lower Extremity Assistance: Minimal Assistance   Bed Mobility: Independent  Transfers: Stand by Assist - Presence and Cueing  Ambulation: Contact Guard Assist - hands on patient for balance   How far (in feet) is the patient ambulating?  300 ft  Device Used: Walker   Barriers to Discharge: CHAKA Guerrero MSW  [facility]

## 2021-04-19 ENCOUNTER — TELEPHONE (OUTPATIENT)
Dept: FAMILY MEDICINE CLINIC | Age: 86
End: 2021-04-19

## 2021-04-19 NOTE — TELEPHONE ENCOUNTER
Pt son calling to ask Dr Tello Kirby if his mother does need 6 weeks of IV antibiotcs? States she is in rehab and wants to leave so if she needs to continue they will need to arrange for her to get it at her home instead.    Please call [phone]

## 2021-04-21 ENCOUNTER — PATIENT OUTREACH (OUTPATIENT)
Dept: CASE MANAGEMENT | Age: 86
End: 2021-04-21

## 2021-04-22 NOTE — PROGRESS NOTES
Post Acute Facility Update     *The information contained in this note was received during a weekly care coordination call with the post acute facility*    Current Facility: [address] [place] [address] ([facility])  Anticipated Discharge Date: 4/23/2021    Facility Nursing Update: Patients son to be trained on IV administration. Facility Social Work Update: :Last insured day 4/22, 4/23 patient to discharge home, son will be trained on IV administration and  will be staying with patient. Upper Extremity Assistance: Independent  Lower Extremity Assistance: Independent  Bed Mobility: Stand by Assist - Presence and Cueing  Transfers: Stand by Assist - Presence and Cueing  Ambulation: Contact Guard Assist - hands on patient for balance   How far (in feet) is the patient ambulating?  Not noted  Device Used: walker  Barriers to Discharge: CHAKA KELLYN, RN  [employer]

## 2021-04-28 ENCOUNTER — PATIENT OUTREACH (OUTPATIENT)
Dept: CASE MANAGEMENT | Age: 86
End: 2021-04-28

## 2021-04-29 NOTE — PROGRESS NOTES
[address] [place] [address] Dr Discharge Note    *The information contained in this note was received during a weekly care coordination call with the post acute facility*      Patient was discharged from [address] [place] [address] () on 4/23/2021. PCP: MD KALI Colunga appointment: No future appointments. Follow-up PCP appointment on 5/3/2021 @ 1:30P    Home Health/Outpatient orders at discharge: home health care  [facility] [place] at discharge: None  [address] received prior to discharge: [address] [facility] Team will sign off at this time. Medications were not reconciled and general patient assessment was not completed during this skilled nursing facility outreach.      CB [facility]  [facility] Team

## 2021-05-06 ENCOUNTER — TELEPHONE (OUTPATIENT)
Dept: FAMILY MEDICINE CLINIC | Age: 86
End: 2021-05-06

## 2021-05-06 NOTE — TELEPHONE ENCOUNTER
Choco Gunter called from [employer] to advise Dr. Nikita Gonzalez of delay in pt's labs being drawn due to mix up with home health and  regarding pt's IV therapy. [facility] to have [facility]'s [facility] drawn today and results were faxed to Dr. Nikita Gonzalez for review  and [place] [facility] will follow. Pt's PICC line due to be pulled on Monday. Please contact Choco Gunter at [phone] if you need to reach her.  Adenm

## 2021-09-06 ENCOUNTER — APPOINTMENT (OUTPATIENT)
Dept: GENERAL RADIOLOGY | Age: 86
End: 2021-09-06
Attending: STUDENT IN AN ORGANIZED HEALTH CARE EDUCATION/TRAINING PROGRAM
Payer: MEDICARE

## 2021-09-06 ENCOUNTER — HOSPITAL ENCOUNTER (EMERGENCY)
Age: 86
Discharge: HOME | End: 2021-09-06
Attending: STUDENT IN AN ORGANIZED HEALTH CARE EDUCATION/TRAINING PROGRAM
Payer: MEDICARE

## 2021-09-06 VITALS
DIASTOLIC BLOOD PRESSURE: 54 MMHG | SYSTOLIC BLOOD PRESSURE: 151 MMHG | HEIGHT: 59 IN | BODY MASS INDEX: 28.05 KG/M2 | HEART RATE: 74 BPM | OXYGEN SATURATION: 100 % | TEMPERATURE: 98.9 F | RESPIRATION RATE: 19 BRPM

## 2021-09-06 DIAGNOSIS — R07.89 OTHER CHEST PAIN: Primary | ICD-10-CM

## 2021-09-06 LAB
ANION GAP SERPL CALC-SCNC: 8 MMOL/L (ref 5–15)
BASOPHILS # BLD: 0 K/UL (ref 0–0.1)
BASOPHILS NFR BLD: 0 % (ref 0–1)
BUN SERPL-MCNC: 9 MG/DL (ref 6–20)
BUN/CREAT SERPL: 15 (ref 12–20)
CALCIUM SERPL-MCNC: 8.8 MG/DL (ref 8.5–10.1)
CHLORIDE SERPL-SCNC: 101 MMOL/L (ref 97–108)
CO2 SERPL-SCNC: 25 MMOL/L (ref 21–32)
COMMENT, HOLDF: NORMAL
CREAT SERPL-MCNC: 0.61 MG/DL (ref 0.55–1.02)
DIFFERENTIAL METHOD BLD: ABNORMAL
EOSINOPHIL # BLD: 0.1 K/UL (ref 0–0.4)
EOSINOPHIL NFR BLD: 2 % (ref 0–7)
ERYTHROCYTE [DISTWIDTH] IN BLOOD BY AUTOMATED COUNT: 17.3 % (ref 11.5–14.5)
GLUCOSE SERPL-MCNC: 96 MG/DL (ref 65–100)
HCT VFR BLD AUTO: 31 % (ref 35–47)
HGB BLD-MCNC: 9.8 G/DL (ref 11.5–16)
IMM GRANULOCYTES # BLD AUTO: 0 K/UL (ref 0–0.04)
IMM GRANULOCYTES NFR BLD AUTO: 0 % (ref 0–0.5)
LYMPHOCYTES # BLD: 0.9 K/UL (ref 0.8–3.5)
LYMPHOCYTES NFR BLD: 13 % (ref 12–49)
MCH RBC QN AUTO: 25.1 PG (ref 26–34)
MCHC RBC AUTO-ENTMCNC: 31.6 G/DL (ref 30–36.5)
MCV RBC AUTO: 79.5 FL (ref 80–99)
MONOCYTES # BLD: 0.4 K/UL (ref 0–1)
MONOCYTES NFR BLD: 5 % (ref 5–13)
NEUTS SEG # BLD: 5.4 K/UL (ref 1.8–8)
NEUTS SEG NFR BLD: 80 % (ref 32–75)
NRBC # BLD: 0 K/UL (ref 0–0.01)
NRBC BLD-RTO: 0 PER 100 WBC
PLATELET # BLD AUTO: 410 K/UL (ref 150–400)
PMV BLD AUTO: 8.9 FL (ref 8.9–12.9)
POTASSIUM SERPL-SCNC: 3.9 MMOL/L (ref 3.5–5.1)
RBC # BLD AUTO: 3.9 M/UL (ref 3.8–5.2)
SAMPLES BEING HELD,HOLD: NORMAL
SODIUM SERPL-SCNC: 134 MMOL/L (ref 136–145)
TROPONIN I SERPL-MCNC: <0.05 NG/ML
WBC # BLD AUTO: 6.9 K/UL (ref 3.6–11)

## 2021-09-06 PROCEDURE — 93005 ELECTROCARDIOGRAM TRACING: CPT

## 2021-09-06 PROCEDURE — 71046 X-RAY EXAM CHEST 2 VIEWS: CPT

## 2021-09-06 PROCEDURE — 99285 EMERGENCY DEPT VISIT HI MDM: CPT

## 2021-09-06 PROCEDURE — 80048 BASIC METABOLIC PNL TOTAL CA: CPT

## 2021-09-06 PROCEDURE — 84484 ASSAY OF TROPONIN QUANT: CPT

## 2021-09-06 PROCEDURE — 85025 COMPLETE CBC W/AUTO DIFF WBC: CPT

## 2021-09-06 PROCEDURE — 36415 COLL VENOUS BLD VENIPUNCTURE: CPT

## 2021-09-06 NOTE — DISCHARGE INSTRUCTIONS
You presented to the ED with left axilla and chest tightness. Your EKG chest x-ray and troponin were within normal limits. Doubt heart attack at this time. He did not have any rash or signs of shingles at this time however if rash begins to appear shingles is a possibility, at that time please call your PCP for prescription for an antiviral.  Most likely this is muscle skeletal pain, you may take Tylenol 500 mg every 6 hours for pain.

## 2021-09-06 NOTE — ED PROVIDER NOTES
Chief Complaint:  Chest Pain       HPI:  Carmita Chavira is a 80 y.o.  female who presents for evaluation of left axilla pain. Patient states she may have slept funny on her left arm and now has pain in the axilla and anterior chest.  Patient denies any heart history. Patient does have significant injuries to the left elbow and she is following up with orthopedic surgery. Patient does have full range of motion the left arm. The history is provided by the patient and medical records. Chest Pain   This is a new problem. The current episode started 6 to 12 hours ago. The problem has been rapidly improving. The problem occurs constantly. The pain is associated with normal activity (Patient states she may have slept on her arm in awkward position). The pain is present in the lateral region. The pain is at a severity of 3/10. The pain is mild. The quality of the pain is described as pressure-like. The pain does not radiate. Exacerbated by: Nothing. Pertinent negatives include no abdominal pain, no diaphoresis, no dizziness, no exertional chest pressure, no fever, no headaches, no irregular heartbeat, no leg pain, no nausea, no near-syncope, no palpitations, no shortness of breath, no vomiting and no weakness. She has tried OTC pain medications for the symptoms. The treatment provided mild relief. Her past medical history does not include DVT, PE or CHF. Pertinent negatives include no cardiac catheterization.        Past Medical History:   Diagnosis Date    Arthritis, rheumatoid ([facility] [address]     Diabetes ([facility] [facility]     Diverticulitis     Hard of hearing     History of vascular access device 04/01/2021    [facility] VAT 4 FR R Basilic 39/1 LTAbx    Hypertension     Hypothyroid     Osteoporosis, post-menopausal        Past Surgical History:   Procedure Laterality Date    FLEXIBLE SIGMOIDOSCOPY N/A 12/2/2020    SIGMOIDOSCOPY FLEXIBLE performed by Allen Vanessa MD at [address] [facility] HX HEMORRHOIDECTOMY      HX HYSTERECTOMY      HX THYROIDECTOMY           Family History:   Problem Relation Age of Onset    Heart Disease Mother     Diabetes Father     Cancer Brother         brain cancer    Heart Disease Brother     Diabetes Brother     Other Other         scleroderma       Social History     Socioeconomic History    Marital status:      Spouse name: Not on file    Number of children: Not on file    Years of education: Not on file    Highest education level: Not on file   Occupational History    Not on file   Tobacco Use    Smoking status: Former Smoker     Types: Cigarettes     Quit date: 1976     Years since quittin.7    Smokeless tobacco: Never Used   Substance and Sexual Activity    Alcohol use: Yes     Comment: Waylon time 1 glass wine    Drug use: No    Sexual activity: Never   Other Topics Concern     Service Not Asked    Blood Transfusions Not Asked    Caffeine Concern Not Asked    Occupational Exposure Not Asked    Hobby Hazards Not Asked    Sleep Concern Not Asked    Stress Concern Not Asked    Weight Concern Not Asked    Special Diet Not Asked    Back Care Not Asked    Exercise Not Asked    Bike Helmet Not Asked    Seat Belt Not Asked    Self-Exams Not Asked   Social History Narrative    ** Merged History Encounter **          Social Determinants of Health     Financial Resource Strain:     Difficulty of Paying Living Expenses:    Food Insecurity:     Worried About Running Out of Food in the Last Year:     Ran Out of Food in the Last Year:    Transportation Needs:     Lack of Transportation (Medical):      Lack of Transportation (Non-Medical):    Physical Activity:     Days of Exercise per Week:     Minutes of Exercise per Session:    Stress:     Feeling of Stress :    Social Connections:     Frequency of Communication with Friends and Family:     Frequency of Social Gatherings with Friends and Family:     Attends Hindu Services:     Active Member of Clubs or Organizations:     Attends Club or Organization Meetings:     Marital Status:    Intimate Partner Violence:     Fear of Current or Ex-Partner:     Emotionally Abused:     Physically Abused:     Sexually Abused: ALLERGIES: Alendronate, Amoxicillin, and Hydrochlorothiazide    Review of Systems   Constitutional: Negative. Negative for diaphoresis and fever. HENT: Negative. Eyes: Negative. Respiratory: Negative. Negative for shortness of breath. Cardiovascular: Positive for chest pain. Negative for palpitations and near-syncope. Gastrointestinal: Negative. Negative for abdominal pain, nausea and vomiting. Endocrine: Negative. Genitourinary: Negative. Musculoskeletal: Negative. Skin: Negative. Allergic/Immunologic: Negative. Neurological: Negative. Negative for dizziness, weakness and headaches. Hematological: Negative. Psychiatric/Behavioral: Negative. There were no vitals filed for this visit. Physical Exam  Vitals and nursing note reviewed. Constitutional:       General: She is not in acute distress. Appearance: Normal appearance. HENT:      Head: Normocephalic and atraumatic. Right Ear: External ear normal.      Left Ear: External ear normal.      Nose: Nose normal.      Mouth/Throat:      Mouth: Mucous membranes are moist.      Pharynx: Oropharynx is clear. No posterior oropharyngeal erythema. Eyes:      Extraocular Movements: Extraocular movements intact. Conjunctiva/sclera: Conjunctivae normal.   Cardiovascular:      Rate and Rhythm: Normal rate. Pulses: Normal pulses. Radial pulses are 2+ on the right side and 2+ on the left side. Heart sounds: Normal heart sounds. Pulmonary:      Effort: Pulmonary effort is normal.      Breath sounds: Normal breath sounds. Chest:      Chest wall: No deformity or tenderness.       Breasts: Breasts are symmetrical.         Right: No swelling or skin change. Left: No swelling or skin change. Abdominal:      General: Abdomen is flat. There is no distension. Tenderness: There is no abdominal tenderness. Musculoskeletal:         General: No deformity or signs of injury. Normal range of motion. Cervical back: Normal range of motion and neck supple. No tenderness. Skin:     General: Skin is warm and dry. Capillary Refill: Capillary refill takes less than 2 seconds. Neurological:      General: No focal deficit present. Mental Status: She is alert and oriented to person, place, and time. Psychiatric:         Mood and Affect: Mood normal.         Behavior: Behavior normal.          [facility]  ED Course as of Sep 06 1849   [facility] Sep 06, 2021   1044 EKG interpretation:   Rhythm: normal sinus rhythm; and regular . Rate (approx.): 84; Axis: normal; Intervals: normal ; ST/T wave: normal; EKG documented and interpreted by Ramu Noel. Sheral Halsted, MD, ED MD.      [AL]      ED Course User Index  [AL] Jordyn Maguire MD     LABORATORY RESULTS:  Labs Reviewed   CBC WITH AUTOMATED DIFF - Abnormal; Notable for the following components:       Result Value    HGB 9.8 (*)     HCT 31.0 (*)     MCV 79.5 (*)     MCH 25.1 (*)     RDW 17.3 (*)     PLATELET 916 (*)     NEUTROPHILS 80 (*)     All other components within normal limits   METABOLIC PANEL, BASIC - Abnormal; Notable for the following components:    Sodium 134 (*)     All other components within normal limits   TROPONIN I   SAMPLES BEING HELD       IMAGING RESULTS:  XR CHEST PA LAT   Final Result   1. No radiographic evidence of acute cardiopulmonary disease. MEDICATIONS GIVEN:  Medications - No data to display    Differential diagnosis: ACS, cellulitis, abscess, shingles, muscle skeletal injury    ED physician interpretation of imaging: Chest x-ray without left shoulder injury, hemopneumothorax, focal pneumonia.   ED physician interpretation of laboratory results: Troponin unmeasurable, hemoglobin at baseline. Other lab work unremarkable. MDM: Patient is a functional 66-year-old female presenting to the ED with left axilla pain after waking up in the morning. Patient was concerned for heart problems but he is unlikely with a unremarkable EKG, chest x-ray and unmeasurable troponin that would likely be positive she was having ACS. Patient also worried about possible breast aspect of her pain however pain is acute in nature, in her upper axilla. Patient instructed to follow-up with PCP for further evaluation. PCP stated that she was past the age where she needed to get mammograms however she is having pain there further investigation is not unreasonable but not indicated in the ED. Most likely patient has some muscle skeletal pain from significant elbow problems but is following up closely with orthopedic surgery for further evaluation. Patient has full range of motion of his arm, is neurovascularly intact. No indication for further ED evaluation at this time. Patient states understanding and will follow up with your PCP and specialist doctors as needed. Key discharge instructions: You presented to the ED with left axilla and chest tightness. Your EKG chest x-ray and troponin were within normal limits. Doubt heart attack at this time. He did not have any rash or signs of shingles at this time however if rash begins to appear shingles is a possibility, at that time please call your PCP for prescription for an antiviral.  Most likely this is muscle skeletal pain, you may take Tylenol 500 mg every 6 hours for pain. The patient has been re-evaluated and feeling better. Patient is stable for discharge. All available radiology and laboratory results have been reviewed with patient and/or available family.   Patient and/or family verbally conveyed their understanding and agreement of the patient's signs, symptoms, diagnosis, treatment and prognosis and additionally agree to follow-up as recommended in the discharge instructions or to return to the Emergency Department should their condition change or worsen prior to their follow-up appointment. All questions have been answered and patient and/or available family express understanding. IMPRESSION:  1. Other chest pain        DISPOSITION: Discharged    Omi Montejo MD        Procedures

## 2021-09-06 NOTE — ED TRIAGE NOTES
Pt comes to ED for pain in left side that radiates into neck and through chest since 5am.  Pt denies sob. States that she took 324 asa pta and now is not experiencing pain. Pt is a&ox4, hr regular, resp deep and unlabored on cardiac monitorx4.  In NAD

## 2021-09-07 LAB
ATRIAL RATE: 84 BPM
CALCULATED P AXIS, ECG09: 56 DEGREES
CALCULATED R AXIS, ECG10: 47 DEGREES
CALCULATED T AXIS, ECG11: 60 DEGREES
DIAGNOSIS, 93000: NORMAL
P-R INTERVAL, ECG05: 192 MS
Q-T INTERVAL, ECG07: 388 MS
QRS DURATION, ECG06: 68 MS
QTC CALCULATION (BEZET), ECG08: 458 MS
VENTRICULAR RATE, ECG03: 84 BPM

## 2022-01-18 ENCOUNTER — APPOINTMENT (OUTPATIENT)
Dept: CT IMAGING | Age: 87
End: 2022-01-18
Attending: EMERGENCY MEDICINE
Payer: MEDICARE

## 2022-01-18 ENCOUNTER — HOSPITAL ENCOUNTER (EMERGENCY)
Age: 87
Discharge: HOME | End: 2022-01-18
Attending: EMERGENCY MEDICINE
Payer: MEDICARE

## 2022-01-18 ENCOUNTER — APPOINTMENT (OUTPATIENT)
Dept: GENERAL RADIOLOGY | Age: 87
End: 2022-01-18
Attending: EMERGENCY MEDICINE
Payer: MEDICARE

## 2022-01-18 VITALS
SYSTOLIC BLOOD PRESSURE: 176 MMHG | OXYGEN SATURATION: 99 % | TEMPERATURE: 98.3 F | HEART RATE: 93 BPM | BODY MASS INDEX: 23.33 KG/M2 | RESPIRATION RATE: 18 BRPM | WEIGHT: 115.74 LBS | DIASTOLIC BLOOD PRESSURE: 61 MMHG | HEIGHT: 59 IN

## 2022-01-18 DIAGNOSIS — R42 VERTIGO: Primary | ICD-10-CM

## 2022-01-18 LAB
ALBUMIN SERPL-MCNC: 3.4 G/DL (ref 3.5–5)
ALBUMIN/GLOB SERPL: 0.8 {RATIO} (ref 1.1–2.2)
ALP SERPL-CCNC: 101 U/L (ref 45–117)
ALT SERPL-CCNC: 14 U/L (ref 12–78)
ANION GAP SERPL CALC-SCNC: 5 MMOL/L (ref 5–15)
APPEARANCE UR: CLEAR
AST SERPL-CCNC: 14 U/L (ref 15–37)
BACTERIA URNS QL MICRO: NEGATIVE /HPF
BASOPHILS # BLD: 0 K/UL (ref 0–0.1)
BASOPHILS NFR BLD: 1 % (ref 0–1)
BILIRUB SERPL-MCNC: 0.4 MG/DL (ref 0.2–1)
BILIRUB UR QL: NEGATIVE
BUN SERPL-MCNC: 15 MG/DL (ref 6–20)
BUN/CREAT SERPL: 19 (ref 12–20)
CALCIUM SERPL-MCNC: 9.1 MG/DL (ref 8.5–10.1)
CHLORIDE SERPL-SCNC: 104 MMOL/L (ref 97–108)
CO2 SERPL-SCNC: 29 MMOL/L (ref 21–32)
COLOR UR: NORMAL
COMMENT, HOLDF: NORMAL
CREAT SERPL-MCNC: 0.79 MG/DL (ref 0.55–1.02)
DIFFERENTIAL METHOD BLD: ABNORMAL
EOSINOPHIL # BLD: 0.2 K/UL (ref 0–0.4)
EOSINOPHIL NFR BLD: 3 % (ref 0–7)
EPITH CASTS URNS QL MICRO: NORMAL /LPF
ERYTHROCYTE [DISTWIDTH] IN BLOOD BY AUTOMATED COUNT: 16.5 % (ref 11.5–14.5)
GLOBULIN SER CALC-MCNC: 4.2 G/DL (ref 2–4)
GLUCOSE SERPL-MCNC: 112 MG/DL (ref 65–100)
GLUCOSE UR STRIP.AUTO-MCNC: NEGATIVE MG/DL
HCT VFR BLD AUTO: 35.1 % (ref 35–47)
HGB BLD-MCNC: 11.2 G/DL (ref 11.5–16)
HGB UR QL STRIP: NEGATIVE
HYALINE CASTS URNS QL MICRO: NORMAL /LPF (ref 0–5)
IMM GRANULOCYTES # BLD AUTO: 0 K/UL (ref 0–0.04)
IMM GRANULOCYTES NFR BLD AUTO: 0 % (ref 0–0.5)
KETONES UR QL STRIP.AUTO: NEGATIVE MG/DL
LEUKOCYTE ESTERASE UR QL STRIP.AUTO: NEGATIVE
LYMPHOCYTES # BLD: 1.1 K/UL (ref 0.8–3.5)
LYMPHOCYTES NFR BLD: 14 % (ref 12–49)
MAGNESIUM SERPL-MCNC: 2.3 MG/DL (ref 1.6–2.4)
MCH RBC QN AUTO: 27.4 PG (ref 26–34)
MCHC RBC AUTO-ENTMCNC: 31.9 G/DL (ref 30–36.5)
MCV RBC AUTO: 85.8 FL (ref 80–99)
MONOCYTES # BLD: 0.6 K/UL (ref 0–1)
MONOCYTES NFR BLD: 8 % (ref 5–13)
NEUTS SEG # BLD: 6.2 K/UL (ref 1.8–8)
NEUTS SEG NFR BLD: 74 % (ref 32–75)
NITRITE UR QL STRIP.AUTO: NEGATIVE
NRBC # BLD: 0 K/UL (ref 0–0.01)
NRBC BLD-RTO: 0 PER 100 WBC
PH UR STRIP: 7 [PH] (ref 5–8)
PHOSPHATE SERPL-MCNC: 3.4 MG/DL (ref 2.6–4.7)
PLATELET # BLD AUTO: 327 K/UL (ref 150–400)
PMV BLD AUTO: 9.8 FL (ref 8.9–12.9)
POTASSIUM SERPL-SCNC: 3.6 MMOL/L (ref 3.5–5.1)
PROT SERPL-MCNC: 7.6 G/DL (ref 6.4–8.2)
PROT UR STRIP-MCNC: NEGATIVE MG/DL
RBC # BLD AUTO: 4.09 M/UL (ref 3.8–5.2)
RBC #/AREA URNS HPF: NORMAL /HPF (ref 0–5)
SAMPLES BEING HELD,HOLD: NORMAL
SODIUM SERPL-SCNC: 138 MMOL/L (ref 136–145)
SP GR UR REFRACTOMETRY: <1.005 (ref 1–1.03)
TROPONIN-HIGH SENSITIVITY: 7 NG/L (ref 0–51)
TSH SERPL DL<=0.05 MIU/L-ACNC: 0.34 UIU/ML (ref 0.36–3.74)
UR CULT HOLD, URHOLD: NORMAL
UROBILINOGEN UR QL STRIP.AUTO: 0.2 EU/DL (ref 0.2–1)
WBC # BLD AUTO: 8.2 K/UL (ref 3.6–11)
WBC URNS QL MICRO: NORMAL /HPF (ref 0–4)

## 2022-01-18 PROCEDURE — 84484 ASSAY OF TROPONIN QUANT: CPT

## 2022-01-18 PROCEDURE — 99285 EMERGENCY DEPT VISIT HI MDM: CPT

## 2022-01-18 PROCEDURE — 83735 ASSAY OF MAGNESIUM: CPT

## 2022-01-18 PROCEDURE — 84100 ASSAY OF PHOSPHORUS: CPT

## 2022-01-18 PROCEDURE — 74011250636 HC RX REV CODE- 250/636: Performed by: EMERGENCY MEDICINE

## 2022-01-18 PROCEDURE — 74176 CT ABD & PELVIS W/O CONTRAST: CPT

## 2022-01-18 PROCEDURE — 71045 X-RAY EXAM CHEST 1 VIEW: CPT

## 2022-01-18 PROCEDURE — 81001 URINALYSIS AUTO W/SCOPE: CPT

## 2022-01-18 PROCEDURE — 36415 COLL VENOUS BLD VENIPUNCTURE: CPT

## 2022-01-18 PROCEDURE — 70450 CT HEAD/BRAIN W/O DYE: CPT

## 2022-01-18 PROCEDURE — 85025 COMPLETE CBC W/AUTO DIFF WBC: CPT

## 2022-01-18 PROCEDURE — 80053 COMPREHEN METABOLIC PANEL: CPT

## 2022-01-18 PROCEDURE — 84443 ASSAY THYROID STIM HORMONE: CPT

## 2022-01-18 PROCEDURE — 93005 ELECTROCARDIOGRAM TRACING: CPT

## 2022-01-18 RX ORDER — MECLIZINE HYDROCHLORIDE 25 MG/1
25 TABLET ORAL
Qty: 30 TABLET | Refills: 0 | Status: SHIPPED | OUTPATIENT
Start: 2022-01-18 | End: 2022-01-28

## 2022-01-18 RX ORDER — MECLIZINE HYDROCHLORIDE 25 MG/1
25 TABLET ORAL ONCE
Status: COMPLETED | OUTPATIENT
Start: 2022-01-18 | End: 2022-01-18

## 2022-01-18 RX ORDER — SODIUM CHLORIDE 0.9 % (FLUSH) 0.9 %
5-10 SYRINGE (ML) INJECTION AS NEEDED
Status: DISCONTINUED | OUTPATIENT
Start: 2022-01-18 | End: 2022-01-18 | Stop reason: HOSPADM

## 2022-01-18 RX ORDER — MECLIZINE HYDROCHLORIDE 25 MG/1
25 TABLET ORAL DAILY
Status: DISCONTINUED | OUTPATIENT
Start: 2022-01-19 | End: 2022-01-18

## 2022-01-18 RX ADMIN — MECLIZINE HYDROCHLORIDE 25 MG: 25 TABLET ORAL at 18:59

## 2022-01-18 NOTE — ED PROVIDER NOTES
75-year-old female with a history of diabetes, rheumatoid arthritis, diverticulitis, hypertension and vertigo presents with a chief complaint of dizziness. Patient reports that she has felt similar symptoms in the past and meclizine has helped. She ran out of meclizine today. She also endorses some shortness of breath and some left lower quadrant abdominal pain. She has had multiple bowel movements today without blood or diarrhea.   She denies having any fevers, chills, cough, headache           Past Medical History:   Diagnosis Date    Arthritis, rheumatoid ([address]     Diabetes ([facility] [address]     Diverticulitis     Hard of hearing     History of vascular access device 2021    [facility] VAT 4 FR R Basilic  LTAbx    Hypertension     Hypothyroid     Osteoporosis, post-menopausal        Past Surgical History:   Procedure Laterality Date    FLEXIBLE SIGMOIDOSCOPY N/A 2020    SIGMOIDOSCOPY FLEXIBLE performed by Rodríguez Plascencia MD at [address] [facility] HX HEMORRHOIDECTOMY      HX HYSTERECTOMY      HX THYROIDECTOMY           Family History:   Problem Relation Age of Onset    Heart Disease Mother     Diabetes Father     Cancer Brother         brain cancer    Heart Disease Brother     Diabetes Brother     Other Other         scleroderma       Social History     Socioeconomic History    Marital status:      Spouse name: Not on file    Number of children: Not on file    Years of education: Not on file    Highest education level: Not on file   Occupational History    Not on file   Tobacco Use    Smoking status: Former Smoker     Types: Cigarettes     Quit date: 1976     Years since quittin.1    Smokeless tobacco: Never Used   Substance and Sexual Activity    Alcohol use: Yes     Comment: Waylon time 1 glass wine    Drug use: No    Sexual activity: Never   Other Topics Concern     Service Not Asked    Blood Transfusions Not Asked    Caffeine Concern Not Asked    Occupational Exposure Not Asked   Marcella Harman Hazards Not Asked    Sleep Concern Not Asked    Stress Concern Not Asked    Weight Concern Not Asked    Special Diet Not Asked    Back Care Not Asked    Exercise Not Asked    Bike Helmet Not Asked    Seat Belt Not Asked    Self-Exams Not Asked   Social History Narrative    ** Merged History Encounter **          Social Determinants of Health     Financial Resource Strain:     Difficulty of Paying Living Expenses: Not on file   Food Insecurity:     Worried About Running Out of Food in the Last Year: Not on file    Fuad of Food in the Last Year: Not on file   Transportation Needs:     Lack of Transportation (Medical): Not on file    Lack of Transportation (Non-Medical): Not on file   Physical Activity:     Days of Exercise per Week: Not on file    Minutes of Exercise per Session: Not on file   Stress:     Feeling of Stress : Not on file   Social Connections:     Frequency of Communication with Friends and Family: Not on file    Frequency of Social Gatherings with Friends and Family: Not on file    Attends Zoroastrianism Services: Not on file    Active Member of [address] [place] [address] or Organizations: Not on file    Attends Club or Organization Meetings: Not on file    Marital Status: Not on file   Intimate Partner Violence:     Fear of Current or Ex-Partner: Not on file    Emotionally Abused: Not on file    Physically Abused: Not on file    Sexually Abused: Not on file   Housing Stability:     Unable to Pay for Housing in the Last Year: Not on file    Number of [place] in the Last Year: Not on file    Unstable Housing in the Last Year: Not on file         ALLERGIES: Alendronate, Amoxicillin, and Hydrochlorothiazide    Review of Systems   Constitutional: Negative for fever. HENT: Negative for rhinorrhea. Respiratory: Positive for shortness of breath. Cardiovascular: Negative for chest pain. Gastrointestinal: Positive for abdominal pain. Genitourinary: Negative for dysuria. Musculoskeletal: Negative for back pain. Skin: Negative for wound. Neurological: Positive for dizziness. Negative for headaches. Psychiatric/Behavioral: Negative for confusion. There were no vitals filed for this visit. Physical Exam  Vitals and nursing note reviewed. Constitutional:       General: She is not in acute distress. Appearance: Normal appearance. She is not ill-appearing, toxic-appearing or diaphoretic. HENT:      Head: Normocephalic and atraumatic. Mouth/Throat:      Mouth: Mucous membranes are moist.   Eyes:      Extraocular Movements: Extraocular movements intact. Cardiovascular:      Rate and Rhythm: Normal rate. Pulses: Normal pulses. Heart sounds: Normal heart sounds. Pulmonary:      Effort: Pulmonary effort is normal. No respiratory distress. Breath sounds: Normal breath sounds. Abdominal:      General: Abdomen is flat. Bowel sounds are normal. There is no distension. Palpations: Abdomen is soft. Musculoskeletal:         General: Normal range of motion. Cervical back: Normal range of motion. Skin:     General: Skin is dry. Neurological:      Mental Status: She is alert and oriented to person, place, and time. Comments: No nystagmus, no dysmetria, no dysdiadochokinesis   Psychiatric:         Mood and Affect: Mood normal.          MDM  Number of Diagnoses or Management Options  Vertigo  Diagnosis management comments: Patient presents with dizziness and multiple other complaints. Differentials include but not limited to posterior circulation stroke, vertigo, ACS, UTI among many others. Extensive work-up including CT imaging and labs is unremarkable. Patient ambulates without difficulty after meclizine. She reports feeling much better. Discussed my clinical impression(s), any labs and/or radiology results with the patient. I answered any questions and addressed any concerns. Discussed the importance of following up with their primary care physician and/or specialist(s). Discussed signs or symptoms that would warrant return back to the ER for further evaluation. The patient is agreeable with discharge.     EKG shows sinus rhythm at a rate of 90, first-degree AV block, otherwise normal intervals, normal axis, no ischemic changes       Amount and/or Complexity of Data Reviewed  Clinical lab tests: ordered and reviewed  Tests in the radiology section of CPT®: ordered and reviewed           Procedures

## 2022-01-18 NOTE — ED TRIAGE NOTES
Pt arrives with EMS stataing that she has been feeling dizzy.  She had this same problem two years ago and states that she was treated with meclizine

## 2022-01-19 LAB
ATRIAL RATE: 90 BPM
CALCULATED P AXIS, ECG09: 46 DEGREES
CALCULATED R AXIS, ECG10: 70 DEGREES
CALCULATED T AXIS, ECG11: 73 DEGREES
DIAGNOSIS, 93000: NORMAL
P-R INTERVAL, ECG05: 202 MS
Q-T INTERVAL, ECG07: 388 MS
QRS DURATION, ECG06: 74 MS
QTC CALCULATION (BEZET), ECG08: 474 MS
VENTRICULAR RATE, ECG03: 90 BPM

## 2022-01-19 NOTE — DISCHARGE INSTRUCTIONS
Thank you for allowing us to provide you with medical care today. We realize that you have many choices for your emergency care needs. We thank you for choosing [facility]. Please choose us in the future for any continued health care needs. The exam and treatment you received in the Emergency Department were for an emergent problem and are not intended as complete care. It is important that you follow up with a doctor, nurse practitioner, or physician's assistant for ongoing care. If your symptoms worsen or you do not improve as expected and you are unable to reach your usual health care provider, you should return to the Emergency Department. We are available 24 hours a day. Please make an appointment with your health care provider(s) for follow up of your Emergency Department visit. Take this sheet with you when you go to your follow-up visit.

## 2022-02-27 ENCOUNTER — APPOINTMENT (OUTPATIENT)
Dept: GENERAL RADIOLOGY | Age: 87
DRG: 854 | End: 2022-02-27
Attending: EMERGENCY MEDICINE
Payer: MEDICARE

## 2022-02-27 ENCOUNTER — APPOINTMENT (OUTPATIENT)
Dept: CT IMAGING | Age: 87
DRG: 854 | End: 2022-02-27
Attending: EMERGENCY MEDICINE
Payer: MEDICARE

## 2022-02-27 ENCOUNTER — HOSPITAL ENCOUNTER (INPATIENT)
Age: 87
LOS: 5 days | Discharge: HOME HEALTH | DRG: 854 | End: 2022-03-04
Attending: EMERGENCY MEDICINE | Admitting: INTERNAL MEDICINE
Payer: MEDICARE

## 2022-02-27 ENCOUNTER — APPOINTMENT (OUTPATIENT)
Dept: MRI IMAGING | Age: 87
DRG: 854 | End: 2022-02-27
Attending: PHYSICIAN ASSISTANT
Payer: MEDICARE

## 2022-02-27 DIAGNOSIS — M00.9 PYOGENIC ARTHRITIS OF LEFT ELBOW, DUE TO UNSPECIFIED ORGANISM (HCC): ICD-10-CM

## 2022-02-27 DIAGNOSIS — N18.30 STAGE 3 CHRONIC KIDNEY DISEASE, UNSPECIFIED WHETHER STAGE 3A OR 3B CKD (HCC): ICD-10-CM

## 2022-02-27 DIAGNOSIS — A41.9 SEPSIS, DUE TO UNSPECIFIED ORGANISM, UNSPECIFIED WHETHER ACUTE ORGAN DYSFUNCTION PRESENT (HCC): Primary | ICD-10-CM

## 2022-02-27 DIAGNOSIS — M86.9: ICD-10-CM

## 2022-02-27 DIAGNOSIS — M05.79 RHEUMATOID ARTHRITIS INVOLVING MULTIPLE SITES WITH POSITIVE RHEUMATOID FACTOR (HCC): Chronic | ICD-10-CM

## 2022-02-27 LAB
ALBUMIN SERPL-MCNC: 3.2 G/DL (ref 3.5–5)
ALBUMIN/GLOB SERPL: 0.7 {RATIO} (ref 1.1–2.2)
ALP SERPL-CCNC: 84 U/L (ref 45–117)
ALT SERPL-CCNC: 17 U/L (ref 12–78)
ANION GAP SERPL CALC-SCNC: 9 MMOL/L (ref 5–15)
APPEARANCE SNV: ABNORMAL
APPEARANCE UR: CLEAR
AST SERPL-CCNC: 14 U/L (ref 15–37)
ATRIAL RATE: 89 BPM
ATRIAL RATE: 98 BPM
BACTERIA URNS QL MICRO: NEGATIVE /HPF
BASOPHILS # BLD: 0.1 K/UL (ref 0–0.1)
BASOPHILS NFR BLD: 1 % (ref 0–1)
BILIRUB SERPL-MCNC: 0.5 MG/DL (ref 0.2–1)
BILIRUB UR QL: NEGATIVE
BODY FLD TYPE: NORMAL
BUN SERPL-MCNC: 16 MG/DL (ref 6–20)
BUN/CREAT SERPL: 20 (ref 12–20)
CALCIUM SERPL-MCNC: 8.8 MG/DL (ref 8.5–10.1)
CALCULATED P AXIS, ECG09: -28 DEGREES
CALCULATED P AXIS, ECG09: 15 DEGREES
CALCULATED R AXIS, ECG10: 68 DEGREES
CALCULATED R AXIS, ECG10: 68 DEGREES
CALCULATED T AXIS, ECG11: 72 DEGREES
CALCULATED T AXIS, ECG11: 84 DEGREES
CHLORIDE SERPL-SCNC: 100 MMOL/L (ref 97–108)
CO2 SERPL-SCNC: 24 MMOL/L (ref 21–32)
COLOR SNV: ABNORMAL
COLOR UR: ABNORMAL
COMMENT, HOLDF: NORMAL
COVID-19 RAPID TEST, COVR: NOT DETECTED
CREAT SERPL-MCNC: 0.82 MG/DL (ref 0.55–1.02)
CRP SERPL-MCNC: 4.41 MG/DL (ref 0–0.6)
CRYSTALS FLD MICRO: NORMAL
DIAGNOSIS, 93000: NORMAL
DIAGNOSIS, 93000: NORMAL
DIFFERENTIAL METHOD BLD: ABNORMAL
EOSINOPHIL # BLD: 0.1 K/UL (ref 0–0.4)
EOSINOPHIL NFR BLD: 1 % (ref 0–7)
EPITH CASTS URNS QL MICRO: ABNORMAL /LPF
ERYTHROCYTE [DISTWIDTH] IN BLOOD BY AUTOMATED COUNT: 14.9 % (ref 11.5–14.5)
ERYTHROCYTE [SEDIMENTATION RATE] IN BLOOD: 37 MM/HR (ref 0–30)
GLOBULIN SER CALC-MCNC: 4.3 G/DL (ref 2–4)
GLUCOSE SERPL-MCNC: 107 MG/DL (ref 65–100)
GLUCOSE UR STRIP.AUTO-MCNC: NEGATIVE MG/DL
HCT VFR BLD AUTO: 34.9 % (ref 35–47)
HGB BLD-MCNC: 11.2 G/DL (ref 11.5–16)
HGB UR QL STRIP: ABNORMAL
HYALINE CASTS URNS QL MICRO: ABNORMAL /LPF (ref 0–5)
IMM GRANULOCYTES # BLD AUTO: 0 K/UL (ref 0–0.04)
IMM GRANULOCYTES NFR BLD AUTO: 0 % (ref 0–0.5)
KETONES UR QL STRIP.AUTO: NEGATIVE MG/DL
LACTATE SERPL-SCNC: 1.2 MMOL/L (ref 0.4–2)
LACTATE SERPL-SCNC: 2.6 MMOL/L (ref 0.4–2)
LEUKOCYTE ESTERASE UR QL STRIP.AUTO: NEGATIVE
LYMPHOCYTES # BLD: 0.5 K/UL (ref 0.8–3.5)
LYMPHOCYTES NFR BLD: 4 % (ref 12–49)
LYMPHOCYTES NFR SNV MANUAL: 4 % (ref 0–15)
MCH RBC QN AUTO: 27.3 PG (ref 26–34)
MCHC RBC AUTO-ENTMCNC: 32.1 G/DL (ref 30–36.5)
MCV RBC AUTO: 84.9 FL (ref 80–99)
MONOCYTES # BLD: 0.5 K/UL (ref 0–1)
MONOCYTES NFR BLD: 4 % (ref 5–13)
MONOCYTES NFR SNV MANUAL: 8 % (ref 0–65)
NEUTROPHILS NFR SNV MANUAL: 88 % (ref 0–20)
NEUTS SEG # BLD: 12.3 K/UL (ref 1.8–8)
NEUTS SEG NFR BLD: 90 % (ref 32–75)
NITRITE UR QL STRIP.AUTO: NEGATIVE
NRBC # BLD: 0 K/UL (ref 0–0.01)
NRBC BLD-RTO: 0 PER 100 WBC
P-R INTERVAL, ECG05: 196 MS
P-R INTERVAL, ECG05: 218 MS
PH UR STRIP: 7.5 [PH] (ref 5–8)
PLATELET # BLD AUTO: 354 K/UL (ref 150–400)
PMV BLD AUTO: 9.9 FL (ref 8.9–12.9)
POTASSIUM SERPL-SCNC: 3.5 MMOL/L (ref 3.5–5.1)
PROCALCITONIN SERPL-MCNC: <0.05 NG/ML
PROT SERPL-MCNC: 7.5 G/DL (ref 6.4–8.2)
PROT UR STRIP-MCNC: ABNORMAL MG/DL
Q-T INTERVAL, ECG07: 380 MS
Q-T INTERVAL, ECG07: 388 MS
QRS DURATION, ECG06: 66 MS
QRS DURATION, ECG06: 68 MS
QTC CALCULATION (BEZET), ECG08: 472 MS
QTC CALCULATION (BEZET), ECG08: 485 MS
RBC # BLD AUTO: 4.11 M/UL (ref 3.8–5.2)
RBC # SNV: >100 /CU MM
RBC #/AREA URNS HPF: ABNORMAL /HPF (ref 0–5)
RBC MORPH BLD: ABNORMAL
SAMPLES BEING HELD,HOLD: NORMAL
SODIUM SERPL-SCNC: 133 MMOL/L (ref 136–145)
SOURCE, COVRS: NORMAL
SP GR UR REFRACTOMETRY: 1.01 (ref 1–1.03)
SPECIMEN SOURCE FLD: ABNORMAL
TROPONIN-HIGH SENSITIVITY: 8 NG/L (ref 0–51)
UA: UC IF INDICATED,UAUC: ABNORMAL
URATE SERPL-MCNC: 1.9 MG/DL (ref 2.6–6)
UROBILINOGEN UR QL STRIP.AUTO: 0.2 EU/DL (ref 0.2–1)
VENTRICULAR RATE, ECG03: 89 BPM
VENTRICULAR RATE, ECG03: 98 BPM
WBC # BLD AUTO: 13.5 K/UL (ref 3.6–11)
WBC # SNV: 388 /CU MM (ref 0–150)
WBC URNS QL MICRO: ABNORMAL /HPF (ref 0–4)

## 2022-02-27 PROCEDURE — 87116 MYCOBACTERIA CULTURE: CPT

## 2022-02-27 PROCEDURE — 71045 X-RAY EXAM CHEST 1 VIEW: CPT

## 2022-02-27 PROCEDURE — 83605 ASSAY OF LACTIC ACID: CPT

## 2022-02-27 PROCEDURE — 87205 SMEAR GRAM STAIN: CPT

## 2022-02-27 PROCEDURE — 73223 MRI JOINT UPR EXTR W/O&W/DYE: CPT

## 2022-02-27 PROCEDURE — 65270000029 HC RM PRIVATE

## 2022-02-27 PROCEDURE — A9575 INJ GADOTERATE MEGLUMI 0.1ML: HCPCS | Performed by: RADIOLOGY

## 2022-02-27 PROCEDURE — 74011250637 HC RX REV CODE- 250/637: Performed by: EMERGENCY MEDICINE

## 2022-02-27 PROCEDURE — 84484 ASSAY OF TROPONIN QUANT: CPT

## 2022-02-27 PROCEDURE — 96365 THER/PROPH/DIAG IV INF INIT: CPT

## 2022-02-27 PROCEDURE — 73030 X-RAY EXAM OF SHOULDER: CPT

## 2022-02-27 PROCEDURE — 77030019905 HC CATH URETH INTMIT MDII -A

## 2022-02-27 PROCEDURE — 74011000250 HC RX REV CODE- 250: Performed by: INTERNAL MEDICINE

## 2022-02-27 PROCEDURE — 96366 THER/PROPH/DIAG IV INF ADDON: CPT

## 2022-02-27 PROCEDURE — 99285 EMERGENCY DEPT VISIT HI MDM: CPT

## 2022-02-27 PROCEDURE — 74011000250 HC RX REV CODE- 250: Performed by: EMERGENCY MEDICINE

## 2022-02-27 PROCEDURE — 81001 URINALYSIS AUTO W/SCOPE: CPT

## 2022-02-27 PROCEDURE — 74011250636 HC RX REV CODE- 250/636: Performed by: RADIOLOGY

## 2022-02-27 PROCEDURE — 93005 ELECTROCARDIOGRAM TRACING: CPT

## 2022-02-27 PROCEDURE — 36415 COLL VENOUS BLD VENIPUNCTURE: CPT

## 2022-02-27 PROCEDURE — 74177 CT ABD & PELVIS W/CONTRAST: CPT

## 2022-02-27 PROCEDURE — 74011000636 HC RX REV CODE- 636: Performed by: RADIOLOGY

## 2022-02-27 PROCEDURE — 85652 RBC SED RATE AUTOMATED: CPT

## 2022-02-27 PROCEDURE — 74011250637 HC RX REV CODE- 250/637: Performed by: INTERNAL MEDICINE

## 2022-02-27 PROCEDURE — 84145 PROCALCITONIN (PCT): CPT

## 2022-02-27 PROCEDURE — 89050 BODY FLUID CELL COUNT: CPT

## 2022-02-27 PROCEDURE — 84550 ASSAY OF BLOOD/URIC ACID: CPT

## 2022-02-27 PROCEDURE — 89060 EXAM SYNOVIAL FLUID CRYSTALS: CPT

## 2022-02-27 PROCEDURE — 74011000250 HC RX REV CODE- 250: Performed by: PHYSICIAN ASSISTANT

## 2022-02-27 PROCEDURE — 73080 X-RAY EXAM OF ELBOW: CPT

## 2022-02-27 PROCEDURE — 86140 C-REACTIVE PROTEIN: CPT

## 2022-02-27 PROCEDURE — 74011250636 HC RX REV CODE- 250/636: Performed by: INTERNAL MEDICINE

## 2022-02-27 PROCEDURE — 96375 TX/PRO/DX INJ NEW DRUG ADDON: CPT

## 2022-02-27 PROCEDURE — 74011000258 HC RX REV CODE- 258: Performed by: INTERNAL MEDICINE

## 2022-02-27 PROCEDURE — 74011250636 HC RX REV CODE- 250/636: Performed by: EMERGENCY MEDICINE

## 2022-02-27 PROCEDURE — 80053 COMPREHEN METABOLIC PANEL: CPT

## 2022-02-27 PROCEDURE — 87635 SARS-COV-2 COVID-19 AMP PRB: CPT

## 2022-02-27 PROCEDURE — 87040 BLOOD CULTURE FOR BACTERIA: CPT

## 2022-02-27 PROCEDURE — 85025 COMPLETE CBC W/AUTO DIFF WBC: CPT

## 2022-02-27 RX ORDER — ONDANSETRON 4 MG/1
4 TABLET, ORALLY DISINTEGRATING ORAL
Status: DISCONTINUED | OUTPATIENT
Start: 2022-02-27 | End: 2022-03-04 | Stop reason: HOSPADM

## 2022-02-27 RX ORDER — DOCUSATE SODIUM 100 MG/1
100 CAPSULE, LIQUID FILLED ORAL
Status: DISCONTINUED | OUTPATIENT
Start: 2022-02-27 | End: 2022-03-04 | Stop reason: HOSPADM

## 2022-02-27 RX ORDER — SODIUM CHLORIDE 0.9 % (FLUSH) 0.9 %
5-10 SYRINGE (ML) INJECTION AS NEEDED
Status: DISCONTINUED | OUTPATIENT
Start: 2022-02-27 | End: 2022-03-04 | Stop reason: HOSPADM

## 2022-02-27 RX ORDER — ACETAMINOPHEN 325 MG/1
650 TABLET ORAL
Status: DISCONTINUED | OUTPATIENT
Start: 2022-02-27 | End: 2022-03-04 | Stop reason: HOSPADM

## 2022-02-27 RX ORDER — ONDANSETRON 2 MG/ML
4 INJECTION INTRAMUSCULAR; INTRAVENOUS
Status: DISCONTINUED | OUTPATIENT
Start: 2022-02-27 | End: 2022-03-04 | Stop reason: HOSPADM

## 2022-02-27 RX ORDER — POLYETHYLENE GLYCOL 3350 17 G/17G
17 POWDER, FOR SOLUTION ORAL DAILY PRN
Status: DISCONTINUED | OUTPATIENT
Start: 2022-02-27 | End: 2022-03-04 | Stop reason: HOSPADM

## 2022-02-27 RX ORDER — FOLIC ACID 1 MG/1
1 TABLET ORAL DAILY
Status: DISCONTINUED | OUTPATIENT
Start: 2022-02-28 | End: 2022-03-04 | Stop reason: HOSPADM

## 2022-02-27 RX ORDER — METRONIDAZOLE 500 MG/100ML
500 INJECTION, SOLUTION INTRAVENOUS EVERY 12 HOURS
Status: DISCONTINUED | OUTPATIENT
Start: 2022-02-27 | End: 2022-02-28

## 2022-02-27 RX ORDER — FAMOTIDINE 20 MG/1
20 TABLET, FILM COATED ORAL DAILY
Status: DISCONTINUED | OUTPATIENT
Start: 2022-02-27 | End: 2022-03-04 | Stop reason: HOSPADM

## 2022-02-27 RX ORDER — LEVOTHYROXINE SODIUM 75 UG/1
75 TABLET ORAL
Status: DISCONTINUED | OUTPATIENT
Start: 2022-02-28 | End: 2022-03-04 | Stop reason: HOSPADM

## 2022-02-27 RX ORDER — SODIUM CHLORIDE 0.9 % (FLUSH) 0.9 %
5-40 SYRINGE (ML) INJECTION EVERY 8 HOURS
Status: DISCONTINUED | OUTPATIENT
Start: 2022-02-27 | End: 2022-03-04 | Stop reason: HOSPADM

## 2022-02-27 RX ORDER — ASPIRIN 81 MG/1
81 TABLET ORAL DAILY
Status: DISCONTINUED | OUTPATIENT
Start: 2022-02-28 | End: 2022-03-04 | Stop reason: HOSPADM

## 2022-02-27 RX ORDER — GADOTERATE MEGLUMINE 376.9 MG/ML
10 INJECTION INTRAVENOUS
Status: COMPLETED | OUTPATIENT
Start: 2022-02-27 | End: 2022-02-27

## 2022-02-27 RX ORDER — LIDOCAINE HYDROCHLORIDE 10 MG/ML
10 INJECTION, SOLUTION EPIDURAL; INFILTRATION; INTRACAUDAL; PERINEURAL ONCE
Status: COMPLETED | OUTPATIENT
Start: 2022-02-27 | End: 2022-02-27

## 2022-02-27 RX ORDER — ACETAMINOPHEN 500 MG
1000 TABLET ORAL ONCE
Status: COMPLETED | OUTPATIENT
Start: 2022-02-27 | End: 2022-02-27

## 2022-02-27 RX ORDER — AMLODIPINE BESYLATE 5 MG/1
10 TABLET ORAL DAILY
Status: DISCONTINUED | OUTPATIENT
Start: 2022-02-28 | End: 2022-03-04 | Stop reason: HOSPADM

## 2022-02-27 RX ORDER — SODIUM CHLORIDE 0.9 % (FLUSH) 0.9 %
5-40 SYRINGE (ML) INJECTION AS NEEDED
Status: DISCONTINUED | OUTPATIENT
Start: 2022-02-27 | End: 2022-03-04 | Stop reason: HOSPADM

## 2022-02-27 RX ORDER — POLYETHYLENE GLYCOL 3350 17 G/17G
17 POWDER, FOR SOLUTION ORAL DAILY
Status: DISCONTINUED | OUTPATIENT
Start: 2022-02-28 | End: 2022-03-04 | Stop reason: HOSPADM

## 2022-02-27 RX ORDER — VANCOMYCIN 2 GRAM/500 ML IN 0.9 % SODIUM CHLORIDE INTRAVENOUS
2000 ONCE
Status: DISCONTINUED | OUTPATIENT
Start: 2022-02-27 | End: 2022-02-27

## 2022-02-27 RX ORDER — ACETAMINOPHEN 650 MG/1
650 SUPPOSITORY RECTAL
Status: DISCONTINUED | OUTPATIENT
Start: 2022-02-27 | End: 2022-03-04 | Stop reason: HOSPADM

## 2022-02-27 RX ADMIN — Medication 10 ML: at 20:45

## 2022-02-27 RX ADMIN — VANCOMYCIN HYDROCHLORIDE 1250 MG: 1.25 INJECTION, POWDER, LYOPHILIZED, FOR SOLUTION INTRAVENOUS at 06:17

## 2022-02-27 RX ADMIN — METRONIDAZOLE 500 MG: 500 INJECTION, SOLUTION INTRAVENOUS at 11:54

## 2022-02-27 RX ADMIN — SODIUM CHLORIDE 1000 ML: 9 INJECTION, SOLUTION INTRAVENOUS at 05:55

## 2022-02-27 RX ADMIN — SODIUM CHLORIDE 1000 ML: 9 INJECTION, SOLUTION INTRAVENOUS at 04:37

## 2022-02-27 RX ADMIN — GADOTERATE MEGLUMINE 10 ML: 376.9 INJECTION INTRAVENOUS at 16:21

## 2022-02-27 RX ADMIN — IOPAMIDOL 100 ML: 755 INJECTION, SOLUTION INTRAVENOUS at 07:04

## 2022-02-27 RX ADMIN — VANCOMYCIN HYDROCHLORIDE 500 MG: 500 INJECTION, POWDER, LYOPHILIZED, FOR SOLUTION INTRAVENOUS at 18:52

## 2022-02-27 RX ADMIN — Medication 10 ML: at 17:50

## 2022-02-27 RX ADMIN — METRONIDAZOLE 500 MG: 500 INJECTION, SOLUTION INTRAVENOUS at 20:41

## 2022-02-27 RX ADMIN — FAMOTIDINE 20 MG: 20 TABLET ORAL at 11:56

## 2022-02-27 RX ADMIN — ACETAMINOPHEN 1000 MG: 500 TABLET, FILM COATED ORAL at 04:34

## 2022-02-27 RX ADMIN — SODIUM CHLORIDE 2 G: 9 INJECTION INTRAMUSCULAR; INTRAVENOUS; SUBCUTANEOUS at 06:14

## 2022-02-27 RX ADMIN — LIDOCAINE HYDROCHLORIDE 10 ML: 10 INJECTION, SOLUTION EPIDURAL; INFILTRATION; INTRACAUDAL; PERINEURAL at 11:56

## 2022-02-27 NOTE — PROCEDURES
The treatment options for her left elbow were discussed with the patient in detail. She has erythema, recurrent pain, fever, leukocytosis, and increased inflammatory markers concerning for septic left elbow. She has fluctuance of the lateral and posterior elbow concerning for septic arthritis with abscess. There was no fluctuance of the olecranon bursa. Following the risks and benefits discussion including seeding of the elbow joint due to aspiration, the left patient was consents. Consent was obtained from the patient for a left elbow arthrocentesis and we proceeded with the procedure. Following this, 8 cc of lidocaine was used in the soft tissue and to the depth of the left elbow joint. The elbow was entered utilizing a lateral and posterior approach to avoid the erythema. I entered a pocket of purulent fluid in the superficial layers. I continued to aspirate until the entire pocket was decompressed. There appeared to be synovial fluid in the purulent fluid so I entered the joint and aspirated 8 cc of fluid from the joint and pocket superficially. It appeared the fluids connected and I felt that if this was the case, the analysis of the fluid would be the same. The fluid was sent for cell count, crystal analysis, and gram stain/culture. Patient tolerated the procedure well without adverse effect. Patient was placed in a posterior long arm well padded splint to protect the soft tissues. Continue NPO status. If cell count comes back and is diagnostic for septic arthritis, she will need an I and D. If it is not significant for septic arthritis, she will need a MRI/CT of the left elbow for further diagnostic reasons.       Mary Gonzalez PA-C  [facility]  [address] [facility]

## 2022-02-27 NOTE — PROGRESS NOTES
TNC from left elbow aspiration is 388 which is not diagnostic of septic arthritis. Aspirated fluid was dark yellow/purulent appearing until mixed with blood which was concerning for a septic arthritis. Will obtain MRI w/wo contrast to better evaluate the elbow. NPO for now. Hold anticoagulants.      Kadi Sheppard PA-C  [facility]  [address] [facility]

## 2022-02-27 NOTE — PROGRESS NOTES
MRI shows a large joint effusion with synovial enhancement that is consistent with septic arthritis. She also has erosive changes of the radial head which represents early osteomyelitis. Her TNC of 388 from the left elbow aspirate is not representative of a septic arthritis, but she had purulent fluid on aspirate (?TNC inaccurate). She will need an I and D based on exam, MRI, and fluid appearance. Dr. Sy Pope is going to review MRI tonight. She is currently stable and will proceed with I and D likely tomorrow. Continue with long arm posterior splint. Ok to anticoagulate tonight and hold tomorrows dose. Continue IV abx. Orthopedics to follow.       Jennie Downing PA-C  [facility]  [address] [facility]

## 2022-02-27 NOTE — PROGRESS NOTES
[address] [address] Pharmacy Dosing Services: Antimicrobial Stewardship Daily Doc 2/27/2022    Consult for antibiotic dosing of Vancomycin by Dr. Caio Hoang  Indication: Sepsis unknown etiology- Empiric septic arthiritis; intra abdominal  Day of Therapy: 1    Ht Readings from Last 1 Encounters:   02/27/22 149.9 cm (59\")        Wt Readings from Last 1 Encounters:   02/27/22 50.6 kg (111 lb 8 oz)      Vancomycin therapy:  Current maintenance dose: Initial dosing  Last level: Initial dosing  Dose calculated to approximate a           a. Target AUC/MAGALIE of 400-600          b. Trough of N/A    Plan: Patient s/p 1250 mg loading dose. Will start 500 mg (~10 mg/kg) every 12 hours for anticipated  trough 17.8 mcg/ml. Will draw random level in AM per protocol to evaluate clearance. Creatinine ordered every other day. Dose administration notes:   Doses given appropriately as scheduled    Date Dose & Interval Measured (mcg/mL) Extrapolated (mcg/mL)   ? ? ? ?   ? ? ? ?   ? ? ? ? Other Antimicrobial   (not dosed by pharmacist) Metronidazole 500 mg Q12 hrs  Ceftriaxone 2 grams every 24 hours   Cultures 2/27 Blood x 2: Pending   Serum Creatinine Lab Results   Component Value Date/Time    Creatinine 0.82 02/27/2022 04:22 AM    Creatinine (POC) 1.1 12/12/2017 01:22 PM         Creatinine Clearance Estimated Creatinine Clearance: 33.7 mL/min (based on SCr of 0.82 mg/dL). Temp Temp: 99.5 °F (37.5 °C)       WBC Lab Results   Component Value Date/Time    WBC 13.5 (H) 02/27/2022 04:22 AM        Procalcitonin Lab Results   Component Value Date/Time    Procalcitonin <0.05 02/27/2022 04:25 AM        For Antifungals, Metronidazole and Nafcillin: Lab Results   Component Value Date/Time    ALT (SGPT) 17 02/27/2022 04:22 AM    AST (SGOT) 14 (L) 02/27/2022 04:22 AM    Alk.  phosphatase 84 02/27/2022 04:22 AM    Bilirubin, total 0.5 02/27/2022 04:22 AM        Pharmacist SOLA HartleyD

## 2022-02-27 NOTE — ED NOTES
Verbal shift change report given to Melida RN (oncoming nurse) by Benny Arrington RN (offgoing nurse). Report included the following information SBAR, Kardex, ED Summary, Intake/Output, MAR and Recent Results.

## 2022-02-27 NOTE — PROGRESS NOTES
Orthopedic surgery is aware of the patient. Well known to our service. Recurrent left elbow pain worsening since starting steroids for a skin condition of the bilateral ankles. Patient with a history of RA but currently not on a DMARD. Keep NPO for now. Plan for left elbow arthrocentesis this morning to exclude septic arthritis.        CHINA Caceres-C  [facility]  [address] [facility]

## 2022-02-27 NOTE — H&P (VIEW-ONLY)
ORTHOPEDIC SURGERY CONSULT    Subjective:     Date of Consultation:  February 27, 2022    Referring Physician:  Dr. Albina Baron is a 80 y.o. right hand dominant female who is being seen for left elbow pain and fever. Patient has a past medical history of septic left elbow, CKD, HTN, RA, hypothyroidism, and SBO. She is well known to the orthopedic surgery service. She presented to [facility] 3/28 with sepsis and severe left elbow pain. CT with contrast at that time showed a large effusion and abscess. She underwent a left elbow I and D. She was put on IV antibiotics for 6 weeks. There was never an organism that grew on the cultures. She was empirically treated at that time. She reports full resolution of her pain after the duration of her antibiotics. She presented to the [facility] last night with increasing pain, fever, and malaise. She was found to have a leukocytosis of 13.5 with left shift, Tmax of 101/7, and tachycardia. She was admitted for sepsis. She reports a 24 hour period of left lateral elbow pain. She reports she has had scar tissue around the lateral elbow that has been painful for a few months. She states she developed scaling around her BL ankles and was placed on steroids for this. She was on day 4 when the left elbow pain started. She reports no fever at home. She denies trauma to the left elbow. Imaging in the ER included a left elbow xray showed a small joint effusion and elbow osteoarthritis. She reports severe pain with ROM of the elbow and loss of motion of her elbow since the pain started yesterday.       Patient Active Problem List    Diagnosis Date Noted    Septic joint ([address] 02/27/2022    Diverticulitis of sigmoid colon 03/28/2021    Severe sepsis ([address] 03/28/2021    Intractable nausea and vomiting 03/28/2021    Immunocompromised state due to drug therapy ([address] 03/28/2021    Septic arthritis of elbow, left ([address] 03/28/2021    Acute diverticulitis 2020    SBO (small bowel obstruction) ([address] 2020    Chronic kidney disease (CKD) 2017    Osteoporosis, post-menopausal     Chest pain 2016    DM type 2 (diabetes mellitus, type 2) ([address] 2016    HTN (hypertension), benign 2016    Hypothyroid 2016    RA (rheumatoid arthritis) ([address] 2016    Abnormal chest x-ray 2016     Family History   Problem Relation Age of Onset    Heart Disease Mother     Diabetes Father     Cancer Brother         brain cancer    Heart Disease Brother     Diabetes Brother     Other Other         scleroderma      Social History     Tobacco Use    Smoking status: Former Smoker     Types: Cigarettes     Quit date: 1976     Years since quittin.2    Smokeless tobacco: Never Used   Substance Use Topics    Alcohol use: Yes     Comment: Waylon time 1 glass wine     Past Medical History:   Diagnosis Date    Arthritis, rheumatoid ([address] [address]     Diabetes ([address]     Diverticulitis     Hard of hearing     History of vascular access device 2021    [facility] VAT 4 FR R Basilic 65/6 LTAbx    Hypertension     Hypothyroid     Osteoporosis, post-menopausal       Past Surgical History:   Procedure Laterality Date    FLEXIBLE SIGMOIDOSCOPY N/A 2020    SIGMOIDOSCOPY FLEXIBLE performed by Philomena Lewis MD at [facility] HX HEMORRHOIDECTOMY      HX HYSTERECTOMY      HX THYROIDECTOMY        Prior to Admission medications    Medication Sig Start Date End Date Taking? Authorizing Provider   folic acid (FOLVITE) 1 mg tablet Take 1 mg by mouth daily. Provider, Historical   docusate sodium (Colace) 100 mg capsule Take 100 mg by mouth two (2) times daily as needed for Constipation. Provider, Historical   levothyroxine (SYNTHROID) 75 mcg tablet Take 1 Tab by mouth Daily (before breakfast). 20   Praneeth Rojo MD   polyethylene glycol (Miralax) 17 gram packet Take 1 Packet by mouth daily.  20   Praneeth Rojo MD L.acidoph & parac-S.therm-Bifido (BRENNEN Q2/RISAQUAD-2) 16 billion cell cap cap Take 1 Cap by mouth daily. 12/2/20   Shay Rojo MD   amLODIPine (NORVASC) 10 mg tablet Take 10 mg by mouth daily. Provider, Historical   acetaminophen (Tylenol Extra Strength) 500 mg tablet Take 500 mg by mouth two (2) times daily as needed for Pain. Provider, Historical   multivit-min/iron/folic/lutein (CENTRUM SILVER WOMEN PO) Take 1 Tab by mouth every other day. Provider, Historical   cholecalciferol (VITAMIN D3) 1,000 unit cap Take 2,000 Units by mouth daily. Provider, Historical   aspirin delayed-release 81 mg tablet Take 81 mg by mouth daily. Provider, Historical     Current Facility-Administered Medications   Medication Dose Route Frequency    sodium chloride (NS) flush 5-10 mL  5-10 mL IntraVENous PRN    sodium chloride (NS) flush 5-40 mL  5-40 mL IntraVENous Q8H    sodium chloride (NS) flush 5-40 mL  5-40 mL IntraVENous PRN    acetaminophen (TYLENOL) tablet 650 mg  650 mg Oral Q6H PRN    Or    acetaminophen (TYLENOL) suppository 650 mg  650 mg Rectal Q6H PRN    polyethylene glycol (MIRALAX) packet 17 g  17 g Oral DAILY PRN    ondansetron (ZOFRAN ODT) tablet 4 mg  4 mg Oral Q8H PRN    Or    ondansetron (ZOFRAN) injection 4 mg  4 mg IntraVENous Q6H PRN    famotidine (PEPCID) tablet 20 mg  20 mg Oral DAILY    [START ON 2/28/2022] Vancomycin level 2/28 prior to 0600 dose   Other ONCE    vancomycin (VANCOCIN) 500 mg in 0.9% sodium chloride (MBP/ADV) 100 mL MBP  500 mg IntraVENous Q12H    [START ON 2/28/2022] cefTRIAXone (ROCEPHIN) 2 g in 0.9% sodium chloride 20 mL IV syringe  2 g IntraVENous Q24H    metroNIDAZOLE (FLAGYL) IVPB premix 500 mg  500 mg IntraVENous Q12H     Current Outpatient Medications   Medication Sig    folic acid (FOLVITE) 1 mg tablet Take 1 mg by mouth daily.  docusate sodium (Colace) 100 mg capsule Take 100 mg by mouth two (2) times daily as needed for Constipation.     levothyroxine (SYNTHROID) 75 mcg tablet Take 1 Tab by mouth Daily (before breakfast).  polyethylene glycol (Miralax) 17 gram packet Take 1 Packet by mouth daily.  L.acidoph & parac-S.therm-Bifido (BRENNEN Q2/RISAQUAD-2) 16 billion cell cap cap Take 1 Cap by mouth daily.  amLODIPine (NORVASC) 10 mg tablet Take 10 mg by mouth daily.  acetaminophen (Tylenol Extra Strength) 500 mg tablet Take 500 mg by mouth two (2) times daily as needed for Pain.  multivit-min/iron/folic/lutein (CENTRUM SILVER WOMEN PO) Take 1 Tab by mouth every other day.  cholecalciferol (VITAMIN D3) 1,000 unit cap Take 2,000 Units by mouth daily.  aspirin delayed-release 81 mg tablet Take 81 mg by mouth daily. Allergies   Allergen Reactions    Alendronate Diarrhea    Amoxicillin Rash     Tolerated cefepime 3/27/21    Hydrochlorothiazide Other (comments)     \"Caused pain everywhere\"        Review of Systems:  Pertinent items are noted in HPI. Objective:     Patient Vitals for the past 8 hrs:   BP Temp Pulse Resp SpO2   22 1200 (!) 158/61  87 12 96 %   22 1100 (!) 166/72  92 19 97 %   22 0716   83     22 0636  99.5 °F (37.5 °C)        Temp (24hrs), Av.4 °F (38 °C), Min:99.5 °F (37.5 °C), Max:101.7 °F (38.7 °C)        EXAM: GEN: Well appearing female in NAD  PSYCH:  Ione. AAO x 4. Hearing aids in place  MUSC: Left elbow with lateral erythema. There is a hard nonmobile mass over the lateral elbow posterior to the scar at the elbow. The erythema does not extend/streak proximal/distal.  Mild edema in the proximal/mid forearm. PROM: 5-90. I can not ROM past 90 degrees due to severe pain. Supination of the wrist without left elbow pain. There is fluctuance lateral and posterior to the lateral epicondyle of the humerus. The fluctuance appears to be lateral to the tricep tendon. There is a small joint effusion. PROM: 145/40 IR not tested. No pain with ROM of the shoulder. Decreased sensation in digits 1-3.  + ulnar and radial pulses. +BCR of all digits        IMAGING:  INDICATION: pain     EXAMINATION:  ELBOW RADIOGRAPHS     COMPARISON: March 27, 2021     FINDINGS:  AP, lateral, and oblique views of the left elbow demonstrate no acute fracture  or subluxation. There is no significant soft tissue swelling. There is no joint  effusion. Joint space narrowing and osteophytes of the radial head similar to  prior study     IMPRESSION  No acute fracture. Degenerative changes as above      Data Review   Recent Results (from the past 24 hour(s))   METABOLIC PANEL, COMPREHENSIVE    Collection Time: 02/27/22  4:22 AM   Result Value Ref Range    Sodium 133 (L) 136 - 145 mmol/L    Potassium 3.5 3.5 - 5.1 mmol/L    Chloride 100 97 - 108 mmol/L    CO2 24 21 - 32 mmol/L    Anion gap 9 5 - 15 mmol/L    Glucose 107 (H) 65 - 100 mg/dL    BUN 16 6 - 20 MG/DL    Creatinine 0.82 0.55 - 1.02 MG/DL    BUN/Creatinine ratio 20 12 - 20      GFR est AA >60 >60 ml/min/1.73m2    GFR est non-AA >60 >60 ml/min/1.73m2    Calcium 8.8 8.5 - 10.1 MG/DL    Bilirubin, total 0.5 0.2 - 1.0 MG/DL    ALT (SGPT) 17 12 - 78 U/L    AST (SGOT) 14 (L) 15 - 37 U/L    Alk.  phosphatase 84 45 - 117 U/L    Protein, total 7.5 6.4 - 8.2 g/dL    Albumin 3.2 (L) 3.5 - 5.0 g/dL    Globulin 4.3 (H) 2.0 - 4.0 g/dL    A-G Ratio 0.7 (L) 1.1 - 2.2     CBC WITH AUTOMATED DIFF    Collection Time: 02/27/22  4:22 AM   Result Value Ref Range    WBC 13.5 (H) 3.6 - 11.0 K/uL    RBC 4.11 3.80 - 5.20 M/uL    HGB 11.2 (L) 11.5 - 16.0 g/dL    HCT 34.9 (L) 35.0 - 47.0 %    MCV 84.9 80.0 - 99.0 FL    MCH 27.3 26.0 - 34.0 PG    MCHC 32.1 30.0 - 36.5 g/dL    RDW 14.9 (H) 11.5 - 14.5 %    PLATELET 561 446 - 833 K/uL    MPV 9.9 8.9 - 12.9 FL    NRBC 0.0 0  WBC    ABSOLUTE NRBC 0.00 0.00 - 0.01 K/uL    NEUTROPHILS 90 (H) 32 - 75 %    LYMPHOCYTES 4 (L) 12 - 49 %    MONOCYTES 4 (L) 5 - 13 %    EOSINOPHILS 1 0 - 7 %    BASOPHILS 1 0 - 1 %    IMMATURE GRANULOCYTES 0 0.0 - 0.5 %    ABS. NEUTROPHILS 12.3 (H) 1.8 - 8.0 K/UL    ABS. LYMPHOCYTES 0.5 (L) 0.8 - 3.5 K/UL    ABS. MONOCYTES 0.5 0.0 - 1.0 K/UL    ABS. EOSINOPHILS 0.1 0.0 - 0.4 K/UL    ABS. BASOPHILS 0.1 0.0 - 0.1 K/UL    ABS. IMM. GRANS. 0.0 0.00 - 0.04 K/UL    DF SMEAR SCANNED      RBC COMMENTS NORMOCYTIC, NORMOCHROMIC     LACTIC ACID    Collection Time: 02/27/22  4:25 AM   Result Value Ref Range    Lactic acid 2.6 (HH) 0.4 - 2.0 MMOL/L   PROCALCITONIN    Collection Time: 02/27/22  4:25 AM   Result Value Ref Range    Procalcitonin <0.05 ng/mL   TROPONIN-HIGH SENSITIVITY    Collection Time: 02/27/22  4:32 AM   Result Value Ref Range    Troponin-High Sensitivity 8 0 - 51 ng/L   SAMPLES BEING HELD    Collection Time: 02/27/22  4:32 AM   Result Value Ref Range    SAMPLES BEING HELD 1SST,1RD,1BLU     COMMENT        Add-on orders for these samples will be processed based on acceptable specimen integrity and analyte stability, which may vary by analyte.    EKG, 12 LEAD, INITIAL    Collection Time: 02/27/22  4:45 AM   Result Value Ref Range    Ventricular Rate 98 BPM    Atrial Rate 98 BPM    P-R Interval 196 ms    QRS Duration 66 ms    Q-T Interval 380 ms    QTC Calculation (Bezet) 485 ms    Calculated P Axis -28 degrees    Calculated R Axis 68 degrees    Calculated T Axis 84 degrees    Diagnosis       Normal sinus rhythm  Nonspecific ST and T wave abnormality  Abnormal ECG  When compared with ECG of 18-JAN-2022 17:58,  premature atrial complexes are no longer present     URINALYSIS W/ REFLEX CULTURE    Collection Time: 02/27/22  5:12 AM    Specimen: Urine   Result Value Ref Range    Color YELLOW/STRAW      Appearance CLEAR CLEAR      Specific gravity 1.009 1.003 - 1.030      pH (UA) 7.5 5.0 - 8.0      Protein TRACE (A) NEG mg/dL    Glucose Negative NEG mg/dL    Ketone Negative NEG mg/dL    Bilirubin Negative NEG      Blood TRACE (A) NEG      Urobilinogen 0.2 0.2 - 1.0 EU/dL    Nitrites Negative NEG      Leukocyte Esterase Negative NEG      WBC 0-4 0 - 4 /hpf    RBC 5-10 0 - 5 /hpf    Epithelial cells FEW FEW /lpf    Bacteria Negative NEG /hpf    UA:UC IF INDICATED CULTURE NOT INDICATED BY UA RESULT CNI      Hyaline cast 0-2 0 - 5 /lpf   COVID-19 RAPID TEST    Collection Time: 02/27/22  5:55 AM   Result Value Ref Range    Specimen source Nasopharyngeal      COVID-19 rapid test Not detected NOTD     LACTIC ACID    Collection Time: 02/27/22  6:51 AM   Result Value Ref Range    Lactic acid 1.2 0.4 - 2.0 MMOL/L   SED RATE (ESR)    Collection Time: 02/27/22 10:18 AM   Result Value Ref Range    Sed rate, automated 37 (H) 0 - 30 mm/hr   C REACTIVE PROTEIN, QT    Collection Time: 02/27/22 10:18 AM   Result Value Ref Range    C-Reactive protein 4.41 (H) 0.00 - 0.60 mg/dL   URIC ACID    Collection Time: 02/27/22 10:18 AM   Result Value Ref Range    Uric acid 1.9 (L) 2.6 - 6.0 MG/DL   CRYSTALS, SYNOVIAL FLUID    Collection Time: 02/27/22  1:01 PM   Result Value Ref Range    FLUID TYPE(7) ELBOW      Crystals, body fluid NO CRYSTALS SEEN WITH POLARIZED LIGHT     CELL COUNT, SYNOVIAL    Collection Time: 02/27/22  1:01 PM   Result Value Ref Range    SYNOVIAL FLD SITE ELBOW      SYN FLUID COLOR PINK      SYN FLUID APPEARANCE CLOUDY      SYNOVIAL FLD RBC CT >100 (H) 0 /cu mm    SYNOVIAL FLD WBC  (H) 0 - 150 /cu mm    SYNOVIAL FLD SEGS 88 (H) 0 - 20 %    SYNOVIAL FLD LYMPHS 4 0 - 15 %    SYNOVIAL FLD MONOS 8 0 - 65 %         Assessment/Plan:   A: 1. Left elbow cellulitis  2. Left elbow septic arthritis v. Gouty arthropathy v. RA flare  3. Left elbow superficial fluid collection concerning for abscess     P: 1. Patient has been started on IV abx due to fever and leukocytosis. Her left elbow was septic in 2021, but we were never able to identify an organism. She has had a rapid change in her ROM per office notes. Her increased pain, leukocytosis, and fever are concerning for septic arthritis v gouty arthropathy.   For that reason we will aspirate the left elbow today- see procedure note. The fluid collection posterior and lateral to the elbow may have communication to the joint and this was aspirated as well. If TNC greater than 50k she will need an I and D. If aspirate is not indicative of a septic arthritis a MRI w/wo contrast will be ordered. For now continue with IV abx. Elbow will be splinted. Keep NPO. Orthopedics to follow. Discussed case with Dr. Flores Fischer who agrees with plan.         Melani Ann [place]   Orthopaedic Surgery PA  [facility]

## 2022-02-27 NOTE — ED TRIAGE NOTES
Patient arrived to ED from home by EMS. Patient reports left elbow pain. States last April had a surgery on that arm. Per EMS patient has fever and elevated HR. [facility] for rash all over the body, and was prescribed steroids.

## 2022-02-27 NOTE — ED PROVIDER NOTES
20-year-old female with a history of rheumatoid arthritis, diabetes, hypertension and pyogenic arthritis of the left elbow presents with chief complaint of left arm pain and fever. According to the patient's chart she had I&D performed by Dr. Claus Junior in March of last year on her left elbow. She underwent a course of antibiotic treatment and improved. She has had chronic left elbow pain since that time. Patient denies any recent falls, chest pain, shortness of breath, abdominal pain, GI or urinary symptoms.            Past Medical History:   Diagnosis Date    Arthritis, rheumatoid ([address]     Diabetes ([address]     Diverticulitis     Hard of hearing     History of vascular access device 2021    [facility] VAT 4 FR R Basilic 74/4 LTAbx    Hypertension     Hypothyroid     Osteoporosis, post-menopausal        Past Surgical History:   Procedure Laterality Date    FLEXIBLE SIGMOIDOSCOPY N/A 2020    SIGMOIDOSCOPY FLEXIBLE performed by David Damon MD at [facility] HX HEMORRHOIDECTOMY      HX HYSTERECTOMY      HX THYROIDECTOMY           Family History:   Problem Relation Age of Onset    Heart Disease Mother     Diabetes Father     Cancer Brother         brain cancer    Heart Disease Brother     Diabetes Brother     Other Other         scleroderma       Social History     Socioeconomic History    Marital status:      Spouse name: Not on file    Number of children: Not on file    Years of education: Not on file    Highest education level: Not on file   Occupational History    Not on file   Tobacco Use    Smoking status: Former Smoker     Types: Cigarettes     Quit date: 1976     Years since quittin.2    Smokeless tobacco: Never Used   Substance and Sexual Activity    Alcohol use: Yes     Comment: Waylon time 1 glass wine    Drug use: No    Sexual activity: Never   Other Topics Concern     Service Not Asked    Blood Transfusions Not Asked    Caffeine Concern Not Asked    Occupational Exposure Not Asked   Judeen Clause Hazards Not Asked    Sleep Concern Not Asked    Stress Concern Not Asked    Weight Concern Not Asked    Special Diet Not Asked    Back Care Not Asked    Exercise Not Asked    Bike Helmet Not Asked    Seat Belt Not Asked    Self-Exams Not Asked   Social History Narrative    ** Merged History Encounter **          Social Determinants of Health     Financial Resource Strain:     Difficulty of Paying Living Expenses: Not on file   Food Insecurity:     Worried About Running Out of Food in the Last Year: Not on file    Fuad of Food in the Last Year: Not on file   Transportation Needs:     Lack of Transportation (Medical): Not on file    Lack of Transportation (Non-Medical): Not on file   Physical Activity:     Days of Exercise per Week: Not on file    Minutes of Exercise per Session: Not on file   Stress:     Feeling of Stress : Not on file   Social Connections:     Frequency of Communication with Friends and Family: Not on file    Frequency of Social Gatherings with Friends and Family: Not on file    Attends Sikh Services: Not on file    Active Member of [address] [place] [address] or Organizations: Not on file    Attends Club or Organization Meetings: Not on file    Marital Status: Not on file   Intimate Partner Violence:     Fear of Current or Ex-Partner: Not on file    Emotionally Abused: Not on file    Physically Abused: Not on file    Sexually Abused: Not on file   Housing Stability:     Unable to Pay for Housing in the Last Year: Not on file    Number of [place] in the Last Year: Not on file    Unstable Housing in the Last Year: Not on file         ALLERGIES: Alendronate, Amoxicillin, and Hydrochlorothiazide    Review of Systems   Constitutional: Positive for fever. HENT: Negative for rhinorrhea. Respiratory: Negative for shortness of breath. Cardiovascular: Negative for chest pain. Gastrointestinal: Negative for abdominal pain. Genitourinary: Negative for dysuria. Musculoskeletal: Positive for arthralgias. Skin: Negative for wound. Neurological: Negative for headaches. Psychiatric/Behavioral: Negative for confusion. There were no vitals filed for this visit. Physical Exam  Vitals and nursing note reviewed. Constitutional:       General: She is not in acute distress. Appearance: Normal appearance. She is not ill-appearing, toxic-appearing or diaphoretic. HENT:      Head: Normocephalic and atraumatic. Eyes:      Extraocular Movements: Extraocular movements intact. Cardiovascular:      Rate and Rhythm: Normal rate. Pulses: Normal pulses. Heart sounds: Normal heart sounds. Pulmonary:      Effort: Pulmonary effort is normal. No respiratory distress. Breath sounds: Normal breath sounds. Abdominal:      General: There is no distension. Palpations: Abdomen is soft. Tenderness: There is no abdominal tenderness. Musculoskeletal:         General: Normal range of motion. Cervical back: Normal range of motion. Comments: Pain with passive motion of the left elbow and shoulder. No significant left upper extremity erythema   Skin:     General: Skin is warm and dry. Neurological:      Mental Status: She is alert and oriented to person, place, and time. Psychiatric:         Mood and Affect: Mood normal.          MDM  Number of Diagnoses or Management Options  Sepsis, due to unspecified organism, unspecified whether acute organ dysfunction present [facility])  Diagnosis management comments:     Patient presents with left elbow pain and fever. She has significant pain with range of motion of the left elbow although there is no overt cellulitis. Plain film x-rays were obtained of the left upper extremity which were unremarkable. Patient's abdomen is soft and nontender. Chest x-ray shows no pneumonia. Patient's white blood cell count is elevated along with her lactic acid level. She is febrile by rectal temperature. She will be covered with IV antibiotics including Rocephin and vancomycin. Septic arthritis is certainly a possibility of the left elbow however I will obtain a CT of the abdomen pelvis to rule out intra-abdominal pathology as the patient does endorse some diarrhea recently. Patient will be admitted to hospital medicine for further management. I will defer orthopedic consultation to them. EKG shows sinus rhythm at a rate of 89, first-degree AV block, otherwise normal intervals, normal axis, no ischemic changes. Perfect Serve Consult for Admission  6:03 AM    ED Room Number: TR51/44  Patient Name and age:  Wilver Ayala 80 y.o.  female  Working Diagnosis: Sepsis, due to unspecified organism, unspecified whether acute organ dysfunction present ([facility]  (primary encounter diagnosis)    COVID-19 Suspicion:  yes  Sepsis present:  yes  Reassessment needed: no  Code Status:  Full Code  Readmission: no  Isolation Requirements: yes  Recommended Level of Care:  telemetry  Department:[facility] ED - [phone]  Other: 68-year-old female presents with fever and left arm pain. She has had pyogenic arthritis of the left elbow in the past.  There is no evidence of cellulitis. Elevated white blood cell count, fever and tachycardia. Patient covered with IV antibiotics.   CT abdomen pending although no significant tenderness on exam.           Procedures

## 2022-02-27 NOTE — H&P
[facility]  Hospitalist Group  History and Physical    Primary Care Provider: Nadir Glass MD  Date of Service:  2/27/2022    Chief Complaint: Left Elbow Pain    Subjective:     Anuja Mcpherson is a 80 y.o. female who Past medical history of CKD, hypertension, rheumatoid arthritis, hypothyroidism, left elbow septic arthritis of unknown organism who was recently treated by orthopedic surgery with prolonged IV antibiotics, patient presented to ED complaining of worsening left elbow pain for the last 4 days. Patient is stating that about 4 days ago she developed a generalized rash went to an urgent care center where she was given a Medrol Dosepak and topical steroids, she stating that her rash disappeared however her elbow pain has been increasing and she is having difficulty with her range of motion, she also developed fever. Patient denies any headache, blurry vision, sore throat, trouble swallowing, trouble with speech, hemoptysis, hematemesis, fever, chills, shortness of breath, chest pain, dysphagia, odynophagia, nausea, vomiting, diarrhea, abdominal pain, urgency, hesitancy, frequency, dysuria, constipation, melena, hematochezia. Patient also denies recent travels, sick contacts, recent hospitalization, focal or generalized neurological symptoms,  falls, injuries, rashes, exposure to COVID-19. Review of Systems:    A comprehensive review of systems was negative except for that written in the History of Present Illness.      Past Medical History:   Diagnosis Date    Arthritis, rheumatoid ([address]     Diabetes ([facility]     Diverticulitis     Hard of hearing     History of vascular access device 04/01/2021    [facility] VAT 4 FR R Basilic 66/1 LTAbx    Hypertension     Hypothyroid     Osteoporosis, post-menopausal       Past Surgical History:   Procedure Laterality Date    FLEXIBLE SIGMOIDOSCOPY N/A 12/2/2020    SIGMOIDOSCOPY FLEXIBLE performed by Jacquelyn Maldonado MD at [place] [address]  HX HEMORRHOIDECTOMY      HX HYSTERECTOMY      HX THYROIDECTOMY       Prior to Admission medications    Medication Sig Start Date End Date Taking? Authorizing Provider   folic acid (FOLVITE) 1 mg tablet Take 1 mg by mouth daily. Provider, Historical   docusate sodium (Colace) 100 mg capsule Take 100 mg by mouth two (2) times daily as needed for Constipation. Provider, Historical   levothyroxine (SYNTHROID) 75 mcg tablet Take 1 Tab by mouth Daily (before breakfast). 12/2/20   , Francesca Napoles MD   polyethylene glycol (Miralax) 17 gram packet Take 1 Packet by mouth daily. 12/2/20   Do, Francesca Napoles MD   L.acidoph & parac-S.therm-Bifido (BRENNEN Q2/RISAQUAD-2) 16 billion cell cap cap Take 1 Cap by mouth daily. 12/2/20   Do, Shay HAN MD   amLODIPine (NORVASC) 10 mg tablet Take 10 mg by mouth daily. Provider, Historical   acetaminophen (Tylenol Extra Strength) 500 mg tablet Take 500 mg by mouth two (2) times daily as needed for Pain. Provider, Historical   multivit-min/iron/folic/lutein (CENTRUM SILVER WOMEN PO) Take 1 Tab by mouth every other day. Provider, Historical   cholecalciferol (VITAMIN D3) 1,000 unit cap Take 2,000 Units by mouth daily. Provider, Historical   aspirin delayed-release 81 mg tablet Take 81 mg by mouth daily. Provider, Historical     Allergies   Allergen Reactions    Alendronate Diarrhea    Amoxicillin Rash     Tolerated cefepime 3/27/21    Hydrochlorothiazide Other (comments)     \"Caused pain everywhere\"      Family History   Problem Relation Age of Onset    Heart Disease Mother     Diabetes Father     Cancer Brother         brain cancer    Heart Disease Brother     Diabetes Brother     Other Other         scleroderma        SOCIAL HISTORY:  Patient resides at Home. Patient ambulates with walker.    Smoking history: never  Alcohol history: 0 Beers        Objective:       Physical Exam:   General appearance: alert, cooperative, no distress, appears stated age  Head: Normocephalic, without obvious abnormality, atraumatic  Neck: supple, symmetrical, trachea midline, no adenopathy, thyroid: not enlarged, symmetric, no tenderness/mass/nodules, no carotid bruit and no JVD  Lungs: clear to auscultation bilaterally  Abdomen: soft, non-tender. Bowel sounds normal. No masses,  no organomegaly  Extremities: Right elbow mild erythema, warmth, edema, limited range of motion, no active discharge. atraumatic, no cyanosis   Pulses: 2+ and symmetric  Skin: Skin color, texture, turgor normal. No rashes or lesions  Cap refill: Brisk, less than 3 seconds  Pulses: 2+, symmetric in all extremities  Skin: Warm, dry, without rashes or lesions    ECG:  normal EKG, normal sinus rhythm, unchanged from previous tracings     Data Review: All diagnostic labs and studies have been reviewed. Chest x-ray was negative for infiltrate, effusion, pneumothorax, or wide mediastinum. Assessment/Plan   · Septic arthritis  · Cellulitis  · Rheumatoid arthritis  · CKD  · Hypertension  · Hypothyroidism    Dona Mendoza is a 80 y.o. female who Past medical history of CKD, hypertension, rheumatoid arthritis, hypothyroidism, left elbow septic arthritis of unknown organism who was recently treated by orthopedic surgery with prolonged IV antibiotics, patient presented to ED complaining of worsening left elbow pain for the last 4 days. Patient is stating that about 4 days ago she developed a generalized rash went to an urgent care center where she was given a Medrol Dosepak and topical steroids, she stating that her rash disappeared however her elbow pain has been increasing and she is having difficulty with her range of motion, she also developed fever.      Left elbow septic arthritis versus cellulitis given history of extensive left elbow abnormalities in the past, patient has history of septic arthritis in the left elbow in the past without any known organism, has been followed by orthopedic surgery has been treated with prolonged IV antibiotics. Will consult ID for joint aspiration and for possible I&D if septic arthritis is confirmed. Meanwhile we will start patient on empiric broad-spectrum IV antibiotics. Patient will also be started on her home medications of hypertension, RA and hypothyroidism. DVT prophylaxis, fall aspiration and seizure precautions will be implemented.         FUNCTIONAL STATUS PRIOR TO HOSPITALIZATION Ambulatory with Use of Assistive Devices (including history of recent falls):0       Signed By: German Peralta MD     February 27, 2022

## 2022-02-27 NOTE — CONSULTS
ORTHOPEDIC SURGERY CONSULT    Subjective:     Date of Consultation:  February 27, 2022    Referring Physician:  Dr. Lesly Bartlett is a 80 y.o. right hand dominant female who is being seen for left elbow pain and fever. Patient has a past medical history of septic left elbow, CKD, HTN, RA, hypothyroidism, and SBO. She is well known to the orthopedic surgery service. She presented to [facility] 3/28 with sepsis and severe left elbow pain. CT with contrast at that time showed a large effusion and abscess. She underwent a left elbow I and D. She was put on IV antibiotics for 6 weeks. There was never an organism that grew on the cultures. She was empirically treated at that time. She reports full resolution of her pain after the duration of her antibiotics. She presented to the [facility] last night with increasing pain, fever, and malaise. She was found to have a leukocytosis of 13.5 with left shift, Tmax of 101/7, and tachycardia. She was admitted for sepsis. She reports a 24 hour period of left lateral elbow pain. She reports she has had scar tissue around the lateral elbow that has been painful for a few months. She states she developed scaling around her BL ankles and was placed on steroids for this. She was on day 4 when the left elbow pain started. She reports no fever at home. She denies trauma to the left elbow. Imaging in the ER included a left elbow xray showed a small joint effusion and elbow osteoarthritis. She reports severe pain with ROM of the elbow and loss of motion of her elbow since the pain started yesterday.       Patient Active Problem List    Diagnosis Date Noted    Septic joint ([address] 02/27/2022    Diverticulitis of sigmoid colon 03/28/2021    Severe sepsis ([address] 03/28/2021    Intractable nausea and vomiting 03/28/2021    Immunocompromised state due to drug therapy ([address] 03/28/2021    Septic arthritis of elbow, left ([address] 03/28/2021    Acute diverticulitis 2020    SBO (small bowel obstruction) ([facility] [address] 2020    Chronic kidney disease (CKD) 2017    Osteoporosis, post-menopausal     Chest pain 2016    DM type 2 (diabetes mellitus, type 2) ([facility] [address] 2016    HTN (hypertension), benign 2016    Hypothyroid 2016    RA (rheumatoid arthritis) ([facility] [address] 2016    Abnormal chest x-ray 2016     Family History   Problem Relation Age of Onset    Heart Disease Mother     Diabetes Father     Cancer Brother         brain cancer    Heart Disease Brother     Diabetes Brother     Other Other         scleroderma      Social History     Tobacco Use    Smoking status: Former Smoker     Types: Cigarettes     Quit date: 1976     Years since quittin.2    Smokeless tobacco: Never Used   Substance Use Topics    Alcohol use: Yes     Comment: Waylon time 1 glass wine     Past Medical History:   Diagnosis Date    Arthritis, rheumatoid ([facility] [address]     Diabetes ([facility]     Diverticulitis     Hard of hearing     History of vascular access device 2021    [facility] VAT 4 FR R Basilic 74/0 LTAbx    Hypertension     Hypothyroid     Osteoporosis, post-menopausal       Past Surgical History:   Procedure Laterality Date    FLEXIBLE SIGMOIDOSCOPY N/A 2020    SIGMOIDOSCOPY FLEXIBLE performed by Jeremy Ivory MD at [facility] HX HEMORRHOIDECTOMY      HX HYSTERECTOMY      HX THYROIDECTOMY        Prior to Admission medications    Medication Sig Start Date End Date Taking? Authorizing Provider   folic acid (FOLVITE) 1 mg tablet Take 1 mg by mouth daily. Provider, Historical   docusate sodium (Colace) 100 mg capsule Take 100 mg by mouth two (2) times daily as needed for Constipation. Provider, Historical   levothyroxine (SYNTHROID) 75 mcg tablet Take 1 Tab by mouth Daily (before breakfast). 20   Karla Rojo MD   polyethylene glycol (Miralax) 17 gram packet Take 1 Packet by mouth daily.  20   Karla Rojo MD L.acidoph & parac-S.therm-Bifido (BRENNEN Q2/RISAQUAD-2) 16 billion cell cap cap Take 1 Cap by mouth daily. 12/2/20   Shay Rojo MD   amLODIPine (NORVASC) 10 mg tablet Take 10 mg by mouth daily. Provider, Historical   acetaminophen (Tylenol Extra Strength) 500 mg tablet Take 500 mg by mouth two (2) times daily as needed for Pain. Provider, Historical   multivit-min/iron/folic/lutein (CENTRUM SILVER WOMEN PO) Take 1 Tab by mouth every other day. Provider, Historical   cholecalciferol (VITAMIN D3) 1,000 unit cap Take 2,000 Units by mouth daily. Provider, Historical   aspirin delayed-release 81 mg tablet Take 81 mg by mouth daily. Provider, Historical     Current Facility-Administered Medications   Medication Dose Route Frequency    sodium chloride (NS) flush 5-10 mL  5-10 mL IntraVENous PRN    sodium chloride (NS) flush 5-40 mL  5-40 mL IntraVENous Q8H    sodium chloride (NS) flush 5-40 mL  5-40 mL IntraVENous PRN    acetaminophen (TYLENOL) tablet 650 mg  650 mg Oral Q6H PRN    Or    acetaminophen (TYLENOL) suppository 650 mg  650 mg Rectal Q6H PRN    polyethylene glycol (MIRALAX) packet 17 g  17 g Oral DAILY PRN    ondansetron (ZOFRAN ODT) tablet 4 mg  4 mg Oral Q8H PRN    Or    ondansetron (ZOFRAN) injection 4 mg  4 mg IntraVENous Q6H PRN    famotidine (PEPCID) tablet 20 mg  20 mg Oral DAILY    [START ON 2/28/2022] Vancomycin level 2/28 prior to 0600 dose   Other ONCE    vancomycin (VANCOCIN) 500 mg in 0.9% sodium chloride (MBP/ADV) 100 mL MBP  500 mg IntraVENous Q12H    [START ON 2/28/2022] cefTRIAXone (ROCEPHIN) 2 g in 0.9% sodium chloride 20 mL IV syringe  2 g IntraVENous Q24H    metroNIDAZOLE (FLAGYL) IVPB premix 500 mg  500 mg IntraVENous Q12H     Current Outpatient Medications   Medication Sig    folic acid (FOLVITE) 1 mg tablet Take 1 mg by mouth daily.  docusate sodium (Colace) 100 mg capsule Take 100 mg by mouth two (2) times daily as needed for Constipation.     levothyroxine (SYNTHROID) 75 mcg tablet Take 1 Tab by mouth Daily (before breakfast).  polyethylene glycol (Miralax) 17 gram packet Take 1 Packet by mouth daily.  L.acidoph & parac-S.therm-Bifido (BRENNEN Q2/RISAQUAD-2) 16 billion cell cap cap Take 1 Cap by mouth daily.  amLODIPine (NORVASC) 10 mg tablet Take 10 mg by mouth daily.  acetaminophen (Tylenol Extra Strength) 500 mg tablet Take 500 mg by mouth two (2) times daily as needed for Pain.  multivit-min/iron/folic/lutein (CENTRUM SILVER WOMEN PO) Take 1 Tab by mouth every other day.  cholecalciferol (VITAMIN D3) 1,000 unit cap Take 2,000 Units by mouth daily.  aspirin delayed-release 81 mg tablet Take 81 mg by mouth daily. Allergies   Allergen Reactions    Alendronate Diarrhea    Amoxicillin Rash     Tolerated cefepime 3/27/21    Hydrochlorothiazide Other (comments)     \"Caused pain everywhere\"        Review of Systems:  Pertinent items are noted in HPI. Objective:     Patient Vitals for the past 8 hrs:   BP Temp Pulse Resp SpO2   22 1200 (!) 158/61 -- 87 12 96 %   22 1100 (!) 166/72 -- 92 19 97 %   22 0716 -- -- 83 -- --   22 0636 -- 99.5 °F (37.5 °C) -- -- --     Temp (24hrs), Av.4 °F (38 °C), Min:99.5 °F (37.5 °C), Max:101.7 °F (38.7 °C)        EXAM: GEN: Well appearing female in NAD  PSYCH:  [place]. AAO x 4. Hearing aids in place  MUSC: Left elbow with lateral erythema. There is a hard nonmobile mass over the lateral elbow posterior to the scar at the elbow. The erythema does not extend/streak proximal/distal.  Mild edema in the proximal/mid forearm. PROM: 5-90. I can not ROM past 90 degrees due to severe pain. Supination of the wrist without left elbow pain. There is fluctuance lateral and posterior to the lateral epicondyle of the humerus. The fluctuance appears to be lateral to the tricep tendon. There is a small joint effusion. PROM: 145/40 IR not tested. No pain with ROM of the shoulder. Decreased sensation in digits 1-3.  + ulnar and radial pulses. +BCR of all digits        IMAGING:  INDICATION: pain     EXAMINATION:  ELBOW RADIOGRAPHS     COMPARISON: March 27, 2021     FINDINGS:  AP, lateral, and oblique views of the left elbow demonstrate no acute fracture  or subluxation. There is no significant soft tissue swelling. There is no joint  effusion. Joint space narrowing and osteophytes of the radial head similar to  prior study     IMPRESSION  No acute fracture. Degenerative changes as above      Data Review   Recent Results (from the past 24 hour(s))   METABOLIC PANEL, COMPREHENSIVE    Collection Time: 02/27/22  4:22 AM   Result Value Ref Range    Sodium 133 (L) 136 - 145 mmol/L    Potassium 3.5 3.5 - 5.1 mmol/L    Chloride 100 97 - 108 mmol/L    CO2 24 21 - 32 mmol/L    Anion gap 9 5 - 15 mmol/L    Glucose 107 (H) 65 - 100 mg/dL    BUN 16 6 - 20 MG/DL    Creatinine 0.82 0.55 - 1.02 MG/DL    BUN/Creatinine ratio 20 12 - 20      GFR est AA >60 >60 ml/min/1.73m2    GFR est non-AA >60 >60 ml/min/1.73m2    Calcium 8.8 8.5 - 10.1 MG/DL    Bilirubin, total 0.5 0.2 - 1.0 MG/DL    ALT (SGPT) 17 12 - 78 U/L    AST (SGOT) 14 (L) 15 - 37 U/L    Alk.  phosphatase 84 45 - 117 U/L    Protein, total 7.5 6.4 - 8.2 g/dL    Albumin 3.2 (L) 3.5 - 5.0 g/dL    Globulin 4.3 (H) 2.0 - 4.0 g/dL    A-G Ratio 0.7 (L) 1.1 - 2.2     CBC WITH AUTOMATED DIFF    Collection Time: 02/27/22  4:22 AM   Result Value Ref Range    WBC 13.5 (H) 3.6 - 11.0 K/uL    RBC 4.11 3.80 - 5.20 M/uL    HGB 11.2 (L) 11.5 - 16.0 g/dL    HCT 34.9 (L) 35.0 - 47.0 %    MCV 84.9 80.0 - 99.0 FL    MCH 27.3 26.0 - 34.0 PG    MCHC 32.1 30.0 - 36.5 g/dL    RDW 14.9 (H) 11.5 - 14.5 %    PLATELET 515 951 - 576 K/uL    MPV 9.9 8.9 - 12.9 FL    NRBC 0.0 0  WBC    ABSOLUTE NRBC 0.00 0.00 - 0.01 K/uL    NEUTROPHILS 90 (H) 32 - 75 %    LYMPHOCYTES 4 (L) 12 - 49 %    MONOCYTES 4 (L) 5 - 13 %    EOSINOPHILS 1 0 - 7 %    BASOPHILS 1 0 - 1 %    IMMATURE GRANULOCYTES 0 0.0 - 0.5 %    ABS. NEUTROPHILS 12.3 (H) 1.8 - 8.0 K/UL    ABS. LYMPHOCYTES 0.5 (L) 0.8 - 3.5 K/UL    ABS. MONOCYTES 0.5 0.0 - 1.0 K/UL    ABS. EOSINOPHILS 0.1 0.0 - 0.4 K/UL    ABS. BASOPHILS 0.1 0.0 - 0.1 K/UL    ABS. IMM. GRANS. 0.0 0.00 - 0.04 K/UL    DF SMEAR SCANNED      RBC COMMENTS NORMOCYTIC, NORMOCHROMIC     LACTIC ACID    Collection Time: 02/27/22  4:25 AM   Result Value Ref Range    Lactic acid 2.6 (HH) 0.4 - 2.0 MMOL/L   PROCALCITONIN    Collection Time: 02/27/22  4:25 AM   Result Value Ref Range    Procalcitonin <0.05 ng/mL   TROPONIN-HIGH SENSITIVITY    Collection Time: 02/27/22  4:32 AM   Result Value Ref Range    Troponin-High Sensitivity 8 0 - 51 ng/L   SAMPLES BEING HELD    Collection Time: 02/27/22  4:32 AM   Result Value Ref Range    SAMPLES BEING HELD 1SST,1RD,1BLU     COMMENT        Add-on orders for these samples will be processed based on acceptable specimen integrity and analyte stability, which may vary by analyte.    EKG, 12 LEAD, INITIAL    Collection Time: 02/27/22  4:45 AM   Result Value Ref Range    Ventricular Rate 98 BPM    Atrial Rate 98 BPM    P-R Interval 196 ms    QRS Duration 66 ms    Q-T Interval 380 ms    QTC Calculation (Bezet) 485 ms    Calculated P Axis -28 degrees    Calculated R Axis 68 degrees    Calculated T Axis 84 degrees    Diagnosis       Normal sinus rhythm  Nonspecific ST and T wave abnormality  Abnormal ECG  When compared with ECG of 18-JAN-2022 17:58,  premature atrial complexes are no longer present     URINALYSIS W/ REFLEX CULTURE    Collection Time: 02/27/22  5:12 AM    Specimen: Urine   Result Value Ref Range    Color YELLOW/STRAW      Appearance CLEAR CLEAR      Specific gravity 1.009 1.003 - 1.030      pH (UA) 7.5 5.0 - 8.0      Protein TRACE (A) NEG mg/dL    Glucose Negative NEG mg/dL    Ketone Negative NEG mg/dL    Bilirubin Negative NEG      Blood TRACE (A) NEG      Urobilinogen 0.2 0.2 - 1.0 EU/dL    Nitrites Negative NEG      Leukocyte Esterase Negative NEG      WBC 0-4 0 - 4 /hpf    RBC 5-10 0 - 5 /hpf    Epithelial cells FEW FEW /lpf    Bacteria Negative NEG /hpf    UA:UC IF INDICATED CULTURE NOT INDICATED BY UA RESULT CNI      Hyaline cast 0-2 0 - 5 /lpf   COVID-19 RAPID TEST    Collection Time: 02/27/22  5:55 AM   Result Value Ref Range    Specimen source Nasopharyngeal      COVID-19 rapid test Not detected NOTD     LACTIC ACID    Collection Time: 02/27/22  6:51 AM   Result Value Ref Range    Lactic acid 1.2 0.4 - 2.0 MMOL/L   SED RATE (ESR)    Collection Time: 02/27/22 10:18 AM   Result Value Ref Range    Sed rate, automated 37 (H) 0 - 30 mm/hr   C REACTIVE PROTEIN, QT    Collection Time: 02/27/22 10:18 AM   Result Value Ref Range    C-Reactive protein 4.41 (H) 0.00 - 0.60 mg/dL   URIC ACID    Collection Time: 02/27/22 10:18 AM   Result Value Ref Range    Uric acid 1.9 (L) 2.6 - 6.0 MG/DL   CRYSTALS, SYNOVIAL FLUID    Collection Time: 02/27/22  1:01 PM   Result Value Ref Range    FLUID TYPE(7) ELBOW      Crystals, body fluid NO CRYSTALS SEEN WITH POLARIZED LIGHT     CELL COUNT, SYNOVIAL    Collection Time: 02/27/22  1:01 PM   Result Value Ref Range    SYNOVIAL FLD SITE ELBOW      SYN FLUID COLOR PINK      SYN FLUID APPEARANCE CLOUDY      SYNOVIAL FLD RBC CT >100 (H) 0 /cu mm    SYNOVIAL FLD WBC  (H) 0 - 150 /cu mm    SYNOVIAL FLD SEGS 88 (H) 0 - 20 %    SYNOVIAL FLD LYMPHS 4 0 - 15 %    SYNOVIAL FLD MONOS 8 0 - 65 %         Assessment/Plan:   A: 1. Left elbow cellulitis  2. Left elbow septic arthritis v. Gouty arthropathy v. RA flare  3. Left elbow superficial fluid collection concerning for abscess     P: 1. Patient has been started on IV abx due to fever and leukocytosis. Her left elbow was septic in 2021, but we were never able to identify an organism. She has had a rapid change in her ROM per office notes. Her increased pain, leukocytosis, and fever are concerning for septic arthritis v gouty arthropathy.   For that reason we will aspirate the left elbow today- see procedure note. The fluid collection posterior and lateral to the elbow may have communication to the joint and this was aspirated as well. If TNC greater than 50k she will need an I and D. If aspirate is not indicative of a septic arthritis a MRI w/wo contrast will be ordered. For now continue with IV abx. Elbow will be splinted. Keep NPO. Orthopedics to follow. Discussed case with Dr. Sheba Mosley who agrees with plan.         Jose Almaguer, [place]   Orthopaedic Surgery PA  [address] [facility]

## 2022-02-28 ENCOUNTER — ANESTHESIA EVENT (OUTPATIENT)
Dept: SURGERY | Age: 87
DRG: 854 | End: 2022-02-28
Payer: MEDICARE

## 2022-02-28 ENCOUNTER — ANESTHESIA (OUTPATIENT)
Dept: SURGERY | Age: 87
DRG: 854 | End: 2022-02-28
Payer: MEDICARE

## 2022-02-28 LAB
ALBUMIN SERPL-MCNC: 2.7 G/DL (ref 3.5–5)
ALBUMIN/GLOB SERPL: 0.8 {RATIO} (ref 1.1–2.2)
ALP SERPL-CCNC: 70 U/L (ref 45–117)
ALT SERPL-CCNC: 12 U/L (ref 12–78)
ANION GAP SERPL CALC-SCNC: 8 MMOL/L (ref 5–15)
AST SERPL-CCNC: 9 U/L (ref 15–37)
BASOPHILS # BLD: 0.1 K/UL (ref 0–0.1)
BASOPHILS NFR BLD: 1 % (ref 0–1)
BILIRUB SERPL-MCNC: 0.6 MG/DL (ref 0.2–1)
BUN SERPL-MCNC: 11 MG/DL (ref 6–20)
BUN/CREAT SERPL: 20 (ref 12–20)
CALCIUM SERPL-MCNC: 8 MG/DL (ref 8.5–10.1)
CHLORIDE SERPL-SCNC: 105 MMOL/L (ref 97–108)
CO2 SERPL-SCNC: 26 MMOL/L (ref 21–32)
CREAT SERPL-MCNC: 0.54 MG/DL (ref 0.55–1.02)
CRP SERPL HS-MCNC: >9.5 MG/L
CRP SERPL-MCNC: 19.6 MG/DL (ref 0–0.6)
DIFFERENTIAL METHOD BLD: ABNORMAL
EOSINOPHIL # BLD: 0.1 K/UL (ref 0–0.4)
EOSINOPHIL NFR BLD: 1 % (ref 0–7)
ERYTHROCYTE [DISTWIDTH] IN BLOOD BY AUTOMATED COUNT: 15.3 % (ref 11.5–14.5)
ERYTHROCYTE [SEDIMENTATION RATE] IN BLOOD: 59 MM/HR (ref 0–30)
GLOBULIN SER CALC-MCNC: 3.2 G/DL (ref 2–4)
GLUCOSE BLD STRIP.AUTO-MCNC: 85 MG/DL (ref 65–117)
GLUCOSE SERPL-MCNC: 105 MG/DL (ref 65–100)
HCT VFR BLD AUTO: 30.2 % (ref 35–47)
HGB BLD-MCNC: 9.8 G/DL (ref 11.5–16)
IMM GRANULOCYTES # BLD AUTO: 0.1 K/UL (ref 0–0.04)
IMM GRANULOCYTES NFR BLD AUTO: 1 % (ref 0–0.5)
LYMPHOCYTES # BLD: 0.4 K/UL (ref 0.8–3.5)
LYMPHOCYTES NFR BLD: 4 % (ref 12–49)
MAGNESIUM SERPL-MCNC: 2.3 MG/DL (ref 1.6–2.4)
MCH RBC QN AUTO: 27.8 PG (ref 26–34)
MCHC RBC AUTO-ENTMCNC: 32.5 G/DL (ref 30–36.5)
MCV RBC AUTO: 85.8 FL (ref 80–99)
MONOCYTES # BLD: 0.4 K/UL (ref 0–1)
MONOCYTES NFR BLD: 4 % (ref 5–13)
NEUTS SEG # BLD: 8.4 K/UL (ref 1.8–8)
NEUTS SEG NFR BLD: 90 % (ref 32–75)
NRBC # BLD: 0 K/UL (ref 0–0.01)
NRBC BLD-RTO: 0 PER 100 WBC
PLATELET # BLD AUTO: 262 K/UL (ref 150–400)
PMV BLD AUTO: 9.8 FL (ref 8.9–12.9)
POTASSIUM SERPL-SCNC: 3 MMOL/L (ref 3.5–5.1)
PROT SERPL-MCNC: 5.9 G/DL (ref 6.4–8.2)
RBC # BLD AUTO: 3.52 M/UL (ref 3.8–5.2)
SERVICE CMNT-IMP: NORMAL
SODIUM SERPL-SCNC: 139 MMOL/L (ref 136–145)
VANCOMYCIN SERPL-MCNC: 19.4 UG/ML
WBC # BLD AUTO: 9.4 K/UL (ref 3.6–11)

## 2022-02-28 PROCEDURE — 87102 FUNGUS ISOLATION CULTURE: CPT

## 2022-02-28 PROCEDURE — 2709999900 HC NON-CHARGEABLE SUPPLY: Performed by: ORTHOPAEDIC SURGERY

## 2022-02-28 PROCEDURE — 0R9M0ZZ DRAINAGE OF LEFT ELBOW JOINT, OPEN APPROACH: ICD-10-PCS | Performed by: ORTHOPAEDIC SURGERY

## 2022-02-28 PROCEDURE — 74011250637 HC RX REV CODE- 250/637: Performed by: INTERNAL MEDICINE

## 2022-02-28 PROCEDURE — 74011250636 HC RX REV CODE- 250/636: Performed by: ANESTHESIOLOGY

## 2022-02-28 PROCEDURE — 80053 COMPREHEN METABOLIC PANEL: CPT

## 2022-02-28 PROCEDURE — 87205 SMEAR GRAM STAIN: CPT

## 2022-02-28 PROCEDURE — 74011250636 HC RX REV CODE- 250/636: Performed by: NURSE ANESTHETIST, CERTIFIED REGISTERED

## 2022-02-28 PROCEDURE — 77030040361 HC SLV COMPR DVT MDII -B

## 2022-02-28 PROCEDURE — 80202 ASSAY OF VANCOMYCIN: CPT

## 2022-02-28 PROCEDURE — 85652 RBC SED RATE AUTOMATED: CPT

## 2022-02-28 PROCEDURE — 74011250636 HC RX REV CODE- 250/636: Performed by: INTERNAL MEDICINE

## 2022-02-28 PROCEDURE — 86140 C-REACTIVE PROTEIN: CPT

## 2022-02-28 PROCEDURE — 65270000029 HC RM PRIVATE

## 2022-02-28 PROCEDURE — 86141 C-REACTIVE PROTEIN HS: CPT

## 2022-02-28 PROCEDURE — 77030002916 HC SUT ETHLN J&J -A: Performed by: ORTHOPAEDIC SURGERY

## 2022-02-28 PROCEDURE — 77030002966 HC SUT PDS J&J -A: Performed by: ORTHOPAEDIC SURGERY

## 2022-02-28 PROCEDURE — 77030040922 HC BLNKT HYPOTHRM STRY -A

## 2022-02-28 PROCEDURE — 36415 COLL VENOUS BLD VENIPUNCTURE: CPT

## 2022-02-28 PROCEDURE — 74011000250 HC RX REV CODE- 250: Performed by: INTERNAL MEDICINE

## 2022-02-28 PROCEDURE — 82962 GLUCOSE BLOOD TEST: CPT

## 2022-02-28 PROCEDURE — 77030042556 HC PNCL CAUT -B: Performed by: ORTHOPAEDIC SURGERY

## 2022-02-28 PROCEDURE — 74011000258 HC RX REV CODE- 258: Performed by: INTERNAL MEDICINE

## 2022-02-28 PROCEDURE — 87075 CULTR BACTERIA EXCEPT BLOOD: CPT

## 2022-02-28 PROCEDURE — 83735 ASSAY OF MAGNESIUM: CPT

## 2022-02-28 PROCEDURE — 76010000149 HC OR TIME 1 TO 1.5 HR: Performed by: ORTHOPAEDIC SURGERY

## 2022-02-28 PROCEDURE — 76210000000 HC OR PH I REC 2 TO 2.5 HR: Performed by: ORTHOPAEDIC SURGERY

## 2022-02-28 PROCEDURE — 74011000250 HC RX REV CODE- 250: Performed by: ANESTHESIOLOGY

## 2022-02-28 PROCEDURE — 85025 COMPLETE CBC W/AUTO DIFF WBC: CPT

## 2022-02-28 PROCEDURE — 76060000033 HC ANESTHESIA 1 TO 1.5 HR: Performed by: ORTHOPAEDIC SURGERY

## 2022-02-28 PROCEDURE — 99223 1ST HOSP IP/OBS HIGH 75: CPT | Performed by: INTERNAL MEDICINE

## 2022-02-28 RX ORDER — SODIUM CHLORIDE, SODIUM LACTATE, POTASSIUM CHLORIDE, CALCIUM CHLORIDE 600; 310; 30; 20 MG/100ML; MG/100ML; MG/100ML; MG/100ML
75 INJECTION, SOLUTION INTRAVENOUS CONTINUOUS
Status: DISCONTINUED | OUTPATIENT
Start: 2022-02-28 | End: 2022-03-01

## 2022-02-28 RX ORDER — PROPOFOL 10 MG/ML
INJECTION, EMULSION INTRAVENOUS
Status: DISCONTINUED | OUTPATIENT
Start: 2022-02-28 | End: 2022-02-28 | Stop reason: HOSPADM

## 2022-02-28 RX ORDER — ROPIVACAINE HYDROCHLORIDE 5 MG/ML
INJECTION, SOLUTION EPIDURAL; INFILTRATION; PERINEURAL AS NEEDED
Status: DISCONTINUED | OUTPATIENT
Start: 2022-02-28 | End: 2022-02-28 | Stop reason: HOSPADM

## 2022-02-28 RX ORDER — FENTANYL CITRATE 50 UG/ML
INJECTION, SOLUTION INTRAMUSCULAR; INTRAVENOUS AS NEEDED
Status: DISCONTINUED | OUTPATIENT
Start: 2022-02-28 | End: 2022-02-28 | Stop reason: HOSPADM

## 2022-02-28 RX ORDER — SODIUM CHLORIDE 0.9 % (FLUSH) 0.9 %
5-40 SYRINGE (ML) INJECTION EVERY 8 HOURS
Status: DISCONTINUED | OUTPATIENT
Start: 2022-02-28 | End: 2022-03-04 | Stop reason: HOSPADM

## 2022-02-28 RX ORDER — SODIUM CHLORIDE 0.9 % (FLUSH) 0.9 %
5-40 SYRINGE (ML) INJECTION EVERY 8 HOURS
Status: DISCONTINUED | OUTPATIENT
Start: 2022-02-28 | End: 2022-02-28 | Stop reason: HOSPADM

## 2022-02-28 RX ORDER — SODIUM CHLORIDE 0.9 % (FLUSH) 0.9 %
5-40 SYRINGE (ML) INJECTION AS NEEDED
Status: DISCONTINUED | OUTPATIENT
Start: 2022-02-28 | End: 2022-03-04 | Stop reason: HOSPADM

## 2022-02-28 RX ORDER — SODIUM CHLORIDE, SODIUM LACTATE, POTASSIUM CHLORIDE, CALCIUM CHLORIDE 600; 310; 30; 20 MG/100ML; MG/100ML; MG/100ML; MG/100ML
INJECTION, SOLUTION INTRAVENOUS
Status: DISCONTINUED | OUTPATIENT
Start: 2022-02-28 | End: 2022-02-28 | Stop reason: HOSPADM

## 2022-02-28 RX ORDER — SODIUM CHLORIDE 0.9 % (FLUSH) 0.9 %
5-40 SYRINGE (ML) INJECTION AS NEEDED
Status: DISCONTINUED | OUTPATIENT
Start: 2022-02-28 | End: 2022-02-28 | Stop reason: HOSPADM

## 2022-02-28 RX ORDER — POTASSIUM CHLORIDE 750 MG/1
40 TABLET, FILM COATED, EXTENDED RELEASE ORAL
Status: COMPLETED | OUTPATIENT
Start: 2022-02-28 | End: 2022-02-28

## 2022-02-28 RX ADMIN — ROPIVACAINE HYDROCHLORIDE 12.2 MG: 5 INJECTION, SOLUTION EPIDURAL; INFILTRATION; PERINEURAL at 17:45

## 2022-02-28 RX ADMIN — VANCOMYCIN HYDROCHLORIDE 500 MG: 500 INJECTION, POWDER, LYOPHILIZED, FOR SOLUTION INTRAVENOUS at 06:05

## 2022-02-28 RX ADMIN — SODIUM CHLORIDE, POTASSIUM CHLORIDE, SODIUM LACTATE AND CALCIUM CHLORIDE: 600; 310; 30; 20 INJECTION, SOLUTION INTRAVENOUS at 17:30

## 2022-02-28 RX ADMIN — FAMOTIDINE 20 MG: 20 TABLET ORAL at 08:42

## 2022-02-28 RX ADMIN — Medication 10 ML: at 22:18

## 2022-02-28 RX ADMIN — LEVOTHYROXINE SODIUM 75 MCG: 0.07 TABLET ORAL at 06:13

## 2022-02-28 RX ADMIN — AMLODIPINE BESYLATE 10 MG: 5 TABLET ORAL at 08:41

## 2022-02-28 RX ADMIN — MEPIVACAINE HYDROCHLORIDE 12.5 ML: 15 INJECTION, SOLUTION EPIDURAL; INFILTRATION at 17:45

## 2022-02-28 RX ADMIN — SODIUM CHLORIDE 2 G: 9 INJECTION INTRAMUSCULAR; INTRAVENOUS; SUBCUTANEOUS at 06:12

## 2022-02-28 RX ADMIN — SODIUM CHLORIDE, POTASSIUM CHLORIDE, SODIUM LACTATE AND CALCIUM CHLORIDE 75 ML/HR: 600; 310; 30; 20 INJECTION, SOLUTION INTRAVENOUS at 21:35

## 2022-02-28 RX ADMIN — Medication 10 ML: at 06:11

## 2022-02-28 RX ADMIN — PROPOFOL 50 MCG/KG/MIN: 10 INJECTION, EMULSION INTRAVENOUS at 18:00

## 2022-02-28 RX ADMIN — FENTANYL CITRATE 25 MCG: 50 INJECTION INTRAMUSCULAR; INTRAVENOUS at 17:45

## 2022-02-28 RX ADMIN — POTASSIUM CHLORIDE 40 MEQ: 750 TABLET, EXTENDED RELEASE ORAL at 03:17

## 2022-02-28 RX ADMIN — SODIUM CHLORIDE, PRESERVATIVE FREE 10 ML: 5 INJECTION INTRAVENOUS at 22:16

## 2022-02-28 RX ADMIN — SODIUM CHLORIDE, POTASSIUM CHLORIDE, SODIUM LACTATE AND CALCIUM CHLORIDE 75 ML/HR: 600; 310; 30; 20 INJECTION, SOLUTION INTRAVENOUS at 17:33

## 2022-02-28 RX ADMIN — ASPIRIN 81 MG: 81 TABLET, COATED ORAL at 08:41

## 2022-02-28 RX ADMIN — ACETAMINOPHEN 650 MG: 325 TABLET ORAL at 22:16

## 2022-02-28 RX ADMIN — SODIUM CHLORIDE, PRESERVATIVE FREE 10 ML: 5 INJECTION INTRAVENOUS at 22:18

## 2022-02-28 RX ADMIN — FOLIC ACID 1 MG: 1 TABLET ORAL at 08:41

## 2022-02-28 NOTE — PROGRESS NOTES
Orthopedic NP Progress Note      February 28, 2022 10:05 AM     William Gaitan    Attending Physician: Treatment Team: Attending Provider: Jose Reed MD; Consulting Provider: Kely Dixon MD; Consulting Provider: CHINA Pearce; Staff Nurse: Alyse Causey RN; Consulting Provider: Maximo Muhammad DO     Vital Signs:    Patient Vitals for the past 8 hrs:   BP Temp Pulse Resp SpO2   02/28/22 0757 (!) 145/66 99 °F (37.2 °C) 70 18 97 %   02/28/22 0210 (!) 141/64 98.5 °F (36.9 °C) 66 18 94 %     BMI (calculated): 22.7 (02/27/22 2041)    Intake/Output:  No intake/output data recorded. 02/26 1901 - 02/28 0700  In: 1550 [P.O.:200; I.V.:1350]  Out: 200 [Urine:200]    Pain Control:   Pain Assessment  Pain Scale 1: Numeric (0 - 10)  Pain Intensity 1: 0  Pain Onset 1: 2 days  Pain Location 1: Elbow,Arm  Pain Orientation 1: Left  Pain Description 1: Aching    LAB:    Recent Labs     02/28/22  0048   HCT 30.2*   HGB 9.8*     Lab Results   Component Value Date/Time    Sodium 139 02/28/2022 12:48 AM    Potassium 3.0 (L) 02/28/2022 12:48 AM    Chloride 105 02/28/2022 12:48 AM    CO2 26 02/28/2022 12:48 AM    Glucose 105 (H) 02/28/2022 12:48 AM    BUN 11 02/28/2022 12:48 AM    Creatinine 0.54 (L) 02/28/2022 12:48 AM    Calcium 8.0 (L) 02/28/2022 12:48 AM       Subjective:  William Gaitan is a 80 y.o. female s/p L elbow aspiration yesterday, pt states pain is better, splint was removed. Afebrile over night        Objective: General: alert, cooperative, no distress. Neuro/Vascular: CNS Intact. Sensation stable. Brisk cap refill, 2+ pulses UE/LE  Musculoskeletal:  +ROM RUE/LE.  With full extension and flexion to 130 with min pain per pt, min TTP of elbow with small area of fluctuance noted to posterior, erythema improved, NVI    Skin: warm and dry     Miller - n  Drain - n               PT/OT:   Gait:                      Assessment:    s/p Procedure(s):  INCISION AND DRAINAGE LEFT ELBOW    Active Problems: Septic joint ([facility] (2/27/2022)         Plan:   -  L elbow inflammatory flare vs septic jt with hx of L elbow septic jt - WBC trending down, pt reports improvement in pain with ROM and ROM has improved since yesterday, MRI was reviewed by Dr. Mona Curry in light of clinical improvement the plan is to check ESR & CRP prior to reaspiration givne hx of RA/Gout and negative culture with prior I&D and no organisms on gram stain with final cultures pending     Plan as per Dr. Mona Curry, will follow up for stat labs     Signed By: Mushtaq Castorena NP    Orthopedic Nurse Practitioner

## 2022-02-28 NOTE — ANESTHESIA PREPROCEDURE EVALUATION
Relevant Problems   CARDIOVASCULAR   (+) HTN (hypertension), benign      RENAL FAILURE   (+) Chronic kidney disease (CKD)      ENDOCRINE   (+) DM type 2 (diabetes mellitus, type 2) (HCC)   (+) Hypothyroid   (+) RA (rheumatoid arthritis) (HCC)   (+) Septic arthritis of elbow, left (HCC)   (+) Septic joint (HCC)       Anesthetic History     PONV          Review of Systems / Medical History  Patient summary reviewed, nursing notes reviewed and pertinent labs reviewed    Pulmonary              Pertinent negatives: No COPD, asthma and recent URI     Neuro/Psych           Pertinent negatives: No seizures, TIA and CVA   Cardiovascular    Hypertension            Pertinent negatives: No past MI, angina and CHF       GI/Hepatic/Renal  Within defined limits           Pertinent negatives: No renal disease   Endo/Other    Diabetes: well controlled, type 2  Hypothyroidism  Arthritis and anemia (hgb 9.8)     Other Findings   Comments: RA on MTX           Physical Exam    Airway  Mallampati: II  TM Distance: 4 - 6 cm    Mouth opening: Normal     Cardiovascular      Rate: normal         Dental    Dentition: Edentulous     Pulmonary  Breath sounds clear to auscultation               Abdominal  GI exam deferred       Other Findings            Anesthetic Plan    ASA: 3  Anesthesia type: MAC and regional          Induction: Intravenous  Anesthetic plan and risks discussed with: Patient

## 2022-02-28 NOTE — PROGRESS NOTES
Spiritual Care Assessment/Progress Note  [address] [place] [address] Dr      NAME: Juan Francisco Araiza      MRN: [id number]  AGE: 80 y.o.  SEX: female  Alevism Affiliation: Uatsdin   Language: English     2/28/2022     Total Time (in minutes): 15     Spiritual Assessment begun in [facility] 5M1 MED SURG 1 through conversation with:         [x]Patient        [] Family    [] Friend(s)        Reason for Consult: Initial/Spiritual assessment, patient floor     Spiritual beliefs: (Please include comment if needed)     [x] Identifies with a mellissa tradition: Uatsdin        [] Supported by a mellissa community:            [] Claims no spiritual orientation:           [] Seeking spiritual identity:                [] Adheres to an individual form of spirituality:           [] Not able to assess:                           Identified resources for coping:      [] Prayer                               [] Music                  [] Guided Imagery     [x] Family/friends                 [] Pet visits     [] Devotional reading                         [] Unknown     [] Other:                                              Interventions offered during this visit: (See comments for more details)    Patient Interventions: Affirmation of emotions/emotional suffering,Affirmation of mellissa,Coordination with community clergy,Normalization of emotional/spiritual concerns           Plan of Care:     [] Support spiritual and/or cultural needs    [] Support AMD and/or advance care planning process      [] Support grieving process   [] Coordinate Rites and/or Rituals    [] Coordination with community clergy   [] No spiritual needs identified at this time   [] Detailed Plan of Care below (See Comments)  [] Make referral to Music Therapy  [] Make referral to Pet Therapy     [] Make referral to Addiction services  [] Make referral to [facility]  [] Make referral to Spiritual Care Partner  [] No future visits requested        [x] Follow up visits as needed Visited patient for initial spiritual assessment. Patient requests a  visit. She understands that a  will be in the hospital tomorrow. Alysha Persaud will be so notified when he rounds on patients tomorrow.    Chaplain Serna MDiv, MS, [facility]

## 2022-02-28 NOTE — PROGRESS NOTES
Bedside shift change report given to Brina (oncoming nurse) by Sj Rees (offgoing nurse). Report included the following information SBAR, Kardex, ED Summary, Intake/Output and Recent Results.

## 2022-02-28 NOTE — CONSULTS
Infectious Disease Consult    Impression/Plan   · Suspected left elbow septic arthritis/osteomyelitis. MRI concerning for infection and aspiration revealed purulent material however cell count relatively benign. Awaiting culture results of synovial fluid. Orthopedic surgery following. Continue empiric antibiotics. · Leukocytosis. Resolved  · Rheumatoid arthritis. Was on methotrexate in the past  · Abdominal aortic aneurysm. Avoid quinolone antibiotics  · Amoxicillin allergy. This caused a rash in the past.  Able to tolerate cephalosporins  · Diabetes mellitus    Anti-infectives:   1. Vancomycin  2. Ceftriaxone    Subjective:   Date of Consultation:  February 28, 2022  Date of Admission: 2/27/2022   Referring Physician:     Patient is a 80 y.o. female with a past medical history significant for diabetes mellitus, rheumatoid arthritis, and CKD who is being seen for left elbow pain. The patient is well-known to the infectious diseases service. She was seen in April 2021 for left elbow septic arthritis. She underwent I&D during that hospitalization. Cultures were sterile however patient was on antibiotics prior to surgery. Patient discharged on ceftriaxone which was continued for 6 weeks through 5/10/2021. Patient reportedly doing well until recently when she developed worsening left elbow pain following a dose of steroids prescribed for a rash. MRI of the elbow at this admission has raised concern for septic arthritis and radial head osteomyelitis. Patient is status post aspiration of the elbow joint. Cell count is relatively unremarkable  Culture pending. Orthopedic surgery is following. She has been started on vancomycin and ceftriaxone.   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    Chronic kidney disease (CKD) N18.9    SBO (small bowel obstruction) ([address] K56.609    Acute diverticulitis K57.92    Diverticulitis of sigmoid colon K57.32    Severe sepsis (HCC) A41.9, R65.20    Intractable nausea and vomiting R11.2    Immunocompromised state due to drug therapy ([address] H70.666, Z79.899    Septic arthritis of elbow, left ([address] M00.9    Septic joint ([address] M00.9     Past Medical History:   Diagnosis Date    Arthritis, rheumatoid ([address]     Diabetes ([address]     Diverticulitis     Hard of hearing     History of vascular access device 2021    [facility] VAT 4 FR R Basilic 52/6 LTAbx    Hypertension     Hypothyroid     Osteoporosis, post-menopausal       Family History   Problem Relation Age of Onset    Heart Disease Mother     Diabetes Father     Cancer Brother         brain cancer    Heart Disease Brother     Diabetes Brother     Other Other         scleroderma      Social History     Tobacco Use    Smoking status: Former Smoker     Types: Cigarettes     Quit date: 1976     Years since quittin.2    Smokeless tobacco: Never Used   Substance Use Topics    Alcohol use: Yes     Comment: Waylon time 1 glass wine     Past Surgical History:   Procedure Laterality Date    FLEXIBLE SIGMOIDOSCOPY N/A 2020    SIGMOIDOSCOPY FLEXIBLE [facility] Renato Francis [facility]    HX HEMORRHOIDECTOMY      HX HYSTERECTOMY      HX THYROIDECTOMY        Prior to Admission medications    Medication Sig Start Date End Date Taking? Authorizing Provider   folic acid (FOLVITE) 1 mg tablet Take 1 mg by mouth daily. Provider, Historical   docusate sodium (Colace) 100 mg capsule Take 100 mg by mouth two (2) times daily as needed for Constipation. Provider, Historical   levothyroxine (SYNTHROID) 75 mcg tablet Take 1 Tab by mouth Daily (before breakfast). 20   Do, Josefina Blake MD   polyethylene glycol (Miralax) 17 gram packet Take 1 Packet by mouth daily. 20   Do, Josefina Blake MD   L.acidoph & parac-S.therm-Bifido (BRENNEN Q2/RISAQUAD-2) 16 billion cell cap cap Take 1 Cap by mouth daily. 20   Do, Shay HAN MD   amLODIPine (NORVASC) 10 mg tablet Take 10 mg by mouth daily. Provider, Historical   acetaminophen (Tylenol Extra Strength) 500 mg tablet Take 500 mg by mouth two (2) times daily as needed for Pain. Provider, Historical   multivit-min/iron/folic/lutein (CENTRUM SILVER WOMEN PO) Take 1 Tab by mouth every other day. Provider, Historical   cholecalciferol (VITAMIN D3) 1,000 unit cap Take 2,000 Units by mouth daily. Provider, Historical   aspirin delayed-release 81 mg tablet Take 81 mg by mouth daily. Provider, Historical     Allergies   Allergen Reactions    Alendronate Diarrhea    Amoxicillin Rash     Tolerated cefepime 3/27/21    Hydrochlorothiazide Other (comments)     \"Caused pain everywhere\"        Review of Systems:  A comprehensive review of systems was negative except for that written in the History of Present Illness. Objective:   Blood pressure (!) 145/66, pulse 70, temperature 99 °F (37.2 °C), resp. rate 18, height 4' 11\" (1.499 m), weight 112 lb 3.4 oz (50.9 kg), SpO2 97 %. Temp (24hrs), Av.5 °F (36.9 °C), Min:98.1 °F (36.7 °C), Max:99 °F (37.2 °C)       Exam:          General:  Alert, cooperative, well noursished, well developed, appears stated age   Eyes:  Sclera anicteric. Mouth/Throat: Mucous membranes normal   Neck: Supple   Lungs:   Clear to auscultation anteriorly   CV:  Regular rate and rhythm   Abdomen:   non-distended   Extremities:   See image above. Minimal left elbow tenderness with passive range of motion. Not warm to touch.   No drainage   Skin:  No rash   Lymph nodes:    Musculoskeletal:  Moves all   Lines/Devices:  Intact, no erythema, drainage or tenderness   Psych: Alert and oriented, normal mood affect given the setting       Data Review:   Recent Results (from the past 24 hour(s))   CULTURE, BODY FLUID W GRAM STAIN    Collection Time: 02/27/22  1:01 PM    Specimen: Joint Fluid; Body Fluid   Result Value Ref Range    Special Requests: NO SPECIAL REQUESTS      GRAM STAIN 2+ WBCS SEEN      GRAM STAIN NO ORGANISMS SEEN      Culture result: PENDING    CRYSTALS, SYNOVIAL FLUID    Collection Time: 02/27/22  1:01 PM   Result Value Ref Range    FLUID TYPE(7) ELBOW      Crystals, body fluid NO CRYSTALS SEEN WITH POLARIZED LIGHT     CELL COUNT, SYNOVIAL    Collection Time: 02/27/22  1:01 PM   Result Value Ref Range    SYNOVIAL FLD SITE ELBOW      SYN FLUID COLOR PINK      SYN FLUID APPEARANCE CLOUDY      SYNOVIAL FLD RBC CT >100 (H) 0 /cu mm    SYNOVIAL FLD WBC  (H) 0 - 150 /cu mm    SYNOVIAL FLD SEGS 88 (H) 0 - 20 %    SYNOVIAL FLD LYMPHS 4 0 - 15 %    SYNOVIAL FLD MONOS 8 0 - 65 %   METABOLIC PANEL, COMPREHENSIVE    Collection Time: 02/28/22 12:48 AM   Result Value Ref Range    Sodium 139 136 - 145 mmol/L    Potassium 3.0 (L) 3.5 - 5.1 mmol/L    Chloride 105 97 - 108 mmol/L    CO2 26 21 - 32 mmol/L    Anion gap 8 5 - 15 mmol/L    Glucose 105 (H) 65 - 100 mg/dL    BUN 11 6 - 20 MG/DL    Creatinine 0.54 (L) 0.55 - 1.02 MG/DL    BUN/Creatinine ratio 20 12 - 20      GFR est AA >60 >60 ml/min/1.73m2    GFR est non-AA >60 >60 ml/min/1.73m2    Calcium 8.0 (L) 8.5 - 10.1 MG/DL    Bilirubin, total 0.6 0.2 - 1.0 MG/DL    ALT (SGPT) 12 12 - 78 U/L    AST (SGOT) 9 (L) 15 - 37 U/L    Alk.  phosphatase 70 45 - 117 U/L    Protein, total 5.9 (L) 6.4 - 8.2 g/dL    Albumin 2.7 (L) 3.5 - 5.0 g/dL    Globulin 3.2 2.0 - 4.0 g/dL    A-G Ratio 0.8 (L) 1.1 - 2.2     MAGNESIUM    Collection Time: 02/28/22 12:48 AM   Result Value Ref Range    Magnesium 2.3 1.6 - 2.4 mg/dL   CBC WITH AUTOMATED DIFF    Collection Time: 02/28/22 12:48 AM   Result Value Ref Range    WBC 9.4 3.6 - 11.0 K/uL    RBC 3.52 (L) 3.80 - 5.20 M/uL    HGB 9.8 (L) 11.5 - 16.0 g/dL    HCT 30.2 (L) 35.0 - 47.0 %    MCV 85.8 80.0 - 99.0 FL    MCH 27.8 26.0 - 34.0 PG    MCHC 32.5 30.0 - 36.5 g/dL    RDW 15.3 (H) 11.5 - 14.5 %    PLATELET 623 721 - 267 K/uL    MPV 9.8 8.9 - 12.9 FL    NRBC 0.0 0  WBC    ABSOLUTE NRBC 0.00 0.00 - 0.01 K/uL    NEUTROPHILS 90 (H) 32 - 75 %    LYMPHOCYTES 4 (L) 12 - 49 %    MONOCYTES 4 (L) 5 - 13 %    EOSINOPHILS 1 0 - 7 %    BASOPHILS 1 0 - 1 %    IMMATURE GRANULOCYTES 1 (H) 0.0 - 0.5 %    ABS. NEUTROPHILS 8.4 (H) 1.8 - 8.0 K/UL    ABS. LYMPHOCYTES 0.4 (L) 0.8 - 3.5 K/UL    ABS. MONOCYTES 0.4 0.0 - 1.0 K/UL    ABS. EOSINOPHILS 0.1 0.0 - 0.4 K/UL    ABS. BASOPHILS 0.1 0.0 - 0.1 K/UL    ABS. IMM.  GRANS. 0.1 (H) 0.00 - 0.04 K/UL    DF AUTOMATED     VANCOMYCIN, RANDOM    Collection Time: 02/28/22 12:48 AM   Result Value Ref Range    Vancomycin, random 19.4 UG/ML        Microbiology:      Studies:      Signed By: Tushar Carter DO     February 28, 2022

## 2022-02-28 NOTE — PROGRESS NOTES
Progress Note:  CRP and ESR increased today  Attempted to aspirate L elbow without success, plan for I&D this afternoon wit Dr. Hank Mclean

## 2022-02-28 NOTE — PROGRESS NOTES
Reason for Admission:   Septic joint                  RUR Score:   15%               PCP: First and Last name:   Cynthia Duff MD   Name of Practice:    Are you a current patient: Yes/No: New patient   Approximate date of last visit: *has an appointment scheduled on 3/17/22   Can you participate in a virtual visit if needed:     Do you (patient/family) have any concerns for transition/discharge? If IV abx are needed at d/c, son will provide assistance; he does not want patient to d/c to a SNF. Plan for utilizing home health:   TBD    Current Advanced Directive/Advance Care Plan:  Full Code    Healthcare Decision Maker:             Primary Decision Maker: Glenn Ma - [phone]    Transition of Care Plan:     CM met with patient and her son, Maximiliano Tomlinson, to begin discharge planning and complete initial assessment. Patient lives alone in a senior apartment complex with elevator access. Her son, Maximiliano Tomlinson, lives nearby and is able to assist patient post discharge. Son currently works from 2pm-11pm but stated that he plans on retiring soon and will be available to assist his mother as needed. Son states, patient will not be going to a SNF. Last year she went to the [address] [facility] for IV infusion and later discharged home with [place] provided by [facility] [place] [facility] . Patient reports being independent with ADLs. She owns a walker and a shower chair and uses a cane prn. Her son provides transportation to appointments. Her preferred pharmacy is [facility] [address] [facility]. Patient was last seen by Maykel Sewell, but he left the practice and patient will now be seeing Dr. Afsaneh Alexander (new patient appointment scheduled on 3/17/22). Information on [facility] was provided to patient and her son. 1. CM following for specific discharge needs  2. ID following  3. Ortho surgery following  4.  Son is able to provide transport at d/c    Care Management Interventions  PCP Verified by CM: Yes  Support Systems: Child(dhaval)  Confirm Follow Up Transport: Family  The Plan for Transition of Care is Related to the Following Treatment Goals : Septic joint  Discharge Location  Patient Expects to be Discharged to[de-identified] Home with family assistance     Mike Hampton LCSW

## 2022-02-28 NOTE — ANESTHESIA PROCEDURE NOTES
Peripheral Block    Start time: 2/28/2022 5:30 PM  End time: 2/28/2022 5:56 PM  Performed by: Beatriz Thompson CRNA  Authorized by: Freya Pierson MD       Pre-procedure:    Indications: primary anesthetic    Preanesthetic Checklist: patient identified, risks and benefits discussed, site marked, timeout performed, anesthesia consent given and patient being monitored      Block Type:   Block Type:  Supraclavicular  Laterality:  Left  Monitoring:  Standard ASA monitoring, continuous pulse ox, frequent vital sign checks, heart rate, responsive to questions and oxygen  Injection Technique:  Single shot  Procedures: ultrasound guided    Patient Position: supine  Prep: chlorhexidine    Location:  Supraclavicular  Needle Type:  Stimuplex  Needle Gauge:  22 G  Needle Localization:  Ultrasound guidance  Med Admin Time: 2/28/2022 5:40 PM    Assessment:  Number of attempts:  1  Injection Assessment:  Incremental injection every 5 mL, local visualized surrounding nerve on ultrasound, negative aspiration for CSF, negative aspiration for blood, no intravascular symptoms and no paresthesia  Patient tolerance:  Patient tolerated the procedure well with no immediate complications  Block performed by Peewee Kay

## 2022-02-28 NOTE — BRIEF OP NOTE
Brief Postoperative Note    Patient: Kameron Garcia  YOB: 1930  MRN: [id number]    Date of Procedure: 2/28/2022     Pre-Op Diagnosis: Left Septic Elbow    Post-Op Diagnosis: Same as preoperative diagnosis. Procedure(s):  INCISION AND DRAINAGE LEFT ELBOW    Surgeon(s):  Kristin Barreto MD    Surgical Assistant: None    Anesthesia: General     Estimated Blood Loss (mL): less than 50     Complications: None    Specimens: * No specimens in log *     Implants: * No implants in log *    Drains: * No LDAs found *    Findings: Abundant murky fluid in the elbow joint. No obvious abscess with radial head drilling.     Electronically Signed by Heidi Coronado MD on 2/28/2022 at 5:42 PM

## 2022-02-28 NOTE — PROGRESS NOTES
This patient is a 70-year-old female with past medical history notable for rheumatoid arthritis and history of left septic elbow status post irrigation and debridement nearly a year ago. Please see full consult note from [facility] for further details regarding her course up to this point, but in short she has had a relatively acute onset of severe left elbow pain, swelling, and erythema. She has also had a fever of 101.7 as well as tachycardia, and leukocytosis. She underwent aspiration yesterday. The cell count from that aspiration demonstrated 388 nucleated cells with 88% PMNs. She does have elevated inflammatory markers. To help clarify her situation, we ordered an MRI of the elbow, which has been completed. I have reviewed the MRI of the elbow. This demonstrates a small cystic lesion in the radial head. There is a large effusion with synovitic reaction. The aspiration does not demonstrate any crystals. She does not have a high uric acid level. Overall, I think she has an equivocal picture. Certainly on presentation she had some signs concerning for infectious etiology. However, her cell count has been reassuring despite ominous appearance of the aspiration fluid. I think the chance of this being a septic arthritis is relatively high given her other presenting features as above. However, we will attempt to repeat aspirate the elbow this morning. We have tentatively placed her on the operating room schedule for this afternoon. Please keep NPO for now. Unless the aspiration demonstrates reassuring features, we likely will proceed with surgery this afternoon for irrigation and debridement of the elbow. Please call with questions.     Hilario Villanueva MD

## 2022-02-28 NOTE — OP NOTES
OPERATIVE REPORT    FACILITY: [address] [place] [address]    PATIENT NAME: William Gaitan     DATE OF OPERATION: 2/28/2022    PREOPERATIVE DIAGNOSIS:   1. Concern for left septic elbow joint  2. Concern for left radial head osteomyelitis    POSTOPERATIVE DIAGNOSIS: same    SURGERIES PERFORMED:   1. Left elbow arthrotomy for drainage  2. Drilling of left radial head for osteomyelitis    ATTENDING PHYSICIAN: MD Caity AlvesH[facility] Hospital[facility]ley Trenton, surgical assistant    IMPLANTS:  none  * No implants in log *    SPECIMENS:  Multiple intraoperative cultures    OPERATIVE FINDINGS: abundant murky fluid within the joint with particulate debris, consistent with septic arthritis. No apparent intraosseous abscess with radial head drilling    ANESTHESIA: Block + conscious sedation    FLUIDS: Please see anesthesia record    ESTIMATED BLOOD LOSS: 50cc     INDICATIONS FOR PROCEDURE: This patient is a 80 y.o. female who presented to our institution with A fever, leukocytosis, and a red hot swollen elbow. She had previously undergone left elbow irrigation and debridement for concern for septic arthritis. This was almost a year ago. No organism had grown from that. An aspiration was performed in the emergency department which demonstrated abundant purulent fluid. She had high inflammatory markers. There were no crystals seen on the aspiration. The total nucleated cell count was 388, but the percentage of neutrophils was 88%. Given the gross appearance of the fluid, the cell count was puzzling. She was placed into a splint. She underwent an MRI which showed a large effusion with concern for reactive changes consistent with septic arthritis. There was a cystic lesion in the radial head concerning for possible osteomyelitis. We have repeated her inflammatory markers today, and they had approximately doubled.   Due to the concerning appearance of the elbow as well as the fluid in conjunction with a negative crystal exam and MRI concerning for septic arthritis with potential osteomyelitis of the radial head, we discussed risks, benefits, and alternatives to the surgical procedures outlined above. She gave informed consent to proceed. She presents here today for that surgery. DETAILED DESCRIPTION OF PROCEDURE: The patient was identified in preoperative holding. The operative extremity was marked. The patient was then taken back to the operating room where the anesthetic of choice was administered. The patient was positioned on the operating room table taking care to pad all bony prominences. The operative site was prepped and draped in the usual sterile fashion. Timeouts were performed in keeping with guidelines. We marked the previous scar from her last elbow surgery. This was located laterally over the radiocapitellar joint. We dissected skin and subcutaneous tissue. We identified by palpation and range of motion the radiocapitellar joint. We incised the joint and abundant purulent-appearing fluid was expressed. Multiple cultures were taken. We released the capsule distally over the radial head on off to expose it to the point where we could drill into the radial head at around the level of the lesion concerning for osteomyelitis. Using a 2 mm drill bit, we drilled into the radial head. No obvious abscess was present. We then ranged the joint expressing as much of the purulent fluid out as we could. There was some particulate debris present. We then irrigated the joint. We irrigated a total of 6 L of sterile saline through the joint. This was done in its entirety. The fluid was quite clear at the end. We then all changed our gloves. We set about with closure. We reapproximated the capsule and deep fascia with 2-0 PDS sutures. Hemovac drain was placed as well. The subcutaneous tissue was closed with 2-0 PDS sutures. The skin was closed with interrupted horizontal mattress nylon sutures.   A sterile dressing was applied. The patient was placed into a sling. . All counts were correct at the conclusion of the case. DISPOSITION: Stable to PACU. POSTOPERATIVE PLAN: We will keep the Hemovac drain until tomorrow. We will follow the intraoperative cultures and keep the patient on empiric antibiotics. We will consult Infectious Disease for their input regarding further management. FOLLOW UP: We will plan to see the patient back in approximately 2 weeks in clinic for routine checkup.      Juan Ortiz MD

## 2022-02-28 NOTE — INTERVAL H&P NOTE
Update History & Physical    The Patient's History and Physical of February 27,  was reviewed with the patient and I examined the patient. There was no change. The surgical site was confirmed by the patient and me. Plan:  The risk, benefits, expected outcome, and alternative to the recommended procedure have been discussed with the patient. Patient understands and wants to proceed with the procedure.     Electronically signed by Yaneth Zambrano MD on 2/28/2022 at 5:32 PM

## 2022-03-01 LAB
ALBUMIN SERPL-MCNC: 2.4 G/DL (ref 3.5–5)
ALBUMIN/GLOB SERPL: 0.7 {RATIO} (ref 1.1–2.2)
ALP SERPL-CCNC: 61 U/L (ref 45–117)
ALT SERPL-CCNC: 12 U/L (ref 12–78)
ANION GAP SERPL CALC-SCNC: 8 MMOL/L (ref 5–15)
AST SERPL-CCNC: 8 U/L (ref 15–37)
BILIRUB SERPL-MCNC: 0.4 MG/DL (ref 0.2–1)
BUN SERPL-MCNC: 14 MG/DL (ref 6–20)
BUN/CREAT SERPL: 25 (ref 12–20)
CALCIUM SERPL-MCNC: 8.6 MG/DL (ref 8.5–10.1)
CHLORIDE SERPL-SCNC: 107 MMOL/L (ref 97–108)
CO2 SERPL-SCNC: 24 MMOL/L (ref 21–32)
CREAT SERPL-MCNC: 0.55 MG/DL (ref 0.55–1.02)
ERYTHROCYTE [DISTWIDTH] IN BLOOD BY AUTOMATED COUNT: 15.3 % (ref 11.5–14.5)
GLOBULIN SER CALC-MCNC: 3.6 G/DL (ref 2–4)
GLUCOSE SERPL-MCNC: 80 MG/DL (ref 65–100)
HCT VFR BLD AUTO: 31.8 % (ref 35–47)
HGB BLD-MCNC: 10.2 G/DL (ref 11.5–16)
MCH RBC QN AUTO: 28.2 PG (ref 26–34)
MCHC RBC AUTO-ENTMCNC: 32.1 G/DL (ref 30–36.5)
MCV RBC AUTO: 87.8 FL (ref 80–99)
NRBC # BLD: 0 K/UL (ref 0–0.01)
NRBC BLD-RTO: 0 PER 100 WBC
PLATELET # BLD AUTO: 256 K/UL (ref 150–400)
PMV BLD AUTO: 10.1 FL (ref 8.9–12.9)
POTASSIUM SERPL-SCNC: 3.1 MMOL/L (ref 3.5–5.1)
PROT SERPL-MCNC: 6 G/DL (ref 6.4–8.2)
RBC # BLD AUTO: 3.62 M/UL (ref 3.8–5.2)
SODIUM SERPL-SCNC: 139 MMOL/L (ref 136–145)
VANCOMYCIN SERPL-MCNC: 3.9 UG/ML
WBC # BLD AUTO: 6.4 K/UL (ref 3.6–11)

## 2022-03-01 PROCEDURE — 74011000258 HC RX REV CODE- 258: Performed by: ORTHOPAEDIC SURGERY

## 2022-03-01 PROCEDURE — 36415 COLL VENOUS BLD VENIPUNCTURE: CPT

## 2022-03-01 PROCEDURE — 74011000250 HC RX REV CODE- 250: Performed by: ORTHOPAEDIC SURGERY

## 2022-03-01 PROCEDURE — 74011250637 HC RX REV CODE- 250/637: Performed by: ORTHOPAEDIC SURGERY

## 2022-03-01 PROCEDURE — 80053 COMPREHEN METABOLIC PANEL: CPT

## 2022-03-01 PROCEDURE — 74011250636 HC RX REV CODE- 250/636: Performed by: ORTHOPAEDIC SURGERY

## 2022-03-01 PROCEDURE — 74011250636 HC RX REV CODE- 250/636: Performed by: INTERNAL MEDICINE

## 2022-03-01 PROCEDURE — 51798 US URINE CAPACITY MEASURE: CPT

## 2022-03-01 PROCEDURE — 99232 SBSQ HOSP IP/OBS MODERATE 35: CPT | Performed by: INTERNAL MEDICINE

## 2022-03-01 PROCEDURE — 80202 ASSAY OF VANCOMYCIN: CPT

## 2022-03-01 PROCEDURE — 74011250637 HC RX REV CODE- 250/637: Performed by: INTERNAL MEDICINE

## 2022-03-01 PROCEDURE — 65270000029 HC RM PRIVATE

## 2022-03-01 PROCEDURE — 85027 COMPLETE CBC AUTOMATED: CPT

## 2022-03-01 RX ORDER — POTASSIUM CHLORIDE 750 MG/1
40 TABLET, FILM COATED, EXTENDED RELEASE ORAL EVERY 6 HOURS
Status: COMPLETED | OUTPATIENT
Start: 2022-03-01 | End: 2022-03-01

## 2022-03-01 RX ADMIN — SODIUM CHLORIDE, POTASSIUM CHLORIDE, SODIUM LACTATE AND CALCIUM CHLORIDE 75 ML/HR: 600; 310; 30; 20 INJECTION, SOLUTION INTRAVENOUS at 14:04

## 2022-03-01 RX ADMIN — SODIUM CHLORIDE, PRESERVATIVE FREE 10 ML: 5 INJECTION INTRAVENOUS at 05:42

## 2022-03-01 RX ADMIN — Medication 10 ML: at 05:42

## 2022-03-01 RX ADMIN — ASPIRIN 81 MG: 81 TABLET, COATED ORAL at 08:44

## 2022-03-01 RX ADMIN — POTASSIUM CHLORIDE 40 MEQ: 750 TABLET, EXTENDED RELEASE ORAL at 06:04

## 2022-03-01 RX ADMIN — POLYETHYLENE GLYCOL 3350 17 G: 17 POWDER, FOR SOLUTION ORAL at 08:45

## 2022-03-01 RX ADMIN — AMLODIPINE BESYLATE 10 MG: 5 TABLET ORAL at 08:44

## 2022-03-01 RX ADMIN — FOLIC ACID 1 MG: 1 TABLET ORAL at 08:44

## 2022-03-01 RX ADMIN — SODIUM CHLORIDE, PRESERVATIVE FREE 10 ML: 5 INJECTION INTRAVENOUS at 22:13

## 2022-03-01 RX ADMIN — FAMOTIDINE 20 MG: 20 TABLET ORAL at 08:44

## 2022-03-01 RX ADMIN — ACETAMINOPHEN 650 MG: 325 TABLET ORAL at 22:13

## 2022-03-01 RX ADMIN — SODIUM CHLORIDE 2 G: 9 INJECTION INTRAMUSCULAR; INTRAVENOUS; SUBCUTANEOUS at 06:47

## 2022-03-01 RX ADMIN — VANCOMYCIN HYDROCHLORIDE 500 MG: 500 INJECTION, POWDER, LYOPHILIZED, FOR SOLUTION INTRAVENOUS at 05:42

## 2022-03-01 RX ADMIN — Medication 10 ML: at 22:14

## 2022-03-01 RX ADMIN — POTASSIUM CHLORIDE 40 MEQ: 750 TABLET, EXTENDED RELEASE ORAL at 11:49

## 2022-03-01 RX ADMIN — VANCOMYCIN HYDROCHLORIDE 750 MG: 750 INJECTION, POWDER, LYOPHILIZED, FOR SOLUTION INTRAVENOUS at 23:42

## 2022-03-01 RX ADMIN — VANCOMYCIN HYDROCHLORIDE 750 MG: 750 INJECTION, POWDER, LYOPHILIZED, FOR SOLUTION INTRAVENOUS at 11:48

## 2022-03-01 RX ADMIN — LEVOTHYROXINE SODIUM 75 MCG: 0.07 TABLET ORAL at 06:47

## 2022-03-01 NOTE — PROGRESS NOTES
[facility] Progress Note           Brad Davidson DO    [phone] Office  [phone]  Fax    3/1/2022      Assessment & Plan:   · Suspected left elbow septic arthritis/osteomyelitis. MRI concerning for infection and aspiration revealed purulent material however cell count relatively benign. Taken to the OR yesterday for I&D. Multiple cultures including fungal cultures pending. I asked the microbiology lab to send an AFB culture which will be sent from the aspiration . Continue empiric antibiotics. · Leukocytosis. Resolved  · Rheumatoid arthritis. Was on methotrexate in the past  · Abdominal aortic aneurysm. Avoid quinolone antibiotics  · Amoxicillin allergy. This caused a rash in the past.  Able to tolerate cephalosporins  · Diabetes mellitus           Subjective:     Complaining of postop pain    Objective:     Vitals:   Visit Vitals  BP (!) 177/72 (BP 1 Location: Right upper arm, BP Patient Position: Walking)   Pulse 83   Temp 97.9 °F (36.6 °C)   Resp 18   Ht 4' 11\" (1.499 m)   Wt 121 lb 14.4 oz (55.3 kg)   SpO2 99%   BMI 24.62 kg/m²        Tmax:  Temp (24hrs), Av.2 °F (36.8 °C), Min:97.7 °F (36.5 °C), Max:99.1 °F (37.3 °C)      Exam:   Patient is intubated:  no    Physical Examination:   General:  Alert, cooperative, no distress   Head:  Normocephalic, atraumatic. Eyes:  Conjunctivae clear   Neck: Supple       Lungs:   No distress   Chest wall:     Heart:     Abdomen:    non-distended   Extremities: Moves all. Left elbow dressed   Skin:  No rash   Neurologic: CNII-XII intact. Normal strength     Labs:        No lab exists for component: ITNL   No results for input(s): CPK, CKMB, TROIQ in the last 72 hours.   Recent Labs     22  0232 22  0048 22  0422    139 133*   K 3.1* 3.0* 3.5    105 100   CO2 24 26 24   BUN 14 11 16   CREA 0.55 0.54* 0.82   GLU 80 105* 107*   MG  --  2.3  --    ALB 2.4* 2.7* 3.2*   WBC 6.4 9.4 13.5*   HGB 10.2* 9.8* 11.2*   HCT 31.8* 30.2* 34.9*    262 354     No results for input(s): INR, PTP, APTT, INREXT in the last 72 hours.   Needs: urine analysis, urine sodium, protein and creatinine  Lab Results   Component Value Date/Time    Sodium,urine random 105 03/28/2021 02:18 AM         Cultures:     Lab Results   Component Value Date/Time    Specimen Description: BLOOD 05/01/2011 09:45 AM     Lab Results   Component Value Date/Time    Culture result: PENDING 02/28/2022 06:32 PM    Culture result: PENDING 02/28/2022 06:31 PM    Culture result: PENDING 02/28/2022 06:26 PM       Radiology:     Medications       Current Facility-Administered Medications   Medication Dose Route Frequency Last Admin    vancomycin (VANCOCIN) 750 mg in 0.9% sodium chloride 250 mL (VIAL-MATE)  750 mg IntraVENous Q12H 750 mg at 03/01/22 1148    sodium chloride (NS) flush 5-40 mL  5-40 mL IntraVENous Q8H 10 mL at 03/01/22 0542    sodium chloride (NS) flush 5-40 mL  5-40 mL IntraVENous PRN      lactated Ringers infusion  75 mL/hr IntraVENous CONTINUOUS 75 mL/hr at 02/28/22 2135    sodium chloride (NS) flush 5-10 mL  5-10 mL IntraVENous PRN      amLODIPine (NORVASC) tablet 10 mg  10 mg Oral DAILY 10 mg at 03/01/22 0844    aspirin delayed-release tablet 81 mg  81 mg Oral DAILY 81 mg at 03/01/22 0844    docusate sodium (COLACE) capsule 100 mg  100 mg Oral BID PRN      folic acid (FOLVITE) tablet 1 mg  1 mg Oral DAILY 1 mg at 03/01/22 0844    levothyroxine (SYNTHROID) tablet 75 mcg  75 mcg Oral ACB 75 mcg at 03/01/22 0647    polyethylene glycol (MIRALAX) packet 17 g  17 g Oral DAILY 17 g at 03/01/22 0845    sodium chloride (NS) flush 5-40 mL  5-40 mL IntraVENous Q8H 10 mL at 03/01/22 0542    sodium chloride (NS) flush 5-40 mL  5-40 mL IntraVENous PRN      acetaminophen (TYLENOL) tablet 650 mg  650 mg Oral Q6H  mg at 02/28/22 2216    Or    acetaminophen (TYLENOL) suppository 650 mg  650 mg Rectal Q6H PRN      polyethylene glycol (MIRALAX) packet 17 g  17 g Oral DAILY PRN      ondansetron (ZOFRAN ODT) tablet 4 mg  4 mg Oral Q8H PRN      Or    ondansetron (ZOFRAN) injection 4 mg  4 mg IntraVENous Q6H PRN      famotidine (PEPCID) tablet 20 mg  20 mg Oral DAILY 20 mg at 03/01/22 0833    cefTRIAXone (ROCEPHIN) 2 g in 0.9% sodium chloride 20 mL IV syringe  2 g IntraVENous Q24H 2 g at 03/01/22 7769           Case discussed with: Microbiology lab      Mary Hassan DO

## 2022-03-01 NOTE — PROGRESS NOTES
[address] [address] Pharmacy Dosing Services: Antimicrobial Stewardship Daily Doc 3/1/2022    Consult for antibiotic dosing of Vancomycin by Dr. Audelia Arthur  Indication: Sepsis unknown etiology- Empiric septic arthiritis left elbow; intra abdominal  Day of Therapy: 3    Ht Readings from Last 1 Encounters:   02/27/22 149.9 cm (59\")        Wt Readings from Last 1 Encounters:   03/01/22 55.3 kg (121 lb 14.4 oz)      Vancomycin therapy:  Current maintenance dose: Initial dosing  Last level: Initial dosing  Dose calculated to approximate a           a. Target AUC/MAGALIE of 400-600          b. Trough of N/A  SCr/WBC stable, afebrile, but patient not voiding adequately. ID and Ortho consulted. S/p I&D yesterday. Level obtained this morning with AM labs was 3.9 mcg/mL. It is SUBtherapeutic because patient did NOT receive 1800 dose last night, presumably because patient was in the OR having I&D. Plan: Empirically increase dose to 750 mg IV Q12H starting today at 1100. This predicts AUC of 500 per Bayesian kinetics calculator. Creatinine ordered every other day. Dose administration notes:   Doses NOT given appropriately as scheduled (see above). Date Dose & Interval Measured (mcg/mL) Extrapolated (mcg/mL)   ? 2/28 ? 500 mg IV Q12H ?19.4  ?non-ss level   ? 3/1 ?500 mg IV Q12H ?3.9 ? see above   ? ? ? ? Other Antimicrobial   (not dosed by pharmacist) Metronidazole 500 mg Q12 hrs (initially ordered for anaerobes but dc'd by MD 2/28)  Ceftriaxone 2 grams every 24 hours   Cultures 2/28 Wound (x 3), left elbow - (pending)  2/28 Anaerobic - (pending)  2/28 Fungus - (pending)  2/27 Blood x 2: ngtd (pending)  2/27 Joint fluid: ngtd (pending)   Serum Creatinine Lab Results   Component Value Date/Time    Creatinine 0.55 03/01/2022 02:32 AM    Creatinine (POC) 1.1 12/12/2017 01:22 PM         Creatinine Clearance Estimated Creatinine Clearance: 39.5 mL/min (by C-G formula based on SCr of 0.55 mg/dL).      Temp Temp: 97.9 °F (36.6 °C)       WBC Lab Results   Component Value Date/Time    WBC 6.4 03/01/2022 02:32 AM        Procalcitonin Lab Results   Component Value Date/Time    Procalcitonin <0.05 02/27/2022 04:25 AM        For Antifungals, Metronidazole and Nafcillin: Lab Results   Component Value Date/Time    ALT (SGPT) 12 03/01/2022 02:32 AM    AST (SGOT) 8 (L) 03/01/2022 02:32 AM    Alk.  phosphatase 61 03/01/2022 02:32 AM    Bilirubin, total 0.4 03/01/2022 02:32 AM        Pharmacist Jesus Leija

## 2022-03-01 NOTE — PROGRESS NOTES
Ortho:    Patient seen by Dr. Santa Huston this am, and he removed drain from elbow. Cultures NGTD. No further surgical intervention planned from Ortho perspective at this time. Cont IV abx per ID.     CHINA Hairston

## 2022-03-01 NOTE — PROGRESS NOTES
3/1/2022  Case Management Progress Note    12:06 PM  Patient is 80year old female admitted 2/27 with a septic joint  Patient's RUR is 15% yellow/moderate risk for readmission  Covid test: negative 2/27  Chart reviewed--patient discussed at IDR rounds  Per Dr Rajendra Galarza patient will likely need IV antibiotics. She has used [facility] in the past and [facility], so I have sent preliminary referrals to both of these companies. We are waiting on cultures for final antibiotic orders. Patient's son was clear that he did not want her to go to a SNF, so this option is out. Will continue to follow and assist with discharge planning. Transition of Care Plan   1. Continue medical management/treatment; wait on cultures  2. Preliminary referrals sent to [facility]  3. Home with son's assistance   4. Son can transport at discharge  5.  CM will continue to follow    CB Linder

## 2022-03-01 NOTE — PROGRESS NOTES
0350 - Potassium 3.1, MD aware and new orders placed for replacement. 0400 - patient not voiding adequately. Bladder scanned shows >700ml. Patient assisted to bedside commode and spontaneously voided 450. Patient bladder scanned again and shows >400ml. Patient asymptomatic at this time. Dr. Estefania Silva states they do not want to place a brewster at this time, recommendation is to continue to monitor. Bedside and Verbal shift change report given to JEREMY (oncoming nurse) by Safia Hunt (offgoing nurse). Report included the following information SBAR, Procedure Summary, Intake/Output, MAR and Recent Results.

## 2022-03-01 NOTE — ROUTINE PROCESS
TRANSFER - OUT REPORT:    Verbal report given to [place], RN(name) on Dulcie January  being transferred to 5th floor(unit) for routine post - op       Report consisted of patients Situation, Background, Assessment and   Recommendations(SBAR). Information from the following report(s) SBAR and OR Summary was reviewed with the receiving nurse. Lines:   Peripheral IV 02/28/22 Posterior;Right Forearm (Active)   Site Assessment Clean, dry, & intact 02/28/22 1945   Phlebitis Assessment 0 02/28/22 1945   Infiltration Assessment 0 02/28/22 1945   Dressing Status Clean, dry, & intact 02/28/22 1945   Dressing Type Tape;Transparent 02/28/22 1945   Hub Color/Line Status Pink; Infusing 02/28/22 1945   Action Taken Open ports on tubing capped 02/28/22 1726   Alcohol Cap Used Yes 02/28/22 1945        Opportunity for questions and clarification was provided.       Patient transported with:   Registered Nurse

## 2022-03-01 NOTE — PROGRESS NOTES
[address] visit. Mrs. Leanne Hernandez declined communion because she already received from Fr. Mejias. Prayer offered.     DELONTE Ojeda, RN, ACSW, LCSW   Page:  [phone])

## 2022-03-01 NOTE — PROGRESS NOTES
Rounded on Taoism patients and provided Anointing of the Sick at request of patient.     Lazaro Avilez

## 2022-03-01 NOTE — ANESTHESIA POSTPROCEDURE EVALUATION
Procedure(s):  INCISION AND DRAINAGE LEFT ELBOW. MAC, regional    Anesthesia Post Evaluation      Multimodal analgesia: multimodal analgesia not used between 6 hours prior to anesthesia start to PACU discharge  Patient location during evaluation: PACU  Patient participation: complete - patient participated  Level of consciousness: awake  Pain management: adequate  Airway patency: patent  Anesthetic complications: no  Cardiovascular status: acceptable, blood pressure returned to baseline and hemodynamically stable  Respiratory status: acceptable  Hydration status: acceptable  Post anesthesia nausea and vomiting:  controlled      INITIAL Post-op Vital signs:   Vitals Value Taken Time   /48 02/28/22 1945   Temp 36.5 °C (97.7 °F) 02/28/22 1911   Pulse 67 02/28/22 1947   Resp 19 02/28/22 1947   SpO2 98 % 02/28/22 1947   Vitals shown include unvalidated device data.

## 2022-03-01 NOTE — PROGRESS NOTES
.    [address] [facility] Adult  Hospitalist Group                                                                                          Hospitalist Progress Note  Dyana Rios MD  Answering service: [phone] from in house phone        Date of Service:  3/1/2022  NAME:  Eliseo Linn  :  1930  MRN:  [id number]      Admission Summary:   Eliseo Linn is a 80 y.o. female who Past medical history of CKD, hypertension, rheumatoid arthritis, hypothyroidism, left elbow septic arthritis of unknown organism who was recently treated by orthopedic surgery with prolonged IV antibiotics, patient presented to ED complaining of worsening left elbow pain for the last 4 days. Patient is stating that about 4 days ago she developed a generalized rash went to an urgent care center where she was given a Medrol Dosepak and topical steroids, she stating that her rash disappeared however her elbow pain has been increasing and she is having difficulty with her range of motion, she also developed fever.      Left elbow septic arthritis versus cellulitis given history of extensive left elbow abnormalities in the past, patient has history of septic arthritis in the left elbow in the past without any known organism, has been followed by orthopedic surgery has been treated with prolonged IV antibiotics. Will consult ID for joint aspiration and for possible I&D if septic arthritis is confirmed. Meanwhile we will start patient on empiric broad-spectrum IV antibiotics. Patient will also be started on her home medications of hypertension, RA and hypothyroidism. DVT prophylaxis, fall aspiration and seizure precautions will be implemented. Interval history / Subjective:   2022.   Saw patient this morning, reporting marked improvement of left elbow pain, and left upper extremity itching, Unfortunately patient's MRI is positive for osteomyelitis and septic arthritis, she is scheduled for I&D and possible surgery. She has been followed by orthopedic surgery, ID is also been consulted. Denies any fever, chills, nausea, vomiting. 3/1/2022. Saw patient this morning, awake alert and oriented, no apparent distress, reporting significant improvement of left elbow pain, patient is status post I&D of left elbow septic joint and resection of left radial head osteomyelitis. Assessment & Plan:     Left elbow osteomyelitis  Postoperative day 2 left elbow arthrotomy for drainage and draining of left radial head from osteomyelitis. Day #3 IV antibiotics. Day #3 IV ceftriaxone. Day #3 IV vancomycin. Continue empiric IV antibiotic. Follow-up wound culture. Continue wound care. Orthopedic surgery on board. ID consulted for antibiotic recommendation. Septic arthritis  As above    Rheumatoid arthritis  Not in acute flare. Continue current meds. CKD  CKD stage III, creatinine baseline, euvolemic. Normotensive. Electrolytes WNL. Hypertension  Euvolemic. Normotensive. Continue current meds. Adjust meds as needed. Hypothyroidism  Continue current meds. Code status: Full code  DVT prophylaxis: Lovenox    Care Plan discussed with: Patient/Family  Anticipated Disposition: Home w/Family  Anticipated Discharge: Less than 24 hours     Hospital Problems  Date Reviewed: 12/2/2020          Codes Class Noted POA    Septic joint ([facility] [address] ICD-10-CM: M00.9  ICD-9-CM: 711.00  2/27/2022 Unknown                Review of Systems:   A comprehensive review of systems was negative except for that written in the HPI. Vital Signs:    Last 24hrs VS reviewed since prior progress note.  Most recent are:  Visit Vitals  BP (!) 177/72 (BP 1 Location: Right upper arm, BP Patient Position: Walking)   Pulse 83   Temp 97.9 °F (36.6 °C)   Resp 18   Ht 4' 11\" (1.499 m)   Wt 55.3 kg (121 lb 14.4 oz)   SpO2 99%   BMI 24.62 kg/m²         Intake/Output Summary (Last 24 hours) at 3/1/2022 1121  Last data filed at 3/1/2022 6121  Gross per 24 hour   Intake 350 ml   Output 850 ml   Net -500 ml        Physical Examination:     I had a face to face encounter with this patient and independently examined them on 3/1/2022 as outlined below:          Constitutional:  No acute distress, cooperative, pleasant    ENT:  Oral mucosa moist, oropharynx benign. Resp:  CTA bilaterally. No wheezing/rhonchi/rales. No accessory muscle use   CV:  Regular rhythm, normal rate, no murmurs, gallops, rubs    GI:  Soft, non distended, non tender. normoactive bowel sounds, no hepatosplenomegaly     Musculoskeletal:  No edema, warm, 2+ pulses throughout    Neurologic:  Moves all extremities. AAOx3, CN II-XII reviewed            Data Review:    Review and/or order of clinical lab test      Labs:     Recent Labs     03/01/22 0232 02/28/22 0048   WBC 6.4 9.4   HGB 10.2* 9.8*   HCT 31.8* 30.2*    262     Recent Labs     03/01/22 0232 02/28/22 0048 02/27/22  1018 02/27/22  0422    139  --  133*   K 3.1* 3.0*  --  3.5    105  --  100   CO2 24 26  --  24   BUN 14 11  --  16   CREA 0.55 0.54*  --  0.82   GLU 80 105*  --  107*   CA 8.6 8.0*  --  8.8   MG  --  2.3  --   --    URICA  --   --  1.9*  --      Recent Labs     03/01/22 0232 02/28/22 0048 02/27/22 0422   ALT 12 12 17   AP 61 70 84   TBILI 0.4 0.6 0.5   TP 6.0* 5.9* 7.5   ALB 2.4* 2.7* 3.2*   GLOB 3.6 3.2 4.3*     No results for input(s): INR, PTP, APTT, INREXT, INREXT in the last 72 hours. No results for input(s): FE, TIBC, PSAT, FERR in the last 72 hours. Lab Results   Component Value Date/Time    Folate 15.4 12/02/2020 04:28 AM      No results for input(s): PH, PCO2, PO2 in the last 72 hours. No results for input(s): CPK, CKNDX, TROIQ in the last 72 hours.     No lab exists for component: CPKMB  Lab Results   Component Value Date/Time    Cholesterol, total 191 09/20/2017 08:37 AM    HDL Cholesterol 33 (L) 09/20/2017 08:37 AM    LDL, calculated 128 (H) 09/20/2017 08:37 AM Triglyceride 148 09/20/2017 08:37 AM     Lab Results   Component Value Date/Time    Glucose (POC) 85 02/28/2022 05:17 PM    Glucose (POC) 106 (H) 04/02/2021 11:34 AM    Glucose (POC) 114 (H) 04/02/2021 06:02 AM    Glucose (POC) 119 (H) 04/01/2021 09:51 PM    Glucose (POC) 114 (H) 04/01/2021 04:12 PM     Lab Results   Component Value Date/Time    Color YELLOW/STRAW 02/27/2022 05:12 AM    Appearance CLEAR 02/27/2022 05:12 AM    Specific gravity 1.009 02/27/2022 05:12 AM    pH (UA) 7.5 02/27/2022 05:12 AM    Protein TRACE (A) 02/27/2022 05:12 AM    Glucose Negative 02/27/2022 05:12 AM    Ketone Negative 02/27/2022 05:12 AM    Bilirubin Negative 02/27/2022 05:12 AM    Urobilinogen 0.2 02/27/2022 05:12 AM    Nitrites Negative 02/27/2022 05:12 AM    Leukocyte Esterase Negative 02/27/2022 05:12 AM    Epithelial cells FEW 02/27/2022 05:12 AM    Bacteria Negative 02/27/2022 05:12 AM    WBC 0-4 02/27/2022 05:12 AM    RBC 5-10 02/27/2022 05:12 AM         Medications Reviewed:     Current Facility-Administered Medications   Medication Dose Route Frequency    potassium chloride SR (KLOR-CON 10) tablet 40 mEq  40 mEq Oral Q6H    vancomycin (VANCOCIN) 750 mg in 0.9% sodium chloride 250 mL (VIAL-MATE)  750 mg IntraVENous Q12H    sodium chloride (NS) flush 5-40 mL  5-40 mL IntraVENous Q8H    sodium chloride (NS) flush 5-40 mL  5-40 mL IntraVENous PRN    lactated Ringers infusion  75 mL/hr IntraVENous CONTINUOUS    sodium chloride (NS) flush 5-10 mL  5-10 mL IntraVENous PRN    amLODIPine (NORVASC) tablet 10 mg  10 mg Oral DAILY    aspirin delayed-release tablet 81 mg  81 mg Oral DAILY    docusate sodium (COLACE) capsule 100 mg  100 mg Oral BID PRN    folic acid (FOLVITE) tablet 1 mg  1 mg Oral DAILY    levothyroxine (SYNTHROID) tablet 75 mcg  75 mcg Oral ACB    polyethylene glycol (MIRALAX) packet 17 g  17 g Oral DAILY    sodium chloride (NS) flush 5-40 mL  5-40 mL IntraVENous Q8H    sodium chloride (NS) flush 5-40 mL 5-40 mL IntraVENous PRN    acetaminophen (TYLENOL) tablet 650 mg  650 mg Oral Q6H PRN    Or    acetaminophen (TYLENOL) suppository 650 mg  650 mg Rectal Q6H PRN    polyethylene glycol (MIRALAX) packet 17 g  17 g Oral DAILY PRN    ondansetron (ZOFRAN ODT) tablet 4 mg  4 mg Oral Q8H PRN    Or    ondansetron (ZOFRAN) injection 4 mg  4 mg IntraVENous Q6H PRN    famotidine (PEPCID) tablet 20 mg  20 mg Oral DAILY    cefTRIAXone (ROCEPHIN) 2 g in 0.9% sodium chloride 20 mL IV syringe  2 g IntraVENous Q24H     ______________________________________________________________________  EXPECTED LENGTH OF STAY: - - -  ACTUAL LENGTH OF STAY:          2                 Charline Dobson MD

## 2022-03-01 NOTE — PROGRESS NOTES
Bedside and Verbal shift change report given to Vivienne Galeana RN (oncoming nurse) by Karyna Sanchez RN (offgoing nurse). Report included the following information SBAR, Kardex, Procedure Summary, Intake/Output, MAR and Recent Results.

## 2022-03-02 LAB
CREAT SERPL-MCNC: 0.6 MG/DL (ref 0.55–1.02)
DATE LAST DOSE: ABNORMAL
REPORTED DOSE,DOSE: ABNORMAL UNITS
REPORTED DOSE/TIME,TMG: 2342
VANCOMYCIN TROUGH SERPL-MCNC: 12.2 UG/ML (ref 5–10)

## 2022-03-02 PROCEDURE — 74011250637 HC RX REV CODE- 250/637: Performed by: INTERNAL MEDICINE

## 2022-03-02 PROCEDURE — 74011000250 HC RX REV CODE- 250: Performed by: ORTHOPAEDIC SURGERY

## 2022-03-02 PROCEDURE — 65270000029 HC RM PRIVATE

## 2022-03-02 PROCEDURE — 74011250636 HC RX REV CODE- 250/636: Performed by: ORTHOPAEDIC SURGERY

## 2022-03-02 PROCEDURE — 74011250637 HC RX REV CODE- 250/637: Performed by: ORTHOPAEDIC SURGERY

## 2022-03-02 PROCEDURE — 36415 COLL VENOUS BLD VENIPUNCTURE: CPT

## 2022-03-02 PROCEDURE — 82565 ASSAY OF CREATININE: CPT

## 2022-03-02 PROCEDURE — 80202 ASSAY OF VANCOMYCIN: CPT

## 2022-03-02 PROCEDURE — 74011250636 HC RX REV CODE- 250/636: Performed by: INTERNAL MEDICINE

## 2022-03-02 RX ORDER — LISINOPRIL 5 MG/1
10 TABLET ORAL DAILY
Status: DISCONTINUED | OUTPATIENT
Start: 2022-03-02 | End: 2022-03-04 | Stop reason: HOSPADM

## 2022-03-02 RX ADMIN — SODIUM CHLORIDE, PRESERVATIVE FREE 10 ML: 5 INJECTION INTRAVENOUS at 05:21

## 2022-03-02 RX ADMIN — VANCOMYCIN HYDROCHLORIDE 750 MG: 750 INJECTION, POWDER, LYOPHILIZED, FOR SOLUTION INTRAVENOUS at 23:06

## 2022-03-02 RX ADMIN — SODIUM CHLORIDE 2 G: 9 INJECTION INTRAMUSCULAR; INTRAVENOUS; SUBCUTANEOUS at 06:09

## 2022-03-02 RX ADMIN — ASPIRIN 81 MG: 81 TABLET, COATED ORAL at 09:51

## 2022-03-02 RX ADMIN — Medication 10 ML: at 23:07

## 2022-03-02 RX ADMIN — VANCOMYCIN HYDROCHLORIDE 750 MG: 750 INJECTION, POWDER, LYOPHILIZED, FOR SOLUTION INTRAVENOUS at 12:16

## 2022-03-02 RX ADMIN — LISINOPRIL 10 MG: 5 TABLET ORAL at 12:14

## 2022-03-02 RX ADMIN — LEVOTHYROXINE SODIUM 75 MCG: 0.07 TABLET ORAL at 06:09

## 2022-03-02 RX ADMIN — SODIUM CHLORIDE, PRESERVATIVE FREE 10 ML: 5 INJECTION INTRAVENOUS at 18:46

## 2022-03-02 RX ADMIN — SODIUM CHLORIDE, PRESERVATIVE FREE 10 ML: 5 INJECTION INTRAVENOUS at 20:27

## 2022-03-02 RX ADMIN — AMLODIPINE BESYLATE 10 MG: 5 TABLET ORAL at 09:55

## 2022-03-02 RX ADMIN — Medication 10 ML: at 18:46

## 2022-03-02 RX ADMIN — FAMOTIDINE 20 MG: 20 TABLET ORAL at 09:52

## 2022-03-02 RX ADMIN — FOLIC ACID 1 MG: 1 TABLET ORAL at 09:52

## 2022-03-02 RX ADMIN — Medication 10 ML: at 05:21

## 2022-03-02 RX ADMIN — POLYETHYLENE GLYCOL 3350 17 G: 17 POWDER, FOR SOLUTION ORAL at 09:54

## 2022-03-02 NOTE — PROGRESS NOTES
Orthopedic NP Progress Note  Post Op day: 2 Days Post-Op    March 2, 2022 9:45 AM     Dona Mendoza    Attending Physician: Treatment Team: Attending Provider: Rhett Betancourt MD; Consulting Provider: Korey Blackwood MD; Consulting Provider: CHINA Davison; Consulting Provider: Megan Dixon DO; Utilization Review: January Dougherty; Care Manager: Rambo Rowland; Primary Nurse: Bill Salazar RN     Vital Signs:    Patient Vitals for the past 8 hrs:   BP Temp Pulse Resp SpO2   03/02/22 0827 127/67 98.3 °F (36.8 °C) 87 12 98 %   03/02/22 0355 (!) 158/72 97.6 °F (36.4 °C) 64 15 95 %     BMI (calculated): 24 (03/01/22 2005)    Intake/Output:  03/02 0701 - 03/02 1900  In: -   Out: 250 [Urine:250]  02/28 1901 - 03/02 0700  In: 2176.3 [P.O.:425; I.V.:1751.3]  Out: 3275 [Urine:3275]    Pain Control:   Pain Assessment  Pain Scale 1: Numeric (0 - 10)  Pain Intensity 1: 3  Pain Onset 1: 2 days  Pain Location 1: Arm  Pain Orientation 1: Left  Pain Description 1: Aching  Pain Intervention(s) 1: Position    LAB:    Recent Labs     03/01/22  0232   HCT 31.8*   HGB 10.2*     Lab Results   Component Value Date/Time    Sodium 139 03/01/2022 02:32 AM    Potassium 3.1 (L) 03/01/2022 02:32 AM    Chloride 107 03/01/2022 02:32 AM    CO2 24 03/01/2022 02:32 AM    Glucose 80 03/01/2022 02:32 AM    BUN 14 03/01/2022 02:32 AM    Creatinine 0.55 03/01/2022 02:32 AM    Calcium 8.6 03/01/2022 02:32 AM       Subjective:  Dona Mendoza is a 80 y.o. female s/p a  Procedure(s):  INCISION AND DRAINAGE LEFT ELBOW   Procedure(s):  INCISION AND DRAINAGE LEFT ELBOW. Tolerating diet. Pain is well managed        Objective: General: alert, cooperative, no distress. Neuro/Vascular: CNS Intact. Sensation stable. Brisk cap refill, 2+ pulses UE/LE  Musculoskeletal:  +ROM UE/LE, NVI  ROM of L elbow mildly limited due to post op incision. Skin: Incision - clean, dry and intact. No significant erythema or swelling.     Dressing: clean, dry, and intact    Miller - n  Drain - n       PT/OT:   Gait:                      Assessment:    s/p Procedure(s):  INCISION AND DRAINAGE LEFT ELBOW    Active Problems:    Septic joint ([address] (2/27/2022)         Plan:   -  S/P I&D of L elbow for fluid collection, drilling of L radial head secondary to concern for osteo - intra op and aspiration cultures no growth thus far, IV Abx as per ID and medicine    Discharge To:   Home when able, follow up with Dr. Boo Herrera in 2 weeks       Signed By: Donald Marrero NP    Orthopedic Nurse Practitioner

## 2022-03-02 NOTE — PROGRESS NOTES
.    [address]  Hospitalist Group                                                                                          Hospitalist Progress Note  Colt Everett MD  Answering service: [phone] from in house phone        Date of Service:  3/2/2022  NAME:  Marne Aschoff  :  1930  MRN:  [id number]      Admission Summary:   Marne Aschoff is a 80 y.o. female who Past medical history of CKD, hypertension, rheumatoid arthritis, hypothyroidism, left elbow septic arthritis of unknown organism who was recently treated by orthopedic surgery with prolonged IV antibiotics, patient presented to ED complaining of worsening left elbow pain for the last 4 days. Patient is stating that about 4 days ago she developed a generalized rash went to an urgent care center where she was given a Medrol Dosepak and topical steroids, she stating that her rash disappeared however her elbow pain has been increasing and she is having difficulty with her range of motion, she also developed fever.      Left elbow septic arthritis versus cellulitis given history of extensive left elbow abnormalities in the past, patient has history of septic arthritis in the left elbow in the past without any known organism, has been followed by orthopedic surgery has been treated with prolonged IV antibiotics. Will consult ID for joint aspiration and for possible I&D if septic arthritis is confirmed. Meanwhile we will start patient on empiric broad-spectrum IV antibiotics. Patient will also be started on her home medications of hypertension, RA and hypothyroidism. DVT prophylaxis, fall aspiration and seizure precautions will be implemented. Interval history / Subjective:   2022.   Saw patient this morning, reporting marked improvement of left elbow pain, and left upper extremity itching, Unfortunately patient's MRI is positive for osteomyelitis and septic arthritis, she is scheduled for I&D and possible surgery. She has been followed by orthopedic surgery, ID is also been consulted. Denies any fever, chills, nausea, vomiting. 3/1/2022. Saw patient this morning, awake alert and oriented, no apparent distress, reporting significant improvement of left elbow pain, patient is status post I&D of left elbow septic joint and resection of left radial head osteomyelitis. 3/2/2022. No acute events overnight. Patient is pleasant, awake alert, cooperative and interactive. Reporting significant improvement of left elbow pain. Assessment & Plan:     Left elbow osteomyelitis  Postoperative day 2 left elbow arthrotomy for drainage and draining of left radial head from osteomyelitis. Day #4 IV antibiotics. Day #4 IV ceftriaxone. Day #4 IV vancomycin. Continue empiric IV antibiotic. Follow-up wound culture. Continue wound care. Orthopedic surgery on board. ID consulted for antibiotic recommendation. Septic arthritis  As above    Rheumatoid arthritis  Not in acute flare. Continue current meds. CKD  CKD stage III, creatinine baseline, euvolemic. Normotensive. Electrolytes WNL. Hypertension  Euvolemic. Normotensive. Continue current meds. Adjust meds as needed. Hypothyroidism  Continue current meds. Code status: Full code  DVT prophylaxis: Lovenox    Care Plan discussed with: Patient/Family  Anticipated Disposition: Home w/Family  Anticipated Discharge: Less than 24 hours     Hospital Problems  Date Reviewed: 12/2/2020          Codes Class Noted POA    Septic joint ([facility] [address] ICD-10-CM: M00.9  ICD-9-CM: 711.00  2/27/2022 Unknown                Review of Systems:   A comprehensive review of systems was negative except for that written in the HPI. Vital Signs:    Last 24hrs VS reviewed since prior progress note.  Most recent are:  Visit Vitals  BP (!) 188/80   Pulse 87   Temp 98.3 °F (36.8 °C)   Resp 12   Ht 4' 11\" (1.499 m)   Wt 53.9 kg (118 lb 14.4 oz)   SpO2 98%   BMI 24.01 kg/m²         Intake/Output Summary (Last 24 hours) at 3/2/2022 1431  Last data filed at 3/2/2022 0830  Gross per 24 hour   Intake 250 ml   Output 1875 ml   Net -1625 ml        Physical Examination:     I had a face to face encounter with this patient and independently examined them on 3/2/2022 as outlined below:          Constitutional:  No acute distress, cooperative, pleasant    ENT:  Oral mucosa moist, oropharynx benign. Resp:  CTA bilaterally. No wheezing/rhonchi/rales. No accessory muscle use   CV:  Regular rhythm, normal rate, no murmurs, gallops, rubs    GI:  Soft, non distended, non tender. normoactive bowel sounds, no hepatosplenomegaly     Musculoskeletal:  No edema, warm, 2+ pulses throughout    Neurologic:  Moves all extremities. AAOx3, CN II-XII reviewed            Data Review:    Review and/or order of clinical lab test      Labs:     Recent Labs     03/01/22 0232 02/28/22  0048   WBC 6.4 9.4   HGB 10.2* 9.8*   HCT 31.8* 30.2*    262     Recent Labs     03/02/22  1124 03/01/22 0232 02/28/22  0048   NA  --  139 139   K  --  3.1* 3.0*   CL  --  107 105   CO2  --  24 26   BUN  --  14 11   CREA 0.60 0.55 0.54*   GLU  --  80 105*   CA  --  8.6 8.0*   MG  --   --  2.3     Recent Labs     03/01/22  0232 02/28/22  0048   ALT 12 12   AP 61 70   TBILI 0.4 0.6   TP 6.0* 5.9*   ALB 2.4* 2.7*   GLOB 3.6 3.2     No results for input(s): INR, PTP, APTT, INREXT, INREXT in the last 72 hours. No results for input(s): FE, TIBC, PSAT, FERR in the last 72 hours. Lab Results   Component Value Date/Time    Folate 15.4 12/02/2020 04:28 AM      No results for input(s): PH, PCO2, PO2 in the last 72 hours. No results for input(s): CPK, CKNDX, TROIQ in the last 72 hours.     No lab exists for component: CPKMB  Lab Results   Component Value Date/Time    Cholesterol, total 191 09/20/2017 08:37 AM    HDL Cholesterol 33 (L) 09/20/2017 08:37 AM    LDL, calculated 128 (H) 09/20/2017 08:37 AM    Triglyceride 148 09/20/2017 08:37 AM     Lab Results   Component Value Date/Time    Glucose (POC) 85 02/28/2022 05:17 PM    Glucose (POC) 106 (H) 04/02/2021 11:34 AM    Glucose (POC) 114 (H) 04/02/2021 06:02 AM    Glucose (POC) 119 (H) 04/01/2021 09:51 PM    Glucose (POC) 114 (H) 04/01/2021 04:12 PM     Lab Results   Component Value Date/Time    Color YELLOW/STRAW 02/27/2022 05:12 AM    Appearance CLEAR 02/27/2022 05:12 AM    Specific gravity 1.009 02/27/2022 05:12 AM    pH (UA) 7.5 02/27/2022 05:12 AM    Protein TRACE (A) 02/27/2022 05:12 AM    Glucose Negative 02/27/2022 05:12 AM    Ketone Negative 02/27/2022 05:12 AM    Bilirubin Negative 02/27/2022 05:12 AM    Urobilinogen 0.2 02/27/2022 05:12 AM    Nitrites Negative 02/27/2022 05:12 AM    Leukocyte Esterase Negative 02/27/2022 05:12 AM    Epithelial cells FEW 02/27/2022 05:12 AM    Bacteria Negative 02/27/2022 05:12 AM    WBC 0-4 02/27/2022 05:12 AM    RBC 5-10 02/27/2022 05:12 AM         Medications Reviewed:     Current Facility-Administered Medications   Medication Dose Route Frequency    lisinopriL (PRINIVIL, ZESTRIL) tablet 10 mg  10 mg Oral DAILY    vancomycin (VANCOCIN) 750 mg in 0.9% sodium chloride 250 mL (VIAL-MATE)  750 mg IntraVENous Q12H    sodium chloride (NS) flush 5-40 mL  5-40 mL IntraVENous Q8H    sodium chloride (NS) flush 5-40 mL  5-40 mL IntraVENous PRN    sodium chloride (NS) flush 5-10 mL  5-10 mL IntraVENous PRN    amLODIPine (NORVASC) tablet 10 mg  10 mg Oral DAILY    aspirin delayed-release tablet 81 mg  81 mg Oral DAILY    docusate sodium (COLACE) capsule 100 mg  100 mg Oral BID PRN    folic acid (FOLVITE) tablet 1 mg  1 mg Oral DAILY    levothyroxine (SYNTHROID) tablet 75 mcg  75 mcg Oral ACB    polyethylene glycol (MIRALAX) packet 17 g  17 g Oral DAILY    sodium chloride (NS) flush 5-40 mL  5-40 mL IntraVENous Q8H    sodium chloride (NS) flush 5-40 mL  5-40 mL IntraVENous PRN    acetaminophen (TYLENOL) tablet 650 mg  650 mg Oral Q6H PRN    Or    acetaminophen (TYLENOL) suppository 650 mg  650 mg Rectal Q6H PRN    polyethylene glycol (MIRALAX) packet 17 g  17 g Oral DAILY PRN    ondansetron (ZOFRAN ODT) tablet 4 mg  4 mg Oral Q8H PRN    Or    ondansetron (ZOFRAN) injection 4 mg  4 mg IntraVENous Q6H PRN    famotidine (PEPCID) tablet 20 mg  20 mg Oral DAILY    cefTRIAXone (ROCEPHIN) 2 g in 0.9% sodium chloride 20 mL IV syringe  2 g IntraVENous Q24H     ______________________________________________________________________  EXPECTED LENGTH OF STAY: 4d 16h  ACTUAL LENGTH OF STAY:          3                 Leidy Padilla MD

## 2022-03-02 NOTE — PROGRESS NOTES
Bedside and Verbal shift change report given to [place], [address] [place] [address] (oncoming nurse) by Chemo Johnson RN (offgoing nurse). Report included the following information SBAR, Kardex, Intake/Output, MAR and Recent Results.

## 2022-03-02 NOTE — PROGRESS NOTES
Provided pastoral care visit to [facility] patient. Did not include sacramental care.     Kane Hernandez

## 2022-03-02 NOTE — PROGRESS NOTES
3/2/2022  Case Management Progress Note    12:00 PM  Patient is 80year old female admitted 2/27 with a septic joint  Patient's RUR is 15% yellow/moderate risk for readmission  Covid test: negative 2/27  Chart reviewed--patient discussed at IDR rounds  Per rounds this morning patient is waiting on cultures and thus determination of discharge antibiotics. Sheba Traylor is aware of this, we are currently planning on teaching for tomorrow as patient will also need a test dose prior to discharge as well if anything changes. [facility] was unable to accept patient, so I sent it to other companies that accept patient's insurance: [employer]. Will continue to follow and update. Transition of Care Plan   1. Continue medical management/treatment  2. Home with HH and IV antibiotics at discharge  3. [facility] denied, sent to other agencies  4. Son will transport   5.   will continue to follow and assist    CB Leonard

## 2022-03-03 LAB
ALBUMIN SERPL-MCNC: 2.3 G/DL (ref 3.5–5)
ALBUMIN/GLOB SERPL: 0.7 {RATIO} (ref 1.1–2.2)
ALP SERPL-CCNC: 64 U/L (ref 45–117)
ALT SERPL-CCNC: 14 U/L (ref 12–78)
ANION GAP SERPL CALC-SCNC: 4 MMOL/L (ref 5–15)
AST SERPL-CCNC: 14 U/L (ref 15–37)
BACTERIA SPEC CULT: NORMAL
BILIRUB SERPL-MCNC: 0.2 MG/DL (ref 0.2–1)
BUN SERPL-MCNC: 10 MG/DL (ref 6–20)
BUN/CREAT SERPL: 16 (ref 12–20)
CALCIUM SERPL-MCNC: 7.9 MG/DL (ref 8.5–10.1)
CHLORIDE SERPL-SCNC: 107 MMOL/L (ref 97–108)
CO2 SERPL-SCNC: 26 MMOL/L (ref 21–32)
CREAT SERPL-MCNC: 0.62 MG/DL (ref 0.55–1.02)
ERYTHROCYTE [DISTWIDTH] IN BLOOD BY AUTOMATED COUNT: 15.1 % (ref 11.5–14.5)
GLOBULIN SER CALC-MCNC: 3.3 G/DL (ref 2–4)
GLUCOSE SERPL-MCNC: 105 MG/DL (ref 65–100)
GRAM STN SPEC: NORMAL
GRAM STN SPEC: NORMAL
HCT VFR BLD AUTO: 29.8 % (ref 35–47)
HGB BLD-MCNC: 9.8 G/DL (ref 11.5–16)
MAGNESIUM SERPL-MCNC: 2 MG/DL (ref 1.6–2.4)
MCH RBC QN AUTO: 28.2 PG (ref 26–34)
MCHC RBC AUTO-ENTMCNC: 32.9 G/DL (ref 30–36.5)
MCV RBC AUTO: 85.6 FL (ref 80–99)
NRBC # BLD: 0 K/UL (ref 0–0.01)
NRBC BLD-RTO: 0 PER 100 WBC
PLATELET # BLD AUTO: 249 K/UL (ref 150–400)
PMV BLD AUTO: 9.5 FL (ref 8.9–12.9)
POTASSIUM SERPL-SCNC: 3.8 MMOL/L (ref 3.5–5.1)
PROT SERPL-MCNC: 5.6 G/DL (ref 6.4–8.2)
RBC # BLD AUTO: 3.48 M/UL (ref 3.8–5.2)
SERVICE CMNT-IMP: NORMAL
SODIUM SERPL-SCNC: 137 MMOL/L (ref 136–145)
WBC # BLD AUTO: 6.5 K/UL (ref 3.6–11)

## 2022-03-03 PROCEDURE — 80053 COMPREHEN METABOLIC PANEL: CPT

## 2022-03-03 PROCEDURE — 85027 COMPLETE CBC AUTOMATED: CPT

## 2022-03-03 PROCEDURE — 74011250637 HC RX REV CODE- 250/637: Performed by: ORTHOPAEDIC SURGERY

## 2022-03-03 PROCEDURE — 83735 ASSAY OF MAGNESIUM: CPT

## 2022-03-03 PROCEDURE — 74011000250 HC RX REV CODE- 250: Performed by: ORTHOPAEDIC SURGERY

## 2022-03-03 PROCEDURE — 74011250636 HC RX REV CODE- 250/636: Performed by: INTERNAL MEDICINE

## 2022-03-03 PROCEDURE — 65270000029 HC RM PRIVATE

## 2022-03-03 PROCEDURE — 74011250636 HC RX REV CODE- 250/636: Performed by: ORTHOPAEDIC SURGERY

## 2022-03-03 PROCEDURE — 36415 COLL VENOUS BLD VENIPUNCTURE: CPT

## 2022-03-03 PROCEDURE — 99232 SBSQ HOSP IP/OBS MODERATE 35: CPT | Performed by: INTERNAL MEDICINE

## 2022-03-03 PROCEDURE — 74011250637 HC RX REV CODE- 250/637: Performed by: INTERNAL MEDICINE

## 2022-03-03 RX ORDER — HEPARIN SODIUM 5000 [USP'U]/ML
5000 INJECTION, SOLUTION INTRAVENOUS; SUBCUTANEOUS EVERY 8 HOURS
Status: DISCONTINUED | OUTPATIENT
Start: 2022-03-03 | End: 2022-03-04 | Stop reason: HOSPADM

## 2022-03-03 RX ORDER — TRIAMCINOLONE ACETONIDE 1 MG/G
OINTMENT TOPICAL 2 TIMES DAILY
Status: DISCONTINUED | OUTPATIENT
Start: 2022-03-03 | End: 2022-03-04 | Stop reason: HOSPADM

## 2022-03-03 RX ADMIN — FAMOTIDINE 20 MG: 20 TABLET ORAL at 10:13

## 2022-03-03 RX ADMIN — SODIUM CHLORIDE 2 G: 9 INJECTION INTRAMUSCULAR; INTRAVENOUS; SUBCUTANEOUS at 06:17

## 2022-03-03 RX ADMIN — AMLODIPINE BESYLATE 10 MG: 5 TABLET ORAL at 10:13

## 2022-03-03 RX ADMIN — LEVOTHYROXINE SODIUM 75 MCG: 0.07 TABLET ORAL at 06:17

## 2022-03-03 RX ADMIN — LISINOPRIL 10 MG: 5 TABLET ORAL at 10:13

## 2022-03-03 RX ADMIN — FOLIC ACID 1 MG: 1 TABLET ORAL at 10:13

## 2022-03-03 RX ADMIN — SODIUM CHLORIDE, PRESERVATIVE FREE 10 ML: 5 INJECTION INTRAVENOUS at 14:17

## 2022-03-03 RX ADMIN — TRIAMCINOLONE ACETONIDE: 1 OINTMENT TOPICAL at 17:09

## 2022-03-03 RX ADMIN — HEPARIN SODIUM 5000 UNITS: 5000 INJECTION INTRAVENOUS; SUBCUTANEOUS at 17:09

## 2022-03-03 RX ADMIN — ASPIRIN 81 MG: 81 TABLET, COATED ORAL at 10:13

## 2022-03-03 RX ADMIN — POLYETHYLENE GLYCOL 3350 17 G: 17 POWDER, FOR SOLUTION ORAL at 10:13

## 2022-03-03 RX ADMIN — TRIAMCINOLONE ACETONIDE: 1 OINTMENT TOPICAL at 11:56

## 2022-03-03 RX ADMIN — Medication 10 ML: at 07:45

## 2022-03-03 RX ADMIN — SODIUM CHLORIDE, PRESERVATIVE FREE 10 ML: 5 INJECTION INTRAVENOUS at 06:17

## 2022-03-03 RX ADMIN — VANCOMYCIN HYDROCHLORIDE 750 MG: 750 INJECTION, POWDER, LYOPHILIZED, FOR SOLUTION INTRAVENOUS at 11:56

## 2022-03-03 RX ADMIN — Medication 10 ML: at 14:00

## 2022-03-03 NOTE — PROGRESS NOTES
Problem: General Infection Care Plan (Adult and Pediatric)  Goal: Improvement in signs and symptoms of infection  Outcome: Progressing Towards Goal  Goal: *Optimize nutritional status  Outcome: Progressing Towards Goal     Problem: Patient Education: Go to Patient Education Activity  Goal: Patient/Family Education  Outcome: Progressing Towards Goal     Problem: Pressure Injury - Risk of  Goal: *Prevention of pressure injury  Outcome: Progressing Towards Goal  Note: Pressure Injury Interventions:  Sensory Interventions: Assess changes in LOC,Assess need for specialty bed,Minimize linen layers    Moisture Interventions: Absorbent underpads,Maintain skin hydration (lotion/cream),Minimize layers,Moisture barrier    Activity Interventions: Increase time out of bed,Pressure redistribution bed/mattress(bed type)    Mobility Interventions: HOB 30 degrees or less,Pressure redistribution bed/mattress (bed type)    Nutrition Interventions: Document food/fluid/supplement intake                     Problem: Patient Education: Go to Patient Education Activity  Goal: Patient/Family Education  Outcome: Progressing Towards Goal     Problem: Falls - Risk of  Goal: *Absence of Falls  Outcome: Progressing Towards Goal  Note: Fall Risk Interventions:  Mobility Interventions: Bed/chair exit alarm,Communicate number of staff needed for ambulation/transfer,Patient to call before getting OOB         Medication Interventions: Bed/chair exit alarm,Patient to call before getting OOB,Teach patient to arise slowly    Elimination Interventions: Bed/chair exit alarm,Call light in reach,Patient to call for help with toileting needs,Stay With Me (per policy)              Problem: Patient Education: Go to Patient Education Activity  Goal: Patient/Family Education  Outcome: Progressing Towards Goal

## 2022-03-03 NOTE — PROGRESS NOTES
[address] visit attempted. Mrs. David Holman was sound asleep. Prayer for spiritual communion offered at bedside.     DELONTE Kent, RN, ACSW, LCSW   Page:  [phone]

## 2022-03-03 NOTE — PROGRESS NOTES
Bedside and Verbal shift change report given to Nara (oncoming nurse) by Janie Goodrich (offgoing nurse). Report included the following information SBAR, Procedure Summary, Intake/Output and Recent Results.

## 2022-03-03 NOTE — PROGRESS NOTES
ORTHO PROGRESS NOTE      SUBJECTIVE:  Michael Khan states her left elbow pain is controlled. She reports chronic deformities and numbness left hand followed by hand specialist.    OBJECTIVE:  Patient Vitals for the past 24 hrs:   Temp Pulse BP   03/03/22 1131 -- -- (!) 166/69   03/03/22 1129 98.3 °F (36.8 °C) -- (!) 166/77   03/03/22 0856 98.4 °F (36.9 °C) -- 128/63   03/03/22 0325 98.3 °F (36.8 °C) 71 138/70   03/02/22 2313 98 °F (36.7 °C) 76 121/69   03/02/22 2022 98.6 °F (37 °C) 80 136/70   03/02/22 1546 98.2 °F (36.8 °C) 78 137/62       Alert, no distress. Lying in bed. No fmily present. Respirations unlabored. AROM elbow 15-90. Min edema forearm and upper arm. Moves wrist OK. Palp radial pulse. Chronic deformities index through small finger. Recent Labs     03/03/22  0052   HGB 9.8*   HCT 29.8*      BUN 10   CREA 0.62   GFRAA >60   GFRNA >60     ASSESSMENT:  Procedure: Procedure(s):  INCISION AND DRAINAGE LEFT ELBOW  Post Op day: 3 Days Post-Op    PLAN:  Abx per ID  Tylenol  SCDs. Decisions about chemical DVT proph per medicine. OK from [address] [place] [address] assist.  Disp planning. F/U: Dr. Gallo Oliveira 2 weeks.     CHINA Solorio  Orthopedic Trauma Service  [facility] hospital

## 2022-03-03 NOTE — PROGRESS NOTES
3/3/22  9:47 AM    Care Management Daily Progress Note:    RUR: 13%  Level: Low  LOS: 4D    Chart reviewed--Patient is waiting on cultures and thus determination of discharge antibiotics. [facility] will provide home infusion, [facility] has accepted for home health    CM spoke to [place] from [address] this am, they will need to have the orders in for the abx by 3 or 4:00PM on day of discharge to be able to get the medication to the patient. Transition of Care Plan   1. Continue medical management/treatment  2. Home with HH and IV antibiotics at discharge  3. Son will transport   4.  CM will continue to follow and assist    CB Thomas  Care Manager

## 2022-03-03 NOTE — PROGRESS NOTES
Physician Progress Note      PATIENT:               Sathish Hood  CSN #:                  [id number]  :                       1930  ADMIT DATE:       2022 4:09 AM  DISCH DATE:  RESPONDING  PROVIDER #:        Yakelin Schmidt MD          QUERY TEXT:    Patient admitted with ***. Documentation reflects sepsis (H&P). If possible, please document in the progress notes and discharge summary if sepsis  was: The medical record reflects the following:  Risk Factors: history of septic arthritis , Medrol dose isamar outpt, left radial head OM  Clinical Indicators: H&P-presented to the [facility] last night with increasing pain, fever, and malaise. She was found to have a leukocytosis of 13.5 with left shift, Tmax of 101/7, and tachycardia. She was admitted for sepsis. wbc 13, lac 2.6, T 99  Treatment: ceftriaxone, Vanc, left elbow arthrotomy for drainage and draining, ID cx  Kevin,  Anitha Fam [phone]  Options provided:  -- sepsis  confirmed after study  -- sepsis  treated and resolved  -- sepsis  ruled out after study  -- Other - I will add my own diagnosis  -- Disagree - Not applicable / Not valid  -- Disagree - Clinically unable to determine / Unknown  -- Refer to Clinical Documentation Reviewer    PROVIDER RESPONSE TEXT:    sepsis treated and resolved.     Query created by: Emiliano Houston on 3/1/2022 11:47 AM      Electronically signed by:  Yakelin Schmidt MD 3/3/2022 4:36 PM

## 2022-03-03 NOTE — PROGRESS NOTES
.    [facility]  Hospitalist Group                                                                                          Hospitalist Progress Note  Prieto Chavez MD  Answering service: [phone] from in house phone        Date of Service:  3/3/2022  NAME:  Moe Rosales  :  1930  MRN:  [id number]      Admission Summary:   Moe Rosales is a 80 y.o. female who Past medical history of CKD, hypertension, rheumatoid arthritis, hypothyroidism, left elbow septic arthritis of unknown organism who was recently treated by orthopedic surgery with prolonged IV antibiotics, patient presented to ED complaining of worsening left elbow pain for the last 4 days. Patient is stating that about 4 days ago she developed a generalized rash went to an urgent care center where she was given a Medrol Dosepak and topical steroids, she stating that her rash disappeared however her elbow pain has been increasing and she is having difficulty with her range of motion, she also developed fever.      Left elbow septic arthritis versus cellulitis given history of extensive left elbow abnormalities in the past, patient has history of septic arthritis in the left elbow in the past without any known organism, has been followed by orthopedic surgery has been treated with prolonged IV antibiotics. Will consult ID for joint aspiration and for possible I&D if septic arthritis is confirmed. Meanwhile we will start patient on empiric broad-spectrum IV antibiotics. Patient will also be started on her home medications of hypertension, RA and hypothyroidism. DVT prophylaxis, fall aspiration and seizure precautions will be implemented. Interval history / Subjective:   2022.   Saw patient this morning, reporting marked improvement of left elbow pain, and left upper extremity itching, Unfortunately patient's MRI is positive for osteomyelitis and septic arthritis, she is scheduled for I&D and possible surgery. She has been followed by orthopedic surgery, ID is also been consulted. Denies any fever, chills, nausea, vomiting. 3/1/2022. Saw patient this morning, awake alert and oriented, no apparent distress, reporting significant improvement of left elbow pain, patient is status post I&D of left elbow septic joint and resection of left radial head osteomyelitis. 3/2/2022. No acute events overnight. Patient is pleasant, awake alert, cooperative and interactive. Reporting significant improvement of left elbow pain. 3/3/2022. Saw patient this morning, awake alert and oriented, accompanied by her son, pleasant and cooperative with physical examination, still complaining of itching in her anterior aspect of left elbow, left elbow. Is improving, denies any fever, chills, nausea, vomiting. Assessment & Plan:     Left elbow osteomyelitis  Postoperative day 5 left elbow arthrotomy for drainage and draining of left radial head from osteomyelitis. Day #5 IV antibiotics. Day #5 IV ceftriaxone. Day #5 IV vancomycin. Continue empiric IV antibiotic. Follow-up wound culture. Continue wound care. Orthopedic surgery on board. ID consulted for antibiotic recommendation. Septic arthritis  As above    Rheumatoid arthritis  Not in acute flare. Continue current meds. CKD  CKD stage III, creatinine baseline, euvolemic. Normotensive. Electrolytes WNL. Hypertension  Euvolemic. Normotensive. Continue current meds. Adjust meds as needed. Hypothyroidism  Continue current meds. Code status: Full code  DVT prophylaxis: Subcutaneous heparin.     Care Plan discussed with: Patient/Family  Anticipated Disposition: Home w/Family  Anticipated Discharge: Less than 24 hours     Hospital Problems  Date Reviewed: 12/2/2020          Codes Class Noted POA    Septic joint ([facility] [address] ICD-10-CM: M00.9  ICD-9-CM: 711.00  2/27/2022 Unknown                Review of Systems:   A comprehensive review of systems was negative except for that written in the HPI. Vital Signs:    Last 24hrs VS reviewed since prior progress note. Most recent are:  Visit Vitals  BP (!) 166/69   Pulse 71   Temp 98.3 °F (36.8 °C)   Resp 18   Ht 4' 11\" (1.499 m)   Wt 50.7 kg (111 lb 12.4 oz)   SpO2 98%   BMI 22.58 kg/m²         Intake/Output Summary (Last 24 hours) at 3/3/2022 1546  Last data filed at 3/3/2022 1258  Gross per 24 hour   Intake 450 ml   Output 850 ml   Net -400 ml        Physical Examination:     I had a face to face encounter with this patient and independently examined them on 3/3/2022 as outlined below:          Constitutional:  No acute distress, cooperative, pleasant    ENT:  Oral mucosa moist, oropharynx benign. Resp:  CTA bilaterally. No wheezing/rhonchi/rales. No accessory muscle use   CV:  Regular rhythm, normal rate, no murmurs, gallops, rubs    GI:  Soft, non distended, non tender. normoactive bowel sounds, no hepatosplenomegaly     Musculoskeletal:  No edema, warm, 2+ pulses throughout    Neurologic:  Moves all extremities. AAOx3, CN II-XII reviewed            Data Review:    Review and/or order of clinical lab test      Labs:     Recent Labs     03/03/22 0052 03/01/22 0232   WBC 6.5 6.4   HGB 9.8* 10.2*   HCT 29.8* 31.8*    256     Recent Labs     03/03/22 0052 03/02/22  1124 03/01/22  0232     --  139   K 3.8  --  3.1*     --  107   CO2 26  --  24   BUN 10  --  14   CREA 0.62 0.60 0.55   *  --  80   CA 7.9*  --  8.6   MG 2.0  --   --      Recent Labs     03/03/22 0052 03/01/22  0232   ALT 14 12   AP 64 61   TBILI 0.2 0.4   TP 5.6* 6.0*   ALB 2.3* 2.4*   GLOB 3.3 3.6     No results for input(s): INR, PTP, APTT, INREXT, INREXT in the last 72 hours. No results for input(s): FE, TIBC, PSAT, FERR in the last 72 hours. Lab Results   Component Value Date/Time    Folate 15.4 12/02/2020 04:28 AM      No results for input(s): PH, PCO2, PO2 in the last 72 hours.   No results for input(s): CPK, CKNDX, TROIQ in the last 72 hours.     No lab exists for component: CPKMB  Lab Results   Component Value Date/Time    Cholesterol, total 191 09/20/2017 08:37 AM    HDL Cholesterol 33 (L) 09/20/2017 08:37 AM    LDL, calculated 128 (H) 09/20/2017 08:37 AM    Triglyceride 148 09/20/2017 08:37 AM     Lab Results   Component Value Date/Time    Glucose (POC) 85 02/28/2022 05:17 PM    Glucose (POC) 106 (H) 04/02/2021 11:34 AM    Glucose (POC) 114 (H) 04/02/2021 06:02 AM    Glucose (POC) 119 (H) 04/01/2021 09:51 PM    Glucose (POC) 114 (H) 04/01/2021 04:12 PM     Lab Results   Component Value Date/Time    Color YELLOW/STRAW 02/27/2022 05:12 AM    Appearance CLEAR 02/27/2022 05:12 AM    Specific gravity 1.009 02/27/2022 05:12 AM    pH (UA) 7.5 02/27/2022 05:12 AM    Protein TRACE (A) 02/27/2022 05:12 AM    Glucose Negative 02/27/2022 05:12 AM    Ketone Negative 02/27/2022 05:12 AM    Bilirubin Negative 02/27/2022 05:12 AM    Urobilinogen 0.2 02/27/2022 05:12 AM    Nitrites Negative 02/27/2022 05:12 AM    Leukocyte Esterase Negative 02/27/2022 05:12 AM    Epithelial cells FEW 02/27/2022 05:12 AM    Bacteria Negative 02/27/2022 05:12 AM    WBC 0-4 02/27/2022 05:12 AM    RBC 5-10 02/27/2022 05:12 AM         Medications Reviewed:     Current Facility-Administered Medications   Medication Dose Route Frequency    triamcinolone acetonide (KENALOG) 0.1 % ointment   Topical BID    heparin (porcine) injection 5,000 Units  5,000 Units SubCUTAneous Q8H    lisinopriL (PRINIVIL, ZESTRIL) tablet 10 mg  10 mg Oral DAILY    vancomycin (VANCOCIN) 750 mg in 0.9% sodium chloride 250 mL (VIAL-MATE)  750 mg IntraVENous Q12H    sodium chloride (NS) flush 5-40 mL  5-40 mL IntraVENous Q8H    sodium chloride (NS) flush 5-40 mL  5-40 mL IntraVENous PRN    sodium chloride (NS) flush 5-10 mL  5-10 mL IntraVENous PRN    amLODIPine (NORVASC) tablet 10 mg  10 mg Oral DAILY    aspirin delayed-release tablet 81 mg  81 mg Oral DAILY    docusate sodium (COLACE) capsule 100 mg  100 mg Oral BID PRN    folic acid (FOLVITE) tablet 1 mg  1 mg Oral DAILY    levothyroxine (SYNTHROID) tablet 75 mcg  75 mcg Oral ACB    polyethylene glycol (MIRALAX) packet 17 g  17 g Oral DAILY    sodium chloride (NS) flush 5-40 mL  5-40 mL IntraVENous Q8H    sodium chloride (NS) flush 5-40 mL  5-40 mL IntraVENous PRN    acetaminophen (TYLENOL) tablet 650 mg  650 mg Oral Q6H PRN    Or    acetaminophen (TYLENOL) suppository 650 mg  650 mg Rectal Q6H PRN    polyethylene glycol (MIRALAX) packet 17 g  17 g Oral DAILY PRN    ondansetron (ZOFRAN ODT) tablet 4 mg  4 mg Oral Q8H PRN    Or    ondansetron (ZOFRAN) injection 4 mg  4 mg IntraVENous Q6H PRN    famotidine (PEPCID) tablet 20 mg  20 mg Oral DAILY    cefTRIAXone (ROCEPHIN) 2 g in 0.9% sodium chloride 20 mL IV syringe  2 g IntraVENous Q24H     ______________________________________________________________________  EXPECTED LENGTH OF STAY: 4d 16h  ACTUAL LENGTH OF STAY:          4                 Thu Conklin MD

## 2022-03-03 NOTE — PROGRESS NOTES
[facility] Infectious Disease Specialists Progress Note           Mady Doyle DO    [phone] Office  [phone]  Fax    3/3/2022      Assessment & Plan:   · Suspected left elbow septic arthritis/osteomyelitis. MRI concerning for infection and aspiration revealed purulent material however cell count relatively benign. Taken to the OR  for I&D. Multiple cultures including fungal cultures pending. I asked the microbiology lab to send an AFB culture was sent from the aspiration . Continue empiric antibiotics. Patient's son requesting that antibiotics be completed at home   · Leukocytosis. Resolved  · Rheumatoid arthritis. Was on methotrexate in the past  · Abdominal aortic aneurysm. Avoid quinolone antibiotics  · Amoxicillin allergy. This caused a rash in the past.  Able to tolerate cephalosporins  · Diabetes mellitus           Subjective:     No complaints    Objective:     Vitals:   Visit Vitals  BP (!) 166/69   Pulse 71   Temp 98.3 °F (36.8 °C)   Resp 18   Ht 4' 11\" (1.499 m)   Wt 111 lb 12.4 oz (50.7 kg)   SpO2 98%   BMI 22.58 kg/m²        Tmax:  Temp (24hrs), Av.3 °F (36.8 °C), Min:98 °F (36.7 °C), Max:98.6 °F (37 °C)      Exam:   Patient is intubated:  no    Physical Examination:   General:  Alert, cooperative, no distress   Head:  Normocephalic, atraumatic. Eyes:  Conjunctivae clear   Neck: Supple       Lungs:   No distress   Chest wall:     Heart:     Abdomen:    non-distended   Extremities: Moves all. Left elbow dressed   Skin:  No rash   Neurologic: CNII-XII intact. Normal strength     Labs:        No lab exists for component: ITNL   No results for input(s): CPK, CKMB, TROIQ in the last 72 hours.   Recent Labs     22  0052 22  1124 22  0232     --  139   K 3.8  --  3.1*     --  107   CO2 26  --  24   BUN 10  --  14   CREA 0.62 0.60 0.55   *  --  80   MG 2.0  --   --    ALB 2.3*  --  2.4*   WBC 6.5  --  6.4   HGB 9.8*  --  10.2*   HCT 29.8* --  31.8*     --  256     No results for input(s): INR, PTP, APTT, INREXT, INREXT in the last 72 hours.   Needs: urine analysis, urine sodium, protein and creatinine  Lab Results   Component Value Date/Time    Sodium,urine random 105 03/28/2021 02:18 AM         Cultures:     Lab Results   Component Value Date/Time    Specimen Description: BLOOD 05/01/2011 09:45 AM     Lab Results   Component Value Date/Time    Culture result: NO GROWTH THUS FAR 02/28/2022 06:32 PM    Culture result: NO GROWTH THUS FAR 02/28/2022 06:31 PM    Culture result: NO GROWTH THUS FAR 02/28/2022 06:26 PM       Radiology:     Medications       Current Facility-Administered Medications   Medication Dose Route Frequency Last Admin    triamcinolone acetonide (KENALOG) 0.1 % ointment   Topical BID Given at 03/03/22 1156    lisinopriL (PRINIVIL, ZESTRIL) tablet 10 mg  10 mg Oral DAILY 10 mg at 03/03/22 1013    vancomycin (VANCOCIN) 750 mg in 0.9% sodium chloride 250 mL (VIAL-MATE)  750 mg IntraVENous Q12H 750 mg at 03/03/22 1156    sodium chloride (NS) flush 5-40 mL  5-40 mL IntraVENous Q8H 10 mL at 03/03/22 0617    sodium chloride (NS) flush 5-40 mL  5-40 mL IntraVENous PRN      sodium chloride (NS) flush 5-10 mL  5-10 mL IntraVENous PRN      amLODIPine (NORVASC) tablet 10 mg  10 mg Oral DAILY 10 mg at 03/03/22 1013    aspirin delayed-release tablet 81 mg  81 mg Oral DAILY 81 mg at 03/03/22 1013    docusate sodium (COLACE) capsule 100 mg  100 mg Oral BID PRN      folic acid (FOLVITE) tablet 1 mg  1 mg Oral DAILY 1 mg at 03/03/22 1013    levothyroxine (SYNTHROID) tablet 75 mcg  75 mcg Oral ACB 75 mcg at 03/03/22 0617    polyethylene glycol (MIRALAX) packet 17 g  17 g Oral DAILY 17 g at 03/03/22 1013    sodium chloride (NS) flush 5-40 mL  5-40 mL IntraVENous Q8H 10 mL at 03/03/22 0745    sodium chloride (NS) flush 5-40 mL  5-40 mL IntraVENous PRN      acetaminophen (TYLENOL) tablet 650 mg  650 mg Oral Q6H  mg at 03/01/22 2213    Or    acetaminophen (TYLENOL) suppository 650 mg  650 mg Rectal Q6H PRN      polyethylene glycol (MIRALAX) packet 17 g  17 g Oral DAILY PRN      ondansetron (ZOFRAN ODT) tablet 4 mg  4 mg Oral Q8H PRN      Or    ondansetron (ZOFRAN) injection 4 mg  4 mg IntraVENous Q6H PRN      famotidine (PEPCID) tablet 20 mg  20 mg Oral DAILY 20 mg at 03/03/22 1013    cefTRIAXone (ROCEPHIN) 2 g in 0.9% sodium chloride 20 mL IV syringe  2 g IntraVENous Q24H 2 g at 03/03/22 0617           Case discussed with:  Son at [address], DO

## 2022-03-03 NOTE — ROUTINE PROCESS
Bedside and Verbal shift change report given to CORI Cardenas (oncoming nurse) by Staci Ren (offgoing nurse). Report included the following information SBAR and Kardex.

## 2022-03-04 ENCOUNTER — APPOINTMENT (OUTPATIENT)
Dept: GENERAL RADIOLOGY | Age: 87
DRG: 854 | End: 2022-03-04
Attending: INTERNAL MEDICINE
Payer: MEDICARE

## 2022-03-04 VITALS
RESPIRATION RATE: 18 BRPM | OXYGEN SATURATION: 96 % | HEART RATE: 68 BPM | DIASTOLIC BLOOD PRESSURE: 52 MMHG | HEIGHT: 59 IN | SYSTOLIC BLOOD PRESSURE: 143 MMHG | BODY MASS INDEX: 22.53 KG/M2 | TEMPERATURE: 98.8 F | WEIGHT: 111.77 LBS

## 2022-03-04 LAB
ALBUMIN SERPL-MCNC: 2.4 G/DL (ref 3.5–5)
ALBUMIN/GLOB SERPL: 0.7 {RATIO} (ref 1.1–2.2)
ALP SERPL-CCNC: 72 U/L (ref 45–117)
ALT SERPL-CCNC: 17 U/L (ref 12–78)
ANION GAP SERPL CALC-SCNC: 4 MMOL/L (ref 5–15)
AST SERPL-CCNC: 10 U/L (ref 15–37)
BACTERIA SPEC CULT: NORMAL
BILIRUB SERPL-MCNC: 0.3 MG/DL (ref 0.2–1)
BUN SERPL-MCNC: 9 MG/DL (ref 6–20)
BUN/CREAT SERPL: 15 (ref 12–20)
CALCIUM SERPL-MCNC: 8.3 MG/DL (ref 8.5–10.1)
CHLORIDE SERPL-SCNC: 105 MMOL/L (ref 97–108)
CO2 SERPL-SCNC: 27 MMOL/L (ref 21–32)
CREAT SERPL-MCNC: 0.6 MG/DL (ref 0.55–1.02)
ERYTHROCYTE [DISTWIDTH] IN BLOOD BY AUTOMATED COUNT: 15.3 % (ref 11.5–14.5)
GLOBULIN SER CALC-MCNC: 3.4 G/DL (ref 2–4)
GLUCOSE SERPL-MCNC: 95 MG/DL (ref 65–100)
GRAM STN SPEC: NORMAL
HCT VFR BLD AUTO: 30.6 % (ref 35–47)
HGB BLD-MCNC: 9.9 G/DL (ref 11.5–16)
MCH RBC QN AUTO: 27.7 PG (ref 26–34)
MCHC RBC AUTO-ENTMCNC: 32.4 G/DL (ref 30–36.5)
MCV RBC AUTO: 85.7 FL (ref 80–99)
NRBC # BLD: 0 K/UL (ref 0–0.01)
NRBC BLD-RTO: 0 PER 100 WBC
PLATELET # BLD AUTO: 255 K/UL (ref 150–400)
PMV BLD AUTO: 9.6 FL (ref 8.9–12.9)
POTASSIUM SERPL-SCNC: 3.6 MMOL/L (ref 3.5–5.1)
PROT SERPL-MCNC: 5.8 G/DL (ref 6.4–8.2)
RBC # BLD AUTO: 3.57 M/UL (ref 3.8–5.2)
SERVICE CMNT-IMP: NORMAL
SODIUM SERPL-SCNC: 136 MMOL/L (ref 136–145)
WBC # BLD AUTO: 5.6 K/UL (ref 3.6–11)

## 2022-03-04 PROCEDURE — 74011000250 HC RX REV CODE- 250: Performed by: INTERNAL MEDICINE

## 2022-03-04 PROCEDURE — 74011250636 HC RX REV CODE- 250/636: Performed by: INTERNAL MEDICINE

## 2022-03-04 PROCEDURE — 36573 INSJ PICC RS&I 5 YR+: CPT | Performed by: INTERNAL MEDICINE

## 2022-03-04 PROCEDURE — 74011250637 HC RX REV CODE- 250/637: Performed by: INTERNAL MEDICINE

## 2022-03-04 PROCEDURE — 74011250637 HC RX REV CODE- 250/637: Performed by: ORTHOPAEDIC SURGERY

## 2022-03-04 PROCEDURE — 85027 COMPLETE CBC AUTOMATED: CPT

## 2022-03-04 PROCEDURE — 36415 COLL VENOUS BLD VENIPUNCTURE: CPT

## 2022-03-04 PROCEDURE — 74011250636 HC RX REV CODE- 250/636: Performed by: ORTHOPAEDIC SURGERY

## 2022-03-04 PROCEDURE — 74011000250 HC RX REV CODE- 250: Performed by: ORTHOPAEDIC SURGERY

## 2022-03-04 PROCEDURE — C1751 CATH, INF, PER/CENT/MIDLINE: HCPCS

## 2022-03-04 PROCEDURE — 76937 US GUIDE VASCULAR ACCESS: CPT

## 2022-03-04 PROCEDURE — 74011000258 HC RX REV CODE- 258: Performed by: INTERNAL MEDICINE

## 2022-03-04 PROCEDURE — 99233 SBSQ HOSP IP/OBS HIGH 50: CPT | Performed by: INTERNAL MEDICINE

## 2022-03-04 PROCEDURE — 80053 COMPREHEN METABOLIC PANEL: CPT

## 2022-03-04 RX ORDER — DOXYCYCLINE 100 MG/1
100 TABLET ORAL 2 TIMES DAILY
Qty: 80 TABLET | Refills: 0 | Status: SHIPPED | OUTPATIENT
Start: 2022-03-04 | End: 2022-04-05

## 2022-03-04 RX ORDER — LISINOPRIL 10 MG/1
10 TABLET ORAL DAILY
Qty: 30 TABLET | Refills: 0 | Status: SHIPPED | OUTPATIENT
Start: 2022-03-05 | End: 2022-04-04

## 2022-03-04 RX ADMIN — FAMOTIDINE 20 MG: 20 TABLET ORAL at 08:30

## 2022-03-04 RX ADMIN — POLYETHYLENE GLYCOL 3350 17 G: 17 POWDER, FOR SOLUTION ORAL at 08:30

## 2022-03-04 RX ADMIN — ASPIRIN 81 MG: 81 TABLET, COATED ORAL at 08:29

## 2022-03-04 RX ADMIN — SODIUM CHLORIDE, PRESERVATIVE FREE 10 ML: 5 INJECTION INTRAVENOUS at 04:08

## 2022-03-04 RX ADMIN — Medication 10 ML: at 00:23

## 2022-03-04 RX ADMIN — AMLODIPINE BESYLATE 10 MG: 5 TABLET ORAL at 08:30

## 2022-03-04 RX ADMIN — TRIAMCINOLONE ACETONIDE: 1 OINTMENT TOPICAL at 08:55

## 2022-03-04 RX ADMIN — SODIUM CHLORIDE, PRESERVATIVE FREE 10 ML: 5 INJECTION INTRAVENOUS at 00:23

## 2022-03-04 RX ADMIN — SODIUM CHLORIDE 2 G: 9 INJECTION INTRAMUSCULAR; INTRAVENOUS; SUBCUTANEOUS at 06:56

## 2022-03-04 RX ADMIN — VANCOMYCIN HYDROCHLORIDE 750 MG: 750 INJECTION, POWDER, LYOPHILIZED, FOR SOLUTION INTRAVENOUS at 00:20

## 2022-03-04 RX ADMIN — LISINOPRIL 10 MG: 5 TABLET ORAL at 08:30

## 2022-03-04 RX ADMIN — DAPTOMYCIN 300 MG: 500 INJECTION, POWDER, LYOPHILIZED, FOR SOLUTION INTRAVENOUS at 11:09

## 2022-03-04 RX ADMIN — FOLIC ACID 1 MG: 1 TABLET ORAL at 08:30

## 2022-03-04 RX ADMIN — SODIUM CHLORIDE, PRESERVATIVE FREE 10 ML: 5 INJECTION INTRAVENOUS at 06:56

## 2022-03-04 RX ADMIN — Medication 10 ML: at 04:09

## 2022-03-04 RX ADMIN — HEPARIN SODIUM 5000 UNITS: 5000 INJECTION INTRAVENOUS; SUBCUTANEOUS at 08:30

## 2022-03-04 RX ADMIN — ERTAPENEM SODIUM 1 G: 1 INJECTION, POWDER, LYOPHILIZED, FOR SOLUTION INTRAMUSCULAR; INTRAVENOUS at 11:09

## 2022-03-04 RX ADMIN — HEPARIN SODIUM 5000 UNITS: 5000 INJECTION INTRAVENOUS; SUBCUTANEOUS at 00:20

## 2022-03-04 NOTE — PROGRESS NOTES
Physician Progress Note      PATIENT:               Kisha Rosas  CSN #:                  [id number]  :                       1930  ADMIT DATE:       2022 4:09 AM  DISCH DATE:  RESPONDING  PROVIDER #:        Johnna Pichardo MD          QUERY TEXT:    Patient admitted with septic arthritis. Documentation reflects sepsis (H&P) & 3/3. If possible, please document in the progress notes and discharge summary if sepsis  was: The medical record reflects the following:  Risk Factors: history of septic arthritis , Medrol dose isamar outpt, left radial head OM  Clinical Indicators: H&P-presented to the [facility] last night with increasing pain, fever, and malaise. She was found to have a leukocytosis of 13.5 with left shift, Tmax of 101/7, and tachycardia. She was admitted for sepsis. wbc 13, lac 2.6, T 99.  3/3 pn----sepsis treated and resolved  Treatment: ceftriaxone, Vanc, left elbow arthrotomy for drainage and draining, ID cx  Thanks,  Rachna Abdul [phone]  Options provided:  -- sepsis  confirmed after study  -- sepsis  treated and resolved  -- sepsis  ruled out after study  -- Other - I will add my own diagnosis  -- Disagree - Not applicable / Not valid  -- Disagree - Clinically unable to determine / Unknown  -- Refer to Clinical Documentation Reviewer    PROVIDER RESPONSE TEXT:    sepsis treated and resolved.     Query created by: Jesús Boston on 3/4/2022 11:52 AM      Electronically signed by:  Johnna Pichardo MD 3/4/2022 11:54 AM

## 2022-03-04 NOTE — ROUTINE PROCESS
Bedside and Verbal shift change report given to CORI Cardenas (oncoming nurse) by Zandra Dobbins (offgoing nurse). Report included the following information SBAR and Kardex.

## 2022-03-04 NOTE — PROGRESS NOTES
Orthopedic NP Progress Note  Post Op day: 4 Days Post-Op    March 4, 2022 9:45 AM     Marne Aschoff    Attending Physician: Treatment Team: Attending Provider: Linda Ventura MD; Consulting Provider: Nereida Zamora MD; Consulting Provider: CHINA Valle; Consulting Provider: Flor Echols DO; Utilization Review: See Botello; Care Manager: Christelle Mccracken; Primary Nurse: Carlie Hernandez RN     Vital Signs:    Patient Vitals for the past 8 hrs:   BP Temp Pulse Resp SpO2 Height Weight   03/04/22 0846 (!) 151/62 97.9 °F (36.6 °C) 74 17 99 % -- --   03/04/22 0802 -- -- -- -- -- 4' 11\" (1.499 m) --   03/04/22 0703 -- -- -- -- -- -- 50.7 kg (111 lb 12.4 oz)   03/04/22 0406 (!) 155/73 98.5 °F (36.9 °C) 69 17 94 % -- --     BMI (calculated): 22.6 (03/04/22 0703)    Intake/Output:  03/04 0701 - 03/04 1900  In: 240 [P.O.:240]  Out: -   03/02 1901 - 03/04 0700  In: 1150 [P.O.:400; I.V.:750]  Out: 850 [Urine:850]    Pain Control:   Pain Assessment  Pain Scale 1: Numeric (0 - 10)  Pain Intensity 1: 0  Pain Onset 1: 2 days  Pain Location 1: Arm  Pain Orientation 1: Left  Pain Description 1: Aching  Pain Intervention(s) 1: Position    LAB:    Recent Labs     03/04/22 0411   HCT 30.6*   HGB 9.9*     Lab Results   Component Value Date/Time    Sodium 136 03/04/2022 04:11 AM    Potassium 3.6 03/04/2022 04:11 AM    Chloride 105 03/04/2022 04:11 AM    CO2 27 03/04/2022 04:11 AM    Glucose 95 03/04/2022 04:11 AM    BUN 9 03/04/2022 04:11 AM    Creatinine 0.60 03/04/2022 04:11 AM    Calcium 8.3 (L) 03/04/2022 04:11 AM       Subjective:  Marne Aschoff is a 80 y.o. female s/p a  Procedure(s):  INCISION AND DRAINAGE LEFT ELBOW   Procedure(s):  INCISION AND DRAINAGE LEFT ELBOW. Tolerating diet. Pain is well managed        Objective: General: alert, cooperative, no distress. Neuro/Vascular: CNS Intact. Sensation stable.  Brisk cap refill, 2+ pulses UE/LE  Musculoskeletal:  +ROM UE/LE, NVI  ROM of L elbow mildly limited due to post op incision andd RA hx .    Skin: Incision - clean, dry and intact. No significant erythema or swelling. Dressing: clean, dry, and intact    Miller - n  Drain - n       PT/OT:   Gait:                      Assessment:    s/p Procedure(s):  INCISION AND DRAINAGE LEFT ELBOW    Active Problems:    Septic joint ([address] (2/27/2022)         Plan:   -  S/P I&D of L elbow for fluid collection, drilling of L radial head secondary to concern for osteo - intra op and aspiration cultures both anaerobic and aerobic no growth x 4 days with fungal cultures pending. Pt may ROM as tolerated and OOB with nursing.   -  Ace wrap removed and optifoam dressing placed, change in 5-7 days and place new dressing  -  Plan for PICC and IV Dapto & Invanz thru 4/14/22 as per ID orders, he will follow labs   -  Pt cleared for DC form Ortho standpoint     Discharge To:   Home when able, follow up with Dr. Megan Cormier in 2 weeks       Signed By: Lucien Cranker, NP    Orthopedic Nurse Practitioner

## 2022-03-04 NOTE — PROGRESS NOTES
[facility] Infectious Disease Specialists Progress Note           Yvette Pulido DO    [phone] Office  [phone]  Fax    3/4/2022      Assessment & Plan:   · Suspected left elbow septic arthritis/osteomyelitis. MRI concerning for infection and aspiration revealed purulent material however cell count relatively benign. Taken to the OR  for I&D. Cultures no growth to date. Will need to follow-up on fungal cultures and AFB culture after discharge. Discussed options with patient's son. Daptomycin and Invanz would cost $95 a day. Low suspicion for MRSA as bacterial cultures negative at final reading. I do not feel that vancomycin would be a safe option due to the patient's age and renal function. I offered another alternative as Invanz plus oral doxycycline which we discussed would not be considered to be as efficacious as the daptomycin however suspicion for MRSA is low. Patient's son would like to go with this option. Doxycycline prescription electronically sent to patient's pharmacy. Discharge antibiotic orders updated to [address]. First dose of Invanz ordered to be given prior to discharge. Patient to follow-up with me in office in 5 weeks  · Leukocytosis. Resolved  · Rheumatoid arthritis. Was on methotrexate in the past  · Abdominal aortic aneurysm. Avoid quinolone antibiotics  · Amoxicillin allergy.   This caused a rash in the past.  Able to tolerate cephalosporins  · Diabetes mellitus           Subjective:     No complaints    Objective:     Vitals:   Visit Vitals  /63 (BP 1 Location: Left upper arm, BP Patient Position: At rest)   Pulse 86   Temp 97.6 °F (36.4 °C)   Resp 17   Ht 4' 11\" (1.499 m)   Wt 111 lb 12.4 oz (50.7 kg)   SpO2 98%   BMI 22.58 kg/m²        Tmax:  Temp (24hrs), Av.5 °F (36.9 °C), Min:97.6 °F (36.4 °C), Max:99.3 °F (37.4 °C)      Exam:   Patient is intubated:  no    Physical Examination:   General:  Alert, cooperative, no distress   Head:  Normocephalic, atraumatic. Eyes:  Conjunctivae clear   Neck: Supple       Lungs:   No distress   Chest wall:     Heart:     Abdomen:    non-distended   Extremities: Moves all. Left elbow dressed   Skin:  No rash   Neurologic: CNII-XII intact. Normal strength     Labs:        No lab exists for component: ITNL   No results for input(s): CPK, CKMB, TROIQ in the last 72 hours. Recent Labs     03/04/22  0411 03/03/22  0052 03/02/22  1124    137  --    K 3.6 3.8  --     107  --    CO2 27 26  --    BUN 9 10  --    CREA 0.60 0.62 0.60   GLU 95 105*  --    MG  --  2.0  --    ALB 2.4* 2.3*  --    WBC 5.6 6.5  --    HGB 9.9* 9.8*  --    HCT 30.6* 29.8*  --     249  --      No results for input(s): INR, PTP, APTT, INREXT, INREXT in the last 72 hours.   Needs: urine analysis, urine sodium, protein and creatinine  Lab Results   Component Value Date/Time    Sodium,urine random 105 03/28/2021 02:18 AM         Cultures:     Lab Results   Component Value Date/Time    Specimen Description: BLOOD 05/01/2011 09:45 AM     Lab Results   Component Value Date/Time    Culture result: NO GROWTH 4 DAYS 02/28/2022 06:32 PM    Culture result: NO GROWTH 4 DAYS 02/28/2022 06:32 PM    Culture result: NO GROWTH 4 DAYS 02/28/2022 06:31 PM    Culture result: NO GROWTH 4 DAYS 02/28/2022 06:31 PM       Radiology:     Medications       Current Facility-Administered Medications   Medication Dose Route Frequency Last Admin    DAPTOmycin (CUBICIN) 300 mg in 0.9% sodium chloride 6 mL IV Syringe  300 mg IntraVENous  mg at 03/04/22 1109    triamcinolone acetonide (KENALOG) 0.1 % ointment   Topical BID Given at 03/04/22 0855    heparin (porcine) injection 5,000 Units  5,000 Units SubCUTAneous Q8H 5,000 Units at 03/04/22 0830    lisinopriL (PRINIVIL, ZESTRIL) tablet 10 mg  10 mg Oral DAILY 10 mg at 03/04/22 0830    sodium chloride (NS) flush 5-40 mL  5-40 mL IntraVENous Q8H 10 mL at 03/04/22 0656    sodium chloride (NS) flush 5-40 mL  5-40 mL IntraVENous PRN      sodium chloride (NS) flush 5-10 mL  5-10 mL IntraVENous PRN      amLODIPine (NORVASC) tablet 10 mg  10 mg Oral DAILY 10 mg at 03/04/22 0830    aspirin delayed-release tablet 81 mg  81 mg Oral DAILY 81 mg at 03/04/22 0829    docusate sodium (COLACE) capsule 100 mg  100 mg Oral BID PRN      folic acid (FOLVITE) tablet 1 mg  1 mg Oral DAILY 1 mg at 03/04/22 0830    levothyroxine (SYNTHROID) tablet 75 mcg  75 mcg Oral ACB 75 mcg at 03/03/22 0617    polyethylene glycol (MIRALAX) packet 17 g  17 g Oral DAILY 17 g at 03/04/22 0830    sodium chloride (NS) flush 5-40 mL  5-40 mL IntraVENous Q8H 10 mL at 03/04/22 0409    sodium chloride (NS) flush 5-40 mL  5-40 mL IntraVENous PRN      acetaminophen (TYLENOL) tablet 650 mg  650 mg Oral Q6H  mg at 03/01/22 2213    Or    acetaminophen (TYLENOL) suppository 650 mg  650 mg Rectal Q6H PRN      polyethylene glycol (MIRALAX) packet 17 g  17 g Oral DAILY PRN      ondansetron (ZOFRAN ODT) tablet 4 mg  4 mg Oral Q8H PRN      Or    ondansetron (ZOFRAN) injection 4 mg  4 mg IntraVENous Q6H PRN      famotidine (PEPCID) tablet 20 mg  20 mg Oral DAILY 20 mg at 03/04/22 0830    cefTRIAXone (ROCEPHIN) 2 g in 0.9% sodium chloride 20 mL IV syringe  2 g IntraVENous Q24H 2 g at 03/04/22 7324           Case discussed with: Case management and patient's son      Jazmín Bates DO

## 2022-03-04 NOTE — PROGRESS NOTES
Problem: General Infection Care Plan (Adult and Pediatric)  Goal: Improvement in signs and symptoms of infection  Outcome: Progressing Towards Goal  Goal: *Optimize nutritional status  Outcome: Progressing Towards Goal     Problem: Patient Education: Go to Patient Education Activity  Goal: Patient/Family Education  Outcome: Progressing Towards Goal     Problem: Pressure Injury - Risk of  Goal: *Prevention of pressure injury  Outcome: Progressing Towards Goal  Note: Pressure Injury Interventions:  Sensory Interventions: Assess changes in LOC,Assess need for specialty bed,Minimize linen layers    Moisture Interventions: Absorbent underpads,Maintain skin hydration (lotion/cream),Minimize layers,Moisture barrier    Activity Interventions: Increase time out of bed,Pressure redistribution bed/mattress(bed type)    Mobility Interventions: HOB 30 degrees or less,Float heels    Nutrition Interventions: Document food/fluid/supplement intake                     Problem: Falls - Risk of  Goal: *Absence of Falls  Outcome: Progressing Towards Goal  Note: Fall Risk Interventions:  Mobility Interventions: Bed/chair exit alarm,Patient to call before getting OOB,Utilize walker, cane, or other assistive device         Medication Interventions: Bed/chair exit alarm,Patient to call before getting OOB,Teach patient to arise slowly    Elimination Interventions: Bed/chair exit alarm,Patient to call for help with toileting needs,Call light in reach              Problem: Patient Education: Go to Patient Education Activity  Goal: Patient/Family Education  Outcome: Progressing Towards Goal

## 2022-03-04 NOTE — PROGRESS NOTES
Comprehensive Nutrition Assessment    Type and Reason for Visit: Initial,RD nutrition re-screen/LOS    Nutrition Recommendations/Plan:   1. Continue regular diet   2. Provide Ensure Enlive BID to increase kcal/protein intake (700 kcal, 88 g Carbs, 40 g protein)    Nutrition Assessment:    Pt is a 80year old female admitted with Septic joint ([address] [M00.9]. She  has a past medical history of Arthritis, rheumatoid, Diabetes , Diverticulitis, Hard of hearing, History of vascular access device (04/01/2021), Hypertension, Hypothyroid, and Osteoporosis, post-menopausal. Per documentation, patient has lost 23# (16%) within three months, clinically significant for timeframe. Patient states her appetite is fine, and denies recent appetite changes. Endorses weight loss r/t increased pain in her \"arthritic hands\" - has been having more difficulty feeding herself over the last several months. No c/o N/V/D. No chewing/swallowing problems, has dentures. States # - current weight is a 17.2% loss. States she usually drinks 1 Ensure/day - voiced acceptance of supplement BID to increase kcal/protein intake. Provided feedback on menu items. Patient Vitals for the past 168 hrs:   % Diet Eaten   03/04/22 0925 26 - 50%   03/01/22 0840 76 - 100%   02/28/22 0835 0%       Wt Readings from Last 10 Encounters:   03/04/22 50.7 kg (111 lb 12.4 oz)   01/18/22 52.5 kg (115 lb 11.9 oz)   04/01/21 63 kg (138 lb 14.2 oz)   11/30/20 60.1 kg (132 lb 8 oz)   02/23/20 60.8 kg (134 lb)   03/23/19 62.6 kg (138 lb)   03/21/19 62.6 kg (138 lb)   03/13/19 62.6 kg (138 lb)   12/21/18 64.4 kg (142 lb)   03/09/18 66.7 kg (147 lb)       Malnutrition Assessment:  Malnutrition Status:   Moderate malnutrition    Context:  Chronic illness     Findings of the 6 clinical characteristics of malnutrition:   Energy Intake:  Mild decrease in energy intake (specify) - unable to estimated but states \"maybe half of normal\"  Weight Loss:  7.0 - Greater than 7.5% over 3 months     Body Fat Loss:  1 - Mild body fat loss, Triceps,Orbital   Muscle Mass Loss:  No significant muscle mass loss,    Fluid Accumulation:  No significant fluid accumulation,     Strength:  Not performed     Estimated Daily Nutrient Needs:  Energy (kcal): 2410-9503 (25-30); Weight Used for Energy Requirements: Current  Protein (g): 51-61 (1.0-1.2); Weight Used for Protein Requirements: Current  Fluid (ml/day): 9093-4256; Method Used for Fluid Requirements: 1 ml/kcal    Nutrition Related Findings:       ABD: Fair appetite with active bowel sounds  Last BM: 2/3  Edema: No data recorded    Nutr. Labs:  Lab Results   Component Value Date/Time    GFR est AA >60 03/04/2022 04:11 AM    GFR est non-AA >60 03/04/2022 04:11 AM    Creatinine (POC) 1.1 12/12/2017 01:22 PM    Creatinine 0.60 03/04/2022 04:11 AM    BUN 9 03/04/2022 04:11 AM    BUN (POC) 14 12/12/2017 01:22 PM    Sodium (POC) 138 12/12/2017 01:22 PM    Sodium 136 03/04/2022 04:11 AM    Potassium 3.6 03/04/2022 04:11 AM    Potassium (POC) 3.7 12/12/2017 01:22 PM    Chloride (POC) 100 12/12/2017 01:22 PM    Chloride 105 03/04/2022 04:11 AM    CO2 27 03/04/2022 04:11 AM     Lab Results   Component Value Date/Time    Glucose 95 03/04/2022 04:11 AM    Glucose (POC) 85 02/28/2022 05:17 PM     Lab Results   Component Value Date/Time    Hemoglobin A1c 5.4 03/28/2021 03:47 AM       Nutr. Meds:  Norvasc, colace PRN, pepcid, folic acid, heparin, synthroid, lisinopril, zofran PRN, miralax PRN, vancomycin    Wounds:    Surgical incision (left arm)       Current Nutrition Therapies:  ADULT DIET Regular  ADULT ORAL NUTRITION SUPPLEMENT Lunch; Standard High Calorie/High Protein    Anthropometric Measures:  · Height:  4' 11\" (149.9 cm)  · Current Body Wt:  50.7 kg (111 lb 12.4 oz)   · Admission Body Wt:  111 lb 12.4 oz    · Usual Body Wt:  61.2 kg (135 lb)     · Ideal Body Wt:  95 lbs:  117.7 %   · Body mass index is 22.58 kg/m².    · BMI Category:  Normal weight (BMI 22.0-24.9) age over 72       Nutrition Diagnosis:   · Moderate malnutrition related to inadequate protein-energy intake,acute injury/trauma as evidenced by weight loss    · Increased nutrient needs related to acute injury/trauma as evidenced by  (s/p surgery)    Nutrition Interventions:   Food and/or Nutrient Delivery: Continue current diet,Start oral nutrition supplement  Nutrition Education and Counseling: No recommendations at this time  Coordination of Nutrition Care: Continue to monitor while inpatient,Interdisciplinary rounds    Goals:  PO intake >/= 75% of all meals and supplements within 3 - 5 days       Nutrition Monitoring and Evaluation:   Behavioral-Environmental Outcomes: None identified  Food/Nutrient Intake Outcomes: Food and nutrient intake,Supplement intake  Physical Signs/Symptoms Outcomes: Biochemical data,Skin,Other (specify)    Discharge Planning:    Continue oral nutrition supplement     Electronically signed by Chun Zhu RD on 1/5/4281   Contact: [phone] or via [employer]

## 2022-03-04 NOTE — DISCHARGE SUMMARY
.     Discharge Summary       PATIENT ID: Earna Blizzard  MRN: [id number]   YOB: 1930    DATE OF ADMISSION: 2/27/2022  4:09 AM    DATE OF DISCHARGE: 3/4/2022  PRIMARY CARE PROVIDER: Lorri Cornell MD     ATTENDING PHYSICIAN: Kelsey Kang MD  DISCHARGING PROVIDER: Kelsey Kang MD    To contact this individual call [phone] and ask the  to page. If unavailable ask to be transferred the Adult Hospitalist Department. CONSULTATIONS: IP CONSULT TO ORTHOPEDIC SURGERY  IP CONSULT TO INFECTIOUS DISEASES    PROCEDURES/SURGERIES: Procedure(s):  INCISION AND DRAINAGE LEFT ELBOW    ADMITTING DIAGNOSES & HOSPITAL COURSE:   Garrett Corley a 80 y. o. female who Past medical history of CKD, hypertension, rheumatoid arthritis, hypothyroidism, left elbow septic arthritis of unknown organism who was recently treated by orthopedic surgery with prolonged IV antibiotics, patient presented to ED complaining of worsening left elbow pain for the last 4 days.      Patient is stating that about 4 days ago she developed a generalized rash went to an urgent care center where she was given a Medrol Dosepak and topical steroids, she stating that her rash disappeared however her elbow pain has been increasing and she is having difficulty with her range of motion, she also developed fever.      Left elbow septic arthritis versus cellulitis given history of extensive left elbow abnormalities in the past, patient has history of septic arthritis in the left elbow in the past without any known organism, has been followed by orthopedic surgery has been treated with prolonged IV antibiotics.  Will consult ID for joint aspiration and for possible I&D if septic arthritis is confirmed. Roger November we will start patient on empiric broad-spectrum IV antibiotics.  Patient will also be started on her home medications of hypertension, RA and hypothyroidism.   DVT prophylaxis, fall aspiration and seizure precautions will be implemented. DISCHARGE DIAGNOSES / PLAN:      Left elbow osteomyelitis  Postoperative day 5 left elbow arthrotomy for drainage and draining of left radial head from osteomyelitis.     Received  #6 IV antibiotics. Received  #6 IV ceftriaxone. Received  #1 IV daptomycin  Received  #1 IV Invanz  Received 6 days of IV vancomycin.       Infectious disease on board, recommendations following antibiotics regimen on discharge. Home antibiotics once prescribed by ID. Please refer to note.      oriented, sitting up home antibiotic. Continue empiric IV antibiotic. Follow-up wound culture. Continue wound care. Orthopedic surgery on board.     Septic arthritis  As above     Rheumatoid arthritis  Not in acute flare. Continue current meds.     CKD  CKD stage III, creatinine baseline, euvolemic. Normotensive. Electrolytes WNL.     Hypertension  Euvolemic. Normotensive. Continue current meds. Adjust meds as needed.     Hypothyroidism  Continue current meds. PENDING TEST RESULTS:   At the time of discharge the following test results are still pendin    FOLLOW UP APPOINTMENTS:    Follow-up Information     Follow up With Specialties Details Why Contact Info    Ottoniel Shoemaker MD Orthopedic Surgery In 2 weeks Dr. Alecia Ochoa office is at the [facility] office at [place] [address] [address]  [phone]      Nadir Glass, [address] [facility] Internal Medicine   [facility] [address] [address] [phone]             ADDITIONAL CARE RECOMMENDATIONS: Orthopedic surgeon and infectious disease follow-up    DIET: Regular Diet and Cardiac Diet  Oral Nutritional Supplements: No Oral Supplement prescribedOnce daily    ACTIVITY: Activity as tolerated      DISCHARGE MEDICATIONS:  Current Discharge Medication List      START taking these medications    Details   doxycycline (ADOXA) 100 mg tablet Take 1 Tablet by mouth two (2) times a day for 40 days.   Qty: 80 Tablet, Refills: 0  Start date: 3/4/2022, End date: 4/13/2022      lisinopriL (PRINIVIL, ZESTRIL) 10 mg tablet Take 1 Tablet by mouth daily for 30 days. Qty: 30 Tablet, Refills: 0  Start date: 3/5/2022, End date: 4/4/2022         CONTINUE these medications which have NOT CHANGED    Details   folic acid (FOLVITE) 1 mg tablet Take 1 mg by mouth daily. docusate sodium (Colace) 100 mg capsule Take 100 mg by mouth two (2) times daily as needed for Constipation. levothyroxine (SYNTHROID) 75 mcg tablet Take 1 Tab by mouth Daily (before breakfast). Qty: 30 Tab, Refills: 1      polyethylene glycol (Miralax) 17 gram packet Take 1 Packet by mouth daily. Qty: 30 Packet, Refills: 1      L.acidoph & parac-S.therm-Bifido (BRENNEN Q2/RISAQUAD-2) 16 billion cell cap cap Take 1 Cap by mouth daily. Qty: 30 Cap, Refills: 0      amLODIPine (NORVASC) 10 mg tablet Take 10 mg by mouth daily. acetaminophen (Tylenol Extra Strength) 500 mg tablet Take 500 mg by mouth two (2) times daily as needed for Pain.      multivit-min/iron/folic/lutein (CENTRUM SILVER WOMEN PO) Take 1 Tab by mouth every other day. cholecalciferol (VITAMIN D3) 1,000 unit cap Take 2,000 Units by mouth daily. aspirin delayed-release 81 mg tablet Take 81 mg by mouth daily. NOTIFY YOUR PHYSICIAN FOR ANY OF THE FOLLOWING:   Fever over 101 degrees for 24 hours. Chest pain, shortness of breath, fever, chills, nausea, vomiting, diarrhea, change in mentation, falling, weakness, bleeding. Severe pain or pain not relieved by medications. Or, any other signs or symptoms that you may have questions about.     DISPOSITION:    Home With:   OT  PT  HH  RN       Long term SNF/Inpatient Rehab    Independent/assisted living    Hospice    Other:       PATIENT CONDITION AT DISCHARGE:     Functional status    Poor     Deconditioned     Independent      Cognition     Lucid     Forgetful     Dementia      Catheters/lines (plus indication)    Miller     PICC     PEG None      Code status     Full code     DNR      PHYSICAL EXAMINATION AT DISCHARGE:   Refer to Progress Note while inpatient      CHRONIC MEDICAL DIAGNOSES:  Problem List as of 3/4/2022 Date Reviewed: 12/2/2020          Codes Class Noted - Resolved    Septic joint ([facility] ICD-10-CM: M00.9  ICD-9-CM: 711.00  2/27/2022 - Present        Diverticulitis of sigmoid colon ICD-10-CM: K57.32  ICD-9-CM: 562.11  3/28/2021 - Present        Severe sepsis ([facility] ICD-10-CM: A41.9, R65.20  ICD-9-CM: 038.9, 995.92  3/28/2021 - Present        Intractable nausea and vomiting ICD-10-CM: R11.2  ICD-9-CM: 536.2  3/28/2021 - Present        Immunocompromised state due to drug therapy [facility]) ICD-10-CM: Y23.783, Z79.899  ICD-9-CM: V58.69  3/28/2021 - Present        Septic arthritis of elbow, left ([facility] ICD-10-CM: M00.9  ICD-9-CM: 711.02  3/28/2021 - Present        Acute diverticulitis ICD-10-CM: K57.92  ICD-9-CM: 562.11  12/2/2020 - Present        SBO (small bowel obstruction) ([facility] ICD-10-CM: O51.298  ICD-9-CM: 560.9  11/30/2020 - Present        Chronic kidney disease (CKD) ICD-10-CM: N18.9  ICD-9-CM: 585.9  9/18/2017 - Present        Osteoporosis, post-menopausal ICD-10-CM: M81.0  ICD-9-CM: 733.01  Unknown - Present        Chest pain ICD-10-CM: R07.9  ICD-9-CM: 786.50  4/9/2016 - Present        DM type 2 (diabetes mellitus, type 2) (HCC) (Chronic) ICD-10-CM: E11.9  ICD-9-CM: 250.00  4/9/2016 - Present        HTN (hypertension), benign ICD-10-CM: I10  ICD-9-CM: 401.1  4/9/2016 - Present        Hypothyroid (Chronic) ICD-10-CM: E03.9  ICD-9-CM: 244.9  4/9/2016 - Present        RA (rheumatoid arthritis) (HCC) (Chronic) ICD-10-CM: M06.9  ICD-9-CM: 714.0  4/9/2016 - Present        Abnormal chest x-ray ICD-10-CM: R93.89  ICD-9-CM: 793.2  4/9/2016 - Present        RESOLVED: Hyponatremia ICD-10-CM: E87.1  ICD-9-CM: 276.1  3/28/2021 - 3/28/2021              Greater than 35 minutes were spent with the patient on counseling and coordination of care    Signed:   Nguyen Sanabria MD  3/4/2022  4:26 PM

## 2022-03-04 NOTE — PROGRESS NOTES
3/4/2022  Case Management Progress Note    2:53 PM  Dr Tracey Burrell has changed antibiotic orders to be more affordable for patient and family. New orders and PICC report sent to [facility]. Kaitlyn (daquan) aware. CB Markham    11:35 AM  Patient is 80year old female admitted 2/27 with a septic joint  Patient's RUR is 13% green/low risk for readmission  Covid test: negative 2/27  Chart reviewed--patient discussed at IDR rounds  IV antibiotic orders are in the chart, and I have sent them to [facility]. Patient would have a cost of $94 a day with what is ordered, so I have reached out to Dr Tracey Burrell to see if there is anything cheaper. Daquan Sahni spoke with patient's son and he is aware of the situation. Patient also needs PICC line placed prior to discharge. [facility] will provide home health. Will continue to follow and update. Transition of Care Plan   1. Continue medical management/treatment  2. Home with [address] [place] [address] for IV antibiotics  3. Family will transport at discharge  4. Follow up outpatient with PCP, specialties as needed  5.  CM will continue to follow    CB Markham

## 2022-03-04 NOTE — PROGRESS NOTES
Bedside shift change report given to Nara PERSAUD  (oncoming nurse) by Isauro Lozada  (offgoing nurse). Report included the following information SBAR, Kardex, MAR, Accordion, Recent Results and Med Rec Status.

## 2022-03-04 NOTE — PROGRESS NOTES
I have reviewed discharge instructions with the patient and caregiver. The patient and caregiver verbalized understanding. Reviewed AVS, new medications, and discharge instructions. Pt is going home with PICC line for longterm abx. Signed AVS placed in pts chart.

## 2022-03-04 NOTE — PROGRESS NOTES
VAT note- To acknowledge order for PICC placement for long term antibiotics. Chart reviewed for PICC placement evaluation. Will plan to place PICC this afternoon.

## 2022-03-04 NOTE — PROGRESS NOTES
PICC Placement Note    PRE-PROCEDURE VERIFICATION  Correct Procedure: yes  Correct Site:  yes  Temperature: Temp: 97.6 °F (36.4 °C), Temperature Source: Temp Source: Oral  Recent Labs     03/04/22  0411   BUN 9   CREA 0.60      WBC 5.6     Allergies: Alendronate, Amoxicillin, and Hydrochlorothiazide  Education materials for PICC Care given: yes. See Patient Education activity for further details. PICC Booklet placed at bedside: yes    Closed Ended PICC Catheters:  Flush Lumens as Follows:  Intermittent Medication:   Flush before and after each medication with 10 ml NS. Unused Ports:  Flush every 8 hours with 10 ml NS.  TPN Ports:  Flush every 24 hours with 20 ml NS prior to hanging new bag. Blood Draws: Stop infusion, draw off and waste 10 ml of blood. Draw sample with 10cc syringe or greater. DO NOT USE VACUTAINER . Transfer with appropriate device to lab  tubes. Flush with 20 ml NS. Dressing Change:  Every 7 days, and PRN using sterile technique if integrity of dressing is compromised. Initial dressing change for central line 24-48 hours post insertion if gauze is used. Apply new dressing per policy. PROCEDURE DETAIL  Consent was obtained and all questions were answered related to risks and benefits. A single lumen PICC line was inserted, as a sterile procedure using ultrasound and modified Seldinger technique for antibiotic therapy. The following documentation is in addition to the PICC properties in the lines/airways flowsheet :  Lot #: [id number]  Lidocaine 1% administered intradermally :yes  Internal Catheter Total Length: 38 cm 0 out (cm)  Vein Selection for PICC:right basilic  Central Line Bundle followed yes  Complication Related to Insertion:no    The placement was verified by EKG, MAX P WAVE @ 38 cm 0 out (cm). PER EKG PICC TIP @ C/A junction.      Line is okay to use: yes    Audrey Cooper RN

## 2022-03-04 NOTE — PROGRESS NOTES
Post Discharge PICC and Antibiotic Orders    1. Diagnosis: Elbow osteomyelitis. Culture negative  2. Antibiotic: Doxycycline 100mg po daily through 4/14/2022                        Invanz 1 g IV daily through 4/14/2022  3. Routine PICC/ Jemima/ Portacath Care including PRN catheter flow management  4. Weekly labs:   _x__CBC/diff/platelets   _x__BUN/Creatinine   ___CPK   _x__AST/TotalBilirubin/AlkalinePhosphatase   _x__CRP     5. Fax lab to [phone]  Call Critical Lab Values to [phone]  6. May send to IR for line evaluation or replacement -[phone]  7. Home Health to pull PIC line at end of therapy or send to IR for Jemima removal  8. Allergies:     Allergies   Allergen Reactions    Alendronate Diarrhea    Amoxicillin Rash     Tolerated cefepime 3/27/21    Hydrochlorothiazide Other (comments)     \"Caused pain everywhere\"         Lydia Winslow, DO

## 2022-03-05 LAB
BACTERIA SPEC CULT: NORMAL
BACTERIA SPEC CULT: NORMAL
SERVICE CMNT-IMP: NORMAL
SERVICE CMNT-IMP: NORMAL

## 2022-03-09 ENCOUNTER — APPOINTMENT (OUTPATIENT)
Dept: CT IMAGING | Age: 87
DRG: 092 | End: 2022-03-09
Attending: EMERGENCY MEDICINE
Payer: MEDICARE

## 2022-03-09 ENCOUNTER — HOSPITAL ENCOUNTER (EMERGENCY)
Age: 87
Discharge: HOME | DRG: 092 | End: 2022-03-09
Attending: EMERGENCY MEDICINE
Payer: MEDICARE

## 2022-03-09 ENCOUNTER — TELEPHONE (OUTPATIENT)
Dept: FAMILY MEDICINE CLINIC | Age: 87
End: 2022-03-09

## 2022-03-09 VITALS
DIASTOLIC BLOOD PRESSURE: 56 MMHG | HEIGHT: 64 IN | OXYGEN SATURATION: 98 % | HEART RATE: 98 BPM | TEMPERATURE: 97.6 F | WEIGHT: 111 LBS | SYSTOLIC BLOOD PRESSURE: 119 MMHG | RESPIRATION RATE: 18 BRPM | BODY MASS INDEX: 18.95 KG/M2
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
## 2022-03-09 DIAGNOSIS — S00.03XA CONTUSION OF SCALP, INITIAL ENCOUNTER: ICD-10-CM

## 2022-03-09 DIAGNOSIS — S06.0X0A CONCUSSION WITHOUT LOSS OF CONSCIOUSNESS, INITIAL ENCOUNTER: ICD-10-CM

## 2022-03-09 DIAGNOSIS — S20.229A CONTUSION OF BACK, UNSPECIFIED LATERALITY, INITIAL ENCOUNTER: ICD-10-CM

## 2022-03-09 LAB
ALBUMIN SERPL-MCNC: 2.8 G/DL (ref 3.5–5)
ALBUMIN/GLOB SERPL: 0.7 {RATIO} (ref 1.1–2.2)
ALP SERPL-CCNC: 73 U/L (ref 45–117)
ALT SERPL-CCNC: 18 U/L (ref 12–78)
ANION GAP SERPL CALC-SCNC: 7 MMOL/L (ref 5–15)
AST SERPL-CCNC: 13 U/L (ref 15–37)
BASOPHILS # BLD: 0.1 K/UL (ref 0–0.1)
BASOPHILS NFR BLD: 0 % (ref 0–1)
BILIRUB SERPL-MCNC: 0.5 MG/DL (ref 0.2–1)
BUN SERPL-MCNC: 14 MG/DL (ref 6–20)
BUN/CREAT SERPL: 22 (ref 12–20)
CALCIUM SERPL-MCNC: 8.6 MG/DL (ref 8.5–10.1)
CHLORIDE SERPL-SCNC: 102 MMOL/L (ref 97–108)
CO2 SERPL-SCNC: 28 MMOL/L (ref 21–32)
CREAT SERPL-MCNC: 0.63 MG/DL (ref 0.55–1.02)
DIFFERENTIAL METHOD BLD: ABNORMAL
EOSINOPHIL # BLD: 0.2 K/UL (ref 0–0.4)
EOSINOPHIL NFR BLD: 1 % (ref 0–7)
ERYTHROCYTE [DISTWIDTH] IN BLOOD BY AUTOMATED COUNT: 15.5 % (ref 11.5–14.5)
GLOBULIN SER CALC-MCNC: 3.8 G/DL (ref 2–4)
GLUCOSE SERPL-MCNC: 133 MG/DL (ref 65–100)
HCT VFR BLD AUTO: 32 % (ref 35–47)
HGB BLD-MCNC: 10.3 G/DL (ref 11.5–16)
IMM GRANULOCYTES # BLD AUTO: 0 K/UL (ref 0–0.04)
IMM GRANULOCYTES NFR BLD AUTO: 0 % (ref 0–0.5)
LYMPHOCYTES # BLD: 1.1 K/UL (ref 0.8–3.5)
LYMPHOCYTES NFR BLD: 9 % (ref 12–49)
MCH RBC QN AUTO: 27.5 PG (ref 26–34)
MCHC RBC AUTO-ENTMCNC: 32.2 G/DL (ref 30–36.5)
MCV RBC AUTO: 85.3 FL (ref 80–99)
MONOCYTES # BLD: 0.4 K/UL (ref 0–1)
MONOCYTES NFR BLD: 3 % (ref 5–13)
NEUTS SEG # BLD: 10.8 K/UL (ref 1.8–8)
NEUTS SEG NFR BLD: 87 % (ref 32–75)
NRBC # BLD: 0 K/UL (ref 0–0.01)
NRBC BLD-RTO: 0 PER 100 WBC
PLATELET # BLD AUTO: 330 K/UL (ref 150–400)
PMV BLD AUTO: 9.7 FL (ref 8.9–12.9)
POTASSIUM SERPL-SCNC: 3.8 MMOL/L (ref 3.5–5.1)
PROT SERPL-MCNC: 6.6 G/DL (ref 6.4–8.2)
RBC # BLD AUTO: 3.75 M/UL (ref 3.8–5.2)
SODIUM SERPL-SCNC: 137 MMOL/L (ref 136–145)
WBC # BLD AUTO: 12.6 K/UL (ref 3.6–11)

## 2022-03-09 PROCEDURE — 99284 EMERGENCY DEPT VISIT MOD MDM: CPT

## 2022-03-09 PROCEDURE — 85025 COMPLETE CBC W/AUTO DIFF WBC: CPT

## 2022-03-09 PROCEDURE — 74011000250 HC RX REV CODE- 250: Performed by: EMERGENCY MEDICINE

## 2022-03-09 PROCEDURE — 70450 CT HEAD/BRAIN W/O DYE: CPT

## 2022-03-09 PROCEDURE — 72125 CT NECK SPINE W/O DYE: CPT

## 2022-03-09 PROCEDURE — 93005 ELECTROCARDIOGRAM TRACING: CPT

## 2022-03-09 PROCEDURE — 36415 COLL VENOUS BLD VENIPUNCTURE: CPT

## 2022-03-09 PROCEDURE — 80053 COMPREHEN METABOLIC PANEL: CPT

## 2022-03-09 PROCEDURE — 72128 CT CHEST SPINE W/O DYE: CPT

## 2022-03-09 RX ORDER — LIDOCAINE 50 MG/G
PATCH TOPICAL
Qty: 30 EACH | Refills: 0 | Status: SHIPPED | OUTPATIENT
Start: 2022-03-09 | End: 2022-09-22

## 2022-03-09 RX ORDER — LIDOCAINE 4 G/100G
1 PATCH TOPICAL
Status: DISCONTINUED | OUTPATIENT
Start: 2022-03-09 | End: 2022-03-09 | Stop reason: HOSPADM

## 2022-03-09 NOTE — TELEPHONE ENCOUNTER
Pt's son was advised to take pt to Urgent care for evaluation. Want to ensure pt did not hit her head, does not have a UTI or nothing internal is going that can not be seen by eye. Pt's son agreed to plan.

## 2022-03-09 NOTE — ED TRIAGE NOTES
PT arrived by EMS PT fell earlier today and hit head, while being visited by son, he noticed she was not making any since and was altered mentally. Pt has bruise on the back of R side of head.      Hx of R elbow surgery

## 2022-03-09 NOTE — ED PROVIDER NOTES
71-year-old female presents to the emergency department by EMS from home reportedly after she tripped while walking with her walker and struck the back of her head. She denies any LOC but she was noted to have a hematoma to the back of her head by EMS. She also was noted to have some tenderness in her lower C-spine and upper thoracic spine with palpation but does not seem to have any other injuries. On no blood thinners other than aspirin. She is currently being treated with IV ABX by PICC line for treatment of left elbow pyogenic arthritis status post operative management on 2/28. She reportedly was GCS 15 and awake alert and oriented by EMS throughout transport but her son said that when he was visiting her earlier she seemed to be slightly altered from her baseline mentally. On arrival she is GCS 15, awake alert and oriented to person, place, time, president, situation. She states that she just lost her balance with a walker and fell she denies any fever, chills, or pain in her elbow. She reportedly has been getting her IV ABX as Rx'd. She denies any preceding chest pain, shortness of breath, or any other medical concerns prior to the fall.            Past Medical History:   Diagnosis Date    Arthritis, rheumatoid ([address]     Diabetes ([address]     Diverticulitis     Hard of hearing     History of vascular access device 04/01/2021    [facility] VAT 4 FR R Basilic 28/8 LTAbx    History of vascular access device 03/04/2022    4 feench single lumen PICC line right basilic    Hypertension     Hypothyroid     Osteoporosis, post-menopausal        Past Surgical History:   Procedure Laterality Date    FLEXIBLE SIGMOIDOSCOPY N/A 12/2/2020    SIGMOIDOSCOPY FLEXIBLE performed by Whitney Jimenez MD at [facility] HX HEMORRHOIDECTOMY      HX HYSTERECTOMY      HX THYROIDECTOMY           Family History:   Problem Relation Age of Onset    Heart Disease Mother     Diabetes Father     Cancer Brother         brain cancer    Heart Disease Brother     Diabetes Brother     Other Other         scleroderma       Social History     Socioeconomic History    Marital status:      Spouse name: Not on file    Number of children: Not on file    Years of education: Not on file    Highest education level: Not on file   Occupational History    Not on file   Tobacco Use    Smoking status: Former Smoker     Types: Cigarettes     Quit date: 1976     Years since quittin.2    Smokeless tobacco: Never Used   Substance and Sexual Activity    Alcohol use: Yes     Comment: Waylon time 1 glass wine    Drug use: No    Sexual activity: Never   Other Topics Concern     Service Not Asked    Blood Transfusions Not Asked    Caffeine Concern Not Asked    Occupational Exposure Not Asked    Hobby Hazards Not Asked    Sleep Concern Not Asked    Stress Concern Not Asked    Weight Concern Not Asked    Special Diet Not Asked    Back Care Not Asked    Exercise Not Asked    Bike Helmet Not Asked    Seat Belt Not Asked    Self-Exams Not Asked   Social History Narrative    ** Merged History Encounter **          Social Determinants of Health     Financial Resource Strain:     Difficulty of Paying Living Expenses: Not on file   Food Insecurity:     Worried About Running Out of Food in the Last Year: Not on file    Fuad of Food in the Last Year: Not on file   Transportation Needs:     Lack of Transportation (Medical): Not on file    Lack of Transportation (Non-Medical):  Not on file   Physical Activity:     Days of Exercise per Week: Not on file    Minutes of Exercise per Session: Not on file   Stress:     Feeling of Stress : Not on file   Social Connections:     Frequency of Communication with Friends and Family: Not on file    Frequency of Social Gatherings with Friends and Family: Not on file    Attends Yarsani Services: Not on file    Active Member of Clubs or Organizations: Not on file   Alec Daugherty Attends Club or Organization Meetings: Not on file    Marital Status: Not on file   Intimate Partner Violence:     Fear of Current or Ex-Partner: Not on file    Emotionally Abused: Not on file    Physically Abused: Not on file    Sexually Abused: Not on file   Housing Stability:     Unable to Pay for Housing in the Last Year: Not on file    Number of [place] in the Last Year: Not on file    Unstable Housing in the Last Year: Not on file         ALLERGIES: Alendronate, Amoxicillin, and Hydrochlorothiazide    Review of Systems   Constitutional: Negative for activity change, appetite change, chills and fever. HENT: Negative for congestion, rhinorrhea, sinus pressure, sneezing and sore throat. Eyes: Negative for photophobia and visual disturbance. Respiratory: Negative for cough and shortness of breath. Cardiovascular: Negative for chest pain. Gastrointestinal: Negative for abdominal pain, blood in stool, constipation, diarrhea, nausea and vomiting. Genitourinary: Negative for difficulty urinating, dysuria, flank pain, frequency, hematuria, menstrual problem, urgency, vaginal bleeding and vaginal discharge. Musculoskeletal: Positive for back pain. Negative for arthralgias, myalgias and neck pain. Skin: Negative for rash and wound. Neurological: Positive for headaches. Negative for syncope, weakness and numbness. Psychiatric/Behavioral: Positive for confusion (reported, but now seems to be neuro intact). Negative for self-injury and suicidal ideas. All other systems reviewed and are negative. There were no vitals filed for this visit. Physical Exam  Vitals and nursing note reviewed. Constitutional:       General: She is not in acute distress. Appearance: Normal appearance. She is well-developed. She is not diaphoretic. Comments: Very pleasant, no acute distress. Speaking in full sentences. HENT:      Head: Normocephalic. Contusion present.         Nose: Nose normal.   Eyes:      Extraocular Movements: Extraocular movements intact. Conjunctiva/sclera: Conjunctivae normal.      Pupils: Pupils are equal, round, and reactive to light. Cardiovascular:      Rate and Rhythm: Normal rate and regular rhythm. Heart sounds: Normal heart sounds. Pulmonary:      Effort: Pulmonary effort is normal.      Breath sounds: Normal breath sounds. Abdominal:      General: There is no distension. Palpations: Abdomen is soft. Tenderness: There is no abdominal tenderness. Musculoskeletal:         General: No tenderness. Arms:       Cervical back: Neck supple. Back:       Comments: Pelvis stable, nontender. Atraumatic extremities. Skin:     General: Skin is warm and dry. Neurological:      General: No focal deficit present. Mental Status: She is alert and oriented to person, place, and time. Cranial Nerves: No cranial nerve deficit. Sensory: No sensory deficit. Motor: No weakness. Coordination: Coordination normal.          MDM   80-year-old female presents with she left. Neuro intact, as above. Afebrile vital signs stable no acute distress. 1620 EKG shows normal sinus rhythm with a rate of 74 bpm with T wave inversion anteriorly but no ST elevation or depression suggestive of acute ischemia. Labs returned showing no significant abnormalities. CT head shows no acute intracranial abnormalities  CT C-spine no acute bony abnormalities  CT thoracic spine no acute bony abnormalities    She was given lidocaine patches for pain relief. Rec'd PCP f/u and return precautions were given for worsening or concerns. This plan was discussed with the patient and her son at the bedside and they stated both understanding and agreement.       Procedures

## 2022-03-09 NOTE — PROGRESS NOTES
Spiritual Care Assessment/Progress Note  [address] [place] [address] Dr      NAME: Bo January      MRN: [id number]  AGE: 80 y.o.  SEX: female  Christian Affiliation: Restorationist   Language: English     3/9/2022     Total Time (in minutes): 15     Spiritual Assessment begun in [facility] EMERGENCY DEPT through conversation with:         [x]Patient        [] Family    [] Friend(s)        Reason for Consult: Emergency Department visit     Spiritual beliefs: (Please include comment if needed)     [x] Identifies with a mellissa tradition:         [] Supported by a mellissa community:            [] Claims no spiritual orientation:           [] Seeking spiritual identity:                [] Adheres to an individual form of spirituality:           [] Not able to assess:                           Identified resources for coping:      [x] Prayer                               [] Music                  [] Guided Imagery     [x] Family/friends                 [] Pet visits     [] Devotional reading                         [] Unknown     [] Other:                                              Interventions offered during this visit: (See comments for more details)    Patient Interventions: Affirmation of emotions/emotional suffering,Affirmation of mellissa,Initial visit,Normalization of emotional/spiritual concerns,Prayer (assurance of)           Plan of Care:     [] Support spiritual and/or cultural needs    [] Support AMD and/or advance care planning process      [] Support grieving process   [] Coordinate Rites and/or Rituals    [] Coordination with community clergy   [] No spiritual needs identified at this time   [] Detailed Plan of Care below (See Comments)  [] Make referral to Music Therapy  [] Make referral to Pet Therapy     [] Make referral to Addiction services  [] Make referral to [facility]  [] Make referral to Spiritual Care Partner  [] No future visits requested        [x] Contact Spiritual Care for further referrals Comments:  Spiritual care assessment with Ms. Junaid Alonzo in Emergency Department. Pt is awake, seemingly alert and welcoming to , but has difficulty speaking and understanding her. Seems to be some confusion. The only information gathered was that she welcomes prayer.  provided a caring, supportive presence with assurance of pastoral support and prayer.     Chaplain Jen Lundberg M.Div.  [facility] ([id number])

## 2022-03-09 NOTE — TELEPHONE ENCOUNTER
Home health nurse Mushtaq Pat called to advise Dr. Lao Draft they just received start of care orders for pt today, were not aware pt was discharged from hospital last week and are going to start pt's plan of care as soon as possible. Pt's son also called stating he's been giving pt her infusions, she lives alone, and he found her on the floor when he came over today. Pt had fallen, did not appear injured, but is hallucinating per her son. Pt's son transferred to nurse in office for further instructions.  Daniela

## 2022-03-10 ENCOUNTER — TELEPHONE (OUTPATIENT)
Dept: FAMILY MEDICINE CLINIC | Age: 87
End: 2022-03-10

## 2022-03-10 LAB
ATRIAL RATE: 74 BPM
CALCULATED P AXIS, ECG09: 21 DEGREES
CALCULATED R AXIS, ECG10: 55 DEGREES
CALCULATED T AXIS, ECG11: 83 DEGREES
DIAGNOSIS, 93000: NORMAL
P-R INTERVAL, ECG05: 190 MS
Q-T INTERVAL, ECG07: 414 MS
QRS DURATION, ECG06: 72 MS
QTC CALCULATION (BEZET), ECG08: 459 MS
VENTRICULAR RATE, ECG03: 74 BPM

## 2022-03-10 NOTE — TELEPHONE ENCOUNTER
Pt's son called office in regards to pt. Advises pt is \"not acting right\" on current ABX. Pt is \"seeing things\" and not herself. Pt was seen in ER on yesterday. Please call son to discuss medication.

## 2022-03-11 ENCOUNTER — APPOINTMENT (OUTPATIENT)
Dept: GENERAL RADIOLOGY | Age: 87
DRG: 092 | End: 2022-03-11
Attending: EMERGENCY MEDICINE
Payer: MEDICARE

## 2022-03-11 ENCOUNTER — HOSPITAL ENCOUNTER (INPATIENT)
Age: 87
LOS: 7 days | Discharge: NURSING, REHAB OR LONG-TERM CARE | DRG: 092 | End: 2022-03-18
Attending: EMERGENCY MEDICINE | Admitting: INTERNAL MEDICINE
Payer: MEDICARE

## 2022-03-11 ENCOUNTER — APPOINTMENT (OUTPATIENT)
Dept: CT IMAGING | Age: 87
DRG: 092 | End: 2022-03-11
Attending: EMERGENCY MEDICINE
Payer: MEDICARE

## 2022-03-11 DIAGNOSIS — M86.132: ICD-10-CM

## 2022-03-11 DIAGNOSIS — M00.9 PYOGENIC ARTHRITIS OF LEFT ELBOW, DUE TO UNSPECIFIED ORGANISM (HCC): ICD-10-CM

## 2022-03-11 DIAGNOSIS — M06.9 RHEUMATOID ARTHRITIS, INVOLVING UNSPECIFIED SITE, UNSPECIFIED WHETHER RHEUMATOID FACTOR PRESENT (HCC): ICD-10-CM

## 2022-03-11 DIAGNOSIS — Z45.2 PICC (PERIPHERALLY INSERTED CENTRAL CATHETER) IN PLACE: ICD-10-CM

## 2022-03-11 DIAGNOSIS — R41.82 ALTERED MENTAL STATUS, UNSPECIFIED ALTERED MENTAL STATUS TYPE: Primary | ICD-10-CM

## 2022-03-11 DIAGNOSIS — M86.9 OSTEOMYELITIS OF ELBOW (HCC): ICD-10-CM

## 2022-03-11 DIAGNOSIS — G93.40 ENCEPHALOPATHY ACUTE: ICD-10-CM

## 2022-03-11 DIAGNOSIS — R29.6 FALLS FREQUENTLY: ICD-10-CM

## 2022-03-11 DIAGNOSIS — G92.9 TOXIC ENCEPHALOPATHY: ICD-10-CM

## 2022-03-11 DIAGNOSIS — S22.42XA CLOSED FRACTURE OF MULTIPLE RIBS OF LEFT SIDE, INITIAL ENCOUNTER: ICD-10-CM

## 2022-03-11 PROBLEM — R33.9 URINARY RETENTION: Status: ACTIVE | Noted: 2022-03-11

## 2022-03-11 PROBLEM — E03.9 HYPOTHYROIDISM: Status: ACTIVE | Noted: 2022-03-11

## 2022-03-11 PROBLEM — I10 HTN (HYPERTENSION), BENIGN: Status: ACTIVE | Noted: 2022-03-11

## 2022-03-11 PROBLEM — E11.9 DM TYPE 2 (DIABETES MELLITUS, TYPE 2) (HCC): Status: ACTIVE | Noted: 2022-03-11

## 2022-03-11 LAB
ALBUMIN SERPL-MCNC: 2.8 G/DL (ref 3.5–5)
ALBUMIN/GLOB SERPL: 0.8 {RATIO} (ref 1.1–2.2)
ALP SERPL-CCNC: 73 U/L (ref 45–117)
ALT SERPL-CCNC: 18 U/L (ref 12–78)
ANION GAP SERPL CALC-SCNC: 5 MMOL/L (ref 5–15)
APPEARANCE UR: CLEAR
AST SERPL-CCNC: 14 U/L (ref 15–37)
ATRIAL RATE: 73 BPM
BASOPHILS # BLD: 0.1 K/UL (ref 0–0.1)
BASOPHILS NFR BLD: 1 % (ref 0–1)
BILIRUB SERPL-MCNC: 0.5 MG/DL (ref 0.2–1)
BILIRUB UR QL: NEGATIVE
BUN SERPL-MCNC: 16 MG/DL (ref 6–20)
BUN/CREAT SERPL: 22 (ref 12–20)
CALCIUM SERPL-MCNC: 8.8 MG/DL (ref 8.5–10.1)
CALCULATED P AXIS, ECG09: 1 DEGREES
CALCULATED R AXIS, ECG10: 49 DEGREES
CALCULATED T AXIS, ECG11: 77 DEGREES
CHLORIDE SERPL-SCNC: 102 MMOL/L (ref 97–108)
CO2 SERPL-SCNC: 29 MMOL/L (ref 21–32)
COLOR UR: NORMAL
COMMENT, HOLDF: NORMAL
CREAT SERPL-MCNC: 0.72 MG/DL (ref 0.55–1.02)
DIAGNOSIS, 93000: NORMAL
DIFFERENTIAL METHOD BLD: ABNORMAL
EOSINOPHIL # BLD: 0.3 K/UL (ref 0–0.4)
EOSINOPHIL NFR BLD: 2 % (ref 0–7)
ERYTHROCYTE [DISTWIDTH] IN BLOOD BY AUTOMATED COUNT: 15.3 % (ref 11.5–14.5)
GLOBULIN SER CALC-MCNC: 3.5 G/DL (ref 2–4)
GLUCOSE SERPL-MCNC: 111 MG/DL (ref 65–100)
GLUCOSE UR STRIP.AUTO-MCNC: NEGATIVE MG/DL
HCT VFR BLD AUTO: 31.2 % (ref 35–47)
HGB BLD-MCNC: 10 G/DL (ref 11.5–16)
HGB UR QL STRIP: NEGATIVE
IMM GRANULOCYTES # BLD AUTO: 0.1 K/UL (ref 0–0.04)
IMM GRANULOCYTES NFR BLD AUTO: 1 % (ref 0–0.5)
KETONES UR QL STRIP.AUTO: NEGATIVE MG/DL
LACTATE SERPL-SCNC: 1.1 MMOL/L (ref 0.4–2)
LEUKOCYTE ESTERASE UR QL STRIP.AUTO: NEGATIVE
LYMPHOCYTES # BLD: 1.1 K/UL (ref 0.8–3.5)
LYMPHOCYTES NFR BLD: 10 % (ref 12–49)
MCH RBC QN AUTO: 28.1 PG (ref 26–34)
MCHC RBC AUTO-ENTMCNC: 32.1 G/DL (ref 30–36.5)
MCV RBC AUTO: 87.6 FL (ref 80–99)
MONOCYTES # BLD: 0.3 K/UL (ref 0–1)
MONOCYTES NFR BLD: 3 % (ref 5–13)
NEUTS SEG # BLD: 9.2 K/UL (ref 1.8–8)
NEUTS SEG NFR BLD: 83 % (ref 32–75)
NITRITE UR QL STRIP.AUTO: NEGATIVE
NRBC # BLD: 0 K/UL (ref 0–0.01)
NRBC BLD-RTO: 0 PER 100 WBC
P-R INTERVAL, ECG05: 186 MS
PH UR STRIP: 7 [PH] (ref 5–8)
PLATELET # BLD AUTO: 395 K/UL (ref 150–400)
PMV BLD AUTO: 10.3 FL (ref 8.9–12.9)
POTASSIUM SERPL-SCNC: 3.7 MMOL/L (ref 3.5–5.1)
PROT SERPL-MCNC: 6.3 G/DL (ref 6.4–8.2)
PROT UR STRIP-MCNC: NEGATIVE MG/DL
Q-T INTERVAL, ECG07: 414 MS
QRS DURATION, ECG06: 74 MS
QTC CALCULATION (BEZET), ECG08: 456 MS
RBC # BLD AUTO: 3.56 M/UL (ref 3.8–5.2)
SAMPLES BEING HELD,HOLD: NORMAL
SODIUM SERPL-SCNC: 136 MMOL/L (ref 136–145)
SP GR UR REFRACTOMETRY: 1.01 (ref 1–1.03)
TROPONIN-HIGH SENSITIVITY: 23 NG/L (ref 0–51)
UR CULT HOLD, URHOLD: NORMAL
UROBILINOGEN UR QL STRIP.AUTO: 0.2 EU/DL (ref 0.2–1)
VENTRICULAR RATE, ECG03: 73 BPM
WBC # BLD AUTO: 10.9 K/UL (ref 3.6–11)

## 2022-03-11 PROCEDURE — 70450 CT HEAD/BRAIN W/O DYE: CPT

## 2022-03-11 PROCEDURE — 74011000258 HC RX REV CODE- 258: Performed by: INTERNAL MEDICINE

## 2022-03-11 PROCEDURE — 74011250636 HC RX REV CODE- 250/636: Performed by: INTERNAL MEDICINE

## 2022-03-11 PROCEDURE — 99223 1ST HOSP IP/OBS HIGH 75: CPT | Performed by: INTERNAL MEDICINE

## 2022-03-11 PROCEDURE — 80053 COMPREHEN METABOLIC PANEL: CPT

## 2022-03-11 PROCEDURE — 83605 ASSAY OF LACTIC ACID: CPT

## 2022-03-11 PROCEDURE — 99285 EMERGENCY DEPT VISIT HI MDM: CPT

## 2022-03-11 PROCEDURE — 81003 URINALYSIS AUTO W/O SCOPE: CPT

## 2022-03-11 PROCEDURE — 2709999900 HC NON-CHARGEABLE SUPPLY

## 2022-03-11 PROCEDURE — 93005 ELECTROCARDIOGRAM TRACING: CPT

## 2022-03-11 PROCEDURE — 74011000250 HC RX REV CODE- 250: Performed by: INTERNAL MEDICINE

## 2022-03-11 PROCEDURE — 72125 CT NECK SPINE W/O DYE: CPT

## 2022-03-11 PROCEDURE — 71045 X-RAY EXAM CHEST 1 VIEW: CPT

## 2022-03-11 PROCEDURE — 85025 COMPLETE CBC W/AUTO DIFF WBC: CPT

## 2022-03-11 PROCEDURE — 36415 COLL VENOUS BLD VENIPUNCTURE: CPT

## 2022-03-11 PROCEDURE — 77030038269 HC DRN EXT URIN PURWCK BARD -A

## 2022-03-11 PROCEDURE — 71250 CT THORAX DX C-: CPT

## 2022-03-11 PROCEDURE — 74011250636 HC RX REV CODE- 250/636: Performed by: EMERGENCY MEDICINE

## 2022-03-11 PROCEDURE — 65270000029 HC RM PRIVATE

## 2022-03-11 PROCEDURE — 87040 BLOOD CULTURE FOR BACTERIA: CPT

## 2022-03-11 PROCEDURE — 84484 ASSAY OF TROPONIN QUANT: CPT

## 2022-03-11 PROCEDURE — 74176 CT ABD & PELVIS W/O CONTRAST: CPT

## 2022-03-11 RX ORDER — ACETAMINOPHEN 650 MG/1
650 SUPPOSITORY RECTAL
Status: DISCONTINUED | OUTPATIENT
Start: 2022-03-11 | End: 2022-03-18 | Stop reason: HOSPADM

## 2022-03-11 RX ORDER — LISINOPRIL 5 MG/1
10 TABLET ORAL DAILY
Status: DISCONTINUED | OUTPATIENT
Start: 2022-03-12 | End: 2022-03-18 | Stop reason: HOSPADM

## 2022-03-11 RX ORDER — POLYETHYLENE GLYCOL 3350 17 G/17G
17 POWDER, FOR SOLUTION ORAL DAILY
COMMUNITY
End: 2022-03-30

## 2022-03-11 RX ORDER — SODIUM CHLORIDE 9 MG/ML
75 INJECTION, SOLUTION INTRAVENOUS CONTINUOUS
Status: DISCONTINUED | OUTPATIENT
Start: 2022-03-11 | End: 2022-03-18 | Stop reason: HOSPADM

## 2022-03-11 RX ORDER — ONDANSETRON 2 MG/ML
4 INJECTION INTRAMUSCULAR; INTRAVENOUS
Status: DISCONTINUED | OUTPATIENT
Start: 2022-03-11 | End: 2022-03-18 | Stop reason: HOSPADM

## 2022-03-11 RX ORDER — ASPIRIN 81 MG/1
81 TABLET ORAL DAILY
COMMUNITY
End: 2022-03-30 | Stop reason: ALTCHOICE

## 2022-03-11 RX ORDER — DOXYCYCLINE 100 MG/1
100 TABLET ORAL 2 TIMES DAILY
COMMUNITY
End: 2022-03-18

## 2022-03-11 RX ORDER — ASPIRIN 81 MG/1
81 TABLET ORAL DAILY
Status: DISCONTINUED | OUTPATIENT
Start: 2022-03-12 | End: 2022-03-18 | Stop reason: HOSPADM

## 2022-03-11 RX ORDER — ENOXAPARIN SODIUM 100 MG/ML
40 INJECTION SUBCUTANEOUS DAILY
Status: DISCONTINUED | OUTPATIENT
Start: 2022-03-12 | End: 2022-03-18 | Stop reason: HOSPADM

## 2022-03-11 RX ORDER — AMLODIPINE BESYLATE 5 MG/1
10 TABLET ORAL DAILY
Status: DISCONTINUED | OUTPATIENT
Start: 2022-03-12 | End: 2022-03-18 | Stop reason: HOSPADM

## 2022-03-11 RX ORDER — LEVOTHYROXINE SODIUM 75 UG/1
75 TABLET ORAL
COMMUNITY

## 2022-03-11 RX ORDER — LISINOPRIL 10 MG/1
10 TABLET ORAL DAILY
COMMUNITY
End: 2022-03-30

## 2022-03-11 RX ORDER — LEVOTHYROXINE SODIUM 75 UG/1
75 TABLET ORAL
Status: DISCONTINUED | OUTPATIENT
Start: 2022-03-12 | End: 2022-03-18 | Stop reason: HOSPADM

## 2022-03-11 RX ORDER — SODIUM CHLORIDE 0.9 % (FLUSH) 0.9 %
5-40 SYRINGE (ML) INJECTION AS NEEDED
Status: DISCONTINUED | OUTPATIENT
Start: 2022-03-11 | End: 2022-03-18 | Stop reason: HOSPADM

## 2022-03-11 RX ORDER — ACETAMINOPHEN 500 MG
TABLET ORAL
COMMUNITY
End: 2022-03-30 | Stop reason: ALTCHOICE

## 2022-03-11 RX ORDER — SODIUM CHLORIDE 0.9 % (FLUSH) 0.9 %
5-40 SYRINGE (ML) INJECTION EVERY 8 HOURS
Status: DISCONTINUED | OUTPATIENT
Start: 2022-03-11 | End: 2022-03-18 | Stop reason: HOSPADM

## 2022-03-11 RX ORDER — DOCUSATE SODIUM 100 MG/1
100 CAPSULE, LIQUID FILLED ORAL
COMMUNITY
End: 2022-03-30

## 2022-03-11 RX ORDER — AMLODIPINE BESYLATE 10 MG/1
TABLET ORAL DAILY
COMMUNITY
End: 2022-03-30

## 2022-03-11 RX ORDER — ACETAMINOPHEN 325 MG/1
650 TABLET ORAL
Status: DISCONTINUED | OUTPATIENT
Start: 2022-03-11 | End: 2022-03-18 | Stop reason: HOSPADM

## 2022-03-11 RX ORDER — SODIUM CHLORIDE 0.9 % (FLUSH) 0.9 %
5-10 SYRINGE (ML) INJECTION AS NEEDED
Status: DISCONTINUED | OUTPATIENT
Start: 2022-03-11 | End: 2022-03-18 | Stop reason: HOSPADM

## 2022-03-11 RX ADMIN — SODIUM CHLORIDE 1000 ML: 9 INJECTION, SOLUTION INTRAVENOUS at 13:30

## 2022-03-11 RX ADMIN — SODIUM CHLORIDE 75 ML/HR: 9 INJECTION, SOLUTION INTRAVENOUS at 17:42

## 2022-03-11 RX ADMIN — SODIUM CHLORIDE 100 MG: 9 INJECTION, SOLUTION INTRAVENOUS at 16:40

## 2022-03-11 RX ADMIN — Medication 10 ML: at 22:16

## 2022-03-11 RX ADMIN — Medication 10 ML: at 17:47

## 2022-03-11 NOTE — PROGRESS NOTES
Skin Assessment:  1. Bruise to back of head  2. Bruises to B/L Lower Abdomen  3. L elbow sx wound- JOSE C, no drainage redness noted  4. B/L Buttocks Stage one?  Wound consult and turn team  5. R Knee abrasion

## 2022-03-11 NOTE — H&P
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 No focal neuro deficits noted on exam.  Suspected to be HAL of ertapenem. Seen by ID in the ER.  -- neuro checks  -- change IV abx to tigecycline    Closed fracture of two ribs of left side (3/11/2022): Has acute fx on left, but also noted are old fractures. -- tylenol prn  -- incentive spirometer    Urinary retention (3/11/2022): Straight cath in ER and per nurse, put out 400 ml.  -- check post void bladder scans    Debility:  Falls may be exacerbated by abx side effects. At baseline uses rollator. Given findings of old fractures, suspect has chronic debility. -- fall precautions  -- PT/OT consult    Hypothyroidism:  -- continue LT4    HTN:  -- continue amlodipine and lisinpril           Subjective:     CHIEF COMPLAINT:  falling    HISTORY OF PRESENT ILLNESS:     Ms. Ori Porter is a 80 y.o.  female who is admitted with Toxic encephalopathy. Ms. Ori Porter presented to the Emergency Department today complaining of falls. Son is not at bedside and no contact information is in EMR. Patient reports frequent falls recently. Per ER physician, patient has been hallucinating. Currently being treated for left elbow infection with IV Abx via PICC line. Patient does report pain from right pelvis and left chest with deep breath. No fever, chills. No n/v.  Lives alone and walks with rollator. Denies abd pain, diarrhea, constipation. Denies dysuria. There is concern for neurotoxicity due to ertapenem, so admission recommended to change IV abx. Past Medical History:   Diagnosis Date    HTN (hypertension)     Hypothyroid     Septic arthritis of elbow, left (HCC)         No past surgical history on file.     Social History     Tobacco Use    Smoking status: Not on file    Smokeless tobacco: Not on file   Substance Use Topics    Alcohol use: Not on file     FH:  F - diabetes, M -  Heart disease    Allergies   Allergen Reactions    Amoxicillin Unknown (comments)    Penicillins Unknown (comments)      Meds:    START taking these medications     Details   doxycycline (ADOXA) 100 mg tablet Take 1 Tablet by mouth two (2) times a day for 40 days. Qty: 80 Tablet, Refills: 0  Start date: 3/4/2022, End date: 4/13/2022       lisinopriL (PRINIVIL, ZESTRIL) 10 mg tablet Take 1 Tablet by mouth daily for 30 days. Qty: 30 Tablet, Refills: 0  Start date: 3/5/2022, End date: 4/4/2022               CONTINUE these medications which have NOT CHANGED     Details   folic acid (FOLVITE) 1 mg tablet Take 1 mg by mouth daily.       docusate sodium (Colace) 100 mg capsule Take 100 mg by mouth two (2) times daily as needed for Constipation.       levothyroxine (SYNTHROID) 75 mcg tablet Take 1 Tab by mouth Daily (before breakfast). Qty: 30 Tab, Refills: 1       polyethylene glycol (Miralax) 17 gram packet Take 1 Packet by mouth daily. Qty: 30 Packet, Refills: 1       L.acidoph & parac-S.therm-Bifido (BRENNEN Q2/RISAQUAD-2) 16 billion cell cap cap Take 1 Cap by mouth daily.   Qty: 30 Cap, Refills: 0       amLODIPine (NORVASC) 10 mg tablet Take 10 mg by mouth daily.       acetaminophen (Tylenol Extra Strength) 500 mg tablet Take 500 mg by mouth two (2) times daily as needed for Pain.       multivit-min/iron/folic/lutein (CENTRUM SILVER WOMEN PO) Take 1 Tab by mouth every other day.       cholecalciferol (VITAMIN D3) 1,000 unit cap Take 2,000 Units by mouth daily.       aspirin delayed-release 81 mg tablet Take 81 mg by mouth daily.                 Review of Systems:  (bold if positive, if negative)    Gen:  Eyes:  ENT:  CVS:  chest painPulm:  GI:    :    MS:  PainSkin:  Renal:    Neuro:            Objective:      VITALS:    Vital signs reviewed; most recent are:    Visit Vitals  BP (!) 150/68   Pulse 73   Temp 97.8 °F (36.6 °C)   Resp 18   Ht 4' 11\" (1.499 m)   Wt 56.2 kg (124 lb)   SpO2 100%   BMI 25.04 kg/m²     SpO2 Readings from Last 6 Encounters:   03/11/22 100%            Intake/Output Summary (Last 24 hours) at 3/11/2022 1538  Last data filed at 3/11/2022 1340  Gross per 24 hour   Intake --   Output 400 ml   Net -400 ml            Exam:     Physical Exam:    Gen:  Well-developed, well-nourished, in no acute distress  HEENT:  Pink conjunctivae, PERRL, hearing intact to voice, dry mucous membranes  Resp:  No accessory muscle use, clear breath sounds without wheezes rales or rhonchi  Card:  No murmurs, normal S1, S2 without thrills or peripheral edema, RUE PICC line present, ttp left lateral chest wall  Abd:  Soft, non-tender, non-distended, normoactive bowel sounds are present  Musc:  No cyanosis, left elbow surgical site with mild erythema and warmth  Skin:  Ecchymosis RLQ, abrasion right knee  Neuro:  Cranial nerves 3-12 are grossly intact,  strength is 4/5 bilaterally, dorsi / plantarflexion strength is 4/5 bilaterally, follows commands appropriately  Psych:  Alert with moderate insight. Oriented to person, place, and time       Labs:    Recent Labs     03/11/22  1309   WBC 10.9   HGB 10.0*   HCT 31.2*        Recent Labs     03/11/22  1309      K 3.7      CO2 29   *   BUN 16   CREA 0.72   CA 8.8   ALB 2.8*   TBILI 0.5   ALT 18     No results found for: GLUCPOC  No results for input(s): PH, PCO2, PO2, HCO3, FIO2 in the last 72 hours. No results for input(s): INR, INREXT, INREXT in the last 72 hours. CT head:  No acute process  CT chest/abd/pelvis:   1. Acute mildly displaced fractures of the left posterior sixth and seventh  ribs. 2. A 3.2 cm infrarenal abdominal aortic aneurysm. 3. Markedly distended bladder. 4. Additional findings as above.     Surrogate decision maker:  son    Total time spent in care of this patient: 48 Minutes Care Plan discussed with: Patient    Discussed:  Care Plan    Prophylaxis:  Lovenox    Probable Disposition:  HH PT, OT, RN           ___________________________________________________    Attending Physician: Virginia Baumann MD

## 2022-03-11 NOTE — ED PROVIDER NOTES
Please note that this dictation was completed with [employer], the computer voice recognition software.  Quite often unanticipated grammatical, syntax, homophones, and other interpretive errors are inadvertently transcribed by the computer software.  Please disregard these errors.  Please excuse any errors that have escaped final proofreading. 45-year-old female past medical history markable for intermittent left elbow infections currently with a PICC line and receiving ertapenem on infusions presents the ER with her son for \"I am not sure why I am here I think I am okay. \"    Per the son Kait Goff is lives by herself still. I am there is much as I can be but I have to leave at night. She is fallen several times over the previous days she is getting bruises she is also been hallucinating. She tells me she sees children under computer. \"  She has been tolerating p.o. normally but was found down in the floor this morning when I got there and evidently when asked called EMS states that they came out the other day and picked her up out of the floor as well and I had no idea about this. Of note of at least 4 falls over the previous several days. I called and spoke with her ID doctor today, and they told me to bring her here for readmission. He said she needs to be admitted for several days so that me she may be placed into a nursing home. \"           No past medical history on file. No past surgical history on file. No family history on file.     Social History     Socioeconomic History    Marital status: Not on file     Spouse name: Not on file    Number of children: Not on file    Years of education: Not on file    Highest education level: Not on file   Occupational History    Not on file   Tobacco Use    Smoking status: Not on file    Smokeless tobacco: Not on file   Substance and Sexual Activity    Alcohol use: Not on file    Drug use: Not on file    Sexual activity: Not on file   Other Topics Concern  Not on file   Social History Narrative    Not on file     Social Determinants of Health     Financial Resource Strain:     Difficulty of Paying Living Expenses: Not on file   Food Insecurity:     Worried About Running Out of Food in the Last Year: Not on file    Fuad of Food in the Last Year: Not on file   Transportation Needs:     Lack of Transportation (Medical): Not on file    Lack of Transportation (Non-Medical): Not on file   Physical Activity:     Days of Exercise per Week: Not on file    Minutes of Exercise per Session: Not on file   Stress:     Feeling of Stress : Not on file   Social Connections:     Frequency of Communication with Friends and Family: Not on file    Frequency of Social Gatherings with Friends and Family: Not on file    Attends [employer]ism Services: Not on file    Active Member of [address] [place] [address] Allclasses or Organizations: Not on file    Attends Club or Organization Meetings: Not on file    Marital Status: Not on file   Intimate Partner Violence:     Fear of Current or Ex-Partner: Not on file    Emotionally Abused: Not on file    Physically Abused: Not on file    Sexually Abused: Not on file   Housing Stability:     Unable to Pay for Housing in the Last Year: Not on file    Number of [place] in the Last Year: Not on file    Unstable Housing in the Last Year: Not on file         ALLERGIES: Patient has no allergy information on record. Review of Systems   Unable to perform ROS: Dementia   All other systems reviewed and are negative. Vitals:    03/11/22 1242 03/11/22 1300   BP: (!) 148/54 (!) 122/55   Pulse: 73    Resp: 18    Temp: 97.8 °F (36.6 °C)    SpO2: 97% 99%   Weight: 56.2 kg (124 lb)    Height: 4' 11\" (1.499 m)             Physical Exam  Vitals and nursing note reviewed. Constitutional:       General: She is not in acute distress. Appearance: Normal appearance. She is well-developed. She is not ill-appearing, toxic-appearing or diaphoretic.       Comments: NAD, AxOx1, speaking in complete sentences    gcs = 14 (memory); HENT:      Head: Normocephalic and atraumatic. Right Ear: External ear normal.      Left Ear: External ear normal.      Mouth/Throat:      Mouth: Mucous membranes are moist.   Eyes:      General: No scleral icterus. Right eye: No discharge. Left eye: No discharge. Extraocular Movements: Extraocular movements intact. Conjunctiva/sclera: Conjunctivae normal.      Pupils: Pupils are equal, round, and reactive to light. Neck:      Vascular: No JVD. Trachea: No tracheal deviation. Cardiovascular:      Rate and Rhythm: Normal rate and regular rhythm. Pulses: Normal pulses. Heart sounds: Normal heart sounds. No murmur heard. No friction rub. No gallop. Pulmonary:      Effort: Pulmonary effort is normal. No respiratory distress. Breath sounds: Normal breath sounds. No stridor. No wheezing, rhonchi or rales. Chest:      Chest wall: No tenderness. Abdominal:      General: Bowel sounds are normal.      Palpations: Abdomen is soft. Tenderness: There is no abdominal tenderness. There is no guarding or rebound. Hernia: No hernia is present. Comments: Noted bilat bruises;     Min nttp/ neg peritoneal signs; Genitourinary:     Comments: Pt in diaper  Musculoskeletal:         General: Signs of injury present. No swelling, tenderness or deformity. Normal range of motion. Cervical back: Normal range of motion and neck supple. Right lower leg: No edema. Left lower leg: No edema. Comments: RUE - noted PICC line/ nttp/ no redness;     L elbow - noted min redness/ from;    Skin:     General: Skin is warm and dry. Capillary Refill: Capillary refill takes less than 2 seconds. Coloration: Skin is not jaundiced or pale. Findings: Bruising present. No erythema, lesion or rash. Neurological:      General: No focal deficit present.       Mental Status: She is alert and oriented to person, place, and time. Cranial Nerves: No cranial nerve deficit. Sensory: No sensory deficit. Motor: No weakness or abnormal muscle tone. Coordination: Coordination normal.      Gait: Gait normal.      Deep Tendon Reflexes: Reflexes normal.   Psychiatric:         Behavior: Behavior normal.         Thought Content: Thought content normal.          MDM  Number of Diagnoses or Management Options         Procedures    Chief Complaint   Patient presents with    Altered mental status       12:44 PM  The patients presenting problems have been discussed, and they are in agreement with the care plan formulated and outlined with them. I have encouraged them to ask questions as they arise throughout their visit. MEDICATIONS GIVEN:  Medications - No data to display    LABS REVIEWED:  Labs Reviewed - No data to display    RADIOLOGY RESULTS:  The following have been ordered and reviewed:  _____________________________________________________________________  _____________________________________________________________________    EKG interpretation:   Rhythm: normal sinus rhythm; and regular . Rate (approx.): 72; Axis: normal; P wave: normal; QRS interval: normal ; ST/T wave: normal; Negative acute significant segmental elevations/ no old study for comparison;     PROCEDURES:        CONSULTATIONS:       PROGRESS NOTES:      DIAGNOSIS:    1. Altered mental status, unspecified altered mental status type    2. PICC (peripherally inserted central catheter) in place    3. Falls frequently    4. Toxic encephalopathy    5. Osteomyelitis of elbow (HCC)    6. Closed fracture of multiple ribs of left side, initial encounter            ED COURSE: The patients hospital course has been uncomplicated. 2:39 PM  Pt has had a registration error; will check notes once corrected/ plan to admit;     CONSULT  NOTE  2:46 PM  Manjit Dewey MD spoke with Dr Khari Puente.   Specialty: ID  Discussed pt's hx, disposition, and available diagnostic and imaging results. Reviewed care plans. Consulting physician agrees with plans as outlined 'I will see her/ adjust her ABX'; Althea Morejon Serve Consult for Admission  2:49 PM    ED Room Number: ER17/17  Patient Name and age:  Shasta Race 80 y.o.  female  Working Diagnosis:   1. Altered mental status, unspecified altered mental status type    2. PICC (peripherally inserted central catheter) in place    3.  Falls frequently        COVID-19 Suspicion:  no  Sepsis present:  no  Reassessment needed: yes  Code Status:  Full Code  Readmission: yes  Isolation Requirements:  no  Recommended Level of Care:  telemetry  Department:[facility] ED - [phone]  Other:  Dr Buddy Villegas changing abx/ on ertapenem currently/ increased hallucinations/ falls x past 2-3 days/  Lives alone

## 2022-03-11 NOTE — CONSULTS
Infectious Disease Consult    Impression/Plan   · AMS. Likely neurotoxicity related to ertapenem. Unfortunately  antibiotic options limited. Will need to avoid carbapenem antibiotics with AMS on ertapenem. She is allergic to amoxicillin so cannot use piperacillin-tazobactam.  The patient was treated with ceftriaxone in the past with recurrence of infection so would like to avoid cephalosporins. Cannot use quinolone antibiotics with history of abdominal aortic aneurysm. Will change antibiotics to tigecycline to continue over the weekend. Consider trial of daptomycin/aztreonam on Monday depending on clinical course. I suspect she will need discharge to SNF to complete IV antibiotic therapy as she is unsafe to be home alone at this time. 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 Bacterial culture negative. Fungal and AFB cultures pending. Needs antibiotic therapy through 4/14/2022  · Rheumatoid arthritis. Was on methotrexate in the past  · Abdominal aortic aneurysm. Avoid quinolone antibiotics  · Amoxicillin allergy. This caused a rash in the past.  Able to tolerate cephalosporins  · Diabetes mellitus      Anti-infectives:   1. Ertapenem  2. Doxycycline    Subjective:   Date of Consultation:  March 11, 2022  Date of Admission: 3/11/2022   Referring Physician:       Please note patient is currently admitted under the name Shweta Art the correct name is Laureen Goodell and the correct medical record number is [id number]    Patient is a 80 y.o. female with a past medical history significant for rheumatoid arthritis, abdominal aortic aneurysm, and diabetes mellitus who is being seen for altered mental status. The patient is well-known to the infectious disease service. She was seen in April 2021 for left elbow septic arthritis. She underwent I&D. Cultures were sterile however patient was on antibiotics prior to surgery.   She was discharged on an empiric course of ceftriaxone which was continued for 6 weeks through 5/10/2021    She returned to [facility] in 2022 with complaints of worsening left elbow pain following a dose of steroids prescribed for a rash. MRI was done which raised concern for septic arthritis and radial head osteomyelitis. She was taken to the surgery where she underwent I&D. Per operative note purulence was encountered at the time of surgery. Once again, however, cultures were sterile at final reading. Fungal and AFB cultures are pending at the time of this dictation. Patient was sent home with plans to complete a 6-week course of ertapenem 2022. Unfortunately she has developed altered mental status with hallucinations  and has had several falls. Prior to this she was living independently. She has been brought back to the ER for antibiotic change and discharge to a SNF to complete antibiotic therapy    There is no problem list on file for this patient. No past medical history on file. No family history on file. Social History     Tobacco Use    Smoking status: Not on file    Smokeless tobacco: Not on file   Substance Use Topics    Alcohol use: Not on file     No past surgical history on file. Prior to Admission medications    Not on File     Allergies   Allergen Reactions    Amoxicillin Unknown (comments)    Penicillins Unknown (comments)        Review of Systems:  Review of systems not obtained due to patient factors. Objective:   Blood pressure (!) 122/55, pulse 73, temperature 97.8 °F (36.6 °C), resp. rate 18, height 4' 11\" (1.499 m), weight 124 lb (56.2 kg), SpO2 99 %. Temp (24hrs), Av.8 °F (36.6 °C), Min:97.8 °F (36.6 °C), Max:97.8 °F (36.6 °C)       Exam:    General:  Alert, cooperative. Mildly confused   Eyes:  Sclera anicteric.     Mouth/Throat: Mucous membranes normal   Neck: Supple   Lungs:   Clear to auscultation anteriorly   CV:  Regular rate and rhythm   Abdomen:    non-distended   Extremities: No edema Skin:  no rash   Lymph nodes:    Musculoskeletal:  Moves all   Lines/Devices:   PICC line in place   Psych:  Unable to assess       Data Review:   Recent Results (from the past 24 hour(s))   LACTIC ACID    Collection Time: 03/11/22  1:07 PM   Result Value Ref Range    Lactic acid 1.1 0.4 - 2.0 MMOL/L   TROPONIN-HIGH SENSITIVITY    Collection Time: 03/11/22  1:07 PM   Result Value Ref Range    Troponin-High Sensitivity 23 0 - 51 ng/L   SAMPLES BEING HELD    Collection Time: 03/11/22  1:07 PM   Result Value Ref Range    SAMPLES BEING HELD MINDI     COMMENT        Add-on orders for these samples will be processed based on acceptable specimen integrity and analyte stability, which may vary by analyte. METABOLIC PANEL, COMPREHENSIVE    Collection Time: 03/11/22  1:09 PM   Result Value Ref Range    Sodium 136 136 - 145 mmol/L    Potassium 3.7 3.5 - 5.1 mmol/L    Chloride 102 97 - 108 mmol/L    CO2 29 21 - 32 mmol/L    Anion gap 5 5 - 15 mmol/L    Glucose 111 (H) 65 - 100 mg/dL    BUN 16 6 - 20 MG/DL    Creatinine 0.72 0.55 - 1.02 MG/DL    BUN/Creatinine ratio 22 (H) 12 - 20      GFR est AA >60 >60 ml/min/1.73m2    GFR est non-AA >60 >60 ml/min/1.73m2    Calcium 8.8 8.5 - 10.1 MG/DL    Bilirubin, total 0.5 0.2 - 1.0 MG/DL    ALT (SGPT) 18 12 - 78 U/L    AST (SGOT) 14 (L) 15 - 37 U/L    Alk.  phosphatase 73 45 - 117 U/L    Protein, total 6.3 (L) 6.4 - 8.2 g/dL    Albumin 2.8 (L) 3.5 - 5.0 g/dL    Globulin 3.5 2.0 - 4.0 g/dL    A-G Ratio 0.8 (L) 1.1 - 2.2     CBC WITH AUTOMATED DIFF    Collection Time: 03/11/22  1:09 PM   Result Value Ref Range    WBC 10.9 3.6 - 11.0 K/uL    RBC 3.56 (L) 3.80 - 5.20 M/uL    HGB 10.0 (L) 11.5 - 16.0 g/dL    HCT 31.2 (L) 35.0 - 47.0 %    MCV 87.6 80.0 - 99.0 FL    MCH 28.1 26.0 - 34.0 PG    MCHC 32.1 30.0 - 36.5 g/dL    RDW 15.3 (H) 11.5 - 14.5 %    PLATELET 382 488 - 627 K/uL    MPV 10.3 8.9 - 12.9 FL    NRBC 0.0 0  WBC    ABSOLUTE NRBC 0.00 0.00 - 0.01 K/uL    NEUTROPHILS 83 (H) 32 - 75 %    LYMPHOCYTES 10 (L) 12 - 49 %    MONOCYTES 3 (L) 5 - 13 %    EOSINOPHILS 2 0 - 7 %    BASOPHILS 1 0 - 1 %    IMMATURE GRANULOCYTES 1 (H) 0.0 - 0.5 %    ABS. NEUTROPHILS 9.2 (H) 1.8 - 8.0 K/UL    ABS. LYMPHOCYTES 1.1 0.8 - 3.5 K/UL    ABS. MONOCYTES 0.3 0.0 - 1.0 K/UL    ABS. EOSINOPHILS 0.3 0.0 - 0.4 K/UL    ABS. BASOPHILS 0.1 0.0 - 0.1 K/UL    ABS. IMM. GRANS. 0.1 (H) 0.00 - 0.04 K/UL    DF AUTOMATED     URINALYSIS W/ RFLX MICROSCOPIC    Collection Time: 03/11/22  1:40 PM   Result Value Ref Range    Color YELLOW/STRAW      Appearance CLEAR CLEAR      Specific gravity 1.010 1.003 - 1.030      pH (UA) 7.0 5.0 - 8.0      Protein Negative NEG mg/dL    Glucose Negative NEG mg/dL    Ketone Negative NEG mg/dL    Bilirubin Negative NEG      Blood Negative NEG      Urobilinogen 0.2 0.2 - 1.0 EU/dL    Nitrites Negative NEG      Leukocyte Esterase Negative NEG     URINE CULTURE HOLD SAMPLE    Collection Time: 03/11/22  1:40 PM    Specimen: Serum   Result Value Ref Range    Urine culture hold        Urine on hold in Microbiology dept for 2 days. If unpreserved urine is submitted, it cannot be used for addtional testing after 24 hours, recollection will be required.         Microbiology:     Studies:      Signed By: Augustin Marcus DO     March 11, 2022

## 2022-03-11 NOTE — PROGRESS NOTES
[facility]: MED RECONCILIATION    Information obtained from: patient's son (patient with AMS) - provided AVS from 3/4/22 (patient had surgery at [facility] on 2/28/22); no Rx query on file    Of note, patient had been getting Ertapenem IV at home for elbow infection however is presumed to be causing patient's AMS. She did not have a dose this morning. I did NOT add this to PTA med list. ID is managing her ABX currently. Significant PMH/Disease States:   Past Medical History:   Diagnosis Date    HTN (hypertension)     Hypothyroid     Septic arthritis of elbow, left [facility])      Chief Complaint for this Admission:   Chief Complaint   Patient presents with    Altered mental status    Fall     Allergies: Amoxicillin and Penicillins    Prior to Admission Medications:     Medication Documentation Review Audit       Reviewed by Kunal Ennis PHARMD (Pharmacist) on 03/11/22 at 1604      Medication Sig Documenting Provider Last Dose Status Taking?   acetaminophen (TYLENOL) 500 mg tablet Take  by mouth two (2) times daily as needed for Pain. Provider, Historical  Active Yes   amLODIPine (NORVASC) 10 mg tablet Take  by mouth daily. Provider, Historical 3/11/2022 am Active Yes   aspirin delayed-release 81 mg tablet Take 81 mg by mouth daily. Provider, Historical 3/11/2022 am Active Yes   docusate sodium (COLACE) 100 mg capsule Take 100 mg by mouth two (2) times daily as needed for Constipation. Provider, Historical  Active Yes   doxycycline (ADOXA) 100 mg tablet Take 100 mg by mouth two (2) times a day. Provider, Historical 3/11/2022 am Active Yes   L.acid,para-B. bifidum-S.therm (RISAQUAD) 8 billion cell cap cap Take 1 Capsule by mouth daily. Provider, Historical 3/11/2022 am Active Yes   levothyroxine (SYNTHROID) 75 mcg tablet Take 75 mcg by mouth Daily (before breakfast). Provider, Historical 3/11/2022 am Active Yes   lisinopriL (PRINIVIL, ZESTRIL) 10 mg tablet Take 10 mg by mouth daily.  Provider, Historical 3/11/2022 am Active Yes   polyethylene glycol (Miralax) 17 gram packet Take 17 g by mouth daily. Provider, Historical 3/11/2022 am Active Yes                Thank you,  Jesus RUGGIERO [facility]

## 2022-03-11 NOTE — TELEPHONE ENCOUNTER
Pt's son called again stating it's urgent, requesting call back from Dr Buddy Villegas because pt has fallen again, is still hallucinating and needs to be in a facility that can take care of her. Pt not established yet with new PCP until appointment 3/17/22 and has been waiting to be seen since October. Pt's son thinks the antibiotics prescribed by Dr. Buddy Villegas are causing pt's symptoms. Pt seen at [facility] ER but not checked for UTI.  Daniela

## 2022-03-11 NOTE — ED TRIAGE NOTES
Patient presents to ED via EMS for reported confusion. Family reports patient is currently on doxycycline for post op surgery on left arm. Reports patient is more confused; no hx of dementia. Patient lives along. Family member reports patient has been having increased falls recently.

## 2022-03-11 NOTE — PROGRESS NOTES
Care Management Readmission Assessment        NAME:   Beverly Galladro   :     1930   MRN:     [id number]             RUR Score/Risk Level:       RUR:  Not available  Risk Level:  N/A    Assessment: In person with son Dona Dixon    Reason for Readmission:  Ms. Nate Briceno is a 80 y.o. female with history that includes DM, CKD, HTN, hypothyroidism, RA and [place]  who was emergently admitted for:  toxic encephalopathy    Patient Active Problem List   Diagnosis Code    Toxic encephalopathy G92.9    Closed fracture of two ribs of left side S22.42XA    Urinary retention R33.9       Has any pertinent information changed since previous Care Management Assessment? Living arrangements:  Pt lives in an apt alone. Son has been staying with Pt as he can during the day. Pt alone at night. ADLs:  Son has been assisting with ADLs since last hospitalization   DME:   Rollator, raised toilet seat, cane, grab bars in bathroom   Pharmacy:  [address] [place] [address]    Insurer:  Payor: Kendal Hurtado / Plan: [address] [place] [address] HMO / Product Type: Managed Care Medicare /     PCP: Satya, MD Nicole   Name of Practice:  [facility]   Current patient:  No   Approximate date of last visit: Pt has visit scheduled for 3/17. Access to virtual PCP visits:  Unknown    Is a Care Conference indicated:   No    Did you attend your follow up appointment(s):  Pt has not seen PCP. Saw Ortho surgeon yesterday (3/10). If not, why not:  PCP appt not available until 3/17    Resources/supports as identified by patient/family:  Pt has supportive family         Top Challenges facing patient (as identified by patient/family and CM): Finances/Medication cost?  None identified  Transportation? None identified       Support system or lack thereof? None identified     Living arrangements? None identified         Self-care/ADLs/Cognition? Yes - Specify: Pt has been experiencing confusion and hallucinations.        Advance Directive:  Full Code, does not have an advance directive. Plan for utilizing home health:  Pt is currently open to [facility]. Transition of Care Plan:      SNF / IPR    Additional information:  Pt admitted on 3/11/22 for toxic encephalopathy. CM met with Pt and son Guadalupe Dewitt). Pt is confused and Lisandra Mathew [phone] answers most of the assessment questions. Pt was discharged home from [facility] on 3/4/22 with IV abx ([facility]) and [facility]. Son states that Pt has been experiencing increasing confusion and hallucinations and was told by MD to come to the hospital.     Pt lives in an apartment alone. Son states that apartment is a \"senior\" apartment complex. Son lives nearby but not with Pt. Son states that since he's been helping with IV abx he has been staying with Pt more during the day but Pt is alone at night. Son is employed and unable to stay with Pt 24/7. Pt has [facility] through [facility]. IV abx through [address] [place] [address]. Pt has no hx of home O2. Pt has a rollator, raised toilet seat, cane, and grab bars in the restroom. Son reports prior to last hospitalization, Pt was completely independent and able to walk independently. Since last hospitalization, son has noticed decline in cognition and ability to care for herself. Pt has also started hallucinating as well. Son also states that Pt has been unsteady with ambulation- even when using the rollator. Son reports at least 4 falls in the past 3 days. Son is interested in SNF placement upon discharge. Pt has hx of SNF at [facility] (3140) but he is not interested in Pt returning. CM provided son with SNF rehab list. CM removed facilities that do not accept [employer]. CM requested son review list and choose 3-4 facilities that CM could contact to make referral. CM re-educated Pt on process of SNF rehab. Son voiced understanding. Son confirms that Pt has received all COVID vaccinations (including booster).  Son will be able to provide COVID card if SNF requires. CM will continue to follow. _____________________________________  KAY Cuadra - Care Management  3/11/2022   3:30 PM     Readmission Assessment  Number of days since last admission?: 8-30 days  Previous disposition: Home with Home Health  Who is being interviewed?: Caregiver  What was the patient's/caregiver's perception as to why they think they needed to return back to the hospital?: Other (Comment) (confusion and falls)  Did you visit your Primary Care Physician after you left the hospital, before you returned this time?: No  Why weren't you able to visit your PCP?: Other (Comment) (no available appt until 3/17)  Did you see a specialist, such as Cardiac, Pulmonary, Orthopedic Physician, etc. after you left the hospital?: Yes  Who advised the patient to return to the hospital?: Physician  Does the patient report anything that got in the way of taking their medications?: No  In our efforts to provide the best possible care to you and others like you, can you think of anything that we could have done to help you after you left the hospital the first time, so that you might not have needed to return so soon?: Other (Comment) (no)        Care Management Interventions  PCP Verified by CM: Yes Jenni Stratton MD)  Mode of Transport at Discharge:  Other (see comment) (TBD)  Transition of Care Consult (CM Consult): Discharge Planning  MyChart Signup: No  Discharge Durable Medical Equipment: No  Physical Therapy Consult: Yes  Occupational Therapy Consult: Yes  Speech Therapy Consult: No  Support Systems: Child(dhaval)  Confirm Follow Up Transport: Family  Discharge Location  Patient Expects to be Discharged to[de-identified] Skilled nursing facility

## 2022-03-11 NOTE — PROGRESS NOTES
65  Spoke with Dr. Jewel Sexton and he is aware of A 3.2 cm infrarenal abdominal aortic aneurysm. No need for tele per MD      1925  Bedside and Verbal shift change report given to [address] [place] [address] Drive (oncoming nurse) by Tito Szymanski RN (offgoing nurse). Report included the following information SBAR, Kardex, Intake/Output and Recent Results.

## 2022-03-12 LAB — CK SERPL-CCNC: 57 U/L (ref 26–192)

## 2022-03-12 PROCEDURE — 51798 US URINE CAPACITY MEASURE: CPT

## 2022-03-12 PROCEDURE — 97161 PT EVAL LOW COMPLEX 20 MIN: CPT

## 2022-03-12 PROCEDURE — 74011000258 HC RX REV CODE- 258: Performed by: INTERNAL MEDICINE

## 2022-03-12 PROCEDURE — 74011250637 HC RX REV CODE- 250/637: Performed by: INTERNAL MEDICINE

## 2022-03-12 PROCEDURE — 74011250636 HC RX REV CODE- 250/636: Performed by: INTERNAL MEDICINE

## 2022-03-12 PROCEDURE — 36415 COLL VENOUS BLD VENIPUNCTURE: CPT

## 2022-03-12 PROCEDURE — 97116 GAIT TRAINING THERAPY: CPT

## 2022-03-12 PROCEDURE — 82550 ASSAY OF CK (CPK): CPT

## 2022-03-12 PROCEDURE — 97165 OT EVAL LOW COMPLEX 30 MIN: CPT

## 2022-03-12 PROCEDURE — 97535 SELF CARE MNGMENT TRAINING: CPT

## 2022-03-12 PROCEDURE — 74011000250 HC RX REV CODE- 250: Performed by: INTERNAL MEDICINE

## 2022-03-12 PROCEDURE — 65270000029 HC RM PRIVATE

## 2022-03-12 RX ADMIN — AMLODIPINE BESYLATE 10 MG: 5 TABLET ORAL at 08:39

## 2022-03-12 RX ADMIN — LEVOTHYROXINE SODIUM 75 MCG: 0.07 TABLET ORAL at 06:30

## 2022-03-12 RX ADMIN — SODIUM CHLORIDE 75 ML/HR: 9 INJECTION, SOLUTION INTRAVENOUS at 07:44

## 2022-03-12 RX ADMIN — Medication 10 ML: at 06:30

## 2022-03-12 RX ADMIN — ASPIRIN 81 MG: 81 TABLET, COATED ORAL at 08:39

## 2022-03-12 RX ADMIN — LISINOPRIL 10 MG: 5 TABLET ORAL at 08:39

## 2022-03-12 RX ADMIN — ENOXAPARIN SODIUM 40 MG: 100 INJECTION SUBCUTANEOUS at 08:38

## 2022-03-12 RX ADMIN — ACETAMINOPHEN 650 MG: 325 TABLET ORAL at 17:37

## 2022-03-12 RX ADMIN — SODIUM CHLORIDE 50 MG: 900 INJECTION INTRAVENOUS at 04:43

## 2022-03-12 RX ADMIN — SODIUM CHLORIDE 50 MG: 900 INJECTION INTRAVENOUS at 17:38

## 2022-03-12 RX ADMIN — SODIUM CHLORIDE 75 ML/HR: 9 INJECTION, SOLUTION INTRAVENOUS at 17:34

## 2022-03-12 NOTE — PROGRESS NOTES
Problem: Mobility Impaired (Adult and Pediatric)  Goal: *Acute Goals and Plan of Care (Insert Text)  Description: FUNCTIONAL STATUS PRIOR TO ADMISSION: Patient was modified independent using a rollator for functional mobility. HOME SUPPORT PRIOR TO ADMISSION: The patient lived alone in senior apartment; son to provide assistance with some ADL's but patient is alone at night. History of falls. Physical Therapy Goals  Initiated 3/12/2022  1. Patient will move from supine to sit and sit to supine  in bed with supervision/set-up within 7 day(s). 2.  Patient will transfer from bed to chair and chair to bed with minimal assistance/contact guard assist using the least restrictive device within 7 day(s). 3.  Patient will perform sit to stand with minimal assistance/contact guard assist within 7 day(s). 4.  Patient will ambulate with minimal assistance/contact guard assist for 50 feet with the least restrictive device within 7 day(s). Outcome: Progressing Towards Goal   PHYSICAL THERAPY EVALUATION  Patient: Richar Ordonez (68 y.o. female)  Date: 3/12/2022  Primary Diagnosis: Toxic encephalopathy [G92.9]        Precautions:   Fall    ASSESSMENT  Based on the objective data described below, the patient presents with decreased balance, high fall risk, decreased transfers and ambulation, confusion, decreased safety awareness, following admission for toxic encephalopathy. She has history of dementia and left elbow infection, frequent falls and was recently discharged on 3/4/22. She has left 6th and 7th rib fractures. Patient is not safe to return home alone. Recommend SNF. Must have assistance at all times with mobility. Current Level of Function Impacting Discharge (mobility/balance): Received patient in bed carrying on a conversation with herself. She is alert but very confused.   Patient stood and took 5 side steps to head of bed with +2 mod assist.  Patient fearful of falling when up and leans posteriorly. Her hands have muscle atrophy but she is able to grasp walker. She does report left rib pain with movement. Patient returned to bed for safety with alarm in place. Vitals stable. Functional Outcome Measure: The patient scored 6/28 on the Tinetti outcome measure which is indicative of high fall risk. Other factors to consider for discharge: confused; high fall risk, poor balance and safety awareness     Patient will benefit from skilled therapy intervention to address the above noted impairments. PLAN :  Recommendations and Planned Interventions: bed mobility training, transfer training, gait training, and therapeutic exercises      Frequency/Duration: Patient will be followed by physical therapy:  5 times a week to address goals. Recommendation for discharge: (in order for the patient to meet his/her long term goals)  Therapy up to 5 days/week in SNF setting    This discharge recommendation:  Has been made in collaboration with the attending provider and/or case management    IF patient discharges home will need the following DME: none         SUBJECTIVE:   Patient stated I went out of the boat yesterday and they were looking for fish.     OBJECTIVE DATA SUMMARY:   HISTORY:    Past Medical History:   Diagnosis Date    HTN (hypertension)     Hypothyroid     Septic arthritis of elbow, left ([address]    No past surgical history on file.     Personal factors and/or comorbidities impacting plan of care: confusion, high fall risk, decreased dynamic balance    Home Situation  Home Environment: Apartment  # Steps to Enter: 0  One/Two Story Residence: Other (Comment) (lives in 3rd floor of senior apartment)  Living Alone: Yes  Support Systems: Child(dhaval)  Patient Expects to be Discharged to[de-identified] Skilled nursing facility  Current DME Used/Available at Home: Elverna Corti, rollator,Cane, straight,Shower chair,Commode, bedside    EXAMINATION/PRESENTATION/DECISION MAKING:   Critical Behavior:  Neurologic State: Confused  Orientation Level: Disoriented to time,Disoriented to situation,Disoriented to place  Cognition: Poor safety awareness,Memory loss,Impaired decision making  Safety/Judgement: Lack of insight into deficits  Hearing: Auditory  Auditory Impairment: Hard of hearing, bilateral  Skin:  not fully observed    Range Of Motion:  AROM: Within functional limits                       Strength:    Strength: Generally decreased, functional                    Tone & Sensation:   Tone: Normal              Sensation: Intact                       Functional Mobility:  Bed Mobility:     Supine to Sit: Assist x2;Minimum assistance  Sit to Supine: Assist x2;Minimum assistance  Scooting: Minimum assistance  Transfers:  Sit to Stand: Assist x2; Moderate assistance  Stand to Sit: Assist x2;Minimum assistance                       Balance:   Sitting: Intact; High guard  Standing: Impaired  Standing - Static: Constant support  Standing - Dynamic : Constant support  Ambulation/Gait Training:  Distance (ft):  (5 side steps to head of bed)  Assistive Device: Gait belt;Walker, rolling  Ambulation - Level of Assistance: Assist x2; Moderate assistance        Gait Abnormalities: Decreased step clearance;Trunk sway increased                                                     Functional Measure:  Tinetti test:    Sitting Balance: 0  Arises: 0  Attempts to Rise: 0  Immediate Standing Balance: 0  Standing Balance: 0  Nudged: 0  Eyes Closed: 0  Turn 360 Degrees - Continuous/Discontinuous: 0  Turn 360 Degrees - Steady/Unsteady: 0  Sitting Down: 1  Balance Score: 1 Balance total score  Indication of Gait: 1  R Step Length/Height: 0  L Step Length/Height: 0  R Foot Clearance: 1  L Foot Clearance: 1  Step Symmetry: 1  Step Continuity: 1  Path: 0  Trunk: 0  Walking Time: 0  Gait Score: 5 Gait total score  Total Score: 6/28 Overall total score         Tinetti Tool Score Risk of Falls  <19 = High Fall Risk  19-24 = Moderate Fall Risk  25-28 = Low Fall Risk  Tinetti ME. Performance-Oriented Assessment of Mobility Problems in Elderly Patients. [facility] 66; [id number]. (Scoring Description: PT Bulletin Feb. 10, 1993)    Older adults: Xochitl Fernando et al, 2009; n = 1000 [facility] elderly evaluated with ABC, CHENTE, ADL, and IADL)  · Mean CHENTE score for males aged 69-68 years = 26.21(3.40)  · Mean CHENTE score for females age 69-68 years = 25.16(4.30)  · Mean CHENTE score for males over 80 years = 23.29(6.02)  · Mean CHENTE score for females over 80 years = 17.20(8.32)           Physical Therapy Evaluation Charge Determination   History Examination Presentation Decision-Making   MEDIUM  Complexity : 1-2 comorbidities / personal factors will impact the outcome/ POC  LOW Complexity : 1-2 Standardized tests and measures addressing body structure, function, activity limitation and / or participation in recreation  LOW Complexity : Stable, uncomplicated  Other outcome measures Tinetti  HIGH       Based on the above components, the patient evaluation is determined to be of the following complexity level: LOW     Pain Rating:  No complaints of pain    Activity Tolerance:   Fair    After treatment patient left in no apparent distress:   Supine in bed, Call bell within reach, Bed / chair alarm activated, and Side rails x 3    COMMUNICATION/EDUCATION:   The patients plan of care was discussed with:  nursing and OT and CM . Fall prevention education was provided and the patient/caregiver indicated understanding. and Patient/family agree to work toward stated goals and plan of care.     Thank you for this referral.  Mela Barker, PT   Time Calculation: 23 mins

## 2022-03-12 NOTE — PROGRESS NOTES
Bedside and Verbal shift change report given to CORI Hathaway (oncoming nurse) by Yordy Pereira (offgoing nurse). Report included the following information SBAR, Kardex, OR Summary, Procedure Summary, Accordion, Recent Results and Med Rec Status.

## 2022-03-12 NOTE — PROGRESS NOTES
[facility] L[facility] [place] [address]  [address] [facility], [place], [id number] [place] [address]  [phone]      Medical Progress Note      NAME:         Lyndsay Crain   :        1930  MRM:        [id number]    Date of service: 3/12/2022      Subjective: Patient has been seen and examined as a follow up for multiple medical issues. Chart, labs, diagnostics reviewed. Patient still confused and hallucinating as per son. Not changed any since admission     Objective:    Vital Signs:    Visit Vitals  /65 (BP 1 Location: Left upper arm, BP Patient Position: Lying;Supine)   Pulse 84   Temp 98.4 °F (36.9 °C)   Resp 14   Ht 4' 11\" (1.499 m)   Wt 56.2 kg (124 lb)   SpO2 94%   Breastfeeding No   BMI 25.04 kg/m²          Intake/Output Summary (Last 24 hours) at 3/12/2022 0854  Last data filed at 3/12/2022 0636  Gross per 24 hour   Intake 1150 ml   Output 2100 ml   Net -950 ml        Physical Examination:    General:   Weak and remains ill looking, not in any acute distress   Eyes:   pink conjunctivae, PERRLA with no discharge. ENT:   no ottorrhea or rhinorrhea with dry mucous membranes  Neck: no masses, thyroid non-tender and trachea central.  Pulm:  no accessory muscle use, clear breath sounds without crackles or wheezes  Card:  no JVD or murmurs, has regular and normal S1, S2 without thrills, bruits or peripheral edema  Abd:  Soft, non-tender, non-distended, normoactive bowel sounds with no palpable organomegaly  Musc:  No cyanosis, clubbing, atrophy or deformities. Skin:  No rashes, bruising or ulcers. Clean wound left elbow  Neuro: Awake and alert. Confused and hallucinating.  Moves all extremities   Psych:  Has no insight to her illness     Current Facility-Administered Medications   Medication Dose Route Frequency    sodium chloride (NS) flush 5-10 mL  5-10 mL IntraVENous PRN    sodium chloride (NS) flush 5-40 mL  5-40 mL IntraVENous Q8H    sodium chloride (NS) flush 5-40 mL  5-40 mL IntraVENous PRN    acetaminophen (TYLENOL) tablet 650 mg  650 mg Oral Q6H PRN    Or    acetaminophen (TYLENOL) suppository 650 mg  650 mg Rectal Q6H PRN    0.9% sodium chloride infusion  75 mL/hr IntraVENous CONTINUOUS    ondansetron (ZOFRAN) injection 4 mg  4 mg IntraVENous Q6H PRN    enoxaparin (LOVENOX) injection 40 mg  40 mg SubCUTAneous DAILY    TIGEcycline (TYGACIL) 50 mg in 0.9% sodium chloride (MBP/ADV) 100 mL MBP  50 mg IntraVENous Q12H    amLODIPine (NORVASC) tablet 10 mg  10 mg Oral DAILY    lisinopriL (PRINIVIL, ZESTRIL) tablet 10 mg  10 mg Oral DAILY    aspirin delayed-release tablet 81 mg  81 mg Oral DAILY    levothyroxine (SYNTHROID) tablet 75 mcg  75 mcg Oral ACB        Laboratory data and review:    Recent Labs     03/11/22  1309   WBC 10.9   HGB 10.0*   HCT 31.2*        Recent Labs     03/11/22  1309      K 3.7      CO2 29   *   BUN 16   CREA 0.72   CA 8.8   ALB 2.8*   ALT 18     No components found for: [place]    Diagnostics: Imaging studies have been reviewed    Telemetry reviewed by me:   normal sinus rhythm    Assessment and Plan:    Septic arthritis of elbow, left ([facility] (3/11/2022) / Acute osteomyelitis of left radius ([facility] (3/11/2022) POA: recently discharged from hospital on 3/4 under a different MRN [id number]. At that time, MRI concerning for infection and aspiration revealed purulent material however cell count relatively benign. Taken to the OR 2/28 for I&D and bacterial cultures neg. Fungal and AFB cultures pending. Continue IV TIGEcycline. Given her allergies and contraindications for multiple antibiotics, ID is following and will guide on discharge regimen     Toxic encephalopathy (3/11/2022) POA: presumed to be due to Ertapenem which has been discontinued. Still persists. CT scan head neg for acute changes.  Monitor clinical progress for now    Rheumatoid arthritis ([facility] (3/11/2022) / Physical debility / Closed fracture of two ribs of left side (3/11/2022) POA: CT chest showed acute mildly displaced fractures of the left posterior sixth and seventh ribs. Pain control, incentive spirometry, PT, OT. Will need SNF    DM type 2 (diabetes mellitus, type 2) ([facility] (3/11/2022) POA: BG stable. No listed medications    HTN (hypertension), benign (3/11/2022) POA: BP well controlled. Continue Amlodipine, Lisinopril    Hypothyroidism (3/11/2022) POA: continue Levothyroxine    Urinary retention (3/11/2022) POA: resolved.  Not recurred    Total time spent for the patient's care: [address] discussed with: Patient, Care Manager and Nursing Staff    Discussed:  Care Plan and D/C Planning    Prophylaxis:  Lovenox    Anticipated Disposition:  SNF/LTC           ___________________________________________________    Attending Physician:   Raina Carrel, MD

## 2022-03-12 NOTE — PROGRESS NOTES
1:14 PM  Son arrived and met with this cm, asked that Aram Hatchet also get the referral and it has been referred through allscripts. Will need auth to be started next week by facility that can accept patient. Son is aware.  is ordering PT and OT evaluations which are necessary as part of pre auth. 11:21 AM  Son is on his way to the hospital, identified preference of [facility] as first SNF he has thought of, to meet with this CM when he arrives if he has more preferences to review. Patient is a [employer] patient and will need preauth before able to transition to a facility so it will be next week  For preauth. Referrals will be started this weekend. 10:32 AM  Call placed to patients son and message left     Transition of Care  RUR 12%  1- CM following to obtain SNF acceptance  2- accepting SNF will need to start Auth asap first of the week  3- Patient will need a negative covid test within 24 hours of transfer to the accepting and authorized facility. 4- Will need stretcher transport at discharge.

## 2022-03-12 NOTE — PROGRESS NOTES
Problem: Self Care Deficits Care Plan (Adult)  Goal: *Acute Goals and Plan of Care (Insert Text)  Description: FUNCTIONAL STATUS PRIOR TO ADMISSION: Patient was modified independent using a rollator for functional mobility. Has history of multiple falls. HOME SUPPORT: The patient lived alone in a senior living apartment. Son provides assistance but pt is alone at night. Occupational Therapy Goals  Initiated 3/12/2022  1. Patient will perform grooming with supervision/set-up in sitting within 7 day(s). 2.  Patient will perform upper body dressing with supervision/set-up within 7 day(s). 3.  Patient will perform lower body dressing with minimal assistance within 7 day(s). 4.  Patient will perform toilet transfers with minimal assistance/contact guard assist within 7 day(s). 5.  Patient will perform all aspects of toileting with minimal assistance/contact guard assist within 7 day(s). 6.  Patient will participate in upper extremity therapeutic exercise/activities with supervision/set-up for 5 minutes within 7 day(s). Outcome: Progressing Towards Goal   OCCUPATIONAL THERAPY EVALUATION  Patient: Mahi Gastelum (79 y.o. female)  Date: 3/12/2022  Primary Diagnosis: Toxic encephalopathy [G92.9]        Precautions:  Fall    ASSESSMENT  Based on the objective data described below, the patient presents with confusion, decreased balance and strength, L rib pain due to fractures, high fall risk and impaired functional mobility following admission for toxic encephalopathy. At baseline pt lives alone in senior living apartment, ambulates with rollator, has history of L elbow infection and dementia. She was received semisupine in bed, alert and confused, carrying on conversation with herself. She transferred supine>sit with min A x2, fair-good sitting balance. Pt declined grooming and required total A to don socks, not attending to task. Noted pt with muscle atrophy of hands but able to maintain grasp on RW. She stood and side stepped to [facility] with min-mod A x2, frequent cueing for initiation and physical assistance to move RW. She reported L rib pain with transfers. Returned to bed at end of session with needs met, VSS, alarm set. At this time pt is functioning below her baseline and will benefit from skilled therapy intervention to address the above noted impairments. Recommend SNF at discharge. Current Level of Function Impacting Discharge (ADLs/self-care): Min -mod A x2 transfers, up to total A ADLs    Functional Outcome Measure: The patient scored 10/100 on the Barthel Index outcome measure. Other factors to consider for discharge: high fall risk, confusion, lives alone        PLAN :  Recommendations and Planned Interventions: self care training, functional mobility training, therapeutic exercise, balance training, therapeutic activities, endurance activities, patient education, home safety training, and family training/education    Frequency/Duration: Patient will be followed by occupational therapy 5 times a week to address goals. Recommendation for discharge: (in order for the patient to meet his/her long term goals)  Therapy up to 5 days/week in SNF setting    This discharge recommendation:  Has not yet been discussed the attending provider and/or case management    IF patient discharges home will need the following DME: TBD       SUBJECTIVE:   Patient stated I was out on a boat yesterday.     OBJECTIVE DATA SUMMARY:   HISTORY:   Past Medical History:   Diagnosis Date    HTN (hypertension)     Hypothyroid     Septic arthritis of elbow, left ([address]    No past surgical history on file.     Expanded or extensive additional review of patient history:     Home Situation  Home Environment: Apartment  # Steps to Enter: 0  One/Two Story Residence: Other (Comment) (lives in 3rd floor of senior apartment)  Living Alone: Yes  Support Systems: Child(dhaval)  Patient Expects to be Discharged to[de-identified] Skilled nursing facility  Current DME Used/Available at Home: Walker, rollator,Cane, straight,Shower chair,Commode, bedside    Hand dominance: Right    EXAMINATION OF PERFORMANCE DEFICITS:  Cognitive/Behavioral Status:  Neurologic State: Confused  Orientation Level: Disoriented to time;Disoriented to situation;Disoriented to place  Cognition: Poor safety awareness;Memory loss; Impaired decision making        Safety/Judgement: Lack of insight into deficits    Hearing: Auditory  Auditory Impairment: Hard of hearing, bilateral    Vision/Perceptual:                   Acuity: Within Defined Limits         Range of Motion:  AROM: Within functional limits  PROM: Within functional limits          Strength:  Strength: Generally decreased, functional       Coordination:  Coordination: Within functional limits  Fine Motor Skills-Upper: Left Impaired;Right Intact    Gross Motor Skills-Upper: Left Intact; Right Intact    Tone & Sensation:  Tone: Normal  Sensation: Intact       Balance:  Sitting: Intact; High guard  Standing: Impaired  Standing - Static: Constant support  Standing - Dynamic : Constant support    Functional Mobility and Transfers for ADLs:  Bed Mobility:  Supine to Sit: Assist x2;Minimum assistance  Sit to Supine: Assist x2;Minimum assistance  Scooting: Minimum assistance    Transfers:  Sit to Stand: Assist x2; Moderate assistance  Stand to Sit: Assist x2;Minimum assistance    ADL Assessment:  Feeding: Minimum assistance    Oral Facial Hygiene/Grooming: Moderate assistance    Bathing: Maximum assistance    Upper Body Dressing: Maximum assistance    Lower Body Dressing: Total assistance    Toileting: Total assistance          ADL Intervention and task modifications:         Lower Body Dressing Assistance  Socks:  Total assistance (dependent)         Cognitive Retraining  Safety/Judgement: Lack of insight into deficits    Functional Measure:      Barthel Index:  Bathin  Bladder: 0  Bowels: 0  Groomin  Dressin  Feeding: 5  Mobility: 0  Stairs: 0  Toilet Use: 0  Transfer (Bed to Chair and Back): 5  Total: 10/100      The Barthel ADL Index: Guidelines  1. The index should be used as a record of what a patient does, not as a record of what a patient could do. 2. The main aim is to establish degree of independence from any help, physical or verbal, however minor and for whatever reason. 3. The need for supervision renders the patient not independent. 4. A patient's performance should be established using the best available evidence. Asking the patient, friends/relatives and nurses are the usual sources, but direct observation and common sense are also important. However direct testing is not needed. 5. Usually the patient's performance over the preceding 24-48 hours is important, but occasionally longer periods will be relevant. 6. Middle categories imply that the patient supplies over 50 per cent of the effort. 7. Use of aids to be independent is allowed. Score Interpretation (from [address] [facility])    Independent   60-79 Minimally independent   40-59 Partially dependent   20-39 Very dependent   <20 Totally dependent     -Zuleika Zuñiga., Barthel, D.W. (1965). Functional evaluation: the Barthel Index. [address] [facility] ([address] [facility] [address] (1997). The Barthel activities of daily living index: self-reporting versus actual performance in the old (> or = 75 years). Journal of [address] [place] [address] 45(7), [address] [facility], .[facility], Sanjuana Dawn., Kevyn Condon. (1999). Measuring the change in disability after inpatient rehabilitation; comparison of the responsiveness of the Barthel Index and Functional [place] Measure. Journal of Neurology, Neurosurgery, and Psychiatry, 66(4), 998-966. James Horta, N.J.A, Riccardo Sadler  WHALLIE.M, & Caroline Andersen M.A. (2004) Assessment of post-stroke quality of life in cost-effectiveness studies: The usefulness of the Barthel Index and the EuroQoL-5D.  Quality of Life Research, 15, 671-98      Occupational Therapy Evaluation Charge Determination   History Examination Decision-Making   LOW Complexity : Brief history review  MEDIUM Complexity : 3-5 performance deficits relating to physical, cognitive , or psychosocial skils that result in activity limitations and / or participation restrictions MEDIUM Complexity : Patient may present with comorbidities that affect occupational performnce. Miniml to moderate modification of tasks or assistance (eg, physical or verbal ) with assesment(s) is necessary to enable patient to complete evaluation       Based on the above components, the patient evaluation is determined to be of the following complexity level: LOW   Pain Rating:  Pt reported L rib pain with transfers    Activity Tolerance:   Fair    After treatment patient left in no apparent distress:    Supine in bed, Call bell within reach, Bed / chair alarm activated, and Side rails x 3    COMMUNICATION/EDUCATION:   The patients plan of care was discussed with: Physical therapist and Registered nurse. Patient is unable to participate in goal setting and plan of care. This patients plan of care is appropriate for delegation to [facility].     Thank you for this referral.  Meli Elizabeth OT  Time Calculation: 22 mins

## 2022-03-12 NOTE — PROGRESS NOTES
1930: Per out going RN, patient was straight cath in the ED before admission to the floor. Pt has not voided for 8 hours. Abdomen/ pelvis is distended. 0028: Bladder scan shows 999 ml. RN called Dr. Glendy Pappas  And order given to place brewster. 6755: Brewster catheter placed. 0315:  Output is 1250 ml

## 2022-03-13 PROCEDURE — 65270000029 HC RM PRIVATE

## 2022-03-13 PROCEDURE — 74011250636 HC RX REV CODE- 250/636: Performed by: INTERNAL MEDICINE

## 2022-03-13 PROCEDURE — 74011250637 HC RX REV CODE- 250/637: Performed by: INTERNAL MEDICINE

## 2022-03-13 PROCEDURE — 74011000250 HC RX REV CODE- 250: Performed by: INTERNAL MEDICINE

## 2022-03-13 PROCEDURE — 74011000258 HC RX REV CODE- 258: Performed by: INTERNAL MEDICINE

## 2022-03-13 RX ADMIN — LISINOPRIL 10 MG: 5 TABLET ORAL at 09:31

## 2022-03-13 RX ADMIN — ASPIRIN 81 MG: 81 TABLET, COATED ORAL at 09:30

## 2022-03-13 RX ADMIN — SODIUM CHLORIDE 75 ML/HR: 9 INJECTION, SOLUTION INTRAVENOUS at 11:50

## 2022-03-13 RX ADMIN — AMLODIPINE BESYLATE 10 MG: 5 TABLET ORAL at 09:30

## 2022-03-13 RX ADMIN — SODIUM CHLORIDE 50 MG: 900 INJECTION INTRAVENOUS at 04:12

## 2022-03-13 RX ADMIN — LEVOTHYROXINE SODIUM 75 MCG: 0.07 TABLET ORAL at 06:47

## 2022-03-13 RX ADMIN — ACETAMINOPHEN 650 MG: 325 TABLET ORAL at 03:00

## 2022-03-13 RX ADMIN — SODIUM CHLORIDE 50 MG: 900 INJECTION INTRAVENOUS at 16:00

## 2022-03-13 RX ADMIN — ENOXAPARIN SODIUM 40 MG: 100 INJECTION SUBCUTANEOUS at 09:31

## 2022-03-13 RX ADMIN — Medication 10 ML: at 13:54

## 2022-03-13 NOTE — PROGRESS NOTES
Bedside and Verbal shift change report given to Lucy Rodriguez RN (oncoming nurse) by Tyson Jacobo RN (offgoing nurse).  Report included the following information SBAR, Kardex, Intake/Output, MAR, Accordion, Recent Results and Med Rec Status. '

## 2022-03-13 NOTE — PROGRESS NOTES
0700 Shift report received from Jorge A, [address]. Pt with sitter at bedside. 0920 In to assess pt. Sitter at bedside. Pt denies pain. When asked to state name pt spelled first and middle name backwards. Pt able to state birthday. Pt asking about bathroom door and states there is someone in there. Showed pt that bathroom was empty and pt started talking as if someone was in bathroom. Pt reoriented. Pt took meds whole in applesauce without difficulty. Pt resting. Sitter instructed to call for assistance if needed. 1040 In to check on pt. Sitter at bedside. Pt states \"Same old, same old\" when asked how she is doing. Pt in bed watching TV with sitter present. 1140 Changed IV bags. Pt resting in bed. Sitter present at the bedside. 26 Pt son at bedside. 1240 In to check on pt. Pt resting in bed states \"i'm in water right now so my legs float\". Pt given sips of water. Asked son if he had any questions at this time and he does not. Instructed to notify nursing if he leaves. Bed alarm is engaged. 1350 In to check on pt. Pt in bed resting. Son is no longer at bedside. Pt states she hears knocking and there is a woman here to take her. Pt states she thinks she had BM. Checked and pt is clean. Bed alarm is engaged and pt is resting in bed with calming music playing on the tv.     1530 Pt resting in bed.     1600 Pt abx started. Pt sleeping intermittently. Will arouse and make a comment and then close eyes again. [address] at bedside. Asked pt if she would like to eat now. Pt states \"I told them to be quiet so I can sleep. \" Will attempt to assist pt with eating at next hourly round. 1730 Pt awake and PCT attempting to assist with feeding. 1830 Pt attempting to get out of bed without assistance. Pt states she is going to work and needs to get dressed. Attempted to reorient pt to room and situation. Pt followed commands. Bed alarm is engaged.  PCT back to room to sit with pt.     1935 Bedside shift change report given to Gretchen Baumgarten, RN (oncoming nurse) by Christina Ramos RN (offgoing nurse). Report included the following information SBAR, Kardex, Intake/Output and MAR.

## 2022-03-13 NOTE — PROGRESS NOTES
Problem: Pressure Injury - Risk of  Goal: *Prevention of pressure injury  Description: Document Maco Scale and appropriate interventions in the flowsheet. Outcome: Progressing Towards Goal  Note: Pressure Injury Interventions:  Sensory Interventions: Assess changes in LOC,Check visual cues for pain,Float heels,Keep linens dry and wrinkle-free,Maintain/enhance activity level,Minimize linen layers,Turn and reposition approx. every two hours (pillows and wedges if needed)    Moisture Interventions: Absorbent underpads,Internal/External urinary devices    Activity Interventions: Increase time out of bed,Pressure redistribution bed/mattress(bed type),PT/OT evaluation    Mobility Interventions: Float heels,HOB 30 degrees or less,Pressure redistribution bed/mattress (bed type),PT/OT evaluation,Turn and reposition approx. every two hours(pillow and wedges)    Nutrition Interventions: Document food/fluid/supplement intake,Offer support with meals,snacks and hydration    Friction and Shear Interventions: HOB 30 degrees or less,Lift team/patient mobility team,Minimize layers                Problem: Patient Education: Go to Patient Education Activity  Goal: Patient/Family Education  Outcome: Progressing Towards Goal     Problem: Falls - Risk of  Goal: *Absence of Falls  Description: Document Jina Fall Risk and appropriate interventions in the flowsheet.   Outcome: Progressing Towards Goal  Note: Fall Risk Interventions:  Mobility Interventions: Bed/chair exit alarm,OT consult for ADLs,Patient to call before getting OOB,PT Consult for mobility concerns,PT Consult for assist device competence,Utilize walker, cane, or other assistive device,Utilize gait belt for transfers/ambulation    Mentation Interventions: Bed/chair exit alarm,Door open when patient unattended,Adequate sleep, hydration, pain control,Family/sitter at bedside,Increase mobility,More frequent rounding,Room close to nurse's station    Medication Interventions: Bed/chair exit alarm,Patient to call before getting OOB,Teach patient to arise slowly,Utilize gait belt for transfers/ambulation    Elimination Interventions: Bed/chair exit alarm,Call light in reach,Patient to call for help with toileting needs,Stay With Me (per policy)    History of Falls Interventions: Bed/chair exit alarm,Door open when patient unattended,Room close to nurse's station         Problem: Patient Education: Go to Patient Education Activity  Goal: Patient/Family Education  Outcome: Progressing Towards Goal     Problem: Patient Education: Go to Patient Education Activity  Goal: Patient/Family Education  Outcome: Progressing Towards Goal     Problem: Patient Education: Go to Patient Education Activity  Goal: Patient/Family Education  Outcome: Progressing Towards Goal

## 2022-03-13 NOTE — PROGRESS NOTES
Problem: Pressure Injury - Risk of  Goal: *Prevention of pressure injury  Description: Document Maco Scale and appropriate interventions in the flowsheet. Outcome: Progressing Towards Goal  Note: Pressure Injury Interventions:  Sensory Interventions: Keep linens dry and wrinkle-free,Maintain/enhance activity level,Minimize linen layers,Monitor skin under medical devices,Pad between skin to skin,Pressure redistribution bed/mattress (bed type),Assess changes in LOC    Moisture Interventions: Absorbent underpads,Apply protective barrier, creams and emollients,Check for incontinence Q2 hours and as needed    Activity Interventions: Increase time out of bed,Pressure redistribution bed/mattress(bed type),PT/OT evaluation    Mobility Interventions: Pressure redistribution bed/mattress (bed type),HOB 30 degrees or less,PT/OT evaluation    Nutrition Interventions: Document food/fluid/supplement intake    Friction and Shear Interventions: Minimize layers,HOB 30 degrees or less,Apply protective barrier, creams and emollients         Problem: Falls - Risk of  Goal: *Absence of Falls  Description: Document Jina Fall Risk and appropriate interventions in the flowsheet.   Outcome: Progressing Towards Goal  Note: Fall Risk Interventions:  Mobility Interventions: Bed/chair exit alarm,Communicate number of staff needed for ambulation/transfer,Patient to call before getting OOB,PT Consult for mobility concerns,PT Consult for assist device competence,Utilize walker, cane, or other assistive device    Mentation Interventions: Bed/chair exit alarm,More frequent rounding,Reorient patient,Room close to nurse's station,Door open when patient unattended    Medication Interventions: Bed/chair exit alarm,Teach patient to arise slowly,Patient to call before getting OOB    Elimination Interventions: Bed/chair exit alarm,Call light in reach,Stay With Me (per policy)    History of Falls Interventions: Bed/chair exit alarm,Door open when patient unattended,Room close to nurse's station

## 2022-03-13 NOTE — PROGRESS NOTES
[facility] [address]  [address] [facility], [place], [address] [place] [address]  [phone]      Medical Progress Note      NAME:         Catherine Parker   :        1930  MRM:        [id number]    Date of service: 3/13/2022      Subjective: Patient has been seen and examined as a follow up for multiple medical issues. Chart, labs, diagnostics reviewed. Patient still confused and hallucinating but seems much less. Did not sleep last night. No fever or chills. Objective:    Vital Signs:    Visit Vitals  BP (!) 142/64 (BP 1 Location: Left upper arm, BP Patient Position: Lying)   Pulse 81   Temp 98.5 °F (36.9 °C)   Resp 16   Ht 4' 11\" (1.499 m)   Wt 52.8 kg (116 lb 6.4 oz)   SpO2 94%   Breastfeeding No   BMI 23.51 kg/m²          Intake/Output Summary (Last 24 hours) at 3/13/2022 0708  Last data filed at 3/13/2022 0412  Gross per 24 hour   Intake 1865 ml   Output 1800 ml   Net 65 ml        Physical Examination:    General:   Weak and remains ill looking, not in any acute distress   Eyes:   pink conjunctivae, PERRLA with no discharge. ENT:   no ottorrhea or rhinorrhea with dry mucous membranes  Neck: no masses, thyroid non-tender and trachea central.  Pulm:  no accessory muscle use, clear breath sounds without crackles or wheezes  Card:  has regular and normal S1, S2 without thrills, bruits or peripheral edema  Abd:  Soft, non-tender, non-distended, normoactive bowel sounds with no palpable organomegaly  Musc:  No cyanosis, clubbing, atrophy or deformities. Skin:  No rashes, bruising or ulcers. Clean wound left elbow  Neuro: Awake and alert.  Confused but with a non focal exam.    Psych:  Has no insight to her illness     Current Facility-Administered Medications   Medication Dose Route Frequency    sodium chloride (NS) flush 5-10 mL  5-10 mL IntraVENous PRN    sodium chloride (NS) flush 5-40 mL  5-40 mL IntraVENous Q8H    sodium chloride (NS) flush 5-40 mL  5-40 mL IntraVENous PRN    acetaminophen (TYLENOL) tablet 650 mg  650 mg Oral Q6H PRN    Or    acetaminophen (TYLENOL) suppository 650 mg  650 mg Rectal Q6H PRN    0.9% sodium chloride infusion  75 mL/hr IntraVENous CONTINUOUS    ondansetron (ZOFRAN) injection 4 mg  4 mg IntraVENous Q6H PRN    enoxaparin (LOVENOX) injection 40 mg  40 mg SubCUTAneous DAILY    TIGEcycline (TYGACIL) 50 mg in 0.9% sodium chloride (MBP/ADV) 100 mL MBP  50 mg IntraVENous Q12H    amLODIPine (NORVASC) tablet 10 mg  10 mg Oral DAILY    lisinopriL (PRINIVIL, ZESTRIL) tablet 10 mg  10 mg Oral DAILY    aspirin delayed-release tablet 81 mg  81 mg Oral DAILY    levothyroxine (SYNTHROID) tablet 75 mcg  75 mcg Oral ACB        Laboratory data and review:    Recent Labs     03/11/22  1309   WBC 10.9   HGB 10.0*   HCT 31.2*        Recent Labs     03/11/22  1309      K 3.7      CO2 29   *   BUN 16   CREA 0.72   CA 8.8   ALB 2.8*   ALT 18     No components found for: [place]    Diagnostics: Imaging studies have been reviewed    Telemetry reviewed by me:   normal sinus rhythm    Assessment and Plan:    Septic arthritis of elbow, left ([address] (3/11/2022) / Acute osteomyelitis of left radius ([facility] (3/11/2022) POA: recently discharged from hospital on 3/4 under a different MRN [id number]. At that time, MRI concerning for infection and aspiration revealed purulent material however cell count relatively benign. Taken to the OR 2/28 for I&D and bacterial and fungal cultures neg. AFB cultures pending. Continue IV TIGEcycline. Given her allergies and contraindications for multiple antibiotics, ID is following and will guide on discharge regimen. CM for discharge planning    Toxic encephalopathy (3/11/2022) POA: presumed to be due to Ertapenem which has been discontinued. Still persists but seems less. CT scan head neg for acute changes. Monitor clinical progress for now.  Will consider an MRi brain of this persists or worsens    Rheumatoid arthritis ([facility] (3/11/2022) / Physical debility / Closed fracture of two ribs of left side (3/11/2022) POA: CT chest showed acute mildly displaced fractures of the left posterior sixth and seventh ribs. Pain control, incentive spirometry, PT, OT. Will need SNF    DM type 2 (diabetes mellitus, type 2) ([facility] [address] (3/11/2022) POA: BG stable. No listed medications    HTN (hypertension), benign (3/11/2022) POA: BP variable. Continue Amlodipine, Lisinopril    Hypothyroidism (3/11/2022) POA: continue Levothyroxine    Urinary retention (3/11/2022) POA: resolved.  Not recurred    Total time spent for the patient's care: [facility] discussed with: Patient, Care Manager and Nursing Staff    Discussed:  Care Plan and D/C Planning    Prophylaxis:  Lovenox    Anticipated Disposition:  SNF/LTC           ___________________________________________________    Attending Physician:   Trish Cuellar MD

## 2022-03-14 ENCOUNTER — APPOINTMENT (OUTPATIENT)
Dept: MRI IMAGING | Age: 87
DRG: 092 | End: 2022-03-14
Attending: INTERNAL MEDICINE
Payer: MEDICARE

## 2022-03-14 PROCEDURE — 97530 THERAPEUTIC ACTIVITIES: CPT

## 2022-03-14 PROCEDURE — 70551 MRI BRAIN STEM W/O DYE: CPT

## 2022-03-14 PROCEDURE — 74011250636 HC RX REV CODE- 250/636: Performed by: INTERNAL MEDICINE

## 2022-03-14 PROCEDURE — 74011250637 HC RX REV CODE- 250/637: Performed by: INTERNAL MEDICINE

## 2022-03-14 PROCEDURE — 97116 GAIT TRAINING THERAPY: CPT

## 2022-03-14 PROCEDURE — 74011000258 HC RX REV CODE- 258: Performed by: INTERNAL MEDICINE

## 2022-03-14 PROCEDURE — 99232 SBSQ HOSP IP/OBS MODERATE 35: CPT | Performed by: INTERNAL MEDICINE

## 2022-03-14 PROCEDURE — 65270000029 HC RM PRIVATE

## 2022-03-14 RX ADMIN — ASPIRIN 81 MG: 81 TABLET, COATED ORAL at 08:34

## 2022-03-14 RX ADMIN — SODIUM CHLORIDE 75 ML/HR: 9 INJECTION, SOLUTION INTRAVENOUS at 04:25

## 2022-03-14 RX ADMIN — SODIUM CHLORIDE 50 MG: 900 INJECTION INTRAVENOUS at 16:56

## 2022-03-14 RX ADMIN — LISINOPRIL 10 MG: 5 TABLET ORAL at 08:34

## 2022-03-14 RX ADMIN — ENOXAPARIN SODIUM 40 MG: 100 INJECTION SUBCUTANEOUS at 08:34

## 2022-03-14 RX ADMIN — AMLODIPINE BESYLATE 10 MG: 5 TABLET ORAL at 08:34

## 2022-03-14 RX ADMIN — LEVOTHYROXINE SODIUM 75 MCG: 0.07 TABLET ORAL at 06:32

## 2022-03-14 RX ADMIN — SODIUM CHLORIDE 50 MG: 900 INJECTION INTRAVENOUS at 04:26

## 2022-03-14 NOTE — PROGRESS NOTES
Problem: Pressure Injury - Risk of  Goal: *Prevention of pressure injury  Description: Document Maco Scale and appropriate interventions in the flowsheet. Outcome: Progressing Towards Goal  Note: Pressure Injury Interventions:  Sensory Interventions: Assess changes in LOC,Float heels,Keep linens dry and wrinkle-free,Maintain/enhance activity level,Pressure redistribution bed/mattress (bed type),Turn and reposition approx. every two hours (pillows and wedges if needed)    Moisture Interventions: Absorbent underpads,Internal/External urinary devices    Activity Interventions: Increase time out of bed,Pressure redistribution bed/mattress(bed type),PT/OT evaluation    Mobility Interventions: Float heels,HOB 30 degrees or less,Pressure redistribution bed/mattress (bed type),Turn and reposition approx. every two hours(pillow and wedges)    Nutrition Interventions: Document food/fluid/supplement intake    Friction and Shear Interventions: HOB 30 degrees or less,Lift team/patient mobility team,Minimize layers,Lift sheet         Problem: Falls - Risk of  Goal: *Absence of Falls  Description: Document Jina Fall Risk and appropriate interventions in the flowsheet.   Outcome: Progressing Towards Goal  Note: Fall Risk Interventions:  Mobility Interventions: Bed/chair exit alarm,Patient to call before getting OOB,PT Consult for mobility concerns,PT Consult for assist device competence,Utilize walker, cane, or other assistive device    Mentation Interventions: Bed/chair exit alarm,Door open when patient unattended,More frequent rounding,Reorient patient,Room close to nurse's station    Medication Interventions: Bed/chair exit alarm,Patient to call before getting OOB,Teach patient to arise slowly    Elimination Interventions: Call light in reach,Bed/chair exit alarm,Patient to call for help with toileting needs    History of Falls Interventions: Bed/chair exit alarm,Door open when patient unattended,Room close to nurse's station,Investigate reason for fall

## 2022-03-14 NOTE — PROGRESS NOTES
[facility] Progress Note           Aneesh Wang DO    [phone] Office  [phone]  Fax    3/14/2022      Assessment & Plan:   · AMS. Initially thought to be neurotoxicity related to ertapenem however there has been little improvement despite being off this antibiotic for several days. AMS may be due to sundowning and being in an unfamiliar environment at this point. Continue to monitor off of ertapenem. 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 Unfortunately  antibiotic options limited. Will need to avoid carbapenem antibiotics with AMS on ertapenem. She is allergic to amoxicillin so cannot use piperacillin-tazobactam.  The patient was treated with ceftriaxone in the past with recurrence of infection so would like to avoid cephalosporins. Cannot use quinolone antibiotics with history of abdominal aortic aneurysm. Will consider combination of daptomycin and aztreonam however as aztreonam has been rarely associated with encephalopathy would prefer to hold off on this antibiotic until patient's mental status is back to baseline. Continue tigecycline. Fungal and AFB cultures pending. Needs antibiotic therapy through 2022  · Rheumatoid arthritis. Was on methotrexate in the past  · Abdominal aortic aneurysm. Avoid quinolone antibiotics  · Amoxicillin allergy.   This caused a rash in the past.  Able to tolerate cephalosporins  · Diabetes mellitus    Patient has another medical record number from previous admission which is [id number]          Subjective:     Remains confused    Objective:     Vitals:   Visit Vitals  /62 (BP 1 Location: Left lower arm, BP Patient Position: At rest)   Pulse 81   Temp 98 °F (36.7 °C)   Resp 14   Ht 4' 11\" (1.499 m)   Wt 113 lb 12.1 oz (51.6 kg)   SpO2 98%   Breastfeeding No   BMI 22.98 kg/m²        Tmax:  Temp (24hrs), Av.4 °F (36.9 °C), Min:98 °F (36.7 °C), Max:98.9 °F (37.2 °C)      Exam:   Patient is intubated:  no    Physical Examination:   General:   Confused. Does not follow commands   Head:  Normocephalic, atraumatic. Eyes:  Conjunctivae clear   Neck: Supple       Lungs:   No distress. Chest wall:     Heart:     Abdomen:   Soft, non-tender, non-distended   Extremities: Moves all. Skin:  No rash   Neurologic:  Unable to assess     Labs:        No lab exists for component: ITNL   Recent Labs     03/12/22  0336   CPK 57     Recent Labs     03/11/22  1309      K 3.7      CO2 29   BUN 16   CREA 0.72   *   ALB 2.8*   WBC 10.9   HGB 10.0*   HCT 31.2*        No results for input(s): INR, PTP, APTT, INREXT in the last 72 hours.   Needs: urine analysis, urine sodium, protein and creatinine  No results found for: MALCOM, CREAU      Cultures:     No results found for: SDES  Lab Results   Component Value Date/Time    Culture result: NO GROWTH 3 DAYS 03/11/2022 02:10 PM    Culture result: NO GROWTH 3 DAYS 03/11/2022 02:07 PM       Radiology:     Medications       Current Facility-Administered Medications   Medication Dose Route Frequency Last Admin    sodium chloride (NS) flush 5-10 mL  5-10 mL IntraVENous PRN      sodium chloride (NS) flush 5-40 mL  5-40 mL IntraVENous Q8H 10 mL at 03/13/22 1354    sodium chloride (NS) flush 5-40 mL  5-40 mL IntraVENous PRN      acetaminophen (TYLENOL) tablet 650 mg  650 mg Oral Q6H  mg at 03/13/22 0300    Or    acetaminophen (TYLENOL) suppository 650 mg  650 mg Rectal Q6H PRN      0.9% sodium chloride infusion  75 mL/hr IntraVENous CONTINUOUS 75 mL/hr at 03/14/22 0425    ondansetron (ZOFRAN) injection 4 mg  4 mg IntraVENous Q6H PRN      enoxaparin (LOVENOX) injection 40 mg  40 mg SubCUTAneous DAILY 40 mg at 03/14/22 0834    TIGEcycline (TYGACIL) 50 mg in 0.9% sodium chloride (MBP/ADV) 100 mL MBP  50 mg IntraVENous Q12H 50 mg at 03/14/22 0426    amLODIPine (NORVASC) tablet 10 mg  10 mg Oral DAILY 10 mg at 03/14/22 0834    lisinopriL (PRINIVIL, ZESTRIL) tablet 10 mg  10 mg Oral DAILY 10 mg at 03/14/22 0834    aspirin delayed-release tablet 81 mg  81 mg Oral DAILY 81 mg at 03/14/22 0834    levothyroxine (SYNTHROID) tablet 75 mcg  75 mcg Oral ACB 75 mcg at 03/14/22 0790           Case discussed with:      Matias Hart DO

## 2022-03-14 NOTE — PROGRESS NOTES
Problem: Self Care Deficits Care Plan (Adult)  Goal: *Acute Goals and Plan of Care (Insert Text)  Description: FUNCTIONAL STATUS PRIOR TO ADMISSION: Patient was modified independent using a rollator for functional mobility. Has history of multiple falls. HOME SUPPORT: The patient lived alone in a senior living apartment. Son provides assistance but pt is alone at night. Occupational Therapy Goals  Initiated 3/12/2022  1. Patient will perform grooming with supervision/set-up in sitting within 7 day(s). 2.  Patient will perform upper body dressing with supervision/set-up within 7 day(s). 3.  Patient will perform lower body dressing with minimal assistance within 7 day(s). 4.  Patient will perform toilet transfers with minimal assistance/contact guard assist within 7 day(s). 5.  Patient will perform all aspects of toileting with minimal assistance/contact guard assist within 7 day(s). 6.  Patient will participate in upper extremity therapeutic exercise/activities with supervision/set-up for 5 minutes within 7 day(s). Outcome: Progressing Towards Goal   OCCUPATIONAL THERAPY TREATMENT  Patient: Woodrow Moe (13 y.o. female)  Date: 3/14/2022  Diagnosis: Toxic encephalopathy [G92.9] Toxic encephalopathy       Precautions: Fall  Chart, occupational therapy assessment, plan of care, and goals were reviewed. ASSESSMENT  Patient continues with skilled OT services and is progressing towards goals. Patient received in bed. Patient following commands but noted [place] and requires slow loud speech. Patient to EOB with moderate assistance. Once seated, patient initially using bed rail for support but able to maintain sitting balance without support. Sit to stand with min assist x 2. Patient able to ambulate within room with verbal cues and RW use, direction, safety and mod assist x 1 secondary to posterior lean. Patient returned to bed, and with increased posterior lean.   Patient safely returned to supine and left in NAD. Current Level of Function Impacting Discharge (ADLs): up to mod assist for mobility    Other factors to consider for discharge:          PLAN :  Patient continues to benefit from skilled intervention to address the above impairments. Continue treatment per established plan of care to address goals. Recommend with staff: MEMO Meehan as able    Recommend next OT session: continue goals     Recommendation for discharge: (in order for the patient to meet his/her long term goals)  Therapy up to 5 days/week in SNF setting    This discharge recommendation:  Has been made in collaboration with the attending provider and/or case management    IF patient discharges home will need the following DME: TBD       SUBJECTIVE:   Patient stated This way.     OBJECTIVE DATA SUMMARY:   Cognitive/Behavioral Status:  Neurologic State: Alert;Confused  Orientation Level: Oriented to person;Disoriented to place; Disoriented to situation;Disoriented to time  Cognition: Follows commands; Impaired decision making;Poor safety awareness             Functional Mobility and Transfers for ADLs:  Bed Mobility:  Rolling: Moderate assistance;Assist x1;Additional time  Supine to Sit: Moderate assistance;Assist x1;Additional time    Transfers:  Sit to Stand: Minimum assistance;Assist x2; Additional time     Bed to Chair: Moderate assistance;Assist x1;Additional time (due to posterior lean)    Balance:       ADL Intervention:                                          Therapeutic Exercises:       Pain:  None reported     Activity Tolerance:   Good    After treatment patient left in no apparent distress:   Supine in bed, Call bell within reach, Bed / chair alarm activated, Caregiver / family present, and Side rails x 3    COMMUNICATION/COLLABORATION:   The patients plan of care was discussed with: Physical therapist and Registered nurse.      Yuliya Yo OTR/L  Time Calculation: 13 mins

## 2022-03-14 NOTE — PROGRESS NOTES
1  MD notified - \"Pt could probably benefit from a speech consult - she's having increased coughing with liquid intake. \" No new orders received. Will continue to monitor. 1900  Bedside and Verbal shift change report given to Cameron Barber RN (oncoming nurse) by Pablo Kelley RN (offgoing nurse). Report included the following information SBAR, Kardex, Intake/Output, MAR and Recent Results.

## 2022-03-14 NOTE — PROGRESS NOTES
Bedside and Verbal shift change report given to [facility] (oncoming nurse) by Sherita Diane RN (offgoing nurse). Report included the following information SBAR, Kardex, Intake/Output, MAR, Accordion, Recent Results and Med Rec Status.

## 2022-03-14 NOTE — PROGRESS NOTES
3/14/2022  Case Management Progress Note    2:54 PM  Spoke with Nguyen @ [facility], they want to re-evaluate when final recommendations are set from ID since drugs may be expensive. She asked to regroup in the morning. I let her know they are preference. CB Hall    11:11 AM  Patient is 80year old female admitted 3/11 with toxic encephalopathy  Patient's RUR is 13% green/low risk for readmission  Covid test: none this admission  Chart reviewed--patient discussed at IDR rounds this morning  Per rounds patient is still significantly confused. ID to see today for recommendations, neuro also consulted for confusion. Patient's discharge plan is SNF, referrals were sent over the weekend to the [address] of [facility] (preference) and [address]. Updated family. Will continue to follow and update. Transition of Care Plan   1. Continue medical management/treatment  2. SNF referrals pending  3. Transportation TBD  4.  CM will continue to follow    CB Hall

## 2022-03-14 NOTE — PROGRESS NOTES
Problem: Mobility Impaired (Adult and Pediatric)  Goal: *Acute Goals and Plan of Care (Insert Text)  Description: FUNCTIONAL STATUS PRIOR TO ADMISSION: Patient was modified independent using a rollator for functional mobility. HOME SUPPORT PRIOR TO ADMISSION: The patient lived alone in senior apartment; son to provide assistance with some ADL's but patient is alone at night. History of falls. Physical Therapy Goals  Initiated 3/12/2022  1. Patient will move from supine to sit and sit to supine  in bed with supervision/set-up within 7 day(s). 2.  Patient will transfer from bed to chair and chair to bed with minimal assistance/contact guard assist using the least restrictive device within 7 day(s). 3.  Patient will perform sit to stand with minimal assistance/contact guard assist within 7 day(s). 4.  Patient will ambulate with minimal assistance/contact guard assist for 50 feet with the least restrictive device within 7 day(s). Outcome: Progressing Towards Goal  Note:   PHYSICAL THERAPY TREATMENT  Patient: Hernandez Tirado (86 y.o. female)  Date: 3/14/2022  Diagnosis: Toxic encephalopathy [G92.9] Toxic encephalopathy       Precautions: Fall  Chart, physical therapy assessment, plan of care and goals were reviewed. ASSESSMENT  Patient continues with skilled PT services and is progressing towards goals. Patient requires repeating due to hard of hearing and cognitive deficits, but able to follow all commands. Patient required MOD A for bed mobility, sit-stand with MIN A x2 but then able to complete ambulation and returned to bed with 1 assist.  Patient with continued posterior loss of balance, most pronounced with turning sequencing.      Current Level of Function Impacting Discharge (mobility/balance): MOD A x2 for bed mobility, MIN A x2 for initial sit-stand, all others between MIN A x1-MOD A x1 due to posterior loss of balance    Other factors to consider for discharge:          PLAN :  Patient continues to benefit from skilled intervention to address the above impairments. Continue treatment per established plan of care. to address goals. Recommendation for discharge: (in order for the patient to meet his/her long term goals)  Therapy up to 5 days/week in SNF setting    This discharge recommendation:  Has been made in collaboration with the attending provider and/or case management    IF patient discharges home will need the following DME: to be determined (TBD)       SUBJECTIVE:   Patient stated .    OBJECTIVE DATA SUMMARY:   Critical Behavior:  Neurologic State: Alert,Confused  Orientation Level: Oriented to person,Disoriented to place,Disoriented to situation,Disoriented to time  Cognition: Follows commands,Impaired decision making,Poor safety awareness  Safety/Judgement: Lack of insight into deficits  Functional Mobility Training:  Bed Mobility:  Rolling: Moderate assistance;Assist x1;Additional time  Supine to Sit: Moderate assistance;Assist x1;Additional time              Transfers:  Sit to Stand: Minimum assistance;Assist x2; Additional time  Stand to Sit: Minimum assistance;Assist x1;Additional time; Moderate assistance        Bed to Chair: Moderate assistance;Assist x1;Additional time (due to posterior lean)                    Balance:     Ambulation/Gait Training:  Distance (ft): 40 Feet (ft)  Assistive Device: Walker, rolling;Gait belt  Ambulation - Level of Assistance: Minimal assistance; Moderate assistance;Assist x1;Additional time        Gait Abnormalities: Decreased step clearance; Path deviations (posterior loss of balance, more pronounced with turning)                          Pain Rating:  Upon sitting describes discomfort where she has premorbid fractured ribs, but able to participate with all activites without any other indication on pain    Activity Tolerance:   Good    After treatment patient left in no apparent distress:   Supine in bed, Call bell within reach, Bed / chair alarm activated, and sitter present    COMMUNICATION/COLLABORATION:   The patients plan of care was discussed with: Registered nurse.  Occupational Therapy    Candelario Siegel PT, DPT   Time Calculation: 15 mins

## 2022-03-14 NOTE — PROGRESS NOTES
[facility] [place] [facility]  [address] [facility], [address] [place] [address]  [phone]      Medical Progress Note      NAME:         Bibi Cota   :        1930  MRM:        [id number]    Date of service: 3/14/2022      Subjective: Patient has been seen and examined as a follow up for multiple medical issues. Chart, labs, diagnostics reviewed. Patient still confused and hallucinating. No fever or chills. Discussed with son      Objective:    Vital Signs:    Visit Vitals  /62 (BP 1 Location: Left lower arm, BP Patient Position: At rest)   Pulse 81   Temp 98 °F (36.7 °C)   Resp 14   Ht 4' 11\" (1.499 m)   Wt 51.6 kg (113 lb 12.1 oz)   SpO2 98%   Breastfeeding No   BMI 22.98 kg/m²          Intake/Output Summary (Last 24 hours) at 3/14/2022 1350  Last data filed at 3/14/2022 1302  Gross per 24 hour   Intake 395 ml   Output 3000 ml   Net -2605 ml        Physical Examination:    General:   Weak and remains ill looking, not in any acute distress   Eyes:   pink conjunctivae, PERRLA with no discharge. ENT:   no ottorrhea or rhinorrhea with dry mucous membranes  Pulm:  clear breath sounds without crackles or wheezes  Card:  has regular and normal S1, S2 without thrills, bruits or peripheral edema  Abd:  non-tender, non-distended, normoactive bowel sounds   Musc:  No cyanosis, clubbing, atrophy or deformities. Skin:  No rashes, bruising or ulcers. Clean wound left elbow  Neuro: Awake and alert.  Confused but with a non focal exam.    Psych:  Has no insight to her illness     Current Facility-Administered Medications   Medication Dose Route Frequency    sodium chloride (NS) flush 5-10 mL  5-10 mL IntraVENous PRN    sodium chloride (NS) flush 5-40 mL  5-40 mL IntraVENous Q8H    sodium chloride (NS) flush 5-40 mL  5-40 mL IntraVENous PRN    acetaminophen (TYLENOL) tablet 650 mg  650 mg Oral Q6H PRN    Or    acetaminophen (TYLENOL) suppository 650 mg  650 mg Rectal Q6H PRN    0.9% sodium chloride infusion  75 mL/hr IntraVENous CONTINUOUS    ondansetron (ZOFRAN) injection 4 mg  4 mg IntraVENous Q6H PRN    enoxaparin (LOVENOX) injection 40 mg  40 mg SubCUTAneous DAILY    TIGEcycline (TYGACIL) 50 mg in 0.9% sodium chloride (MBP/ADV) 100 mL MBP  50 mg IntraVENous Q12H    amLODIPine (NORVASC) tablet 10 mg  10 mg Oral DAILY    lisinopriL (PRINIVIL, ZESTRIL) tablet 10 mg  10 mg Oral DAILY    aspirin delayed-release tablet 81 mg  81 mg Oral DAILY    levothyroxine (SYNTHROID) tablet 75 mcg  75 mcg Oral ACB        Laboratory data and review:    Recent Labs     03/11/22  1309   WBC 10.9   HGB 10.0*   HCT 31.2*        Recent Labs     03/11/22  1309      K 3.7      CO2 29   *   BUN 16   CREA 0.72   CA 8.8   ALB 2.8*   ALT 18     No components found for: [place]    Diagnostics: Imaging studies have been reviewed    Telemetry reviewed by me:   normal sinus rhythm    Assessment and Plan:    Toxic encephalopathy (3/11/2022) POA: was presumed to be due to Ertapenem which was discontinued. Still persists. CT scan head neg for acute changes. Will get an MRi brain, vit B12, TSH and ammonia. Consult neurology. Septic arthritis of elbow, left ([facility] [address] (3/11/2022) / Acute osteomyelitis of left radius [facility]) (3/11/2022) POA: recently discharged from hospital on 3/4 under a different MRN [id number]. At that time, MRI concerning for infection and aspiration revealed purulent material however cell count relatively benign. Taken to the OR 2/28 for I&D and bacterial and fungal cultures neg. AFB cultures pending. Continue IV TIGEcycline. Given her allergies and contraindications for multiple antibiotics, ID is following and will guide on the final antibiotic regimen.      Rheumatoid arthritis ([facility] [address] (3/11/2022) / Physical debility / Closed fracture of two ribs of left side (3/11/2022) POA: CT chest showed acute mildly displaced fractures of the left posterior sixth and seventh ribs. Pain control, incentive spirometry, PT, OT. Will need SNF    DM type 2 (diabetes mellitus, type 2) ([address] (3/11/2022) POA: BG stable. No listed medications    HTN (hypertension), benign (3/11/2022) POA: BP variable. Continue Amlodipine, Lisinopril    Hypothyroidism (3/11/2022) POA: continue Levothyroxine    Urinary retention (3/11/2022) POA: resolved.  Not recurred    Total time spent for the patient's care: [facility] discussed with: Patient, Care Manager and Nursing Staff    Discussed:  Care Plan and D/C Planning    Prophylaxis:  Lovenox    Anticipated Disposition:  SNF/LTC           ___________________________________________________    Attending Physician:   Jyothi Jacobo MD

## 2022-03-15 LAB
AMMONIA PLAS-SCNC: 33 UMOL/L
COMMENT, HOLDF: NORMAL
CRP SERPL-MCNC: 7.82 MG/DL (ref 0–0.6)
SAMPLES BEING HELD,HOLD: NORMAL
TSH SERPL DL<=0.05 MIU/L-ACNC: 1.25 UIU/ML (ref 0.36–3.74)
VIT B12 SERPL-MCNC: 564 PG/ML (ref 193–986)

## 2022-03-15 PROCEDURE — 86140 C-REACTIVE PROTEIN: CPT

## 2022-03-15 PROCEDURE — 74011250637 HC RX REV CODE- 250/637: Performed by: INTERNAL MEDICINE

## 2022-03-15 PROCEDURE — 65270000029 HC RM PRIVATE

## 2022-03-15 PROCEDURE — 2709999900 HC NON-CHARGEABLE SUPPLY

## 2022-03-15 PROCEDURE — 84443 ASSAY THYROID STIM HORMONE: CPT

## 2022-03-15 PROCEDURE — 82140 ASSAY OF AMMONIA: CPT

## 2022-03-15 PROCEDURE — 36415 COLL VENOUS BLD VENIPUNCTURE: CPT

## 2022-03-15 PROCEDURE — 97535 SELF CARE MNGMENT TRAINING: CPT

## 2022-03-15 PROCEDURE — 99223 1ST HOSP IP/OBS HIGH 75: CPT | Performed by: PSYCHIATRY & NEUROLOGY

## 2022-03-15 PROCEDURE — 74011000250 HC RX REV CODE- 250: Performed by: INTERNAL MEDICINE

## 2022-03-15 PROCEDURE — 97530 THERAPEUTIC ACTIVITIES: CPT

## 2022-03-15 PROCEDURE — 82607 VITAMIN B-12: CPT

## 2022-03-15 PROCEDURE — 99233 SBSQ HOSP IP/OBS HIGH 50: CPT | Performed by: INTERNAL MEDICINE

## 2022-03-15 PROCEDURE — 74011250636 HC RX REV CODE- 250/636: Performed by: NURSE PRACTITIONER

## 2022-03-15 PROCEDURE — 97116 GAIT TRAINING THERAPY: CPT

## 2022-03-15 PROCEDURE — 74011250636 HC RX REV CODE- 250/636: Performed by: INTERNAL MEDICINE

## 2022-03-15 PROCEDURE — 74011000258 HC RX REV CODE- 258: Performed by: INTERNAL MEDICINE

## 2022-03-15 PROCEDURE — 95816 EEG AWAKE AND DROWSY: CPT | Performed by: PSYCHIATRY & NEUROLOGY

## 2022-03-15 PROCEDURE — 95714 VEEG EA 12-26 HR UNMNTR: CPT | Performed by: NURSE PRACTITIONER

## 2022-03-15 RX ORDER — CYANOCOBALAMIN 1000 UG/ML
1000 INJECTION, SOLUTION INTRAMUSCULAR; SUBCUTANEOUS DAILY
Status: COMPLETED | OUTPATIENT
Start: 2022-03-15 | End: 2022-03-17

## 2022-03-15 RX ADMIN — LEVOTHYROXINE SODIUM 75 MCG: 0.07 TABLET ORAL at 08:08

## 2022-03-15 RX ADMIN — SODIUM CHLORIDE 75 ML/HR: 9 INJECTION, SOLUTION INTRAVENOUS at 00:20

## 2022-03-15 RX ADMIN — SODIUM CHLORIDE 75 ML/HR: 9 INJECTION, SOLUTION INTRAVENOUS at 12:00

## 2022-03-15 RX ADMIN — LISINOPRIL 10 MG: 5 TABLET ORAL at 08:08

## 2022-03-15 RX ADMIN — ENOXAPARIN SODIUM 40 MG: 100 INJECTION SUBCUTANEOUS at 08:07

## 2022-03-15 RX ADMIN — SODIUM CHLORIDE 50 MG: 900 INJECTION INTRAVENOUS at 04:24

## 2022-03-15 RX ADMIN — AMLODIPINE BESYLATE 10 MG: 5 TABLET ORAL at 08:08

## 2022-03-15 RX ADMIN — ASPIRIN 81 MG: 81 TABLET, COATED ORAL at 08:07

## 2022-03-15 RX ADMIN — AZTREONAM 2 G: 2 INJECTION, POWDER, LYOPHILIZED, FOR SOLUTION INTRAMUSCULAR; INTRAVENOUS at 12:01

## 2022-03-15 RX ADMIN — CYANOCOBALAMIN 1000 MCG: 1000 INJECTION, SOLUTION INTRAMUSCULAR; SUBCUTANEOUS at 10:00

## 2022-03-15 RX ADMIN — Medication 10 ML: at 13:36

## 2022-03-15 RX ADMIN — Medication 10 ML: at 20:31

## 2022-03-15 RX ADMIN — AZTREONAM 2 G: 2 INJECTION, POWDER, LYOPHILIZED, FOR SOLUTION INTRAMUSCULAR; INTRAVENOUS at 20:31

## 2022-03-15 RX ADMIN — DAPTOMYCIN 350 MG: 500 INJECTION, POWDER, LYOPHILIZED, FOR SOLUTION INTRAVENOUS at 13:36

## 2022-03-15 NOTE — PROGRESS NOTES
Problem: Self Care Deficits Care Plan (Adult)  Goal: *Acute Goals and Plan of Care (Insert Text)  Description: FUNCTIONAL STATUS PRIOR TO ADMISSION: Patient was modified independent using a rollator for functional mobility. Has history of multiple falls. HOME SUPPORT: The patient lived alone in a senior living apartment. Son provides assistance but pt is alone at night. Occupational Therapy Goals  Initiated 3/12/2022  1. Patient will perform grooming with supervision/set-up in sitting within 7 day(s). 2.  Patient will perform upper body dressing with supervision/set-up within 7 day(s). 3.  Patient will perform lower body dressing with minimal assistance within 7 day(s). 4.  Patient will perform toilet transfers with minimal assistance/contact guard assist within 7 day(s). 5.  Patient will perform all aspects of toileting with minimal assistance/contact guard assist within 7 day(s). 6.  Patient will participate in upper extremity therapeutic exercise/activities with supervision/set-up for 5 minutes within 7 day(s). Outcome: Progressing Towards Goal   OCCUPATIONAL THERAPY TREATMENT  Patient: Abbie Castillo (85 y.o. female)  Date: 3/15/2022  Diagnosis: Toxic encephalopathy [G92.9] Toxic encephalopathy       Precautions: Fall  Chart, occupational therapy assessment, plan of care, and goals were reviewed. ASSESSMENT  Patient continues with skilled OT services and is progressing towards goals. Pt recently assisted with bathing and dressing UB with staff. She transfers with contact guard to sit in chair and set-up for grooming. Current Level of Function Impacting Discharge (ADLs): contact guard ADL related transfers, set-up grooming in chair    Other factors to consider for discharge:          PLAN :  Patient continues to benefit from skilled intervention to address the above impairments. Continue treatment per established plan of care to address goals.     Recommend with staff: out of bed for ADl's, there ex, there act, meals    Recommend next OT session: cont towards goals    Recommendation for discharge: (in order for the patient to meet his/her long term goals)  Therapy up to 5 days/week in SNF setting    This discharge recommendation:  Has not yet been discussed the attending provider and/or case management    IF patient discharges home will need the following DME:        SUBJECTIVE:   Patient stated I can sit in the chair.     OBJECTIVE DATA SUMMARY:   Cognitive/Behavioral Status:  Neurologic State: Alert  Orientation Level: Oriented X4  Cognition: Appropriate for age attention/concentration; Follows commands             Functional Mobility and Transfers for ADLs:  Bed Mobility:  Supine to Sit: Minimum assistance;Assist x2    Transfers:  Sit to Stand: Minimum assistance          Balance:  Sitting: Intact  Standing: Intact  Standing - Static: Fair    ADL Intervention:       Grooming  Grooming Assistance: Set-up  Position Performed: Seated in chair  Washing Face: Set-up  Brushing Teeth: Set-up              Activity Tolerance:   Fair    After treatment patient left in no apparent distress:   Sitting in chair    COMMUNICATION/COLLABORATION:   The patients plan of care was discussed with: Physical therapy assistant, Occupational therapist, and Registered nurse.      NIA Maher/L  Time Calculation: 15 mins

## 2022-03-15 NOTE — PROGRESS NOTES
0800  Pt awake and alert - oriented only to self. Unable to state where she is and says the year is 2030.     1130  Pt much more like baseline - A+O x 4. Reminiscing about previous surgeries and life events - being very interactive and chatty. 1900  Bedside and Verbal shift change report given to CORI Harry (oncoming nurse) by Yessenia Michael RN (offgoing nurse). Report included the following information SBAR, Kardex, Intake/Output, MAR and Recent Results.

## 2022-03-15 NOTE — PROGRESS NOTES
[address] [place] [address] [facility] Progress Note           Quita Yung DO    [phone] Office  [phone]  Fax    3/15/2022      Assessment & Plan:   · AMS. Significantly improved today. Suspect  neurotoxicity related to ertapenem. Ertapenem has been stopped. Neurology following . 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 Limited antibiotic options. .  Will need to avoid carbapenem antibiotics with AMS on ertapenem. She is allergic to amoxicillin so cannot use piperacillin-tazobactam.  The patient was treated with ceftriaxone in the past with recurrence of infection so would like to avoid cephalosporins. Cannot use quinolone antibiotics with history of abdominal aortic aneurysm. Will attempt trial of daptomycin and aztreonam.  Tentative IV antibiotic orders are in the chart  Fungal and AFB cultures pending. Needs antibiotic therapy through 2022  · Rheumatoid arthritis. Was on methotrexate in the past  · Abdominal aortic aneurysm. Avoid quinolone antibiotics  · Amoxicillin allergy. This caused a rash in the past.  Able to tolerate cephalosporins  · Diabetes mellitus    Patient has another medical record number from previous admission which is [id number]          Subjective:     Much more alert today    Objective:     Vitals:   Visit Vitals  BP (!) 117/59 (BP 1 Location: Left upper arm, BP Patient Position: Sitting)   Pulse 88   Temp 98.5 °F (36.9 °C)   Resp 15   Ht 4' 11\" (1.499 m)   Wt 113 lb 12.1 oz (51.6 kg)   SpO2 98%   Breastfeeding No   BMI 22.98 kg/m²        Tmax:  Temp (24hrs), Av.3 °F (36.8 °C), Min:97.9 °F (36.6 °C), Max:98.5 °F (36.9 °C)      Exam:   Patient is intubated:  no    Physical Examination:   General:   Alert. Follows commands appropriately   Head:  Normocephalic, atraumatic. Eyes:  Conjunctivae clear   Neck: Supple       Lungs:   No distress.      Chest wall:     Heart:   RRR   Abdomen:   Soft, non-tender, non-distended   Extremities: Moves all. Skin:  No rash   Neurologic:  Unable to assess     Labs:        No lab exists for component: ITNL   No results for input(s): CPK, CKMB, TROIQ in the last 72 hours. No results for input(s): NA, K, CL, CO2, BUN, CREA, GLU, PHOS, MG, ALB, WBC, HGB, HCT, PLT, HGBEXT, HCTEXT, PLTEXT, HGBEXT, HCTEXT, PLTEXT in the last 72 hours. No lab exists for component:  CA  No results for input(s): INR, PTP, APTT, INREXT, INREXT in the last 72 hours.   Needs: urine analysis, urine sodium, protein and creatinine  No results found for: MALCOM, CREAU      Cultures:     No results found for: SDES  Lab Results   Component Value Date/Time    Culture result: NO GROWTH 4 DAYS 03/11/2022 02:10 PM    Culture result: NO GROWTH 4 DAYS 03/11/2022 02:07 PM       Radiology:     Medications       Current Facility-Administered Medications   Medication Dose Route Frequency Last Admin    cyanocobalamin (VITAMIN B12) injection 1,000 mcg  1,000 mcg SubCUTAneous DAILY 1,000 mcg at 03/15/22 1000    sodium chloride (NS) flush 5-10 mL  5-10 mL IntraVENous PRN      sodium chloride (NS) flush 5-40 mL  5-40 mL IntraVENous Q8H 10 mL at 03/13/22 1354    sodium chloride (NS) flush 5-40 mL  5-40 mL IntraVENous PRN      acetaminophen (TYLENOL) tablet 650 mg  650 mg Oral Q6H  mg at 03/13/22 0300    Or    acetaminophen (TYLENOL) suppository 650 mg  650 mg Rectal Q6H PRN      0.9% sodium chloride infusion  75 mL/hr IntraVENous CONTINUOUS 75 mL/hr at 03/15/22 0020    ondansetron (ZOFRAN) injection 4 mg  4 mg IntraVENous Q6H PRN      enoxaparin (LOVENOX) injection 40 mg  40 mg SubCUTAneous DAILY 40 mg at 03/15/22 0807    TIGEcycline (TYGACIL) 50 mg in 0.9% sodium chloride (MBP/ADV) 100 mL MBP  50 mg IntraVENous Q12H 50 mg at 03/15/22 0424    amLODIPine (NORVASC) tablet 10 mg  10 mg Oral DAILY 10 mg at 03/15/22 0808    lisinopriL (PRINIVIL, ZESTRIL) tablet 10 mg  10 mg Oral DAILY 10 mg at 03/15/22 4293    aspirin delayed-release tablet 81 mg  81 mg Oral DAILY 81 mg at 03/15/22 0807    levothyroxine (SYNTHROID) tablet 75 mcg  75 mcg Oral ACB 75 mcg at 03/15/22 4940           Case discussed with: Case management      Felipa Montilla DO

## 2022-03-15 NOTE — PROCEDURES
[facility]  EEG    Name:  Maritza Kaminski  MR#:  [id number]  :  1930  ACCOUNT #:  [de-identified]  DATE OF SERVICE:  03/15/2022      REFERRING PROVIDER:  Kiki Santiago NP    CLINICAL HISTORY:  An EEG is requested on this 80-year-old lady to evaluate for epileptiform abnormalities. MEDICATIONS:  Not listed. EEG REPORT:  This tracing is obtained during the awake state. During this state, the tracing is degraded by the patient generated movement muscle artifact. Through, the discernible portions, the background consists of diffuse mixed frequency theta range activity. Hyperventilation and photic stimulation are not performed. Sleep architecture is not seen. IMPRESSION/INTERPRETATION:  This EEG recorded during the awake state is abnormal secondary to diffuse slowing and disorganization of the background rhythms indicative of a moderate degree of bihemispheric dysfunction either on a structural or physiologic basis and clinical correlation is recommended. This pattern is also commonly seen in chronic neurodegenerative disorders such as dementia and again clinical correlation recommended.       Priscila Bonilla MD      [id number]  D:  03/15/2022 16:52  T:  03/15/2022 17:37  JOB #:  [id number]

## 2022-03-15 NOTE — PROGRESS NOTES
[facility]  [address] [facility], [address] [place] [address]  [phone]      Medical Progress Note      NAME: Guy Boland   :  1930  MRM:  [id number]    Date of service: 3/15/2022  8:45 AM       Assessment and Plan:   1. 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 CT scan and MRI of the head neg for acute changes. vit B12, TSH and ammonia are ok. More comunicative this morning. Consulted neurology.       2. Septic arthritis of elbow, left ([facility] (3/11/2022) / Acute osteomyelitis of left radius [facility]) (3/11/2022): recently discharged from hospital on 3/4 under a different MRN [id number]. At that time, MRI concerning for infection and aspiration revealed purulent material however cell count relatively benign. Taken to the OR  for I&D and bacterial and fungal cultures neg. AFB cultures pending. Continue IV Tigecycline. ID following      3. Rheumatoid arthritis ([facility] (3/11/2022) / Physical debility / Closed fracture of two ribs of left side (3/11/2022): CT chest showed acute mildly displaced fractures of the left posterior sixth and seventh ribs. Pain control, incentive spirometry, PT, OT. Will need SNF     4. HTN (hypertension), benign (3/11/2022). Continue Amlodipine, Lisinopril     5. Hypothyroidism (3/11/2022): continue Levothyroxine. Normal TSH     6.  Urinary retention (3/11/2022): resolved. 7.  Hard of hearing. Supportive care           Subjective:     Chief Complaint[de-identified] Patient was seen and examined as a follow up for septic arthritis. Chart was reviewed. no events     ROS:  (bold if positive, if negative)    Tolerating PT  Tolerating Diet        Objective:     Last 24hrs VS reviewed since prior progress note.  Most recent are:    Visit Vitals  BP (!) 155/69 (BP 1 Location: Left upper arm, BP Patient Position: At rest)   Pulse 76   Temp 98.2 °F (36.8 °C)   Resp 16   Ht 4' 11\" (1.499 m)   Wt 51.6 kg (113 lb 12.1 oz)   SpO2 96%   Breastfeeding No   BMI 22.98 kg/m²     SpO2 Readings from Last 6 Encounters:   03/15/22 96%            Intake/Output Summary (Last 24 hours) at 3/15/2022 0845  Last data filed at 3/15/2022 0840  Gross per 24 hour   Intake 1691.25 ml   Output 1075 ml   Net 616.25 ml        Physical Exam:    Gen:  Well-developed, well-nourished, in no acute distress  HEENT:  Pink conjunctivae, PERRL, hearing intact to voice, moist mucous membranes  Neck:  Supple, without masses, thyroid non-tender  Resp:  No accessory muscle use, clear breath sounds without wheezes rales or rhonchi  Card:  No murmurs, normal S1, S2 without thrills, bruits or peripheral edema  Abd:  Soft, non-tender, non-distended, normoactive bowel sounds are present, no palpable organomegaly and no detectable hernias  Lymph:  No cervical or inguinal adenopathy  Musc:  No cyanosis or clubbing  Skin:  No rashes or ulcers, skin turgor is good  Neuro:  Cranial nerves are grossly intact  Psych:  Poor insight   __________________________________________________________________  Medications Reviewed: (see below)  Medications:     Current Facility-Administered Medications   Medication Dose Route Frequency    sodium chloride (NS) flush 5-10 mL  5-10 mL IntraVENous PRN    sodium chloride (NS) flush 5-40 mL  5-40 mL IntraVENous Q8H    sodium chloride (NS) flush 5-40 mL  5-40 mL IntraVENous PRN    acetaminophen (TYLENOL) tablet 650 mg  650 mg Oral Q6H PRN    Or    acetaminophen (TYLENOL) suppository 650 mg  650 mg Rectal Q6H PRN    0.9% sodium chloride infusion  75 mL/hr IntraVENous CONTINUOUS    ondansetron (ZOFRAN) injection 4 mg  4 mg IntraVENous Q6H PRN    enoxaparin (LOVENOX) injection 40 mg  40 mg SubCUTAneous DAILY    TIGEcycline (TYGACIL) 50 mg in 0.9% sodium chloride (MBP/ADV) 100 mL MBP  50 mg IntraVENous Q12H    amLODIPine (NORVASC) tablet 10 mg  10 mg Oral DAILY    lisinopriL (PRINIVIL, ZESTRIL) tablet 10 mg  10 mg Oral DAILY    aspirin delayed-release tablet 81 mg  81 mg Oral DAILY    levothyroxine (SYNTHROID) tablet 75 mcg  75 mcg Oral ACB        Lab Data Reviewed: (see below)  Lab Review:     No results for input(s): WBC, HGB, HCT, PLT, HGBEXT, HCTEXT, PLTEXT in the last 72 hours. No results for input(s): NA, K, CL, CO2, GLU, BUN, CREA, CA, MG, PHOS, ALB, TBIL, TBILI, ALT, INR, INREXT in the last 72 hours. No lab exists for component: SGOT  No results found for: GLUCPOC  No results for input(s): PH, PCO2, PO2, HCO3, FIO2 in the last 72 hours. No results for input(s): INR, INREXT in the last 72 hours. All Micro Results     Procedure Component Value Units Date/Time    CULTURE, BLOOD [[id number]] Collected: 03/11/22 1410    Order Status: Completed Specimen: Blood Updated: 03/15/22 0600     Special Requests: NO SPECIAL REQUESTS        Culture result: NO GROWTH 4 DAYS       CULTURE, BLOOD [[id number]] Collected: 03/11/22 1407    Order Status: Completed Specimen: Blood Updated: 03/15/22 0600     Special Requests: NO SPECIAL REQUESTS        Culture result: NO GROWTH 4 DAYS       URINE CULTURE HOLD SAMPLE [[id number]] Collected: 03/11/22 1340    Order Status: Completed Specimen: Serum Updated: 03/11/22 1355     Urine culture hold       Urine on hold in Microbiology dept for 2 days. If unpreserved urine is submitted, it cannot be used for addtional testing after 24 hours, recollection will be required. I have reviewed notes of prior 24hr. Other pertinent lab: Total time spent with patient: 28 I personally reviewed chart, notes, data and current medications in the medical record. I have personally examined and treated the patient at bedside during this period.                  Care Plan discussed with: Patient, Nursing Staff and >50% of time spent in counseling and coordination of care    Discussed:  Care Plan    Prophylaxis:  Lovenox    Disposition: SNF/LTC           ___________________________________________________    Attending Physician: Leoncio Kwon MD

## 2022-03-15 NOTE — PROGRESS NOTES
Post Discharge PICC and Antibiotic Orders    1. Diagnosis: Septic elbow. Culture negative  2. Antibiotic: Daptomycin 350 mg IV daily through 4/14/2022                        aztreonam 2 g IV every 12 hours through 4/14/2022  3. Routine PICC/ Jemima/ Portacath Care including PRN catheter flow management  4. Weekly labs:   _x__CBC/diff/platelets   _x__BUN/Creatinine   _x__CPK   _x__AST/TotalBilirubin/AlkalinePhosphatase   _x__CRP  5. Fax lab to [phone]  Call Critical Lab Values to [phone]  6. May send to IR for line evaluation or replacement [phone]  7. Home Health to pull PIC line at end of therapy or send to IR for Jemima removal  8. Allergies:     Allergies   Allergen Reactions    Amoxicillin Unknown (comments)    Penicillins Unknown (comments)         Rice Staff, DO

## 2022-03-15 NOTE — CONSULTS
[facility] and [address] at [facility] at [address] [facility], [address] [facility] [address] [facility] [address], [address] [place] [address]  [phone] Office  [phone] Facsimile           Referring: Dr. Antonio Merino    Chief Complaint   Patient presents with    Altered mental status    Fall   Were asked to see this 55-year-old lady for alteration in neurologic status manifested as confusion and hallucinations. She was admitted to the hospital with septic arthritis of the elbow. She was on ertapenem and that was discontinued as it was felt that perhaps it was contributing. Her admission history and physical notes that she was having frequent falls. She had been hallucinating. And her exam noted her to be oriented to person place and time. Initial infectious disease consult also noted neurotoxicity related to ertapenem. She did have limited antibiotic options given her infection  Emergency department note recalls that the patient lives by herself and the son is there is much as he could but he has to leave at night. He noted several times of the previous days she was getting bruises and had been hallucinating seeing children under the computer. EMS came and picked her up off of the floor 1 day or so. He noted \"she needs to be admitted for several days so that she may be placed in a nursing home\". In the initial infectious disease note her history was recounted. She has undergone I&D x2 for left elbow septic arthritis. She came into the hospital with hallucinations as noted above. She tells me she came in because she has had surgery on her elbow x2 and then she needed to come back. She lives alone. She says that she does not do her own checkbook because of her eyes and also she is not driven in years. She says she is continuously losing her vision.     Past Medical History:   Diagnosis Date    HTN (hypertension)     Hypothyroid     Septic arthritis of elbow, left (HCC)        No past surgical history on file.     Current Facility-Administered Medications   Medication Dose Route Frequency Provider Last Rate Last Admin    sodium chloride (NS) flush 5-10 mL  5-10 mL IntraVENous PRN Shayy Wan MD        sodium chloride (NS) flush 5-40 mL  5-40 mL IntraVENous Q8H Brady Tafoya MD   10 mL at 03/13/22 1354    sodium chloride (NS) flush 5-40 mL  5-40 mL IntraVENous PRN Brady Tafoya MD        acetaminophen (TYLENOL) tablet 650 mg  650 mg Oral Q6H PRN Brady Tafoya MD   650 mg at 03/13/22 0300    Or    acetaminophen (TYLENOL) suppository 650 mg  650 mg Rectal Q6H PRN Brady Tafoya MD        0.9% sodium chloride infusion  75 mL/hr IntraVENous CONTINUOUS Brady Tafoya MD 75 mL/hr at 03/15/22 0020 75 mL/hr at 03/15/22 0020    ondansetron (ZOFRAN) injection 4 mg  4 mg IntraVENous Q6H PRN Brady Tafoya MD        enoxaparin (LOVENOX) injection 40 mg  40 mg SubCUTAneous DAILY Brady Tafoya MD   40 mg at 03/15/22 0807    TIGEcycline (TYGACIL) 50 mg in 0.9% sodium chloride (MBP/ADV) 100 mL MBP  50 mg IntraVENous Q12H Mingo Arthur  mL/hr at 03/15/22 0424 50 mg at 03/15/22 0424    amLODIPine (NORVASC) tablet 10 mg  10 mg Oral DAILY Brady Tafoya MD   10 mg at 03/15/22 2086    lisinopriL (PRINIVIL, ZESTRIL) tablet 10 mg  10 mg Oral DAILY Brady Tafoya MD   10 mg at 03/15/22 0802    aspirin delayed-release tablet 81 mg  81 mg Oral DAILY Brady Tafoya MD   81 mg at 03/15/22 6465    levothyroxine (SYNTHROID) tablet 75 mcg  75 mcg Oral ACB Brady Tafoya MD   75 mcg at 03/15/22 0808        Allergies   Allergen Reactions    Amoxicillin Unknown (comments)    Penicillins Unknown (comments)       Social History     Tobacco Use    Smoking status: Not on file    Smokeless tobacco: Not on file   Substance Use Topics    Alcohol use: Not on file    Drug use: Not on file       Family History   Problem Relation Age of Onset    Heart Disease Mother     Diabetes Father        Review of Systems  Pertinent positives and negatives as noted. Examination  Visit Vitals  BP (!) 155/69 (BP 1 Location: Left upper arm, BP Patient Position: At rest)   Pulse 76   Temp 98.2 °F (36.8 °C)   Resp 16   Ht 4' 11\" (1.499 m)   Wt 51.6 kg (113 lb 12.1 oz)   SpO2 96%   Breastfeeding No   BMI 22.98 kg/m²     She is comfortable appearing when we entered the room. She is asleep but easily aroused to her name. She tells me that she is in [place] at [facility] when she came here because she has had 2 surgeries on her elbow and she needed to come back for that. She says that she lives alone and people come knocking on her door asking for money and that is a big problem in [facility] in terms of what is going on in the news. She knows its March 15, 2022 and she recalls the current president. Registration is 3/3 and recall was 1/3. She follows all commands. Cranial nerves are intact although she does have hearing aid in place. She has no pronation or drift and she resists fully in all extremities direct testing. Reflexes symmetric      Impression/Plan  . 75-year-old lady who has been encephalopathic and this was felt to be secondary to antibiotic  She appears to be much more intact today and this may be the case  Question some underlying dementia exacerbated by a toxic metabolic encephalopathy  CT was unremarkable  Exam as stated   We will supplement B12  We will obtain a routine EEG just to exclude any epileptiform abnormalities and to be prudent  If that is unremarkable then we will return as needed    Outpatient follow-up for formal neuropsychological evaluation with Dr. Obdulio Garcia MD          This note was created using voice recognition software. Despite editing, there may be syntax errors.

## 2022-03-15 NOTE — ROUTINE PROCESS
Bedside shift change report given to [employer] (oncoming nurse) by Deepak Simpson (offgoing nurse). Report included the following information SBAR, Kardex, Intake/Output, MAR, Accordion and Recent Results.

## 2022-03-15 NOTE — ROUTINE PROCESS
2345: Bedside and Verbal shift change report given to Lucina Gore RN (oncoming nurse) by Tamia Coleman RN (offgoing nurse).  Report included the following information SBAR, Kardex, Intake/Output, MAR, Accordion, Recent Results and Med Rec Status. '

## 2022-03-15 NOTE — PROGRESS NOTES
Problem: Mobility Impaired (Adult and Pediatric)  Goal: *Acute Goals and Plan of Care (Insert Text)  Description: FUNCTIONAL STATUS PRIOR TO ADMISSION: Patient was modified independent using a rollator for functional mobility. HOME SUPPORT PRIOR TO ADMISSION: The patient lived alone in senior apartment; son to provide assistance with some ADL's but patient is alone at night. History of falls. Physical Therapy Goals  Initiated 3/12/2022  1. Patient will move from supine to sit and sit to supine  in bed with supervision/set-up within 7 day(s). 2.  Patient will transfer from bed to chair and chair to bed with minimal assistance/contact guard assist using the least restrictive device within 7 day(s). 3.  Patient will perform sit to stand with minimal assistance/contact guard assist within 7 day(s). 4.  Patient will ambulate with minimal assistance/contact guard assist for 50 feet with the least restrictive device within 7 day(s). Note:   PHYSICAL THERAPY TREATMENT  Patient: Andrea Mendez (73 y.o. female)  Date: 3/15/2022  Diagnosis: Toxic encephalopathy [G92.9] Toxic encephalopathy       Precautions: Fall  Chart, physical therapy assessment, plan of care and goals were reviewed. ASSESSMENT  Patient continues with skilled PT services. Pt supine to sit with min assist.Pt sit to stand with min assist of 2. Pt ambulated 40ft with RW min assist.Pt is unsteady and is at increased risk  for falls. Pt left sitting with chair alarm in place. Continue goals. PLAN :  Patient continues to benefit from skilled intervention to address the above impairments. Continue treatment per established plan of care. to address goals.     Recommendation for discharge: (in order for the patient to meet his/her long term goals)  Therapy up to 5 days/week in SNF setting    This discharge recommendation:  Has been made in collaboration with the attending provider and/or case management    IF patient discharges home will need the following DME: rolling walker       SUBJECTIVE:       OBJECTIVE DATA SUMMARY:   Critical Behavior:  Neurologic State: Alert,Confused  Orientation Level: Oriented to person,Disoriented to place,Disoriented to situation,Disoriented to time  Cognition: Follows commands  Safety/Judgement: Lack of insight into deficits  Functional Mobility Training:  Bed Mobility:     Supine to Sit: Minimum assistance;Assist x2              Transfers:  Sit to Stand: Minimum assistance of 2  Stand to Sit: Contact guard assistance;Minimum assistance                             Balance:  Sitting: Intact  Standing: Intact  Standing - Static: Fair  Ambulation/Gait Training:  Distance (ft): 40 Feet (ft)  Assistive Device: Gait belt;Walker, rolling  Ambulation - Level of Assistance: Minimal assistance        Gait Abnormalities: Path deviations;Decreased step clearance                        Activity Tolerance:   Fair    After treatment patient left in no apparent distress:   Sitting in chair    COMMUNICATION/COLLABORATION:   The patients plan of care was discussed with: Physical therapist.     Margery Cockayne, PTA   Time Calculation: 23 mins

## 2022-03-15 NOTE — PROGRESS NOTES
3/15/2022  Case Management Progress Note    11:23 AM  Patient is 80year old female admitted 3/11 with toxic encephalopathy  Patient's RUR is 11% green/low risk for readmission  Covid test: none this admission, will need for placement  Chart reviewed--patient discussed at IDR rounds this morning  Per chart patient is on a trial of antibiotics today to see how she does. I have spoken to Den @ the [facility] to see if we can get a definitive answer for the patient and her family as they are preference. She is reaching out for approval and will get back with me. Plan B is for [employer]. Will continue to follow and assist with discharge planning. Transition of Care Plan   1. Continue medical management/treatment  2. Antibiotic trial today   3. To SNF at discharge  4.  CM will continue to follow and assist    CB Naidu

## 2022-03-15 NOTE — PROGRESS NOTES
Problem: Falls - Risk of  Goal: *Absence of Falls  Description: Document Miahhailey Andersener Fall Risk and appropriate interventions in the flowsheet.   Outcome: Progressing Towards Goal  Note: Fall Risk Interventions:  Mobility Interventions: Bed/chair exit alarm,Patient to call before getting OOB    Mentation Interventions: Adequate sleep, hydration, pain control,Bed/chair exit alarm,Door open when patient unattended,Family/sitter at bedside    Medication Interventions: Bed/chair exit alarm    Elimination Interventions: Bed/chair exit alarm,Call light in reach    History of Falls Interventions: Bed/chair exit alarm,Door open when patient unattended

## 2022-03-16 LAB
COVID-19 RAPID TEST, COVR: NOT DETECTED
SOURCE, COVRS: NORMAL

## 2022-03-16 PROCEDURE — 99232 SBSQ HOSP IP/OBS MODERATE 35: CPT | Performed by: INTERNAL MEDICINE

## 2022-03-16 PROCEDURE — 65270000029 HC RM PRIVATE

## 2022-03-16 PROCEDURE — 74011250636 HC RX REV CODE- 250/636: Performed by: NURSE PRACTITIONER

## 2022-03-16 PROCEDURE — 74011250637 HC RX REV CODE- 250/637: Performed by: INTERNAL MEDICINE

## 2022-03-16 PROCEDURE — 74011000258 HC RX REV CODE- 258: Performed by: INTERNAL MEDICINE

## 2022-03-16 PROCEDURE — 74011000250 HC RX REV CODE- 250: Performed by: INTERNAL MEDICINE

## 2022-03-16 PROCEDURE — 74011250636 HC RX REV CODE- 250/636: Performed by: INTERNAL MEDICINE

## 2022-03-16 PROCEDURE — 87635 SARS-COV-2 COVID-19 AMP PRB: CPT

## 2022-03-16 RX ORDER — AMOXICILLIN 250 MG
1 CAPSULE ORAL DAILY
Status: DISCONTINUED | OUTPATIENT
Start: 2022-03-16 | End: 2022-03-18 | Stop reason: HOSPADM

## 2022-03-16 RX ORDER — POLYETHYLENE GLYCOL 3350 17 G/17G
17 POWDER, FOR SOLUTION ORAL 2 TIMES DAILY
Status: DISCONTINUED | OUTPATIENT
Start: 2022-03-16 | End: 2022-03-18 | Stop reason: HOSPADM

## 2022-03-16 RX ADMIN — SODIUM CHLORIDE 75 ML/HR: 9 INJECTION, SOLUTION INTRAVENOUS at 20:34

## 2022-03-16 RX ADMIN — DAPTOMYCIN 350 MG: 500 INJECTION, POWDER, LYOPHILIZED, FOR SOLUTION INTRAVENOUS at 17:15

## 2022-03-16 RX ADMIN — DOCUSATE SODIUM 50MG AND SENNOSIDES 8.6MG 1 TABLET: 8.6; 5 TABLET, FILM COATED ORAL at 11:23

## 2022-03-16 RX ADMIN — LISINOPRIL 10 MG: 5 TABLET ORAL at 09:11

## 2022-03-16 RX ADMIN — ENOXAPARIN SODIUM 40 MG: 100 INJECTION SUBCUTANEOUS at 09:10

## 2022-03-16 RX ADMIN — POLYETHYLENE GLYCOL 3350 17 G: 17 POWDER, FOR SOLUTION ORAL at 17:15

## 2022-03-16 RX ADMIN — ASPIRIN 81 MG: 81 TABLET, COATED ORAL at 09:11

## 2022-03-16 RX ADMIN — POLYETHYLENE GLYCOL 3350 17 G: 17 POWDER, FOR SOLUTION ORAL at 11:23

## 2022-03-16 RX ADMIN — CYANOCOBALAMIN 1000 MCG: 1000 INJECTION, SOLUTION INTRAMUSCULAR; SUBCUTANEOUS at 09:10

## 2022-03-16 RX ADMIN — SODIUM CHLORIDE 75 ML/HR: 9 INJECTION, SOLUTION INTRAVENOUS at 06:27

## 2022-03-16 RX ADMIN — AZTREONAM 2 G: 2 INJECTION, POWDER, LYOPHILIZED, FOR SOLUTION INTRAMUSCULAR; INTRAVENOUS at 09:10

## 2022-03-16 RX ADMIN — AZTREONAM 2 G: 2 INJECTION, POWDER, LYOPHILIZED, FOR SOLUTION INTRAMUSCULAR; INTRAVENOUS at 20:30

## 2022-03-16 RX ADMIN — Medication 10 ML: at 05:26

## 2022-03-16 RX ADMIN — LEVOTHYROXINE SODIUM 75 MCG: 0.07 TABLET ORAL at 06:30

## 2022-03-16 RX ADMIN — AMLODIPINE BESYLATE 10 MG: 5 TABLET ORAL at 09:11

## 2022-03-16 RX ADMIN — Medication 10 ML: at 20:31

## 2022-03-16 NOTE — PROGRESS NOTES
3/16/2022  Case Management Progress Note    11:10 AM  Patient is 80year old female admitted 3/11 with toxic encephalopathy  Patient's RUR is 11% green/low risk for readmission  Covid test: none this admission, will need for placement, MD aware  Chart reviewed--patient discussed at IDR rounds this morning  Spoke with Nguyen at the [address] of [facility], they have started Emry Massed with [employer] this morning. They are attempting to get patient's expensive IV antibiotics more covered by insurance as well. Patient will need a rapid covid test, MD aware. Will continue to follow and assist with discharge planning. Transition of Care Plan   1. Continue medical management/treatment  2. Alfie of  started auth this morning  3. Transportation tbd   4.   will continue to follow and assist with medical management/treatment    CB Moore

## 2022-03-16 NOTE — PROGRESS NOTES
1050  Pt hasn't had a full BM in a few days - having the urge but feeling constipated. Will request bowel regimen, MD notified - miralax and pericolace ordered. [phone]  Pt assisted by PCT to bedside commode for successful BM      1900  Bedside and Verbal shift change report given to Paresh Epps RN (oncoming nurse) by Pablo Kelley RN (offgoing nurse). Report included the following information SBAR, Kardex, Intake/Output, MAR and Recent Results.

## 2022-03-16 NOTE — PROGRESS NOTES
[facility]  [address] [facility] [address] [place] [address]  [phone]      Medical Progress Note      NAME: Madelene Bamberger   :  1930  MRM:  [id number]    Date of service: 3/16/2022  8:45 AM       Assessment and Plan:   1. 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 CT scan and MRI of the head neg for acute changes. vit B12, TSH and ammonia are ok. Evaluated by neurology. EEG: no seizure      2. Septic arthritis of elbow, left ([facility] [address] (3/11/2022) / Acute osteomyelitis of left radius [facility]) (3/11/2022): recently discharged from hospital on 3/4 under a different MRN [id number]. At that time, MRI concerning for infection and aspiration revealed purulent material however cell count relatively benign. Taken to the OR  for I&D and bacterial and fungal cultures neg. AFB cultures pending. On dapto and aztreonam through . ID following      3. Rheumatoid arthritis ([facility] [address] (3/11/2022) / Physical debility / Closed fracture of two ribs of left side (3/11/2022): CT chest showed acute mildly displaced fractures of the left posterior sixth and seventh ribs. Pain control, incentive spirometry, PT, OT. Will need SNF     4. HTN (hypertension), benign (3/11/2022). Continue Amlodipine, Lisinopril     5. Hypothyroidism (3/11/2022): continue Levothyroxine. Normal TSH     6.  Urinary retention (3/11/2022): resolved. 7.  Hard of hearing. Supportive care           Subjective:     Chief Complaint[de-identified] Patient was seen and examined as a follow up for septic arthritis. Chart was reviewed. Feels better       ROS:  (bold if positive, if negative)    Tolerating PT  Tolerating Diet        Objective:     Last 24hrs VS reviewed since prior progress note.  Most recent are:    Visit Vitals  BP (!) 161/70 (BP 1 Location: Left upper arm, BP Patient Position: At rest)   Pulse 81   Temp 98.5 °F (36.9 °C)   Resp 17   Ht 4' 11\" (1.499 m)   Wt 51.6 kg (113 lb 12.1 oz)   SpO2 96%   Breastfeeding No   BMI 22.98 kg/m²     SpO2 Readings from Last 6 Encounters:   03/16/22 96%            Intake/Output Summary (Last 24 hours) at 3/16/2022 0910  Last data filed at 3/16/2022 6246  Gross per 24 hour   Intake 1260 ml   Output 1975 ml   Net -715 ml        Physical Exam:    Gen:  Well-developed, well-nourished, in no acute distress  HEENT:  Pink conjunctivae, PERRL, hearing intact to voice, moist mucous membranes  Neck:  Supple, without masses, thyroid non-tender  Resp:  No accessory muscle use, clear breath sounds without wheezes rales or rhonchi  Card:  No murmurs, normal S1, S2 without thrills, bruits or peripheral edema  Abd:  Soft, non-tender, non-distended, normoactive bowel sounds are present, no palpable organomegaly and no detectable hernias  Lymph:  No cervical or inguinal adenopathy  Musc:  No cyanosis or clubbing  Skin:  No rashes or ulcers, skin turgor is good  Neuro:  Cranial nerves are grossly intact  Psych:  Poor insight   __________________________________________________________________  Medications Reviewed: (see below)  Medications:     Current Facility-Administered Medications   Medication Dose Route Frequency    cyanocobalamin (VITAMIN B12) injection 1,000 mcg  1,000 mcg SubCUTAneous DAILY    aztreonam (AZACTAM) 2 g in 0.9% sodium chloride (MBP/ADV) 100 mL MBP  2 g IntraVENous Q12H    DAPTOmycin (CUBICIN) 350 mg in 0.9% sodium chloride 7 mL IV Syringe  350 mg IntraVENous QPM    sodium chloride (NS) flush 5-10 mL  5-10 mL IntraVENous PRN    sodium chloride (NS) flush 5-40 mL  5-40 mL IntraVENous Q8H    sodium chloride (NS) flush 5-40 mL  5-40 mL IntraVENous PRN    acetaminophen (TYLENOL) tablet 650 mg  650 mg Oral Q6H PRN    Or    acetaminophen (TYLENOL) suppository 650 mg  650 mg Rectal Q6H PRN    0.9% sodium chloride infusion  75 mL/hr IntraVENous CONTINUOUS    ondansetron (ZOFRAN) injection 4 mg  4 mg IntraVENous Q6H PRN    enoxaparin (LOVENOX) injection 40 mg  40 mg SubCUTAneous DAILY    amLODIPine (NORVASC) tablet 10 mg  10 mg Oral DAILY    lisinopriL (PRINIVIL, ZESTRIL) tablet 10 mg  10 mg Oral DAILY    aspirin delayed-release tablet 81 mg  81 mg Oral DAILY    levothyroxine (SYNTHROID) tablet 75 mcg  75 mcg Oral ACB        Lab Data Reviewed: (see below)  Lab Review:     No results for input(s): WBC, HGB, HCT, PLT, HGBEXT, HCTEXT, PLTEXT, HGBEXT, HCTEXT, PLTEXT in the last 72 hours. No results for input(s): NA, K, CL, CO2, GLU, BUN, CREA, CA, MG, PHOS, ALB, TBIL, TBILI, ALT, INR, INREXT, INREXT in the last 72 hours. No lab exists for component: SGOT  No results found for: GLUCPOC  No results for input(s): PH, PCO2, PO2, HCO3, FIO2 in the last 72 hours. No results for input(s): INR, INREXT, INREXT in the last 72 hours. All Micro Results     Procedure Component Value Units Date/Time    CULTURE, BLOOD [[id number]] Collected: 03/11/22 1410    Order Status: Completed Specimen: Blood Updated: 03/16/22 0630     Special Requests: NO SPECIAL REQUESTS        Culture result: NO GROWTH 5 DAYS       CULTURE, BLOOD [[id number]] Collected: 03/11/22 1407    Order Status: Completed Specimen: Blood Updated: 03/16/22 0630     Special Requests: NO SPECIAL REQUESTS        Culture result: NO GROWTH 5 DAYS       URINE CULTURE HOLD SAMPLE [[id number]] Collected: 03/11/22 1340    Order Status: Completed Specimen: Serum Updated: 03/11/22 1355     Urine culture hold       Urine on hold in Microbiology dept for 2 days. If unpreserved urine is submitted, it cannot be used for addtional testing after 24 hours, recollection will be required. I have reviewed notes of prior 24hr. Other pertinent lab: Total time spent with patient: 28 I personally reviewed chart, notes, data and current medications in the medical record. I have personally examined and treated the patient at bedside during this period.                  Care Plan discussed with: Patient, Nursing Staff and >50% of time spent in counseling and coordination of care    Discussed:  Care Plan    Prophylaxis:  Lovenox    Disposition:  SNF/LTC           ___________________________________________________    Attending Physician: Kadi Fuentes MD

## 2022-03-16 NOTE — PROGRESS NOTES
Problem: Pressure Injury - Risk of  Goal: *Prevention of pressure injury  Description: Document Maco Scale and appropriate interventions in the flowsheet. Outcome: Progressing Towards Goal  Note: Pressure Injury Interventions:  Sensory Interventions: Assess changes in LOC,Assess need for specialty bed,Keep linens dry and wrinkle-free,Maintain/enhance activity level,Minimize linen layers,Pressure redistribution bed/mattress (bed type)    Moisture Interventions: Absorbent underpads,Internal/External urinary devices,Maintain skin hydration (lotion/cream),Minimize layers    Activity Interventions: Assess need for specialty bed,Pressure redistribution bed/mattress(bed type),PT/OT evaluation    Mobility Interventions: Assess need for specialty bed,Pressure redistribution bed/mattress (bed type),PT/OT evaluation    Nutrition Interventions: Document food/fluid/supplement intake,Offer support with meals,snacks and hydration    Friction and Shear Interventions: Apply protective barrier, creams and emollients,Minimize layers                Problem: Falls - Risk of  Goal: *Absence of Falls  Description: Document Jina Fall Risk and appropriate interventions in the flowsheet.   Outcome: Progressing Towards Goal  Note: Fall Risk Interventions:  Mobility Interventions: Bed/chair exit alarm,Communicate number of staff needed for ambulation/transfer,Patient to call before getting OOB,PT Consult for mobility concerns,OT consult for ADLs,Strengthening exercises (ROM-active/passive),Utilize walker, cane, or other assistive device    Mentation Interventions: Adequate sleep, hydration, pain control,Bed/chair exit alarm,Evaluate medications/consider consulting pharmacy,Room close to nurse's station    Medication Interventions: Bed/chair exit alarm,Evaluate medications/consider consulting pharmacy,Patient to call before getting OOB,Teach patient to arise slowly    Elimination Interventions: Bed/chair exit alarm,Call light in reach,Patient to call for help with toileting needs,Toileting schedule/hourly rounds    History of Falls Interventions: Bed/chair exit alarm,Room close to nurse's station

## 2022-03-16 NOTE — PROGRESS NOTES
Bedside and Verbal shift change report given to [facility], [address] (oncoming nurse) by Jean-Pierre Fall RN (offgoing nurse). Report included the following information SBAR, Kardex, ED Summary, Intake/Output, MAR and Recent Results.

## 2022-03-16 NOTE — PROGRESS NOTES
Comprehensive Nutrition Assessment    Type and Reason for Visit: Initial,RD nutrition re-screen/LOS    Nutrition Recommendations/Plan:   1. Continue regular diet     - May request prune juice from kitchen if patient agreeable, c/o constipation     Nutrition Assessment:   Pt is a 80year old female admitted with Toxic encephalopathy [G92.9]. She  has a past medical history of HTN (hypertension), Hypothyroid, and Septic arthritis of elbow, left ([address] PO intake has been averaging 75% of all meals. No weight history - patient unsure of UBW. Denies recent weight or appetite changes. NKFA. Denies chewing/swallowing problems. C/o constipation at this time. No noted N/V/D. If current PO intake trend continues, patient likely meeting 100% kcal and protein needs. Wt Readings from Last 10 Encounters:   03/16/22 51.3 kg (113 lb 1.5 oz)     Malnutrition Assessment:  Malnutrition Status:  No malnutrition    Context:  Acute illness     Findings of the 6 clinical characteristics of malnutrition:   Energy Intake:  No significant decrease in energy intake  Weight Loss:  No significant weight loss     Body Fat Loss:  No significant body fat loss,     Muscle Mass Loss:  No significant muscle mass loss,    Fluid Accumulation:  No significant fluid accumulation,     Strength:  Not performed     Estimated Daily Nutrient Needs:  Energy (kcal): 3230-4889; Weight Used for Energy Requirements: Current  Protein (g): 43-52 (1.0-1.2 g/kg IBW); Weight Used for Protein Requirements: Ideal  Fluid (ml/day): 0360-8085; Method Used for Fluid Requirements: 1 ml/kcal    Nutrition Related Findings:   ABD: WDL, good appetite with active bowel sounds  Last BM:  03/13/22 (per night RN), Formed  Edema:No data recorded    Nutr.  Labs:    Lab Results   Component Value Date/Time    GFR est AA >60 03/11/2022 01:09 PM    GFR est non-AA >60 03/11/2022 01:09 PM    Creatinine 0.72 03/11/2022 01:09 PM    BUN 16 03/11/2022 01:09 PM    Sodium 136 03/11/2022 01:09 PM    Potassium 3.7 03/11/2022 01:09 PM    Chloride 102 03/11/2022 01:09 PM    CO2 29 03/11/2022 01:09 PM       Lab Results   Component Value Date/Time    Glucose 111 (H) 03/11/2022 01:09 PM       No results found for: HBA1C, EEU1EBNQ, TYP9HZPZ, VEZ4WBNQ    Nutr. Meds:  Norvasc, Vit B12, lovenox, synthroid, lisinopril, zofran PRN, miralax, pericolace    Wounds:    None (no open areas per wound care note)       Current Nutrition Therapies:  ADULT DIET Regular    Anthropometric Measures:  · Height:  4' 11\" (149.9 cm)  · Current Body Wt:  51.3 kg (113 lb 1.5 oz)   · Admission Body Wt:  113 lb 1.5 oz    · Usual Body Wt:    Unknown  · Ideal Body Wt:  95 lbs:  119 %   Body mass index is 22.84 kg/m².   · BMI Category:  Normal weight (BMI 22.0-24.9) age over 72       Nutrition Diagnosis:   · No nutrition diagnosis at this time     Nutrition Interventions:   Food and/or Nutrient Delivery: Continue current diet  Nutrition Education and Counseling: No recommendations at this time  Coordination of Nutrition Care: Continue to monitor while inpatient,Interdisciplinary rounds    Goals:  PO intake continues >/= 75% of all meals within 5 - 7 days       Nutrition Monitoring and Evaluation:   Behavioral-Environmental Outcomes: None identified  Food/Nutrient Intake Outcomes: Food and nutrient intake  Physical Signs/Symptoms Outcomes: Biochemical data,Skin,Weight    Discharge Planning:    No discharge needs at this time     Electronically signed by Walker Tovar RD on 7/78/3751  Contact: [phone] or via [employer]

## 2022-03-16 NOTE — PROGRESS NOTES
[facility] Progress Note           Maria Teresa Oro DO    [phone] Office  [phone]  Fax    3/16/2022      Assessment & Plan:   · AMS. Significantly improved today. Suspect neurotoxicity related to ertapenem. Ertapenem has been stopped. Neurology following . 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 Limited antibiotic options. .  Will need to avoid carbapenem antibiotics with AMS on ertapenem. She is allergic to amoxicillin so cannot use piperacillin-tazobactam.  The patient was treated with ceftriaxone in the past with recurrence of infection so would like to avoid cephalosporins. Cannot use quinolone antibiotics with history of abdominal aortic aneurysm. Will attempt trial of daptomycin and aztreonam.  Tentative IV antibiotic orders are in the chart. Tolerating new antibiotics so far. Fungal and AFB cultures pending. Needs antibiotic therapy through 2022. Check CPK in a.m. · Rheumatoid arthritis. Was on methotrexate in the past  · Abdominal aortic aneurysm. Avoid quinolone antibiotics  · Amoxicillin allergy. This caused a rash in the past.  Able to tolerate cephalosporins  · Diabetes mellitus    Patient has another medical record number from previous admission which is [id number]          Subjective:     Much more alert today    Objective:     Vitals:   Visit Vitals  BP (!) 148/68 (BP 1 Location: Left upper arm, BP Patient Position: At rest)   Pulse 78   Temp 98 °F (36.7 °C)   Resp 17   Ht 4' 11\" (1.499 m)   Wt 113 lb 1.5 oz (51.3 kg)   SpO2 96%   Breastfeeding No   BMI 22.84 kg/m²        Tmax:  Temp (24hrs), Av.3 °F (36.8 °C), Min:98 °F (36.7 °C), Max:98.8 °F (37.1 °C)      Exam:   Patient is intubated:  no    Physical Examination:   General:   Alert. Follows commands appropriately   Head:  Normocephalic, atraumatic. Eyes:  Conjunctivae clear   Neck: Supple       Lungs:   No distress.      Chest wall:     Heart:   RRR Abdomen:   Soft, non-tender, non-distended   Extremities: Moves all. Skin:  No rash   Neurologic:      Labs:        No lab exists for component: ITNL   No results for input(s): CPK, CKMB, TROIQ in the last 72 hours. No results for input(s): NA, K, CL, CO2, BUN, CREA, GLU, PHOS, MG, ALB, WBC, HGB, HCT, PLT, HGBEXT, HCTEXT, PLTEXT, HGBEXT, HCTEXT, PLTEXT in the last 72 hours. No lab exists for component:  CA  No results for input(s): INR, PTP, APTT, INREXT, INREXT in the last 72 hours.   Needs: urine analysis, urine sodium, protein and creatinine  No results found for: MALCOM, CREAU      Cultures:     No results found for: SDES  Lab Results   Component Value Date/Time    Culture result: NO GROWTH 5 DAYS 03/11/2022 02:10 PM    Culture result: NO GROWTH 5 DAYS 03/11/2022 02:07 PM       Radiology:     Medications       Current Facility-Administered Medications   Medication Dose Route Frequency Last Admin    polyethylene glycol (MIRALAX) packet 17 g  17 g Oral BID 17 g at 03/16/22 1715    senna-docusate (PERICOLACE) 8.6-50 mg per tablet 1 Tablet  1 Tablet Oral DAILY 1 Tablet at 03/16/22 1123    cyanocobalamin (VITAMIN B12) injection 1,000 mcg  1,000 mcg SubCUTAneous DAILY 1,000 mcg at 03/16/22 0910    aztreonam (AZACTAM) 2 g in 0.9% sodium chloride (MBP/ADV) 100 mL MBP  2 g IntraVENous Q12H 2 g at 03/16/22 0910    DAPTOmycin (CUBICIN) 350 mg in 0.9% sodium chloride 7 mL IV Syringe  350 mg IntraVENous  mg at 03/16/22 1715    sodium chloride (NS) flush 5-10 mL  5-10 mL IntraVENous PRN      sodium chloride (NS) flush 5-40 mL  5-40 mL IntraVENous Q8H 10 mL at 03/16/22 0526    sodium chloride (NS) flush 5-40 mL  5-40 mL IntraVENous PRN      acetaminophen (TYLENOL) tablet 650 mg  650 mg Oral Q6H  mg at 03/13/22 0300    Or    acetaminophen (TYLENOL) suppository 650 mg  650 mg Rectal Q6H PRN      0.9% sodium chloride infusion  75 mL/hr IntraVENous CONTINUOUS 75 mL/hr at 03/16/22 0627    ondansetron [facility]) injection 4 mg  4 mg IntraVENous Q6H PRN      enoxaparin (LOVENOX) injection 40 mg  40 mg SubCUTAneous DAILY 40 mg at 03/16/22 0910    amLODIPine (NORVASC) tablet 10 mg  10 mg Oral DAILY 10 mg at 03/16/22 0911    lisinopriL (PRINIVIL, ZESTRIL) tablet 10 mg  10 mg Oral DAILY 10 mg at 03/16/22 0911    aspirin delayed-release tablet 81 mg  81 mg Oral DAILY 81 mg at 03/16/22 0911    levothyroxine (SYNTHROID) tablet 75 mcg  75 mcg Oral ACB 75 mcg at 03/16/22 0630           Case discussed with: Case management      Tushar Carter DO

## 2022-03-17 PROCEDURE — 74011000250 HC RX REV CODE- 250: Performed by: INTERNAL MEDICINE

## 2022-03-17 PROCEDURE — 74011000258 HC RX REV CODE- 258: Performed by: INTERNAL MEDICINE

## 2022-03-17 PROCEDURE — 99232 SBSQ HOSP IP/OBS MODERATE 35: CPT | Performed by: INTERNAL MEDICINE

## 2022-03-17 PROCEDURE — 97530 THERAPEUTIC ACTIVITIES: CPT

## 2022-03-17 PROCEDURE — 74011250636 HC RX REV CODE- 250/636: Performed by: NURSE PRACTITIONER

## 2022-03-17 PROCEDURE — 65270000029 HC RM PRIVATE

## 2022-03-17 PROCEDURE — 97116 GAIT TRAINING THERAPY: CPT

## 2022-03-17 PROCEDURE — 97535 SELF CARE MNGMENT TRAINING: CPT

## 2022-03-17 PROCEDURE — 74011250636 HC RX REV CODE- 250/636: Performed by: INTERNAL MEDICINE

## 2022-03-17 PROCEDURE — 2709999900 HC NON-CHARGEABLE SUPPLY

## 2022-03-17 PROCEDURE — 51798 US URINE CAPACITY MEASURE: CPT

## 2022-03-17 PROCEDURE — 74011250637 HC RX REV CODE- 250/637: Performed by: INTERNAL MEDICINE

## 2022-03-17 RX ADMIN — SODIUM CHLORIDE 75 ML/HR: 9 INJECTION, SOLUTION INTRAVENOUS at 06:35

## 2022-03-17 RX ADMIN — CYANOCOBALAMIN 1000 MCG: 1000 INJECTION, SOLUTION INTRAMUSCULAR; SUBCUTANEOUS at 10:11

## 2022-03-17 RX ADMIN — AZTREONAM 2 G: 2 INJECTION, POWDER, LYOPHILIZED, FOR SOLUTION INTRAMUSCULAR; INTRAVENOUS at 20:21

## 2022-03-17 RX ADMIN — Medication 10 ML: at 06:33

## 2022-03-17 RX ADMIN — DOCUSATE SODIUM 50MG AND SENNOSIDES 8.6MG 1 TABLET: 8.6; 5 TABLET, FILM COATED ORAL at 10:12

## 2022-03-17 RX ADMIN — Medication 10 ML: at 20:22

## 2022-03-17 RX ADMIN — ASPIRIN 81 MG: 81 TABLET, COATED ORAL at 10:12

## 2022-03-17 RX ADMIN — LEVOTHYROXINE SODIUM 75 MCG: 0.07 TABLET ORAL at 06:33

## 2022-03-17 RX ADMIN — POLYETHYLENE GLYCOL 3350 17 G: 17 POWDER, FOR SOLUTION ORAL at 17:32

## 2022-03-17 RX ADMIN — LISINOPRIL 10 MG: 5 TABLET ORAL at 10:11

## 2022-03-17 RX ADMIN — ENOXAPARIN SODIUM 40 MG: 100 INJECTION SUBCUTANEOUS at 10:11

## 2022-03-17 RX ADMIN — AZTREONAM 2 G: 2 INJECTION, POWDER, LYOPHILIZED, FOR SOLUTION INTRAMUSCULAR; INTRAVENOUS at 10:11

## 2022-03-17 RX ADMIN — SODIUM CHLORIDE 75 ML/HR: 9 INJECTION, SOLUTION INTRAVENOUS at 20:23

## 2022-03-17 RX ADMIN — DAPTOMYCIN 350 MG: 500 INJECTION, POWDER, LYOPHILIZED, FOR SOLUTION INTRAVENOUS at 17:32

## 2022-03-17 RX ADMIN — POLYETHYLENE GLYCOL 3350 17 G: 17 POWDER, FOR SOLUTION ORAL at 10:12

## 2022-03-17 RX ADMIN — AMLODIPINE BESYLATE 10 MG: 5 TABLET ORAL at 10:12

## 2022-03-17 NOTE — PROGRESS NOTES
3/17/2022  Case Management Progress Note    11:27 AM  Patient is 80year old female admitted 3/11 with toxic encephalopathy  Patient's RUR is 10% green/low risk for readmission  Covid test: negative 3/16  Chart reviewed--patient discussed at IDR rounds this morning  Per rounds this morning patient is medically ready for discharge, just waiting on [facility]. I have reached out to admissions at the [address] [facility] and await an answer. Patient's medications are expensive and they are trying to get them primarily covered. Will continue to follow and update. Transition of Care Plan   1. Continue medical management/treatment  2. To the [facility] pending auth   3. Transportation tbd, likely family vs  van   4.  CM will continue to follow    CB Jimenez

## 2022-03-17 NOTE — PROGRESS NOTES
Jose E Linares [facility] 79  [address] [facility], [address] [place] [address]  [phone]      Medical Progress Note      NAME: Mich Escobar   :  1930  MRM:  [id number]    Date of service: 3/17/2022  8:45 AM       Assessment and Plan:   1. 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 CT scan and MRI of the head neg for acute changes. vit B12, TSH and ammonia are ok. Evaluated by neurology. EEG: no seizure. Has improved.       2. Septic arthritis of elbow, left ([facility] [address] (3/11/2022) / Acute osteomyelitis of left radius [facility]) (3/11/2022): recently discharged from hospital on 3/4 under a different MRN [id number]. At that time, MRI concerning for infection and aspiration revealed purulent material however cell count relatively benign. Taken to the OR  for I&D and bacterial and fungal cultures neg. AFB cultures pending. On dapto and aztreonam through . ID following      3. Rheumatoid arthritis ([facility] [address] (3/11/2022) / Physical debility / Closed fracture of two ribs of left side (3/11/2022): CT chest showed acute mildly displaced fractures of the left posterior sixth and seventh ribs. Pain control, incentive spirometry, PT, OT. Will need SNF     4. HTN (hypertension), benign (3/11/2022). Continue Amlodipine, Lisinopril     5. Hypothyroidism (3/11/2022): continue Levothyroxine. Normal TSH     6.  Urinary retention (3/11/2022): resolved. 7.  Hard of hearing. Supportive care           Subjective:     Chief Complaint[de-identified] Patient was seen and examined as a follow up for septic arthritis. Chart was reviewed. No events     ROS:  (bold if positive, if negative)    Tolerating PT  Tolerating Diet        Objective:     Last 24hrs VS reviewed since prior progress note.  Most recent are:    Visit Vitals  BP (!) 158/70 (BP 1 Location: Left upper arm, BP Patient Position: At rest)   Pulse 87   Temp 98.6 °F (37 °C)   Resp 18   Ht 4' 11\" (1.499 m)   Wt 51.3 kg (113 lb 1.5 oz)   SpO2 97%   Breastfeeding No   BMI 22.84 kg/m²     SpO2 Readings from Last 6 Encounters:   03/17/22 97%            Intake/Output Summary (Last 24 hours) at 3/17/2022 0944  Last data filed at 3/17/2022 8095  Gross per 24 hour   Intake 1647.5 ml   Output 2050 ml   Net -402.5 ml        Physical Exam:    Gen:  Well-developed, well-nourished, in no acute distress  HEENT:  Pink conjunctivae, PERRL, hearing intact to voice, moist mucous membranes  Neck:  Supple, without masses, thyroid non-tender  Resp:  No accessory muscle use, clear breath sounds without wheezes rales or rhonchi  Card:  No murmurs, normal S1, S2 without thrills, bruits or peripheral edema  Abd:  Soft, non-tender, non-distended, normoactive bowel sounds are present, no palpable organomegaly and no detectable hernias  Lymph:  No cervical or inguinal adenopathy  Musc:  No cyanosis or clubbing  Skin:  No rashes or ulcers, skin turgor is good  Neuro:  Cranial nerves are grossly intact  Psych:  Poor insight   __________________________________________________________________  Medications Reviewed: (see below)  Medications:     Current Facility-Administered Medications   Medication Dose Route Frequency    polyethylene glycol (MIRALAX) packet 17 g  17 g Oral BID    senna-docusate (PERICOLACE) 8.6-50 mg per tablet 1 Tablet  1 Tablet Oral DAILY    cyanocobalamin (VITAMIN B12) injection 1,000 mcg  1,000 mcg SubCUTAneous DAILY    aztreonam (AZACTAM) 2 g in 0.9% sodium chloride (MBP/ADV) 100 mL MBP  2 g IntraVENous Q12H    DAPTOmycin (CUBICIN) 350 mg in 0.9% sodium chloride 7 mL IV Syringe  350 mg IntraVENous QPM    sodium chloride (NS) flush 5-10 mL  5-10 mL IntraVENous PRN    sodium chloride (NS) flush 5-40 mL  5-40 mL IntraVENous Q8H    sodium chloride (NS) flush 5-40 mL  5-40 mL IntraVENous PRN    acetaminophen (TYLENOL) tablet 650 mg  650 mg Oral Q6H PRN    Or    acetaminophen (TYLENOL) suppository 650 mg  650 mg Rectal Q6H PRN    0.9% sodium chloride infusion  75 mL/hr IntraVENous CONTINUOUS    ondansetron (ZOFRAN) injection 4 mg  4 mg IntraVENous Q6H PRN    enoxaparin (LOVENOX) injection 40 mg  40 mg SubCUTAneous DAILY    amLODIPine (NORVASC) tablet 10 mg  10 mg Oral DAILY    lisinopriL (PRINIVIL, ZESTRIL) tablet 10 mg  10 mg Oral DAILY    aspirin delayed-release tablet 81 mg  81 mg Oral DAILY    levothyroxine (SYNTHROID) tablet 75 mcg  75 mcg Oral ACB        Lab Data Reviewed: (see below)  Lab Review:     No results for input(s): WBC, HGB, HCT, PLT, HGBEXT, HCTEXT, PLTEXT, HGBEXT, HCTEXT, PLTEXT in the last 72 hours. No results for input(s): NA, K, CL, CO2, GLU, BUN, CREA, CA, MG, PHOS, ALB, TBIL, TBILI, ALT, INR, INREXT, INREXT in the last 72 hours. No lab exists for component: SGOT  No results found for: GLUCPOC  No results for input(s): PH, PCO2, PO2, HCO3, FIO2 in the last 72 hours. No results for input(s): INR, INREXT, INREXT in the last 72 hours. All Micro Results     Procedure Component Value Units Date/Time    CULTURE, BLOOD [[id number]] Collected: 03/11/22 1410    Order Status: Completed Specimen: Blood Updated: 03/17/22 0542     Special Requests: NO SPECIAL REQUESTS        Culture result: NO GROWTH 6 DAYS       CULTURE, BLOOD [[id number]] Collected: 03/11/22 1407    Order Status: Completed Specimen: Blood Updated: 03/17/22 0542     Special Requests: NO SPECIAL REQUESTS        Culture result: NO GROWTH 6 DAYS       COVID-19 RAPID TEST [[id number]] Collected: 03/16/22 1126    Order Status: Completed Specimen: Nasopharyngeal Updated: 03/16/22 1229     Specimen source Nasopharyngeal        COVID-19 rapid test Not detected        Comment: Rapid Abbott ID Now       Rapid NAAT:  The specimen is NEGATIVE for SARS-CoV-2, the novel coronavirus associated with COVID-19.        Negative results should be treated as presumptive and, if inconsistent with clinical signs and symptoms or necessary for patient management, should be tested with an alternative molecular assay. Negative results do not preclude SARS-CoV-2 infection and should not be used as the sole basis for patient management decisions. This test has been authorized by the FDA under an Emergency Use Authorization (EUA) for use by authorized laboratories. Fact sheet for Healthcare Providers: ConventionUpdate.co.nz  Fact sheet for Patients: ConventionUpdate.co.nz       Methodology: Isothermal Nucleic Acid Amplification         URINE CULTURE HOLD SAMPLE [[id number]] Collected: 03/11/22 1340    Order Status: Completed Specimen: Serum Updated: 03/11/22 1355     Urine culture hold       Urine on hold in Microbiology dept for 2 days. If unpreserved urine is submitted, it cannot be used for addtional testing after 24 hours, recollection will be required. I have reviewed notes of prior 24hr. Other pertinent lab: Total time spent with patient: 28 I personally reviewed chart, notes, data and current medications in the medical record. I have personally examined and treated the patient at bedside during this period.                  Care Plan discussed with: Patient, Nursing Staff and >50% of time spent in counseling and coordination of care    Discussed:  Care Plan    Prophylaxis:  Lovenox    Disposition:  SNF/LTC           ___________________________________________________    Attending Physician: Yady Gaines MD

## 2022-03-17 NOTE — PROGRESS NOTES
[facility] Progress Note           Romana Catching DO    [phone] Office  [phone]  Fax    3/17/2022      Assessment & Plan:   · AMS. Resolved. Suspect neurotoxicity related to ertapenem. Ertapenem has been stopped. Neurology following . 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 Limited antibiotic options. .  Will need to avoid carbapenem antibiotics with AMS on ertapenem. She is allergic to amoxicillin so cannot use piperacillin-tazobactam.  The patient was treated with ceftriaxone in the past with recurrence of infection so would like to avoid cephalosporins. Cannot use quinolone antibiotics with history of abdominal aortic aneurysm. Tolerating daptomycin and aztreonam.   IV antibiotic orders are in the chart. Fungal and AFB cultures negative as of 3/17. Needs antibiotic therapy through 2022. CPK WNL  · Rheumatoid arthritis. Was on methotrexate in the past  · Abdominal aortic aneurysm. Avoid quinolone antibiotics  · Amoxicillin allergy. This caused a rash in the past.  Able to tolerate cephalosporins  · Diabetes mellitus    Patient has another medical record number from previous admission which is [id number]          Subjective:     No complaints. Appears to be back in mental status    Objective:     Vitals:   Visit Vitals  /66 (BP 1 Location: Left upper arm, BP Patient Position: At rest;Sitting)   Pulse 75   Temp 98.3 °F (36.8 °C)   Resp 18   Ht 4' 11\" (1.499 m)   Wt 113 lb 1.5 oz (51.3 kg)   SpO2 96%   Breastfeeding No   BMI 22.84 kg/m²        Tmax:  Temp (24hrs), Av.4 °F (36.9 °C), Min:98.1 °F (36.7 °C), Max:98.9 °F (37.2 °C)      Exam:   Patient is intubated:  no    Physical Examination:   General:   Alert. Follows commands appropriately   Head:  Normocephalic, atraumatic. Eyes:  Conjunctivae clear   Neck: Supple       Lungs:   No distress.      Chest wall:     Heart:   RRR   Abdomen:   Soft, non-tender, non-distended   Extremities: Moves all. Skin:  No rash   Neurologic:      Labs:        No lab exists for component: ITNL   No results for input(s): CPK, CKMB, TROIQ in the last 72 hours. No results for input(s): NA, K, CL, CO2, BUN, CREA, GLU, PHOS, MG, ALB, WBC, HGB, HCT, PLT, HGBEXT, HCTEXT, PLTEXT, HGBEXT, HCTEXT, PLTEXT in the last 72 hours. No lab exists for component:  CA  No results for input(s): INR, PTP, APTT, INREXT, INREXT in the last 72 hours.   Needs: urine analysis, urine sodium, protein and creatinine  No results found for: MALCOM, CREAU      Cultures:     No results found for: SDES  Lab Results   Component Value Date/Time    Culture result: NO GROWTH 6 DAYS 03/11/2022 02:10 PM    Culture result: NO GROWTH 6 DAYS 03/11/2022 02:07 PM       Radiology:     Medications       Current Facility-Administered Medications   Medication Dose Route Frequency Last Admin    polyethylene glycol (MIRALAX) packet 17 g  17 g Oral BID 17 g at 03/17/22 1732    senna-docusate (PERICOLACE) 8.6-50 mg per tablet 1 Tablet  1 Tablet Oral DAILY 1 Tablet at 03/17/22 1012    aztreonam (AZACTAM) 2 g in 0.9% sodium chloride (MBP/ADV) 100 mL MBP  2 g IntraVENous Q12H 2 g at 03/17/22 1011    DAPTOmycin (CUBICIN) 350 mg in 0.9% sodium chloride 7 mL IV Syringe  350 mg IntraVENous  mg at 03/17/22 1732    sodium chloride (NS) flush 5-10 mL  5-10 mL IntraVENous PRN      sodium chloride (NS) flush 5-40 mL  5-40 mL IntraVENous Q8H 10 mL at 03/17/22 0633    sodium chloride (NS) flush 5-40 mL  5-40 mL IntraVENous PRN      acetaminophen (TYLENOL) tablet 650 mg  650 mg Oral Q6H  mg at 03/13/22 0300    Or    acetaminophen (TYLENOL) suppository 650 mg  650 mg Rectal Q6H PRN      0.9% sodium chloride infusion  75 mL/hr IntraVENous CONTINUOUS 75 mL/hr at 03/17/22 0635    ondansetron (ZOFRAN) injection 4 mg  4 mg IntraVENous Q6H PRN      enoxaparin (LOVENOX) injection 40 mg  40 mg SubCUTAneous DAILY 40 mg at 03/17/22 1011    amLODIPine (NORVASC) tablet 10 mg  10 mg Oral DAILY 10 mg at 03/17/22 1012    lisinopriL (PRINIVIL, ZESTRIL) tablet 10 mg  10 mg Oral DAILY 10 mg at 03/17/22 1011    aspirin delayed-release tablet 81 mg  81 mg Oral DAILY 81 mg at 03/17/22 1012    levothyroxine (SYNTHROID) tablet 75 mcg  75 mcg Oral ACB 75 mcg at 03/17/22 2092           Case discussed with: Case management      Jeffry Crain DO

## 2022-03-17 NOTE — PROGRESS NOTES
Problem: Mobility Impaired (Adult and Pediatric)  Goal: *Acute Goals and Plan of Care (Insert Text)  Description: FUNCTIONAL STATUS PRIOR TO ADMISSION: Patient was modified independent using a rollator for functional mobility. HOME SUPPORT PRIOR TO ADMISSION: The patient lived alone in senior apartment; son to provide assistance with some ADL's but patient is alone at night. History of falls. Physical Therapy Goals  Initiated 3/12/2022  1. Patient will move from supine to sit and sit to supine  in bed with supervision/set-up within 7 day(s). 2.  Patient will transfer from bed to chair and chair to bed with minimal assistance/contact guard assist using the least restrictive device within 7 day(s). 3.  Patient will perform sit to stand with minimal assistance/contact guard assist within 7 day(s). 4.  Patient will ambulate with minimal assistance/contact guard assist for 50 feet with the least restrictive device within 7 day(s). Outcome: Progressing Towards Goal   PHYSICAL THERAPY TREATMENT  Patient: Catherine Parker (85 y.o. female)  Date: 3/17/2022  Diagnosis: Toxic encephalopathy [G92.9] Toxic encephalopathy       Precautions: Fall  Chart, physical therapy assessment, plan of care and goals were reviewed. ASSESSMENT  Patient continues with skilled PT services and is progressing towards goals. Pt making steady gains in strength, balance and improving independence. Largely SBA for bed mobility, CGA to stand and CGA to ambulate increased distance in hallway. One LOB with turning in hallway with Min A to correct for LOB. Pt engaged in seated exercises after gait training. Good recall of exercises from HHPT for both UEs and LEs. Pt remains appropriate for discharge to SNF.      Current Level of Function Impacting Discharge (mobility/balance): Min A with LOB in hallway    Other factors to consider for discharge: fall PTA         PLAN :  Patient continues to benefit from skilled intervention to address the above impairments. Continue treatment per established plan of care. to address goals. Recommendation for discharge: (in order for the patient to meet his/her long term goals)  Therapy up to 5 days/week in SNF setting    This discharge recommendation:  Has been made in collaboration with the attending provider and/or case management    IF patient discharges home will need the following DME: rolling walker       SUBJECTIVE:   Patient stated I don't know what this bandage is from.     OBJECTIVE DATA SUMMARY:   Critical Behavior:  Neurologic State: Alert  Orientation Level: Oriented X4  Cognition: Follows commands  Safety/Judgement: Lack of insight into deficits  Functional Mobility Training:  Bed Mobility:  Rolling: Stand-by assistance  Supine to Sit: Stand-by assistance  Sit to Supine: Minimum assistance           Transfers:  Sit to Stand: Contact guard assistance  Stand to Sit: Contact guard assistance        Bed to Chair: Contact guard assistance                    Balance:  Sitting: Intact  Standing: Impaired  Standing - Static: Good;Constant support  Standing - Dynamic : Fair;Constant support  Ambulation/Gait Training:  Distance (ft): 160 Feet (ft)  Assistive Device: Gait belt;Walker, rolling  Ambulation - Level of Assistance: Stand-by assistance;Minimal assistance (MIN A LOB while turning)        Gait Abnormalities: Decreased step clearance                              Activity Tolerance:   Good and requires rest breaks    After treatment patient left in no apparent distress:   Sitting in chair, Call bell within reach, and Bed / chair alarm activated    COMMUNICATION/COLLABORATION:   The patients plan of care was discussed with: Registered nurse.      Ruth Douglass, PT   Time Calculation: 26 mins

## 2022-03-17 NOTE — PROGRESS NOTES
Bedside and Verbal shift change report given to Bismark Valdes RN (oncoming nurse) by Susan Beal RN (offgoing nurse). Report included the following information SBAR, ED Summary, Procedure Summary, Intake/Output, MAR and Recent Results.

## 2022-03-17 NOTE — PROGRESS NOTES
Problem: Self Care Deficits Care Plan (Adult)  Goal: *Acute Goals and Plan of Care (Insert Text)  Description: FUNCTIONAL STATUS PRIOR TO ADMISSION: Patient was modified independent using a rollator for functional mobility. Has history of multiple falls. HOME SUPPORT: The patient lived alone in a senior living apartment. Son provides assistance but pt is alone at night. Occupational Therapy Goals  Initiated 3/12/2022  1. Patient will perform grooming with supervision/set-up in sitting within 7 day(s). 2.  Patient will perform upper body dressing with supervision/set-up within 7 day(s). 3.  Patient will perform lower body dressing with minimal assistance within 7 day(s). 4.  Patient will perform toilet transfers with minimal assistance/contact guard assist within 7 day(s). 5.  Patient will perform all aspects of toileting with minimal assistance/contact guard assist within 7 day(s). 6.  Patient will participate in upper extremity therapeutic exercise/activities with supervision/set-up for 5 minutes within 7 day(s). Outcome: Progressing Towards Goal   OCCUPATIONAL THERAPY TREATMENT  Patient: Beverly Gallardo (86 y.o. female)  Date: 3/17/2022  Diagnosis: Toxic encephalopathy [G92.9] Toxic encephalopathy       Precautions: Fall  Chart, occupational therapy assessment, plan of care, and goals were reviewed. ASSESSMENT  Patient continues with skilled OT services and is progressing towards goals. Pt seen for ADL re-training, and sit to stand contact guard to ambulate to restroom. Pt stood to brush her teeth, verbal cueing for safety and to have RW in front of her to task. Pt returned to bed following activity. Current Level of Function Impacting Discharge (ADLs): contact guard grooming at sink    Other factors to consider for discharge:          PLAN :  Patient continues to benefit from skilled intervention to address the above impairments.   Continue treatment per established plan of care to address goals. Recommend with staff: out of bed for ADL's, there ex, there act, meals    Recommend next OT session: cont towards goals    Recommendation for discharge: (in order for the patient to meet his/her long term goals)  Therapy up to 5 days/week in SNF setting    This discharge recommendation:  Has not yet been discussed the attending provider and/or case management    IF patient discharges home will need the following DME:        SUBJECTIVE:   Patient stated I am good.     OBJECTIVE DATA SUMMARY:   Cognitive/Behavioral Status:  Neurologic State: Alert  Orientation Level: Oriented X4  Cognition: Follows commands             Functional Mobility and Transfers for ADLs:  Bed Mobility:  Sit to Supine: Minimum assistance    Transfers:      Sit to stand contact guard       Balance:  Sitting: Intact  Standing: Impaired    ADL Intervention:       Grooming  Grooming Assistance: Contact guard assistance  Brushing Teeth: Contact guard assistance  Cues: Verbal cues provided (safety)       Activity Tolerance:   Fair    After treatment patient left in no apparent distress:   Supine in bed and Call bell within reach    COMMUNICATION/COLLABORATION:   The patients plan of care was discussed with: Occupational therapist and Registered nurse.      NIA Maher/L  Time Calculation: 18 mins

## 2022-03-17 NOTE — PROGRESS NOTES
Spiritual Care Assessment/Progress Note  [address] [place] [address] Dr      NAME: Madelene Bamberger      MRN: [id number]  AGE: 80 y.o. SEX: female  Mormonism Affiliation: Gerardoouti   Language: English     3/17/2022     Total Time (in minutes): 54     Spiritual Assessment begun in [address] [place] [address] 1 through conversation with:         [x]Patient        [x] Family    [] Friend(s)        Reason for Consult: Initial/Spiritual assessment, patient floor     Spiritual beliefs: (Please include comment if needed)     [x] Identifies with a mellissa tradition:   Samaritan       [] Supported by a mellissa community:            [] Claims no spiritual orientation:           [] Seeking spiritual identity:                [] Adheres to an individual form of spirituality:           [] Not able to assess:                           Identified resources for coping:      [x] Prayer                               [] Music                  [] Guided Imagery     [x] Family/friends                 [] Pet visits     [] Devotional reading                         [] Unknown     [] Other:                                              Interventions offered during this visit: (See comments for more details)    Patient Interventions: Affirmation of emotions/emotional suffering,Affirmation of mellissa,Mosque/blessing,Catharsis/review of pertinent events in supportive environment,Initial/Spiritual assessment, patient floor,Life review/legacy,Normalization of emotional/spiritual concerns,Prayer (assurance of),Prayer (actual),Mormonism beliefs/image of God discussed     Family/Friend(s):  Affirmation of emotions/emotional suffering,Catharsis/review of pertinent events in supportive environment,Initial Assessment,Life review/legacy,Normalization of emotional/spiritual concerns,Prayer (assurance of),Mormonism beliefs/image of God discussed     Plan of Care:     [] Support spiritual and/or cultural needs    [] Support AMD and/or advance care planning process      [] Support grieving process   [] Coordinate Rites and/or Rituals    [] Coordination with community clergy   [] No spiritual needs identified at this time   [] Detailed Plan of Care below (See Comments)  [] Make referral to Music Therapy  [] Make referral to Pet Therapy     [] Make referral to Addiction services  [] Make referral to [facility]  [] Make referral to Spiritual Care Partner  [] No future visits requested        [x] Contact Spiritual Care for further referrals     Comments:   Spiritual care assessment with Ms. Arturo Hathaway on 8P6 Med Surg unit. Ms Hiram Coello had just finished walking with therapy when encounter took place. Her son, Kalia Mcdaniel is also there sharing much about her life. Spiritual care volunteer Bindu Amaral is leading this visit while Lux Zepeda is shadowing. Kalia Mcdaniel shared his mothers events bringing her to hospital. That he has a brother and sister in different states, making him one of the primary care takers. He shared that Ms Hiram Coello lives independently in an apartment where assisted living is available. Bindu Amaral sat with her and had fairly long conversation with Ms Hiram Coello. She is hard of hearing, and when she could hear and understand she answered wittingly. She shared that she tries to catch a Anglican service on TV every morning when possible and if not a Anglican one, she said she catches Hartman Wright on [employer]. She enjoys this and mellissa is important to her. She accepted prayer from Bindu Amaral and her and her son expressed gratitude for the care and support.     Chaplain Warden Luis M.Div.  Odilia Aguilar ([id number])

## 2022-03-17 NOTE — PROGRESS NOTES
Problem: Pressure Injury - Risk of  Goal: *Prevention of pressure injury  Description: Document Maco Scale and appropriate interventions in the flowsheet. Outcome: Progressing Towards Goal  Note: Pressure Injury Interventions:  Sensory Interventions: Assess changes in LOC,Assess need for specialty bed,Keep linens dry and wrinkle-free,Maintain/enhance activity level,Minimize linen layers,Pressure redistribution bed/mattress (bed type)    Moisture Interventions: Absorbent underpads,Apply protective barrier, creams and emollients,Internal/External urinary devices,Maintain skin hydration (lotion/cream),Minimize layers    Activity Interventions: Assess need for specialty bed,Pressure redistribution bed/mattress(bed type),PT/OT evaluation    Mobility Interventions: Assess need for specialty bed,Pressure redistribution bed/mattress (bed type),PT/OT evaluation    Nutrition Interventions: Document food/fluid/supplement intake,Offer support with meals,snacks and hydration    Friction and Shear Interventions: Apply protective barrier, creams and emollients,Lift team/patient mobility team,Minimize layers                Problem: Patient Education: Go to Patient Education Activity  Goal: Patient/Family Education  Outcome: Progressing Towards Goal     Problem: Falls - Risk of  Goal: *Absence of Falls  Description: Document Tobin Blades Fall Risk and appropriate interventions in the flowsheet.   Outcome: Progressing Towards Goal  Note: Fall Risk Interventions:  Mobility Interventions: Bed/chair exit alarm,Communicate number of staff needed for ambulation/transfer,Patient to call before getting OOB,PT Consult for mobility concerns,OT consult for ADLs,Strengthening exercises (ROM-active/passive),Utilize walker, cane, or other assistive device    Mentation Interventions: Adequate sleep, hydration, pain control,Bed/chair exit alarm,Room close to nurse's station,Toileting rounds    Medication Interventions: Bed/chair exit alarm,Evaluate medications/consider consulting pharmacy,Patient to call before getting OOB,Teach patient to arise slowly    Elimination Interventions: Bed/chair exit alarm,Call light in reach,Patient to call for help with toileting needs,Toileting schedule/hourly rounds    History of Falls Interventions: Bed/chair exit alarm,Room close to nurse's station

## 2022-03-18 VITALS
DIASTOLIC BLOOD PRESSURE: 60 MMHG | OXYGEN SATURATION: 95 % | TEMPERATURE: 98.1 F | SYSTOLIC BLOOD PRESSURE: 124 MMHG | HEIGHT: 59 IN | HEART RATE: 76 BPM | BODY MASS INDEX: 22.8 KG/M2 | WEIGHT: 113.1 LBS | RESPIRATION RATE: 19 BRPM

## 2022-03-18 PROBLEM — S22.42XA CLOSED FRACTURE OF TWO RIBS OF LEFT SIDE: Status: ACTIVE | Noted: 2022-03-11

## 2022-03-18 PROBLEM — M00.9 SEPTIC ARTHRITIS OF ELBOW, LEFT (HCC): Status: ACTIVE | Noted: 2021-03-28

## 2022-03-18 PROBLEM — K56.609 SBO (SMALL BOWEL OBSTRUCTION) (HCC): Status: ACTIVE | Noted: 2020-11-30

## 2022-03-18 PROBLEM — R65.20 SEVERE SEPSIS (HCC): Status: ACTIVE | Noted: 2021-03-28

## 2022-03-18 PROBLEM — E11.9 DM TYPE 2 (DIABETES MELLITUS, TYPE 2) (HCC): Status: ACTIVE | Noted: 2022-03-11

## 2022-03-18 PROBLEM — A41.9 SEVERE SEPSIS (HCC): Status: ACTIVE | Noted: 2021-03-28

## 2022-03-18 PROBLEM — M00.9 SEPTIC JOINT (HCC): Status: ACTIVE | Noted: 2022-02-27

## 2022-03-18 PROBLEM — E03.9 HYPOTHYROIDISM: Status: ACTIVE | Noted: 2022-03-11

## 2022-03-18 PROCEDURE — 74011000250 HC RX REV CODE- 250: Performed by: INTERNAL MEDICINE

## 2022-03-18 PROCEDURE — 74011250637 HC RX REV CODE- 250/637: Performed by: INTERNAL MEDICINE

## 2022-03-18 PROCEDURE — 74011250636 HC RX REV CODE- 250/636: Performed by: INTERNAL MEDICINE

## 2022-03-18 PROCEDURE — 74011000258 HC RX REV CODE- 258: Performed by: INTERNAL MEDICINE

## 2022-03-18 RX ADMIN — SODIUM CHLORIDE 75 ML/HR: 9 INJECTION, SOLUTION INTRAVENOUS at 06:30

## 2022-03-18 RX ADMIN — LISINOPRIL 10 MG: 5 TABLET ORAL at 10:05

## 2022-03-18 RX ADMIN — LEVOTHYROXINE SODIUM 75 MCG: 0.07 TABLET ORAL at 06:30

## 2022-03-18 RX ADMIN — Medication 10 ML: at 06:29

## 2022-03-18 RX ADMIN — Medication 10 ML: at 14:00

## 2022-03-18 RX ADMIN — AZTREONAM 2 G: 2 INJECTION, POWDER, LYOPHILIZED, FOR SOLUTION INTRAMUSCULAR; INTRAVENOUS at 10:05

## 2022-03-18 RX ADMIN — ENOXAPARIN SODIUM 40 MG: 100 INJECTION SUBCUTANEOUS at 10:05

## 2022-03-18 RX ADMIN — AMLODIPINE BESYLATE 10 MG: 5 TABLET ORAL at 10:05

## 2022-03-18 RX ADMIN — ASPIRIN 81 MG: 81 TABLET, COATED ORAL at 10:05

## 2022-03-18 RX ADMIN — DOCUSATE SODIUM 50MG AND SENNOSIDES 8.6MG 1 TABLET: 8.6; 5 TABLET, FILM COATED ORAL at 10:06

## 2022-03-18 RX ADMIN — DAPTOMYCIN 350 MG: 500 INJECTION, POWDER, LYOPHILIZED, FOR SOLUTION INTRAVENOUS at 15:27

## 2022-03-18 RX ADMIN — POLYETHYLENE GLYCOL 3350 17 G: 17 POWDER, FOR SOLUTION ORAL at 10:06

## 2022-03-18 NOTE — PROGRESS NOTES
[facility]  [address] [facility], [id number] [place] [address]  [phone]      Medical Progress Note      NAME: Sunil Baker   :  1930  MRM:  [id number]    Date of service: 3/18/2022  8:45 AM       Assessment and Plan:   1. 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 CT scan and MRI of the head neg for acute changes. vit B12, TSH and ammonia are ok. Evaluated by neurology. EEG: no seizure. Has improved.       2. Septic arthritis of elbow, left ([address] (3/11/2022) / Acute osteomyelitis of left radius [facility]) (3/11/2022): recently discharged from hospital on 3/4 under a different MRN [id number]. At that time, MRI concerning for infection and aspiration revealed purulent material however cell count relatively benign. Taken to the OR  for I&D and bacterial and fungal cultures neg. AFB cultures pending. On dapto and aztreonam through . ID following      3. Rheumatoid arthritis ([address] (3/11/2022) / Physical debility / Closed fracture of two ribs of left side (3/11/2022): CT chest showed acute mildly displaced fractures of the left posterior sixth and seventh ribs. Pain control, incentive spirometry, PT, OT. Will need SNF     4. HTN (hypertension), benign (3/11/2022). Continue Amlodipine, Lisinopril     5. Hypothyroidism (3/11/2022): continue Levothyroxine. Normal TSH     6.  Urinary retention (3/11/2022): resolved. 7.  Hard of hearing. Supportive care           Subjective:     Chief Complaint[de-identified] Patient was seen and examined as a follow up for septic arthritis. Chart was reviewed. No events, awaiting placement      ROS:  (bold if positive, if negative)    Tolerating PT  Tolerating Diet        Objective:     Last 24hrs VS reviewed since prior progress note.  Most recent are:    Visit Vitals  /63 (BP 1 Location: Left upper arm, BP Patient Position: At rest)   Pulse 75   Temp 98.6 °F (37 °C)   Resp 18   Ht 4' 11\" (1.499 m)   Wt 51.3 kg (113 lb 1.5 oz)   SpO2 98%   Breastfeeding No   BMI 22.84 kg/m²     SpO2 Readings from Last 6 Encounters:   03/18/22 98%            Intake/Output Summary (Last 24 hours) at 3/18/2022 0908  Last data filed at 3/18/2022 0700  Gross per 24 hour   Intake 1260 ml   Output 2750 ml   Net -1490 ml        Physical Exam:    Gen:  Well-developed, well-nourished, in no acute distress  HEENT:  Pink conjunctivae, PERRL, hearing intact to voice, moist mucous membranes  Neck:  Supple, without masses, thyroid non-tender  Resp:  No accessory muscle use, clear breath sounds without wheezes rales or rhonchi  Card:  No murmurs, normal S1, S2 without thrills, bruits or peripheral edema  Abd:  Soft, non-tender, non-distended, normoactive bowel sounds are present, no palpable organomegaly and no detectable hernias  Lymph:  No cervical or inguinal adenopathy  Musc:  No cyanosis or clubbing  Skin:  No rashes or ulcers, skin turgor is good  Neuro:  Cranial nerves are grossly intact  Psych:  Poor insight   __________________________________________________________________  Medications Reviewed: (see below)  Medications:     Current Facility-Administered Medications   Medication Dose Route Frequency    polyethylene glycol (MIRALAX) packet 17 g  17 g Oral BID    senna-docusate (PERICOLACE) 8.6-50 mg per tablet 1 Tablet  1 Tablet Oral DAILY    aztreonam (AZACTAM) 2 g in 0.9% sodium chloride (MBP/ADV) 100 mL MBP  2 g IntraVENous Q12H    DAPTOmycin (CUBICIN) 350 mg in 0.9% sodium chloride 7 mL IV Syringe  350 mg IntraVENous QPM    sodium chloride (NS) flush 5-10 mL  5-10 mL IntraVENous PRN    sodium chloride (NS) flush 5-40 mL  5-40 mL IntraVENous Q8H    sodium chloride (NS) flush 5-40 mL  5-40 mL IntraVENous PRN    acetaminophen (TYLENOL) tablet 650 mg  650 mg Oral Q6H PRN    Or    acetaminophen (TYLENOL) suppository 650 mg  650 mg Rectal Q6H PRN    0.9% sodium chloride infusion  75 mL/hr IntraVENous CONTINUOUS    ondansetron (ZOFRAN) injection 4 mg  4 mg IntraVENous Q6H PRN    enoxaparin (LOVENOX) injection 40 mg  40 mg SubCUTAneous DAILY    amLODIPine (NORVASC) tablet 10 mg  10 mg Oral DAILY    lisinopriL (PRINIVIL, ZESTRIL) tablet 10 mg  10 mg Oral DAILY    aspirin delayed-release tablet 81 mg  81 mg Oral DAILY    levothyroxine (SYNTHROID) tablet 75 mcg  75 mcg Oral ACB        Lab Data Reviewed: (see below)  Lab Review:     No results for input(s): WBC, HGB, HCT, PLT, HGBEXT, HCTEXT, PLTEXT, HGBEXT, HCTEXT, PLTEXT in the last 72 hours. No results for input(s): NA, K, CL, CO2, GLU, BUN, CREA, CA, MG, PHOS, ALB, TBIL, TBILI, ALT, INR, INREXT, INREXT in the last 72 hours. No lab exists for component: SGOT  No results found for: GLUCPOC  No results for input(s): PH, PCO2, PO2, HCO3, FIO2 in the last 72 hours. No results for input(s): INR, INREXT, INREXT in the last 72 hours. All Micro Results     Procedure Component Value Units Date/Time    CULTURE, BLOOD [[id number]] Collected: 03/11/22 1410    Order Status: Completed Specimen: Blood Updated: 03/17/22 0542     Special Requests: NO SPECIAL REQUESTS        Culture result: NO GROWTH 6 DAYS       CULTURE, BLOOD [[id number]] Collected: 03/11/22 1407    Order Status: Completed Specimen: Blood Updated: 03/17/22 0542     Special Requests: NO SPECIAL REQUESTS        Culture result: NO GROWTH 6 DAYS       COVID-19 RAPID TEST [[id number]] Collected: 03/16/22 1126    Order Status: Completed Specimen: Nasopharyngeal Updated: 03/16/22 1229     Specimen source Nasopharyngeal        COVID-19 rapid test Not detected        Comment: Rapid Abbott ID Now       Rapid NAAT:  The specimen is NEGATIVE for SARS-CoV-2, the novel coronavirus associated with COVID-19. Negative results should be treated as presumptive and, if inconsistent with clinical signs and symptoms or necessary for patient management, should be tested with an alternative molecular assay.   Negative results do not preclude SARS-CoV-2 infection and should not be used as the sole basis for patient management decisions. This test has been authorized by the FDA under an Emergency Use Authorization (EUA) for use by authorized laboratories. Fact sheet for Healthcare Providers: ConventionUpdate.co.nz  Fact sheet for Patients: ConventionUpdate.co.nz       Methodology: Isothermal Nucleic Acid Amplification         URINE CULTURE HOLD SAMPLE [[id number]] Collected: 03/11/22 1340    Order Status: Completed Specimen: Serum Updated: 03/11/22 1355     Urine culture hold       Urine on hold in Microbiology dept for 2 days. If unpreserved urine is submitted, it cannot be used for addtional testing after 24 hours, recollection will be required. I have reviewed notes of prior 24hr. Other pertinent lab: Total time spent with patient: 28 I personally reviewed chart, notes, data and current medications in the medical record. I have personally examined and treated the patient at bedside during this period.                  Care Plan discussed with: Patient, Nursing Staff and >50% of time spent in counseling and coordination of care    Discussed:  Care Plan    Prophylaxis:  Lovenox    Disposition:  SNF/LTC           ___________________________________________________    Attending Physician: Yady Gaines MD

## 2022-03-18 NOTE — PROGRESS NOTES
Called report to Diann Batista at [facility]. Answered all questions, Left time for clarification. Estimate around 1600. No needs at this time. Will monitor closely.

## 2022-03-18 NOTE — PROGRESS NOTES
Bedside and Verbal shift change report given to Laxmi Luong [facility] (oncoming nurse) by Namrata Lee RN (offgoing nurse). Report included the following information SBAR, ED Summary, Procedure Summary, Intake/Output, MAR and Recent Results.

## 2022-03-18 NOTE — PROGRESS NOTES
Transition of Care Plan to SNF/Rehab    SNF/Rehab Transition:  Patient has been accepted to [facility] and meets criteria for admission. Patient will transported by Hospital to Home* and expected to leave at 4pm.    Communication to Patient/Family:  Met with patient and son (identified care giver) and they are agreeable to the transition plan. Communication to SNF/Rehab:  Bedside RN, Shavon Arrieta, has been notified to update the transition plan to the facility and call report (phone number [phone]). Nursing Please include all hard scripts for controlled substances, med rec and dc summary, and AVS in packet. Reviewed and confirmed with facility, [facility], can manage the patient care needs for the following:     SNF/Rehab Transition:  Patient to follow-up with Home Health: none at this time  PCP/Specialist: Patient will follow up with MD at facility, then PCP    Reviewed and confirmed with facility, [facility]* they can manage the patient care needs for the following:     Luisa Cartagena with (X) only those applicable:    Medication:  [x]  Medications will be available at the facility  [x]  IV Antibiotics   []  Controlled Substance - hard copy to be sent with patient   []  Weekly Labs   Documents:  [] Hard RX  [] MAR  [] Kardex  [x] AVS  []Transfer Summary  []Discharge   Equipment:  []  CPAP/BiPAP  []  Wound Vacuum  []  Miller or Urinary Device  [x]  PICC/Central Line  []  Nebulizer  []  Ventilator   Treatment:  []Isolation (for MRSA, VRE, etc.)  []Surgical Drain Management  []Tracheostomy Care  [x]Dressing Changes  []Dialysis with transportation and chair time. []PEG Care  []Oxygen  []Daily Weights for Heart Failure   Dietary:  []Any diet limitations  []Tube Feedings   []Total Parenteral Management (TPN)   Eligible for Medicaid Long Term Services and Supports  Yes:  [] Eligible for medical assistance or will become eligible within 180 days and UAI completed.    [] Provider/Patient and/or support system has requested screening. [] UAI copy provided to patient or responsible party. [] UAI unavailable at discharge will send once processed to SNF provider. [] UAI unavailable at discharged mailed to patient  No:   [] Private pay and is not financially eligible for Medicaid within the next 180 days. [] Reside out-of-state.   [] A residents of a state owned/operated facility that is licensed  by [address] [facility] or [facility]  [] Enrollment in [facility] services  [] [address] [place] [address]  [] Patient /Family declines to have screening completed or provide financial information for screening     Financial Resources:  Medicaid    [] Initiated and application pending   [] Full coverage     Advanced Care Plan:  []Surrogate Decision Maker of Care  []POA  [x]Communicated Code Status Full* (DDNR\", \"Full\")    Other     Financial Resources:  Medicaid    [] Initiated and application pending   [] Full coverage     Advanced Care Plan:  []Surrogate Decision Maker of Care  []POA  [x]Communicated Code Status *Full  (DDNR\", \"Full\")    Other     CB Delgado

## 2022-03-18 NOTE — DISCHARGE SUMMARY
Hospitalist Discharge Summary     Patient ID:    Mahi Gastelum  [id number]  19 y.o.  2/13/1930    Admit date of service: 3/11/2022    Discharge date of service: 3/18/2022    Admission Diagnoses: Toxic encephalopathy [G92.9]    Chronic Diagnoses:    Problem List as of 3/18/2022 Date Reviewed: 3/11/2022          Codes Class Noted - Resolved    * (Principal) Toxic encephalopathy ICD-10-CM: G92.9  ICD-9-CM: 349.82  3/11/2022 - Present        Closed fracture of two ribs of left side ICD-10-CM: S22.42XA  ICD-9-CM: 807.02  3/11/2022 - Present        Urinary retention ICD-10-CM: R33.9  ICD-9-CM: 788.20  3/11/2022 - Present        Septic arthritis of elbow, left ([facility] ICD-10-CM: M00.9  ICD-9-CM: 711.02  3/11/2022 - Present        DM type 2 (diabetes mellitus, type 2) ([facility] ICD-10-CM: E11.9  ICD-9-CM: 250.00  3/11/2022 - Present        Acute osteomyelitis of left radius ([facility] ICD-10-CM: U54.527  ICD-9-CM: 730.03  3/11/2022 - Present        Rheumatoid arthritis ([facility] ICD-10-CM: M06.9  ICD-9-CM: 714.0  3/11/2022 - Present        HTN (hypertension), benign ICD-10-CM: I10  ICD-9-CM: 401.1  3/11/2022 - Present        Hypothyroidism ICD-10-CM: E03.9  ICD-9-CM: 244.9  3/11/2022 - Present              Discharge Medications:   Current Discharge Medication List      CONTINUE these medications which have NOT CHANGED    Details   amLODIPine (NORVASC) 10 mg tablet Take  by mouth daily. lisinopriL (PRINIVIL, ZESTRIL) 10 mg tablet Take 10 mg by mouth daily. docusate sodium (COLACE) 100 mg capsule Take 100 mg by mouth two (2) times daily as needed for Constipation. aspirin delayed-release 81 mg tablet Take 81 mg by mouth daily. L.acid,para-B. bifidum-S.therm (RISAQUAD) 8 billion cell cap cap Take 1 Capsule by mouth daily. polyethylene glycol (Miralax) 17 gram packet Take 17 g by mouth daily.       acetaminophen (TYLENOL) 500 mg tablet Take  by mouth two (2) times daily as needed for Pain.      levothyroxine (SYNTHROID) 75 mcg tablet Take 75 mcg by mouth Daily (before breakfast). STOP taking these medications       doxycycline (ADOXA) 100 mg tablet Comments:   Reason for Stopping: Follow up Care:    1. Other, MD Nicole in 1-2 weeks  2. Diet:  Regular Diet    Disposition:  SNF. Advanced Directive:    Discharge Exam:  See today's note. CONSULTATIONS: ID    Significant Diagnostic Studies:   No results for input(s): WBC, HGB, HCT, PLT, HGBEXT, HCTEXT, PLTEXT in the last 72 hours. No results for input(s): NA, K, CL, CO2, BUN, CREA, GLU, CA, MG, PHOS, URICA in the last 72 hours. No results for input(s): ALT, AP, TBIL, TBILI, TP, ALB, GLOB, GGT, AML, LPSE in the last 72 hours. No lab exists for component: SGOT, GPT, AMYP, HLPSE  No results for input(s): INR, PTP, APTT, INREXT in the last 72 hours. No results for input(s): FE, TIBC, PSAT, FERR in the last 72 hours. No results for input(s): PH, PCO2, PO2 in the last 72 hours. No results for input(s): CPK, CKMB in the last 72 hours. No lab exists for component: TROPONINI  No results found for: [address]:   1. Toxic encephalopathy (3/11/2022): was presumed to be due to Ertapenem which was discontinued. CT scan and MRI of the head neg for acute changes. vit B12, TSH and ammonia are ok. Evaluated by neurology.  EEG: no seizure. Has improved.       2. Septic arthritis of elbow, left ([facility] (3/11/2022) / Acute osteomyelitis of left radius [facility]) (3/11/2022): recently discharged from hospital on 3/4 under a different MRN [id number]. At that time, MRI concerning for infection and aspiration revealed purulent material however cell count relatively benign. Taken to the OR 2/28 for I&D and bacterial and fungal cultures neg. AFB cultures pending. On dapto and aztreonam through 4/14.      3.   Rheumatoid arthritis ([facility] (3/11/2022) / Physical debility / Closed fracture of two ribs of left side (3/11/2022): CT chest showed acute mildly displaced fractures of the left posterior sixth and seventh ribs. Pain control, incentive spirometry, PT, OT.       4. HTN (hypertension), benign (3/11/2022). Continue Amlodipine, Lisinopril     5. Hypothyroidism (3/11/2022): continue Levothyroxine. Normal TSH     6.  Urinary retention (3/11/2022): resolved.       7.  Hard of hearing. Supportive care         Discharged in improved condition.     Spent 35 minutes    Signed:  Lubna Reeves MD  3/18/2022  11:48 AM

## 2022-03-18 NOTE — DISCHARGE INSTRUCTIONS
Patient Education     Altered Mental Status: Care Instructions  Your Care Instructions  Altered mental status is a change in how well your brain is working. As a result, you may be confused, be less alert than usual, or act in odd ways. This may include seeing or hearing things that aren't really there (hallucinations). A mental status change has many possible causes. For example, it may be the result of an infection, an imbalance of chemicals in the body, or a chronic disease such as diabetes or COPD. It can also be caused by things such as a head injury, taking certain medicines, or using alcohol or drugs. The doctor may do tests to look for the cause. These tests may include urine tests, blood tests, and imaging tests such as a CT scan. Sometimes a clear cause isn't found. But tests can help the doctor rule out a serious cause of your symptoms. A change in mental status can be scary. But mental status will often return to normal when the cause is treated. So it is important to get any follow-up testing or treatment the doctor has suggested. The doctor has checked you carefully, but problems can develop later. If you notice any problems or new symptoms, get medical treatment right away. Follow-up care is a key part of your treatment and safety. Be sure to make and go to all appointments, and call your doctor if you are having problems. It's also a good idea to know your test results and keep a list of the medicines you take. How can you care for yourself at home? · Be safe with medicines. Take your medicines exactly as prescribed. Call your doctor if you think you are having a problem with your medicine. · Have another adult stay with you until you are better. This can help keep you safe. Ask that person to watch for signs that your mental status is getting worse. When should you call for help? Call 911 anytime you think you may need emergency care.  For example, call if:  · You passed out (lost consciousness). Call your doctor now or seek immediate medical care if:  · Your mental status is getting worse. · You have new symptoms, such as a fever, chills, or shortness of breath. · You do not feel safe. Watch closely for changes in your health, and be sure to contact your doctor if:  · You do not get better as expected. Where can you learn more? Go to ALOSKO.be  Enter J452 in the search box to learn more about \"Altered Mental Status: Care Instructions. \"   © 2954-4824 [employer]wise, [employer]. Care instructions adapted under license by [facility] (which disclaims liability or warranty for this information). This care instruction is for use with your licensed healthcare professional. If you have questions about a medical condition or this instruction, always ask your healthcare professional. [address] any warranty or liability for your use of this information.   Content Version: 57.4.975507; Current as of: November 20, 2015         ACUTE DIAGNOSES:  Toxic encephalopathy [G92.9]    CHRONIC MEDICAL DIAGNOSES:  Problem List as of 3/18/2022 Date Reviewed: 3/11/2022          Codes Class Noted - Resolved    * (Principal) Toxic encephalopathy ICD-10-CM: G92.9  ICD-9-CM: 349.82  3/11/2022 - Present        Closed fracture of two ribs of left side ICD-10-CM: S22.42XA  ICD-9-CM: 807.02  3/11/2022 - Present        Urinary retention ICD-10-CM: R33.9  ICD-9-CM: 788.20  3/11/2022 - Present        Septic arthritis of elbow, left ([facility] ICD-10-CM: M00.9  ICD-9-CM: 711.02  3/11/2022 - Present        DM type 2 (diabetes mellitus, type 2) ([facility] ICD-10-CM: E11.9  ICD-9-CM: 250.00  3/11/2022 - Present        Acute osteomyelitis of left radius ([facility] ICD-10-CM: W25.736  ICD-9-CM: 730.03  3/11/2022 - Present        Rheumatoid arthritis ([facility] ICD-10-CM: M06.9  ICD-9-CM: 714.0  3/11/2022 - Present        HTN (hypertension), benign ICD-10-CM: I10  ICD-9-CM: 401.1  3/11/2022 - Present        Hypothyroidism ICD-10-CM: E03.9  ICD-9-CM: 244.9  3/11/2022 - Present              DISCHARGE MEDICATIONS:          · It is important that you take the medication exactly as they are prescribed. · Keep your medication in the bottles provided by the pharmacist and keep a list of the medication names, dosages, and times to be taken in your wallet. · Do not take other medications without consulting your doctor. DIET:  Regular Diet    ACTIVITY: Activity as tolerated    ADDITIONAL INFORMATION: If you experience any of the following symptoms then please call your primary care physician or return to the emergency room if you cannot get hold of your doctor: Fever, chills, nausea, vomiting, diarrhea, change in mentation, falling, bleeding, shortness of breath. FOLLOW UP CARE:              Information obtained by :  I understand that if any problems occur once I am at home I am to contact my physician. I understand and acknowledge receipt of the instructions indicated above.                                                                                                                                            Physician's or R.N.'s Signature                                                                  Date/Time                                                                                                                                              Patient or Representative Signature                                                          Date/Time

## 2022-03-18 NOTE — PROGRESS NOTES
Problem: Pressure Injury - Risk of  Goal: *Prevention of pressure injury  Description: Document Maco Scale and appropriate interventions in the flowsheet. Outcome: Progressing Towards Goal  Note: Pressure Injury Interventions:  Sensory Interventions: Assess changes in LOC,Assess need for specialty bed,Avoid rigorous massage over bony prominences,Check visual cues for pain,Discuss PT/OT consult with provider,Float heels,Keep linens dry and wrinkle-free,Maintain/enhance activity level,Minimize linen layers,Monitor skin under medical devices,Pad between skin to skin,Turn and reposition approx. every two hours (pillows and wedges if needed)    Moisture Interventions: Absorbent underpads,Apply protective barrier, creams and emollients,Check for incontinence Q2 hours and as needed,Internal/External urinary devices,Limit adult briefs,Maintain skin hydration (lotion/cream),Moisture barrier,Offer toileting Q_hr,Minimize layers    Activity Interventions: Assess need for specialty bed,Increase time out of bed,PT/OT evaluation    Mobility Interventions: Assess need for specialty bed,PT/OT evaluation,Turn and reposition approx. every two hours(pillow and wedges)    Nutrition Interventions: Document food/fluid/supplement intake,Discuss nutritional consult with provider,Offer support with meals,snacks and hydration    Friction and Shear Interventions: Apply protective barrier, creams and emollients,Foam dressings/transparent film/skin sealants,HOB 30 degrees or less,Lift team/patient mobility team,Lift sheet,Minimize layers,Transfer aides:transfer board/Rafy lift/ceiling lift,Transferring/repositioning devices                Problem: Patient Education: Go to Patient Education Activity  Goal: Patient/Family Education  Outcome: Progressing Towards Goal     Problem: Falls - Risk of  Goal: *Absence of Falls  Description: Document Jina Fall Risk and appropriate interventions in the flowsheet.   Outcome: Progressing Towards Goal  Note: Fall Risk Interventions:  Mobility Interventions: Bed/chair exit alarm,Communicate number of staff needed for ambulation/transfer,OT consult for ADLs,Patient to call before getting OOB,PT Consult for mobility concerns,PT Consult for assist device competence,Utilize walker, cane, or other assistive device    Mentation Interventions: Adequate sleep, hydration, pain control,Bed/chair exit alarm,Room close to nurse's station,Toileting rounds    Medication Interventions: Bed/chair exit alarm,Evaluate medications/consider consulting pharmacy,Patient to call before getting OOB    Elimination Interventions: Bed/chair exit alarm,Call light in reach,Patient to call for help with toileting needs    History of Falls Interventions: Bed/chair exit alarm,Investigate reason for fall,Assess for delayed presentation/identification of injury for 48 hrs (comment for end date)         Problem: Patient Education: Go to Patient Education Activity  Goal: Patient/Family Education  Outcome: Progressing Towards Goal     Problem: Patient Education: Go to Patient Education Activity  Goal: Patient/Family Education  Outcome: Progressing Towards Goal     Problem: Patient Education: Go to Patient Education Activity  Goal: Patient/Family Education  Outcome: Progressing Towards Goal     Problem: Nutrition Deficit  Goal: *Optimize nutritional status  Outcome: Progressing Towards Goal

## 2022-03-18 NOTE — PROGRESS NOTES
3/18/2022  Case Management Progress Note    11:56 AM  Patient is 80year old female admitted 3/11 with toxic encephalopathy  Patient's RUR is 10% green/low risk for readmission  Covid test: negative 3/16  Chart reviewed--patient discussed at IDR rounds this morning  Patient has authorization to go to the [id number] [facility] today. Informed family and they are agreeable to wheelchair [facility] [place] [facility] [place] being set up. I have set up Hospital to Home for 4pm this afternoon with the time being at the request of the [facility]. [facility] note to come. Transition of Care Plan   1. Discharge today, order already placed  2. To the [facility]   3. Hospital to Home set up for 4pm   4.  Ok for discharge from [facility], MSW

## 2022-03-19 PROBLEM — M86.132: Status: ACTIVE | Noted: 2022-03-11

## 2022-03-19 PROBLEM — I10 HTN (HYPERTENSION), BENIGN: Status: ACTIVE | Noted: 2022-03-11

## 2022-03-19 PROBLEM — R11.2 INTRACTABLE NAUSEA AND VOMITING: Status: ACTIVE | Noted: 2021-03-28

## 2022-03-19 PROBLEM — M06.9 RHEUMATOID ARTHRITIS (HCC): Status: ACTIVE | Noted: 2022-03-11

## 2022-03-19 PROBLEM — K57.32 DIVERTICULITIS OF SIGMOID COLON: Status: ACTIVE | Noted: 2021-03-28

## 2022-03-19 PROBLEM — N18.9 CHRONIC KIDNEY DISEASE (CKD): Status: ACTIVE | Noted: 2017-09-18

## 2022-03-19 PROBLEM — D84.821 IMMUNOCOMPROMISED STATE DUE TO DRUG THERAPY (HCC): Status: ACTIVE | Noted: 2021-03-28

## 2022-03-19 PROBLEM — R33.9 URINARY RETENTION: Status: ACTIVE | Noted: 2022-03-11

## 2022-03-19 PROBLEM — M00.9 SEPTIC ARTHRITIS OF ELBOW, LEFT (HCC): Status: ACTIVE | Noted: 2022-03-11

## 2022-03-19 PROBLEM — G92.9 TOXIC ENCEPHALOPATHY: Status: ACTIVE | Noted: 2022-03-11

## 2022-03-19 PROBLEM — K57.92 ACUTE DIVERTICULITIS: Status: ACTIVE | Noted: 2020-12-02

## 2022-03-19 PROBLEM — Z79.899 IMMUNOCOMPROMISED STATE DUE TO DRUG THERAPY (HCC): Status: ACTIVE | Noted: 2021-03-28

## 2022-03-28 LAB
BACTERIA SPEC CULT: NORMAL
SERVICE CMNT-IMP: NORMAL

## 2022-03-30 ENCOUNTER — OFFICE VISIT (OUTPATIENT)
Dept: INFECTIOUS DISEASES | Age: 87
End: 2022-03-30
Payer: MEDICARE

## 2022-03-30 VITALS
HEIGHT: 59 IN | HEART RATE: 80 BPM | OXYGEN SATURATION: 98 % | WEIGHT: 113 LBS | TEMPERATURE: 97 F | BODY MASS INDEX: 22.78 KG/M2 | SYSTOLIC BLOOD PRESSURE: 108 MMHG | DIASTOLIC BLOOD PRESSURE: 50 MMHG

## 2022-03-30 DIAGNOSIS — M00.9 PYOGENIC ARTHRITIS OF LEFT ELBOW, DUE TO UNSPECIFIED ORGANISM (HCC): Primary | ICD-10-CM

## 2022-03-30 PROCEDURE — 99214 OFFICE O/P EST MOD 30 MIN: CPT | Performed by: INTERNAL MEDICINE

## 2022-03-30 PROCEDURE — G8428 CUR MEDS NOT DOCUMENT: HCPCS | Performed by: INTERNAL MEDICINE

## 2022-03-30 PROCEDURE — G8420 CALC BMI NORM PARAMETERS: HCPCS | Performed by: INTERNAL MEDICINE

## 2022-03-30 PROCEDURE — G8432 DEP SCR NOT DOC, RNG: HCPCS | Performed by: INTERNAL MEDICINE

## 2022-03-30 PROCEDURE — 1111F DSCHRG MED/CURRENT MED MERGE: CPT | Performed by: INTERNAL MEDICINE

## 2022-03-30 PROCEDURE — G8536 NO DOC ELDER MAL SCRN: HCPCS | Performed by: INTERNAL MEDICINE

## 2022-03-30 PROCEDURE — 1101F PT FALLS ASSESS-DOCD LE1/YR: CPT | Performed by: INTERNAL MEDICINE

## 2022-03-30 PROCEDURE — 1090F PRES/ABSN URINE INCON ASSESS: CPT | Performed by: INTERNAL MEDICINE

## 2022-03-30 RX ORDER — TAMSULOSIN HYDROCHLORIDE 0.4 MG/1
0.4 CAPSULE ORAL DAILY
COMMUNITY

## 2022-03-30 RX ORDER — DAPTOMYCIN 350 MG/7ML
INJECTION, POWDER, LYOPHILIZED, FOR SOLUTION INTRAVENOUS
COMMUNITY
End: 2022-09-22

## 2022-03-30 RX ORDER — AMOXICILLIN 250 MG
1 CAPSULE ORAL DAILY
COMMUNITY

## 2022-03-30 RX ORDER — SIMETHICONE 80 MG
80 TABLET,CHEWABLE ORAL
COMMUNITY

## 2022-03-30 NOTE — PROGRESS NOTES
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 Culture negative. Fungal and AFB cultures negative as of 3/30/2022. Patient tolerating daptomycin and aztreonam.  Plan to continue therapy through 4/14/2022.   Laboratory results requested from the [facility]  · Recent encephalopathy due to ertapenem   · Rheumatoid arthritis.  Was on methotrexate in the past  · Abdominal aortic aneurysm.  Avoid quinolone antibiotics  · Amoxicillin allergy.  This caused a rash in the past.  Able to tolerate cephalosporins  · Diabetes mellitus          Subjective: Tolerating antibiotics. No complaints    Objective:     Vitals:   Visit Vitals  BP (!) 108/50 (BP 1 Location: Left upper arm, BP Patient Position: Sitting, BP Cuff Size: Adult)   Pulse 80   Temp 97 °F (36.1 °C) (Temporal)   Ht 4' 11\" (1.499 m)   Wt 113 lb (51.3 kg)   SpO2 98%   BMI 22.82 kg/m²          Exam:    Physical Examination:   General:  Alert, cooperative, no distress   Head:  Normocephalic, atraumatic. Eyes:  Conjunctivae clear   Neck: Supple       Lungs:    No distress   Chest wall:     Heart:     Abdomen:    non-distended   Extremities: Moves all. Left elbow incision site unremarkable. No warmth or erythema. No pain with flexion or extension. Right upper extremity PICC site unremarkable   Skin:  No rash   Neurologic: CNII-XII intact. Labs:        No lab exists for component: ITNL   No results for input(s): CPK, CKMB, TROIQ in the last 72 hours. No results for input(s): NA, K, CL, CO2, BUN, CREA, GLU, PHOS, MG, ALB, WBC, HGB, HCT, PLT, HGBEXT, HCTEXT, PLTEXT in the last 72 hours. No lab exists for component:  CA  No results for input(s): INR, PTP, APTT, INREXT in the last 72 hours.   Needs: urine analysis, urine sodium, protein and creatinine  Lab Results   Component Value Date/Time    Sodium,urine random 105 03/28/2021 02:18 AM         Cultures:     Lab Results   Component Value Date/Time    Specimen Description: BLOOD 05/01/2011 09:45 AM     Lab Results   Component Value Date/Time    Culture result: NO GROWTH 6 DAYS 03/11/2022 02:10 PM    Culture result: NO GROWTH 6 DAYS 03/11/2022 02:07 PM    Culture result: NO FUNGUS ISOLATED 28 DAYS 02/28/2022 07:32 PM       Radiology:     Medications       Current Outpatient Medications   Medication Sig Dispense    DAPTOmycin (CUBICIN) 350 mg injection by IntraVENous route.  tamsulosin (Flomax) 0.4 mg capsule Take 0.4 mg by mouth daily.  senna-docusate (Senokot-S) 8.6-50 mg per tablet Take 1 Tablet by mouth daily.  simethicone (MYLICON) 80 mg chewable tablet Take 80 mg by mouth every six (6) hours as needed for Flatulence.  levothyroxine (SYNTHROID) 75 mcg tablet Take 75 mcg by mouth Daily (before breakfast).  lisinopriL (PRINIVIL, ZESTRIL) 10 mg tablet Take 1 Tablet by mouth daily for 30 days. 30 Tablet    folic acid (FOLVITE) 1 mg tablet Take 1 mg by mouth daily.  docusate sodium (Colace) 100 mg capsule Take 100 mg by mouth two (2) times daily as needed for Constipation.  polyethylene glycol (Miralax) 17 gram packet Take 1 Packet by mouth daily. 30 Packet    amLODIPine (NORVASC) 10 mg tablet Take 10 mg by mouth daily.  acetaminophen (Tylenol Extra Strength) 500 mg tablet Take 500 mg by mouth two (2) times daily as needed for Pain.  multivit-min/iron/folic/lutein (CENTRUM SILVER WOMEN PO) Take 1 Tab by mouth every other day.  cholecalciferol (VITAMIN D3) 1,000 unit cap Take 2,000 Units by mouth daily.  aspirin delayed-release 81 mg tablet Take 81 mg by mouth daily.  lidocaine (Lidoderm) 5 % Apply patch to the affected area for 12 hours a day and remove for 12 hours a day.  (Patient not taking: Reported on 3/30/2022) 30 Each    doxycycline (ADOXA) 100 mg tablet Take 1 Tablet by mouth two (2) times a day for 40 days. (Patient not taking: Reported on 3/30/2022) 80 Tablet    L.acidoph & parac-S.therm-Bifido (BRENNEN Q2/RISAQUAD-2) 16 billion cell cap cap Take 1 Cap by mouth daily. 30 Cap     No current facility-administered medications for this visit. Case discussed with: There is concern patient was not getting IV antibiotics.   I contacted the [facility] facility and confirmed that patient was receiving IV antibiotics as ordered      Felipa Montilla,

## 2022-03-30 NOTE — PROGRESS NOTES
Identified pt with two pt identifiers. Reviewed record in preparation for visit and have obtained necessary documentation. All patient medications has been reviewed. Chief Complaint   Patient presents with   [facility] Follow Up     Additional information about chief complaint:    Visit Vitals  BP (!) 108/50 (BP 1 Location: Left upper arm, BP Patient Position: Sitting, BP Cuff Size: Adult)   Pulse 80   Temp 97 °F (36.1 °C) (Temporal)   Ht 4' 11\" (1.499 m)   Wt 113 lb (51.3 kg)   SpO2 98%   BMI 22.82 kg/m²       Health Maintenance Due   Topic    Foot Exam Q1     MICROALBUMIN Q1     Eye Exam Retinal or Dilated     COVID-19 Vaccine (1)    DTaP/Tdap/Td series (1 - Tdap)    Shingrix Vaccine Age 50> (1 of 2)    Pneumococcal 65+ yrs at Risk Vaccine (1 of 2 - PCV13)    Flu Vaccine (1)    Medicare Yearly Exam        1. Have you been to the ER, urgent care clinic since your last visit? Hospitalized since your last visit? Yes seen in the ER for a fall after her discharge. 2. Have you seen or consulted any other health care providers outside of the [address] [place] [address] since your last visit?   No

## 2022-04-05 RX ORDER — DOXYCYCLINE 100 MG/1
100 TABLET ORAL 2 TIMES DAILY
Qty: 80 TABLET | Refills: 0 | Status: SHIPPED | OUTPATIENT
Start: 2022-04-05 | End: 2022-05-15

## 2022-04-13 ENCOUNTER — TELEPHONE (OUTPATIENT)
Dept: FAMILY MEDICINE CLINIC | Age: 87
End: 2022-04-13

## 2022-04-13 NOTE — TELEPHONE ENCOUNTER
Audra Wadsworthw with [facility] called verify end of therapy and okay to pull PICC line.   Please call [phone]

## 2022-04-13 NOTE — TELEPHONE ENCOUNTER
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 Culture negative. Fungal and AFB cultures negative as of 3/30/2022. Patient tolerating daptomycin and aztreonam.  Plan to continue therapy through 4/14/2022. Hiram Simmons and he has been advised therapy ends 04/14/2022, not chart note, and PICC is also to be pulled.

## 2022-04-14 LAB
ACID FAST STN SPEC: NEGATIVE
MYCOBACTERIUM SPEC QL CULT: NEGATIVE
SPECIMEN PREPARATION: NORMAL
SPECIMEN SOURCE: NORMAL

## 2022-09-18 ENCOUNTER — HOSPITAL ENCOUNTER (INPATIENT)
Age: 87
LOS: 3 days | Discharge: HOME HEALTH | DRG: 872 | End: 2022-09-22
Attending: STUDENT IN AN ORGANIZED HEALTH CARE EDUCATION/TRAINING PROGRAM | Admitting: HOSPITALIST
Payer: MEDICARE

## 2022-09-18 DIAGNOSIS — K57.92 DIVERTICULITIS: Primary | ICD-10-CM

## 2022-09-18 DIAGNOSIS — D72.829 LEUKOCYTOSIS, UNSPECIFIED TYPE: ICD-10-CM

## 2022-09-18 PROCEDURE — 99285 EMERGENCY DEPT VISIT HI MDM: CPT

## 2022-09-19 ENCOUNTER — APPOINTMENT (OUTPATIENT)
Dept: CT IMAGING | Age: 87
DRG: 872 | End: 2022-09-19
Attending: STUDENT IN AN ORGANIZED HEALTH CARE EDUCATION/TRAINING PROGRAM
Payer: MEDICARE

## 2022-09-19 ENCOUNTER — APPOINTMENT (OUTPATIENT)
Dept: GENERAL RADIOLOGY | Age: 87
DRG: 872 | End: 2022-09-19
Attending: INTERNAL MEDICINE
Payer: MEDICARE

## 2022-09-19 PROBLEM — K57.92 DIVERTICULITIS: Status: ACTIVE | Noted: 2022-09-19

## 2022-09-19 LAB
ALBUMIN SERPL-MCNC: 2.2 G/DL (ref 3.5–5)
ALBUMIN SERPL-MCNC: 2.4 G/DL (ref 3.5–5)
ALBUMIN/GLOB SERPL: 0.5 {RATIO} (ref 1.1–2.2)
ALBUMIN/GLOB SERPL: 0.5 {RATIO} (ref 1.1–2.2)
ALP SERPL-CCNC: 137 U/L (ref 45–117)
ALP SERPL-CCNC: 96 U/L (ref 45–117)
ALT SERPL-CCNC: 21 U/L (ref 12–78)
ALT SERPL-CCNC: 26 U/L (ref 12–78)
ANION GAP SERPL CALC-SCNC: 14 MMOL/L (ref 5–15)
ANION GAP SERPL CALC-SCNC: 8 MMOL/L (ref 5–15)
APPEARANCE UR: ABNORMAL
AST SERPL-CCNC: 20 U/L (ref 15–37)
AST SERPL-CCNC: 28 U/L (ref 15–37)
BACTERIA URNS QL MICRO: NEGATIVE /HPF
BASOPHILS # BLD: 0 K/UL (ref 0–0.1)
BASOPHILS # BLD: 0 K/UL (ref 0–0.1)
BASOPHILS NFR BLD: 0 % (ref 0–1)
BASOPHILS NFR BLD: 0 % (ref 0–1)
BILIRUB SERPL-MCNC: 0.3 MG/DL (ref 0.2–1)
BILIRUB SERPL-MCNC: 0.5 MG/DL (ref 0.2–1)
BILIRUB UR QL CFM: NEGATIVE
BUN SERPL-MCNC: 32 MG/DL (ref 6–20)
BUN SERPL-MCNC: 37 MG/DL (ref 6–20)
BUN/CREAT SERPL: 24 (ref 12–20)
BUN/CREAT SERPL: 32 (ref 12–20)
CALCIUM SERPL-MCNC: 8.9 MG/DL (ref 8.5–10.1)
CALCIUM SERPL-MCNC: 9.3 MG/DL (ref 8.5–10.1)
CHLORIDE SERPL-SCNC: 94 MMOL/L (ref 97–108)
CHLORIDE SERPL-SCNC: 98 MMOL/L (ref 97–108)
CO2 SERPL-SCNC: 20 MMOL/L (ref 21–32)
CO2 SERPL-SCNC: 24 MMOL/L (ref 21–32)
COLOR UR: ABNORMAL
COMMENT, HOLDF: NORMAL
CREAT SERPL-MCNC: 1.14 MG/DL (ref 0.55–1.02)
CREAT SERPL-MCNC: 1.31 MG/DL (ref 0.55–1.02)
DIFFERENTIAL METHOD BLD: ABNORMAL
DIFFERENTIAL METHOD BLD: ABNORMAL
EOSINOPHIL # BLD: 0 K/UL (ref 0–0.4)
EOSINOPHIL # BLD: 0 K/UL (ref 0–0.4)
EOSINOPHIL NFR BLD: 0 % (ref 0–7)
EOSINOPHIL NFR BLD: 0 % (ref 0–7)
EPITH CASTS URNS QL MICRO: ABNORMAL /LPF
ERYTHROCYTE [DISTWIDTH] IN BLOOD BY AUTOMATED COUNT: 16.6 % (ref 11.5–14.5)
ERYTHROCYTE [DISTWIDTH] IN BLOOD BY AUTOMATED COUNT: 16.8 % (ref 11.5–14.5)
EST. AVERAGE GLUCOSE BLD GHB EST-MCNC: 111 MG/DL
GLOBULIN SER CALC-MCNC: 4.4 G/DL (ref 2–4)
GLOBULIN SER CALC-MCNC: 5.1 G/DL (ref 2–4)
GLUCOSE BLD STRIP.AUTO-MCNC: 122 MG/DL (ref 65–117)
GLUCOSE BLD STRIP.AUTO-MCNC: 135 MG/DL (ref 65–117)
GLUCOSE SERPL-MCNC: 148 MG/DL (ref 65–100)
GLUCOSE SERPL-MCNC: 230 MG/DL (ref 65–100)
GLUCOSE UR STRIP.AUTO-MCNC: NEGATIVE MG/DL
HBA1C MFR BLD: 5.5 % (ref 4–5.6)
HCT VFR BLD AUTO: 31.5 % (ref 35–47)
HCT VFR BLD AUTO: 38.1 % (ref 35–47)
HGB BLD-MCNC: 10.4 G/DL (ref 11.5–16)
HGB BLD-MCNC: 12.6 G/DL (ref 11.5–16)
HGB UR QL STRIP: NEGATIVE
HYALINE CASTS URNS QL MICRO: ABNORMAL /LPF (ref 0–5)
IMM GRANULOCYTES # BLD AUTO: 0.3 K/UL (ref 0–0.04)
IMM GRANULOCYTES # BLD AUTO: 0.3 K/UL (ref 0–0.04)
IMM GRANULOCYTES NFR BLD AUTO: 1 % (ref 0–0.5)
IMM GRANULOCYTES NFR BLD AUTO: 1 % (ref 0–0.5)
KETONES UR QL STRIP.AUTO: ABNORMAL MG/DL
LACTATE BLD-SCNC: 1.77 MMOL/L (ref 0.4–2)
LEUKOCYTE ESTERASE UR QL STRIP.AUTO: ABNORMAL
LIPASE SERPL-CCNC: 96 U/L (ref 73–393)
LYMPHOCYTES # BLD: 0.5 K/UL (ref 0.8–3.5)
LYMPHOCYTES # BLD: 0.8 K/UL (ref 0.8–3.5)
LYMPHOCYTES NFR BLD: 2 % (ref 12–49)
LYMPHOCYTES NFR BLD: 3 % (ref 12–49)
MCH RBC QN AUTO: 26.6 PG (ref 26–34)
MCH RBC QN AUTO: 27.5 PG (ref 26–34)
MCHC RBC AUTO-ENTMCNC: 33 G/DL (ref 30–36.5)
MCHC RBC AUTO-ENTMCNC: 33.1 G/DL (ref 30–36.5)
MCV RBC AUTO: 80.6 FL (ref 80–99)
MCV RBC AUTO: 83 FL (ref 80–99)
MONOCYTES # BLD: 0.3 K/UL (ref 0–1)
MONOCYTES # BLD: 0.3 K/UL (ref 0–1)
MONOCYTES NFR BLD: 1 % (ref 5–13)
MONOCYTES NFR BLD: 1 % (ref 5–13)
NEUTS SEG # BLD: 24.3 K/UL (ref 1.8–8)
NEUTS SEG # BLD: 25.2 K/UL (ref 1.8–8)
NEUTS SEG NFR BLD: 95 % (ref 32–75)
NEUTS SEG NFR BLD: 96 % (ref 32–75)
NITRITE UR QL STRIP.AUTO: NEGATIVE
NRBC # BLD: 0 K/UL (ref 0–0.01)
NRBC # BLD: 0 K/UL (ref 0–0.01)
NRBC BLD-RTO: 0 PER 100 WBC
NRBC BLD-RTO: 0 PER 100 WBC
PH UR STRIP: 5 [PH] (ref 5–8)
PLATELET # BLD AUTO: 472 K/UL (ref 150–400)
PLATELET # BLD AUTO: 568 K/UL (ref 150–400)
PMV BLD AUTO: 9.1 FL (ref 8.9–12.9)
PMV BLD AUTO: 9.4 FL (ref 8.9–12.9)
POTASSIUM SERPL-SCNC: 4.4 MMOL/L (ref 3.5–5.1)
POTASSIUM SERPL-SCNC: 4.4 MMOL/L (ref 3.5–5.1)
PROT SERPL-MCNC: 6.6 G/DL (ref 6.4–8.2)
PROT SERPL-MCNC: 7.5 G/DL (ref 6.4–8.2)
PROT UR STRIP-MCNC: 30 MG/DL
RBC # BLD AUTO: 3.91 M/UL (ref 3.8–5.2)
RBC # BLD AUTO: 4.59 M/UL (ref 3.8–5.2)
RBC #/AREA URNS HPF: ABNORMAL /HPF (ref 0–5)
RBC MORPH BLD: ABNORMAL
RBC MORPH BLD: ABNORMAL
SAMPLES BEING HELD,HOLD: NORMAL
SERVICE CMNT-IMP: ABNORMAL
SERVICE CMNT-IMP: ABNORMAL
SODIUM SERPL-SCNC: 128 MMOL/L (ref 136–145)
SODIUM SERPL-SCNC: 130 MMOL/L (ref 136–145)
SP GR UR REFRACTOMETRY: 1.02 (ref 1–1.03)
UA: UC IF INDICATED,UAUC: ABNORMAL
UROBILINOGEN UR QL STRIP.AUTO: 1 EU/DL (ref 0.2–1)
WBC # BLD AUTO: 25.7 K/UL (ref 3.6–11)
WBC # BLD AUTO: 26.3 K/UL (ref 3.6–11)
WBC URNS QL MICRO: ABNORMAL /HPF (ref 0–4)

## 2022-09-19 PROCEDURE — 83605 ASSAY OF LACTIC ACID: CPT

## 2022-09-19 PROCEDURE — 74011250636 HC RX REV CODE- 250/636: Performed by: INTERNAL MEDICINE

## 2022-09-19 PROCEDURE — 80053 COMPREHEN METABOLIC PANEL: CPT

## 2022-09-19 PROCEDURE — 82962 GLUCOSE BLOOD TEST: CPT

## 2022-09-19 PROCEDURE — 96366 THER/PROPH/DIAG IV INF ADDON: CPT

## 2022-09-19 PROCEDURE — 96375 TX/PRO/DX INJ NEW DRUG ADDON: CPT

## 2022-09-19 PROCEDURE — 74011250636 HC RX REV CODE- 250/636: Performed by: HOSPITALIST

## 2022-09-19 PROCEDURE — 97535 SELF CARE MNGMENT TRAINING: CPT

## 2022-09-19 PROCEDURE — 81001 URINALYSIS AUTO W/SCOPE: CPT

## 2022-09-19 PROCEDURE — 65270000029 HC RM PRIVATE

## 2022-09-19 PROCEDURE — 74011250637 HC RX REV CODE- 250/637: Performed by: HOSPITALIST

## 2022-09-19 PROCEDURE — 83690 ASSAY OF LIPASE: CPT

## 2022-09-19 PROCEDURE — 85025 COMPLETE CBC W/AUTO DIFF WBC: CPT

## 2022-09-19 PROCEDURE — 96365 THER/PROPH/DIAG IV INF INIT: CPT

## 2022-09-19 PROCEDURE — 36415 COLL VENOUS BLD VENIPUNCTURE: CPT

## 2022-09-19 PROCEDURE — 74011000250 HC RX REV CODE- 250: Performed by: STUDENT IN AN ORGANIZED HEALTH CARE EDUCATION/TRAINING PROGRAM

## 2022-09-19 PROCEDURE — 87040 BLOOD CULTURE FOR BACTERIA: CPT

## 2022-09-19 PROCEDURE — 74176 CT ABD & PELVIS W/O CONTRAST: CPT

## 2022-09-19 PROCEDURE — 74011000250 HC RX REV CODE- 250: Performed by: HOSPITALIST

## 2022-09-19 PROCEDURE — 96376 TX/PRO/DX INJ SAME DRUG ADON: CPT

## 2022-09-19 PROCEDURE — 97165 OT EVAL LOW COMPLEX 30 MIN: CPT

## 2022-09-19 PROCEDURE — 74011250636 HC RX REV CODE- 250/636: Performed by: STUDENT IN AN ORGANIZED HEALTH CARE EDUCATION/TRAINING PROGRAM

## 2022-09-19 PROCEDURE — 83036 HEMOGLOBIN GLYCOSYLATED A1C: CPT

## 2022-09-19 PROCEDURE — 74018 RADEX ABDOMEN 1 VIEW: CPT

## 2022-09-19 RX ORDER — MORPHINE SULFATE 4 MG/ML
4 INJECTION INTRAVENOUS
Status: COMPLETED | OUTPATIENT
Start: 2022-09-19 | End: 2022-09-19

## 2022-09-19 RX ORDER — HEPARIN SODIUM 5000 [USP'U]/ML
5000 INJECTION, SOLUTION INTRAVENOUS; SUBCUTANEOUS EVERY 8 HOURS
Status: DISCONTINUED | OUTPATIENT
Start: 2022-09-19 | End: 2022-09-22 | Stop reason: HOSPADM

## 2022-09-19 RX ORDER — MAGNESIUM SULFATE 100 %
4 CRYSTALS MISCELLANEOUS AS NEEDED
Status: DISCONTINUED | OUTPATIENT
Start: 2022-09-19 | End: 2022-09-22 | Stop reason: HOSPADM

## 2022-09-19 RX ORDER — OXYCODONE HYDROCHLORIDE 5 MG/1
5 TABLET ORAL
Status: DISCONTINUED | OUTPATIENT
Start: 2022-09-19 | End: 2022-09-22 | Stop reason: HOSPADM

## 2022-09-19 RX ORDER — SODIUM CHLORIDE 0.9 % (FLUSH) 0.9 %
5-40 SYRINGE (ML) INJECTION AS NEEDED
Status: DISCONTINUED | OUTPATIENT
Start: 2022-09-19 | End: 2022-09-22 | Stop reason: HOSPADM

## 2022-09-19 RX ORDER — SODIUM CHLORIDE 9 MG/ML
100 INJECTION, SOLUTION INTRAVENOUS CONTINUOUS
Status: DISCONTINUED | OUTPATIENT
Start: 2022-09-19 | End: 2022-09-19

## 2022-09-19 RX ORDER — ONDANSETRON 2 MG/ML
4 INJECTION INTRAMUSCULAR; INTRAVENOUS
Status: COMPLETED | OUTPATIENT
Start: 2022-09-19 | End: 2022-09-19

## 2022-09-19 RX ORDER — DEXTROSE MONOHYDRATE 100 MG/ML
0-250 INJECTION, SOLUTION INTRAVENOUS AS NEEDED
Status: DISCONTINUED | OUTPATIENT
Start: 2022-09-19 | End: 2022-09-22 | Stop reason: HOSPADM

## 2022-09-19 RX ORDER — METRONIDAZOLE 500 MG/100ML
500 INJECTION, SOLUTION INTRAVENOUS
Status: COMPLETED | OUTPATIENT
Start: 2022-09-19 | End: 2022-09-19

## 2022-09-19 RX ORDER — TAMSULOSIN HYDROCHLORIDE 0.4 MG/1
0.4 CAPSULE ORAL DAILY
Status: DISCONTINUED | OUTPATIENT
Start: 2022-09-19 | End: 2022-09-19

## 2022-09-19 RX ORDER — INSULIN GLARGINE 100 [IU]/ML
0.2 INJECTION, SOLUTION SUBCUTANEOUS DAILY
Status: DISCONTINUED | OUTPATIENT
Start: 2022-09-19 | End: 2022-09-19

## 2022-09-19 RX ORDER — SODIUM CHLORIDE 9 MG/ML
125 INJECTION, SOLUTION INTRAVENOUS CONTINUOUS
Status: DISCONTINUED | OUTPATIENT
Start: 2022-09-19 | End: 2022-09-19 | Stop reason: SDUPTHER

## 2022-09-19 RX ORDER — LEVOFLOXACIN 5 MG/ML
750 INJECTION, SOLUTION INTRAVENOUS
Status: DISCONTINUED | OUTPATIENT
Start: 2022-09-19 | End: 2022-09-22 | Stop reason: HOSPADM

## 2022-09-19 RX ORDER — POLYETHYLENE GLYCOL 3350 17 G/17G
17 POWDER, FOR SOLUTION ORAL DAILY
Status: DISCONTINUED | OUTPATIENT
Start: 2022-09-20 | End: 2022-09-22 | Stop reason: HOSPADM

## 2022-09-19 RX ORDER — METRONIDAZOLE 500 MG/100ML
500 INJECTION, SOLUTION INTRAVENOUS EVERY 12 HOURS
Status: DISCONTINUED | OUTPATIENT
Start: 2022-09-19 | End: 2022-09-22 | Stop reason: HOSPADM

## 2022-09-19 RX ORDER — LEVOTHYROXINE SODIUM 75 UG/1
75 TABLET ORAL
Status: DISCONTINUED | OUTPATIENT
Start: 2022-09-19 | End: 2022-09-22 | Stop reason: HOSPADM

## 2022-09-19 RX ORDER — SODIUM CHLORIDE, SODIUM LACTATE, POTASSIUM CHLORIDE, CALCIUM CHLORIDE 600; 310; 30; 20 MG/100ML; MG/100ML; MG/100ML; MG/100ML
125 INJECTION, SOLUTION INTRAVENOUS CONTINUOUS
Status: DISCONTINUED | OUTPATIENT
Start: 2022-09-19 | End: 2022-09-22 | Stop reason: HOSPADM

## 2022-09-19 RX ORDER — ONDANSETRON 2 MG/ML
4 INJECTION INTRAMUSCULAR; INTRAVENOUS
Status: DISCONTINUED | OUTPATIENT
Start: 2022-09-19 | End: 2022-09-22 | Stop reason: HOSPADM

## 2022-09-19 RX ORDER — DOCUSATE SODIUM 100 MG/1
100 CAPSULE, LIQUID FILLED ORAL 2 TIMES DAILY
Status: DISCONTINUED | OUTPATIENT
Start: 2022-09-19 | End: 2022-09-22 | Stop reason: HOSPADM

## 2022-09-19 RX ORDER — METRONIDAZOLE 500 MG/100ML
500 INJECTION, SOLUTION INTRAVENOUS EVERY 8 HOURS
Status: DISCONTINUED | OUTPATIENT
Start: 2022-09-19 | End: 2022-09-19 | Stop reason: SDUPTHER

## 2022-09-19 RX ORDER — MORPHINE SULFATE 4 MG/ML
4 INJECTION INTRAVENOUS
Status: DISCONTINUED | OUTPATIENT
Start: 2022-09-19 | End: 2022-09-22 | Stop reason: HOSPADM

## 2022-09-19 RX ORDER — ASPIRIN 81 MG/1
81 TABLET ORAL DAILY
Status: DISCONTINUED | OUTPATIENT
Start: 2022-09-19 | End: 2022-09-22 | Stop reason: HOSPADM

## 2022-09-19 RX ORDER — INSULIN LISPRO 100 [IU]/ML
INJECTION, SOLUTION INTRAVENOUS; SUBCUTANEOUS
Status: DISCONTINUED | OUTPATIENT
Start: 2022-09-19 | End: 2022-09-22 | Stop reason: HOSPADM

## 2022-09-19 RX ORDER — SODIUM CHLORIDE 0.9 % (FLUSH) 0.9 %
5-40 SYRINGE (ML) INJECTION EVERY 8 HOURS
Status: DISCONTINUED | OUTPATIENT
Start: 2022-09-19 | End: 2022-09-22 | Stop reason: HOSPADM

## 2022-09-19 RX ADMIN — ONDANSETRON 4 MG: 2 INJECTION INTRAMUSCULAR; INTRAVENOUS at 00:49

## 2022-09-19 RX ADMIN — DOCUSATE SODIUM 100 MG: 100 CAPSULE, LIQUID FILLED ORAL at 18:48

## 2022-09-19 RX ADMIN — DOCUSATE SODIUM 100 MG: 100 CAPSULE, LIQUID FILLED ORAL at 08:22

## 2022-09-19 RX ADMIN — CEFEPIME 2 G: 2 INJECTION, POWDER, FOR SOLUTION INTRAVENOUS at 02:42

## 2022-09-19 RX ADMIN — Medication 10 ML: at 08:24

## 2022-09-19 RX ADMIN — HEPARIN SODIUM 5000 UNITS: 5000 INJECTION INTRAVENOUS; SUBCUTANEOUS at 08:23

## 2022-09-19 RX ADMIN — METRONIDAZOLE 500 MG: 500 INJECTION, SOLUTION INTRAVENOUS at 02:42

## 2022-09-19 RX ADMIN — MORPHINE SULFATE 4 MG: 4 INJECTION INTRAVENOUS at 00:49

## 2022-09-19 RX ADMIN — Medication 10 ML: at 13:45

## 2022-09-19 RX ADMIN — METRONIDAZOLE 500 MG: 500 INJECTION, SOLUTION INTRAVENOUS at 10:15

## 2022-09-19 RX ADMIN — ASPIRIN 81 MG: 81 TABLET, COATED ORAL at 08:23

## 2022-09-19 RX ADMIN — MORPHINE SULFATE 4 MG: 4 INJECTION INTRAVENOUS at 06:03

## 2022-09-19 RX ADMIN — SODIUM CHLORIDE, POTASSIUM CHLORIDE, SODIUM LACTATE AND CALCIUM CHLORIDE 125 ML/HR: 600; 310; 30; 20 INJECTION, SOLUTION INTRAVENOUS at 18:44

## 2022-09-19 RX ADMIN — LEVOTHYROXINE SODIUM 75 MCG: 0.07 TABLET ORAL at 08:23

## 2022-09-19 RX ADMIN — Medication 10 ML: at 22:55

## 2022-09-19 RX ADMIN — SODIUM CHLORIDE, POTASSIUM CHLORIDE, SODIUM LACTATE AND CALCIUM CHLORIDE 125 ML/HR: 600; 310; 30; 20 INJECTION, SOLUTION INTRAVENOUS at 10:12

## 2022-09-19 RX ADMIN — SODIUM CHLORIDE, POTASSIUM CHLORIDE, SODIUM LACTATE AND CALCIUM CHLORIDE 1000 ML: 600; 310; 30; 20 INJECTION, SOLUTION INTRAVENOUS at 08:29

## 2022-09-19 RX ADMIN — METRONIDAZOLE 500 MG: 500 INJECTION, SOLUTION INTRAVENOUS at 22:54

## 2022-09-19 RX ADMIN — HEPARIN SODIUM 5000 UNITS: 5000 INJECTION INTRAVENOUS; SUBCUTANEOUS at 16:33

## 2022-09-19 RX ADMIN — LEVOFLOXACIN 750 MG: 5 INJECTION, SOLUTION INTRAVENOUS at 10:15

## 2022-09-19 RX ADMIN — MORPHINE SULFATE 4 MG: 4 INJECTION INTRAVENOUS at 11:41

## 2022-09-19 NOTE — ED PROVIDER NOTES
This is a 79-year-old female presents ED for evaluation of nausea and vomiting started earlier today. As she has had some abdominal pain in the left lower quadrant. Has history of diverticulitis and this feels similar to that. Denies diarrhea, denies dysuria. Patient's pain is is moderate to severe nature. Past medical history of diabetes, hypertension, surgical history of a hysterectomy. Nausea   Associated symptoms include abdominal pain. Vomiting   Associated symptoms include abdominal pain.       Past Medical History:   Diagnosis Date    Arthritis, rheumatoid (HCC)     Diabetes ([address]     Diverticulitis     Hard of hearing     History of vascular access device 2021    [facility] VAT 4 FR R Basilic 47/7 LTAbx    History of vascular access device 2022    4 feench single lumen PICC line right basilic    HTN (hypertension)     Hypertension     Hypothyroid     Osteoporosis, post-menopausal     Septic arthritis of elbow, left (HCC)        Past Surgical History:   Procedure Laterality Date    FLEXIBLE SIGMOIDOSCOPY N/A 2020    SIGMOIDOSCOPY FLEXIBLE performed by Jay Jay Prabhakar MD at [facility] ENDOSCOPY    HX HEMORRHOIDECTOMY      HX HYSTERECTOMY      HX THYROIDECTOMY           Family History:   Problem Relation Age of Onset    Heart Disease Mother     Diabetes Father     Cancer Brother         brain cancer    Heart Disease Brother     Diabetes Brother     Other Other         scleroderma       Social History     Socioeconomic History    Marital status: SINGLE     Spouse name: Not on file    Number of children: Not on file    Years of education: Not on file    Highest education level: Not on file   Occupational History    Not on file   Tobacco Use    Smoking status: Former     Types: Cigarettes     Quit date: 1976     Years since quittin.8    Smokeless tobacco: Never   Substance and Sexual Activity    Alcohol use: Yes     Comment: Waylon time 1 glass wine    Drug use: No    Sexual activity: Never   Other Topics Concern     Service Not Asked    Blood Transfusions Not Asked    Caffeine Concern Not Asked    Occupational Exposure Not Asked    Hobby Hazards Not Asked    Sleep Concern Not Asked    Stress Concern Not Asked    Weight Concern Not Asked    Special Diet Not Asked    Back Care Not Asked    Exercise Not Asked    Bike Helmet Not Asked    Seat Belt Not Asked    Self-Exams Not Asked   Social History Narrative    ** Merged History Encounter **         ** Merged History Encounter **          Social Determinants of Health     Financial Resource Strain: Not on file   Food Insecurity: Not on file   Transportation Needs: Not on file   Physical Activity: Not on file   Stress: Not on file   Social Connections: Not on file   Intimate Partner Violence: Not on file   Housing Stability: Not on file         ALLERGIES: Alendronate, Amoxicillin, Amoxicillin, Hydrochlorothiazide, and Penicillins    Review of Systems   Respiratory:  Negative for shortness of breath. Cardiovascular:  Negative for chest pain. Gastrointestinal:  Positive for abdominal pain, nausea and vomiting. Genitourinary:  Negative for dysuria. Musculoskeletal:  Negative for back pain. All other systems reviewed and are negative. Vitals:    09/18/22 2342   BP: 136/81   Pulse: 97   Resp: 16   Temp: 97.7 °F (36.5 °C)   SpO2: 100%   Weight: 49.4 kg (109 lb)   Height: 4' 11\" (1.499 m)            Physical Exam  Vitals and nursing note reviewed. Constitutional:       Appearance: She is normal weight. HENT:      Head: Normocephalic and atraumatic. Right Ear: External ear normal.      Left Ear: External ear normal.      Nose: Nose normal.      Mouth/Throat:      Mouth: Mucous membranes are moist.   Eyes:      Conjunctiva/sclera: Conjunctivae normal.   Cardiovascular:      Rate and Rhythm: Normal rate and regular rhythm. Pulses: Normal pulses. Heart sounds: Normal heart sounds.    Pulmonary:      Effort: Pulmonary effort is normal.      Breath sounds: Normal breath sounds. No wheezing. Abdominal:      General: Abdomen is flat. Bowel sounds are normal.      Tenderness: There is abdominal tenderness. There is rebound. There is no guarding. Musculoskeletal:         General: Normal range of motion. Cervical back: Normal range of motion. Skin:     General: Skin is warm. Capillary Refill: Capillary refill takes less than 2 seconds. Neurological:      General: No focal deficit present. Mental Status: She is alert. Psychiatric:         Mood and Affect: Mood normal.        MDM  Number of Diagnoses or Management Options  Diverticulitis  Leukocytosis, unspecified type  Diagnosis management comments: Differential diagnosis includes but not limited to diverticulitis, UTI, appendicitis, cholecystitis    CBC shows a significant leukocytosis white count of 25.7, serum chemistries show mild hyponatremia, slightly elevated serum creatinine level. Urinalysis shows trace leukocyte esterase with moderate epithelial cells likely contaminant. CT abdomen pelvis with IV contrast shows diffuse wall thickening and pericolonic changes in the descending and sigmoid colon compatible with colitis or diverticulitis. Patient was given cefepime and Flagyl, admitted to the hospital medicine team for further evaluation management           Procedures      Perfect Serve Consult for Admission  4:51 AM    ED Room Number: ER10/10  Patient Name and age:  Tin Heredia 80 y.o.  female  Working Diagnosis:   1. Diverticulitis    2. Leukocytosis, unspecified type        COVID-19 Suspicion:  no  Sepsis present:  no  Reassessment needed: no  Code Status:  Full Code  Readmission: no  Isolation Requirements:  no  Recommended Level of Care:  med/surg  Department:[facility] ED - [phone]  Other: 68-year-old female with abdominal pain, white blood cell count of 26.3, colitis/diverticulitis.   Afebrile, not tachycardic, blood pressure stable

## 2022-09-19 NOTE — ED TRIAGE NOTES
Pt presents to the ED with c/o nausea and vomiting. Pt states that she threw up around 8:30 and since then has been having abdominal pain as well as dry heaves. Pt has a hx of diverticulosis.

## 2022-09-19 NOTE — ED NOTES
Bedside shift change report given to Ricky Li RN  (oncoming nurse) by Brandi Gould RN  (offgoing nurse). Report included the following information SBAR, ED Summary, MAR, Recent Results and Med Rec Status.

## 2022-09-19 NOTE — ED NOTES
TRANSFER - OUT REPORT:    Verbal report given to Nikki RN(name) on Anna Bryan  being transferred to 4th Floor(unit) for routine progression of care       Report consisted of patients Situation, Background, Assessment and   Recommendations(SBAR). Information from the following report(s) SBAR, Kardex, ED Summary, [facility] and Recent Results was reviewed with the receiving nurse. Lines:   PICC Single Lumen 84/40/37 Basilic;Right (Active)       PICC Single Lumen 03/04/22 Right (Active)       Peripheral IV 09/19/22 Right; Outer Antecubital (Active)   Site Assessment Clean, dry, & intact 09/19/22 0049   Phlebitis Assessment 0 09/19/22 0049   Infiltration Assessment 0 09/19/22 0049   Dressing Status Clean, dry, & intact 09/19/22 0049   Dressing Type Transparent 09/19/22 0049   Hub Color/Line Status Pink;Flushed;Patent 09/19/22 0049   Action Taken Blood drawn 09/19/22 0049        Opportunity for questions and clarification was provided. Patient transported with:   Transport     Note: Medications not verified by pharmacy prior to transport.

## 2022-09-19 NOTE — ED NOTES
Verbal shift change report given to Carlie Marx RN (oncoming nurse) by Juan C Chang RN (offgoing nurse). Report included the following information SBAR, Kardex, ED Summary, MAR, and Recent Results.

## 2022-09-19 NOTE — PROGRESS NOTES
Problem: Self Care Deficits Care Plan (Adult)  Goal: *Acute Goals and Plan of Care (Insert Text)  Description: FUNCTIONAL STATUS PRIOR TO ADMISSION: Patient was modified independent using a walker and rollator for functional mobility. HOME SUPPORT: The patient lived alone with nearby son to provide assistance as needed per report    Occupational Therapy Goals  Initiated 9/19/2022  1. Patient will perform self-feeding with modified independence within 7 day(s). 2.  Patient will perform grooming with modified independence within 7 day(s). 3.  Patient will perform upper body dressing and lower body dressing with supervision/set-up within 7 day(s). 4.  Patient will perform toilet transfers with supervision/set-up within 7 day(s). 5.  Patient will perform all aspects of toileting with supervision/set-up within 7 day(s). 6.  Patient will participate in upper extremity therapeutic exercise/activities with supervision/set-up for 10 minutes within 7 day(s). 7.  Patient will utilize energy conservation techniques during functional activities with verbal, visual, and tactile cues within 7 day(s). Outcome: Not Met   OCCUPATIONAL THERAPY EVALUATION  Patient: Anna Bryan (16 y.o. female)  Date: 9/19/2022  Primary Diagnosis: Diverticulitis [K57.92]       Precautions: Fall risk       ASSESSMENT  Based on the objective data described below, the patient presents with intermittent confusion, pain \"everywhere\" (though complains mostly of L shoulder, B elbows, B wrists/hands, abdomen, L breast during assessment today), decreased awareness, [place] bilateral, decreased vision, perseveration during conversation, poor endurance and decrease from previous Mod I baseline. Pt received soiled in bed requiring CGA up to Mod A for rolling/bed mobility and Total A for toileting hygiene in bed.  Pt needing re-direction to task throughout to maintain attention but perseverates heavily on pain though functional performance is inconsistent with pain reported. Pt stating inability to stand but stands with RW and gait belt with Min A at EOB but requires heavy cues and assistance for safe stand <>sit decent d/t wanting to \"flop back like I do at home in my chair or bed\". Patient needing assistance to maintain grasp on cup of water at bedside but with decreased weight she is able to maintain and drink independently. Recommending SNF at d/c 2/2 marked decrease from Mod I baseline. Current Level of Function Impacting Discharge (ADLs/self-care): Total A toileting, up to Mod A for ed mobility, Min a for standing    Functional Outcome Measure: The patient scored Total: 10/100 on the Barthel Index outcome measure       Other factors to consider for discharge: PLOF, lives alone, needs rehab     Patient will benefit from skilled therapy intervention to address the above noted impairments. PLAN :  Recommendations and Planned Interventions: self care training, functional mobility training, therapeutic exercise, balance training, therapeutic activities, endurance activities, patient education, home safety training, and family training/education    Frequency/Duration: Patient will be followed by occupational therapy 5 times a week to address goals. Recommendation for discharge: (in order for the patient to meet his/her long term goals)  Therapy up to 5 days/week in SNF setting    This discharge recommendation:  Has not yet been discussed the attending provider and/or case management    IF patient discharges home will need the following DME: patient owns DME required for discharge       SUBJECTIVE:   Patient stated I have pain everywhere lately.     OBJECTIVE DATA SUMMARY:   HISTORY:   Past Medical History:   Diagnosis Date    Arthritis, rheumatoid ([facility] [address]     Diabetes ([facility] [address]     Diverticulitis     Hard of hearing     History of vascular access device 04/01/2021    [facility] VAT 4 FR R Basilic 61/6 LTAbx    History of vascular access device 03/04/2022    [facility] single lumen PICC line right basilic    HTN (hypertension)     Hypertension     Hypothyroid     Osteoporosis, post-menopausal     Septic arthritis of elbow, left (HCC)      Past Surgical History:   Procedure Laterality Date    FLEXIBLE SIGMOIDOSCOPY N/A 12/2/2020    SIGMOIDOSCOPY FLEXIBLE performed by Razia Simeon MD at [facility]      HX HYSTERECTOMY      HX THYROIDECTOMY         Expanded or extensive additional review of patient history:     Home Situation  Home Environment: Private residence (55+)  # Steps to Enter:  (none)  Living Alone: Yes  Support Systems: Child(dhaval)  Patient Expects to be Discharged to[de-identified] Home with home health  Current DME Used/Available at Home: Walker  Tub or Shower Type: Shower    Hand dominance: Right    EXAMINATION OF PERFORMANCE DEFICITS:  Cognitive/Behavioral Status:  Neurologic State: Alert  Orientation Level: Oriented X4  Cognition: Impulsive; Follows commands; Impaired decision making;Poor safety awareness  Perception: Appears intact  Perseveration: Perseverates during conversation  Safety/Judgement: Decreased awareness of need for assistance;Decreased awareness of need for safety;Decreased insight into deficits      Hearing: Auditory  Auditory Impairment: Hard of hearing, bilateral  Hearing Aids/Status: Bilateral, With patient    Vision/Perceptual:    Tracking: Able to track stimulus in all quadrants w/o difficulty      Acuity: Impaired far vision; Impaired near vision (macular degeneration)         Range of Motion:  AROM: Generally decreased, functional (LUE shoulder not WFL)  PROM: Generally decreased, functional     Strength:  Strength: Generally decreased, functional     Coordination:  Coordination: Generally decreased, functional  Fine Motor Skills-Upper: Left Impaired;Right Impaired    Gross Motor Skills-Upper: Right Intact; Left Impaired    Tone & Sensation:  Tone: Normal  Sensation: Impaired (carpal tunnel with numbness and tingling bilateral hands)     Balance:  Sitting: Intact; Without support  Standing: Impaired    Functional Mobility and Transfers for ADLs:  Bed Mobility:  Rolling: Contact guard assistance;Minimum assistance; Moderate assistance (varied during 3 separate trials)  Supine to Sit: Minimum assistance  Sit to Supine: Minimum assistance  Scooting: Minimum assistance    Transfers:  Sit to Stand: Moderate assistance;Assist x1;Additional time  Stand to Sit: Minimum assistance; Additional time;Assist x1    ADL Assessment:  Feeding: Setup;Stand-by assistance    Oral Facial Hygiene/Grooming: Setup;Stand-by assistance    Bathing: Moderate assistance         Upper Body Dressing: Moderate assistance    Lower Body Dressing: Moderate assistance    Toileting: Maximum assistance                ADL Intervention and task modifications:  Feeding  Drink to Mouth: Set-up; Stand-by assistance    Lower Body Dressing Assistance  Socks: Maximum assistance (attempts but unable to reach feet)  Position Performed: Seated edge of bed    Toileting  Toileting Assistance: Total assistance(dependent); Maximum assistance  Bladder Hygiene: Total assistance (dependent)  Bowel Hygiene: Total assistance (dependent)  Clothing Management: Total assistance (dependent)    Cognitive Retraining  Safety/Judgement: Decreased awareness of need for assistance;Decreased awareness of need for safety;Decreased insight into deficits  Functional Measure:    Barthel Index:  Bathin  Bladder: 0  Bowels: 0  Groomin  Dressin  Feedin  Mobility: 0  Stairs: 0  Toilet Use: 0  Transfer (Bed to Chair and Back): 0  Total: 10/100      The Barthel ADL Index: Guidelines  1. The index should be used as a record of what a patient does, not as a record of what a patient could do. 2. The main aim is to establish degree of independence from any help, physical or verbal, however minor and for whatever reason. 3. The need for supervision renders the patient not independent.   4. A patient's performance should be established using the best available evidence. Asking the patient, friends/relatives and nurses are the usual sources, but direct observation and common sense are also important. However direct testing is not needed. 5. Usually the patient's performance over the preceding 24-48 hours is important, but occasionally longer periods will be relevant. 6. Middle categories imply that the patient supplies over 50 per cent of the effort. 7. Use of aids to be independent is allowed. Score Interpretation (from [address])    Independent   60-79 Minimally independent   40-59 Partially dependent   20-39 Very dependent   <20 Totally dependent     -Zuleika Zuñiga., Barthel, D.W. (1965). Functional evaluation: the Barthel Index. [address] ([address] (1997). The Barthel activities of daily living index: self-reporting versus actual performance in the old (> or = 75 years). Journal of [address] [place] [address] 45(7), [address] [facility], TOBINF, Jigar Glory., [facility]. (1999). Measuring the change in disability after inpatient rehabilitation; comparison of the responsiveness of the Barthel Index and Functional [place] Measure. Journal of Neurology, Neurosurgery, and Psychiatry, 66(4), 784-333. Monae David, N.J.A, VINAY Rangel, & Cheryl Hart MAlonzoA. (2004) Assessment of post-stroke quality of life in cost-effectiveness studies: The usefulness of the Barthel Index and the EuroQoL-5D.  Quality of Life Research, 15, 174-87     Occupational Therapy Evaluation Charge Determination   History Examination Decision-Making   MEDIUM Complexity : Expanded review of history including physical, cognitive and psychosocial  history  MEDIUM Complexity : 3-5 performance deficits relating to physical, cognitive , or psychosocial skils that result in activity limitations and / or participation restrictions MEDIUM Complexity : Patient may present with comorbidities that affect occupational performnce. Miniml to moderate modification of tasks or assistance (eg, physical or verbal ) with assesment(s) is necessary to enable patient to complete evaluation       Based on the above components, the patient evaluation is determined to be of the following complexity level: MEDIUM  Pain Rating:  Does not give numeric score    Activity Tolerance:   Poor, SpO2 stable on RA, and requires frequent rest breaks    After treatment patient left in no apparent distress:    Supine in bed, Heels elevated for pressure relief, Call bell within reach, Bed / chair alarm activated, and Side rails x 3    COMMUNICATION/EDUCATION:   The patients plan of care was discussed with: Registered nurse. Home safety education was provided and the patient/caregiver indicated understanding., Patient/family have participated as able in goal setting and plan of care. , and Patient/family agree to work toward stated goals and plan of care. This patients plan of care is appropriate for delegation to [facility].     Thank you for this referral.  Ramya White, OT

## 2022-09-19 NOTE — PROGRESS NOTES
Hospitalist Progress Note      NAME: Danny Claire   :  1930  MRM:  [id number]    Date/Time: 2022  7:43 AM           Assessment / Plan:     #Sepsis due to Diverticulitis: P/w leukocytosis, tachycardia and colitis vs diverticulitis on imaging. Denies diarrhea, so less likely C dif. - Levo/flagyl; po vanc if develops diarrhea   - LR bolus now   - Continue to monitor fluid responsiveness   - UOP and MAPs to goal    #MIKE: Baseline normal ladonna function. Presents with Cr 1.32, hypovolemic   - IVF   - Renally dose meds, avoid nephrotoxins    #Hypovolemic Hyponatremia: 128, due to the above   - IVF    #Hyperglycemia: No prior hx DM. Bgs 230 at present  - A1c  - Start basal + SSI    #Hx RA: per report but she is not on DMARD or bDMARD                 Care Plan discussed with: Patient, Family, Care Manager, Nursing Staff, and Consultant/Specialist    Discussed:  Care Plan    Prophylaxis:  Lovenox    Disposition:  Home w/Family           ___________________________________________________    Attending Physician: Molly Coles MD        Subjective:     Chief Complaint:  No acute events overnight. Abd pain persists today but slightly improved. Passed gas on arrival, but none since. Denies diarrhea. ROS:  (bold if positive, if negative)    Tolerating PT  Tolerating Diet          Objective:       Vitals:          Last 24hrs VS reviewed since prior progress note.  Most recent are:    Visit Vitals  BP (!) 121/51 (BP 1 Location: Right upper arm, BP Patient Position: At rest)   Pulse 94   Temp 97.7 °F (36.5 °C)   Resp 20   Ht 4' 11\" (1.499 m)   Wt 49.4 kg (109 lb)   SpO2 97%   BMI 22.02 kg/m²     SpO2 Readings from Last 6 Encounters:   22 97%   22 98%   22 95%   22 98%   22 96%   22 99%          Intake/Output Summary (Last 24 hours) at 2022 0728  Last data filed at 2022 0556  Gross per 24 hour   Intake --   Output 125 ml   Net -125 ml          Exam: Physical Exam:    Gen:  frail, elderly, in no acute distress  HEENT:  Pink conjunctivae, PERRL, hearing intact to voice, moist mucous membranes  Neck:  Supple, without masses, thyroid non-tender  Resp:  No accessory muscle use, clear breath sounds without wheezes rales or rhonchi  Card:  No murmurs, normal S1, S2 without thrills, bruits or peripheral edema  Abd:  Soft, LLQ-tender, non-distended, normoactive bowel sounds are present  Musc:  No cyanosis or clubbing  Skin:  No rashes or ulcers, skin turgor is good  Neuro:  Cranial nerves 3-12 are grossly intact,  strength is 5/5 bilaterally and dorsi / plantarflexion is 5/5 bilaterally, follows commands appropriately  Psych:  Good insight, oriented to person, place and time, alert       Medications Reviewed: (see below)    Lab Data Reviewed: (see below)    ______________________________________________________________________    Medications:     Current Facility-Administered Medications   Medication Dose Route Frequency    0.9% sodium chloride infusion  100 mL/hr IntraVENous CONTINUOUS    ondansetron (ZOFRAN) injection 4 mg  4 mg IntraVENous Q8H PRN    metroNIDAZOLE (FLAGYL) IVPB premix 500 mg  500 mg IntraVENous Q8H    levoFLOXacin (LEVAQUIN) 750 mg in D5W IVPB  750 mg IntraVENous Q24H     Current Outpatient Medications   Medication Sig    tamsulosin (FLOMAX) 0.4 mg capsule Take 0.4 mg by mouth daily. levothyroxine (SYNTHROID) 75 mcg tablet Take 75 mcg by mouth Daily (before breakfast). folic acid (FOLVITE) 1 mg tablet Take 1 mg by mouth daily. amLODIPine (NORVASC) 10 mg tablet Take 10 mg by mouth daily. aspirin delayed-release 81 mg tablet Take 81 mg by mouth daily. DAPTOmycin (CUBICIN) 350 mg injection by IntraVENous route. senna-docusate (Senokot-S) 8.6-50 mg per tablet Take 1 Tablet by mouth daily. simethicone (MYLICON) 80 mg chewable tablet Take 80 mg by mouth every six (6) hours as needed for Flatulence.     lidocaine (Lidoderm) 5 % Apply patch to the affected area for 12 hours a day and remove for 12 hours a day. (Patient not taking: Reported on 3/30/2022)    docusate sodium (Colace) 100 mg capsule Take 100 mg by mouth two (2) times daily as needed for Constipation. polyethylene glycol (Miralax) 17 gram packet Take 1 Packet by mouth daily. L.acidoph & parac-S.therm-Bifido (BRENNEN Q2/RISAQUAD-2) 16 billion cell cap cap Take 1 Cap by mouth daily. acetaminophen (Tylenol Extra Strength) 500 mg tablet Take 500 mg by mouth two (2) times daily as needed for Pain.    multivit-min/iron/folic/lutein (CENTRUM SILVER WOMEN PO) Take 1 Tab by mouth every other day. cholecalciferol (VITAMIN D3) 25 mcg (1,000 unit) cap Take 2,000 Units by mouth daily.             Lab Review:     Recent Labs     09/19/22 0053   WBC 26.3*   HGB 12.6   HCT 38.1   *     Recent Labs     09/19/22 0053   *   K 4.4   CL 94*   CO2 20*   *   BUN 32*   CREA 1.31*   CA 9.3   ALB 2.4*   ALT 26     No components found for: [place]

## 2022-09-19 NOTE — PROGRESS NOTES
9/19/2022  11:20 AM  CM completed assessment w/ pt in person, CM wore mask at all times. Charted demographics verified, pt lives alone in a senior apt. There is an elevator, at baseline pt is ambulatory w/ RW, independent w/ ADLs, she does not drives, denies falls she stated she had carpel tunnel surgery on her L wrist in July and had some HH therapy. She does not cook and usually heats her food in the microwave. Reason for Admission:   Emergent for Diverticulitis  Pt is a 81 yo female who [facility] to [facility] c/o abdominal pain  PMHx:  Arthritis, rheumatoid ([facility] [address]       Diabetes ([facility] [address]      Diverticulitis      Hard of hearing      History of vascular access device 04/01/2021     [facility] VAT 4 FR R Basilic 97/4 LTAbx    History of vascular access device 03/04/2022     4 [facility] single lumen PICC line right basilic    HTN (hypertension)      Hypertension      Hypothyroid      Osteoporosis, post-menopausal      Septic arthritis of elbow, left                        RUR Score:     17  % Moderate Risk of Readmission/Yellow              PCP: First and Last name:   Alisa Berg MD     Name of Practice:    Are you a current patient: Yes/No: yes    Approximate date of last visit: 2 weeks ago   Can you participate in a virtual visit if needed: NO    Do you (patient/family) have any concerns for transition/discharge?      Pt lives alone w/ minimal support from family               Plan for utilizing home health:   pt states she had [facility] for he r Lt Wrist following surgery, no rehab history, would benefit from PT/OT evals to determine skilled needs at [facility]   DME: shower chair, RW  Rx: Human pt uses [facility] [address], is usually covered for his medications  COVID Vax Hx; 2 jabs and 1 booster  (January 2022)  Current Advanced Directive/Advance Care Plan:  DNR  [facility] son John Clark (C) [phone]    Healthcare Decision Maker:   Click here to complete [facility] including [facility] Relationship (ie \"Primary\")            Primary Decision Maker: Alexus Donis - [phone]    Transition of Care Plan:        RUR 17 % Moderate Risk of Readmission/Yellow  Hospital admission for medical management   CM to follow through for treatment/response  Pt would benefit from PT, OT evals to determine skilled needs at [facility] when stable, dispo pending therapy and progress  Outpatient follow up PCP,specialists  Transport TBD  CM will follow and ssist w/ DC needs  Care Management Interventions  PCP Verified by CM:  Yes Ana Jackson MD )  Palliative Care Criteria Met (RRAT>21 & CHF Dx)?: No  Mode of Transport at Discharge: Self ([address] vs Family)  Transition of Care Consult (CM Consult):  (pt lives aloen in senior  apt, at baseline pt is ambulatory w/ RW, iADLS, relies on family for transport)  Physical Therapy Consult: Yes  Occupational Therapy Consult: Yes  Support Systems: Child(dhaval) (pt lives alone in senior apt, at baseline she reports to be ambautlory w/ RW, iADLs, relies  on son for transport)  Confirm Follow Up Transport: Family  Discharge Location  Patient Expects to be Discharged to[de-identified] Home with home health  ROBIN Adkins

## 2022-09-19 NOTE — PROGRESS NOTES
[facility] Dosing Services: 9/19/22    Pharmacist Renal Dosing  Note for Levaquin, cefepime   Physician Dr Autumn Howard    Indication: Diverticulitis  Previous Regimen Levaquin 750 mg every 24 hours, cefepime 1 gram IV every 12 hours   Serum Creatinine Lab Results   Component Value Date/Time    Creatinine 1.31 (H) 09/19/2022 12:53 AM    Creatinine (POC) 1.1 12/12/2017 01:22 PM      Creatinine Clearance Estimated Creatinine Clearance: 21.1 mL/min (A) (based on SCr of 1.31 mg/dL (H)).    BUN Lab Results   Component Value Date/Time    BUN 32 (H) 09/19/2022 12:53 AM    BUN (POC) 14 12/12/2017 01:22 PM         Plan:  Levaquin 750 mg every 48 hours, cefepime 1 gram IV every 24 hours    Thank you      Bailey Abraham, PharmD, BCPS

## 2022-09-19 NOTE — PROGRESS NOTES
Automatic dose adjustment per [facility] P&T protocol for Metronidazole (Flagyl) for this 80 y.o. for indication of: intraabdominal infection. Current regimen: Metronidazole 500 mg every 8 hours  Recommended regimen: Metronidazole 500 mg every 12 hours      Intervention:  1.  Pharmacy will automatically change the dose of metronidazole to 500 mg every 12 hours for all indications except for the following:   a. C. difficile infection   b. CNS infections   c. Non-anaerobic infections (giardiasis, trichomonas, H pylori, etc)       Thank you,  ALANIS Cooper , PHARMD Contact information: [phone]

## 2022-09-19 NOTE — H&P
Jose E Vijay [facility] [address]  [address] [facility], [address] [place] [address]  [phone]    Admission History and Physical      NAME:  Price Rushing   :   1930   MRN:  [id number]     PCP:  Klaudia Schneider MD     Date/Time of service:  2022  7:03 AM        Subjective:     CHIEF COMPLAINT: abdominal pain        HISTORY OF PRESENT ILLNESS:     Ms. Devan Molina is a 80 y.o. With a past medical history significant for diverticulosis, diverticulitis, diabetes mellitus type 2, essential hypertension presents to the next department with the above complaint. According to the patient she has been having increasing abdominal pain since Saturday. The pain has been in the lower quadrants of her abdomen. Radiating across the abdomen as well. The pain is sharp in nature and 10/10. Patient lives by herself with her son daily visiting approximately 10 minutes away. Patient does still ambulate in takes care of most of her ADLs. She has not had any fevers or chills. She has been having some nausea but no vomiting according to the patient. Symptoms gradually worsened over the weekend and this morning she started to developed the nausea which prompted patient to come to the emergency department to be evaluated. CT abdomen shows diverticulitis. Allergies   Allergen Reactions    Alendronate Diarrhea    Amoxicillin Rash     Tolerated cefepime 3/27/21    Amoxicillin Unknown (comments)    Hydrochlorothiazide Other (comments)     \"Caused pain everywhere\"    Penicillins Unknown (comments)       Prior to Admission medications    Medication Sig Start Date End Date Taking? Authorizing Provider   tamsulosin (FLOMAX) 0.4 mg capsule Take 0.4 mg by mouth daily. Yes Provider, Historical   levothyroxine (SYNTHROID) 75 mcg tablet Take 75 mcg by mouth Daily (before breakfast). Yes Provider, Historical   folic acid (FOLVITE) 1 mg tablet Take 1 mg by mouth daily.    Yes Provider, Historical   amLODIPine (NORVASC) 10 mg tablet Take 10 mg by mouth daily. Yes Provider, Historical   aspirin delayed-release 81 mg tablet Take 81 mg by mouth daily. Yes Provider, Historical   DAPTOmycin (CUBICIN) 350 mg injection by IntraVENous route. Provider, Historical   senna-docusate (Senokot-S) 8.6-50 mg per tablet Take 1 Tablet by mouth daily. Provider, Historical   simethicone (MYLICON) 80 mg chewable tablet Take 80 mg by mouth every six (6) hours as needed for Flatulence. Provider, Historical   lidocaine (Lidoderm) 5 % Apply patch to the affected area for 12 hours a day and remove for 12 hours a day. Patient not taking: Reported on 3/30/2022 3/9/22   Mei Wheeler DO   docusate sodium (Colace) 100 mg capsule Take 100 mg by mouth two (2) times daily as needed for Constipation. Provider, Historical   polyethylene glycol (Miralax) 17 gram packet Take 1 Packet by mouth daily. 12/2/20   Do, Tani Roque MD   L.acidoph & parac-S.therm-Bifido (BRENNEN Q2/RISAQUAD-2) 16 billion cell cap cap Take 1 Cap by mouth daily. 12/2/20   Do, Tani Roque MD   acetaminophen (Tylenol Extra Strength) 500 mg tablet Take 500 mg by mouth two (2) times daily as needed for Pain. Provider, Historical   multivit-min/iron/folic/lutein (CENTRUM SILVER WOMEN PO) Take 1 Tab by mouth every other day. Provider, Historical   cholecalciferol (VITAMIN D3) 25 mcg (1,000 unit) cap Take 2,000 Units by mouth daily.     Provider, Historical       Past Medical History:   Diagnosis Date    Arthritis, rheumatoid ([address]     Diabetes ([address]     Diverticulitis     Hard of hearing     History of vascular access device 04/01/2021    [facility] VAT 4 FR R Basilic 55/2 LTAbx    History of vascular access device 03/04/2022    4 feench single lumen PICC line right basilic    HTN (hypertension)     Hypertension     Hypothyroid     Osteoporosis, post-menopausal     Septic arthritis of elbow, left ([address]         Past Surgical History:   Procedure Laterality Date    FLEXIBLE SIGMOIDOSCOPY N/A 2020    SIGMOIDOSCOPY FLEXIBLE performed by Addis Yepez MD at [facility] ENDOSCOPY    HX HEMORRHOIDECTOMY      HX HYSTERECTOMY      HX THYROIDECTOMY         Social History     Tobacco Use    Smoking status: Former     Types: Cigarettes     Quit date: 1976     Years since quittin.8    Smokeless tobacco: Never   Substance Use Topics    Alcohol use: Yes     Comment: Waylon time 1 glass wine        Family History   Problem Relation Age of Onset    Heart Disease Mother     Diabetes Father     Cancer Brother         brain cancer    Heart Disease Brother     Diabetes Brother     Other Other         scleroderma        Review of Systems:  (bold if positive, if negative)    Gen:  Eyes:  ENT:  CVS:  Pulm:  GI:  Abdominal pain, nausea,:  MS:  Skin:  Psych:  Endo:  Hem:  Renal:  Neuro:          Objective:      VITALS:    Vital signs reviewed; most recent are:    Visit Vitals  BP (!) 121/51 (BP 1 Location: Right upper arm, BP Patient Position: At rest)   Pulse 94   Temp 97.7 °F (36.5 °C)   Resp 20   Ht 4' 11\" (1.499 m)   Wt 49.4 kg (109 lb)   SpO2 97%   BMI 22.02 kg/m²     SpO2 Readings from Last 6 Encounters:   22 97%   22 98%   22 95%   22 98%   22 96%   22 99%          Intake/Output Summary (Last 24 hours) at 2022 0703  Last data filed at 2022 0556  Gross per 24 hour   Intake --   Output 125 ml   Net -125 ml        Exam:     Physical Exam:    Gen:  Well-developed, well-nourished,   Elderly female in no acute distress, resting comfortably in bed  HEENT:  Pink conjunctivae, PERRL, hearing intact to voice, moist mucous membranes  Neck:  Supple, without masses, thyroid non-tender  Resp:  No accessory muscle use, clear breath sounds without wheezes rales or rhonchi  Card:  No murmurs, normal S1, S2 without thrills, bruits or peripheral edema  Abd:  Soft, tender, non-distended, normoactive bowel sounds are present, no palpable organomegaly and no detectable hernias  Lymph: No cervical adenopathy  Musc:  No cyanosis or clubbing  Skin:  No rashes or ulcers, skin turgor is good  Neuro:  Cranial nerves 3-12 are grossly intact,  strength is 5/5 bilaterally, dorsi / plantarflexion strength is 5/5 bilaterally, follows commands appropriately  Psych:  Alert with good insight. Oriented to person, place, and time      Labs:    Recent Labs     09/19/22 0053   WBC 26.3*   HGB 12.6   HCT 38.1   *     Recent Labs     09/19/22 0053   *   K 4.4   CL 94*   CO2 20*   *   BUN 32*   CREA 1.31*   CA 9.3   ALB 2.4*   TBILI 0.5   ALT 26     Lab Results   Component Value Date/Time    Glucose (POC) 85 02/28/2022 05:17 PM    Glucose (POC) 106 (H) 04/02/2021 11:34 AM     No results for input(s): PH, PCO2, PO2, HCO3, FIO2 in the last 72 hours. No results for input(s): INR, INREXT in the last 72 hours. Radiology and EKG reviewed:    CT abdomen -Diffuse wall thickening and pericolonic inflammatory changes in the descending  and sigmoid colon are compatible with colitis or diverticulitis; clinical  follow-up is recommended to assure complete resolution. Stable incidental  findings as detailed above. **Old Records reviewed in [facility]**       Assessment/Plan:       Active Problems:    DM type 2 (diabetes mellitus, type 2) ([facility] [address] [facility] (4/9/2016)   Sliding scale insulin provided with Accu-Cheks before meals and at bedtime. Diabetic diet started. Hypothyroid (4/9/2016)   Continue Synthroid      Chronic kidney disease (CKD) (9/18/2017)   Stable will continue monitor      Diverticulitis (9/19/2022)    Diverticulitis was present on CT abdomen. She states that she has constipation instead of diarrhea.     - vitals appears stable   -vitals q.4 hours  -telemetry monitoring   -cefepime and Flagyl  - monitor for vomiting   - Zofran for nausea   - Tylenol and oxycodone for pain  - IV fluids at 100 cc/hour normal saline    Risk of deterioration: low      Total time spent with patient: 30 Minutes **I personally saw and examined the patient during this time period**                 Care Plan discussed with: Patient    Discussed:  Code Status and Care Plan    Prophylaxis:  Lovenox    Probable Disposition:  Home w/Family           ___________________________________________________    Attending Physician: Janee Macias MD

## 2022-09-20 LAB
ANION GAP SERPL CALC-SCNC: 8 MMOL/L (ref 5–15)
BUN SERPL-MCNC: 20 MG/DL (ref 6–20)
BUN/CREAT SERPL: 28 (ref 12–20)
CALCIUM SERPL-MCNC: 8.1 MG/DL (ref 8.5–10.1)
CHLORIDE SERPL-SCNC: 100 MMOL/L (ref 97–108)
CO2 SERPL-SCNC: 23 MMOL/L (ref 21–32)
CREAT SERPL-MCNC: 0.72 MG/DL (ref 0.55–1.02)
ERYTHROCYTE [DISTWIDTH] IN BLOOD BY AUTOMATED COUNT: 17 % (ref 11.5–14.5)
GLUCOSE BLD STRIP.AUTO-MCNC: 102 MG/DL (ref 65–117)
GLUCOSE BLD STRIP.AUTO-MCNC: 103 MG/DL (ref 65–117)
GLUCOSE BLD STRIP.AUTO-MCNC: 108 MG/DL (ref 65–117)
GLUCOSE BLD STRIP.AUTO-MCNC: 112 MG/DL (ref 65–117)
GLUCOSE BLD STRIP.AUTO-MCNC: 97 MG/DL (ref 65–117)
GLUCOSE SERPL-MCNC: 108 MG/DL (ref 65–100)
HCT VFR BLD AUTO: 26.7 % (ref 35–47)
HGB BLD-MCNC: 8.7 G/DL (ref 11.5–16)
MCH RBC QN AUTO: 26.5 PG (ref 26–34)
MCHC RBC AUTO-ENTMCNC: 32.6 G/DL (ref 30–36.5)
MCV RBC AUTO: 81.4 FL (ref 80–99)
NRBC # BLD: 0 K/UL (ref 0–0.01)
NRBC BLD-RTO: 0 PER 100 WBC
PLATELET # BLD AUTO: 336 K/UL (ref 150–400)
PMV BLD AUTO: 9.3 FL (ref 8.9–12.9)
POTASSIUM SERPL-SCNC: 3.6 MMOL/L (ref 3.5–5.1)
RBC # BLD AUTO: 3.28 M/UL (ref 3.8–5.2)
SERVICE CMNT-IMP: NORMAL
SODIUM SERPL-SCNC: 131 MMOL/L (ref 136–145)
WBC # BLD AUTO: 16.6 K/UL (ref 3.6–11)

## 2022-09-20 PROCEDURE — 74011000250 HC RX REV CODE- 250: Performed by: HOSPITALIST

## 2022-09-20 PROCEDURE — 85027 COMPLETE CBC AUTOMATED: CPT

## 2022-09-20 PROCEDURE — 80048 BASIC METABOLIC PNL TOTAL CA: CPT

## 2022-09-20 PROCEDURE — 82962 GLUCOSE BLOOD TEST: CPT

## 2022-09-20 PROCEDURE — 65270000029 HC RM PRIVATE

## 2022-09-20 PROCEDURE — 51798 US URINE CAPACITY MEASURE: CPT

## 2022-09-20 PROCEDURE — 74011250636 HC RX REV CODE- 250/636: Performed by: HOSPITALIST

## 2022-09-20 PROCEDURE — 97535 SELF CARE MNGMENT TRAINING: CPT

## 2022-09-20 PROCEDURE — 97110 THERAPEUTIC EXERCISES: CPT

## 2022-09-20 PROCEDURE — 36415 COLL VENOUS BLD VENIPUNCTURE: CPT

## 2022-09-20 PROCEDURE — 97116 GAIT TRAINING THERAPY: CPT

## 2022-09-20 PROCEDURE — 97162 PT EVAL MOD COMPLEX 30 MIN: CPT

## 2022-09-20 PROCEDURE — 74011250637 HC RX REV CODE- 250/637: Performed by: HOSPITALIST

## 2022-09-20 PROCEDURE — 74011250637 HC RX REV CODE- 250/637: Performed by: INTERNAL MEDICINE

## 2022-09-20 RX ADMIN — METRONIDAZOLE 500 MG: 500 INJECTION, SOLUTION INTRAVENOUS at 08:23

## 2022-09-20 RX ADMIN — ACETAMINOPHEN 650 MG: 160 SOLUTION ORAL at 02:18

## 2022-09-20 RX ADMIN — POLYETHYLENE GLYCOL 3350 17 G: 17 POWDER, FOR SOLUTION ORAL at 08:23

## 2022-09-20 RX ADMIN — LEVOTHYROXINE SODIUM 75 MCG: 0.07 TABLET ORAL at 05:01

## 2022-09-20 RX ADMIN — HEPARIN SODIUM 5000 UNITS: 5000 INJECTION INTRAVENOUS; SUBCUTANEOUS at 02:18

## 2022-09-20 RX ADMIN — DOCUSATE SODIUM 100 MG: 100 CAPSULE, LIQUID FILLED ORAL at 08:22

## 2022-09-20 RX ADMIN — Medication 10 ML: at 14:00

## 2022-09-20 RX ADMIN — Medication 10 ML: at 05:01

## 2022-09-20 RX ADMIN — HEPARIN SODIUM 5000 UNITS: 5000 INJECTION INTRAVENOUS; SUBCUTANEOUS at 08:23

## 2022-09-20 RX ADMIN — Medication 10 ML: at 22:44

## 2022-09-20 RX ADMIN — METRONIDAZOLE 500 MG: 500 INJECTION, SOLUTION INTRAVENOUS at 22:44

## 2022-09-20 RX ADMIN — ASPIRIN 81 MG: 81 TABLET, COATED ORAL at 08:22

## 2022-09-20 RX ADMIN — DOCUSATE SODIUM 100 MG: 100 CAPSULE, LIQUID FILLED ORAL at 17:22

## 2022-09-20 RX ADMIN — HEPARIN SODIUM 5000 UNITS: 5000 INJECTION INTRAVENOUS; SUBCUTANEOUS at 16:33

## 2022-09-20 NOTE — PROGRESS NOTES
..Bedside shift change report given to [employer] (oncoming nurse) by Hafsa Lockwood (offgoing nurse). Report included the following information SBAR.

## 2022-09-20 NOTE — PROGRESS NOTES
Problem: Nausea/Vomiting (Adult)  Goal: *Absence of nausea/vomiting  Outcome: Progressing Towards Goal     Problem: Nutrition Deficit  Goal: *Optimize nutritional status  Outcome: Progressing Towards Goal

## 2022-09-20 NOTE — PROGRESS NOTES
9/20/2022   Care Management Progress Note      ICD-10-CM ICD-9-CM    1. Diverticulitis  K57.92 562.11       2. Leukocytosis, unspecified type  D72.829 288.60           RUR:  16%  Risk Level: []Low [x]Moderate []High  Value-based purchasing: [] Yes [x] No  Bundle patient: [] Yes [x] No   Specify:     Transition of care plan:  Hospital admission for medical management   CM to follow through for treatment/response  Home with family support at discharge  Home health arranged through [facility]  Outpatient follow up PCP,specialists  Pt's son will transport pt home     12:52 PM [facility] has accepted pt for home health. 12:17 PM  [facility] has denied referral due to staffing. Referral sent to [facility] they have denied as well due to staffing. Referral sent to [facility] via [employer] and is pending. 11:44 AM CM confirmed with treating PT, pt will be able to discharge home with home health as pt's mobility has greatly improved from yesterday. CM will follow. KAY Reinoso    9/20/2022 10:45 AM Met with pt and pt's son at bedside to discuss discharge recommendations for SNF. Pt and pt's son polietly decline SNF and request home with home health. Pt's son reported he can provide needed support for pt at home. Pt's son selected [facility] as preferred home health agency as pt has used this agency before. Referral sent to [facility] via [employer]. Care Management Interventions  PCP Verified by CM:  Yes Yeny Solis MD )  Palliative Care Criteria Met (RRAT>21 & CHF Dx)?: No  Mode of Transport at Discharge: Self ([address] vs Family)  Transition of Care Consult (CM Consult):  (pt lives aloen in senior  apt, at baseline pt is ambulatory w/ RW, iADLS, relies on family for transport)  Physical Therapy Consult: Yes  Occupational Therapy Consult: Yes  Support Systems:  (son checks on mom daily and camera in place)  Confirm Follow Up Transport: Family  The Patient and/or Patient Representative was Provided with a Choice of Provider and Agrees with the Discharge Plan?: Yes  Freedom of Choice List was Provided with Basic Dialogue that Supports the Patient's Individualized Plan of Care/Goals, Treatment Preferences and Shares the Quality Data Associated with the Providers?: Yes  Discharge Location  Patient Expects to be Discharged to[de-identified] Home with home health

## 2022-09-20 NOTE — PROGRESS NOTES
Medicare pt has received, reviewed, and signed 2nd IM letter informing them of their right to appeal the discharge. Signed copied has been placed on pt bedside chart.   Ninfa Caro CMS

## 2022-09-20 NOTE — PROGRESS NOTES
Hospital Follow-up appointment scheduled with Dr. Phylicia Hare on 10/05/2022 at 1:15pm ,  Bry Carter

## 2022-09-20 NOTE — PROGRESS NOTES
Problem: Self Care Deficits Care Plan (Adult)  Goal: *Acute Goals and Plan of Care (Insert Text)  Description: FUNCTIONAL STATUS PRIOR TO ADMISSION: Patient was modified independent using a walker and rollator for functional mobility. HOME SUPPORT: The patient lived alone with nearby son to provide assistance as needed per report    Occupational Therapy Goals  Initiated 9/19/2022  1. Patient will perform self-feeding with modified independence within 7 day(s). 2.  Patient will perform grooming with modified independence within 7 day(s). 3.  Patient will perform upper body dressing and lower body dressing with supervision/set-up within 7 day(s). 4.  Patient will perform toilet transfers with supervision/set-up within 7 day(s). 5.  Patient will perform all aspects of toileting with supervision/set-up within 7 day(s). 6.  Patient will participate in upper extremity therapeutic exercise/activities with supervision/set-up for 10 minutes within 7 day(s). 7.  Patient will utilize energy conservation techniques during functional activities with verbal, visual, and tactile cues within 7 day(s). Outcome: Progressing Towards Goal     OCCUPATIONAL THERAPY TREATMENT  Patient: Katia Berger (59 y.o. female)  Date: 9/20/2022  Diagnosis: Diverticulitis [K57.92] <principal problem not specified>      Precautions: DNR, Contact (enteric)  Chart, occupational therapy assessment, plan of care, and goals were reviewed. ASSESSMENT  Patient received semi supine in bed A&Ox4 and agreeable for OT tx. Patient continues with skilled OT services and is progressing towards goals thus D/C recommendation updated to home with [address] [place], [place] [address]S [place]. Patient currently supervision for bed mobility and scooting EOB, SBA sit<->stand and toilet transfer with Rw and gait belt and SBA for toileting/cloth mgmt, grooming sitting/standing sink and SBA LB dressing sitting EOB.  Patient would benefit from continued skilled OT services while at [facility] in order to increase safety and independence with self care and functional transfers/mobility. Recommend discharge to home with [facility] when medically appropriate as pt has made good progress towards goals. Other factors to consider for discharge: time since onset, severity of deficits, PLOF         PLAN :  Patient continues to benefit from skilled intervention to address the above impairments. Continue treatment per established plan of care to address goals. Recommend with staff: assist with toilet transfers    Recommend next OT session: sink level bathing    Recommendation for discharge: (in order for the patient to meet his/her long term goals)  Occupational therapy at least 2 days/week in the home     This discharge recommendation:  Has been made in collaboration with the attending provider and/or case management    IF patient discharges home will need the following DME: TBD       SUBJECTIVE:   Patient stated I feel much better now.     OBJECTIVE DATA SUMMARY:   Cognitive/Behavioral Status:  Neurologic State: Alert  Orientation Level: Oriented X4  Cognition: Follows commands     Functional Mobility and Transfers for ADLs:  Bed Mobility:  Rolling: Modified independent  Supine to Sit: Stand-by assistance  Sit to Supine: Stand-by assistance  Scooting: Stand-by assistance    Transfers:  Sit to Stand: Stand-by assistance  Functional Transfers  Bathroom Mobility: Stand-by assistance  Toilet Transfer : Stand-by assistance  Bed to Chair: Stand-by assistance    Balance:  Sitting: Intact  Standing: Intact; With support  Standing - Static: Constant support;Good  Standing - Dynamic : Constant support;Good    ADL Intervention:     Grooming  Grooming Assistance: Stand-by assistance  Position Performed:  (sitting/standing at sink)  Washing Face: Stand-by assistance  Washing Hands: Stand-by assistance  Brushing Teeth: Stand-by assistance  Brushing/Combing Hair: Stand-by assistance    Lower Body Dressing Assistance  Socks: Stand-by assistance  Slip on Shoes with Back: Stand-by assistance  Leg Crossed Method Used: Yes  Position Performed: Seated edge of bed    Toileting  Toileting Assistance: Stand-by assistance  Bladder Hygiene: Stand-by assistance  Bowel Hygiene: Stand-by assistance  Clothing Management: Stand-by assistance  Pain:  No pain reported    Activity Tolerance:   Good    After treatment patient left in no apparent distress:   Supine in bed, Call bell within reach, Bed / chair alarm activated, and Side rails x 3    COMMUNICATION/COLLABORATION:   The patients plan of care was discussed with: Physical therapist and Registered nurse.      Bernadette Mujica  Time Calculation: 35 mins

## 2022-09-20 NOTE — PROGRESS NOTES
Problem: Mobility Impaired (Adult and Pediatric)  Goal: *Acute Goals and Plan of Care (Insert Text)  Description: FUNCTIONAL STATUS PRIOR TO ADMISSION: Patient was modified independent using a rollator for functional mobility. HOME SUPPORT PRIOR TO ADMISSION: The patient lived alone with son to provide assistance who checks on her daily. Video cam in place to monitor    Physical Therapy Goals  Initiated 9/20/2022  1. Patient will move from supine to sit and sit to supine  in bed with modified independence within 7 day(s). 2.  Patient will transfer from bed to chair and chair to bed with modified independence using the least restrictive device within 7 day(s). 3.  Patient will perform sit to stand with modified independence within 7 day(s). 4.  Patient will ambulate with supervision/set-up for 150 feet with the least restrictive device within 7 day(s). Outcome: Progressing Towards Goal   PHYSICAL THERAPY EVALUATION  Patient: Zac Santoyo (46 y.o. female)  Date: 9/20/2022  Primary Diagnosis: Diverticulitis [K57.92]       Precautions:   DNR, Contact (enteric)    ASSESSMENT  Based on the objective data described below, the patient presents with admission due to diverticulitis. Pt lives has lived in [facility] for several years and has been active member of her community. She has been Mod I with gait and ADL's with her rollator. Pt received supine in bed with son visiting who is her primary assist locally. She is  [employer] and [place]. Pt educated with JORGEE/LE ex with handout and demonstrated her personal ex program PTA. Supine to sit with SBA to Mod I.  Sitting balance is good. Sit to stand with RW with CGA. Gait of 80' tolerated well with CGA with RW. Returned to bed in NAD. Recommending HHPT of which pt is agreeable. CM informed. Current Level of Function Impacting Discharge (mobility/balance): CGA to SBA/good with RW    Functional Outcome Measure:   The patient scored 55/100 on the barthel outcome measure      Other factors to consider for discharge: per above      Patient will benefit from skilled therapy intervention to address the above noted impairments. PLAN :  Recommendations and Planned Interventions: bed mobility training, transfer training, gait training, therapeutic exercises, neuromuscular re-education, edema management/control, patient and family training/education, and therapeutic activities      Frequency/Duration: Patient will be followed by physical therapy:  5 times a week to address goals. Recommendation for discharge: (in order for the patient to meet his/her long term goals)  Physical therapy at least 2 days/week in the home     This discharge recommendation:  Has been made in collaboration with the attending provider and/or case management    IF patient discharges home will need the following DME: has all DME; son to get [facility]         SUBJECTIVE:   Patient stated I walk around without my rollator in my apt but use it for laundry.     OBJECTIVE DATA SUMMARY:   HISTORY:    Past Medical History:   Diagnosis Date    Arthritis, rheumatoid ([address]     Diabetes ([address]     Diverticulitis     Hard of hearing     History of vascular access device 04/01/2021    [facility] VAT 4 FR R Basilic 79/2 LTAbx    History of vascular access device 03/04/2022    4 feench single lumen PICC line right basilic    HTN (hypertension)     Hypertension     Hypothyroid     Osteoporosis, post-menopausal     Septic arthritis of elbow, left ([address]      Past Surgical History:   Procedure Laterality Date    FLEXIBLE SIGMOIDOSCOPY N/A 12/2/2020    SIGMOIDOSCOPY FLEXIBLE [facility]    HX HEMORRHOIDECTOMY      HX HYSTERECTOMY      HX THYROIDECTOMY         Personal factors and/or comorbidities impacting plan of care: per above and below    Home Situation  Home Environment: Private residence (53+)  # Steps to Enter:  (none)  Living Alone: Yes  Support Systems:  (son checks on mom daily and camera in place)  Patient Expects to be Discharged to[de-identified] Home with home health  Current DME Used/Available at Home: Angel Gambler, rollator  Tub or Shower Type: Shower    EXAMINATION/PRESENTATION/DECISION MAKING:   Critical Behavior:  Neurologic State: Alert, Appropriate for age  Orientation Level: Oriented X4  Cognition: Follows commands  Safety/Judgement: Decreased awareness of need for assistance, Decreased awareness of need for safety, Decreased insight into deficits  Hearing: Auditory  Auditory Impairment: Hard of hearing, bilateral  Hearing Aids/Status: Bilateral, With patient  Skin:  IV  Edema: WNL  Range Of Motion:  AROM: Within functional limits (excluding L shoulder and hand due to RA)                       Strength:    Strength: Generally decreased, functional                    Tone & Sensation:   Tone: Normal              Sensation: Impaired (B hands)               Coordination:  Coordination: Within functional limits  Vision:      Functional Mobility:  Bed Mobility:  Rolling: Modified independent  Supine to Sit: Stand-by assistance;Modified independent; Additional time  Sit to Supine: Stand-by assistance;Modified independent  Scooting: Modified independent  Transfers:  Sit to Stand: Stand-by assistance;Contact guard assistance  Stand to Sit: Stand-by assistance                       Balance:   Sitting: Intact  Standing: Intact; With support  Ambulation/Gait Training:  Distance (ft): 80 Feet (ft)  Assistive Device: Walker, rolling;Gait belt  Ambulation - Level of Assistance: Contact guard assistance        Gait Abnormalities: Step to gait; Decreased step clearance        Base of Support: Narrowed                                    Therapeutic Exercises:   Per above    Functional Measure:  Barthel Index:    Bathin  Bladder: 5  Bowels: 5  Groomin  Dressin  Feeding: 10  Mobility: 10  Stairs: 0  Toilet Use: 5  Transfer (Bed to Chair and Back): 10  Total: 55/100       The Barthel ADL Index: Guidelines  1. The index should be used as a record of what a patient does, not as a record of what a patient could do. 2. The main aim is to establish degree of independence from any help, physical or verbal, however minor and for whatever reason. 3. The need for supervision renders the patient not independent. 4. A patient's performance should be established using the best available evidence. Asking the patient, friends/relatives and nurses are the usual sources, but direct observation and common sense are also important. However direct testing is not needed. 5. Usually the patient's performance over the preceding 24-48 hours is important, but occasionally longer periods will be relevant. 6. Middle categories imply that the patient supplies over 50 per cent of the effort. 7. Use of aids to be independent is allowed. Score Interpretation (from [address] [facility])    Independent   60-79 Minimally independent   40-59 Partially dependent   20-39 Very dependent   <20 Totally dependent     -Zuleika Zuñiga., Barthel, DAlonzoW. (1965). Functional evaluation: the Barthel Index. [address] [facility] ([address] (1997). The Barthel activities of daily living index: self-reporting versus actual performance in the old (> or = 75 years). Journal of [address] [place] [address] 45(7), 14 [facility], J.GIANLUCA.M., Antonino Forrester., Gopal Valdez. (1999). Measuring the change in disability after inpatient rehabilitation; comparison of the responsiveness of the Barthel Index and Functional [place] Measure. Journal of Neurology, Neurosurgery, and Psychiatry, 66(4), 995-464. Rosy Branham, N.J.A, DEO Rangel.JANIE, & Sydney Thomas MRADHA. (2004) Assessment of post-stroke quality of life in cost-effectiveness studies: The usefulness of the Barthel Index and the EuroQoL-5D.  Quality of Life Research, 15, 169-24           Physical Therapy Evaluation Charge Determination   History Examination Presentation Decision-Making   MEDIUM  Complexity : 1-2 comorbidities / personal factors will impact the outcome/ POC  MEDIUM Complexity : 3 Standardized tests and measures addressing body structure, function, activity limitation and / or participation in recreation  MEDIUM Complexity : Evolving with changing characteristics  Other outcome measures barthel  MEDIUM      Based on the above components, the patient evaluation is determined to be of the following complexity level: MEDIUM    Pain Rating:  L shoulder with AROM    Activity Tolerance:   Good    After treatment patient left in no apparent distress:   Supine in bed, Heels elevated for pressure relief, Call bell within reach, Bed / chair alarm activated, Caregiver / family present, and Side rails x 3    COMMUNICATION/EDUCATION:   The patients plan of care was discussed with: Registered nurse and Case management. Fall prevention education was provided and the patient/caregiver indicated understanding., Patient/family have participated as able in goal setting and plan of care. , and Patient/family agree to work toward stated goals and plan of care.     Thank you for this referral.  Pablo Salazar, PT   Time Calculation: 35 mins

## 2022-09-20 NOTE — PROGRESS NOTES
[facility]  [address] [facility], [facility] [address]  [phone]         Hospitalist Progress Note        NAME:  Alina Butler   :  1930   MRN:  [id number]    Date/Time:  2022     Patient PCP:  Jaye Boston MD    Emergency Contact:    Extended Emergency Contact Information  Primary Emergency Contact: Grant Sanabria  Mobile Phone: [phone]  Relation: Son  Secondary Emergency Contact: Grant Sanabria  Address: [address] [place] [address] Phone: [phone]  Mobile Phone: [phone]  Relation: Son      Code: DNR     Isolation Precautions: Enteric Contact        Subjective:     REASON FOR VISIT:  Recheck Abd pain / sepsis     HPI & INTERVAL HISTORY:     Ms. Rodney Ventura is a 80 y.o. female with history that includes diverticulosis, diverticulitis, diabetes mellitus type 2, essential hypertension presents to the next department with the above complaint. According to the patient she has been having increasing abdominal pain since Saturday. The pain has been in the lower quadrants of her abdomen. : Patient was seen and examined with son Charis Girard at bedside. She reports that she has great improvement of her symptoms with although she reports 4 out of 10 left lower quadrant pain which has been pretty much constant without radiation great improvement compared to 10 out of 10 previously. White count has gone from 25-16. 6.       ALLERGIES  Allergies   Allergen Reactions    Alendronate Diarrhea    Amoxicillin Rash     Tolerated cefepime 3/27/21    Amoxicillin Unknown (comments)    Hydrochlorothiazide Other (comments)     \"Caused pain everywhere\"    Penicillins Unknown (comments)         ROS:  Gen:   denies fever, chills, fatigue, malaise, generalized weakness  Resp:  negative  CVS:   negative  GI:       abdominal pain  :     negative         Objective:      Visit Vitals  BP (!) 134/57 (BP 1 Location: Left upper arm, BP Patient Position: At rest)   Pulse 93   Temp 97.9 °F (36.6 °C)   Resp 18   Ht 4' 11\" (1.499 m)   Wt 49.4 kg (109 lb)   SpO2 95%   Breastfeeding No   BMI 22.02 kg/m²       Physical Exam:  General: alert, well appearing, and no distress  Head: Normocephalic, without obvious abnormality, atraumatic  Eyes: PERRL, EOMI, anicteric sclerae, and conjuntiva clear  ENT: lips, mucosa, and tongue normal  Neck: normal, supple, and no tenderness  Lungs: clear to auscultation with good breath sounds and normal respiratory effort  Heart: S1, S2 normal, regular rate, and regular rhythm  Abd: not distended, soft, left lower quadrant tenderness to palpation, BS present and normactive no guarding or rebound  Ext: no cyanosis and no edema  Skin: normal skin color, no rashes, and texture normal  Neuro:  alert, oriented x 3, no defects noted in general exam.  Psych: not anxious, cooperative, appropriate affect      Medications:  Current Facility-Administered Medications   Medication Dose Route Frequency    aspirin delayed-release tablet 81 mg  81 mg Oral DAILY    levothyroxine (SYNTHROID) tablet 75 mcg  75 mcg Oral ACB    sodium chloride (NS) flush 5-40 mL  5-40 mL IntraVENous Q8H    sodium chloride (NS) flush 5-40 mL  5-40 mL IntraVENous PRN    metroNIDAZOLE (FLAGYL) IVPB premix 500 mg  500 mg IntraVENous Q12H    acetaminophen (TYLENOL) solution 650 mg  650 mg Oral Q4H PRN    oxyCODONE IR (ROXICODONE) tablet 5 mg  5 mg Oral Q4H PRN    docusate sodium (COLACE) capsule 100 mg  100 mg Oral BID    heparin (porcine) injection 5,000 Units  5,000 Units SubCUTAneous Q8H    ondansetron (ZOFRAN) injection 4 mg  4 mg IntraVENous Q8H PRN    levoFLOXacin (LEVAQUIN) 750 mg in D5W IVPB  750 mg IntraVENous Q48H    lactated Ringers infusion  125 mL/hr IntraVENous CONTINUOUS    glucose chewable tablet 16 g  4 Tablet Oral PRN    glucagon (GLUCAGEN) injection 1 mg  1 mg IntraMUSCular PRN    dextrose 10% infusion 0-250 mL 0-250 mL IntraVENous PRN    insulin lispro (HUMALOG) injection   SubCUTAneous AC&HS    morphine injection 4 mg  4 mg IntraVENous Q3H PRN    polyethylene glycol (MIRALAX) packet 17 g  17 g Oral DAILY        Labs:  Recent Labs     09/20/22  0334   WBC 16.6*   HGB 8.7*   HCT 26.7*          Recent Labs     09/20/22  0334 09/19/22  0802   * 130*   K 3.6 4.4    98   CO2 23 24   * 148*   BUN 20 37*   CREA 0.72 1.14*   CA 8.1* 8.9   ALB  --  2.2*   TBILI  --  0.3   ALT  --  21         Radiology:  XR ABD (KUB)    Result Date: 9/19/2022  Moderate stool burden throughout the colon. No evidence of mechanical bowel obstruction. I personally reviewed and interpreted the imaging studies and agree with official reading    The labs, imaging studies, and medications was reviewed by me on: September 20, 2022         Assessment/Plan:      Sepsis based on leukocytosis, tachycardia due to acute diverticulitis POA   Leukocytosis improving  Continue with Levaquin and Flagyl  Add p.o. vancomycin if develops diarrhea  Blood cultures pending*  Continue IV fluids and supportive care    MIKE POA likely prerenal from hypovolemia   Continue with IV fluids and avoid nephrotoxic agents.   Monitor labs    Hypovolemic hyponatremia 128 POA  IV fluid replacement and monitor labs    Hyperglycemia with no history of diabetes glucose 230 POA  A1c 5.5  Continue with basal insulin and ISS coverage    History of RA  Per report and is not on DMARDs or bDMARDs    Body mass index is 22.02 kg/m².:  18.5 - 24.9:  Normal weight      Risk of deterioration: high      Discussed:  Pt's condition, Imaging findings, Lab findings, Assessment, and Care Plan discussed with: Patient, Family at alpesh, RN, and Care Manager    Prophylaxis:  Lovenox SQ    Probable disposition:  Home with family      Total time: -28- minutes **I personally saw and examined the patient during this time period**      Date of service:    9/20/2022 ___________________________________________________    Admitting Physician: Florence Kulkarni MD

## 2022-09-20 NOTE — PROGRESS NOTES
Problem: Risk for Spread of Infection  Goal: Prevent transmission of infectious organism to others  Description: Prevent the transmission of infectious organisms to other patients, staff members, and visitors.   Outcome: Progressing Towards Goal     Problem: Patient Education: Go to Patient Education Activity  Goal: Patient/Family Education  Outcome: Progressing Towards Goal

## 2022-09-20 NOTE — PROGRESS NOTES
0530 Patient noted still did not pass urine since last night, bladder scan done, 977 mils noted. Informed Dr Griffin Stairs through perfect serv, no reply. Sent a message to Dr Andrew Dickson, to call him back. Spoke with Dr Andrew Dickson on the phone, can perform straight cath once now. 0636 about to perform straight cath but noted patient passed urine on purwick. Noted 200 mils. Bladder scan done, 800 mils noted. Patient verbalized she can try to pass again. Patient placed in a sitting position to facilitate urination. Needs attended, will continue to monitor. 0800 Received call from the team, Updated about the patient status. Informed patient wants to try to sit on the commode chair. He verbalized we can do that and see, if still with retention to perform a couple of straight caths and observe. Bedside shift change report given to Anson (oncoming nurse) by Niles Lee (offgoing nurse). Report included the following information SBAR, Intake/Output, MAR, Recent Results, and Med Rec Status.

## 2022-09-20 NOTE — PROGRESS NOTES
Bedside shift change report given to CORI Whiting (oncoming nurse) by Nikole Trotter (offgoing nurse). Report included the following information SBAR, Kardex, and MAR.

## 2022-09-21 LAB
ANION GAP SERPL CALC-SCNC: 9 MMOL/L (ref 5–15)
BASOPHILS # BLD: 0 K/UL (ref 0–0.1)
BASOPHILS NFR BLD: 0 % (ref 0–1)
BUN SERPL-MCNC: 11 MG/DL (ref 6–20)
BUN/CREAT SERPL: 21 (ref 12–20)
CALCIUM SERPL-MCNC: 8.1 MG/DL (ref 8.5–10.1)
CHLORIDE SERPL-SCNC: 102 MMOL/L (ref 97–108)
CO2 SERPL-SCNC: 22 MMOL/L (ref 21–32)
CREAT SERPL-MCNC: 0.52 MG/DL (ref 0.55–1.02)
DIFFERENTIAL METHOD BLD: ABNORMAL
EOSINOPHIL # BLD: 0.1 K/UL (ref 0–0.4)
EOSINOPHIL NFR BLD: 1 % (ref 0–7)
ERYTHROCYTE [DISTWIDTH] IN BLOOD BY AUTOMATED COUNT: 17 % (ref 11.5–14.5)
GLUCOSE BLD STRIP.AUTO-MCNC: 101 MG/DL (ref 65–117)
GLUCOSE BLD STRIP.AUTO-MCNC: 125 MG/DL (ref 65–117)
GLUCOSE BLD STRIP.AUTO-MCNC: 89 MG/DL (ref 65–117)
GLUCOSE BLD STRIP.AUTO-MCNC: 96 MG/DL (ref 65–117)
GLUCOSE SERPL-MCNC: 93 MG/DL (ref 65–100)
HCT VFR BLD AUTO: 26.9 % (ref 35–47)
HGB BLD-MCNC: 8.6 G/DL (ref 11.5–16)
IMM GRANULOCYTES # BLD AUTO: 0.1 K/UL (ref 0–0.04)
IMM GRANULOCYTES NFR BLD AUTO: 1 % (ref 0–0.5)
LYMPHOCYTES # BLD: 0.9 K/UL (ref 0.8–3.5)
LYMPHOCYTES NFR BLD: 6 % (ref 12–49)
MAGNESIUM SERPL-MCNC: 1.9 MG/DL (ref 1.6–2.4)
MCH RBC QN AUTO: 26.8 PG (ref 26–34)
MCHC RBC AUTO-ENTMCNC: 32 G/DL (ref 30–36.5)
MCV RBC AUTO: 83.8 FL (ref 80–99)
MONOCYTES # BLD: 0.3 K/UL (ref 0–1)
MONOCYTES NFR BLD: 2 % (ref 5–13)
NEUTS SEG # BLD: 12 K/UL (ref 1.8–8)
NEUTS SEG NFR BLD: 90 % (ref 32–75)
NRBC # BLD: 0 K/UL (ref 0–0.01)
NRBC BLD-RTO: 0 PER 100 WBC
PLATELET # BLD AUTO: 340 K/UL (ref 150–400)
PMV BLD AUTO: 9.5 FL (ref 8.9–12.9)
POTASSIUM SERPL-SCNC: 3.3 MMOL/L (ref 3.5–5.1)
RBC # BLD AUTO: 3.21 M/UL (ref 3.8–5.2)
SERVICE CMNT-IMP: ABNORMAL
SERVICE CMNT-IMP: NORMAL
SODIUM SERPL-SCNC: 133 MMOL/L (ref 136–145)
WBC # BLD AUTO: 13.4 K/UL (ref 3.6–11)

## 2022-09-21 PROCEDURE — 74011000250 HC RX REV CODE- 250: Performed by: HOSPITALIST

## 2022-09-21 PROCEDURE — 65270000029 HC RM PRIVATE

## 2022-09-21 PROCEDURE — 80048 BASIC METABOLIC PNL TOTAL CA: CPT

## 2022-09-21 PROCEDURE — 74011250636 HC RX REV CODE- 250/636: Performed by: HOSPITALIST

## 2022-09-21 PROCEDURE — 74011250637 HC RX REV CODE- 250/637: Performed by: HOSPITALIST

## 2022-09-21 PROCEDURE — 97116 GAIT TRAINING THERAPY: CPT

## 2022-09-21 PROCEDURE — 74011250636 HC RX REV CODE- 250/636: Performed by: INTERNAL MEDICINE

## 2022-09-21 PROCEDURE — 83735 ASSAY OF MAGNESIUM: CPT

## 2022-09-21 PROCEDURE — 85025 COMPLETE CBC W/AUTO DIFF WBC: CPT

## 2022-09-21 PROCEDURE — 82962 GLUCOSE BLOOD TEST: CPT

## 2022-09-21 PROCEDURE — 36415 COLL VENOUS BLD VENIPUNCTURE: CPT

## 2022-09-21 RX ADMIN — METRONIDAZOLE 500 MG: 500 INJECTION, SOLUTION INTRAVENOUS at 23:07

## 2022-09-21 RX ADMIN — ASPIRIN 81 MG: 81 TABLET, COATED ORAL at 08:55

## 2022-09-21 RX ADMIN — POTASSIUM BICARBONATE 40 MEQ: 782 TABLET, EFFERVESCENT ORAL at 11:45

## 2022-09-21 RX ADMIN — Medication 10 ML: at 23:07

## 2022-09-21 RX ADMIN — POTASSIUM BICARBONATE 40 MEQ: 782 TABLET, EFFERVESCENT ORAL at 10:16

## 2022-09-21 RX ADMIN — SODIUM CHLORIDE, POTASSIUM CHLORIDE, SODIUM LACTATE AND CALCIUM CHLORIDE 125 ML/HR: 600; 310; 30; 20 INJECTION, SOLUTION INTRAVENOUS at 00:44

## 2022-09-21 RX ADMIN — LEVOTHYROXINE SODIUM 75 MCG: 0.07 TABLET ORAL at 06:40

## 2022-09-21 RX ADMIN — HEPARIN SODIUM 5000 UNITS: 5000 INJECTION INTRAVENOUS; SUBCUTANEOUS at 17:18

## 2022-09-21 RX ADMIN — DOCUSATE SODIUM 100 MG: 100 CAPSULE, LIQUID FILLED ORAL at 17:18

## 2022-09-21 RX ADMIN — LEVOFLOXACIN 750 MG: 5 INJECTION, SOLUTION INTRAVENOUS at 10:15

## 2022-09-21 RX ADMIN — Medication 10 ML: at 14:29

## 2022-09-21 RX ADMIN — HEPARIN SODIUM 5000 UNITS: 5000 INJECTION INTRAVENOUS; SUBCUTANEOUS at 00:44

## 2022-09-21 RX ADMIN — METRONIDAZOLE 500 MG: 500 INJECTION, SOLUTION INTRAVENOUS at 08:53

## 2022-09-21 RX ADMIN — Medication 10 ML: at 06:40

## 2022-09-21 RX ADMIN — DOCUSATE SODIUM 100 MG: 100 CAPSULE, LIQUID FILLED ORAL at 08:55

## 2022-09-21 RX ADMIN — HEPARIN SODIUM 5000 UNITS: 5000 INJECTION INTRAVENOUS; SUBCUTANEOUS at 08:55

## 2022-09-21 NOTE — PROGRESS NOTES
Transition of care plan - RUR 16%:  Medication management continues  CM following  PT/OT following - recommending [address] has been arranged with [facility] [address] with family and home health at discharge  Outpatient follow up PCP, specialists  Patient's son will transport home     Med[place], [place]

## 2022-09-21 NOTE — PROGRESS NOTES
Bedside shift change report given to CORI Whiting (oncoming nurse) by Lesvia Simmons (offgoing nurse). Report included the following information SBAR, Kardex, and MAR.

## 2022-09-21 NOTE — PROGRESS NOTES
Problem: Patient Education:  Go to Education Activity  Goal: Patient/Family Education  Outcome: Progressing Towards Goal     Problem: Aspiration - Risk of  Goal: *Absence of aspiration  Outcome: Progressing Towards Goal

## 2022-09-21 NOTE — PROGRESS NOTES
Assisted to the bedside commode 3 times. Patient stable and not in distress, tolerated well. Bedside shift change report given to Tadeo Borden RN and Aracelis Beltran RN (oncoming nurse) by Janay Harris (offgoing nurse). Report included the following information SBAR, Kardex, Intake/Output, and MAR.

## 2022-09-21 NOTE — PROGRESS NOTES
Problem: Mobility Impaired (Adult and Pediatric)  Goal: *Acute Goals and Plan of Care (Insert Text)  Description: FUNCTIONAL STATUS PRIOR TO ADMISSION: Patient was modified independent using a rollator for functional mobility. HOME SUPPORT PRIOR TO ADMISSION: The patient lived alone with son to provide assistance who checks on her daily. Video cam in place to monitor    Physical Therapy Goals  Initiated 9/20/2022  1. Patient will move from supine to sit and sit to supine  in bed with modified independence within 7 day(s). 2.  Patient will transfer from bed to chair and chair to bed with modified independence using the least restrictive device within 7 day(s). 3.  Patient will perform sit to stand with modified independence within 7 day(s). 4.  Patient will ambulate with supervision/set-up for 150 feet with the least restrictive device within 7 day(s). Outcome: Progressing Towards Goal   PHYSICAL THERAPY TREATMENT  Patient: Xiang Glover (10 y.o. female)  Date: 9/21/2022  Diagnosis: Diverticulitis [K57.92] <principal problem not specified>      Precautions: DNR, Contact (enteric)  Chart, physical therapy assessment, plan of care and goals were reviewed. ASSESSMENT  Patient continues with skilled PT services and is progressing towards goals. Pt received sitting up in recliner stating that she needs to get up and move. BLE's extended and requesting to not have them up. Also stating buttocks is sore. Provided waffle cushion. Sit to stand with SBA with RW.  Gait of 240' tolerated well today with CGA with RW. Returned to recliner with BLE's down. Pt stating waffle cushion is very comfortable. Good progress today with gait and activity tolerance. HHPT recommended.      Current Level of Function Impacting Discharge (mobility/balance): CGA to SBA/good with RW    Other factors to consider for discharge: per above         PLAN :  Patient continues to benefit from skilled intervention to address the above impairments. Continue treatment per established plan of care. to address goals. Recommendation for discharge: (in order for the patient to meet his/her long term goals)  Physical therapy at least 2 days/week in the home     This discharge recommendation:  Has been made in collaboration with the attending provider and/or case management    IF patient discharges home will need the following DME: has all DME       SUBJECTIVE:   Patient stated I cant sit like this.     OBJECTIVE DATA SUMMARY:   Critical Behavior:  Neurologic State: Alert  Orientation Level: Oriented X4  Cognition: Follows commands  Safety/Judgement: Decreased awareness of need for assistance, Decreased awareness of need for safety, Decreased insight into deficits  Functional Mobility Training:  Bed Mobility:                    Transfers:  Sit to Stand: Stand-by assistance  Stand to Sit: Stand-by assistance                             Balance:  Sitting: Intact  Standing: Intact  Standing - Static: Good;Constant support  Standing - Dynamic : Good;Fair;Constant support  Ambulation/Gait Training:  Distance (ft): 240 Feet (ft)  Assistive Device: Walker, rolling;Gait belt  Ambulation - Level of Assistance: Contact guard assistance                                           Activity Tolerance:   Good    After treatment patient left in no apparent distress:   Call bell within reach, Bed / chair alarm activated, and Caregiver / family present    COMMUNICATION/COLLABORATION:   The patients plan of care was discussed with: Registered nurse and Case management.      Walker Ours, PT   Time Calculation: 15 mins

## 2022-09-21 NOTE — PROGRESS NOTES
09/21/22      Medicare pt has received Medicare 2nd IMM letter. Detailed information was given to patient about the letter patient Declined to sign wanted POA to review letter first. Kannan Valerio was not present copy of letter was left on bedside table.

## 2022-09-22 VITALS
HEART RATE: 95 BPM | OXYGEN SATURATION: 98 % | HEIGHT: 59 IN | WEIGHT: 109 LBS | RESPIRATION RATE: 16 BRPM | DIASTOLIC BLOOD PRESSURE: 71 MMHG | SYSTOLIC BLOOD PRESSURE: 124 MMHG | TEMPERATURE: 97.4 F | BODY MASS INDEX: 21.97 KG/M2

## 2022-09-22 LAB
ANION GAP SERPL CALC-SCNC: 5 MMOL/L (ref 5–15)
BASOPHILS # BLD: 0 K/UL (ref 0–0.1)
BASOPHILS NFR BLD: 0 % (ref 0–1)
BUN SERPL-MCNC: 8 MG/DL (ref 6–20)
BUN/CREAT SERPL: 16 (ref 12–20)
CALCIUM SERPL-MCNC: 8.1 MG/DL (ref 8.5–10.1)
CHLORIDE SERPL-SCNC: 101 MMOL/L (ref 97–108)
CO2 SERPL-SCNC: 27 MMOL/L (ref 21–32)
CREAT SERPL-MCNC: 0.49 MG/DL (ref 0.55–1.02)
DIFFERENTIAL METHOD BLD: ABNORMAL
EOSINOPHIL # BLD: 0.2 K/UL (ref 0–0.4)
EOSINOPHIL NFR BLD: 1 % (ref 0–7)
ERYTHROCYTE [DISTWIDTH] IN BLOOD BY AUTOMATED COUNT: 17.2 % (ref 11.5–14.5)
GLUCOSE BLD STRIP.AUTO-MCNC: 110 MG/DL (ref 65–117)
GLUCOSE BLD STRIP.AUTO-MCNC: 99 MG/DL (ref 65–117)
GLUCOSE SERPL-MCNC: 104 MG/DL (ref 65–100)
HCT VFR BLD AUTO: 27.5 % (ref 35–47)
HGB BLD-MCNC: 9 G/DL (ref 11.5–16)
IMM GRANULOCYTES # BLD AUTO: 0.1 K/UL (ref 0–0.04)
IMM GRANULOCYTES NFR BLD AUTO: 1 % (ref 0–0.5)
LYMPHOCYTES # BLD: 0.8 K/UL (ref 0.8–3.5)
LYMPHOCYTES NFR BLD: 8 % (ref 12–49)
MCH RBC QN AUTO: 26.5 PG (ref 26–34)
MCHC RBC AUTO-ENTMCNC: 32.7 G/DL (ref 30–36.5)
MCV RBC AUTO: 81.1 FL (ref 80–99)
MONOCYTES # BLD: 0.3 K/UL (ref 0–1)
MONOCYTES NFR BLD: 3 % (ref 5–13)
NEUTS SEG # BLD: 9.7 K/UL (ref 1.8–8)
NEUTS SEG NFR BLD: 87 % (ref 32–75)
NRBC # BLD: 0 K/UL (ref 0–0.01)
NRBC BLD-RTO: 0 PER 100 WBC
PLATELET # BLD AUTO: 374 K/UL (ref 150–400)
PMV BLD AUTO: 9.1 FL (ref 8.9–12.9)
POTASSIUM SERPL-SCNC: 3.7 MMOL/L (ref 3.5–5.1)
RBC # BLD AUTO: 3.39 M/UL (ref 3.8–5.2)
SERVICE CMNT-IMP: NORMAL
SERVICE CMNT-IMP: NORMAL
SODIUM SERPL-SCNC: 133 MMOL/L (ref 136–145)
WBC # BLD AUTO: 11.1 K/UL (ref 3.6–11)

## 2022-09-22 PROCEDURE — 97535 SELF CARE MNGMENT TRAINING: CPT

## 2022-09-22 PROCEDURE — 74011250637 HC RX REV CODE- 250/637: Performed by: HOSPITALIST

## 2022-09-22 PROCEDURE — 74011250636 HC RX REV CODE- 250/636: Performed by: INTERNAL MEDICINE

## 2022-09-22 PROCEDURE — 74011250636 HC RX REV CODE- 250/636: Performed by: HOSPITALIST

## 2022-09-22 PROCEDURE — 85025 COMPLETE CBC W/AUTO DIFF WBC: CPT

## 2022-09-22 PROCEDURE — 36415 COLL VENOUS BLD VENIPUNCTURE: CPT

## 2022-09-22 PROCEDURE — 74011000250 HC RX REV CODE- 250: Performed by: HOSPITALIST

## 2022-09-22 PROCEDURE — 74011250637 HC RX REV CODE- 250/637: Performed by: INTERNAL MEDICINE

## 2022-09-22 PROCEDURE — 82962 GLUCOSE BLOOD TEST: CPT

## 2022-09-22 PROCEDURE — 80048 BASIC METABOLIC PNL TOTAL CA: CPT

## 2022-09-22 RX ORDER — LISINOPRIL 10 MG/1
10 TABLET ORAL DAILY
COMMUNITY

## 2022-09-22 RX ORDER — METRONIDAZOLE 500 MG/1
500 TABLET ORAL 3 TIMES DAILY
Qty: 21 TABLET | Refills: 0 | Status: SHIPPED | OUTPATIENT
Start: 2022-09-22 | End: 2022-10-25

## 2022-09-22 RX ORDER — LEVOFLOXACIN 500 MG/1
500 TABLET, FILM COATED ORAL DAILY
Qty: 7 TABLET | Refills: 0 | Status: SHIPPED | OUTPATIENT
Start: 2022-09-22 | End: 2022-10-25

## 2022-09-22 RX ADMIN — METRONIDAZOLE 500 MG: 500 INJECTION, SOLUTION INTRAVENOUS at 10:45

## 2022-09-22 RX ADMIN — HEPARIN SODIUM 5000 UNITS: 5000 INJECTION INTRAVENOUS; SUBCUTANEOUS at 10:41

## 2022-09-22 RX ADMIN — Medication 10 ML: at 05:49

## 2022-09-22 RX ADMIN — POLYETHYLENE GLYCOL 3350 17 G: 17 POWDER, FOR SOLUTION ORAL at 10:45

## 2022-09-22 RX ADMIN — LEVOTHYROXINE SODIUM 75 MCG: 0.07 TABLET ORAL at 05:49

## 2022-09-22 RX ADMIN — SODIUM CHLORIDE, POTASSIUM CHLORIDE, SODIUM LACTATE AND CALCIUM CHLORIDE 125 ML/HR: 600; 310; 30; 20 INJECTION, SOLUTION INTRAVENOUS at 05:51

## 2022-09-22 RX ADMIN — ASPIRIN 81 MG: 81 TABLET, COATED ORAL at 10:41

## 2022-09-22 RX ADMIN — HEPARIN SODIUM 5000 UNITS: 5000 INJECTION INTRAVENOUS; SUBCUTANEOUS at 02:05

## 2022-09-22 RX ADMIN — DOCUSATE SODIUM 100 MG: 100 CAPSULE, LIQUID FILLED ORAL at 10:41

## 2022-09-22 NOTE — PROGRESS NOTES
9/22/2022 12:35 PM Discharge order noted. [facility] was sent pt's discharge clinicals and notified of pt's discharge home today. Pt's son will transport pt home today. No further discharge needs identified at this time. KAY Conley    Transition of care plan - RUR 16%:  Discharge home today  Home health has been arranged with [facility]  Home with family and home health at discharge  Outpatient follow up PCP, specialists  Patient's son will transport home     Care Management Interventions  PCP Verified by CM:  Yes Adeel Muro MD )  Palliative Care Criteria Met (RRAT>21 & CHF Dx)?: No  Mode of Transport at Discharge: Self ([place] 49 vs Family)  Transition of Care Consult (CM Consult):  (pt lives aloen in senior  apt, at baseline pt is ambulatory w/ RW, iADLS, relies on family for transport)  Physical Therapy Consult: Yes  Occupational Therapy Consult: Yes  Support Systems:  (son checks on mom daily and camera in place)  Confirm Follow Up Transport: Family  The Patient and/or Patient Representative was Provided with a Choice of Provider and Agrees with the Discharge Plan?: Yes  Freedom of Choice List was Provided with Basic Dialogue that Supports the Patient's Individualized Plan of Care/Goals, Treatment Preferences and Shares the Quality Data Associated with the Providers?: Yes  Discharge Location  Patient Expects to be Discharged to[de-identified] Home with home health

## 2022-09-22 NOTE — PROGRESS NOTES
Bedside shift change report given to [address] (oncoming nurse) by [facility] (offgoing nurse). Report included the following information SBAR, Intake/Output, MAR, and Recent Results.

## 2022-09-22 NOTE — DISCHARGE INSTRUCTIONS
ACUTE DIAGNOSES:  Diverticulitis [K57.92]    CHRONIC MEDICAL DIAGNOSES:  [unfilled]    DISCHARGE MEDICATIONS:          It is important that you take the medication exactly as they are prescribed. Keep your medication in the bottles provided by the pharmacist and keep a list of the medication names, dosages, and times to be taken in your wallet. Do not take other medications without consulting your doctor. DIET:  Regular Diet    ACTIVITY: Activity as tolerated    ADDITIONAL INFORMATION: If you experience any of the following symptoms then please call your primary care physician or return to the emergency room if you cannot get hold of your doctor: Fever, chills, nausea, vomiting, diarrhea, change in mentation, falling, bleeding, shortness of breath. FOLLOW UP CARE:  Primary care physician. you are to call and set up an appointment to see them in 5 days. Information obtained by :  I understand that if any problems occur once I am at home I am to contact my physician. I understand and acknowledge receipt of the instructions indicated above.                                                                                                                                            Physician's or R.N.'s Signature                                                                  Date/Time                                                                                                                                              Patient or Representative Signature                                                          Date/Time

## 2022-09-22 NOTE — PROGRESS NOTES
Problem: Self Care Deficits Care Plan (Adult)  Goal: *Acute Goals and Plan of Care (Insert Text)  Description: FUNCTIONAL STATUS PRIOR TO ADMISSION: Patient was modified independent using a walker and rollator for functional mobility. HOME SUPPORT: The patient lived alone with nearby son to provide assistance as needed per report    Occupational Therapy Goals  Initiated 9/19/2022  1. Patient will perform self-feeding with modified independence within 7 day(s). 2.  Patient will perform grooming with modified independence within 7 day(s). 3.  Patient will perform upper body dressing and lower body dressing with supervision/set-up within 7 day(s). 4.  Patient will perform toilet transfers with supervision/set-up within 7 day(s). 5.  Patient will perform all aspects of toileting with supervision/set-up within 7 day(s). 6.  Patient will participate in upper extremity therapeutic exercise/activities with supervision/set-up for 10 minutes within 7 day(s). 7.  Patient will utilize energy conservation techniques during functional activities with verbal, visual, and tactile cues within 7 day(s). Outcome: Progressing Towards Goal   OCCUPATIONAL THERAPY TREATMENT  Patient: Ayo Velasquez (89 y.o. female)  Date: 9/22/2022  Diagnosis: Diverticulitis [K57.92] <principal problem not specified>      Precautions: DNR, Contact (enteric)  Chart, occupational therapy assessment, plan of care, and goals were reviewed. ASSESSMENT  Patient continues with skilled OT services and is progressing towards goals. Pt seen for ADl re-training. She ambulated to restroom and transfers on/off commode min assist with use of grab bar. She was able to manage her own bladder hygiene. Pt stood at sink and washed her hands and returned to chair for medications.       Current Level of Function Impacting Discharge (ADLs): min assist toileting transfers, stand by assist hygiene    Other factors to consider for discharge: PLAN :  Patient continues to benefit from skilled intervention to address the above impairments. Continue treatment per established plan of care to address goals. Recommend with staff: out of bed for ADL's, there ex, there act, meals    Recommend next OT session: cont towards goals    Recommendation for discharge: (in order for the patient to meet his/her long term goals)  Occupational therapy at least 2 days/week in the home AND ensure assist and/or supervision for safety with ADL's    This discharge recommendation:  Has not yet been discussed the attending provider and/or case management    IF patient discharges home will need the following DME:        SUBJECTIVE:   Patient stated This is how I get up.     OBJECTIVE DATA SUMMARY:   Cognitive/Behavioral Status:  Neurologic State: Alert  Orientation Level: Oriented X4  Cognition: Follows commands             Functional Mobility and Transfers for ADLs:  Bed Mobility:   Contact guard supine to sit    Transfers:     Functional Transfers  Toilet Transfer : Minimum assistance       Balance:  Sitting: Intact  Standing: With support    ADL Intervention:       Grooming  Grooming Assistance: Contact guard assistance  Position Performed: Standing  Washing Hands: Contact guard assistance                   Upper Body Dressing Assistance  Dressing Assistance: Stand-by assistance  Hospital Gown: Stand-by assistance    Lower Body Dressing Assistance  Socks: Minimum assistance  Shoes with Velcro: Minimum assistance  Leg Crossed Method Used: No  Position Performed: Seated edge of bed    Toileting  Bladder Hygiene: Stand-by assistance         Activity Tolerance:   Fair    After treatment patient left in no apparent distress:   Sitting in chair and Call bell within reach    COMMUNICATION/COLLABORATION:   The patients plan of care was discussed with: Physical therapist, Occupational therapist, and Registered nurse.      HORACE Maher  Time Calculation: 23 mins

## 2022-09-22 NOTE — PROGRESS NOTES
[facility]  [address] [facility], [address] [place] [address]  [phone]      Medical Progress Note      NAME: Danny Claier   :  1930  MRM:  [id number]    Date of service: 2022  11:56 AM       Assessment and Plan:    Diverticulitis. Better. On levaquin and flagyl. Pt feels better and will discharge on Augmentin due to her age to use quinolone for prolonged time. 2.  Sepsis. Due to above. Improving. Continue ABx as above. 3.  MIKE. Likely due to hypovolemia. Resolved. 4.  Hyponatremia. Due to volume depletion. Stable. Subjective:     Chief Complaint[de-identified] Patient was seen and examined as a follow up for diverticulitis. Chart was reviewed. feels better     ROS:  (bold if positive, if negative)    Tolerating PT  Tolerating Diet        Objective:     Last 24hrs VS reviewed since prior progress note.  Most recent are:    Visit Vitals  /63 (BP 1 Location: Right upper arm, BP Patient Position: At rest)   Pulse 91   Temp 97.6 °F (36.4 °C)   Resp 16   Ht 4' 11\" (1.499 m)   Wt 49.4 kg (109 lb)   SpO2 95%   Breastfeeding No   BMI 22.02 kg/m²     SpO2 Readings from Last 6 Encounters:   22 95%   22 98%   22 95%   22 98%   22 96%   22 99%          Intake/Output Summary (Last 24 hours) at 2022 1156  Last data filed at 2022 8336  Gross per 24 hour   Intake 115 ml   Output 300 ml   Net -185 ml        Physical Exam:    Gen:  Well-developed, well-nourished, in no acute distress  HEENT:  Pink conjunctivae, PERRL, hearing intact to voice, moist mucous membranes  Neck:  Supple, without masses, thyroid non-tender  Resp:  No accessory muscle use, clear breath sounds without wheezes rales or rhonchi  Card:  No murmurs, normal S1, S2 without thrills, bruits or peripheral edema  Abd:  Soft, non-tender, non-distended, normoactive bowel sounds are present, no palpable organomegaly and no detectable hernias  Lymph:  No cervical or inguinal adenopathy  Musc:  No cyanosis or clubbing  Skin:  No rashes or ulcers, skin turgor is good  Neuro:  Cranial nerves are grossly intact, no focal motor weakness, follows commands appropriately  Psych:  Good insight, oriented to person, place and time, alert  __________________________________________________________________  Medications Reviewed: (see below)  Medications:     Current Facility-Administered Medications   Medication Dose Route Frequency    aspirin delayed-release tablet 81 mg  81 mg Oral DAILY    levothyroxine (SYNTHROID) tablet 75 mcg  75 mcg Oral ACB    sodium chloride (NS) flush 5-40 mL  5-40 mL IntraVENous Q8H    sodium chloride (NS) flush 5-40 mL  5-40 mL IntraVENous PRN    metroNIDAZOLE (FLAGYL) IVPB premix 500 mg  500 mg IntraVENous Q12H    acetaminophen (TYLENOL) solution 650 mg  650 mg Oral Q4H PRN    oxyCODONE IR (ROXICODONE) tablet 5 mg  5 mg Oral Q4H PRN    docusate sodium (COLACE) capsule 100 mg  100 mg Oral BID    heparin (porcine) injection 5,000 Units  5,000 Units SubCUTAneous Q8H    ondansetron (ZOFRAN) injection 4 mg  4 mg IntraVENous Q8H PRN    levoFLOXacin (LEVAQUIN) 750 mg in D5W IVPB  750 mg IntraVENous Q48H    lactated Ringers infusion  125 mL/hr IntraVENous CONTINUOUS    glucose chewable tablet 16 g  4 Tablet Oral PRN    glucagon (GLUCAGEN) injection 1 mg  1 mg IntraMUSCular PRN    dextrose 10% infusion 0-250 mL  0-250 mL IntraVENous PRN    insulin lispro (HUMALOG) injection   SubCUTAneous AC&HS    morphine injection 4 mg  4 mg IntraVENous Q3H PRN    polyethylene glycol (MIRALAX) packet 17 g  17 g Oral DAILY        Lab Data Reviewed: (see below)  Lab Review:     Recent Labs     09/22/22 0207 09/21/22  0341 09/20/22  0334   WBC 11.1* 13.4* 16.6*   HGB 9.0* 8.6* 8.7*   HCT 27.5* 26.9* 26.7*    340 336     Recent Labs     09/22/22  0207 09/21/22  0341 09/20/22  0334   * 133* 131*   K 3.7 3.3* 3.6    102 100   CO2 27 22 23   * 93 108* BUN 8 11 20   CREA 0.49* 0.52* 0.72   CA 8.1* 8.1* 8.1*   MG  --  1.9  --      Lab Results   Component Value Date/Time    Glucose (POC) 99 09/22/2022 08:29 AM    Glucose (POC) 101 09/21/2022 11:07 PM    Glucose (POC) 89 09/21/2022 04:08 PM    Glucose (POC) 125 (H) 09/21/2022 11:44 AM    Glucose (POC) 96 09/21/2022 08:22 AM     No results for input(s): PH, PCO2, PO2, HCO3, FIO2 in the last 72 hours. No results for input(s): INR, INREXT in the last 72 hours. All Micro Results       Procedure Component Value Units Date/Time    CULTURE, BLOOD, PAIRED [[id number]] Collected: 09/19/22 0553    Order Status: Completed Specimen: Blood Updated: 09/22/22 0504     Special Requests: NO SPECIAL REQUESTS        Culture result: NO GROWTH 3 DAYS       C. DIFFICILE AG & TOXIN A/B [[id number]]     Order Status: Canceled Specimen: Stool             I have reviewed notes of prior 24hr. Other pertinent lab: Total time spent with patient: 35 minutes I personally reviewed chart, notes, data and current medications in the medical record. I have personally examined and treated the patient at bedside during this period.                  Care Plan discussed with: Patient, Nursing Staff, and >50% of time spent in counseling and coordination of care    Discussed:  Care Plan    Prophylaxis:  Lovenox    Disposition:  Home w/Family           ___________________________________________________    Attending Physician: Rachel Walter MD

## 2022-09-22 NOTE — PROGRESS NOTES
I have reviewed discharge instructions with the patient and son: reviewed s/s to report, meds, f/up apptmts. The patient and son verbalized understanding.    DC instructions e-signed

## 2022-09-22 NOTE — PROGRESS NOTES
Problem: Falls - Risk of  Goal: *Absence of Falls  Description: Document Veronika Minium Fall Risk and appropriate interventions in the flowsheet. Outcome: Progressing Towards Goal  Note: Fall Risk Interventions:  Mobility Interventions: Communicate number of staff needed for ambulation/transfer         Medication Interventions: Bed/chair exit alarm    Elimination Interventions: Call light in reach    History of Falls Interventions: Bed/chair exit alarm         Problem: Pressure Injury - Risk of  Goal: *Prevention of pressure injury  Description: Document Maco Scale and appropriate interventions in the flowsheet.   Outcome: Progressing Towards Goal  Note: Pressure Injury Interventions:  Sensory Interventions: Float heels    Moisture Interventions: Minimize layers    Activity Interventions: Increase time out of bed    Mobility Interventions: HOB 30 degrees or less    Nutrition Interventions: Document food/fluid/supplement intake    Friction and Shear Interventions: HOB 30 degrees or less

## 2022-09-22 NOTE — DISCHARGE SUMMARY
Hospitalist Discharge Summary     Patient ID:    Nanci Garcia  [id number]  7 y.o.  2/13/1930    Admit date of service: 9/18/2022    Discharge date of service: 9/22/2022    Admission Diagnoses: Diverticulitis [K57.92]    Chronic Diagnoses:    Problem List as of 9/22/2022 Date Reviewed: 3/11/2022            Codes Class Noted - Resolved    Diverticulitis ICD-10-CM: T49.68  ICD-9-CM: 562.11  9/19/2022 - Present        Toxic encephalopathy ICD-10-CM: G92.9  ICD-9-CM: 349.82  3/11/2022 - Present        Closed fracture of two ribs of left side ICD-10-CM: S22.42XA  ICD-9-CM: 807.02  3/11/2022 - Present        Urinary retention ICD-10-CM: R33.9  ICD-9-CM: 788.20  3/11/2022 - Present        Septic arthritis of elbow, left ([facility] ICD-10-CM: M00.9  ICD-9-CM: 711.02  3/11/2022 - Present        DM type 2 (diabetes mellitus, type 2) ([facility] ICD-10-CM: E11.9  ICD-9-CM: 250.00  3/11/2022 - Present        Acute osteomyelitis of left radius ([facility] ICD-10-CM: K08.701  ICD-9-CM: 730.03  3/11/2022 - Present        Rheumatoid arthritis ([facility] ICD-10-CM: M06.9  ICD-9-CM: 714.0  3/11/2022 - Present        HTN (hypertension), benign ICD-10-CM: I10  ICD-9-CM: 401.1  3/11/2022 - Present        Hypothyroidism ICD-10-CM: E03.9  ICD-9-CM: 244.9  3/11/2022 - Present        Septic joint ([facility] ICD-10-CM: M00.9  ICD-9-CM: 711.00  2/27/2022 - Present        Diverticulitis of sigmoid colon ICD-10-CM: K57.32  ICD-9-CM: 562.11  3/28/2021 - Present        Severe sepsis ([facility] ICD-10-CM: A41.9, R65.20  ICD-9-CM: 038.9, 995.92  3/28/2021 - Present        Intractable nausea and vomiting ICD-10-CM: R11.2  ICD-9-CM: 536.2  3/28/2021 - Present        Immunocompromised state due to drug therapy ([facility] ICD-10-CM: E41.497, Z79.899  ICD-9-CM: V58.69  3/28/2021 - Present        Septic arthritis of elbow, left ([facility] ICD-10-CM: M00.9  ICD-9-CM: 711.02  3/28/2021 - Present        Acute diverticulitis ICD-10-CM: H81.70  ICD-9-CM: 562.11  12/2/2020 - Present        SBO (small bowel obstruction) ([facility] ICD-10-CM: Y26.616  ICD-9-CM: 560.9  11/30/2020 - Present        Chronic kidney disease (CKD) ICD-10-CM: N18.9  ICD-9-CM: 585.9  9/18/2017 - Present        Osteoporosis, post-menopausal ICD-10-CM: M81.0  ICD-9-CM: 733.01  Unknown - Present        Chest pain ICD-10-CM: R07.9  ICD-9-CM: 786.50  4/9/2016 - Present        DM type 2 (diabetes mellitus, type 2) (HCC) (Chronic) ICD-10-CM: E11.9  ICD-9-CM: 250.00  4/9/2016 - Present        HTN (hypertension), benign ICD-10-CM: I10  ICD-9-CM: 401.1  4/9/2016 - Present        Hypothyroid (Chronic) ICD-10-CM: E03.9  ICD-9-CM: 244.9  4/9/2016 - Present        RA (rheumatoid arthritis) (HCC) (Chronic) ICD-10-CM: M06.9  ICD-9-CM: 714.0  4/9/2016 - Present        Abnormal chest x-ray ICD-10-CM: R93.89  ICD-9-CM: 793.2  4/9/2016 - Present        RESOLVED: Hyponatremia ICD-10-CM: E87.1  ICD-9-CM: 276.1  3/28/2021 - 3/28/2021           Discharge Medications:   Current Discharge Medication List        START taking these medications    Details   levoFLOXacin (Levaquin) 500 mg tablet Take 1 Tablet by mouth daily. Qty: 7 Tablet, Refills: 0      metroNIDAZOLE (FlagyL) 500 mg tablet Take 1 Tablet by mouth three (3) times daily. Qty: 21 Tablet, Refills: 0           CONTINUE these medications which have NOT CHANGED    Details   tamsulosin (FLOMAX) 0.4 mg capsule Take 0.4 mg by mouth daily. levothyroxine (SYNTHROID) 75 mcg tablet Take 75 mcg by mouth Daily (before breakfast). folic acid (FOLVITE) 1 mg tablet Take 1 mg by mouth daily. amLODIPine (NORVASC) 10 mg tablet Take 10 mg by mouth daily. aspirin delayed-release 81 mg tablet Take 81 mg by mouth daily. senna-docusate (Senokot-S) 8.6-50 mg per tablet Take 1 Tablet by mouth daily. simethicone (MYLICON) 80 mg chewable tablet Take 80 mg by mouth every six (6) hours as needed for Flatulence.       docusate sodium (Colace) 100 mg capsule Take 100 mg by mouth two (2) times daily as needed for Constipation. polyethylene glycol (Miralax) 17 gram packet Take 1 Packet by mouth daily. Qty: 30 Packet, Refills: 1      L.acidoph & parac-S.therm-Bifido (BRENNEN Q2/RISAQUAD-2) 16 billion cell cap cap Take 1 Cap by mouth daily. Qty: 30 Cap, Refills: 0      acetaminophen (Tylenol Extra Strength) 500 mg tablet Take 500 mg by mouth two (2) times daily as needed for Pain.      multivit-min/iron/folic/lutein (CENTRUM SILVER WOMEN PO) Take 1 Tab by mouth every other day. cholecalciferol (VITAMIN D3) 25 mcg (1,000 unit) cap Take 2,000 Units by mouth daily. STOP taking these medications       DAPTOmycin (CUBICIN) 350 mg injection Comments:   Reason for Stopping:         lidocaine (Lidoderm) 5 % Comments:   Reason for Stopping: Follow up Care:    1. Samantha Hua MD in 1-2 weeks  2. Diet:  Regular Diet    Disposition:  Home. Advanced Directive:    Discharge Exam:  See today's note. CONSULTATIONS: None    Significant Diagnostic Studies:   Recent Labs     09/22/22 0207 09/21/22 0341   WBC 11.1* 13.4*   HGB 9.0* 8.6*   HCT 27.5* 26.9*    340     Recent Labs     09/22/22 0207 09/21/22 0341 09/20/22  0334   * 133* 131*   K 3.7 3.3* 3.6    102 100   CO2 27 22 23   BUN 8 11 20   CREA 0.49* 0.52* 0.72   * 93 108*   CA 8.1* 8.1* 8.1*   MG  --  1.9  --      No results for input(s): ALT, AP, TBIL, TBILI, TP, ALB, GLOB, GGT, AML, LPSE in the last 72 hours. No lab exists for component: SGOT, GPT, AMYP, HLPSE  No results for input(s): INR, PTP, APTT, INREXT in the last 72 hours. No results for input(s): FE, TIBC, PSAT, FERR in the last 72 hours. No results for input(s): PH, PCO2, PO2 in the last 72 hours. No results for input(s): CPK, CKMB in the last 72 hours.     No lab exists for component: TROPONINI  Lab Results   Component Value Date/Time    Glucose (POC) 99 09/22/2022 08:29 AM    Glucose (POC) 101 09/21/2022 11:07 PM    Glucose (POC) 89 09/21/2022 04:08 PM    Glucose (POC) 125 (H) 09/21/2022 11:44 AM    Glucose (POC) 96 09/21/2022 08:22 AM             HOSPITAL COURSE & TREATMENT RENDERED:    Diverticulitis. Better. On levaquin and flagyl. Pt feels better and will discharge on flagyl and levaquin( pt is allergic to PCN and amoxicillin) ( would like to avoid quinolone due to her age, but no other altrenative at this point). 2.  Sepsis. Due to above. Improving. Continue ABx as above. 3.  MIKE. Likely due to hypovolemia. Resolved. 4.  Hyponatremia. Due to volume depletion. Stable        Discharged in improved condition.     Spent 35 minutes    Signed:  Teo Monroy MD  9/22/2022  12:22 PM

## 2022-09-24 LAB
BACTERIA SPEC CULT: NORMAL
SERVICE CMNT-IMP: NORMAL

## 2022-10-22 ENCOUNTER — APPOINTMENT (OUTPATIENT)
Dept: CT IMAGING | Age: 87
DRG: 386 | End: 2022-10-22
Attending: EMERGENCY MEDICINE
Payer: MEDICARE

## 2022-10-22 ENCOUNTER — HOSPITAL ENCOUNTER (INPATIENT)
Age: 87
LOS: 3 days | Discharge: HOME HEALTH | DRG: 386 | End: 2022-10-25
Attending: EMERGENCY MEDICINE | Admitting: INTERNAL MEDICINE
Payer: MEDICARE

## 2022-10-22 DIAGNOSIS — K52.9 NONINFECTIOUS GASTROENTERITIS, UNSPECIFIED TYPE: Primary | ICD-10-CM

## 2022-10-22 DIAGNOSIS — D72.829 LEUKOCYTOSIS, UNSPECIFIED TYPE: ICD-10-CM

## 2022-10-22 LAB
ALBUMIN SERPL-MCNC: 2.9 G/DL (ref 3.5–5)
ALBUMIN/GLOB SERPL: 0.7 {RATIO} (ref 1.1–2.2)
ALP SERPL-CCNC: 73 U/L (ref 45–117)
ALT SERPL-CCNC: 15 U/L (ref 12–78)
AMORPH CRY URNS QL MICRO: ABNORMAL
ANION GAP SERPL CALC-SCNC: 7 MMOL/L (ref 5–15)
APPEARANCE UR: CLEAR
AST SERPL-CCNC: 17 U/L (ref 15–37)
BACTERIA URNS QL MICRO: NEGATIVE /HPF
BASOPHILS # BLD: 0 K/UL (ref 0–0.1)
BASOPHILS NFR BLD: 0 % (ref 0–1)
BILIRUB SERPL-MCNC: 0.4 MG/DL (ref 0.2–1)
BILIRUB UR QL: NEGATIVE
BUN SERPL-MCNC: 16 MG/DL (ref 6–20)
BUN/CREAT SERPL: 18 (ref 12–20)
CALCIUM SERPL-MCNC: 9.4 MG/DL (ref 8.5–10.1)
CAMPYLOBACTER SPECIES, DNA: NEGATIVE
CHLORIDE SERPL-SCNC: 103 MMOL/L (ref 97–108)
CO2 SERPL-SCNC: 23 MMOL/L (ref 21–32)
COLOR UR: YELLOW
COMMENT, HOLDF: NORMAL
CREAT SERPL-MCNC: 0.91 MG/DL (ref 0.55–1.02)
DIFFERENTIAL METHOD BLD: ABNORMAL
ENTEROTOXIGEN E COLI, DNA: NEGATIVE
EOSINOPHIL # BLD: 0 K/UL (ref 0–0.4)
EOSINOPHIL NFR BLD: 0 % (ref 0–7)
EPITH CASTS URNS QL MICRO: ABNORMAL /LPF
ERYTHROCYTE [DISTWIDTH] IN BLOOD BY AUTOMATED COUNT: 19 % (ref 11.5–14.5)
GLOBULIN SER CALC-MCNC: 3.9 G/DL (ref 2–4)
GLUCOSE SERPL-MCNC: 162 MG/DL (ref 65–100)
GLUCOSE UR STRIP.AUTO-MCNC: NEGATIVE MG/DL
HCT VFR BLD AUTO: 36.3 % (ref 35–47)
HGB BLD-MCNC: 11.8 G/DL (ref 11.5–16)
HGB UR QL STRIP: NEGATIVE
IMM GRANULOCYTES # BLD AUTO: 0.2 K/UL (ref 0–0.04)
IMM GRANULOCYTES NFR BLD AUTO: 1 % (ref 0–0.5)
KETONES UR QL STRIP.AUTO: NEGATIVE MG/DL
LEUKOCYTE ESTERASE UR QL STRIP.AUTO: ABNORMAL
LIPASE SERPL-CCNC: 127 U/L (ref 73–393)
LYMPHOCYTES # BLD: 0.8 K/UL (ref 0.8–3.5)
LYMPHOCYTES NFR BLD: 5 % (ref 12–49)
MCH RBC QN AUTO: 28.3 PG (ref 26–34)
MCHC RBC AUTO-ENTMCNC: 32.5 G/DL (ref 30–36.5)
MCV RBC AUTO: 87.1 FL (ref 80–99)
MONOCYTES # BLD: 0.7 K/UL (ref 0–1)
MONOCYTES NFR BLD: 4 % (ref 5–13)
NEUTS SEG # BLD: 15.2 K/UL (ref 1.8–8)
NEUTS SEG NFR BLD: 90 % (ref 32–75)
NITRITE UR QL STRIP.AUTO: NEGATIVE
NRBC # BLD: 0 K/UL (ref 0–0.01)
NRBC BLD-RTO: 0 PER 100 WBC
P SHIGELLOIDES DNA STL QL NAA+PROBE: NEGATIVE
PH UR STRIP: 7 [PH] (ref 5–8)
PLATELET # BLD AUTO: 386 K/UL (ref 150–400)
PMV BLD AUTO: 9.5 FL (ref 8.9–12.9)
POTASSIUM SERPL-SCNC: 4.1 MMOL/L (ref 3.5–5.1)
PROT SERPL-MCNC: 6.8 G/DL (ref 6.4–8.2)
PROT UR STRIP-MCNC: NEGATIVE MG/DL
RBC # BLD AUTO: 4.17 M/UL (ref 3.8–5.2)
RBC #/AREA URNS HPF: ABNORMAL /HPF (ref 0–5)
RBC MORPH BLD: ABNORMAL
RBC MORPH BLD: ABNORMAL
SALMONELLA SPECIES, DNA: NEGATIVE
SAMPLES BEING HELD,HOLD: NORMAL
SHIGA TOXIN PRODUCING, DNA: NEGATIVE
SHIGELLA SP+EIEC IPAH STL QL NAA+PROBE: NEGATIVE
SODIUM SERPL-SCNC: 133 MMOL/L (ref 136–145)
SP GR UR REFRACTOMETRY: 1.01 (ref 1–1.03)
UR CULT HOLD, URHOLD: NORMAL
UROBILINOGEN UR QL STRIP.AUTO: 1 EU/DL (ref 0.2–1)
VIBRIO SPECIES, DNA: NEGATIVE
WBC # BLD AUTO: 16.9 K/UL (ref 3.6–11)
WBC URNS QL MICRO: ABNORMAL /HPF (ref 0–4)
Y. ENTEROCOLITICA, DNA: NEGATIVE

## 2022-10-22 PROCEDURE — 80053 COMPREHEN METABOLIC PANEL: CPT

## 2022-10-22 PROCEDURE — 89055 LEUKOCYTE ASSESSMENT FECAL: CPT

## 2022-10-22 PROCEDURE — 99285 EMERGENCY DEPT VISIT HI MDM: CPT

## 2022-10-22 PROCEDURE — 81001 URINALYSIS AUTO W/SCOPE: CPT

## 2022-10-22 PROCEDURE — 87506 IADNA-DNA/RNA PROBE TQ 6-11: CPT

## 2022-10-22 PROCEDURE — 74011250637 HC RX REV CODE- 250/637: Performed by: INTERNAL MEDICINE

## 2022-10-22 PROCEDURE — 74011250636 HC RX REV CODE- 250/636: Performed by: INTERNAL MEDICINE

## 2022-10-22 PROCEDURE — 85025 COMPLETE CBC W/AUTO DIFF WBC: CPT

## 2022-10-22 PROCEDURE — 74011000258 HC RX REV CODE- 258: Performed by: INTERNAL MEDICINE

## 2022-10-22 PROCEDURE — 87040 BLOOD CULTURE FOR BACTERIA: CPT

## 2022-10-22 PROCEDURE — 74011250636 HC RX REV CODE- 250/636: Performed by: EMERGENCY MEDICINE

## 2022-10-22 PROCEDURE — 36415 COLL VENOUS BLD VENIPUNCTURE: CPT

## 2022-10-22 PROCEDURE — 74176 CT ABD & PELVIS W/O CONTRAST: CPT

## 2022-10-22 PROCEDURE — 65270000029 HC RM PRIVATE

## 2022-10-22 PROCEDURE — 83690 ASSAY OF LIPASE: CPT

## 2022-10-22 RX ORDER — METRONIDAZOLE 500 MG/100ML
500 INJECTION, SOLUTION INTRAVENOUS
Status: COMPLETED | OUTPATIENT
Start: 2022-10-22 | End: 2022-10-22

## 2022-10-22 RX ORDER — HYDROMORPHONE HYDROCHLORIDE 1 MG/ML
0.5 INJECTION, SOLUTION INTRAMUSCULAR; INTRAVENOUS; SUBCUTANEOUS
Status: DISCONTINUED | OUTPATIENT
Start: 2022-10-22 | End: 2022-10-25 | Stop reason: HOSPADM

## 2022-10-22 RX ORDER — VANCOMYCIN HYDROCHLORIDE 250 MG/5ML
125 POWDER, FOR SOLUTION ORAL EVERY 6 HOURS
Status: DISCONTINUED | OUTPATIENT
Start: 2022-10-22 | End: 2022-10-25 | Stop reason: HOSPADM

## 2022-10-22 RX ORDER — SODIUM CHLORIDE 9 MG/ML
75 INJECTION, SOLUTION INTRAVENOUS CONTINUOUS
Status: DISCONTINUED | OUTPATIENT
Start: 2022-10-22 | End: 2022-10-25 | Stop reason: HOSPADM

## 2022-10-22 RX ORDER — PROCHLORPERAZINE EDISYLATE 5 MG/ML
10 INJECTION INTRAMUSCULAR; INTRAVENOUS
Status: DISCONTINUED | OUTPATIENT
Start: 2022-10-22 | End: 2022-10-25 | Stop reason: HOSPADM

## 2022-10-22 RX ORDER — LEVOFLOXACIN 5 MG/ML
500 INJECTION, SOLUTION INTRAVENOUS
Status: DISCONTINUED | OUTPATIENT
Start: 2022-10-22 | End: 2022-10-22

## 2022-10-22 RX ADMIN — VANCOMYCIN HYDROCHLORIDE 125 MG: 250 POWDER, FOR SOLUTION ORAL at 20:41

## 2022-10-22 RX ADMIN — Medication 1 CAPSULE: at 11:10

## 2022-10-22 RX ADMIN — SODIUM CHLORIDE 75 ML/HR: 9 INJECTION, SOLUTION INTRAVENOUS at 17:45

## 2022-10-22 RX ADMIN — VANCOMYCIN HYDROCHLORIDE 125 MG: 250 POWDER, FOR SOLUTION ORAL at 23:33

## 2022-10-22 RX ADMIN — VANCOMYCIN HYDROCHLORIDE 125 MG: 250 POWDER, FOR SOLUTION ORAL at 13:22

## 2022-10-22 RX ADMIN — PROCHLORPERAZINE EDISYLATE 10 MG: 5 INJECTION INTRAMUSCULAR; INTRAVENOUS at 11:27

## 2022-10-22 RX ADMIN — METRONIDAZOLE 500 MG: 500 INJECTION, SOLUTION INTRAVENOUS at 11:10

## 2022-10-22 RX ADMIN — SODIUM CHLORIDE 75 ML/HR: 9 INJECTION, SOLUTION INTRAVENOUS at 11:23

## 2022-10-22 RX ADMIN — MEROPENEM 1 G: 1 INJECTION, POWDER, FOR SOLUTION INTRAVENOUS at 11:11

## 2022-10-22 RX ADMIN — MEROPENEM 1 G: 1 INJECTION, POWDER, FOR SOLUTION INTRAVENOUS at 23:33

## 2022-10-22 NOTE — ED PROVIDER NOTES
35-year-old female with abdominal pain today. Nursing home reports she has had some nausea vomiting and dizziness since 1 AM which is 7 hours ago. She denies chest pain fever or chills. She denies any pain at this time. She is otherwise stable in the ER. The history is provided by the patient. Abdominal Pain   This is a new problem. The current episode started 6 to 12 hours ago. The problem occurs constantly. The problem has been gradually improving. The pain is associated with vomiting. The pain is located in the generalized abdominal region. The quality of the pain is aching. Associated symptoms include diarrhea, nausea and vomiting. Pertinent negatives include no fever, no belching, no flatus, no hematochezia, no melena, no constipation, no headaches, no myalgias, no chest pain and no back pain. Nothing worsens the pain. The pain is relieved by Nothing. Past workup includes no CT scan, no ultrasound, no surgery, no barium enema. Her past medical history does not include PUD, gallstones, GERD, ulcerative colitis, irritable bowel syndrome or cancer. Diarrhea   This is a new problem. The current episode started 6 to 12 hours ago. The problem has been gradually improving. The pain is associated with vomiting. The pain is located in the generalized abdominal region. The quality of the pain is aching. The pain is mild. Associated symptoms include diarrhea, nausea and vomiting. Pertinent negatives include no fever, no belching, no flatus, no hematochezia, no melena, no constipation, no headaches, no myalgias, no chest pain and no back pain. Nothing worsens the pain. The pain is relieved by Nothing. Past workup includes no CT scan, no ultrasound, no surgery, no barium enema. Her past medical history does not include PUD, gallstones, GERD, ulcerative colitis, irritable bowel syndrome or cancer.       Past Medical History:   Diagnosis Date    Arthritis, rheumatoid ([address]     Diabetes ([address]     Diverticulitis Hard of hearing     History of vascular access device 2021    [facility] VAT 4 FR R Basilic 48/0 LTAbx    History of vascular access device 2022    4 feench single lumen PICC line right basilic    HTN (hypertension)     Hypertension     Hypothyroid     Osteoporosis, post-menopausal     Septic arthritis of elbow, left (HCC)        Past Surgical History:   Procedure Laterality Date    FLEXIBLE SIGMOIDOSCOPY N/A 2020    SIGMOIDOSCOPY FLEXIBLE performed by Irina Todd MD at [facility] ENDOSCOPY    HX HEMORRHOIDECTOMY      HX HYSTERECTOMY      HX THYROIDECTOMY           Family History:   Problem Relation Age of Onset    Heart Disease Mother     Diabetes Father     Cancer Brother         brain cancer    Heart Disease Brother     Diabetes Brother     Other Other         scleroderma       Social History     Socioeconomic History    Marital status: SINGLE     Spouse name: Not on file    Number of children: Not on file    Years of education: Not on file    Highest education level: Not on file   Occupational History    Not on file   Tobacco Use    Smoking status: Former     Types: Cigarettes     Quit date: 1976     Years since quittin.9    Smokeless tobacco: Never   Substance and Sexual Activity    Alcohol use: Yes     Comment: Waylon time 1 glass wine    Drug use: No    Sexual activity: Never   Other Topics Concern     Service Not Asked    Blood Transfusions Not Asked    Caffeine Concern Not Asked    Occupational Exposure Not Asked    Hobby Hazards Not Asked    Sleep Concern Not Asked    Stress Concern Not Asked    Weight Concern Not Asked    Special Diet Not Asked    Back Care Not Asked    Exercise Not Asked    Bike Helmet Not Asked    Seat Belt Not Asked    Self-Exams Not Asked   Social History Narrative    ** Merged History Encounter **         ** Merged History Encounter **          Social Determinants of Health     Financial Resource Strain: Not on file   Food Insecurity: Not on file Transportation Needs: Not on file   Physical Activity: Not on file   Stress: Not on file   Social Connections: Not on file   Intimate Partner Violence: Not on file   Housing Stability: Not on file         ALLERGIES: Alendronate, Amoxicillin, Amoxicillin, Hydrochlorothiazide, and Penicillins    Review of Systems   Constitutional:  Negative for chills and fever. HENT:  Negative for congestion, rhinorrhea, sneezing and sore throat. Respiratory:  Negative for shortness of breath. Cardiovascular:  Negative for chest pain. Gastrointestinal:  Positive for abdominal pain, diarrhea, nausea and vomiting. Negative for constipation, flatus, hematochezia and melena. Musculoskeletal:  Negative for back pain, myalgias and neck stiffness. Skin:  Negative for rash. Neurological:  Negative for dizziness, weakness and headaches. All other systems reviewed and are negative. Vitals:    10/22/22 0727 10/22/22 0747   BP: 122/69    Pulse: 91    Resp: 16    Temp: 97.6 °F (36.4 °C)    SpO2: 98% 98%            Physical Exam  Vitals and nursing note reviewed. Constitutional:       Appearance: Normal appearance. She is well-developed. HENT:      Head: Normocephalic and atraumatic. Eyes:      Conjunctiva/sclera: Conjunctivae normal.   Cardiovascular:      Rate and Rhythm: Normal rate and regular rhythm. Pulses: Normal pulses. Heart sounds: Normal heart sounds, S1 normal and S2 normal.   Pulmonary:      Effort: Pulmonary effort is normal. No respiratory distress. Breath sounds: Normal breath sounds. No wheezing. Abdominal:      General: Bowel sounds are normal. There is no distension. Palpations: Abdomen is soft. Tenderness: There is no abdominal tenderness. There is no rebound. Musculoskeletal:         General: Normal range of motion. Cervical back: Full passive range of motion without pain, normal range of motion and neck supple. Skin:     General: Skin is warm and dry. Findings: No rash. Neurological:      Mental Status: She is alert and oriented to person, place, and time. Psychiatric:         Speech: Speech normal.         Behavior: Behavior normal.         Thought Content: Thought content normal.         Judgment: Judgment normal.        MDM  Number of Diagnoses or Management Options  Diagnosis management comments: Abdominal pain with diarrhea and gradually improving. Labs and CAT scan today patient seems stable at this time. Patient Progress  Patient progress: (Patient is stable in the emergency department. CT shows colitis and white count is elevated to 16.9. Patient will be admitted for colitis with leukocytosis.)       Recent Results (from the past 24 hour(s))   SAMPLES BEING HELD    Collection Time: 10/22/22  7:31 AM   Result Value Ref Range    SAMPLES BEING HELD [email or web address]     COMMENT        Add-on orders for these samples will be processed based on acceptable specimen integrity and analyte stability, which may vary by analyte. CBC WITH AUTOMATED DIFF    Collection Time: 10/22/22  7:31 AM   Result Value Ref Range    WBC 16.9 (H) 3.6 - 11.0 K/uL    RBC 4.17 3.80 - 5.20 M/uL    HGB 11.8 11.5 - 16.0 g/dL    HCT 36.3 35.0 - 47.0 %    MCV 87.1 80.0 - 99.0 FL    MCH 28.3 26.0 - 34.0 PG    MCHC 32.5 30.0 - 36.5 g/dL    RDW 19.0 (H) 11.5 - 14.5 %    PLATELET 888 514 - 634 K/uL    MPV 9.5 8.9 - 12.9 FL    NRBC 0.0 0  WBC    ABSOLUTE NRBC 0.00 0.00 - 0.01 K/uL    NEUTROPHILS 90 (H) 32 - 75 %    LYMPHOCYTES 5 (L) 12 - 49 %    MONOCYTES 4 (L) 5 - 13 %    EOSINOPHILS 0 0 - 7 %    BASOPHILS 0 0 - 1 %    IMMATURE GRANULOCYTES 1 (H) 0.0 - 0.5 %    ABS. NEUTROPHILS 15.2 (H) 1.8 - 8.0 K/UL    ABS. LYMPHOCYTES 0.8 0.8 - 3.5 K/UL    ABS. MONOCYTES 0.7 0.0 - 1.0 K/UL    ABS. EOSINOPHILS 0.0 0.0 - 0.4 K/UL    ABS. BASOPHILS 0.0 0.0 - 0.1 K/UL    ABS. IMM.  GRANS. 0.2 (H) 0.00 - 0.04 K/UL    DF SMEAR SCANNED      RBC COMMENTS OVALOCYTES  PRESENT        RBC COMMENTS ANISOCYTOSIS  1+       METABOLIC PANEL, COMPREHENSIVE    Collection Time: 10/22/22  7:31 AM   Result Value Ref Range    Sodium 133 (L) 136 - 145 mmol/L    Potassium 4.1 3.5 - 5.1 mmol/L    Chloride 103 97 - 108 mmol/L    CO2 23 21 - 32 mmol/L    Anion gap 7 5 - 15 mmol/L    Glucose 162 (H) 65 - 100 mg/dL    BUN 16 6 - 20 MG/DL    Creatinine 0.91 0.55 - 1.02 MG/DL    BUN/Creatinine ratio 18 12 - 20      eGFR 59 (L) >60 ml/min/1.73m2    Calcium 9.4 8.5 - 10.1 MG/DL    Bilirubin, total 0.4 0.2 - 1.0 MG/DL    ALT (SGPT) 15 12 - 78 U/L    AST (SGOT) 17 15 - 37 U/L    Alk. phosphatase 73 45 - 117 U/L    Protein, total 6.8 6.4 - 8.2 g/dL    Albumin 2.9 (L) 3.5 - 5.0 g/dL    Globulin 3.9 2.0 - 4.0 g/dL    A-G Ratio 0.7 (L) 1.1 - 2.2     LIPASE    Collection Time: 10/22/22  7:31 AM   Result Value Ref Range    Lipase 127 73 - 393 U/L   URINALYSIS W/ RFLX MICROSCOPIC    Collection Time: 10/22/22  8:02 AM   Result Value Ref Range    Color YELLOW      Appearance CLEAR CLEAR      Specific gravity 1.012 1.003 - 1.030      pH (UA) 7.0 5.0 - 8.0      Protein Negative NEG mg/dL    Glucose Negative NEG mg/dL    Ketone Negative NEG mg/dL    Bilirubin Negative NEG      Blood Negative NEG      Urobilinogen 1.0 0.2 - 1.0 EU/dL    Nitrites Negative NEG      Leukocyte Esterase TRACE (A) NEG      WBC 5-10 0 - 4 /hpf    RBC 0-5 0 - 5 /hpf    Epithelial cells FEW FEW /lpf    Bacteria Negative NEG /hpf    Amorphous Crystals 1+ (A) NEG   URINE CULTURE HOLD SAMPLE    Collection Time: 10/22/22  8:02 AM    Specimen: Serum   Result Value Ref Range    Urine culture hold        Urine on hold in Microbiology dept for 2 days. If unpreserved urine is submitted, it cannot be used for addtional testing after 24 hours, recollection will be required. CT ABD PELV WO CONT   Final Result   Unchanged left colitis. Perfect Serve Consult for Admission  10:25 AM    ED Room Number: ER07/07  Patient Name and age:  Nicolasa Love 80 y.o. female  Working Diagnosis: No diagnosis found. COVID-19 Suspicion:  no  Sepsis present:  no  Reassessment needed: no  Code Status:  DNR  Readmission: no  Isolation Requirements:  no  Recommended Level of Care:  med/surg  Department:[facility] ED - [phone]  Other: 70-year-old female with colitis and white blood cell count over 16,000.   Procedures

## 2022-10-22 NOTE — H&P
Jose [address] Linares [address]  [address] [facility], [address] [place] [address]  [phone]    Hospitalist Admission History and Physical      NAME:  Obdulia Heredia   :   1930   MRN:  [id number]     PCP:  Jay Cunningham MD     Date/Time of service:  10/22/2022  11:05 AM        Subjective:     CHIEF COMPLAINT: abd pain     HISTORY OF PRESENT ILLNESS:     The patient is a 81 yo hx of HTN, DM, RA, recent diverticulitis, presented w/ recurrent abd pain, N/V/D, colitis. The patient was admitted to [facility] last month for diverticulitis. She was subsequently sent home with oral Levaquin/flagyl. However, she continued to have LLQ pain, associated with intermittent diarrhea, nausea, vomiting. Denied fevers, chills. In the ED, WBC was 16.9. Abd CT showed persistent colitis. Allergies   Allergen Reactions    Alendronate Diarrhea    Amoxicillin Rash     Tolerated cefepime 3/27/21    Amoxicillin Unknown (comments)    Hydrochlorothiazide Other (comments)     \"Caused pain everywhere\"    Penicillins Unknown (comments)       Prior to Admission medications    Medication Sig Start Date End Date Taking? Authorizing Provider   levoFLOXacin (Levaquin) 500 mg tablet Take 1 Tablet by mouth daily. 22   Avinash Alan MD   metroNIDAZOLE (FlagyL) 500 mg tablet Take 1 Tablet by mouth three (3) times daily. 22   Avinash Alan MD   lisinopriL (PRINIVIL, ZESTRIL) 10 mg tablet Take 10 mg by mouth daily. Provider, Historical   tamsulosin (FLOMAX) 0.4 mg capsule Take 0.4 mg by mouth daily. Provider, Historical   senna-docusate (Senokot-S) 8.6-50 mg per tablet Take 1 Tablet by mouth daily. Provider, Historical   simethicone (MYLICON) 80 mg chewable tablet Take 80 mg by mouth every six (6) hours as needed for Flatulence. Provider, Historical   levothyroxine (SYNTHROID) 75 mcg tablet Take 75 mcg by mouth Daily (before breakfast).     Provider, Historical   folic acid (FOLVITE) 1 mg tablet Take 1 mg by mouth daily. Provider, Historical   docusate sodium (Colace) 100 mg capsule Take 100 mg by mouth two (2) times daily as needed for Constipation. Provider, Historical   polyethylene glycol (Miralax) 17 gram packet Take 1 Packet by mouth daily. 20   , MD ZOEY Ayalaacidoph & parac-S.therm-Bifido (BRENNEN Q2/RISAQUAD-2) 16 billion cell cap cap Take 1 Cap by mouth daily. 20   Do, Shay HAN MD   amLODIPine (NORVASC) 10 mg tablet Take 10 mg by mouth daily. Provider, Historical   acetaminophen (Tylenol Extra Strength) 500 mg tablet Take 500 mg by mouth two (2) times daily as needed for Pain. Provider, Historical   multivit-min/iron/folic/lutein (CENTRUM SILVER WOMEN PO) Take 1 Tab by mouth every other day. Provider, Historical   cholecalciferol (VITAMIN D3) 25 mcg (1,000 unit) cap Take 2,000 Units by mouth daily. Provider, Historical   aspirin delayed-release 81 mg tablet Take 81 mg by mouth daily.     Provider, Historical       Past Medical History:   Diagnosis Date    Arthritis, rheumatoid ([address]     Diabetes ([address]     Diverticulitis     Hard of hearing     History of vascular access device 2021    [facility] VAT 4 FR R Basilic  LTAbx    History of vascular access device 2022    4 feench single lumen PICC line right basilic    HTN (hypertension)     Hypertension     Hypothyroid     Osteoporosis, post-menopausal     Septic arthritis of elbow, left ([address]         Past Surgical History:   Procedure Laterality Date    FLEXIBLE SIGMOIDOSCOPY N/A 2020    SIGMOIDOSCOPY FLEXIBLE performed by Padilla Garcia MD at [facility] ENDOSCOPY    HX HEMORRHOIDECTOMY      HX HYSTERECTOMY      HX THYROIDECTOMY         Social History     Tobacco Use    Smoking status: Former     Types: Cigarettes     Quit date: 1976     Years since quittin.9    Smokeless tobacco: Never   Substance Use Topics    Alcohol use: Yes     Comment: Waylon time 1 glass wine        Family History   Problem Relation Age of Onset    Heart Disease Mother     Diabetes Father     Cancer Brother         brain cancer    Heart Disease Brother     Diabetes Brother     Other Other         scleroderma        Review of Systems:  (bold if positive, if negative)    Gen:  Eyes:  ENT:  CVS:  Pulm:  GI:  Abdominal pain,ausea, emesis, diarrhea, :  MS:  Skin:  Psych:  Endo:  Hem:  Renal:  Neuro:          Objective:      VITALS:    Vital signs reviewed; most recent are:    Visit Vitals  /69   Pulse 91   Temp 97.6 °F (36.4 °C)   Resp 16   SpO2 98%     SpO2 Readings from Last 6 Encounters:   10/22/22 98%   09/22/22 98%   03/30/22 98%   03/18/22 95%   03/09/22 98%   03/04/22 96%        No intake or output data in the 24 hours ending 10/22/22 1105     Exam:     Physical Exam:    Gen:  elderly, thin, frail, NAD  HEENT:  Pink conjunctivae, PERRL, hearing intact to voice, dry mucous membranes  Neck:  Supple, without masses, thyroid non-tender  Resp:  No accessory muscle use, clear breath sounds without wheezes rales or rhonchi  Card:  No murmurs, normal S1, S2 without thrills, bruits or peripheral edema  Abd:  Soft, LLQ pain, non-distended, normoactive bowel sounds are present  Lymph:  No cervical adenopathy  Musc:  No cyanosis or clubbing  Skin:  No rashes   Neuro:  Cranial nerves 3-12 are grossly intact, follows commands appropriately  Psych:  Alert with fair insight. Oriented to person, place, and time    Labs:    Recent Labs     10/22/22  0731   WBC 16.9*   HGB 11.8   HCT 36.3        Recent Labs     10/22/22  0731   *   K 4.1      CO2 23   *   BUN 16   CREA 0.91   CA 9.4   ALB 2.9*   TBILI 0.4   ALT 15     Lab Results   Component Value Date/Time    Glucose (POC) 110 09/22/2022 12:22 PM    Glucose (POC) 99 09/22/2022 08:29 AM     No results for input(s): PH, PCO2, PO2, HCO3, FIO2 in the last 72 hours. No results for input(s): INR, INREXT in the last 72 hours.     Radiology and EKG reviewed:   abd CT reviewed    **Old Records reviewed in [facility]**       Assessment/Plan:       Principal Problem:    81 yo hx of HTN, DM, RA, recent diverticulitis, presented w/ recurrent abd pain, N/V/D, colitis    1) Recurrent diverticulitis/Colitis: persistent, failed oral abx. Will repeat blood Cx, stool Cx, C-diff. Given PCN allergies, will start IV Meropenem, oral Vanc, pro-biotics. Use IV dilaudid prn severe pain. Consult GI    2) HTN: BP stable. Cont norvasc. Hold lisinopril    3) DM type 2: A1C 5.5%. diet control    4) Hx of RA: cont pain control.   Consult PT/OT    5) Hypothyroid: cont synthroid     Risk of deterioration: high      Total time spent with patient care: 79 Minutes **I personally saw and examined the patient during this time period**                 Care Plan discussed with: Patient, nursing, son     Discussed:  Care Plan    Prophylaxis:  Lovenox    Probable Disposition:   PT, OT, RN           ___________________________________________________    Attending Physician: Leland Peng MD

## 2022-10-22 NOTE — CONSULTS
THERESA Llanos MD  [phone] office     Gastroenterology Consultation Note      Admit Date: 10/22/2022  Consult Date: 10/22/2022   I greatly appreciate your asking me to see Patrick Houston, thank you very much for the opportunity to participate in her care. Narrative Assessment and Plan   80year old female who presented with recurrent lower abdominal pain. She has known chronic sigmoid diverticulitis which dates back at least two years. She had a colonoscopy in 12/2020 which changes of chronic diverticulitis. Surgery elected against operative management at the time. Her current symptoms are similar. Would support her with antibiotics and monitor. Doubt C. Diff, but empiric therapy is reasonable until her stool is negative. If her pain worsens, then would ask Surgery to see her. GI will follow. Subjective:     Chief Complaint: Abdominal pain    History of Present Illness:     Ms. Hiram Coello is a 80year old female with RA, DM, who presented with recurrent abdominal pain. A CT showed persistent left sided colitis. She had similar findings on CT last month and was treated with oral antibiotics. Her pain never got better. In reviewing her record, she had similar findings in 2020 as well. Currently she is comfortable. Her stools alternate between diarrhea and constipation. Today her stool was loose. No rectal bleeding.          Callie Winslow MD    Past Medical History:   Diagnosis Date    Arthritis, rheumatoid ([address]     Diabetes ([address]     Diverticulitis     Hard of hearing     History of vascular access device 04/01/2021    [facility] VAT 4 FR R Basilic 87/2 LTAbx    History of vascular access device 03/04/2022    4 feench single lumen PICC line right basilic    HTN (hypertension)     Hypertension     Hypothyroid     Osteoporosis, post-menopausal     Septic arthritis of elbow, left ([address]         Past Surgical History:   Procedure Laterality Date    FLEXIBLE SIGMOIDOSCOPY N/A 2020    SIGMOIDOSCOPY FLEXIBLE performed by Gabriella Haynes MD at [facility] ENDOSCOPY    HX HEMORRHOIDECTOMY      HX HYSTERECTOMY      HX THYROIDECTOMY         Social History     Tobacco Use    Smoking status: Former     Types: Cigarettes     Quit date: 1976     Years since quittin.9    Smokeless tobacco: Never   Substance Use Topics    Alcohol use: Yes     Comment: Waylon time 1 glass wine        Family History   Problem Relation Age of Onset    Heart Disease Mother     Diabetes Father     Cancer Brother         brain cancer    Heart Disease Brother     Diabetes Brother     Other Other         scleroderma        Allergies   Allergen Reactions    Alendronate Diarrhea    Amoxicillin Rash     Tolerated cefepime 3/27/21    Amoxicillin Unknown (comments)    Hydrochlorothiazide Other (comments)     \"Caused pain everywhere\"    Penicillins Unknown (comments)            Home Medications:  Prior to Admission Medications   Prescriptions Last Dose Informant Patient Reported? Taking? L.acidoph & parac-S.therm-Bifido (BRENNEN Q2/RISAQUAD-2) 16 billion cell cap cap   No No   Sig: Take 1 Cap by mouth daily. acetaminophen (Tylenol Extra Strength) 500 mg tablet  Self Yes No   Sig: Take 500 mg by mouth two (2) times daily as needed for Pain. amLODIPine (NORVASC) 10 mg tablet  Self Yes No   Sig: Take 10 mg by mouth daily. aspirin delayed-release 81 mg tablet  Self Yes No   Sig: Take 81 mg by mouth daily. cholecalciferol (VITAMIN D3) 25 mcg (1,000 unit) cap  Self Yes No   Sig: Take 2,000 Units by mouth daily. docusate sodium (Colace) 100 mg capsule   Yes No   Sig: Take 100 mg by mouth two (2) times daily as needed for Constipation. folic acid (FOLVITE) 1 mg tablet   Yes No   Sig: Take 1 mg by mouth daily. levoFLOXacin (Levaquin) 500 mg tablet   No No   Sig: Take 1 Tablet by mouth daily. levothyroxine (SYNTHROID) 75 mcg tablet  Child Yes No   Sig: Take 75 mcg by mouth Daily (before breakfast).    lisinopriL (PRINIVIL, ZESTRIL) 10 mg tablet   Yes No   Sig: Take 10 mg by mouth daily. metroNIDAZOLE (FlagyL) 500 mg tablet   No No   Sig: Take 1 Tablet by mouth three (3) times daily. multivit-min/iron/folic/lutein (CENTRUM SILVER WOMEN PO)  Self Yes No   Sig: Take 1 Tab by mouth every other day. polyethylene glycol (Miralax) 17 gram packet   No No   Sig: Take 1 Packet by mouth daily. senna-docusate (Senokot-S) 8.6-50 mg per tablet   Yes No   Sig: Take 1 Tablet by mouth daily. simethicone (MYLICON) 80 mg chewable tablet   Yes No   Sig: Take 80 mg by mouth every six (6) hours as needed for Flatulence. tamsulosin (FLOMAX) 0.4 mg capsule   Yes No   Sig: Take 0.4 mg by mouth daily. Facility-Administered Medications: None       Hospital Medications:  Current Facility-Administered Medications   Medication Dose Route Frequency    L.acidophilus-paracasei-S.thermophil-bifidobacter (RISAQUAD) 8 billion cell capsule  1 Capsule Oral DAILY    HYDROmorphone (DILAUDID) syringe 0.5 mg  0.5 mg IntraVENous Q4H PRN    prochlorperazine (COMPAZINE) injection 10 mg  10 mg IntraVENous Q6H PRN    0.9% sodium chloride infusion  75 mL/hr IntraVENous CONTINUOUS    meropenem (MERREM) 1 g in 0.9% sodium chloride (MBP/ADV) 50 mL MBP  1 g IntraVENous Q12H    vancomycin (FIRVANQ) 50 mg/mL oral solution 125 mg  125 mg Oral Q6H     Current Outpatient Medications   Medication Sig    levoFLOXacin (Levaquin) 500 mg tablet Take 1 Tablet by mouth daily. metroNIDAZOLE (FlagyL) 500 mg tablet Take 1 Tablet by mouth three (3) times daily. lisinopriL (PRINIVIL, ZESTRIL) 10 mg tablet Take 10 mg by mouth daily. tamsulosin (FLOMAX) 0.4 mg capsule Take 0.4 mg by mouth daily. senna-docusate (Senokot-S) 8.6-50 mg per tablet Take 1 Tablet by mouth daily. simethicone (MYLICON) 80 mg chewable tablet Take 80 mg by mouth every six (6) hours as needed for Flatulence.     levothyroxine (SYNTHROID) 75 mcg tablet Take 75 mcg by mouth Daily (before breakfast). folic acid (FOLVITE) 1 mg tablet Take 1 mg by mouth daily. docusate sodium (Colace) 100 mg capsule Take 100 mg by mouth two (2) times daily as needed for Constipation. polyethylene glycol (Miralax) 17 gram packet Take 1 Packet by mouth daily. L.acidoph & parac-S.therm-Bifido (BRENNEN Q2/RISAQUAD-2) 16 billion cell cap cap Take 1 Cap by mouth daily. amLODIPine (NORVASC) 10 mg tablet Take 10 mg by mouth daily. acetaminophen (Tylenol Extra Strength) 500 mg tablet Take 500 mg by mouth two (2) times daily as needed for Pain.    multivit-min/iron/folic/lutein (CENTRUM SILVER WOMEN PO) Take 1 Tab by mouth every other day. cholecalciferol (VITAMIN D3) 25 mcg (1,000 unit) cap Take 2,000 Units by mouth daily. aspirin delayed-release 81 mg tablet Take 81 mg by mouth daily. Review of Systems:  Full ROS was done and was negative except as noted in the HPI. Objective:     Physical Exam:  Visit Vitals  BP (!) 121/43   Pulse 91   Temp 97.6 °F (36.4 °C)   Resp 16   SpO2 100%     SpO2 Readings from Last 6 Encounters:   10/22/22 100%   09/22/22 98%   03/30/22 98%   03/18/22 95%   03/09/22 98%   03/04/22 96%        No intake or output data in the 24 hours ending 10/22/22 1417   General: no distress, comfortable  Skin:  No rash or jaundice  HEENT: Pupils equal, sclera anicteric  Cardiovascular: Regular, well perfused, no edema  Respiratory:  Clear bilaterally, normal respiratory effort  GI:  Abdomen nondistended, nontender, soft  Musculoskeletal:  No skeletal deformity nor acute arthritis noted.   Neurological:  Motor and sensory function intact in upper extremities  Psychiatric:  Normal affect, memory intact, appears to have insight into current illness    Laboratory:    Recent Results (from the past 24 hour(s))   SAMPLES BEING HELD    Collection Time: 10/22/22  7:31 AM   Result Value Ref Range    SAMPLES BEING HELD [email or web address]     COMMENT        Add-on orders for these samples will be processed based on acceptable specimen integrity and analyte stability, which may vary by analyte. CBC WITH AUTOMATED DIFF    Collection Time: 10/22/22  7:31 AM   Result Value Ref Range    WBC 16.9 (H) 3.6 - 11.0 K/uL    RBC 4.17 3.80 - 5.20 M/uL    HGB 11.8 11.5 - 16.0 g/dL    HCT 36.3 35.0 - 47.0 %    MCV 87.1 80.0 - 99.0 FL    MCH 28.3 26.0 - 34.0 PG    MCHC 32.5 30.0 - 36.5 g/dL    RDW 19.0 (H) 11.5 - 14.5 %    PLATELET 344 575 - 821 K/uL    MPV 9.5 8.9 - 12.9 FL    NRBC 0.0 0  WBC    ABSOLUTE NRBC 0.00 0.00 - 0.01 K/uL    NEUTROPHILS 90 (H) 32 - 75 %    LYMPHOCYTES 5 (L) 12 - 49 %    MONOCYTES 4 (L) 5 - 13 %    EOSINOPHILS 0 0 - 7 %    BASOPHILS 0 0 - 1 %    IMMATURE GRANULOCYTES 1 (H) 0.0 - 0.5 %    ABS. NEUTROPHILS 15.2 (H) 1.8 - 8.0 K/UL    ABS. LYMPHOCYTES 0.8 0.8 - 3.5 K/UL    ABS. MONOCYTES 0.7 0.0 - 1.0 K/UL    ABS. EOSINOPHILS 0.0 0.0 - 0.4 K/UL    ABS. BASOPHILS 0.0 0.0 - 0.1 K/UL    ABS. IMM. GRANS. 0.2 (H) 0.00 - 0.04 K/UL    DF SMEAR SCANNED      RBC COMMENTS OVALOCYTES  PRESENT        RBC COMMENTS ANISOCYTOSIS  1+       METABOLIC PANEL, COMPREHENSIVE    Collection Time: 10/22/22  7:31 AM   Result Value Ref Range    Sodium 133 (L) 136 - 145 mmol/L    Potassium 4.1 3.5 - 5.1 mmol/L    Chloride 103 97 - 108 mmol/L    CO2 23 21 - 32 mmol/L    Anion gap 7 5 - 15 mmol/L    Glucose 162 (H) 65 - 100 mg/dL    BUN 16 6 - 20 MG/DL    Creatinine 0.91 0.55 - 1.02 MG/DL    BUN/Creatinine ratio 18 12 - 20      eGFR 59 (L) >60 ml/min/1.73m2    Calcium 9.4 8.5 - 10.1 MG/DL    Bilirubin, total 0.4 0.2 - 1.0 MG/DL    ALT (SGPT) 15 12 - 78 U/L    AST (SGOT) 17 15 - 37 U/L    Alk.  phosphatase 73 45 - 117 U/L    Protein, total 6.8 6.4 - 8.2 g/dL    Albumin 2.9 (L) 3.5 - 5.0 g/dL    Globulin 3.9 2.0 - 4.0 g/dL    A-G Ratio 0.7 (L) 1.1 - 2.2     LIPASE    Collection Time: 10/22/22  7:31 AM   Result Value Ref Range    Lipase 127 73 - 393 U/L   URINALYSIS W/ RFLX MICROSCOPIC    Collection Time: 10/22/22  8:02 AM Result Value Ref Range    Color YELLOW      Appearance CLEAR CLEAR      Specific gravity 1.012 1.003 - 1.030      pH (UA) 7.0 5.0 - 8.0      Protein Negative NEG mg/dL    Glucose Negative NEG mg/dL    Ketone Negative NEG mg/dL    Bilirubin Negative NEG      Blood Negative NEG      Urobilinogen 1.0 0.2 - 1.0 EU/dL    Nitrites Negative NEG      Leukocyte Esterase TRACE (A) NEG      WBC 5-10 0 - 4 /hpf    RBC 0-5 0 - 5 /hpf    Epithelial cells FEW FEW /lpf    Bacteria Negative NEG /hpf    Amorphous Crystals 1+ (A) NEG   URINE CULTURE HOLD SAMPLE    Collection Time: 10/22/22  8:02 AM    Specimen: Serum   Result Value Ref Range    Urine culture hold        Urine on hold in Microbiology dept for 2 days. If unpreserved urine is submitted, it cannot be used for addtional testing after 24 hours, recollection will be required. Assessment/Plan:     Principal Problem:    Colitis (10/22/2022)    Active Problems:    DM type 2 (diabetes mellitus, type 2) ([facility] (4/9/2016)      HTN (hypertension), benign (4/9/2016)      RA (rheumatoid arthritis) ([facility] (4/9/2016)         See above narrative for full detail.

## 2022-10-22 NOTE — ACP (ADVANCE CARE PLANNING)
Advance Care Planning     Advance Care Planning (ACP) Physician/NP/PA Conversation      Date of Conversation: 10/22/2022  Diagnosis: colitis  Conducted with: Patient with Decision Making Capacity    Healthcare Decision Maker:     Primary Decision Maker: Jesus Lawsno - [phone]  Click here to complete [employer] including selection of the Healthcare Decision Maker Relationship (ie \"Primary\")        Care Preferences:    Hospitalization: \"If your health worsens and it becomes clear that your chance of recovery is unlikely, what would be your preference regarding hospitalization? \"  Do everything for now    Ventilation: \"If you were unable to breathe on your own and your chance of recovery was unlikely, what would be your preference about the use of a ventilator (breathing machine) if it was available to you? \"   DNR    Resuscitation: \"In the event your heart stopped as a result of an underlying serious health condition, would you want attempts to be made to restart your heart, or would you prefer a natural death? \"   DNR    Conversation Outcomes / Follow-Up Plan:   Discussed with patient and son regarding current medical issues, prognosis, and treatment. Son, Tiffanie Simmons, is POA. Has advance directive. Confirmed DNR status. Patient lives by herself in an apartment and would like to return on discharge.   Tiffanie Simmons has 2 other brothers who help with decision making     Length of Voluntary ACP Conversation in minutes:  16 minutes    Beth Diaz MD

## 2022-10-22 NOTE — ED TRIAGE NOTES
Pt to ED via EMS from [facility] for abdominal pain nausea, vomiting, dizziness and diarrhea since approx 0100. Pt reports recently completing antibiotics after admission here. Pt hard of hearing     Denies chest pain, fever, chills, cough, sob.

## 2022-10-23 PROCEDURE — 74011250636 HC RX REV CODE- 250/636: Performed by: INTERNAL MEDICINE

## 2022-10-23 PROCEDURE — 65270000029 HC RM PRIVATE

## 2022-10-23 PROCEDURE — 74011250637 HC RX REV CODE- 250/637: Performed by: INTERNAL MEDICINE

## 2022-10-23 PROCEDURE — 74011000258 HC RX REV CODE- 258: Performed by: INTERNAL MEDICINE

## 2022-10-23 RX ORDER — PROMETHAZINE HYDROCHLORIDE 25 MG/1
12.5 TABLET ORAL
Status: DISCONTINUED | OUTPATIENT
Start: 2022-10-23 | End: 2022-10-25 | Stop reason: HOSPADM

## 2022-10-23 RX ORDER — LEVOTHYROXINE SODIUM 75 UG/1
75 TABLET ORAL
Status: DISCONTINUED | OUTPATIENT
Start: 2022-10-24 | End: 2022-10-25 | Stop reason: HOSPADM

## 2022-10-23 RX ORDER — OXYCODONE HYDROCHLORIDE 5 MG/1
5 TABLET ORAL
Status: DISCONTINUED | OUTPATIENT
Start: 2022-10-23 | End: 2022-10-25 | Stop reason: HOSPADM

## 2022-10-23 RX ORDER — AMLODIPINE BESYLATE 5 MG/1
10 TABLET ORAL DAILY
Status: DISCONTINUED | OUTPATIENT
Start: 2022-10-24 | End: 2022-10-25 | Stop reason: HOSPADM

## 2022-10-23 RX ORDER — TAMSULOSIN HYDROCHLORIDE 0.4 MG/1
0.4 CAPSULE ORAL DAILY
Status: DISCONTINUED | OUTPATIENT
Start: 2022-10-24 | End: 2022-10-25 | Stop reason: HOSPADM

## 2022-10-23 RX ORDER — ACETAMINOPHEN 650 MG/1
650 SUPPOSITORY RECTAL
Status: DISCONTINUED | OUTPATIENT
Start: 2022-10-23 | End: 2022-10-25 | Stop reason: HOSPADM

## 2022-10-23 RX ORDER — FOLIC ACID 1 MG/1
1 TABLET ORAL DAILY
Status: DISCONTINUED | OUTPATIENT
Start: 2022-10-24 | End: 2022-10-25 | Stop reason: HOSPADM

## 2022-10-23 RX ORDER — ACETAMINOPHEN 325 MG/1
650 TABLET ORAL
Status: DISCONTINUED | OUTPATIENT
Start: 2022-10-23 | End: 2022-10-25 | Stop reason: HOSPADM

## 2022-10-23 RX ORDER — ONDANSETRON 2 MG/ML
4 INJECTION INTRAMUSCULAR; INTRAVENOUS
Status: DISCONTINUED | OUTPATIENT
Start: 2022-10-23 | End: 2022-10-25 | Stop reason: HOSPADM

## 2022-10-23 RX ORDER — ENOXAPARIN SODIUM 100 MG/ML
30 INJECTION SUBCUTANEOUS EVERY 24 HOURS
Status: DISCONTINUED | OUTPATIENT
Start: 2022-10-23 | End: 2022-10-25 | Stop reason: HOSPADM

## 2022-10-23 RX ORDER — ASPIRIN 81 MG/1
81 TABLET ORAL DAILY
Status: DISCONTINUED | OUTPATIENT
Start: 2022-10-24 | End: 2022-10-25 | Stop reason: HOSPADM

## 2022-10-23 RX ORDER — FACIAL-BODY WIPES
10 EACH TOPICAL DAILY PRN
Status: DISCONTINUED | OUTPATIENT
Start: 2022-10-23 | End: 2022-10-25 | Stop reason: HOSPADM

## 2022-10-23 RX ORDER — POLYETHYLENE GLYCOL 3350 17 G/17G
17 POWDER, FOR SOLUTION ORAL
Status: DISCONTINUED | OUTPATIENT
Start: 2022-10-23 | End: 2022-10-25 | Stop reason: HOSPADM

## 2022-10-23 RX ORDER — SODIUM CHLORIDE 0.9 % (FLUSH) 0.9 %
5-40 SYRINGE (ML) INJECTION AS NEEDED
Status: DISCONTINUED | OUTPATIENT
Start: 2022-10-23 | End: 2022-10-25 | Stop reason: HOSPADM

## 2022-10-23 RX ORDER — SODIUM CHLORIDE 0.9 % (FLUSH) 0.9 %
5-40 SYRINGE (ML) INJECTION EVERY 8 HOURS
Status: DISCONTINUED | OUTPATIENT
Start: 2022-10-23 | End: 2022-10-25 | Stop reason: HOSPADM

## 2022-10-23 RX ORDER — SODIUM CHLORIDE 9 MG/ML
25 INJECTION, SOLUTION INTRAVENOUS AS NEEDED
Status: DISCONTINUED | OUTPATIENT
Start: 2022-10-23 | End: 2022-10-25 | Stop reason: HOSPADM

## 2022-10-23 RX ADMIN — Medication 1 CAPSULE: at 08:42

## 2022-10-23 RX ADMIN — VANCOMYCIN HYDROCHLORIDE 125 MG: 250 POWDER, FOR SOLUTION ORAL at 23:45

## 2022-10-23 RX ADMIN — MEROPENEM 1 G: 1 INJECTION, POWDER, FOR SOLUTION INTRAVENOUS at 23:44

## 2022-10-23 RX ADMIN — ENOXAPARIN SODIUM 30 MG: 100 INJECTION SUBCUTANEOUS at 19:48

## 2022-10-23 RX ADMIN — SODIUM CHLORIDE 75 ML/HR: 9 INJECTION, SOLUTION INTRAVENOUS at 08:45

## 2022-10-23 RX ADMIN — MEROPENEM 1 G: 1 INJECTION, POWDER, FOR SOLUTION INTRAVENOUS at 14:10

## 2022-10-23 RX ADMIN — SODIUM CHLORIDE 75 ML/HR: 9 INJECTION, SOLUTION INTRAVENOUS at 23:44

## 2022-10-23 RX ADMIN — VANCOMYCIN HYDROCHLORIDE 125 MG: 250 POWDER, FOR SOLUTION ORAL at 06:17

## 2022-10-23 RX ADMIN — VANCOMYCIN HYDROCHLORIDE 125 MG: 250 POWDER, FOR SOLUTION ORAL at 14:12

## 2022-10-23 NOTE — PROGRESS NOTES
Problem: Falls - Risk of  Goal: *Absence of Falls  Description: Document Carlos Newman Fall Risk and appropriate interventions in the flowsheet.   Outcome: Progressing Towards Goal  Note: Fall Risk Interventions:  Mobility Interventions: Bed/chair exit alarm, OT consult for ADLs, Patient to call before getting OOB    Mentation Interventions: Bed/chair exit alarm, Reorient patient, Adequate sleep, hydration, pain control    Medication Interventions: Bed/chair exit alarm, Teach patient to arise slowly, Patient to call before getting OOB    Elimination Interventions: Bed/chair exit alarm, Call light in reach, Toileting schedule/hourly rounds    History of Falls Interventions: Bed/chair exit alarm, Investigate reason for fall, Room close to nurse's station

## 2022-10-23 NOTE — PROGRESS NOTES
[address]  [address] [facility], [facility]  [phone]         Hospitalist Progress Note        NAME:  Art Apodaca   :  1930   MRN:  [id number]    Date/Time:  10/23/2022     Patient PCP:  hCristy Ramsay MD    Emergency Contact:    Extended Emergency Contact Information  Primary Emergency Contact: Tom Lomeli  Address: [address] [place] [address] Phone: [phone]  Mobile Phone: [phone]  Relation: Son      Code: DDNR    Isolation Precautions: Enteric Contact        Subjective:     REASON FOR VISIT:  Recheck abd pain     HPI & INTERVAL HISTORY:     Ms. Justus Freire is a 80 y.o. female with history that includes  HTN, DM, RA, recent diverticulitis, presented w/ recurrent abd pain, N/V/D, colitis. Returns with abd pain CT showing persistent colitis     10/23: Patient seen and examined. She reports that she is feeling better with less abdominal pain and diarrhea has also improved. No fevers no chills. Updated son Caroline De Dios over phone.       ALLERGIES  Allergies   Allergen Reactions    Alendronate Diarrhea    Amoxicillin Rash     Tolerated cefepime 3/27/21    Amoxicillin Unknown (comments)    Hydrochlorothiazide Other (comments)     \"Caused pain everywhere\"    Penicillins Unknown (comments)         ROS:  Gen:   generalized weakness, denies chills, and denies fevers  Eyes:  negative  ENT:    negative  Resp:  negative  CVS:   negative  GI:       negative         Objective:      Visit Vitals  /72   Pulse 70   Temp 97.9 °F (36.6 °C)   Resp 19   SpO2 96%       Physical Exam:  General: alert, well appearing, and no distress  Head: Normocephalic, without obvious abnormality, atraumatic  Eyes: PERRL, EOMI, anicteric sclerae, and conjuntiva clear  ENT: lips, mucosa, and tongue normal  Neck: normal, supple, and no tenderness  Lungs: clear to auscultation with good breath sounds and normal respiratory effort  Heart: S1, S2 normal, regular rate, and regular rhythm  Abd: not distended, soft, left sided tenderness to palpation, BS present and normactive  Ext: no cyanosis and no edema  Skin: normal skin color, no rashes, and texture normal  Neuro:  alert, oriented x 3, no defects noted in general exam.  Psych: not anxious, cooperative, appropriate affect      Medications:  Current Facility-Administered Medications   Medication Dose Route Frequency    L.acidophilus-paracasei-S.thermophil-bifidobacter (RISAQUAD) 8 billion cell capsule  1 Capsule Oral DAILY    HYDROmorphone (DILAUDID) syringe 0.5 mg  0.5 mg IntraVENous Q4H PRN    prochlorperazine (COMPAZINE) injection 10 mg  10 mg IntraVENous Q6H PRN    0.9% sodium chloride infusion  75 mL/hr IntraVENous CONTINUOUS    meropenem (MERREM) 1 g in 0.9% sodium chloride (MBP/ADV) 50 mL MBP  1 g IntraVENous Q12H    vancomycin (FIRVANQ) 50 mg/mL oral solution 125 mg  125 mg Oral Q6H        Labs:  Recent Labs     10/22/22  0731   WBC 16.9*   HGB 11.8   HCT 36.3          Recent Labs     10/22/22  0731   *   K 4.1      CO2 23   *   BUN 16   CREA 0.91   CA 9.4   ALB 2.9*   TBILI 0.4   ALT 15       Radiology:  CT ABD PELV WO CONT    Result Date: 10/22/2022  Unchanged left colitis. I personally reviewed and interpreted the imaging studies and agree with official reading    The labs, imaging studies, and medications was reviewed by me on: October 23, 2022         Assessment/Plan:      81 yo hx of HTN, DM, RA, recent diverticulitis, presented w/ recurrent abd pain, N/V/D, colitis     Recurrent diverticulitis/Colitis: persistent, failed outpt tx    Improving  Continue Meropenem, oral vanco, and probiotics  BCX pending, stool Cx, C-diff. IV dilaudid prn severe pain  IVFs and supportive care  Consult GI     HTN  BP stable. Cont norvasc. Hold lisinopril     DM type 2: A1C 5.5%   diet control     Hx of RA  Cont pain control.   Consult PT/OT     Hypothyroid  Continue synthroid    DDNR  On File    Risk of deterioration: high      Discussed:  Pt's condition, Imaging findings, Lab findings, Assessment, and Care Plan discussed with: Patient, Family via phone, and Care Manager    Prophylaxis:  Lovenox SQ    Probable disposition:  Home with family or Ramila       Total time: 40 minutes **I personally saw and examined the patient during this time period**      Date of service:    10/23/2022                ___________________________________________________    Admitting Physician: Rachel Fernandez MD

## 2022-10-23 NOTE — PROGRESS NOTES
Bedside shift change report given to ankit (oncoming nurse) by Frank Stevens (offgoing nurse). Report included the following information SBAR, Kardex, ED Summary, OR Summary, Procedure Summary, Intake/Output, MAR, and Recent Results.

## 2022-10-23 NOTE — PROGRESS NOTES
Bryant Licea. Sona Hedrick MD  [phone] office  [phone]     [facility] Progress Note    2022  Admit Date: 10/22/2022         Narrative Assessment and Plan   Abdominal pain - improved  Chronic diverticular disease - being treated with antibiotic therapy. Hiccups periodically - observe    Would give another overnight and if her trajectory of improvement remains then begin discharge planning tomorrow. Following. Subjective:   CC: following for abnormal CT, abd pain. She reports improvement in pain, tolerating diet, having normal non bloody stool. ROS:  The previous review of systems on initial consultation / H&P is noted and reviewed. Specific changes noted above in HPI. Current Medications:     Current Facility-Administered Medications   Medication Dose Route Frequency    L.acidophilus-paracasei-S.thermophil-bifidobacter (RISAQUAD) 8 billion cell capsule  1 Capsule Oral DAILY    HYDROmorphone (DILAUDID) syringe 0.5 mg  0.5 mg IntraVENous Q4H PRN    prochlorperazine (COMPAZINE) injection 10 mg  10 mg IntraVENous Q6H PRN    0.9% sodium chloride infusion  75 mL/hr IntraVENous CONTINUOUS    meropenem (MERREM) 1 g in 0.9% sodium chloride (MBP/ADV) 50 mL MBP  1 g IntraVENous Q12H    vancomycin (FIRVANQ) 50 mg/mL oral solution 125 mg  125 mg Oral Q6H       Objective:     VITALS:   Last 24hrs VS reviewed since prior progress note.  Most recent are:  Visit Vitals  BP (!) 126/59 (BP 1 Location: Right upper arm, BP Patient Position: Semi fowlers)   Pulse 71   Temp 98.2 °F (36.8 °C)   Resp 18   SpO2 94%     Temp (24hrs), Av °F (36.7 °C), Min:97.6 °F (36.4 °C), Max:98.3 °F (36.8 °C)      Intake/Output Summary (Last 24 hours) at 10/23/2022 1417  Last data filed at 10/22/2022 2339  Gross per 24 hour   Intake --   Output 0 ml   Net 0 ml     dermatologic mass    Lab Data Reviewed:   Recent Labs     10/22/22  0731   WBC 16.9*   HGB 11.8   HCT 36.3    Recent Labs     10/22/22  0731   *   K 4.1      CO2 23   *   BUN 16   CREA 0.91   CA 9.4   ALB 2.9*   TBILI 0.4   ALT 15     Lab Results   Component Value Date/Time    Glucose (POC) 110 09/22/2022 12:22 PM    Glucose (POC) 99 09/22/2022 08:29 AM    Glucose (POC) 101 09/21/2022 11:07 PM    Glucose (POC) 89 09/21/2022 04:08 PM    Glucose (POC) 125 (H) 09/21/2022 11:44 AM     No results for input(s): PH, PCO2, PO2, HCO3, FIO2 in the last 72 hours. No results for input(s): INR, INREXT in the last 72 hours.         Assessment:   (See above)  Principal Problem:    Colitis (10/22/2022)    Active Problems:    DM type 2 (diabetes mellitus, type 2) ([facility] (4/9/2016)      HTN (hypertension), benign (4/9/2016)      RA (rheumatoid arthritis) ([facility] (4/9/2016)        Plan:   (See above)      Signed By: Jaden Aleman MD     10/23/2022  2:17 PM

## 2022-10-23 NOTE — PROGRESS NOTES
Bedside shift change report given to Jean-Pierre Triana (oncoming nurse) by Katina Vela (offgoing nurse). Report included the following information SBAR.

## 2022-10-23 NOTE — PROGRESS NOTES
Bedside shift change report given to Dacostaashley Aguilar 15  (oncoming nurse) by Margaret Roque  (offgoing nurse). Report included the following information SBAR, Kardex, ED Summary, MAR, Recent Results, and Med Rec Status.

## 2022-10-24 LAB
ALBUMIN SERPL-MCNC: 2.5 G/DL (ref 3.5–5)
ALBUMIN/GLOB SERPL: 0.8 {RATIO} (ref 1.1–2.2)
ALP SERPL-CCNC: 62 U/L (ref 45–117)
ALT SERPL-CCNC: 12 U/L (ref 12–78)
ANION GAP SERPL CALC-SCNC: 5 MMOL/L (ref 5–15)
AST SERPL-CCNC: 12 U/L (ref 15–37)
BASOPHILS # BLD: 0 K/UL (ref 0–0.1)
BASOPHILS NFR BLD: 1 % (ref 0–1)
BILIRUB DIRECT SERPL-MCNC: 0.1 MG/DL (ref 0–0.2)
BILIRUB SERPL-MCNC: 0.3 MG/DL (ref 0.2–1)
BUN SERPL-MCNC: 7 MG/DL (ref 6–20)
BUN/CREAT SERPL: 11 (ref 12–20)
CALCIUM SERPL-MCNC: 8.5 MG/DL (ref 8.5–10.1)
CHLORIDE SERPL-SCNC: 107 MMOL/L (ref 97–108)
CO2 SERPL-SCNC: 26 MMOL/L (ref 21–32)
CREAT SERPL-MCNC: 0.61 MG/DL (ref 0.55–1.02)
DIFFERENTIAL METHOD BLD: ABNORMAL
EOSINOPHIL # BLD: 0.2 K/UL (ref 0–0.4)
EOSINOPHIL NFR BLD: 4 % (ref 0–7)
ERYTHROCYTE [DISTWIDTH] IN BLOOD BY AUTOMATED COUNT: 18.6 % (ref 11.5–14.5)
GLOBULIN SER CALC-MCNC: 3.3 G/DL (ref 2–4)
GLUCOSE SERPL-MCNC: 91 MG/DL (ref 65–100)
HCT VFR BLD AUTO: 31.7 % (ref 35–47)
HGB BLD-MCNC: 10.1 G/DL (ref 11.5–16)
IMM GRANULOCYTES # BLD AUTO: 0 K/UL (ref 0–0.04)
IMM GRANULOCYTES NFR BLD AUTO: 1 % (ref 0–0.5)
LYMPHOCYTES # BLD: 1 K/UL (ref 0.8–3.5)
LYMPHOCYTES NFR BLD: 21 % (ref 12–49)
MAGNESIUM SERPL-MCNC: 2 MG/DL (ref 1.6–2.4)
MCH RBC QN AUTO: 27.9 PG (ref 26–34)
MCHC RBC AUTO-ENTMCNC: 31.9 G/DL (ref 30–36.5)
MCV RBC AUTO: 87.6 FL (ref 80–99)
MONOCYTES # BLD: 0.3 K/UL (ref 0–1)
MONOCYTES NFR BLD: 7 % (ref 5–13)
NEUTS SEG # BLD: 3.3 K/UL (ref 1.8–8)
NEUTS SEG NFR BLD: 66 % (ref 32–75)
NRBC # BLD: 0 K/UL (ref 0–0.01)
NRBC BLD-RTO: 0 PER 100 WBC
PLATELET # BLD AUTO: 282 K/UL (ref 150–400)
PMV BLD AUTO: 9.3 FL (ref 8.9–12.9)
POTASSIUM SERPL-SCNC: 3.7 MMOL/L (ref 3.5–5.1)
PROT SERPL-MCNC: 5.8 G/DL (ref 6.4–8.2)
RBC # BLD AUTO: 3.62 M/UL (ref 3.8–5.2)
SODIUM SERPL-SCNC: 138 MMOL/L (ref 136–145)
WBC # BLD AUTO: 4.9 K/UL (ref 3.6–11)
WBC #/AREA STL HPF: NORMAL /HPF (ref 0–4)

## 2022-10-24 PROCEDURE — 2709999900 HC NON-CHARGEABLE SUPPLY

## 2022-10-24 PROCEDURE — 74011250636 HC RX REV CODE- 250/636: Performed by: INTERNAL MEDICINE

## 2022-10-24 PROCEDURE — 74011000250 HC RX REV CODE- 250: Performed by: INTERNAL MEDICINE

## 2022-10-24 PROCEDURE — 97116 GAIT TRAINING THERAPY: CPT

## 2022-10-24 PROCEDURE — 80048 BASIC METABOLIC PNL TOTAL CA: CPT

## 2022-10-24 PROCEDURE — 65270000029 HC RM PRIVATE

## 2022-10-24 PROCEDURE — 74011000258 HC RX REV CODE- 258: Performed by: INTERNAL MEDICINE

## 2022-10-24 PROCEDURE — 36415 COLL VENOUS BLD VENIPUNCTURE: CPT

## 2022-10-24 PROCEDURE — 74011250637 HC RX REV CODE- 250/637: Performed by: HOSPITALIST

## 2022-10-24 PROCEDURE — 80076 HEPATIC FUNCTION PANEL: CPT

## 2022-10-24 PROCEDURE — 85025 COMPLETE CBC W/AUTO DIFF WBC: CPT

## 2022-10-24 PROCEDURE — 97161 PT EVAL LOW COMPLEX 20 MIN: CPT

## 2022-10-24 PROCEDURE — 97165 OT EVAL LOW COMPLEX 30 MIN: CPT

## 2022-10-24 PROCEDURE — 83735 ASSAY OF MAGNESIUM: CPT

## 2022-10-24 PROCEDURE — 74011250637 HC RX REV CODE- 250/637: Performed by: INTERNAL MEDICINE

## 2022-10-24 PROCEDURE — 97535 SELF CARE MNGMENT TRAINING: CPT

## 2022-10-24 RX ORDER — MICONAZOLE NITRATE 2 %
POWDER (GRAM) TOPICAL 2 TIMES DAILY
Status: DISCONTINUED | OUTPATIENT
Start: 2022-10-24 | End: 2022-10-25 | Stop reason: HOSPADM

## 2022-10-24 RX ADMIN — MICONAZOLE NITRATE 2 % TOPICAL POWDER: at 19:07

## 2022-10-24 RX ADMIN — Medication 1 CAPSULE: at 08:33

## 2022-10-24 RX ADMIN — LEVOTHYROXINE SODIUM 75 MCG: 0.07 TABLET ORAL at 06:21

## 2022-10-24 RX ADMIN — MEROPENEM 1 G: 1 INJECTION, POWDER, FOR SOLUTION INTRAVENOUS at 11:23

## 2022-10-24 RX ADMIN — AMLODIPINE BESYLATE 10 MG: 5 TABLET ORAL at 08:34

## 2022-10-24 RX ADMIN — SODIUM CHLORIDE, PRESERVATIVE FREE 5 ML: 5 INJECTION INTRAVENOUS at 13:09

## 2022-10-24 RX ADMIN — VANCOMYCIN HYDROCHLORIDE 125 MG: 250 POWDER, FOR SOLUTION ORAL at 06:20

## 2022-10-24 RX ADMIN — VANCOMYCIN HYDROCHLORIDE 125 MG: 250 POWDER, FOR SOLUTION ORAL at 13:08

## 2022-10-24 RX ADMIN — MEROPENEM 1 G: 1 INJECTION, POWDER, FOR SOLUTION INTRAVENOUS at 23:11

## 2022-10-24 RX ADMIN — FOLIC ACID 1 MG: 1 TABLET ORAL at 08:34

## 2022-10-24 RX ADMIN — SODIUM CHLORIDE, PRESERVATIVE FREE 5 ML: 5 INJECTION INTRAVENOUS at 23:11

## 2022-10-24 RX ADMIN — VANCOMYCIN HYDROCHLORIDE 125 MG: 250 POWDER, FOR SOLUTION ORAL at 19:04

## 2022-10-24 RX ADMIN — ASPIRIN 81 MG: 81 TABLET, COATED ORAL at 08:34

## 2022-10-24 RX ADMIN — ENOXAPARIN SODIUM 30 MG: 100 INJECTION SUBCUTANEOUS at 20:15

## 2022-10-24 RX ADMIN — TAMSULOSIN HYDROCHLORIDE 0.4 MG: 0.4 CAPSULE ORAL at 08:43

## 2022-10-24 RX ADMIN — SODIUM CHLORIDE 75 ML/HR: 9 INJECTION, SOLUTION INTRAVENOUS at 16:12

## 2022-10-24 NOTE — PROGRESS NOTES
Bedside and Verbal shift change report given to CORI Rose/CORI Espinoza (oncoming nurse) by Oneil Perez RN (offgoing nurse). Report included the following information SBAR, Kardex, Intake/Output, MAR, Accordion, Recent Results, and Med Rec Status.

## 2022-10-24 NOTE — PROGRESS NOTES
10/24/2022  2:07 PM  CM completed assessment w/ pt's son Chen Goldberg via phone. Charted demographics verified    Reason for Readmission:    Emergent for Colitis  Pt is a 81 yo female recently discharged from [facility] following admission for Diverticulitis 9/18-9/22/22 was discharged to home w [address] [facility] [address] services. Now presents w/ N/V and abdominal pain. PMHx:         Arthritis, rheumatoid ([address]       Diabetes ([facility] [address]      Diverticulitis      Hard of hearing      History of vascular access device 04/01/2021     [facility] VAT 4 FR R Basilic 71/4 LTAbx    History of vascular access device 03/04/2022     4 feench single lumen PICC line right basilic    HTN (hypertension)      Hypertension      Hypothyroid      Osteoporosis, post-menopausal      Septic arthritis of elbow, left         RUR Score/Risk Level:   18 % Moderate Risk of Readmission/Yellow      PCP: First and Last name:  Kelli Shannon MD   Name of Practice:    Are you a current patient: Yes/No: yes    Approximate date of last visit: 10/5/22   Can you participate in a virtual visit with your PCP: NO    Is a Care Conference indicated: No      Did you attend your follow up appointment (s): If not, why not:pt was able to attend scheduled follow up appts following hospital DC          Resources/supports as identified by patient/family:   pt has a supportive son Rebecca Garg, however she [place] apt[place] and has limited support from [facility] facing patient (as identified by patient/family and CM): Finances/Medication cost?   [employer], pt uses [employer] and is usually covered for her medications    Transportation      family   Support system or lack thereof? Pt lives alone has limited support from her son   Living arrangements? Pt [place] alone in [place]  apt w/ elevator access    Self-care/ADLs/Cognition?      Pt can reportedly perform ADLS, independently, and is ambulatory w/ a rollator or RW        Current Advanced Directive/Advance Care Plan: pt has AMD, is DNR            Plan for utilizing home health:   PT, OT andrew completed recommending resumption of HH services at DC, orders were sent to [facility]  DME: Shower Chair, raised toilet seat, rollator, RW and bed rail  COVID Vax Hx: pt had 2 jabs (2021) and 1 booster in 2022             Transition of Care Plan:    Based on readmission, the patient's previous Plan of Care   has been evaluated and/or modified.  The current Transition of Care Plan is:         RUR 18 % Moderate Risk of Readmission/Yellow   LOS 2 Days  Hospital admission for medical management  GI following   PT, OT andrew completed recommending resumption of HH at DC   CM to follow through for treatment/response  Adonay Mcgraw in agreement w/ plan for HHat DC, verbal consent for orders to [facility], sent in [employer], they have accepted and AVS is updated  DC when stable to home / [facility]  Outpatient follow up PCP, STEFFANY  Adonay Mcgraw will transport at DC   Readmission Assessment  Number of days since last admission?: 8-30 days (pt DC to home w/ [facility])  Previous disposition: Home with Home Health ([facility])  What was the patient's/caregiver's perception as to why they think they needed to return back to the hospital?: Other (Comment) (Recurrent Symptoms)  Did you visit your Primary Care Physician after you left the hospital, before you returned this time?: Yes  Did you see a specialist, such as Cardiac, Pulmonary, Orthopedic Physician, etc. after you left the hospital?: Yes  Who advised the patient to return to the hospital?: Self-referral  Does the patient report anything that got in the way of taking their medications?: No  In our efforts to provide the best possible care to you and others like you, can you think of anything that we could have done to help you after you left the hospital the first time, so that you might not have needed to return so soon?: Other (Comment) (Nothing)  Care Management Interventions  PCP Verified by CM:  Yes Oksnaa Moran)  Palliative Care Criteria Met (RRAT>21 & CHF Dx)?: No  Mode of Transport at Discharge: Self (Family)  Physical Therapy Consult: Yes  Occupational Therapy Consult: Yes  Support Systems: Child(dhaval) (pt lives alone in senior apt, at baseline pt is ambaultory w/ RW, iADLs, relies on family for transport )  Confirm Follow Up Transport: Family (Son)  The Plan for Transition of Care is Related to the Following Treatment Goals : [facility]  The Patient and/or Patient Representative was Provided with a Choice of Provider and Agrees with the Discharge Plan?: Yes  Name of the Patient Representative Who was Provided with a Choice of Provider and Agrees with the Discharge Plan: Son Pam Hatchet  [place] of Choice List was Provided with Basic Dialogue that Supports the Patient's Individualized Plan of Care/Goals, Treatment Preferences and Shares the Quality Data Associated with the Providers?: (S) Yes  Discharge Location  Patient Expects to be Discharged to[de-identified] Home with home health ([facility])  RBOIN Mariscal

## 2022-10-24 NOTE — PROGRESS NOTES
1443  Patient tolerated lunch. No pain. No N/V. Dr. Flora Wilkinson made aware. 200  Dr. Flora Wilkinson asking about C-diff result. Still pending/in process. Spoke with [address] [place] [address] and stated C-diff result will be before 1800. Dr. Flora Wilkinson made aware. [facility] again and they said that NOW they don't have c-diff specimen and to call [facility] lab. Called lab and stated they do not have it? ? And they will call [facility] and call back again,    Spoke with son, Tushar Caballero, and stated he would be able to pick patient up tomorrow. Dr. Flora Wilkinson notified. Endorsed to [facility]. 2005  Spoke with lab, stated that there is no c-diff specimen. The one sent was frozen? ??   Endorsed to [facility]. Dr. Flora Wilkinson notified. 2020  Per Dr. Flora Wilkinson, If patient has watery diarrhea then c-diff should be collected.  Can go by the order that was in there or can get order from Night MD. Melvin Neil to [facility]

## 2022-10-24 NOTE — PROGRESS NOTES
OCCUPATIONAL THERAPY EVALUATION/DISCHARGE  Patient: Dajuan Rodriguez (32 y.o. female)  Date: 10/24/2022  Primary Diagnosis: Colitis [K52.9]       Precautions:   Contact (Enteric;Cdiff)    ASSESSMENT  Based on the objective data described below, the patient presents with baseline ADL performance. All DME needs met for safe return home. Patent has been working with [facility] OT for strengthening, Adl/IADL retraining in home environment. Recommend resumption of services    Current Level of Function (ADLs/self-care): modified independence with basic ADL, uses RW for mobility     Functional Outcome Measure:  The patient scored 75/100 on the Barthel Index outcome measure      PLAN :  Recommend with staff: in chair for meals    Recommendation for discharge: (in order for the patient to meet his/her long term goals)  Occupational therapy at least 2 days/week in the home     This discharge recommendation:  Has been made in collaboration with the attending provider and/or case management    IF patient discharges home will need the following DME: patient owns DME required for discharge       SUBJECTIVE:   Patient pleasant and cooperative     OBJECTIVE DATA SUMMARY:   HISTORY:   Past Medical History:   Diagnosis Date    Arthritis, rheumatoid ([address]     Diabetes ([address]     Diverticulitis     Hard of hearing     History of vascular access device 04/01/2021    [facility] VAT 4 FR R Basilic 09/3 LTAbx    History of vascular access device 03/04/2022    4 feench single lumen PICC line right basilic    HTN (hypertension)     Hypertension     Hypothyroid     Osteoporosis, post-menopausal     Septic arthritis of elbow, left ([address]      Past Surgical History:   Procedure Laterality Date    FLEXIBLE SIGMOIDOSCOPY N/A 12/2/2020    SIGMOIDOSCOPY FLEXIBLE performed by Dillon Santana MD at [facility] ENDOSCOPY    HX HEMORRHOIDECTOMY      HX HYSTERECTOMY      HX THYROIDECTOMY         Prior Level of Function/Environment/Context: Patient lives alone in a senior apartment building. It s not IL/AL- there are no meal services or staff. She reports there are outside services that come in to provide activities. She prepares microwave or cold meals (minimal stovetop cooking due to low vision. She cleans her apartment and does her own laundry. Son lives nearby and assists as needed. Expanded or extensive additional review of patient history:   Home Situation  Home Environment: [address] [place] [address] Name: [address] [facility]  # Steps to Enter: 0  One/Two Story Residence: One story  Living Alone: Yes  Support Systems: Child(dhaval), [place], Adult Group Home  Patient Expects to be Discharged to[de-identified] Assisted Living  Current DME Used/Available at Home: [address] [facility] [address], straight, Shower chair, Walker, rollator, Grab bars  Tub or Shower Type: Tub/Shower combination    Hand dominance: Right    EXAMINATION OF PERFORMANCE DEFICITS:  Cognitive/Behavioral Status:  Neurologic State: Alert  Orientation Level: Oriented X4  Cognition: Appropriate decision making         Hearing: Auditory  Auditory Impairment: Hard of hearing, bilateral, Hearing aid(s)  Hearing Aids/Status: With patient    Vision/Perceptual:                           Acuity: Impaired far vision; Impaired near vision    Corrective Lenses: Glasses    Range of Motion:    AROM: Within functional limits                         Strength:    Strength: Within functional limits                Coordination:  Coordination: Within functional limits  Fine Motor Skills-Upper: Left Intact; Right Intact    Gross Motor Skills-Upper: Left Intact; Right Intact    Tone & Sensation:    Tone: Normal                         Balance:  Sitting: Intact; Without support  Standing: Impaired  Standing - Static: Good;Constant support  Standing - Dynamic : Fair;Constant support    Functional Mobility and Transfers for ADLs:  Bed Mobility:  Supine to Sit: Modified independent  Sit to Supine: Modified independent  Scooting: Modified independent    Transfers:  Sit to Stand: Contact guard assistance  Stand to Sit: Contact guard assistance  Bed to Chair: Contact guard assistance  Assistive Device :  (RW unavailable, hand hled assist provided)    ADL Assessment:  Feeding: Independent    Oral Facial Hygiene/Grooming: Independent    Bathing: Modified independent (seated)    Type of Bath: Partial    Upper Body Dressing: Modified independent    Lower Body Dressing: Modified independent    Toileting: Modified independent                ADL Intervention and task modifications:          Functional Measure:    Barthel Index:  Bathin  Bladder: 10  Bowels: 10  Groomin  Dressing: 10  Feeding: 10  Mobility: 5  Stairs: 5  Toilet Use: 5  Transfer (Bed to Chair and Back): 10  Total: 75/100      The Barthel ADL Index: Guidelines  1. The index should be used as a record of what a patient does, not as a record of what a patient could do. 2. The main aim is to establish degree of independence from any help, physical or verbal, however minor and for whatever reason. 3. The need for supervision renders the patient not independent. 4. A patient's performance should be established using the best available evidence. Asking the patient, friends/relatives and nurses are the usual sources, but direct observation and common sense are also important. However direct testing is not needed. 5. Usually the patient's performance over the preceding 24-48 hours is important, but occasionally longer periods will be relevant. 6. Middle categories imply that the patient supplies over 50 per cent of the effort. 7. Use of aids to be independent is allowed. Score Interpretation (from [address] [address])    Independent   60-79 Minimally independent   40-59 Partially dependent   20-39 Very dependent   <20 Totally dependent     -Zuleika Zuñiga., Barthel, D.W. (1965). Functional evaluation: the Barthel Index. [facility] ([address] (1997).  The Barthel activities of daily living index: self-reporting versus actual performance in the old (> or = 75 years). Journal of [address] [place] [address] 457), 14 [facility], ..., Nadege Mccord., Ophelia Bower. (1999). Measuring the change in disability after inpatient rehabilitation; comparison of the responsiveness of the Barthel Index and Functional [place] Measure. Journal of Neurology, Neurosurgery, and Psychiatry, 66(4), 577-012. RAFFY Rose, VINAY Rangel, & Tayla Mcdaniel MAlonzoA. (2004) Assessment of post-stroke quality of life in cost-effectiveness studies: The usefulness of the Barthel Index and the EuroQoL-5D. Quality of Life Research, 15, 012-36         Occupational Therapy Evaluation Charge Determination   History Examination Decision-Making   LOW Complexity : Brief history review  LOW Complexity : 1-3 performance deficits relating to physical, cognitive , or psychosocial skils that result in activity limitations and / or participation restrictions  LOW Complexity : No comorbidities that affect functional and no verbal or physical assistance needed to complete eval tasks       Based on the above components, the patient evaluation is determined to be of the following complexity level: LOW   Pain Rating:  none    Activity Tolerance:   Good    After treatment patient left in no apparent distress:    Supine in bed, Call bell within reach, and Side rails x 3    COMMUNICATION/EDUCATION:   The patients plan of care was discussed with: Registered nurse.      Thank you for this referral.  Sidra Mendoza OT  Time Calculation: 24 mins

## 2022-10-24 NOTE — PROGRESS NOTES
PHYSICAL THERAPY EVALUATION  Patient: Audie Preston (29 y.o. female)  Date: 10/24/2022  Primary Diagnosis: Colitis [K52.9]       Precautions:   Contact (Enteric;Cdiff)    ASSESSMENT  Based on the objective data described below, the patient presents with admission due to episode of colitis. Pt received supine in bed. A&Ox4 yet [place]. Supine to sit with Mod I. Sitting balance is good on EOB. Sit to stand with CGA with RW.  Gait of 100' with RW with CGA. Pt uses rollator at home with Mod I. Pt returned to bed in NAD. Will benefit from continued PT to progress level of assist.  HHPT upon discharge. Current Level of Function Impacting Discharge (mobility/balance): CGA/good    Functional Outcome Measure: The patient scored 70/100 on the barthel outcome measure   Other factors to consider for discharge: per above     Patient will benefit from skilled therapy intervention to address the above noted impairments. PLAN :  Recommendations and Planned Interventions: bed mobility training, transfer training, gait training, therapeutic exercises, neuromuscular re-education, edema management/control, patient and family training/education, and therapeutic activities      Frequency/Duration: Patient will be followed by physical therapy:  5 times a week to address goals. Recommendation for discharge: (in order for the patient to meet his/her long term goals)  Physical therapy at least 2 days/week in the home     This discharge recommendation:  Has been made in collaboration with the attending provider and/or case management    IF patient discharges home will need the following DME: patient owns DME required for discharge         SUBJECTIVE:   Patient stated I am feeling better.     OBJECTIVE DATA SUMMARY:   HISTORY:    Past Medical History:   Diagnosis Date    Arthritis, rheumatoid ([address]     Diabetes [address] [address]     Diverticulitis     Hard of hearing     History of vascular access device 04/01/2021    [facility] VAT 4 FR R Basilic 29/6 LTAbx    History of vascular access device 03/04/2022    4 feench single lumen PICC line right basilic    HTN (hypertension)     Hypertension     Hypothyroid     Osteoporosis, post-menopausal     Septic arthritis of elbow, left ([address]      Past Surgical History:   Procedure Laterality Date    FLEXIBLE SIGMOIDOSCOPY N/A 12/2/2020    SIGMOIDOSCOPY FLEXIBLE performed by Julien Caal MD at [facility]COPY    HX HEMORRHOIDECTOMY      HX HYSTERECTOMY      HX THYROIDECTOMY         Personal factors and/or comorbidities impacting plan of care: per above and below    Home Situation  Home Environment: Apartment ([address])  [address] Name: Mic Roque  # Steps to Enter: 0  One/Two Story Residence: One story  Living Alone: Yes (son is primary care taker; has video cam in place)  Support Systems: Child(dhaval) (pt lives alone in senior appt, at baseline pt is ambaultory w/ RW, iADLs, relies on family for transport )  Patient Expects to be Discharged to[de-identified] Home with home health ([facility])  Current DME Used/Available at Home: [place], rollator  Tub or Shower Type: Tub/Shower combination    EXAMINATION/PRESENTATION/DECISION MAKING:   Critical Behavior:  Neurologic State: Alert  Orientation Level: Oriented X4  Cognition: Appropriate decision making     Hearing: Auditory  Auditory Impairment: Hard of hearing, bilateral, Hearing aid(s)  Hearing Aids/Status: With patient  Skin:  IV  Edema: WNL  Range Of Motion:  AROM: Within functional limits                       Strength:    Strength: Within functional limits                    Tone & Sensation:   Tone: Normal                              Coordination:  Coordination: Within functional limits  Vision:   Acuity: Impaired far vision; Impaired near vision  Corrective Lenses: Glasses  Functional Mobility:  Bed Mobility:     Supine to Sit: Modified independent  Sit to Supine: Modified independent  Scooting: Modified independent  Transfers:  Sit to Stand: Contact guard assistance  Stand to Sit: Contact guard assistance        Bed to Chair: Contact guard assistance (enteric; contact)              Balance:   Sitting: Intact; Without support  Standing: Impaired  Standing - Static: Good;Constant support  Standing - Dynamic : Fair;Constant support  Ambulation/Gait Training:  Distance (ft): 100 Feet (ft)  Assistive Device: Walker, rolling;Gait belt  Ambulation - Level of Assistance: Contact guard assistance                 Base of Support: Narrowed        Step Length: Left shortened;Right shortened                         Functional Measure:  Barthel Index:    Bathin  Bladder: 10  Bowels: 10  Groomin  Dressing: 10  Feeding: 10  Mobility: 5  Stairs: 5  Toilet Use: 5  Transfer (Bed to Chair and Back): 10  Total: 75/100       The Barthel ADL Index: Guidelines  1. The index should be used as a record of what a patient does, not as a record of what a patient could do. 2. The main aim is to establish degree of independence from any help, physical or verbal, however minor and for whatever reason. 3. The need for supervision renders the patient not independent. 4. A patient's performance should be established using the best available evidence. Asking the patient, friends/relatives and nurses are the usual sources, but direct observation and common sense are also important. However direct testing is not needed. 5. Usually the patient's performance over the preceding 24-48 hours is important, but occasionally longer periods will be relevant. 6. Middle categories imply that the patient supplies over 50 per cent of the effort. 7. Use of aids to be independent is allowed. Score Interpretation (from [address] [address])    Independent   60-79 Minimally independent   40-59 Partially dependent   20-39 Very dependent   <20 Totally dependent     -Zuleika Zuñiga., Barthel, D.W. (1965). Functional evaluation: the Barthel Index. [facility] ([address] (1997).  The Barthel activities of daily living index: self-reporting versus actual performance in the old (> or = 75 years). Journal [address] 45(6), 14 [facility], JOSE MF, Helena Crain., Dario Cerda. (1999). Measuring the change in disability after inpatient rehabilitation; comparison of the responsiveness of the Barthel Index and Functional [place] Measure. Journal of Neurology, Neurosurgery, and Psychiatry, 66(4), 866-101. RAFFY Ariza, VINAY Rangel, & Balaji Cool M.A. (2004) Assessment of post-stroke quality of life in cost-effectiveness studies: The usefulness of the Barthel Index and the EuroQoL-5D. Quality of Life Research, 15, 053-47           Physical Therapy Evaluation Charge Determination   History Examination Presentation Decision-Making   MEDIUM  Complexity : 1-2 comorbidities / personal factors will impact the outcome/ POC  MEDIUM Complexity : 3 Standardized tests and measures addressing body structure, function, activity limitation and / or participation in recreation  MEDIUM Complexity : Evolving with changing characteristics  Other outcome measures barthel  MEDIUM      Based on the above components, the patient evaluation is determined to be of the following complexity level: MEDIUM    Pain Rating:  none    Activity Tolerance:   Good    After treatment patient left in no apparent distress:   Supine in bed, Call bell within reach, Bed / chair alarm activated, and Side rails x 3    COMMUNICATION/EDUCATION:   The patients plan of care was discussed with: Registered nurse and Case management. Fall prevention education was provided and the patient/caregiver indicated understanding., Patient/family have participated as able in goal setting and plan of care. , and Patient/family agree to work toward stated goals and plan of care.     Thank you for this referral.  Zulma Bowens, PT   Time Calculation: 23 mins

## 2022-10-24 NOTE — PROGRESS NOTES
Problem: Pressure Injury - Risk of  Goal: *Prevention of pressure injury  Description: Document Maco Scale and appropriate interventions in the flowsheet. Outcome: Progressing Towards Goal  Note: Pressure Injury Interventions:  Sensory Interventions: Maintain/enhance activity level, Keep linens dry and wrinkle-free, Minimize linen layers    Moisture Interventions: Absorbent underpads, Apply protective barrier, creams and emollients    Activity Interventions: Increase time out of bed, Pressure redistribution bed/mattress(bed type), PT/OT evaluation    Mobility Interventions: HOB 30 degrees or less    Nutrition Interventions: Document food/fluid/supplement intake    Friction and Shear Interventions: HOB 30 degrees or less, Lift sheet, Lift team/patient mobility team, Minimize layers                Problem: Falls - Risk of  Goal: *Absence of Falls  Description: Document Jina Fall Risk and appropriate interventions in the flowsheet.   Outcome: Progressing Towards Goal  Note: Fall Risk Interventions:  Mobility Interventions: Bed/chair exit alarm, Patient to call before getting OOB, PT Consult for mobility concerns, PT Consult for assist device competence, Utilize walker, cane, or other assistive device, Communicate number of staff needed for ambulation/transfer    Mentation Interventions: Bed/chair exit alarm, Door open when patient unattended, More frequent rounding, Increase mobility, Room close to nurse's station, Reorient patient    Medication Interventions: Bed/chair exit alarm, Patient to call before getting OOB, Teach patient to arise slowly    Elimination Interventions: Bed/chair exit alarm, Call light in reach, Patient to call for help with toileting needs    History of Falls Interventions: Bed/chair exit alarm, Room close to nurse's station

## 2022-10-24 NOTE — PROGRESS NOTES
Progress Note  Date:10/24/2022       Room:[facility]  Patient Name:Ceci Villatoro     YOB: 1930     Age:89 y.o. Subjective    Subjective:  Symptoms:  Stable. Diet:  No nausea or vomiting. Pain:  She reports no pain. Review of Systems   Constitutional:  Negative for appetite change, fatigue and fever. Gastrointestinal:  Negative for abdominal pain, blood in stool, nausea and vomiting. Skin:  Negative for pallor. Objective         Vitals Last 24 Hours:  TEMPERATURE:  Temp  Av.5 °F (36.9 °C)  Min: 98.1 °F (36.7 °C)  Max: 98.8 °F (37.1 °C)  RESPIRATIONS RANGE: Resp  Av.3  Min: 15  Max: 19  PULSE OXIMETRY RANGE: SpO2  Av.5 %  Min: 94 %  Max: 98 %  PULSE RANGE: Pulse  Av.5  Min: 65  Max: 86  BLOOD PRESSURE RANGE: Systolic (31VKM), SOI:602 , Min:108 , PBE:847   ; Diastolic (75VBP), SIC:32, Min:53, Max:65    I/O (24Hr): Intake/Output Summary (Last 24 hours) at 10/24/2022 1601  Last data filed at 10/24/2022 1300  Gross per 24 hour   Intake 1977.5 ml   Output 1150 ml   Net 827.5 ml     Objective:  General Appearance:  Comfortable and not in pain. Vital signs: (most recent): Blood pressure 125/61, pulse 77, temperature 98.8 °F (37.1 °C), resp. rate 15, weight 49.4 kg (108 lb 14.5 oz), SpO2 96 %. No fever. Output: Producing stool. HEENT: Normal HEENT exam.    Lungs:  Normal effort and normal respiratory rate. Breath sounds clear to auscultation. Heart: Normal rate. Regular rhythm. S1 normal and S2 normal.  No murmur. Abdomen: Abdomen is soft and non-distended. Bowel sounds are normal.   There is no abdominal tenderness. Extremities: Normal range of motion. There is no dependent edema. Pulses: Distal pulses are intact. Neurological: Patient is alert and oriented to person, place and time. Pupils:  Pupils are equal, round, and reactive to light. Skin:  Warm and dry.     Labs/Imaging/Diagnostics Labs:  CBC:  Recent Labs     10/24/22  0629 10/22/22  0731   WBC 4.9 16.9*   RBC 3.62* 4.17   HGB 10.1* 11.8   HCT 31.7* 36.3   MCV 87.6 87.1   RDW 18.6* 19.0*    386     CHEMISTRIES:  Recent Labs     10/24/22  0629 10/22/22  0731    133*   K 3.7 4.1    103   CO2 26 23   BUN 7 16   CA 8.5 9.4   MG 2.0  --    PT/INR:No results for input(s): INR, INREXT in the last 72 hours. No lab exists for component: PROTIME  APTT:No results for input(s): APTT in the last 72 hours. LIVER PROFILE:  Recent Labs     10/24/22  0629 10/22/22  0731   AST 12* 17   ALT 12 15     Lab Results   Component Value Date/Time    ALT (SGPT) 12 10/24/2022 06:29 AM    AST (SGOT) 12 (L) 10/24/2022 06:29 AM    Alk. phosphatase 62 10/24/2022 06:29 AM    Bilirubin, direct 0.1 10/24/2022 06:29 AM    Bilirubin, total 0.3 10/24/2022 06:29 AM       Imaging Last 24 Hours:  No results found. Assessment//Plan   Principal Problem:    Colitis (10/22/2022)    Active Problems:    DM type 2 (diabetes mellitus, type 2) ([facility] (4/9/2016)      HTN (hypertension), benign (4/9/2016)      RA (rheumatoid arthritis) ([facility] (4/9/2016)      Assessment:   (· Abdominal pain - improved  · Chronic diverticular disease - being treated with antibiotic therapy. · Hiccups periodically - observe    10/24/2022: She tolerated her lunch without abdominal pain, nausea, or vomiting. No diarrhea today. Hopeful to be discharged later. ). Plan:    (Okay for discharge from GI perspective if able to tolerate diet and medications. She has an appointment coming up with Dr. Clarissa Hoskins which I encouraged her to keep.).      Electronically signed by Ge Luong NP on 10/24/2022 at 4:01 PM  I have interviewed and examined patient with addendum to note above and formulation care plan to reflect my evaluation    Gabrielle Dominguez M.D.

## 2022-10-24 NOTE — WOUND CARE
Wound Consult:  New Patient Visit  Consulted for: rash under breasts POA, chronic per patient and her son. Patient resting on Dee Dee bed; [facility] system in place. Maco score: 15. Using brief but reports continence at baseline. Patient turns side to side in bed without assist.  Patient is alert and oriented x 4. She reports using antifungal creams/powders for months. She does not wear a bra and has pendulous breasts such that skin is on skin and intertrigo has developed. Assessment:  Bilateral breasts folds - right greater than left - pink confluent rash with some maceration of intact skin, itches at baseline, no odor today but patient says at times if smells bad; appears as intertrigo with secondary candidiasis. Sacral area with blanching redness of intact skin. Heels pink, blanching. Treatment:  Used Dri Go fabric under breasts and talked with patient about how to use at home; put some in her bag for home. Wound Recommendations:  Dri Go fabric sheets under breasts - change as needed. Antifungal powder BID with sheets. Skin and PI Prevention Recommendations:  Turn and reposition ~ every 2 hours - assist.  Off load heels: use pillows as needed. Use waffle cushion while in chair. Manage moisture with frequent incontinence checks; intentional toileting; add barrier ointment if needed; being checked for C diff, no stool present at time of visit. Continue to monitor risk assessment with Maco score; Dual skin assessment and changes in condition. Promote nutrition; consult if needed. Plan:  Hand off to Dr. Fouzia Harris. Orders proposed and added. Please re-consult should any concerns arise prior to next visit. Gonzalo Millan RN,[facility]      Wound and Ostomy Department.   [phone] wound nurse or [employer]

## 2022-10-25 VITALS
WEIGHT: 108.91 LBS | BODY MASS INDEX: 22 KG/M2 | HEART RATE: 83 BPM | RESPIRATION RATE: 20 BRPM | SYSTOLIC BLOOD PRESSURE: 146 MMHG | DIASTOLIC BLOOD PRESSURE: 66 MMHG | OXYGEN SATURATION: 98 % | TEMPERATURE: 98.3 F

## 2022-10-25 PROCEDURE — 97116 GAIT TRAINING THERAPY: CPT

## 2022-10-25 PROCEDURE — 74011000258 HC RX REV CODE- 258: Performed by: INTERNAL MEDICINE

## 2022-10-25 PROCEDURE — 74011250637 HC RX REV CODE- 250/637: Performed by: INTERNAL MEDICINE

## 2022-10-25 PROCEDURE — 74011000250 HC RX REV CODE- 250: Performed by: INTERNAL MEDICINE

## 2022-10-25 PROCEDURE — 74011250636 HC RX REV CODE- 250/636: Performed by: INTERNAL MEDICINE

## 2022-10-25 RX ORDER — CIPROFLOXACIN 500 MG/1
500 TABLET ORAL 2 TIMES DAILY
Qty: 20 TABLET | Refills: 0 | Status: SHIPPED | OUTPATIENT
Start: 2022-10-25 | End: 2022-11-04

## 2022-10-25 RX ORDER — METRONIDAZOLE 500 MG/1
500 TABLET ORAL 3 TIMES DAILY
Qty: 30 TABLET | Refills: 0 | Status: SHIPPED | OUTPATIENT
Start: 2022-10-25 | End: 2022-11-04

## 2022-10-25 RX ADMIN — MICONAZOLE NITRATE 2 % TOPICAL POWDER: at 09:30

## 2022-10-25 RX ADMIN — SODIUM CHLORIDE, PRESERVATIVE FREE 10 ML: 5 INJECTION INTRAVENOUS at 13:00

## 2022-10-25 RX ADMIN — Medication 1 CAPSULE: at 09:29

## 2022-10-25 RX ADMIN — SODIUM CHLORIDE, PRESERVATIVE FREE 5 ML: 5 INJECTION INTRAVENOUS at 06:11

## 2022-10-25 RX ADMIN — TAMSULOSIN HYDROCHLORIDE 0.4 MG: 0.4 CAPSULE ORAL at 09:29

## 2022-10-25 RX ADMIN — VANCOMYCIN HYDROCHLORIDE 125 MG: 250 POWDER, FOR SOLUTION ORAL at 11:32

## 2022-10-25 RX ADMIN — VANCOMYCIN HYDROCHLORIDE 125 MG: 250 POWDER, FOR SOLUTION ORAL at 00:00

## 2022-10-25 RX ADMIN — LEVOTHYROXINE SODIUM 75 MCG: 0.07 TABLET ORAL at 06:32

## 2022-10-25 RX ADMIN — AMLODIPINE BESYLATE 10 MG: 5 TABLET ORAL at 09:29

## 2022-10-25 RX ADMIN — ASPIRIN 81 MG: 81 TABLET, COATED ORAL at 09:29

## 2022-10-25 RX ADMIN — FOLIC ACID 1 MG: 1 TABLET ORAL at 09:29

## 2022-10-25 RX ADMIN — VANCOMYCIN HYDROCHLORIDE 125 MG: 250 POWDER, FOR SOLUTION ORAL at 06:32

## 2022-10-25 RX ADMIN — SODIUM CHLORIDE 75 ML/HR: 9 INJECTION, SOLUTION INTRAVENOUS at 06:40

## 2022-10-25 RX ADMIN — MEROPENEM 1 G: 1 INJECTION, POWDER, FOR SOLUTION INTRAVENOUS at 11:32

## 2022-10-25 NOTE — DISCHARGE SUMMARY
[facility] [address]  [address] [facility], [place], [address] [place] [address]  [phone] [address] Adult  Hospitalist Group     Discharge Summary       PATIENT ID: Rakan Santacruz  MRN: [id number]   YOB: 1930    DATE OF ADMISSION: 10/22/2022  7:20 AM    DATE OF DISCHARGE: 10/25/22   PRIMARY CARE PROVIDER: Paddy Wilson MD     DISCHARGING PROVIDER: Izabel Aguirre MD      CONSULTATIONS: IP CONSULT TO GASTROENTEROLOGY    PROCEDURES/SURGERIES: * No surgery found *    [address] COURSE:     79 yo hx of HTN, DM, RA, recent diverticulitis, presented w/ recurrent abd pain, N/V/D, colitis     Recurrent diverticulitis/Colitis: persistent, failed outpt tx    Improved, received. Meropenem, oral vanco, and probiotics  Enteric panel negative. C.diff not sent due to formed stool. -GI evaluated, follow up scheduled  -cont cipro/flagyl outpatient    HTN  BP stable     DM type 2: A1C 5.5%   diet control     Hx of RA  stable     Hypothyroid  Continue synthroid      PENDING TEST RESULTS:   At the time of discharge the following test results are still pending: none    FOLLOW UP APPOINTMENTS:    Follow-up Information       Follow up With Specialties Details Why Contact Info    Jose 10 Follow up [address] [address] [address] [facility]  Follow up In Home Urgent Care, As needed [phone]    Paddy Wilson MD Internal Medicine Physician Follow up in 3 day(s) Hospital discharge follow up [address]  [facility] 99 [phone]                 DIET: regular    ACTIVITY: as tolerated       DISCHARGE MEDICATIONS:  Current Discharge Medication List        START taking these medications    Details   ciprofloxacin HCl (Cipro) 500 mg tablet Take 1 Tablet by mouth two (2) times a day for 10 days.   Qty: 20 Tablet, Refills: 0  Start date: 10/25/2022, End date: 11/4/2022           CONTINUE these medications which have CHANGED    Details   metroNIDAZOLE (FLAGYL) 500 mg tablet Take 1 Tablet by mouth three (3) times daily for 10 days. Qty: 30 Tablet, Refills: 0  Start date: 10/25/2022, End date: 11/4/2022           CONTINUE these medications which have NOT CHANGED    Details   lisinopriL (PRINIVIL, ZESTRIL) 10 mg tablet Take 10 mg by mouth daily. tamsulosin (FLOMAX) 0.4 mg capsule Take 0.4 mg by mouth daily. senna-docusate (Senokot-S) 8.6-50 mg per tablet Take 1 Tablet by mouth daily. simethicone (MYLICON) 80 mg chewable tablet Take 80 mg by mouth every six (6) hours as needed for Flatulence. levothyroxine (SYNTHROID) 75 mcg tablet Take 75 mcg by mouth Daily (before breakfast). folic acid (FOLVITE) 1 mg tablet Take 1 mg by mouth daily. docusate sodium (Colace) 100 mg capsule Take 100 mg by mouth two (2) times daily as needed for Constipation. polyethylene glycol (Miralax) 17 gram packet Take 1 Packet by mouth daily. Qty: 30 Packet, Refills: 1      L.acidoph & parac-S.therm-Bifido (BRENNEN Q2/RISAQUAD-2) 16 billion cell cap cap Take 1 Cap by mouth daily. Qty: 30 Cap, Refills: 0      amLODIPine (NORVASC) 10 mg tablet Take 10 mg by mouth daily. acetaminophen (Tylenol Extra Strength) 500 mg tablet Take 500 mg by mouth two (2) times daily as needed for Pain.      multivit-min/iron/folic/lutein (CENTRUM SILVER WOMEN PO) Take 1 Tab by mouth every other day. cholecalciferol (VITAMIN D3) 25 mcg (1,000 unit) cap Take 2,000 Units by mouth daily. aspirin delayed-release 81 mg tablet Take 81 mg by mouth daily. STOP taking these medications       levoFLOXacin (Levaquin) 500 mg tablet Comments:   Reason for Stopping:                 NOTIFY YOUR PHYSICIAN FOR ANY OF THE FOLLOWING:   Fever over 101 degrees for 24 hours.    Chest pain, shortness of breath, fever, chills, nausea, vomiting, diarrhea, change in mentation, falling, weakness, bleeding. Severe pain or pain not relieved by medications. Or, any other signs or symptoms that you may have questions about. DISPOSITION:   x Home With:   OT  PT  HH  RN       Long term SNF/Inpatient Rehab    Independent/assisted living    Hospice    Other:         PHYSICAL EXAMINATION AT DISCHARGE:  General:          Alert, cooperative, no distress, appears stated age. HEENT:           Atraumatic, anicteric sclerae, pink conjunctivae                          No oral ulcers, mucosa moist, throat clear, dentition fair  Neck:               Supple, symmetrical  Lungs:             Clear to auscultation bilaterally. No Wheezing or Rhonchi. No rales. Heart:              Regular  rhythm,  No  murmur   No edema  Abdomen:        Soft, non-tender. Not distended. Bowel sounds normal  Extremities:     No cyanosis. No clubbing,                            Skin turgor normal, Capillary refill normal  Skin:                Not pale. Not Jaundiced  No rashes   Psych:             Not anxious or agitated.   Neurologic:      Alert, moves all extremities, answers questions appropriately and responds to commands       [address]:  Problem List as of 10/25/2022 Date Reviewed: 10/22/2022            Codes Class Noted - Resolved    * (Principal) Colitis ICD-10-CM: K52.9  ICD-9-CM: 558.9  10/22/2022 - Present        Diverticulitis ICD-10-CM: K57.92  ICD-9-CM: 562.11  9/19/2022 - Present        Toxic encephalopathy ICD-10-CM: G92.9  ICD-9-CM: 349.82  3/11/2022 - Present        Closed fracture of two ribs of left side ICD-10-CM: S22.42XA  ICD-9-CM: 807.02  3/11/2022 - Present        Urinary retention ICD-10-CM: R33.9  ICD-9-CM: 788.20  3/11/2022 - Present        Septic arthritis of elbow, left ([facility] ICD-10-CM: M00.9  ICD-9-CM: 711.02  3/11/2022 - Present        DM type 2 (diabetes mellitus, type 2) ([facility] ICD-10-CM: E11.9  ICD-9-CM: 250.00  3/11/2022 - Present        Acute osteomyelitis of left radius ([facility] ICD-10-CM: W64.532  ICD-9-CM: 730.03  3/11/2022 - Present        Rheumatoid arthritis ([facility] ICD-10-CM: M06.9  ICD-9-CM: 714.0  3/11/2022 - Present        HTN (hypertension), benign ICD-10-CM: I10  ICD-9-CM: 401.1  3/11/2022 - Present        Hypothyroidism ICD-10-CM: E03.9  ICD-9-CM: 244.9  3/11/2022 - Present        Septic joint ([facility] ICD-10-CM: M00.9  ICD-9-CM: 711.00  2/27/2022 - Present        Diverticulitis of sigmoid colon ICD-10-CM: K57.32  ICD-9-CM: 562.11  3/28/2021 - Present        Severe sepsis ([facility] ICD-10-CM: A41.9, R65.20  ICD-9-CM: 038.9, 995.92  3/28/2021 - Present        Intractable nausea and vomiting ICD-10-CM: R11.2  ICD-9-CM: 536.2  3/28/2021 - Present        Immunocompromised state due to drug therapy [facility]) ICD-10-CM: P88.033, Z79.899  ICD-9-CM: V58.69  3/28/2021 - Present        Septic arthritis of elbow, left ([facility] ICD-10-CM: M00.9  ICD-9-CM: 711.02  3/28/2021 - Present        Acute diverticulitis ICD-10-CM: K57.92  ICD-9-CM: 562.11  12/2/2020 - Present        SBO (small bowel obstruction) ([facility] ICD-10-CM: G31.137  ICD-9-CM: 560.9  11/30/2020 - Present        Chronic kidney disease (CKD) ICD-10-CM: N18.9  ICD-9-CM: 585.9  9/18/2017 - Present        Osteoporosis, post-menopausal ICD-10-CM: M81.0  ICD-9-CM: 733.01  Unknown - Present        Chest pain ICD-10-CM: R07.9  ICD-9-CM: 786.50  4/9/2016 - Present        DM type 2 (diabetes mellitus, type 2) ([facility]) (Chronic) ICD-10-CM: E11.9  ICD-9-CM: 250.00  4/9/2016 - Present        HTN (hypertension), benign ICD-10-CM: I10  ICD-9-CM: 401.1  4/9/2016 - Present        Hypothyroid (Chronic) ICD-10-CM: E03.9  ICD-9-CM: 244.9  4/9/2016 - Present        RA (rheumatoid arthritis) ([facility]) (Chronic) ICD-10-CM: M06.9  ICD-9-CM: 714.0  4/9/2016 - Present        Abnormal chest x-ray ICD-10-CM: R93.89  ICD-9-CM: 793.2  4/9/2016 - Present        RESOLVED: Hyponatremia ICD-10-CM: E87.1  ICD-9-CM: 276.1  3/28/2021 - 3/28/2021           Greater than 30 minutes were spent with the patient on counseling and coordination of care    Signed:   Loida Omalley MD  10/25/2022  1:36 PM

## 2022-10-25 NOTE — PROGRESS NOTES
Spiritual Care Assessment/Progress Note  [address]      NAME: Caitie Olvera      MRN: [id number]  AGE: 80 y.o.  SEX: female  Holiness Affiliation: Patrick Murphy   Language: English     10/25/2022     Total Time (in minutes): (P) 20     Spiritual Assessment begun in [facility] 5M1 MED SURG 1 through conversation with:         [x]Patient        [] Family    [] Friend(s)        Reason for Consult: (P) Initial/Spiritual assessment, patient floor     Spiritual beliefs: (Please include comment if needed)     [] Identifies with a mellissa tradition:         [] Supported by a mellissa community:            [] Claims no spiritual orientation:           [] Seeking spiritual identity:                [x] Adheres to an individual form of spirituality:           [] Not able to assess:                           Identified resources for coping:      [] Prayer                               [] Music                  [] Guided Imagery     [x] Family/friends                 [] Pet visits     [] Devotional reading                         [] Unknown     [x] Other: Activities at her residence                                              Interventions offered during this visit: (See comments for more details)    Patient Interventions: (P) Catharsis/review of pertinent events in supportive environment, Affirmation of emotions/emotional suffering, Initial/Spiritual assessment, patient floor     Family/Friend(s): (P) Affirmation of emotions/emotional suffering     Plan of Care:     [] Support spiritual and/or cultural needs    [] Support AMD and/or advance care planning process      [] Support grieving process   [] Coordinate Rites and/or Rituals    [] Coordination with community clergy   [] No spiritual needs identified at this time   [] Detailed Plan of Care below (See Comments)  [] Make referral to Music Therapy  [] Make referral to Pet Therapy     [] Make referral to Addiction services  [] Make referral to [facility]  [] Make referral to Spiritual Care Partner  [] No future visits requested        [x] Contact Spiritual Care for further referrals     Comments:  initiated initial visit on 5. Reviewed pt's chart. Introduced self and chaplaincy. Patient and pt's son were present during the visit. Patient shared she is ready for discharge. Pt shared she eric by participating in activities at her longterm home and visiting with other residents. Assessed coping. Listened attentively. Advised of  availability. Please contact Spiritual Care for any further referrals.   [address], [address], [address], [facility]  Staff   Paging service: [phone] (KAMLA)

## 2022-10-25 NOTE — PROGRESS NOTES
Patient received discharge instructions and prescriptions sent to pharmacy. Reviewed stroke and heart attack information. Answered all questions. Left time for clarification. No needs at this time. Will monitor closely. Will wheel patient downstairs in wheelchair soon. IV removed with no issues noted.

## 2022-10-25 NOTE — PROGRESS NOTES
Bedside and Verbal shift change report given to [facility] 58 (oncoming nurse) by Jazzmine Gilbert RN (offgoing nurse). Report included the following information SBAR, Kardex, MAR, and Recent Results.

## 2022-10-25 NOTE — PROGRESS NOTES
[facility]  [address], [facility] [address]  [phone]         Hospitalist Progress Note        NAME:  Corrine Leonard   :  1930   MRN:  [id number]    Date/Time:  10/24/2022     Patient PCP:  Fernandez Arreola MD    Emergency Contact:    Extended Emergency Contact Information  Primary Emergency Contact: Juan C Agrawal  Address: [address] [place] [address], [place] [address] [address] [place] [address] Phone: [phone]  Mobile Phone: [phone]  Relation: Son      Code: DDNR    Isolation Precautions: Enteric Contact, Enteric Contact        Subjective:     REASON FOR VISIT:  Recheck abd pain     HPI & INTERVAL HISTORY:     Ms. Nicolasa Adan is a 80 y.o. female with history that includes  HTN, DM, RA, recent diverticulitis, presented w/ recurrent abd pain, N/V/D, colitis. Returns with abd pain CT showing persistent colitis     10/24: Doing well and will DC home but waiting on C diff. Then it got too late no results and family can not .    10/23: Patient seen and examined. She reports that she is feeling better with less abdominal pain and diarrhea has also improved. No fevers no chills. Updated son George Garcia over phone.       ALLERGIES  Allergies   Allergen Reactions    Alendronate Diarrhea    Amoxicillin Rash     Tolerated cefepime 3/27/21    Amoxicillin Unknown (comments)    Hydrochlorothiazide Other (comments)     \"Caused pain everywhere\"    Penicillins Unknown (comments)         ROS:  Gen:   Negative  Eyes:  negative  ENT:    negative  Resp:  negative  CVS:   negative  GI:       negative         Objective:      Visit Vitals  /70 (BP 1 Location: Right upper arm, BP Patient Position: At rest;Semi fowlers)   Pulse 91   Temp 97.8 °F (36.6 °C)   Resp 15   Wt 49.4 kg (108 lb 14.5 oz)   SpO2 98%   BMI 22.00 kg/m²       Physical Exam:  General: NAD  Head: Normocephalic, without obvious abnormality, atraumatic  Eyes: anicteric sclerae and conjuntiva clear  ENT: lips, mucosa, and tongue normal  Neck: normal, supple, and no tenderness  Lungs: clear to auscultation  Heart: S1, S2 normal, regular rate, and regular rhythm  Abd: not distended, soft, left sided tenderness to palpation, BS present and normactive  Ext: no cyanosis and no edema  Skin: normal skin color, no rashes, and texture normal  Neuro:  alert, oriented, no defects noted in general exam.  Psych: not anxious, cooperative, appropriate affect      Medications:  Current Facility-Administered Medications   Medication Dose Route Frequency    miconazole (MICOTIN) 2 % powder   Topical BID    amLODIPine (NORVASC) tablet 10 mg  10 mg Oral DAILY    aspirin delayed-release tablet 81 mg  81 mg Oral DAILY    folic acid (FOLVITE) tablet 1 mg  1 mg Oral DAILY    levothyroxine (SYNTHROID) tablet 75 mcg  75 mcg Oral ACB    tamsulosin (FLOMAX) capsule 0.4 mg  0.4 mg Oral DAILY    sodium chloride (NS) flush 5-40 mL  5-40 mL IntraVENous Q8H    sodium chloride (NS) flush 5-40 mL  5-40 mL IntraVENous PRN    0.9% sodium chloride infusion 25 mL  25 mL IntraVENous PRN    acetaminophen (TYLENOL) tablet 650 mg  650 mg Oral Q6H PRN    Or    acetaminophen (TYLENOL) suppository 650 mg  650 mg Rectal Q6H PRN    bisacodyL (DULCOLAX) suppository 10 mg  10 mg Rectal DAILY PRN    promethazine (PHENERGAN) tablet 12.5 mg  12.5 mg Oral Q6H PRN    Or    ondansetron (ZOFRAN) injection 4 mg  4 mg IntraVENous Q6H PRN    polyethylene glycol (MIRALAX) packet 17 g  17 g Oral BID PRN    enoxaparin (LOVENOX) injection 30 mg  30 mg SubCUTAneous Q24H    oxyCODONE IR (ROXICODONE) tablet 5 mg  5 mg Oral Q4H PRN    L.acidophilus-paracasei-S.thermophil-bifidobacter (RISAQUAD) 8 billion cell capsule  1 Capsule Oral DAILY    HYDROmorphone (DILAUDID) syringe 0.5 mg  0.5 mg IntraVENous Q4H PRN    prochlorperazine (COMPAZINE) injection 10 mg  10 mg IntraVENous Q6H PRN    0.9% sodium chloride infusion  75 mL/hr IntraVENous CONTINUOUS meropenem (MERREM) 1 g in 0.9% sodium chloride (MBP/ADV) 50 mL MBP  1 g IntraVENous Q12H    vancomycin (FIRVANQ) 50 mg/mL oral solution 125 mg  125 mg Oral Q6H        Labs:  Recent Labs     10/24/22  0629   WBC 4.9   HGB 10.1*   HCT 31.7*          Recent Labs     10/24/22  0629      K 3.7      CO2 26   GLU 91   BUN 7   CREA 0.61   CA 8.5   MG 2.0   ALB 2.5*   TBILI 0.3   ALT 12       Radiology:  No results found. The labs, imaging studies, and medications was reviewed by me on: October 24, 2022         Assessment/Plan:      81 yo hx of HTN, DM, RA, recent diverticulitis, presented w/ recurrent abd pain, N/V/D, colitis     Recurrent diverticulitis/Colitis: persistent, failed outpt tx    Appears resolved  Continue Meropenem, oral vanco, and probiotics  Enteric panel negative. C-diff pending sample sent but as no diarrhea now   IV dilaudid prn severe pain  IVFs and supportive care  GI note reviewed and appreciated.      HTN  BP stable     DM type 2: A1C 5.5%   diet control     Hx of RA  stable     Hypothyroid  Continue synthroid    DDNR  On File    Risk of deterioration: high      Discussed:  Pt's condition, Imaging findings, Lab findings, Assessment, and Care Plan discussed with: Patient, Spouse at bedside , RN, and Care Manager    Prophylaxis:  Lovenox SQ    Probable disposition:  Home with family or [facility]        Date of service:    10/24/2022                ___________________________________________________    Admitting Physician: Gely Sanchez MD

## 2022-10-25 NOTE — PROGRESS NOTES
Bedside shift change report given to [facility] [place] Ashwini (oncoming nurse) by Carri Hare (offgoing nurse). Report included the following information SBAR, Kardex, Intake/Output, MAR, and Recent Results.

## 2022-10-25 NOTE — PROGRESS NOTES
Problem: Pressure Injury - Risk of  Goal: *Prevention of pressure injury  Description: Document Maco Scale and appropriate interventions in the flowsheet. Outcome: Progressing Towards Goal  Note: Pressure Injury Interventions:  Sensory Interventions: Assess changes in LOC, Check visual cues for pain    Moisture Interventions: Absorbent underpads, Assess need for specialty bed, Minimize layers, Moisture barrier, Limit adult briefs    Activity Interventions: Assess need for specialty bed    Mobility Interventions: Assess need for specialty bed, HOB 30 degrees or less    Nutrition Interventions: Document food/fluid/supplement intake, Offer support with meals,snacks and hydration    Friction and Shear Interventions: HOB 30 degrees or less, Lift sheet, Minimize layers                Problem: Falls - Risk of  Goal: *Absence of Falls  Description: Document Jina Fall Risk and appropriate interventions in the flowsheet.   Outcome: Progressing Towards Goal  Note: Fall Risk Interventions:  Mobility Interventions: Bed/chair exit alarm, Patient to call before getting OOB, Utilize walker, cane, or other assistive device    Mentation Interventions: Adequate sleep, hydration, pain control, Bed/chair exit alarm, Update white board    Medication Interventions: Assess postural VS orthostatic hypotension, Patient to call before getting OOB, Teach patient to arise slowly    Elimination Interventions: Bed/chair exit alarm, Call light in reach, Patient to call for help with toileting needs, Stay With Me (per policy), Toilet paper/wipes in reach    History of Falls Interventions: Bed/chair exit alarm

## 2022-10-25 NOTE — PROGRESS NOTES
Problem: Mobility Impaired (Adult and Pediatric)  Goal: *Acute Goals and Plan of Care (Insert Text)  Description: FUNCTIONAL STATUS PRIOR TO ADMISSION: Patient was modified independent using a rollator for functional mobility. HOME SUPPORT PRIOR TO ADMISSION: The patient lived alone with son to provide assistance. He checks on mother daily and has video cam set up    Physical Therapy Goals  Initiated 10/24/2022  1. Patient will move from supine to sit and sit to supine  in bed with modified independence within 7 day(s). 2.  Patient will transfer from bed to chair and chair to bed with supervision/set-up using the least restrictive device within 7 day(s). 3.  Patient will perform sit to stand with supervision/set-up within 7 day(s). 4.  Patient will ambulate with supervision/set-up for 150 feet with the least restrictive device within 7 day(s). Outcome: Progressing Towards Goal   PHYSICAL THERAPY TREATMENT  Patient: Obdulia Heredia (89 y.o. female)  Date: 10/25/2022  Diagnosis: Colitis [K52.9] Colitis      Precautions: Contact (Enteric;Cdiff)  Chart, physical therapy assessment, plan of care and goals were reviewed. ASSESSMENT  Patient continues with skilled PT services and is progressing towards goals. Patient is motivated for progress and endorses stiffness from remaining in bed. Good progression of activity tolerance this date utilizing a RW for gait. Gait training completed x120' via laps in her room requiring SBA and occasional cues for RW management. Patient demonstrates good overall safety awareness during session. Patient lives alone and was open to [address] prior to admission. Recommend return home with resumption of HHPT. Current Level of Function Impacting Discharge (mobility/balance): CGA-SBA for transfers and gait           PLAN :  Patient continues to benefit from skilled intervention to address the above impairments. Continue treatment per established plan of care.   to address goals. Recommendation for discharge: (in order for the patient to meet his/her long term goals)  Physical therapy at least 2 days/week in the home     This discharge recommendation:  Has been made in collaboration with the attending provider and/or case management    IF patient discharges home will need the following DME: patient owns DME required for discharge       SUBJECTIVE:   Patient stated I'm glad to get up. My backside is getting numb in the bed.     OBJECTIVE DATA SUMMARY:   Critical Behavior:  Neurologic State: Alert  Orientation Level: Oriented X4  Cognition: Appropriate decision making, Appropriate for age attention/concentration, Appropriate safety awareness, Follows commands     Functional Mobility Training:  Bed Mobility:     Supine to Sit: Modified independent  Sit to Supine: Modified independent  Scooting: Modified independent        Transfers:  Sit to Stand: Stand-by assistance  Stand to Sit: Stand-by assistance        Bed to Chair: Contact guard assistance                    Balance:  Sitting: Intact; Without support  Standing: Impaired  Standing - Static: Good;Constant support  Standing - Dynamic : Fair;Constant support  Ambulation/Gait Training:  Distance (ft): 120 Feet (ft) (6x20' laps)  Assistive Device: Walker, rolling;Gait belt  Ambulation - Level of Assistance: Contact guard assistance                 Base of Support: Narrowed                             Stairs: Therapeutic Exercises:     Pain Rating:      Activity Tolerance:   Good    After treatment patient left in no apparent distress:   Sitting in chair, Call bell within reach, and Bed / chair alarm activated    COMMUNICATION/COLLABORATION:   The patients plan of care was discussed with: Registered nurse.      Deana Epperson PT, DPT   Time Calculation: 29 mins

## 2022-10-25 NOTE — PROGRESS NOTES
10/25/2022  Case Management Progress Note    12:01 PM  Patient is 80year old female admitted 10/22 with colitis  Patient's RUR is 18% yellow/moderate risk for readmission  Covid test: none this admission  Chart reviewed--patient discussed at IDR rounds  Per rounds this morning patient is likely ready for discharge today. Resumption orders were already sent to [facility] for them to  care again with patient. Her son is here bedside ready to transport patient home when ready. Attending messaged regarding discharge as patient's son was asking. No other needs noted at this time. OK for discharge from  standpoint.      Transition of Care Plan   Discharge today pending order  Home with family assistance and HH through [facility]  Son will transport   Follow up outpatient as needed  OK for discharge from     [facility]

## 2022-10-25 NOTE — PROGRESS NOTES
Problem: Risk for Spread of Infection  Goal: Prevent transmission of infectious organism to others  Description: Prevent the transmission of infectious organisms to other patients, staff members, and visitors. Outcome: Progressing Towards Goal     Problem: Pressure Injury - Risk of  Goal: *Prevention of pressure injury  Description: Document Maco Scale and appropriate interventions in the flowsheet. Outcome: Progressing Towards Goal  Note: Pressure Injury Interventions:  Sensory Interventions: Assess changes in LOC, Assess need for specialty bed, Avoid rigorous massage over bony prominences, Check visual cues for pain, Discuss PT/OT consult with provider, Float heels, Keep linens dry and wrinkle-free, Minimize linen layers, Maintain/enhance activity level, Monitor skin under medical devices, Pad between skin to skin, Pressure redistribution bed/mattress (bed type), Sit a 90-degree angle/use footstool if needed, Turn and reposition approx. every two hours (pillows and wedges if needed)    Moisture Interventions: Absorbent underpads, Apply protective barrier, creams and emollients, Assess need for specialty bed, Limit adult briefs, Maintain skin hydration (lotion/cream), Minimize layers, Moisture barrier, Offer toileting Q_hr    Activity Interventions: Assess need for specialty bed, PT/OT evaluation    Mobility Interventions: Assess need for specialty bed, Turn and reposition approx. every two hours(pillow and wedges)    Nutrition Interventions: Document food/fluid/supplement intake, Discuss nutritional consult with provider, Offer support with meals,snacks and hydration    Friction and Shear Interventions: HOB 30 degrees or less, Lift sheet, Minimize layers                Problem: Falls - Risk of  Goal: *Absence of Falls  Description: Document Jina Fall Risk and appropriate interventions in the flowsheet.   Outcome: Progressing Towards Goal  Note: Fall Risk Interventions:  Mobility Interventions: Bed/chair exit alarm, OT consult for ADLs, Patient to call before getting OOB, PT Consult for mobility concerns, PT Consult for assist device competence, Utilize walker, cane, or other assistive device, Utilize gait belt for transfers/ambulation    Mentation Interventions: Adequate sleep, hydration, pain control, Bed/chair exit alarm, Update white board, Toileting rounds    Medication Interventions: Bed/chair exit alarm, Evaluate medications/consider consulting pharmacy, Patient to call before getting OOB, Teach patient to arise slowly, Utilize gait belt for transfers/ambulation    Elimination Interventions: Bed/chair exit alarm, Call light in reach, Patient to call for help with toileting needs    History of Falls Interventions: Bed/chair exit alarm, Consult care management for discharge planning, Evaluate medications/consider consulting pharmacy, Investigate reason for fall, Utilize gait belt for transfer/ambulation, Assess for delayed presentation/identification of injury for 48 hrs (comment for end date), Vital signs minimum Q4HRs X 24 hrs (comment for end date)         Problem: Aspiration - Risk of  Goal: *Absence of aspiration  Outcome: Progressing Towards Goal

## 2022-10-28 LAB
BACTERIA SPEC CULT: NORMAL
SERVICE CMNT-IMP: NORMAL

## 2022-11-02 NOTE — PROGRESS NOTES
Patient Name:  Yi Lee  YOB: 1946  MRN:  [id number]  Admit Date:  11/1/2022  Patient Care Team:  Ankit Albert MD as PCP - General (Family Medicine)  Ria Valenzuela MD as Consulting Physician (Nephrology)  Mery Zuniga PA-C as Referring Physician (Physician Assistant)  Linus Machuca MD as Consulting Physician (Hematology and Oncology)      Subjective   History Present Illness     Chief Complaint   Patient presents with   • Dizziness   • Balance Issues   • Loss of Vision   • Fall   History of Present Illness   Mrs. Lee is a 75-year-old female with history of hypertension, type 2 diabetes, GERD, hyperlipidemia, hypothyroidism, ductal carcinoma of the left breast, iron deficiency anemia who presents to the emergency room with frequent falls and balance issues.  Patient states she initially fell for the first time on October 4 and since then has fell at least 8-12 times, each time she has difficulty getting up and has asked for assistance.  She states she has seen her primary care physician who is arranging appoint with neurology.  Patient states that today she woke up and felt very dizzy and and ran into the door facing of her bedroom several times, which she states she does frequently, then she went to work and when she got out of her car at work she was unable to use her legs, she describes them as shoveling and she cannot pick them up, at that time she states she does have a little blurred vision and tunnel vision, but that has all resolved.  She denies having any trouble with speech or weakness on one side or another.  She denies having any numbness or tingling.  She is currently not on any anticoagulation, she has never had history of stroke in the past.  In the emergency room patient's troponin was 0.014, glucose 136, sodium 133, creatinine 1.75, BUN 24, white blood cell count 4.77, hemoglobin 10.2, hematocrit 33.0.  CT of her head shows no convincing acute  .    [address] [facility]  Hospitalist Group                                                                                          Hospitalist Progress Note  Colt Everett MD  Answering service: [phone] from in house phone        Date of Service:  3/4/2022  NAME:  Marne Aschoff  :  1930  MRN:  [id number]      Admission Summary:   Marne Aschoff is a 80 y.o. female who Past medical history of CKD, hypertension, rheumatoid arthritis, hypothyroidism, left elbow septic arthritis of unknown organism who was recently treated by orthopedic surgery with prolonged IV antibiotics, patient presented to ED complaining of worsening left elbow pain for the last 4 days. Patient is stating that about 4 days ago she developed a generalized rash went to an urgent care center where she was given a Medrol Dosepak and topical steroids, she stating that her rash disappeared however her elbow pain has been increasing and she is having difficulty with her range of motion, she also developed fever.      Left elbow septic arthritis versus cellulitis given history of extensive left elbow abnormalities in the past, patient has history of septic arthritis in the left elbow in the past without any known organism, has been followed by orthopedic surgery has been treated with prolonged IV antibiotics. Will consult ID for joint aspiration and for possible I&D if septic arthritis is confirmed. Meanwhile we will start patient on empiric broad-spectrum IV antibiotics. Patient will also be started on her home medications of hypertension, RA and hypothyroidism. DVT prophylaxis, fall aspiration and seizure precautions will be implemented. Interval history / Subjective:   2022.   Saw patient this morning, reporting marked improvement of left elbow pain, and left upper extremity itching, Unfortunately patient's MRI is positive for osteomyelitis and septic arthritis, she is scheduled for I&D and intracranial abnormality.  There is mild small vessel disease in the frontal periventricular white matter.  ED provider did speak with stroke neurology on-call who recommended inpatient admission and follow-up MRI/MRA, patient cannot have CTA due to her GFR renal function.    Review of Systems   Constitutional: Negative for appetite change and fever.   HENT: Negative for nosebleeds and trouble swallowing.    Eyes: Positive for visual disturbance (blurred vision earlier today but has now resolved). Negative for photophobia and redness.   Respiratory: Negative for cough, chest tightness, shortness of breath and wheezing.    Cardiovascular: Negative for chest pain, palpitations and leg swelling.   Gastrointestinal: Negative for abdominal distention, abdominal pain, nausea and vomiting.   Endocrine: Negative.    Genitourinary: Negative.    Musculoskeletal: Negative for gait problem and joint swelling.        Frequent falls, ataxia, worse today   Skin: Negative.    Neurological: Positive for dizziness and weakness. Negative for seizures, speech difficulty, light-headedness and headaches.   Hematological: Negative.    Psychiatric/Behavioral: Negative for behavioral problems and confusion.        Personal History     Past Medical History:   Diagnosis Date   • Abdominal pain, LLQ    • Abnormal Pap smear of cervix    • Anemia    • Arthralgia of hip 11/22/2015   • Asthma    • Ataxic gait 11/22/2015    Description: Javi Mederos, Neuro.  Some vestibular dysfunction present 2013/2014   • Bipolar I disorder, single manic episode (HCC)    • Bradycardia    • Cardiac murmur    • Colon polyp    • Coronary artery disease    • Degeneration, intervertebral disc, thoracolumbar    • Depression    • Diabetes mellitus (HCC)    • Disease of thyroid gland     Hypothyroidism   • Diverticulosis    • Ductal carcinoma in situ (DCIS) of left breast    • Esophageal spasm    • Fatigue    • GERD (gastroesophageal reflux disease)    • H/O bone  possible surgery. She has been followed by orthopedic surgery, ID is also been consulted. Denies any fever, chills, nausea, vomiting. 3/1/2022. Saw patient this morning, awake alert and oriented, no apparent distress, reporting significant improvement of left elbow pain, patient is status post I&D of left elbow septic joint and resection of left radial head osteomyelitis. 3/2/2022. No acute events overnight. Patient is pleasant, awake alert, cooperative and interactive. Reporting significant improvement of left elbow pain. 3/3/2022. Saw patient this morning, awake alert and oriented, accompanied by her son, pleasant and cooperative with physical examination, still complaining of itching in her anterior aspect of left elbow, left elbow. Is improving, denies any fever, chills, nausea, vomiting. 3/4/2022. No acute events overnight. Reporting significant improvement of left elbow pain, denies any fever, chills, nausea, vomiting. Pending PICC line placement before being discharged home. ID recommended IV antibiotics. Assessment & Plan:     Left elbow osteomyelitis  Postoperative day 5 left elbow arthrotomy for drainage and draining of left radial head from osteomyelitis. Day #6 IV antibiotics. Day #6 IV ceftriaxone. Day #1 IV daptomycin  Day #1 IV Invanz  Received 6 days of IV vancomycin. Infectious disease on board, recommendations following antibiotics regimen on discharge. Daptomycin 350 mg IV daily through 4/14/2022  Invanz 1 g IV daily through 4/14/2022     oriented, sitting up home antibiotic. Continue empiric IV antibiotic. Follow-up wound culture. Continue wound care. Orthopedic surgery on board. Septic arthritis  As above    Rheumatoid arthritis  Not in acute flare. Continue current meds. CKD  CKD stage III, creatinine baseline, euvolemic. Normotensive. Electrolytes WNL. Hypertension  Euvolemic. Normotensive. Continue current meds.   Adjust density study 10/17/2007    Dr. Giles   • Hemorrhoids    • History of mammogram     02/2012, per GYN Reshma Aranda   • History of Papanicolaou smear of cervix     DR. GILES GYN   • History of transfusion    • Hyperlipidemia    • Hypertension    • Impaired cognition 11/22/2015    Description: Testing done 05/14   • Lupus (HCC)    • Optic nerve contusion    • Pain of lower extremity 11/22/2015   • Pseudoaneurysm following procedure (HCC)     DURING CARDIAC CATHETERIZATION   • Shoulder pain     LEFT   • Skin tear of forearm without complication, right, initial encounter    • Stage 2 chronic kidney disease    • Systolic murmur    • Vitamin B12 deficiency    • Vitamin D deficiency      Past Surgical History:   Procedure Laterality Date   • BREAST BIOPSY  2011   • BREAST LUMPECTOMY Left 2020    benign   • CARDIAC CATHETERIZATION     • CHOLECYSTECTOMY     • COLONOSCOPY  2007    done 02/12, repeat 5 yrs, Dr Grigsby; tics in sigmoid colon, IH   • COLONOSCOPY N/A 11/03/2016    polyp, IH   • COLONOSCOPY N/A 02/24/2020    Procedure: COLONOSCOPY TO CECUM WITH COLD POLYPECTOMY;  Surgeon: Eddie Grigsby MD;  Location: [facility] ENDOSCOPY;  Service: Gastroenterology;  Laterality: N/A;  pre: hx of polyps  post: polyp, hemorrhoids, diverticulosis   • COLONOSCOPY N/A 12/07/2021    Procedure: COLONOSCOPY TO CECUM  - COLD BIOPSY POLYPECTOMIES;  Surgeon: Eddie Grigsby MD;  Location: [facility] ENDOSCOPY;  Service: Gastroenterology;  Laterality: N/A;  IRON DEFICIENCY ANEMIA, HX POLYPS, CONSTIPATION   ---DIVERTICULOSIS, POLYPS   • COLONOSCOPY  2010    normal   • COLPOSCOPY W/ BIOPSY / CURETTAGE     • D & C AND LAPAROSCOPY  1974   • DILATATION AND CURETTAGE     • ENDOSCOPY N/A 02/24/2020    Procedure: ESOPHAGOGASTRODUODENOSCOPY with biopsies;  Surgeon: Eddie Grigsby MD;  Location: [facility] ENDOSCOPY;  Service: Gastroenterology;  Laterality: N/A;  pre: iron deficiency anemia  post: gastriits   • ENDOSCOPY N/A 12/07/2021    Procedure:  meds as needed. Hypothyroidism  Continue current meds. Code status: Full code  DVT prophylaxis: Subcutaneous heparin. Care Plan discussed with: Patient/Family  Anticipated Disposition: Home w/Family  Anticipated Discharge: Less than 24 hours     Hospital Problems  Date Reviewed: 12/2/2020          Codes Class Noted POA    Septic joint [facility]) ICD-10-CM: M00.9  ICD-9-CM: 711.00  2/27/2022 Unknown                Review of Systems:   A comprehensive review of systems was negative except for that written in the HPI. Vital Signs:    Last 24hrs VS reviewed since prior progress note. Most recent are:  Visit Vitals  /63 (BP 1 Location: Left upper arm, BP Patient Position: At rest)   Pulse 86   Temp 97.6 °F (36.4 °C)   Resp 17   Ht 4' 11\" (1.499 m)   Wt 50.7 kg (111 lb 12.4 oz)   SpO2 98%   BMI 22.58 kg/m²         Intake/Output Summary (Last 24 hours) at 3/4/2022 1343  Last data filed at 3/4/2022 6581  Gross per 24 hour   Intake 940 ml   Output 150 ml   Net 790 ml        Physical Examination:     I had a face to face encounter with this patient and independently examined them on 3/4/2022 as outlined below:          Constitutional:  No acute distress, cooperative, pleasant    ENT:  Oral mucosa moist, oropharynx benign. Resp:  CTA bilaterally. No wheezing/rhonchi/rales. No accessory muscle use   CV:  Regular rhythm, normal rate, no murmurs, gallops, rubs    GI:  Soft, non distended, non tender. normoactive bowel sounds, no hepatosplenomegaly     Musculoskeletal:  No edema, warm, 2+ pulses throughout    Neurologic:  Moves all extremities.   AAOx3, CN II-XII reviewed            Data Review:    Review and/or order of clinical lab test      Labs:     Recent Labs     03/04/22 0411 03/03/22 0052   WBC 5.6 6.5   HGB 9.9* 9.8*   HCT 30.6* 29.8*    249     Recent Labs     03/04/22  0411 03/03/22 0052 03/02/22  1124    137  --    K 3.6 3.8  --     107  --    CO2 27 26  --    BUN 9 10  -- CREA 0.60 0.62 0.60   GLU 95 105*  --    CA 8.3* 7.9*  --    MG  --  2.0  --      Recent Labs     03/04/22  0411 03/03/22  0052   ALT 17 14   AP 72 64   TBILI 0.3 0.2   TP 5.8* 5.6*   ALB 2.4* 2.3*   GLOB 3.4 3.3     No results for input(s): INR, PTP, APTT, INREXT, INREXT in the last 72 hours. No results for input(s): FE, TIBC, PSAT, FERR in the last 72 hours. Lab Results   Component Value Date/Time    Folate 15.4 12/02/2020 04:28 AM      No results for input(s): PH, PCO2, PO2 in the last 72 hours. No results for input(s): CPK, CKNDX, TROIQ in the last 72 hours.     No lab exists for component: CPKMB  Lab Results   Component Value Date/Time    Cholesterol, total 191 09/20/2017 08:37 AM    HDL Cholesterol 33 (L) 09/20/2017 08:37 AM    LDL, calculated 128 (H) 09/20/2017 08:37 AM    Triglyceride 148 09/20/2017 08:37 AM     Lab Results   Component Value Date/Time    Glucose (POC) 85 02/28/2022 05:17 PM    Glucose (POC) 106 (H) 04/02/2021 11:34 AM    Glucose (POC) 114 (H) 04/02/2021 06:02 AM    Glucose (POC) 119 (H) 04/01/2021 09:51 PM    Glucose (POC) 114 (H) 04/01/2021 04:12 PM     Lab Results   Component Value Date/Time    Color YELLOW/STRAW 02/27/2022 05:12 AM    Appearance CLEAR 02/27/2022 05:12 AM    Specific gravity 1.009 02/27/2022 05:12 AM    pH (UA) 7.5 02/27/2022 05:12 AM    Protein TRACE (A) 02/27/2022 05:12 AM    Glucose Negative 02/27/2022 05:12 AM    Ketone Negative 02/27/2022 05:12 AM    Bilirubin Negative 02/27/2022 05:12 AM    Urobilinogen 0.2 02/27/2022 05:12 AM    Nitrites Negative 02/27/2022 05:12 AM    Leukocyte Esterase Negative 02/27/2022 05:12 AM    Epithelial cells FEW 02/27/2022 05:12 AM    Bacteria Negative 02/27/2022 05:12 AM    WBC 0-4 02/27/2022 05:12 AM    RBC 5-10 02/27/2022 05:12 AM         Medications Reviewed:     Current Facility-Administered Medications   Medication Dose Route Frequency    DAPTOmycin (CUBICIN) 300 mg in 0.9% sodium chloride 6 mL IV Syringe  300 mg IntraVENous ESOPHAGOGASTRODUODENOSCOPY, WITH BIOPSIES;  Surgeon: Eddie Grigsby MD;  Location: [facility] ENDOSCOPY;  Service: Gastroenterology;  Laterality: N/A;  GERD,WT LOSS, IRON DEFICIENCY ANEMIA ,  DYSPEPSIA  ---HIATAL HERNIA, GASTRITIS   • ESOPHAGOGASTRIC FUNDOPLASTY     • HYSTEROSCOPY     • JOINT REPLACEMENT     • ROTATOR CUFF REPAIR Right    • ROTATOR CUFF REPAIR Left    • TOTAL KNEE ARTHROPLASTY Left 2018    Procedure: LT TOTAL KNEE ARTHROPLASTY;  Surgeon: Selwyn Pinto MD;  Location: [facility] MAIN OR;  Service:    • TOTAL SHOULDER ARTHROPLASTY W/ DISTAL CLAVICLE EXCISION Left 2022    Procedure: TOTAL SHOULDER REVERSE ARTHROPLASTY;  Surgeon: Yony Gallardo MD;  Location: [facility] OR OSC;  Service: Orthopedics;  Laterality: Left;     Family History   Problem Relation Age of Onset   • Colon polyps Father    • Heart disease Father    • Prostate cancer Father    • Emphysema Father    • Lung disease Father    • Hypertension Brother    • Lung cancer Maternal Aunt    • Lung cancer Maternal Aunt    • Brain cancer Maternal Uncle    • Lung cancer Paternal Uncle    • Stroke Paternal Grandmother    • Cerebral aneurysm Paternal Grandmother    • Stroke Paternal Grandfather    • Cerebral aneurysm Paternal Grandfather    • Hepatitis Other    • Coronary artery disease Other    • Malig Hyperthermia Neg Hx      Social History     Tobacco Use   • Smoking status: Former     Packs/day: 1.00     Years: 7.00     Pack years: 7.00     Types: Cigarettes     Quit date:      Years since quittin.8   • Smokeless tobacco: Never   • Tobacco comments:     caffeine use: 2 CUPS COFFEE/ 3 DIET PEPSIS DAILY   Vaping Use   • Vaping Use: Never used   Substance Use Topics   • Alcohol use: Yes     Comment: OCC   • Drug use: No     No current facility-administered medications on file prior to encounter.     Current Outpatient Medications on File Prior to Encounter   Medication Sig Dispense Refill   • ALPRAZolam (Xanax) 0.5 MG tablet  QPM    triamcinolone acetonide (KENALOG) 0.1 % ointment   Topical BID    heparin (porcine) injection 5,000 Units  5,000 Units SubCUTAneous Q8H    lisinopriL (PRINIVIL, ZESTRIL) tablet 10 mg  10 mg Oral DAILY    sodium chloride (NS) flush 5-40 mL  5-40 mL IntraVENous Q8H    sodium chloride (NS) flush 5-40 mL  5-40 mL IntraVENous PRN    sodium chloride (NS) flush 5-10 mL  5-10 mL IntraVENous PRN    amLODIPine (NORVASC) tablet 10 mg  10 mg Oral DAILY    aspirin delayed-release tablet 81 mg  81 mg Oral DAILY    docusate sodium (COLACE) capsule 100 mg  100 mg Oral BID PRN    folic acid (FOLVITE) tablet 1 mg  1 mg Oral DAILY    levothyroxine (SYNTHROID) tablet 75 mcg  75 mcg Oral ACB    polyethylene glycol (MIRALAX) packet 17 g  17 g Oral DAILY    sodium chloride (NS) flush 5-40 mL  5-40 mL IntraVENous Q8H    sodium chloride (NS) flush 5-40 mL  5-40 mL IntraVENous PRN    acetaminophen (TYLENOL) tablet 650 mg  650 mg Oral Q6H PRN    Or    acetaminophen (TYLENOL) suppository 650 mg  650 mg Rectal Q6H PRN    polyethylene glycol (MIRALAX) packet 17 g  17 g Oral DAILY PRN    ondansetron (ZOFRAN ODT) tablet 4 mg  4 mg Oral Q8H PRN    Or    ondansetron (ZOFRAN) injection 4 mg  4 mg IntraVENous Q6H PRN    famotidine (PEPCID) tablet 20 mg  20 mg Oral DAILY    cefTRIAXone (ROCEPHIN) 2 g in 0.9% sodium chloride 20 mL IV syringe  2 g IntraVENous Q24H     ______________________________________________________________________  EXPECTED LENGTH OF STAY: 5d 7h  ACTUAL LENGTH OF STAY:          5                 Juan Nichols MD Take 1 tablet by mouth 3 (Three) Times a Day As Needed for Anxiety. 270 tablet 0   • amLODIPine (NORVASC) 5 MG tablet TAKE 1 AND 1/2 TABLETS BY MOUTH TWICE DAILY 270 tablet 1   • anastrozole (ARIMIDEX) 1 MG tablet Take 1 mg by mouth Daily.     • carvedilol (COREG) 12.5 MG tablet TAKE 1 TABLET BY MOUTH  TWICE DAILY 90 tablet 7   • chlorthalidone (HYGROTON) 25 MG tablet Take 1 tablet by mouth Daily. 90 tablet 2   • ferrous sulfate 325 (65 FE) MG tablet Take 1 tablet by mouth Daily With Breakfast & Dinner. 60 tablet 3   • glucose blood (FREESTYLE LITE) test strip Use to test everyday as directed 100 each 5   • hydrALAZINE (APRESOLINE) 100 MG tablet TAKE 1 TABLET BY MOUTH 3  TIMES DAILY 270 tablet 3   • lactulose (CHRONULAC) 10 GM/15ML solution TAKE 35 TO 45 ML&apos;S BY MOUTH DAILY FOR CONSTIPATION (Patient taking differently: Take  by mouth 3 (Three) Times a Day.) 1892 mL 11   • lactulose (Generlac) 10 GM/15ML solution solution (encephalopathy) TAKE 35 TO 45 ML BY MOUTH DAILY FOR CONSTIPATION 4050 mL 1   • lamoTRIgine (LaMICtal) 200 MG tablet TAKE 2 AND 1/2 TABLETS BY  MOUTH AT NIGHT 225 tablet 3   • Lancets (FREESTYLE) lancets As directed to check blood glucose 100 each 12   • levothyroxine (SYNTHROID, LEVOTHROID) 100 MCG tablet TAKE 1 TABLET BY MOUTH  DAILY 90 tablet 3   • metFORMIN (GLUCOPHAGE) 500 MG tablet TAKE 2 TABLETS BY MOUTH  DAILY WITH BREAKFAST 180 tablet 3   • omeprazole (priLOSEC) 40 MG capsule TAKE 1 CAPSULE BY MOUTH  DAILY 90 capsule 3   • PARoxetine (PAXIL) 40 MG tablet TAKE 1 TABLET BY MOUTH IN  THE MORNING FOR DEPRESSION 90 tablet 1   • QUEtiapine (SEROquel) 300 MG tablet TAKE 1 TABLET BY MOUTH AT  NIGHT (Patient taking differently: Take 1/2 a tablet at night) 90 tablet 3   • rosuvastatin (CRESTOR) 5 MG tablet TAKE 1 TABLET BY MOUTH AT  NIGHT FOR CHOLESTEROL 90 tablet 3   • [DISCONTINUED] metoprolol succinate XL (TOPROL-XL) 50 MG 24 hr tablet TAKE 1 TABLET BY MOUTH DAILY 90 tablet 0     Allergies    Allergen Reactions   • Penicillins Hives and Swelling   • Morphine Confusion   • Ace Inhibitors Cough   • Telmisartan Cough       Objective    Objective     Vital Signs  Temp:  [97.9 °F (36.6 °C)] 97.9 °F (36.6 °C)  Heart Rate:  [62-65] 62  Resp:  [18] 18  BP: (159-169)/(66-80) 169/80  SpO2:  [96 %-97 %] 96 %  on   ;   Device (Oxygen Therapy): room air  There is no height or weight on file to calculate BMI.    Physical Exam  Vitals and nursing note reviewed.   Constitutional:       General: She is not in acute distress.     Appearance: She is well-developed.   HENT:      Head: Normocephalic.   Neck:      Vascular: No JVD.   Cardiovascular:      Rate and Rhythm: Normal rate and regular rhythm.      Heart sounds: Normal heart sounds.      Comments: Normal sinus rhythm on the monitor with heart rate 80 during my exam, no chest pain or shortness of breath  Pulmonary:      Effort: Pulmonary effort is normal.      Breath sounds: Normal breath sounds.      Comments: On exam diminished but clear, sats 95% room air  Abdominal:      General: There is no distension.      Palpations: Abdomen is soft.      Tenderness: There is no abdominal tenderness.   Musculoskeletal:         General: Normal range of motion.      Cervical back: Normal range of motion.      Right lower leg: No edema.      Left lower leg: No edema.   Skin:     General: Skin is warm and dry.      Capillary Refill: Capillary refill takes less than 2 seconds.   Neurological:      General: No focal deficit present.      Mental Status: She is alert and oriented to person, place, and time.      Comments: NIH stroke scale currently 0, did not attempt to ambulate patient, but she has no focal weakness, no facial droop, no slurred speech.   Psychiatric:         Attention and Perception: Attention normal.         Mood and Affect: Mood normal.         Behavior: Behavior normal.         Cognition and Memory: Cognition normal.         Judgment: Judgment normal.          Results Review:  I reviewed the patient's new clinical results.  I reviewed the patient's new imaging results and agree with the interpretation.  I reviewed the patient's other test results and agree with the interpretation  I personally viewed and interpreted the patient's EKG/Telemetry data  Discussed with ED provider.    Lab Results (last 24 hours)     Procedure Component Value Units Date/Time    POC Glucose Once [[id number]]  (Abnormal) Collected: 11/01/22 1804    Specimen: Blood Updated: 11/01/22 1806     Glucose 141 mg/dL      Comment: Meter: [id number] : [id number] Dylon Alfredo KAMINSKI [facility]       CBC & Differential [[id number]]  (Abnormal) Collected: 11/01/22 1806    Specimen: Blood Updated: 11/01/22 1900    Narrative:      The following orders were created for panel order CBC & Differential.  Procedure                               Abnormality         Status                     ---------                               -----------         ------                     CBC Auto Differential[[id number]]        Abnormal            Final result                 Please view results for these tests on the individual orders.    Comprehensive Metabolic Panel [[id number]]  (Abnormal) Collected: 11/01/22 1806    Specimen: Blood Updated: 11/01/22 1922     Glucose 136 mg/dL      BUN 24 mg/dL      Creatinine 1.75 mg/dL      Sodium 133 mmol/L      Potassium 4.2 mmol/L      Chloride 100 mmol/L      CO2 21.7 mmol/L      Calcium 10.5 mg/dL      Total Protein 7.8 g/dL      Albumin 4.20 g/dL      ALT (SGPT) 12 U/L      AST (SGOT) 17 U/L      Alkaline Phosphatase 103 U/L      Total Bilirubin 0.3 mg/dL      Globulin 3.6 gm/dL      A/G Ratio 1.2 g/dL      BUN/Creatinine Ratio 13.7     Anion Gap 11.3 mmol/L      eGFR 30.1 mL/min/1.73      Comment: National Kidney Foundation and American Society of Nephrology (ASN) Task Force recommended calculation based on the Chronic Kidney Disease Epidemiology Collaboration (CKD-EPI) equation  refit without adjustment for race.       Narrative:      GFR Normal >60  Chronic Kidney Disease <60  Kidney Failure <15    The GFR formula is only valid for adults with stable renal function between ages 18 and 70.    Troponin [[id number]]  (Normal) Collected: 11/01/22 1806    Specimen: Blood Updated: 11/01/22 1922     Troponin T 0.014 ng/mL     Narrative:      Troponin T Reference Range:  <= 0.03 ng/mL-   Negative for AMI  >0.03 ng/mL-     Abnormal for myocardial necrosis.  Clinicians would have to utilize clinical acumen, EKG, Troponin and serial changes to determine if it is an Acute Myocardial Infarction or myocardial injury due to an underlying chronic condition.       Results may be falsely decreased if patient taking Biotin.      CBC Auto Differential [[id number]]  (Abnormal) Collected: 11/01/22 1806    Specimen: Blood Updated: 11/01/22 1900     WBC 4.77 10*3/mm3      RBC 3.76 10*6/mm3      Hemoglobin 10.2 g/dL      Hematocrit 33.0 %      MCV 87.8 fL      MCH 27.1 pg      MCHC 30.9 g/dL      RDW 11.9 %      RDW-SD 38.1 fl      MPV 9.1 fL      Platelets 300 10*3/mm3      Neutrophil % 76.4 %      Lymphocyte % 12.4 %      Monocyte % 9.6 %      Eosinophil % 0.2 %      Basophil % 1.0 %      Immature Grans % 0.4 %      Neutrophils, Absolute 3.64 10*3/mm3      Lymphocytes, Absolute 0.59 10*3/mm3      Monocytes, Absolute 0.46 10*3/mm3      Eosinophils, Absolute 0.01 10*3/mm3      Basophils, Absolute 0.05 10*3/mm3      Immature Grans, Absolute 0.02 10*3/mm3      nRBC 0.0 /100 WBC           Imaging Results (Last 24 Hours)     Procedure Component Value Units Date/Time    CT Head Without Contrast [[id number]] Collected: 11/01/22 2143     Updated: 11/01/22 2143    Narrative:      EMERGENCY NONCONTRAST HEAD CT 11/01/2022     CLINICAL HISTORY: Dizziness, difficulty walking.     TECHNIQUE: Spiral CT images were obtained from the base of the skull to  the vertex without intravenous contrast. Images were reformatted and  are  submitted in 3 mm thick axial, sagittal and coronal CT sections with  brain algorithm.     COMPARISON: This is correlated to a noncontrast head CT from [facility] on 08/24/2022.     FINDINGS: There is some mild low-density in the periventricular white  matter consistent with mild small vessel disease. There is some streak  artifact through the inferior right cerebellum limiting evaluation and  the remainder of the brain parenchyma is normal in attenuation. The  ventricles are normal in size. I see no focal mass effect and no midline  shift and no extra-axial fluid collections are identified and there is  no evidence of acute intracranial hemorrhage. Paranasal sinuses and  mastoid air cells and middle ear cavities are clear. There are calcified  plaques in the cavernous internal carotid arteries bilaterally.  Calvarium and skull base are on remarkable.       Impression:      1. No convincing acute intracranial abnormality is seen.  2. There is mild small vessel disease in the frontal periventricular  white matter, calcified plaques in the intracranial segment of the  distal right vertebral artery, cavernous segments of the internal  carotid arteries bilaterally. There is a tiny 7 x 4 mm lucent lesion in  the posterior right parietal bone likely a benign calvarial hemangioma.  The remainder of the head CT is normal. The etiology of the patient's  acute onset dizziness and difficulty walking is not clearly established  on this exam and if there remains any clinical suspicion of an acute  stroke I recommend an MRI of the brain for more complete assessment.      Radiation dose reduction techniques were utilized, including automated  exposure control and exposure modulation based on body size.                Results for orders placed during the hospital encounter of 10/04/22    Adult Transthoracic Echo Complete W/ Cont if Necessary Per Protocol    Interpretation Summary  · Calculated left  ventricular EF = 66% Estimated left ventricular EF was in agreement with the calculated left ventricular EF. Left ventricular systolic function is normal.  · Left ventricular wall thickness is consistent with mild concentric hypertrophy.  · Left ventricular diastolic function was normal.  · Normal right ventricular cavity size and systolic function noted.  · The left atrial cavity is moderately dilated  · The interatrial septum appears redundant.  · The aortic valve leaflets are moderately calcified  · Calculated right ventricular systolic pressure from tricuspid regurgitation is 19 mmHg.  · There is no evidence of pericardial effusion      ECG 12 Lead Stroke Evaluation   Preliminary Result   HEART RATE= 63  bpm   RR Interval= 952  ms   OR Interval= 275  ms   P Horizontal Axis= -56  deg   P Front Axis= 186  deg   QRSD Interval= 102  ms   QT Interval= 407  ms   QRS Axis= 11  deg   T Wave Axis= 53  deg   - ABNORMAL ECG -   Sinus or ectopic atrial rhythm   Prolonged OR interval   Baseline wander in lead(s) V3   Electronically Signed By:    Date and Time of Study: 2022-11-01 18:33:46           Assessment/Plan     Active Hospital Problems    Diagnosis  POA   • **Ataxia [R27.0]  Yes   • Fall [W19.XXXA]  Yes   • Ductal carcinoma in situ (DCIS) of left breast [D05.12]  Yes   • Iron deficiency anemia [D50.9]  Yes     Added automatically from request for surgery [id number]     • Cystic kidney disease [Q61.9]  Not Applicable     right     • Benign essential hypertension [I10]  Yes   • Type 2 diabetes mellitus with diabetic neuropathy, without long-term current use of insulin (HCC) [E11.40]  Yes     Description: foot exam 04/16  eye exam patient reported this was done 02/16  Microalbumin 04/16     • Gastroesophageal reflux disease [K21.9]  Yes   • Hyperlipidemia [E78.5]  Yes   • Hypothyroidism [E03.9]  Yes   • Chronic obstructive pulmonary disease (HCC) [J44.9]  Yes     Mrs. Lee is a 75-year-old female with history of  hypertension, type 2 diabetes, GERD, hyperlipidemia, hypothyroidism, ductal carcinoma of the left breast, iron deficiency anemia who presents to the emergency room with frequent falls and balance issues.     Ataxia/falls  -TIA/stroke order set initiated  -Neurology consulted  -MRI MRA of the head neck  -PT, OT to eval and treat  -Patient started on 325 mg aspirin in the emergency room, will continue that  -2D echo in a.m.    Type 2 diabetes  -Accu-Cheks before meals and at bedtime with correctional dose insulin  -Hold oral diabetic medications at this time  -Check hemoglobin A1c    Cystic kidney disease  -Creatinine slightly elevated from baseline, normal saline at 100 cc an hour overnight  -Recheck BMP in a.m.    Hypertension  -Allow for mild permissive hypertension, will reorder home medications with parameters    COPD  -O2 to keep sats above 88%  -Continue home medications when med rec is completed  · I discussed the patient's findings and my recommendations with patient.    VTE Prophylaxis - SCDs.  Code Status - Full code.       EZRA Tello  [facility]  11/01/22  22:46 EDT

## 2022-11-22 NOTE — WOUND CARE
Wound Consult:  New consult Visit. Chart reviewed. Consulted for red sacrum. Spoke with patients nurse,  Rosas Alai. Sabrina Costa RN at bedside for assessment. [facility]  Patient is resting on a johnathan bed with hercules MARCELINA, air added mattress. Heels off loaded with pillows. Patient is awake, alert, cooperative; requires 2 assists to move side to side in bed. Maco score 14  Assessment:  Right lower extremity 2.5x1x0.1cm- pink, moist, no erythema. Right buttock- 1x1xo. 1cm- chafed dry area, pink in center with dry flakin g skin surrounding.  states sits in chair at home  Left buttock- 1x1cm chafed area, no open areas  Sacrum- red/pink, slow to jean paul. Bilateral heels- no redness noted  Treatment:  Bed changed to johnathan with air added  Heels elevated on pillows  Sacral mepilex to sacrum for protection  Aloe vesta to chafed areas of buttocks  Wound Recommendations:  Buttock- aloe vesta daily  Sacrum- sacral foam to sacrum, change every 3 days  Or as needed. Specialty bed- johnathan with air  Skin Care / PI Prevention Recommendations:  1. Minimize friction/shear: minimize layers of linen/pads under patient. 2. Off load pressure/reposition:  turn and reposition approximately every 2 hours; float heels with pillows or use off loading heel boots; waffle cushion for sitting; position wedge. 3. Manage Moisture - keep skin folds dry; incontinence skin care with incontinence wipes; dakotah vesta barrier ointment; purewick in use to help contain urine. 4. Continue to monitor nutrition, pain, and skin risk scale, and skin assessment. Plan:  Spoke with Dr. Pool Myers regarding findings and proposed orders for treatment. We will continue to reassess and as needed. Please re-consult should concerns arise despite continued skin/PI prevention measures.   [address], Wound / [address] [facility] Healing Office [phone]
- - -

## 2023-06-06 NOTE — ED NOTES
Charge RN made aware pt has been here for over an hour without provider seeing pt. Health Maintenance Due   Topic Date Due   • COVID-19 Vaccine (3 - Booster for Pediatric Moderna series) 03/07/2023   • MMR Vaccine (1 of 2 - Standard series) Never done   • Varicella Vaccine (1 of 2 - 2-dose childhood series) Never done   • HIB Vaccine (3 of 3 - PRP-OMP Series) 05/25/2023   • Hepatitis A Vaccine (1 of 2 - 2-dose series) Never done   • Well Child Exam 12 Months  05/25/2023   • Pneumococcal Vaccine 0-64 (4 - PCV13 or PCV15) 05/25/2023       Patient is { DUE PICK LIST:[id number]}

## 2023-11-10 ENCOUNTER — APPOINTMENT (OUTPATIENT)
Facility: HOSPITAL | Age: 88
DRG: 565 | End: 2023-11-10
Payer: MEDICARE

## 2023-11-10 ENCOUNTER — HOSPITAL ENCOUNTER (EMERGENCY)
Facility: HOSPITAL | Age: 88
Discharge: HOME | DRG: 565 | End: 2023-11-13
Payer: MEDICARE

## 2023-11-10 ENCOUNTER — HOSPITAL ENCOUNTER (INPATIENT)
Facility: HOSPITAL | Age: 88
LOS: 5 days | Discharge: HOME | DRG: 565 | End: 2023-11-15
Attending: EMERGENCY MEDICINE | Admitting: HOSPITALIST
Payer: MEDICARE

## 2023-11-10 DIAGNOSIS — S22.089A CLOSED FRACTURE OF TWELFTH THORACIC VERTEBRA, UNSPECIFIED FRACTURE MORPHOLOGY, INITIAL ENCOUNTER (HCC): Primary | ICD-10-CM
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
## 2023-11-10 DIAGNOSIS — M25.522 LEFT ELBOW PAIN: ICD-10-CM

## 2023-11-10 PROBLEM — M00.9 SEPTIC ARTHRITIS (HCC): Status: ACTIVE | Noted: 2023-11-10

## 2023-11-10 PROBLEM — S22.000A COMPRESSION FRACTURE OF BODY OF THORACIC VERTEBRA (HCC): Status: ACTIVE | Noted: 2023-11-10

## 2023-11-10 LAB
ALBUMIN SERPL-MCNC: 3.1 G/DL (ref 3.5–5)
ALBUMIN/GLOB SERPL: 0.8 (ref 1.1–2.2)
ALP SERPL-CCNC: 86 U/L (ref 45–117)
ALT SERPL-CCNC: 17 U/L (ref 12–78)
ANION GAP SERPL CALC-SCNC: 9 MMOL/L (ref 5–15)
APPEARANCE UR: CLEAR
AST SERPL-CCNC: 20 U/L (ref 15–37)
BACTERIA URNS QL MICRO: NEGATIVE /HPF
BASOPHILS # BLD: 0 K/UL (ref 0–0.1)
BASOPHILS NFR BLD: 0 % (ref 0–1)
BILIRUB SERPL-MCNC: 0.8 MG/DL (ref 0.2–1)
BILIRUB UR QL: NEGATIVE
BUN SERPL-MCNC: 14 MG/DL (ref 6–20)
BUN/CREAT SERPL: 17 (ref 12–20)
CALCIUM SERPL-MCNC: 9 MG/DL (ref 8.5–10.1)
CHLORIDE SERPL-SCNC: 99 MMOL/L (ref 97–108)
CK SERPL-CCNC: 120 U/L (ref 26–192)
CO2 SERPL-SCNC: 25 MMOL/L (ref 21–32)
COLOR UR: ABNORMAL
CREAT SERPL-MCNC: 0.82 MG/DL (ref 0.55–1.02)
CRP SERPL-MCNC: 18 MG/DL (ref 0–0.6)
DIFFERENTIAL METHOD BLD: ABNORMAL
EOSINOPHIL # BLD: 0 K/UL (ref 0–0.4)
EOSINOPHIL NFR BLD: 0 % (ref 0–7)
EPITH CASTS URNS QL MICRO: ABNORMAL /LPF
ERYTHROCYTE [DISTWIDTH] IN BLOOD BY AUTOMATED COUNT: 14.5 % (ref 11.5–14.5)
ERYTHROCYTE [SEDIMENTATION RATE] IN BLOOD: 62 MM/HR (ref 0–30)
GLOBULIN SER CALC-MCNC: 4.1 G/DL (ref 2–4)
GLUCOSE BLD STRIP.AUTO-MCNC: 129 MG/DL (ref 65–117)
GLUCOSE SERPL-MCNC: 136 MG/DL (ref 65–100)
GLUCOSE UR STRIP.AUTO-MCNC: NEGATIVE MG/DL
HCT VFR BLD AUTO: 32.8 % (ref 35–47)
HGB BLD-MCNC: 10.8 G/DL (ref 11.5–16)
HGB UR QL STRIP: ABNORMAL
HYALINE CASTS URNS QL MICRO: ABNORMAL /LPF (ref 0–2)
IMM GRANULOCYTES # BLD AUTO: 0.2 K/UL (ref 0–0.04)
IMM GRANULOCYTES NFR BLD AUTO: 1 % (ref 0–0.5)
KETONES UR QL STRIP.AUTO: ABNORMAL MG/DL
LACTATE SERPL-SCNC: 1.7 MMOL/L (ref 0.4–2)
LEUKOCYTE ESTERASE UR QL STRIP.AUTO: ABNORMAL
LYMPHOCYTES # BLD: 0.4 K/UL (ref 0.8–3.5)
LYMPHOCYTES NFR BLD: 2 % (ref 12–49)
MAGNESIUM SERPL-MCNC: 2 MG/DL (ref 1.6–2.4)
MCH RBC QN AUTO: 29.3 PG (ref 26–34)
MCHC RBC AUTO-ENTMCNC: 32.9 G/DL (ref 30–36.5)
MCV RBC AUTO: 88.9 FL (ref 80–99)
MONOCYTES # BLD: 0.4 K/UL (ref 0–1)
MONOCYTES NFR BLD: 2 % (ref 5–13)
NEUTS SEG # BLD: 17.5 K/UL (ref 1.8–8)
NEUTS SEG NFR BLD: 95 % (ref 32–75)
NITRITE UR QL STRIP.AUTO: NEGATIVE
NRBC # BLD: 0 K/UL (ref 0–0.01)
NRBC BLD-RTO: 0 PER 100 WBC
PH UR STRIP: 8 (ref 5–8)
PLATELET # BLD AUTO: 341 K/UL (ref 150–400)
PMV BLD AUTO: 9.9 FL (ref 8.9–12.9)
POTASSIUM SERPL-SCNC: 3.2 MMOL/L (ref 3.5–5.1)
PROCALCITONIN SERPL-MCNC: 0.61 NG/ML
PROT SERPL-MCNC: 7.2 G/DL (ref 6.4–8.2)
PROT UR STRIP-MCNC: 100 MG/DL
RBC # BLD AUTO: 3.69 M/UL (ref 3.8–5.2)
RBC #/AREA URNS HPF: ABNORMAL /HPF (ref 0–5)
RBC MORPH BLD: ABNORMAL
SERVICE CMNT-IMP: ABNORMAL
SODIUM SERPL-SCNC: 133 MMOL/L (ref 136–145)
SP GR UR REFRACTOMETRY: 1.01 (ref 1–1.03)
URINE CULTURE IF INDICATED: ABNORMAL
UROBILINOGEN UR QL STRIP.AUTO: 1 EU/DL (ref 0.2–1)
WBC # BLD AUTO: 18.5 K/UL (ref 3.6–11)
WBC URNS QL MICRO: ABNORMAL /HPF (ref 0–4)

## 2023-11-10 PROCEDURE — 6360000002 HC RX W HCPCS

## 2023-11-10 PROCEDURE — 6360000004 HC RX CONTRAST MEDICATION: Performed by: RADIOLOGY

## 2023-11-10 PROCEDURE — 85652 RBC SED RATE AUTOMATED: CPT

## 2023-11-10 PROCEDURE — 80053 COMPREHEN METABOLIC PANEL: CPT

## 2023-11-10 PROCEDURE — 73030 X-RAY EXAM OF SHOULDER: CPT

## 2023-11-10 PROCEDURE — 2580000003 HC RX 258: Performed by: HOSPITALIST

## 2023-11-10 PROCEDURE — APPNB180 APP NON BILLABLE TIME > 60 MINS: Performed by: NURSE PRACTITIONER

## 2023-11-10 PROCEDURE — 84145 PROCALCITONIN (PCT): CPT

## 2023-11-10 PROCEDURE — 82962 GLUCOSE BLOOD TEST: CPT

## 2023-11-10 PROCEDURE — 6370000000 HC RX 637 (ALT 250 FOR IP)

## 2023-11-10 PROCEDURE — 86140 C-REACTIVE PROTEIN: CPT

## 2023-11-10 PROCEDURE — 87205 SMEAR GRAM STAIN: CPT

## 2023-11-10 PROCEDURE — 2580000003 HC RX 258

## 2023-11-10 PROCEDURE — 96374 THER/PROPH/DIAG INJ IV PUSH: CPT

## 2023-11-10 PROCEDURE — 83605 ASSAY OF LACTIC ACID: CPT

## 2023-11-10 PROCEDURE — 81001 URINALYSIS AUTO W/SCOPE: CPT

## 2023-11-10 PROCEDURE — 1100000000 HC RM PRIVATE

## 2023-11-10 PROCEDURE — 73080 X-RAY EXAM OF ELBOW: CPT

## 2023-11-10 PROCEDURE — A9579 GAD-BASE MR CONTRAST NOS,1ML: HCPCS | Performed by: RADIOLOGY

## 2023-11-10 PROCEDURE — 74176 CT ABD & PELVIS W/O CONTRAST: CPT

## 2023-11-10 PROCEDURE — 87070 CULTURE OTHR SPECIMN AEROBIC: CPT

## 2023-11-10 PROCEDURE — 6370000000 HC RX 637 (ALT 250 FOR IP): Performed by: HOSPITALIST

## 2023-11-10 PROCEDURE — 94761 N-INVAS EAR/PLS OXIMETRY MLT: CPT

## 2023-11-10 PROCEDURE — 36415 COLL VENOUS BLD VENIPUNCTURE: CPT

## 2023-11-10 PROCEDURE — 6360000002 HC RX W HCPCS: Performed by: HOSPITALIST

## 2023-11-10 PROCEDURE — 87040 BLOOD CULTURE FOR BACTERIA: CPT

## 2023-11-10 PROCEDURE — 70450 CT HEAD/BRAIN W/O DYE: CPT

## 2023-11-10 PROCEDURE — 20605 DRAIN/INJ JOINT/BURSA W/O US: CPT | Performed by: NURSE PRACTITIONER

## 2023-11-10 PROCEDURE — 85025 COMPLETE CBC W/AUTO DIFF WBC: CPT

## 2023-11-10 PROCEDURE — 73223 MRI JOINT UPR EXTR W/O&W/DYE: CPT

## 2023-11-10 PROCEDURE — 82550 ASSAY OF CK (CPK): CPT

## 2023-11-10 PROCEDURE — 83735 ASSAY OF MAGNESIUM: CPT

## 2023-11-10 PROCEDURE — 72125 CT NECK SPINE W/O DYE: CPT

## 2023-11-10 PROCEDURE — 99285 EMERGENCY DEPT VISIT HI MDM: CPT

## 2023-11-10 RX ORDER — ACETAMINOPHEN 650 MG/1
650 SUPPOSITORY RECTAL EVERY 6 HOURS PRN
Status: DISCONTINUED | OUTPATIENT
Start: 2023-11-10 | End: 2023-11-15 | Stop reason: HOSPADM

## 2023-11-10 RX ORDER — LISINOPRIL 5 MG/1
10 TABLET ORAL DAILY
Status: DISCONTINUED | OUTPATIENT
Start: 2023-11-10 | End: 2023-11-15 | Stop reason: HOSPADM

## 2023-11-10 RX ORDER — ONDANSETRON 4 MG/1
4 TABLET, ORALLY DISINTEGRATING ORAL EVERY 8 HOURS PRN
Status: DISCONTINUED | OUTPATIENT
Start: 2023-11-10 | End: 2023-11-15 | Stop reason: HOSPADM

## 2023-11-10 RX ORDER — DEXTROSE MONOHYDRATE 100 MG/ML
INJECTION, SOLUTION INTRAVENOUS CONTINUOUS PRN
Status: DISCONTINUED | OUTPATIENT
Start: 2023-11-10 | End: 2023-11-15 | Stop reason: HOSPADM

## 2023-11-10 RX ORDER — SODIUM CHLORIDE 9 MG/ML
INJECTION, SOLUTION INTRAVENOUS PRN
Status: DISCONTINUED | OUTPATIENT
Start: 2023-11-10 | End: 2023-11-15 | Stop reason: HOSPADM

## 2023-11-10 RX ORDER — SODIUM CHLORIDE 0.9 % (FLUSH) 0.9 %
5-40 SYRINGE (ML) INJECTION PRN
Status: DISCONTINUED | OUTPATIENT
Start: 2023-11-10 | End: 2023-11-15 | Stop reason: HOSPADM

## 2023-11-10 RX ORDER — ACETAMINOPHEN 325 MG/1
650 TABLET ORAL EVERY 6 HOURS PRN
Status: DISCONTINUED | OUTPATIENT
Start: 2023-11-10 | End: 2023-11-10

## 2023-11-10 RX ORDER — POTASSIUM CHLORIDE 7.45 MG/ML
10 INJECTION INTRAVENOUS PRN
Status: DISCONTINUED | OUTPATIENT
Start: 2023-11-10 | End: 2023-11-15 | Stop reason: HOSPADM

## 2023-11-10 RX ORDER — SODIUM CHLORIDE 0.9 % (FLUSH) 0.9 %
5-40 SYRINGE (ML) INJECTION EVERY 12 HOURS SCHEDULED
Status: DISCONTINUED | OUTPATIENT
Start: 2023-11-10 | End: 2023-11-15 | Stop reason: HOSPADM

## 2023-11-10 RX ORDER — POLYETHYLENE GLYCOL 3350 17 G/17G
17 POWDER, FOR SOLUTION ORAL DAILY PRN
Status: DISCONTINUED | OUTPATIENT
Start: 2023-11-10 | End: 2023-11-15 | Stop reason: HOSPADM

## 2023-11-10 RX ORDER — LIDOCAINE 4 G/G
1 PATCH TOPICAL DAILY
Status: DISCONTINUED | OUTPATIENT
Start: 2023-11-10 | End: 2023-11-15 | Stop reason: HOSPADM

## 2023-11-10 RX ORDER — ONDANSETRON 2 MG/ML
4 INJECTION INTRAMUSCULAR; INTRAVENOUS EVERY 6 HOURS PRN
Status: DISCONTINUED | OUTPATIENT
Start: 2023-11-10 | End: 2023-11-15 | Stop reason: HOSPADM

## 2023-11-10 RX ORDER — MAGNESIUM SULFATE IN WATER 40 MG/ML
2000 INJECTION, SOLUTION INTRAVENOUS PRN
Status: DISCONTINUED | OUTPATIENT
Start: 2023-11-10 | End: 2023-11-15 | Stop reason: HOSPADM

## 2023-11-10 RX ORDER — LEVOTHYROXINE SODIUM 0.07 MG/1
75 TABLET ORAL
Status: DISCONTINUED | OUTPATIENT
Start: 2023-11-11 | End: 2023-11-15 | Stop reason: HOSPADM

## 2023-11-10 RX ORDER — FOLIC ACID 1 MG/1
1 TABLET ORAL DAILY
Status: DISCONTINUED | OUTPATIENT
Start: 2023-11-10 | End: 2023-11-15 | Stop reason: HOSPADM

## 2023-11-10 RX ORDER — DOCUSATE SODIUM 100 MG/1
100 CAPSULE, LIQUID FILLED ORAL 2 TIMES DAILY PRN
Status: DISCONTINUED | OUTPATIENT
Start: 2023-11-10 | End: 2023-11-15 | Stop reason: HOSPADM

## 2023-11-10 RX ORDER — ACETAMINOPHEN 325 MG/1
650 TABLET ORAL EVERY 6 HOURS PRN
Status: DISCONTINUED | OUTPATIENT
Start: 2023-11-10 | End: 2023-11-15 | Stop reason: HOSPADM

## 2023-11-10 RX ORDER — AMLODIPINE BESYLATE 5 MG/1
10 TABLET ORAL DAILY
Status: DISCONTINUED | OUTPATIENT
Start: 2023-11-10 | End: 2023-11-15 | Stop reason: HOSPADM

## 2023-11-10 RX ORDER — POTASSIUM CHLORIDE 750 MG/1
40 TABLET, FILM COATED, EXTENDED RELEASE ORAL PRN
Status: DISCONTINUED | OUTPATIENT
Start: 2023-11-10 | End: 2023-11-15 | Stop reason: HOSPADM

## 2023-11-10 RX ADMIN — AMLODIPINE BESYLATE 10 MG: 5 TABLET ORAL at 23:06

## 2023-11-10 RX ADMIN — SODIUM CHLORIDE, PRESERVATIVE FREE 10 ML: 5 INJECTION INTRAVENOUS at 23:00

## 2023-11-10 RX ADMIN — GADOTERIDOL 10 ML: 279.3 INJECTION, SOLUTION INTRAVENOUS at 20:43

## 2023-11-10 RX ADMIN — SODIUM CHLORIDE, PRESERVATIVE FREE 10 ML: 5 INJECTION INTRAVENOUS at 22:00

## 2023-11-10 RX ADMIN — LISINOPRIL 10 MG: 5 TABLET ORAL at 23:06

## 2023-11-10 RX ADMIN — WATER 2000 MG: 1 INJECTION INTRAMUSCULAR; INTRAVENOUS; SUBCUTANEOUS at 11:22

## 2023-11-10 RX ADMIN — SODIUM CHLORIDE, PRESERVATIVE FREE 10 ML: 5 INJECTION INTRAVENOUS at 11:24

## 2023-11-10 RX ADMIN — CEFEPIME 2000 MG: 2 INJECTION, POWDER, FOR SOLUTION INTRAVENOUS at 23:00

## 2023-11-10 RX ADMIN — ACETAMINOPHEN 650 MG: 325 TABLET ORAL at 12:55

## 2023-11-10 RX ADMIN — POTASSIUM BICARBONATE 40 MEQ: 782 TABLET, EFFERVESCENT ORAL at 09:46

## 2023-11-10 ASSESSMENT — PAIN SCALES - GENERAL
PAINLEVEL_OUTOF10: 3
PAINLEVEL_OUTOF10: 7
PAINLEVEL_OUTOF10: 6
PAINLEVEL_OUTOF10: 1

## 2023-11-10 ASSESSMENT — PAIN - FUNCTIONAL ASSESSMENT: PAIN_FUNCTIONAL_ASSESSMENT: 0-10

## 2023-11-10 NOTE — CONSULTS
ORTHOPAEDIC CONSULT NOTE    Subjective:     Date of Consultation:  November 10, 2023      Marcel Mcdonough is a 80 y.o. female with PMH of RA, L elbow infection, DM, HTN, who is being seen for L elbow pain and back pain after GLF last night, pt fell at home and unfortunately spent the night on the floor. The pt states her L elbow has been painful for the past 1-2 weeks and not related to the fall, she denies injury to the elbow. Pt denies fevers or chills over the past week. Per chart review the pt has been treated for septic arthritis and radial head osteo of the L elbow x 2 with last I&D in Feb 2022, aspiration and intra op cultures sent at that time were no growth she was treated with a full coarse of IV  dapto and aztreonam as per ID. Back pain started with fall, work up has reveled a compression of T12.  Pt denies numbness/tingling     Patient Active Problem List    Diagnosis Date Noted    Colitis 10/22/2022    Diverticulitis 09/19/2022    DM type 2 (diabetes mellitus, type 2) ([address]) 03/11/2022    Hypothyroidism 03/11/2022    Closed fracture of two ribs of left side 03/11/2022    Toxic encephalopathy 03/11/2022    HTN (hypertension), benign 03/11/2022    Rheumatoid arthritis ([address]) 03/11/2022    Septic arthritis of elbow, left ([address]) 03/11/2022    Acute osteomyelitis of left radius ([address]) 03/11/2022    Urinary retention 03/11/2022    Septic joint ([address]) 02/27/2022    Septic arthritis of elbow, left ([address]) 03/28/2021    Severe sepsis ([address]) 03/28/2021    Intractable nausea and vomiting 03/28/2021    Diverticulitis of sigmoid colon 03/28/2021    Immunocompromised state due to drug therapy ([address]) 03/28/2021    Acute diverticulitis 12/02/2020    SBO (small bowel obstruction) ([address]) 11/30/2020    Chronic kidney disease (CKD) 09/18/2017    Osteoporosis, post-menopausal     Hypothyroid 04/09/2016    RA (rheumatoid arthritis) ([address]) 04/09/2016    HTN (hypertension), benign 04/09/2016    Abnormal chest x-ray 04/09/2016 Specific Gravity, UA 1.015 1.003 - 1.030      pH, Urine 8.0 5.0 - 8.0      Protein,  (A) NEG mg/dL    Glucose, UA Negative NEG mg/dL    Ketones, Urine TRACE (A) NEG mg/dL    Bilirubin Urine Negative NEG      Blood, Urine SMALL (A) NEG      Urobilinogen, Urine 1.0 0.2 - 1.0 EU/dL    Nitrite, Urine Negative NEG      Leukocyte Esterase, Urine TRACE (A) NEG      Urine Culture if Indicated CULTURE NOT INDICATED BY UA RESULT CNI      WBC, UA 0-4 0 - 4 /hpf    RBC, UA 10-20 0 - 5 /hpf    Epithelial Cells UA MODERATE (A) FEW /lpf    BACTERIA, URINE Negative NEG /hpf    Hyaline Casts, UA 0-2 0 - 2 /lpf         Impression:     Patient Active Problem List    Diagnosis Date Noted    Colitis 10/22/2022    Diverticulitis 09/19/2022    DM type 2 (diabetes mellitus, type 2) ([address]) 03/11/2022    Hypothyroidism 03/11/2022    Closed fracture of two ribs of left side 03/11/2022    Toxic encephalopathy 03/11/2022    HTN (hypertension), benign 03/11/2022    Rheumatoid arthritis ([address]) 03/11/2022    Septic arthritis of elbow, left ([address]) 03/11/2022    Acute osteomyelitis of left radius ([address]) 03/11/2022    Urinary retention 03/11/2022    Septic joint ([address]) 02/27/2022    Septic arthritis of elbow, left ([address]) 03/28/2021    Severe sepsis ([address]) 03/28/2021    Intractable nausea and vomiting 03/28/2021    Diverticulitis of sigmoid colon 03/28/2021    Immunocompromised state due to drug therapy ([address]) 03/28/2021    Acute diverticulitis 12/02/2020    SBO (small bowel obstruction) ([address]) 11/30/2020    Chronic kidney disease (CKD) 09/18/2017    Osteoporosis, post-menopausal     Hypothyroid 04/09/2016    RA (rheumatoid arthritis) ([address]) 04/09/2016    HTN (hypertension), benign 04/09/2016    Abnormal chest x-ray 04/09/2016    Chest pain 04/09/2016    DM type 2 (diabetes mellitus, type 2) ([address]) 04/09/2016     Active Problems:    * No active hospital problems. *  Resolved Problems:    * No resolved hospital problems.  *      Plan:   -  L elbow

## 2023-11-10 NOTE — ED TRIAGE NOTES
Patient to ER via EMS for reports of mechanical fall from tripping over her shoes while trying to get into bed last night. Patient denies blood thinners. Patient complains of left elbow pain and lower back pain. Patient has had 2 sx on left elbow. Patient is alert and oriented x 4 during triage.

## 2023-11-10 NOTE — ED PROVIDER NOTES
Radiologist Recommendation  IMPRESSION:  Acute compression fracture of T12. No acute intra-abdominal abnormality. 3.2 cm  infrarenal abdominal aortic aneurysm. [AF]   1035 Discussed results with patient and son at bedside. Plan for antibiotics, ortho consult for possible septic arthritis of elbow, ortho consult for T12 fracture. Admission given not able to ambulate with fracture at this time [AF]   1059 Patient with 700 cc in the bladder. Having difficulty urinating. She did urinate a small amount, but still has significant postvoid residual.  States that she typically has difficulty urinating throughout the day and has to sit on the toilet for a long time. Thinks that she may be having more trouble given that she is lying flat. We will sit her upright, give her a little more time to urinate. Rectal exam has good tone and sensation, do not think this is cauda equina. [AF]   1140 Lactic Acid, Sepsis: 1.7 [AF]   1159 CRP(!): 18.00 [AF]   1234 Sed Rate, Automated(!): 62 [AF]      ED Course User Index  [AF] Rashmi Palma PA-C     Perfect Serve Consult for Admission  4:48 PM    ED Room Number: KC24/78  Patient Name and age:  Cameron Valderrama 80 y.o.  female  Working Diagnosis:   1. Closed fracture of twelfth thoracic vertebra, unspecified fracture morphology, initial encounter ([address])    2. Left elbow pain    3. Fall, initial encounter        COVID-19 Suspicion: No  Sepsis present:  No  Reassessment needed: No  Code Status:  Full Code  Readmission: No  Isolation Requirements: no  Recommended Level of Care: med/surg  Department: [facility] ED - [phone]  Consulting Provider: Dr. Tina Lopes    Other: 80-year-old female with a history of prior left elbow septic arthritis, presenting to the emergency room with a fall from bed last night. Complaining of back pain and left elbow pain. Elbow x-ray is negative for fracture but shows effusion.   She has a leukocytosis of 18 with 95% neutrophils, no other obvious source of infection and suspect septic arthritis. Ortho consulted and is aspirating the elbow. CRP and sed rate are elevated. She also sustained a T12 fracture, and has not been able to ambulate since falling. Typically she is independent living at home and ambulatory. Admit for T12 fracture, PT, ortho follow for possible septic arthritis of the left elbow. CONSULTS:  [address] Bren[address] Cameron    Discussed with Tyrese Tirado, NP with ortho. Plan for diagnostic aspiration of elbow and follow cell count/culture for possible septic joint. PROCEDURES:  Unless otherwise noted below, none     Procedures      FINAL IMPRESSION      1. Closed fracture of twelfth thoracic vertebra, unspecified fracture morphology, initial encounter ([address])    2. Left elbow pain    3.  Fall, initial encounter          DISPOSITION/PLAN   DISPOSITION Admitted 11/10/2023 03:09:21 PM        (Please note that portions of this note were completed with a voice recognition program.  Efforts were made to edit the dictations but occasionally words are mis-transcribed.)    Janeth Mccoy PA-C (electronically signed)  Emergency Attending Physician / Physician Assistant / Nurse Practitioner             Janeth Mccoy PA-C  11/10/23 6911

## 2023-11-11 LAB
ANION GAP SERPL CALC-SCNC: 4 MMOL/L (ref 5–15)
APPEARANCE SNV: ABNORMAL
BASOPHILS # BLD: 0 K/UL (ref 0–0.1)
BASOPHILS NFR BLD: 0 % (ref 0–1)
BODY FLD TYPE: NORMAL
BUN SERPL-MCNC: 16 MG/DL (ref 6–20)
BUN/CREAT SERPL: 23 (ref 12–20)
CALCIUM SERPL-MCNC: 8.7 MG/DL (ref 8.5–10.1)
CHLORIDE SERPL-SCNC: 100 MMOL/L (ref 97–108)
CO2 SERPL-SCNC: 26 MMOL/L (ref 21–32)
COLOR SNV: ABNORMAL
CREAT SERPL-MCNC: 0.71 MG/DL (ref 0.55–1.02)
CRYSTALS FLD MICRO: NORMAL
DIFFERENTIAL METHOD BLD: ABNORMAL
EOSINOPHIL # BLD: 0 K/UL (ref 0–0.4)
EOSINOPHIL NFR BLD: 0 % (ref 0–7)
ERYTHROCYTE [DISTWIDTH] IN BLOOD BY AUTOMATED COUNT: 14.8 % (ref 11.5–14.5)
EST. AVERAGE GLUCOSE BLD GHB EST-MCNC: 100 MG/DL
GLUCOSE BLD STRIP.AUTO-MCNC: 108 MG/DL (ref 65–117)
GLUCOSE BLD STRIP.AUTO-MCNC: 111 MG/DL (ref 65–117)
GLUCOSE BLD STRIP.AUTO-MCNC: 117 MG/DL (ref 65–117)
GLUCOSE BLD STRIP.AUTO-MCNC: 126 MG/DL (ref 65–117)
GLUCOSE SERPL-MCNC: 105 MG/DL (ref 65–100)
HBA1C MFR BLD: 5.1 % (ref 4–5.6)
HCT VFR BLD AUTO: 31.8 % (ref 35–47)
HGB BLD-MCNC: 10.3 G/DL (ref 11.5–16)
IMM GRANULOCYTES # BLD AUTO: 0.2 K/UL (ref 0–0.04)
IMM GRANULOCYTES NFR BLD AUTO: 1 % (ref 0–0.5)
LYMPHOCYTES # BLD: 0.5 K/UL (ref 0.8–3.5)
LYMPHOCYTES NFR BLD: 3 % (ref 12–49)
LYMPHOCYTES NFR SNV MANUAL: 1 % (ref 0–15)
MCH RBC QN AUTO: 29.6 PG (ref 26–34)
MCHC RBC AUTO-ENTMCNC: 32.4 G/DL (ref 30–36.5)
MCV RBC AUTO: 91.4 FL (ref 80–99)
MONOCYTES # BLD: 0.5 K/UL (ref 0–1)
MONOCYTES NFR BLD: 3 % (ref 5–13)
MONOCYTES NFR SNV MANUAL: 7 % (ref 0–65)
NEUTROPHILS NFR SNV MANUAL: 92 % (ref 0–20)
NEUTS SEG # BLD: 14.4 K/UL (ref 1.8–8)
NEUTS SEG NFR BLD: 93 % (ref 32–75)
NRBC # BLD: 0 K/UL (ref 0–0.01)
NRBC BLD-RTO: 0 PER 100 WBC
PLATELET # BLD AUTO: 284 K/UL (ref 150–400)
PMV BLD AUTO: 9.9 FL (ref 8.9–12.9)
POTASSIUM SERPL-SCNC: 3.6 MMOL/L (ref 3.5–5.1)
RBC # BLD AUTO: 3.48 M/UL (ref 3.8–5.2)
RBC # SNV: >100 /CU MM
RBC MORPH BLD: ABNORMAL
SERVICE CMNT-IMP: ABNORMAL
SERVICE CMNT-IMP: NORMAL
SODIUM SERPL-SCNC: 130 MMOL/L (ref 136–145)
SPECIMEN SOURCE FLD: ABNORMAL
WBC # BLD AUTO: 15.6 K/UL (ref 3.6–11)
WBC # SNV: ABNORMAL /CU MM (ref 0–150)

## 2023-11-11 PROCEDURE — 82962 GLUCOSE BLOOD TEST: CPT

## 2023-11-11 PROCEDURE — 36415 COLL VENOUS BLD VENIPUNCTURE: CPT

## 2023-11-11 PROCEDURE — 80048 BASIC METABOLIC PNL TOTAL CA: CPT

## 2023-11-11 PROCEDURE — 83036 HEMOGLOBIN GLYCOSYLATED A1C: CPT

## 2023-11-11 PROCEDURE — 94761 N-INVAS EAR/PLS OXIMETRY MLT: CPT

## 2023-11-11 PROCEDURE — 6360000002 HC RX W HCPCS: Performed by: INTERNAL MEDICINE

## 2023-11-11 PROCEDURE — 97161 PT EVAL LOW COMPLEX 20 MIN: CPT

## 2023-11-11 PROCEDURE — 6360000002 HC RX W HCPCS: Performed by: HOSPITALIST

## 2023-11-11 PROCEDURE — 6370000000 HC RX 637 (ALT 250 FOR IP)

## 2023-11-11 PROCEDURE — 2500000003 HC RX 250 WO HCPCS: Performed by: PHYSICIAN ASSISTANT

## 2023-11-11 PROCEDURE — 2580000003 HC RX 258: Performed by: INTERNAL MEDICINE

## 2023-11-11 PROCEDURE — 89050 BODY FLUID CELL COUNT: CPT

## 2023-11-11 PROCEDURE — 2580000003 HC RX 258: Performed by: HOSPITALIST

## 2023-11-11 PROCEDURE — 87205 SMEAR GRAM STAIN: CPT

## 2023-11-11 PROCEDURE — 6370000000 HC RX 637 (ALT 250 FOR IP): Performed by: HOSPITALIST

## 2023-11-11 PROCEDURE — 85025 COMPLETE CBC W/AUTO DIFF WBC: CPT

## 2023-11-11 PROCEDURE — 97116 GAIT TRAINING THERAPY: CPT

## 2023-11-11 PROCEDURE — 89060 EXAM SYNOVIAL FLUID CRYSTALS: CPT

## 2023-11-11 PROCEDURE — 87070 CULTURE OTHR SPECIMN AEROBIC: CPT

## 2023-11-11 PROCEDURE — 99231 SBSQ HOSP IP/OBS SF/LOW 25: CPT | Performed by: PHYSICIAN ASSISTANT

## 2023-11-11 PROCEDURE — 2580000003 HC RX 258

## 2023-11-11 PROCEDURE — 20605 DRAIN/INJ JOINT/BURSA W/O US: CPT | Performed by: PHYSICIAN ASSISTANT

## 2023-11-11 PROCEDURE — 1100000000 HC RM PRIVATE

## 2023-11-11 PROCEDURE — 0R9M3ZX DRAINAGE OF LEFT ELBOW JOINT, PERCUTANEOUS APPROACH, DIAGNOSTIC: ICD-10-PCS | Performed by: PHYSICIAN ASSISTANT

## 2023-11-11 RX ORDER — SODIUM CHLORIDE 9 MG/ML
INJECTION, SOLUTION INTRAVENOUS CONTINUOUS
Status: DISCONTINUED | OUTPATIENT
Start: 2023-11-11 | End: 2023-11-15 | Stop reason: HOSPADM

## 2023-11-11 RX ORDER — LIDOCAINE HYDROCHLORIDE 10 MG/ML
10 INJECTION, SOLUTION EPIDURAL; INFILTRATION; INTRACAUDAL; PERINEURAL ONCE
Status: COMPLETED | OUTPATIENT
Start: 2023-11-11 | End: 2023-11-11

## 2023-11-11 RX ADMIN — LISINOPRIL 10 MG: 5 TABLET ORAL at 21:47

## 2023-11-11 RX ADMIN — SODIUM CHLORIDE, PRESERVATIVE FREE 10 ML: 5 INJECTION INTRAVENOUS at 09:29

## 2023-11-11 RX ADMIN — SODIUM CHLORIDE, PRESERVATIVE FREE 10 ML: 5 INJECTION INTRAVENOUS at 09:28

## 2023-11-11 RX ADMIN — AMLODIPINE BESYLATE 10 MG: 5 TABLET ORAL at 21:44

## 2023-11-11 RX ADMIN — VANCOMYCIN HYDROCHLORIDE 1250 MG: 1.25 INJECTION, POWDER, LYOPHILIZED, FOR SOLUTION INTRAVENOUS at 12:48

## 2023-11-11 RX ADMIN — FOLIC ACID 1 MG: 1 TABLET ORAL at 09:27

## 2023-11-11 RX ADMIN — CEFEPIME 2000 MG: 2 INJECTION, POWDER, FOR SOLUTION INTRAVENOUS at 11:28

## 2023-11-11 RX ADMIN — SODIUM CHLORIDE: 9 INJECTION, SOLUTION INTRAVENOUS at 11:22

## 2023-11-11 RX ADMIN — LIDOCAINE HYDROCHLORIDE 10 ML: 10 INJECTION, SOLUTION EPIDURAL; INFILTRATION; INTRACAUDAL; PERINEURAL at 11:10

## 2023-11-11 RX ADMIN — SODIUM CHLORIDE, PRESERVATIVE FREE 10 ML: 5 INJECTION INTRAVENOUS at 21:47

## 2023-11-11 RX ADMIN — LEVOTHYROXINE SODIUM 75 MCG: 0.07 TABLET ORAL at 08:28

## 2023-11-11 RX ADMIN — ACETAMINOPHEN 650 MG: 325 TABLET ORAL at 11:35

## 2023-11-11 ASSESSMENT — PAIN SCALES - GENERAL
PAINLEVEL_OUTOF10: 4
PAINLEVEL_OUTOF10: 2
PAINLEVEL_OUTOF10: 0

## 2023-11-11 ASSESSMENT — PAIN DESCRIPTION - ORIENTATION: ORIENTATION: LEFT

## 2023-11-11 ASSESSMENT — PAIN DESCRIPTION - DESCRIPTORS: DESCRIPTORS: ACHING

## 2023-11-11 ASSESSMENT — PAIN DESCRIPTION - LOCATION: LOCATION: ELBOW

## 2023-11-11 NOTE — H&P
[address] [place] [address] Ashley Greenfield  [phone]    [address] [place] [address] Adult  Hospitalist Group    History & Physical    Date of service: 11/10/2023    Patient name: Erin Moss  MRN: [id number]  YOB: 1930  Age: 80 y.o. Primary care provider:  Harmony Stevens MD     Source of Information: patient, medical records and ERP                                  Chief complain: Left elbow pain    History of present illness  Erin Moss is a 80 y.o. female who presented with left elbow pain. Patient is alert awake oriented x3. She lives in a skilled nursing community in an apartment. She is very hard of hearing. Patient had a fall last night. As per the patient she was trying to stand up to get the water when she tripped over her shoes and fell. She denies hitting her head. She denies any loss of consciousness. She was unable to get off the ground and she was found laying on the floor this morning by her son. When she came to the ER she was complaining of left elbow pain and left shoulder pain. She was also complaining of low back pain. As per the patient the pain is radiating to the both hips. Patient has a history of rheumatoid arthritis, diabetes, hypertension, hypothyroidism, osteoporosis, septic arthritis of left elbow. As per the patient she is having a left elbow pain for last couple of weeks. She has a history of septic arthritis and radial head osteomyelitis x2. She was treated with IV daptomycin and aztreonam at that time. Her last I&D was in February 2022. Patient was seen by Ortho in the ER. Arthrocentesis was attempted with a tiny amount of bloody fluid removed. Her blood work showed a white blood cell count of 18.5, ESR of 62 and procalcitonin level of 0.61.   Her potassium level is 3.2    Past Medical History:   Diagnosis Date    Arthritis, rheumatoid (HCC)     Diabetes ([address])     Diverticulitis     Hard of 18.00 (H) 0.00 - 0.60 mg/dL   Sedimentation Rate    Collection Time: 11/10/23  8:37 AM   Result Value Ref Range    Sed Rate, Automated 62 (H) 0 - 30 mm/hr   Procalcitonin    Collection Time: 11/10/23  8:37 AM   Result Value Ref Range    Procalcitonin 0.61 ng/mL   Lactate, Sepsis    Collection Time: 11/10/23 10:46 AM   Result Value Ref Range    Lactic Acid, Sepsis 1.7 0.4 - 2.0 MMOL/L   Urinalysis with Reflex to Culture    Collection Time: 11/10/23 11:49 AM    Specimen: Urine   Result Value Ref Range    Color, UA YELLOW/STRAW      Appearance CLEAR CLEAR      Specific Gravity, UA 1.015 1.003 - 1.030      pH, Urine 8.0 5.0 - 8.0      Protein,  (A) NEG mg/dL    Glucose, UA Negative NEG mg/dL    Ketones, Urine TRACE (A) NEG mg/dL    Bilirubin Urine Negative NEG      Blood, Urine SMALL (A) NEG      Urobilinogen, Urine 1.0 0.2 - 1.0 EU/dL    Nitrite, Urine Negative NEG      Leukocyte Esterase, Urine TRACE (A) NEG      Urine Culture if Indicated CULTURE NOT INDICATED BY UA RESULT CNI      WBC, UA 0-4 0 - 4 /hpf    RBC, UA 10-20 0 - 5 /hpf    Epithelial Cells UA MODERATE (A) FEW /lpf    BACTERIA, URINE Negative NEG /hpf    Hyaline Casts, UA 0-2 0 - 2 /lpf     Lab Results   Component Value Date/Time    WBC 18.5 11/10/2023 08:37 AM    WBC 4.9 10/24/2022 06:29 AM    WBC 16.9 10/22/2022 07:31 AM    HGB 10.8 11/10/2023 08:37 AM    HGB 10.1 10/24/2022 06:29 AM    HGB 11.8 10/22/2022 07:31 AM    HCT 32.8 11/10/2023 08:37 AM    HCT 31.7 10/24/2022 06:29 AM    HCT 36.3 10/22/2022 07:31 AM     11/10/2023 08:37 AM     10/24/2022 06:29 AM     10/22/2022 07:31 AM     Lab Results   Component Value Date/Time     11/10/2023 08:37 AM     10/24/2022 06:29 AM     10/22/2022 07:31 AM    K 3.2 11/10/2023 08:37 AM    K 3.7 10/24/2022 06:29 AM    K 4.1 10/22/2022 07:31 AM    CL 99 11/10/2023 08:37 AM     10/24/2022 06:29 AM     10/22/2022 07:31 AM    CO2 25 11/10/2023 08:37 AM    CO2 26

## 2023-11-11 NOTE — PLAN OF CARE
Problem: Discharge Planning  Goal: Discharge to home or other facility with appropriate resources  11/11/2023 0833 by Yasmin Mccann LPN  Outcome: Progressing     Problem: Safety - Adult  Goal: Free from fall injury  11/11/2023 1448 by Nesha Graves RN  Outcome: Progressing  Flowsheets (Taken 11/11/2023 1448)  Free From Fall Injury: González Curran family/caregiver on patient safety  11/11/2023 0833 by Yasmin Mccann LPN  Outcome: Progressing     Problem: Pain  Goal: Verbalizes/displays adequate comfort level or baseline comfort level  11/11/2023 1448 by Nesha Graves RN  Outcome: Progressing  Flowsheets (Taken 11/11/2023 1448)  Verbalizes/displays adequate comfort level or baseline comfort level:   Encourage patient to monitor pain and request assistance   Assess pain using appropriate pain scale   Administer analgesics based on type and severity of pain and evaluate response   Implement non-pharmacological measures as appropriate and evaluate response  11/11/2023 0833 by Yasmin Mccann LPN  Outcome: Progressing

## 2023-11-11 NOTE — ED NOTES
Bedside and Verbal shift change report given to Haider Masters Dr (oncoming nurse) by Nicky Colmenares (offgoing nurse). Report included the following information Nurse Handoff Report, Index, ED Encounter Summary, ED SBAR, Adult Overview, MAR, and Recent Results.        Shana Woody RN  11/10/23 1931

## 2023-11-12 LAB
ANION GAP SERPL CALC-SCNC: 6 MMOL/L (ref 5–15)
BASOPHILS # BLD: 0 K/UL (ref 0–0.1)
BASOPHILS NFR BLD: 0 % (ref 0–1)
BUN SERPL-MCNC: 19 MG/DL (ref 6–20)
BUN/CREAT SERPL: 27 (ref 12–20)
CALCIUM SERPL-MCNC: 8 MG/DL (ref 8.5–10.1)
CHLORIDE SERPL-SCNC: 104 MMOL/L (ref 97–108)
CO2 SERPL-SCNC: 23 MMOL/L (ref 21–32)
CREAT SERPL-MCNC: 0.7 MG/DL (ref 0.55–1.02)
DIFFERENTIAL METHOD BLD: ABNORMAL
EOSINOPHIL # BLD: 0.2 K/UL (ref 0–0.4)
EOSINOPHIL NFR BLD: 2 % (ref 0–7)
ERYTHROCYTE [DISTWIDTH] IN BLOOD BY AUTOMATED COUNT: 14.6 % (ref 11.5–14.5)
GLUCOSE BLD STRIP.AUTO-MCNC: 113 MG/DL (ref 65–117)
GLUCOSE SERPL-MCNC: 103 MG/DL (ref 65–100)
HCT VFR BLD AUTO: 28.3 % (ref 35–47)
HGB BLD-MCNC: 9 G/DL (ref 11.5–16)
IMM GRANULOCYTES # BLD AUTO: 0.1 K/UL (ref 0–0.04)
IMM GRANULOCYTES NFR BLD AUTO: 1 % (ref 0–0.5)
LYMPHOCYTES # BLD: 0.5 K/UL (ref 0.8–3.5)
LYMPHOCYTES NFR BLD: 5 % (ref 12–49)
MCH RBC QN AUTO: 29.4 PG (ref 26–34)
MCHC RBC AUTO-ENTMCNC: 31.8 G/DL (ref 30–36.5)
MCV RBC AUTO: 92.5 FL (ref 80–99)
MONOCYTES # BLD: 0.4 K/UL (ref 0–1)
MONOCYTES NFR BLD: 4 % (ref 5–13)
NEUTS SEG # BLD: 8.5 K/UL (ref 1.8–8)
NEUTS SEG NFR BLD: 88 % (ref 32–75)
NRBC # BLD: 0 K/UL (ref 0–0.01)
NRBC BLD-RTO: 0 PER 100 WBC
PLATELET # BLD AUTO: 247 K/UL (ref 150–400)
PMV BLD AUTO: 10 FL (ref 8.9–12.9)
POTASSIUM SERPL-SCNC: 3.5 MMOL/L (ref 3.5–5.1)
RBC # BLD AUTO: 3.06 M/UL (ref 3.8–5.2)
SERVICE CMNT-IMP: NORMAL
SODIUM SERPL-SCNC: 133 MMOL/L (ref 136–145)
WBC # BLD AUTO: 9.7 K/UL (ref 3.6–11)

## 2023-11-12 PROCEDURE — 6360000002 HC RX W HCPCS: Performed by: HOSPITALIST

## 2023-11-12 PROCEDURE — 85025 COMPLETE CBC W/AUTO DIFF WBC: CPT

## 2023-11-12 PROCEDURE — 6370000000 HC RX 637 (ALT 250 FOR IP): Performed by: HOSPITALIST

## 2023-11-12 PROCEDURE — 2580000003 HC RX 258

## 2023-11-12 PROCEDURE — 2580000003 HC RX 258: Performed by: HOSPITALIST

## 2023-11-12 PROCEDURE — 6360000002 HC RX W HCPCS: Performed by: INTERNAL MEDICINE

## 2023-11-12 PROCEDURE — 36415 COLL VENOUS BLD VENIPUNCTURE: CPT

## 2023-11-12 PROCEDURE — 82962 GLUCOSE BLOOD TEST: CPT

## 2023-11-12 PROCEDURE — 2580000003 HC RX 258: Performed by: INTERNAL MEDICINE

## 2023-11-12 PROCEDURE — 99232 SBSQ HOSP IP/OBS MODERATE 35: CPT | Performed by: PHYSICIAN ASSISTANT

## 2023-11-12 PROCEDURE — 80048 BASIC METABOLIC PNL TOTAL CA: CPT

## 2023-11-12 PROCEDURE — 94761 N-INVAS EAR/PLS OXIMETRY MLT: CPT

## 2023-11-12 PROCEDURE — 6370000000 HC RX 637 (ALT 250 FOR IP)

## 2023-11-12 PROCEDURE — 1100000000 HC RM PRIVATE

## 2023-11-12 RX ADMIN — CEFEPIME 2000 MG: 2 INJECTION, POWDER, FOR SOLUTION INTRAVENOUS at 11:07

## 2023-11-12 RX ADMIN — FOLIC ACID 1 MG: 1 TABLET ORAL at 08:47

## 2023-11-12 RX ADMIN — AMLODIPINE BESYLATE 10 MG: 5 TABLET ORAL at 20:12

## 2023-11-12 RX ADMIN — SODIUM CHLORIDE, PRESERVATIVE FREE 10 ML: 5 INJECTION INTRAVENOUS at 08:47

## 2023-11-12 RX ADMIN — SODIUM CHLORIDE, PRESERVATIVE FREE 10 ML: 5 INJECTION INTRAVENOUS at 08:48

## 2023-11-12 RX ADMIN — SODIUM CHLORIDE, PRESERVATIVE FREE 10 ML: 5 INJECTION INTRAVENOUS at 20:13

## 2023-11-12 RX ADMIN — VANCOMYCIN HYDROCHLORIDE 750 MG: 750 INJECTION, POWDER, LYOPHILIZED, FOR SOLUTION INTRAVENOUS at 12:08

## 2023-11-12 RX ADMIN — CEFEPIME 2000 MG: 2 INJECTION, POWDER, FOR SOLUTION INTRAVENOUS at 00:09

## 2023-11-12 RX ADMIN — LEVOTHYROXINE SODIUM 75 MCG: 0.07 TABLET ORAL at 05:51

## 2023-11-12 RX ADMIN — LISINOPRIL 10 MG: 5 TABLET ORAL at 20:12

## 2023-11-12 RX ADMIN — ACETAMINOPHEN 650 MG: 325 TABLET ORAL at 20:13

## 2023-11-12 ASSESSMENT — PAIN DESCRIPTION - DESCRIPTORS: DESCRIPTORS: ACHING

## 2023-11-12 ASSESSMENT — PAIN SCALES - GENERAL
PAINLEVEL_OUTOF10: 1
PAINLEVEL_OUTOF10: 3
PAINLEVEL_OUTOF10: 0

## 2023-11-12 ASSESSMENT — PAIN DESCRIPTION - PAIN TYPE: TYPE: ACUTE PAIN

## 2023-11-12 ASSESSMENT — PAIN DESCRIPTION - FREQUENCY: FREQUENCY: INTERMITTENT

## 2023-11-12 ASSESSMENT — PAIN DESCRIPTION - ORIENTATION: ORIENTATION: LOWER

## 2023-11-12 ASSESSMENT — PAIN - FUNCTIONAL ASSESSMENT: PAIN_FUNCTIONAL_ASSESSMENT: ACTIVITIES ARE NOT PREVENTED

## 2023-11-12 ASSESSMENT — PAIN DESCRIPTION - LOCATION: LOCATION: BACK

## 2023-11-12 NOTE — PROGRESS NOTES
Patients cell count is not consistent with a septic arthritis. However, with her history of septic arthritis x 2 of the left elbow and her early cellulitis, I would continue IV abx for now. We will follow the cultures from both aspirates. Orthopedics to follow.

## 2023-11-12 NOTE — PLAN OF CARE
Problem: Safety - Adult  Goal: Free from fall injury  Outcome: Progressing  Flowsheets (Taken 11/12/2023 1020)  Free From Fall Injury: Instruct family/caregiver on patient safety     Problem: Pain  Goal: Verbalizes/displays adequate comfort level or baseline comfort level  Outcome: Progressing  Flowsheets (Taken 11/12/2023 1020)  Verbalizes/displays adequate comfort level or baseline comfort level:   Encourage patient to monitor pain and request assistance   Assess pain using appropriate pain scale   Administer analgesics based on type and severity of pain and evaluate response

## 2023-11-12 NOTE — PROCEDURES
After informed and written consent was obtained, the left elbow was prepped in sterile fashion. The lateral elbow was anesthestized with 10 cc of 1% plain lidocaine. Following this, the left elbow was aspirated from a lateral approach. 15 cc of cloudy and blood tinged fluid was aspirated from the elbow. This was sent for cell count, crystal analysis, and gram stain/culture. Patient tolerated the procedure well without adverse effect.       Lex Islas PA-C  Orthopaedic Surgery [address]

## 2023-11-13 PROBLEM — S22.089A CLOSED FRACTURE OF TWELFTH THORACIC VERTEBRA (HCC): Status: ACTIVE | Noted: 2023-11-13

## 2023-11-13 PROBLEM — Z88.0 PENICILLIN ALLERGY: Status: ACTIVE | Noted: 2023-11-13

## 2023-11-13 PROBLEM — D72.829 LEUKOCYTOSIS: Status: ACTIVE | Noted: 2023-11-13

## 2023-11-13 LAB
ANION GAP SERPL CALC-SCNC: 7 MMOL/L (ref 5–15)
BASOPHILS # BLD: 0 K/UL (ref 0–0.1)
BASOPHILS NFR BLD: 0 % (ref 0–1)
BUN SERPL-MCNC: 15 MG/DL (ref 6–20)
BUN/CREAT SERPL: 23 (ref 12–20)
CALCIUM SERPL-MCNC: 8.3 MG/DL (ref 8.5–10.1)
CHLORIDE SERPL-SCNC: 105 MMOL/L (ref 97–108)
CO2 SERPL-SCNC: 22 MMOL/L (ref 21–32)
CREAT SERPL-MCNC: 0.64 MG/DL (ref 0.55–1.02)
DIFFERENTIAL METHOD BLD: ABNORMAL
EOSINOPHIL # BLD: 0.4 K/UL (ref 0–0.4)
EOSINOPHIL NFR BLD: 5 % (ref 0–7)
ERYTHROCYTE [DISTWIDTH] IN BLOOD BY AUTOMATED COUNT: 14.6 % (ref 11.5–14.5)
GLUCOSE BLD STRIP.AUTO-MCNC: 118 MG/DL (ref 65–117)
GLUCOSE BLD STRIP.AUTO-MCNC: 126 MG/DL (ref 65–117)
GLUCOSE BLD STRIP.AUTO-MCNC: 135 MG/DL (ref 65–117)
GLUCOSE BLD STRIP.AUTO-MCNC: 96 MG/DL (ref 65–117)
GLUCOSE SERPL-MCNC: 92 MG/DL (ref 65–100)
HCT VFR BLD AUTO: 27.8 % (ref 35–47)
HGB BLD-MCNC: 8.9 G/DL (ref 11.5–16)
IMM GRANULOCYTES # BLD AUTO: 0.1 K/UL (ref 0–0.04)
IMM GRANULOCYTES NFR BLD AUTO: 1 % (ref 0–0.5)
LYMPHOCYTES # BLD: 0.6 K/UL (ref 0.8–3.5)
LYMPHOCYTES NFR BLD: 8 % (ref 12–49)
MCH RBC QN AUTO: 29.3 PG (ref 26–34)
MCHC RBC AUTO-ENTMCNC: 32 G/DL (ref 30–36.5)
MCV RBC AUTO: 91.4 FL (ref 80–99)
MONOCYTES # BLD: 0.4 K/UL (ref 0–1)
MONOCYTES NFR BLD: 6 % (ref 5–13)
NEUTS SEG # BLD: 5.3 K/UL (ref 1.8–8)
NEUTS SEG NFR BLD: 79 % (ref 32–75)
NRBC # BLD: 0 K/UL (ref 0–0.01)
NRBC BLD-RTO: 0 PER 100 WBC
PLATELET # BLD AUTO: 239 K/UL (ref 150–400)
PMV BLD AUTO: 9.9 FL (ref 8.9–12.9)
POTASSIUM SERPL-SCNC: 3.4 MMOL/L (ref 3.5–5.1)
RBC # BLD AUTO: 3.04 M/UL (ref 3.8–5.2)
SERVICE CMNT-IMP: ABNORMAL
SERVICE CMNT-IMP: NORMAL
SODIUM SERPL-SCNC: 134 MMOL/L (ref 136–145)
VANCOMYCIN SERPL-MCNC: 11 UG/ML
WBC # BLD AUTO: 6.7 K/UL (ref 3.6–11)

## 2023-11-13 PROCEDURE — 6370000000 HC RX 637 (ALT 250 FOR IP): Performed by: INTERNAL MEDICINE

## 2023-11-13 PROCEDURE — 97535 SELF CARE MNGMENT TRAINING: CPT

## 2023-11-13 PROCEDURE — 94761 N-INVAS EAR/PLS OXIMETRY MLT: CPT

## 2023-11-13 PROCEDURE — 6370000000 HC RX 637 (ALT 250 FOR IP): Performed by: HOSPITALIST

## 2023-11-13 PROCEDURE — 85025 COMPLETE CBC W/AUTO DIFF WBC: CPT

## 2023-11-13 PROCEDURE — 82962 GLUCOSE BLOOD TEST: CPT

## 2023-11-13 PROCEDURE — 6370000000 HC RX 637 (ALT 250 FOR IP)

## 2023-11-13 PROCEDURE — 1100000000 HC RM PRIVATE

## 2023-11-13 PROCEDURE — 80202 ASSAY OF VANCOMYCIN: CPT

## 2023-11-13 PROCEDURE — 2580000003 HC RX 258

## 2023-11-13 PROCEDURE — 2580000003 HC RX 258: Performed by: HOSPITALIST

## 2023-11-13 PROCEDURE — 36415 COLL VENOUS BLD VENIPUNCTURE: CPT

## 2023-11-13 PROCEDURE — 97165 OT EVAL LOW COMPLEX 30 MIN: CPT

## 2023-11-13 PROCEDURE — 80048 BASIC METABOLIC PNL TOTAL CA: CPT

## 2023-11-13 PROCEDURE — 6360000002 HC RX W HCPCS: Performed by: HOSPITALIST

## 2023-11-13 PROCEDURE — 99231 SBSQ HOSP IP/OBS SF/LOW 25: CPT | Performed by: NURSE PRACTITIONER

## 2023-11-13 RX ADMIN — POTASSIUM BICARBONATE 40 MEQ: 782 TABLET, EFFERVESCENT ORAL at 17:03

## 2023-11-13 RX ADMIN — FOLIC ACID 1 MG: 1 TABLET ORAL at 08:35

## 2023-11-13 RX ADMIN — CEFEPIME 2000 MG: 2 INJECTION, POWDER, FOR SOLUTION INTRAVENOUS at 00:10

## 2023-11-13 RX ADMIN — AMLODIPINE BESYLATE 10 MG: 5 TABLET ORAL at 21:44

## 2023-11-13 RX ADMIN — SODIUM CHLORIDE, PRESERVATIVE FREE 10 ML: 5 INJECTION INTRAVENOUS at 21:46

## 2023-11-13 RX ADMIN — LEVOTHYROXINE SODIUM 75 MCG: 0.07 TABLET ORAL at 05:01

## 2023-11-13 RX ADMIN — LISINOPRIL 10 MG: 5 TABLET ORAL at 21:45

## 2023-11-13 RX ADMIN — SODIUM CHLORIDE, PRESERVATIVE FREE 10 ML: 5 INJECTION INTRAVENOUS at 08:36

## 2023-11-13 ASSESSMENT — PAIN DESCRIPTION - LOCATION: LOCATION: BACK

## 2023-11-13 ASSESSMENT — PAIN - FUNCTIONAL ASSESSMENT: PAIN_FUNCTIONAL_ASSESSMENT: PREVENTS OR INTERFERES SOME ACTIVE ACTIVITIES AND ADLS

## 2023-11-13 ASSESSMENT — PAIN DESCRIPTION - DESCRIPTORS: DESCRIPTORS: ACHING

## 2023-11-13 ASSESSMENT — PAIN SCALES - GENERAL
PAINLEVEL_OUTOF10: 0
PAINLEVEL_OUTOF10: 3

## 2023-11-13 ASSESSMENT — PAIN DESCRIPTION - ORIENTATION: ORIENTATION: LOWER

## 2023-11-13 ASSESSMENT — PAIN DESCRIPTION - FREQUENCY: FREQUENCY: INTERMITTENT

## 2023-11-13 ASSESSMENT — PAIN DESCRIPTION - PAIN TYPE: TYPE: ACUTE PAIN

## 2023-11-13 NOTE — PLAN OF CARE
Problem: Safety - Adult  Goal: Free from fall injury  Outcome: Progressing  Flowsheets (Taken 11/13/2023 1013)  Free From Fall Injury: Instruct family/caregiver on patient safety     Problem: Pain  Goal: Verbalizes/displays adequate comfort level or baseline comfort level  Outcome: Progressing  Flowsheets (Taken 11/13/2023 1013)  Verbalizes/displays adequate comfort level or baseline comfort level:   Encourage patient to monitor pain and request assistance   Assess pain using appropriate pain scale   Administer analgesics based on type and severity of pain and evaluate response

## 2023-11-13 NOTE — CARE COORDINATION
11/13/2023 6:57 PM   Care Management Assessment      Reason for Admission: Emergency -     Septic arthritis [facility]) [M00.9]  Left elbow pain [M25.522]  Fall, initial encounter [W19. XXXA]  Closed fracture of twelfth thoracic vertebra, unspecified fracture morphology, initial encounter [facility]) [S22.279C]           Assessment:   [x]In person with pt and pt's sonWashington: Readmission Risk              Risk of Unplanned Readmission:  15           Risk Level: [x]Low []Moderate []High  Value-based purchasing: [] Yes [x] No    Advance Directive: Full Code     [] No AD on file. [x] AD on file. [] Current AD not on file. Copy requested. [] Requests AD, and referral [facility] to [facility]. Healthcare Decision Maker:   Primary Decision Maker: Britt Sushma Robin - [phone]        Assessment:     11/13/23 7314   Service Assessment   Patient Orientation Alert and Oriented   Cognition Alert   History Provided By Patient; Child/Family   Primary Caregiver Self   Accompanied By/Relationship Pt's sonCarl is: Legal Next of Kin   PCP Verified by CM Yes   Last Visit to PCP Within last 6 months   Prior Functional Level Independent in ADLs/IADLs   Can patient return to prior living arrangement Yes   Ability to make needs known: Good   Family able to assist with home care needs: Yes  (Pt's son confirmed he can provide assistance for pt at home for all mobility)   Would you like for me to discuss the discharge plan with any other family members/significant others, and if so, who?  Yes   Financial Resources Medicare   Community Resources None   Social/Functional History   Lives With Alone   Type of Home Apartment   Home Layout One level   Home Access Elevator   Bathroom Equipment Shower chair;Grab bars in shower;Grab bars around toilet   [address] Needs assistance   Active

## 2023-11-14 LAB
GLUCOSE BLD STRIP.AUTO-MCNC: 111 MG/DL (ref 65–117)
GLUCOSE BLD STRIP.AUTO-MCNC: 112 MG/DL (ref 65–117)
GLUCOSE BLD STRIP.AUTO-MCNC: 118 MG/DL (ref 65–117)
GLUCOSE BLD STRIP.AUTO-MCNC: 120 MG/DL (ref 65–117)
SERVICE CMNT-IMP: ABNORMAL
SERVICE CMNT-IMP: ABNORMAL
SERVICE CMNT-IMP: NORMAL
SERVICE CMNT-IMP: NORMAL

## 2023-11-14 PROCEDURE — 6370000000 HC RX 637 (ALT 250 FOR IP): Performed by: HOSPITALIST

## 2023-11-14 PROCEDURE — 6370000000 HC RX 637 (ALT 250 FOR IP)

## 2023-11-14 PROCEDURE — 1100000000 HC RM PRIVATE

## 2023-11-14 PROCEDURE — 97530 THERAPEUTIC ACTIVITIES: CPT

## 2023-11-14 PROCEDURE — 82962 GLUCOSE BLOOD TEST: CPT

## 2023-11-14 PROCEDURE — 94761 N-INVAS EAR/PLS OXIMETRY MLT: CPT

## 2023-11-14 PROCEDURE — 2580000003 HC RX 258: Performed by: HOSPITALIST

## 2023-11-14 PROCEDURE — 97116 GAIT TRAINING THERAPY: CPT

## 2023-11-14 RX ADMIN — AMLODIPINE BESYLATE 10 MG: 5 TABLET ORAL at 22:46

## 2023-11-14 RX ADMIN — SODIUM CHLORIDE, PRESERVATIVE FREE 10 ML: 5 INJECTION INTRAVENOUS at 09:49

## 2023-11-14 RX ADMIN — LEVOTHYROXINE SODIUM 75 MCG: 0.07 TABLET ORAL at 06:36

## 2023-11-14 RX ADMIN — LISINOPRIL 10 MG: 5 TABLET ORAL at 22:46

## 2023-11-14 RX ADMIN — FOLIC ACID 1 MG: 1 TABLET ORAL at 09:49

## 2023-11-14 NOTE — PLAN OF CARE
Problem: Discharge Planning  Goal: Discharge to home or other facility with appropriate resources  Outcome: Progressing     Problem: Safety - Adult  Goal: Free from fall injury  Outcome: Progressing     Problem: Pain  Goal: Verbalizes/displays adequate comfort level or baseline comfort level  Outcome: Progressing     Problem: Physical Therapy - Adult  Goal: By Discharge: Performs mobility at highest level of function for planned discharge setting. See evaluation for individualized goals. Description: FUNCTIONAL STATUS PRIOR TO ADMISSION: Patient was modified independent using a rollator for functional mobility. HOME SUPPORT PRIOR TO ADMISSION: The patient lived alone with son to provide assistance as needed    Physical Therapy Goals  Initiated 11/11/2023  1. Patient will move from supine to sit and sit to supine in bed with supervision/set-up within 7 day(s). 2.  Patient will perform sit to stand with supervision/set-up within 7 day(s). 3.  Patient will transfer from bed to chair and chair to bed with supervision/set-up using the least restrictive device within 7 day(s). 4.  Patient will ambulate with contact guard assist for 150 feet with the least restrictive device within 7 day(s). 11/14/2023 1233 by Alen Brown PTA  Outcome: Progressing     Problem: Chronic Conditions and Co-morbidities  Goal: Patient's chronic conditions and co-morbidity symptoms are monitored and maintained or improved  Outcome: Progressing     Problem: Skin/Tissue Integrity  Goal: Absence of new skin breakdown  Description: 1. Monitor for areas of redness and/or skin breakdown  2. Assess vascular access sites hourly  3. Every 4-6 hours minimum:  Change oxygen saturation probe site  4. Every 4-6 hours:  If on nasal continuous positive airway pressure, respiratory therapy assess nares and determine need for appliance change or resting period.   Outcome: Progressing

## 2023-11-14 NOTE — PLAN OF CARE
Problem: Physical Therapy - Adult  Goal: By Discharge: Performs mobility at highest level of function for planned discharge setting. See evaluation for individualized goals. Description: FUNCTIONAL STATUS PRIOR TO ADMISSION: Patient was modified independent using a rollator for functional mobility. HOME SUPPORT PRIOR TO ADMISSION: The patient lived alone with son to provide assistance as needed    Physical Therapy Goals  Initiated 11/11/2023  1. Patient will move from supine to sit and sit to supine in bed with supervision/set-up within 7 day(s). 2.  Patient will perform sit to stand with supervision/set-up within 7 day(s). 3.  Patient will transfer from bed to chair and chair to bed with supervision/set-up using the least restrictive device within 7 day(s). 4.  Patient will ambulate with contact guard assist for 150 feet with the least restrictive device within 7 day(s). Outcome: Progressing   PHYSICAL THERAPY TREATMENT    Patient: Adalberto Parnell (18 y.o. female)  Date: 11/14/2023  Diagnosis: Septic arthritis ([address]) [M00.9]  Left elbow pain [M25.522]  Fall, initial encounter [W19. XXXA]  Closed fracture of twelfth thoracic vertebra, unspecified fracture morphology, initial encounter ([address]) [S22.089A] Septic arthritis ([address])      Precautions: Bed Alarm, Fall Risk         Spinal Precautions: No Bending, No Lifting, No Twisting          ASSESSMENT:  Patient continues to benefit from skilled PT services and is progressing towards goals. Pt denies elbow pain and eagerly demonstrates active ROM B UE. Reviewed no BLTs precautions. Pt required CGA for mobility tasks and occasional cues for \"no twisting\" with dynamic standing activity. Denies increased back pain with gait training 40 feet with RW support. No LOB. Son present during session, states he is willing and able to assist patient at current level.           PLAN:  Patient continues to benefit from skilled intervention to address the above Verbalized understanding;Continued education needed      Maria E [place]  Minutes: 29

## 2023-11-14 NOTE — PROGRESS NOTES
Physician Progress Note      PATIENT:               Wileen Bence  CSN #:                  [id number]  :                       1930  ADMIT DATE:       11/10/2023 8:13 AM  DISCH DATE:  RESPONDING  PROVIDER #:        Austin Stafford DO        QUERY TEXT:    Stage of Chronic Kidney Disease: Please provide further specificity, if known. Clinical indicators include:  Options provided:  -- Chronic kidney disease stage 1  -- Chronic kidney disease stage 2  -- Chronic kidney disease stage 3  -- Chronic kidney disease stage 3a  -- Chronic kidney disease stage 3b  -- Chronic kidney disease stage 4  -- Chronic kidney disease stage 5  -- Chronic kidney disease stage 5, requiring dialysis  -- End stage renal disease  -- Other - I will add my own diagnosis  -- Disagree - Not applicable / Not valid  -- Disagree - Clinically Unable to determine / Unknown        PROVIDER RESPONSE TEXT:    Provider dismissed this query because it was not applicable to the patient or   not a valid query.       Electronically signed by:  Austin Stafford DO 2023 4:37 PM

## 2023-11-14 NOTE — CARE COORDINATION
11/14/2023 12:08 PM   Care Management Progress Note      ICD-10-CM    1. Closed fracture of twelfth thoracic vertebra, unspecified fracture morphology, initial encounter ([address])  S22.089A       2. Left elbow pain  M25.522       3. Fall, initial encounter  Extension Kristal Olivas. Charmayne Post           RUR:  14%  Risk Level: [x]Low []Moderate []High    Transition of care plan:  Ongoing medical management  Home at discharge with 24 hour assist from pt's son  Home health PT and OT arranged through [facility]  Outpatient follow-up. Pt's son to transport pt at discharge.

## 2023-11-14 NOTE — PROGRESS NOTES
Occupational Therapy: defer    Chart reviewed, attempted OT treatment session. Patient's greatest OT need is compensating for impaired LB reach + newly enforced spinal precautions. Attempted eduction on LB AE, but patient was not receptive. Although she declined compensatory training, she did not provide an alternative to reach her feet while maintaining precautions, and her reasoning for denying education was that her apartment is small so she will be fine. She declined other options for session as well but indicated she may be more receptive when her son is present. Will defer OT at this time and will follow-up as able and appropriate.     Radha Montero, NUBIAR/L

## 2023-11-15 VITALS
RESPIRATION RATE: 15 BRPM | BODY MASS INDEX: 22.38 KG/M2 | HEIGHT: 59 IN | TEMPERATURE: 97.7 F | DIASTOLIC BLOOD PRESSURE: 65 MMHG | SYSTOLIC BLOOD PRESSURE: 119 MMHG | OXYGEN SATURATION: 100 % | WEIGHT: 111 LBS | HEART RATE: 78 BPM

## 2023-11-15 LAB
BACTERIA SPEC CULT: NORMAL
BACTERIA SPEC CULT: NORMAL
CRP SERPL-MCNC: 3.26 MG/DL (ref 0–0.6)
GLUCOSE BLD STRIP.AUTO-MCNC: 98 MG/DL (ref 65–117)
GRAM STN SPEC: NORMAL
SERVICE CMNT-IMP: NORMAL

## 2023-11-15 PROCEDURE — 94761 N-INVAS EAR/PLS OXIMETRY MLT: CPT

## 2023-11-15 PROCEDURE — 6370000000 HC RX 637 (ALT 250 FOR IP): Performed by: HOSPITALIST

## 2023-11-15 PROCEDURE — 6370000000 HC RX 637 (ALT 250 FOR IP)

## 2023-11-15 PROCEDURE — APPNB30 APP NON BILLABLE TIME 0-30 MINS: Performed by: NURSE PRACTITIONER

## 2023-11-15 PROCEDURE — 97535 SELF CARE MNGMENT TRAINING: CPT

## 2023-11-15 PROCEDURE — 2580000003 HC RX 258

## 2023-11-15 PROCEDURE — 36415 COLL VENOUS BLD VENIPUNCTURE: CPT

## 2023-11-15 PROCEDURE — 82962 GLUCOSE BLOOD TEST: CPT

## 2023-11-15 PROCEDURE — 2580000003 HC RX 258: Performed by: HOSPITALIST

## 2023-11-15 PROCEDURE — 86140 C-REACTIVE PROTEIN: CPT

## 2023-11-15 RX ADMIN — SODIUM CHLORIDE, PRESERVATIVE FREE 10 ML: 5 INJECTION INTRAVENOUS at 09:52

## 2023-11-15 RX ADMIN — FOLIC ACID 1 MG: 1 TABLET ORAL at 09:52

## 2023-11-15 RX ADMIN — ACETAMINOPHEN 650 MG: 325 TABLET ORAL at 05:53

## 2023-11-15 RX ADMIN — LEVOTHYROXINE SODIUM 75 MCG: 0.07 TABLET ORAL at 05:54

## 2023-11-15 ASSESSMENT — PAIN SCALES - GENERAL
PAINLEVEL_OUTOF10: 2
PAINLEVEL_OUTOF10: 0
PAINLEVEL_OUTOF10: 0

## 2023-11-15 ASSESSMENT — PAIN - FUNCTIONAL ASSESSMENT: PAIN_FUNCTIONAL_ASSESSMENT: PREVENTS OR INTERFERES SOME ACTIVE ACTIVITIES AND ADLS

## 2023-11-15 ASSESSMENT — PAIN DESCRIPTION - LOCATION: LOCATION: BACK

## 2023-11-15 ASSESSMENT — PAIN DESCRIPTION - DESCRIPTORS: DESCRIPTORS: ACHING

## 2023-11-15 ASSESSMENT — PAIN DESCRIPTION - ORIENTATION: ORIENTATION: POSTERIOR

## 2023-11-15 NOTE — DISCHARGE INSTRUCTIONS
ACUTE DIAGNOSES:  Septic arthritis ([address] ) [M00.9]  Left elbow pain [M25.522]  Fall, initial encounter [W19. XXXA]  Closed fracture of twelfth thoracic vertebra, unspecified fracture morphology, initial encounter ([address] [facility]) [P31.997V]    CHRONIC MEDICAL DIAGNOSES:  [unfilled]    DISCHARGE MEDICATIONS:   [unfilled]    It is important that you take the medication exactly as they are prescribed. Keep your medication in the bottles provided by the pharmacist and keep a list of the medication names, dosages, and times to be taken in your wallet. Do not take other medications without consulting your doctor. DIET:  regular diet    ACTIVITY: activity as tolerated    ADDITIONAL INFORMATION: If you experience any of the following symptoms then please call your primary care physician or return to the emergency room if you cannot get hold of your doctor: Fever, chills, nausea, vomiting, diarrhea, change in mentation, falling, bleeding, shortness of breath. FOLLOW UP CARE:   @ICU@  you are to call and set up an appointment to see them in 5 days. Follow-up  [address]  [phone]  Follow up  [address] [facility],  [address] obtained by :  I understand that if any problems occur once I am at home I am to contact my physician. I understand and acknowledge receipt of the instructions indicated above.                                                                                                                                            Physician's or R.N.'s Signature                                                                  Date/Time                                                                                                                                              Patient or Representative Signature                                                          Date/Time

## 2023-11-15 NOTE — CARE COORDINATION
11/15/2023 11:39 AM Leeann Quezada was sent pt's discharge summary and notified of pt's discharge home today via [employer]. LYNETTE Flores     Care Management Progress Note         ICD-10-CM     1. Closed fracture of twelfth thoracic vertebra, unspecified fracture morphology, initial encounter ([address] [facility])  S22.089A         2. Left elbow pain  M25.522         3. Fall, initial encounter  Extension Kristal Olivas. Ty Laurens               RUR:  14%  Risk Level: [x]Low []Moderate []High     Transition of care plan:  Ongoing medical management  Home at discharge with 24 hour assist from pt's son  Home health PT and OT arranged through Leeann Quezada  Outpatient follow-up. Pt's son to transport pt at discharge. 11/15/23 1138   Discharge Planning   Type of Residence Apartment   Living Arrangements Alone  (with 24 hour assist from pt's son)   Current Services Prior To Admission None   Potential [place]   DME Ordered? No   Potential Assistance Purchasing Medications No   Type of Home Care Services PT;OT   Patient expects to be discharged to: [facility] Discharge   Transition of Care Consult (CM Consult) [facility] No   Reason Outside Agency Chosen Patient already serviced by other home [address] Provided? No   Mode of Transport at Discharge Other (see comment)  (Pt's son)   Condition of Participation: Discharge Planning   The Plan for Transition of Care is related to the following treatment goals: home health   The Patient and/or Patient Representative was provided with a Choice of Provider? Patient   The Patient and/Or Patient Representative agree with the Discharge Plan? Yes   Freedom of Choice list was provided with basic dialogue that supports the patient's individualized plan of care/goals, treatment preferences, and shares the quality data associated with the providers?   Yes

## 2023-11-15 NOTE — PLAN OF CARE
Problem: Discharge Planning  Goal: Discharge to home or other facility with appropriate resources  Outcome: Progressing     Problem: Safety - Adult  Goal: Free from fall injury  Outcome: Progressing     Problem: Pain  Goal: Verbalizes/displays adequate comfort level or baseline comfort level  Outcome: Progressing     Problem: Chronic Conditions and Co-morbidities  Goal: Patient's chronic conditions and co-morbidity symptoms are monitored and maintained or improved  Outcome: Progressing     Problem: Skin/Tissue Integrity  Goal: Absence of new skin breakdown  Description: 1. Monitor for areas of redness and/or skin breakdown  2. Assess vascular access sites hourly  3. Every 4-6 hours minimum:  Change oxygen saturation probe site  4. Every 4-6 hours:  If on nasal continuous positive airway pressure, respiratory therapy assess nares and determine need for appliance change or resting period.   Outcome: Progressing     Problem: ABCDS Injury Assessment  Goal: Absence of physical injury  Outcome: Progressing

## 2023-11-15 NOTE — PROGRESS NOTES
Ortho Progress Note:    Pt is s/p L elbow aspiration, cell count not consistent with septic arthritis and final cultures no growth.   Abx have been discontinued with ID following   Continue methotrexate for RA and consider steroid taper if L elbow pain persists   Pt to follow up with Rheumatology for continued care  Follow up with Ortho if needed  Ortho will sign off

## 2023-11-15 NOTE — DISCHARGE SUMMARY
Hospitalist Discharge Summary     Patient ID:    Cameron Valderrama  [id number]  64 y.o.  2/13/1930    Admit date of service: 11/10/2023    Discharge date of service: 11/15/2023    Admission Diagnoses: Septic arthritis [facility]) [M00.9]  Left elbow pain [M25.522]  Fall, initial encounter [W19. XXXA]  Closed fracture of twelfth thoracic vertebra, unspecified fracture morphology, initial encounter ([address] [facility]) [S22.098B]    Chronic Diagnoses:      Discharge Medications:   Current Discharge Medication List        CONTINUE these medications which have NOT CHANGED    Details   acetaminophen (TYLENOL) 500 MG tablet Take 1 tablet by mouth 2 times daily as needed      amLODIPine (NORVASC) 10 MG tablet Take 1 tablet by mouth daily      aspirin 81 MG EC tablet Take 1 tablet by mouth daily      vitamin D 25 MCG (1000 UT) CAPS Take 2 capsules by mouth daily      docusate (COLACE, DULCOLAX) 100 MG CAPS Take 100 mg by mouth 2 times daily as needed      folic acid (FOLVITE) 1 MG tablet Take 1 tablet by mouth daily      levothyroxine (SYNTHROID) 75 MCG tablet Take 1 tablet by mouth every morning (before breakfast)      lisinopril (PRINIVIL;ZESTRIL) 10 MG tablet Take 1 tablet by mouth daily      polyethylene glycol (GLYCOLAX) 17 GM/SCOOP powder Take 17 g by mouth daily      senna-docusate (PERICOLACE) 8.6-50 MG per tablet Take 1 tablet by mouth daily      simethicone (MYLICON) 80 MG chewable tablet Take 1 tablet by mouth every 6 hours as needed      tamsulosin (FLOMAX) 0.4 MG capsule Take 1 capsule by mouth daily             Follow up Care:    1. [address], [address] [place] [address]  Follow up  [place]   in 1-2 weeks  2. Diet:  regular diet    Disposition:  Home. Advanced Directive:    Discharge Exam:  See today's note.     CONSULTATIONS: ID and orthopedic surgery    Significant Diagnostic Studies:   Recent Labs     11/13/23  0331   WBC 6.7   HGB 8.9*   HCT

## 2023-11-15 NOTE — PLAN OF CARE
Problem: Occupational Therapy - Adult  Goal: By Discharge: Performs self-care activities at highest level of function for planned discharge setting. See evaluation for individualized goals. Description: FUNCTIONAL STATUS PRIOR TO ADMISSION:  Patient was modified independent with basic ADLs although endorses difficulty with UB dressing d/t impaired coordination and reach. She has a history of RA with bilateral hand deformities, L shoulder OA with very limited active flexion or abduction, and L elbow septic arthritis. Bathing seated in shower. She was ambulatory with no AD within apartment and a rollator in lewis/ community. She endorses 1 GLF at EOB, unable to arise overnight, leading to current admission as well as a GLF ~1 month ago due to sliding out of a pillow in a chair. HOME SUPPORT: Patient lived alone in an apartment with daily check-ins from her son, who lives in the same building and also monitored her with cameras in her apartment. Her son provides groceries, prepares meals, and drives her to appointments. Occupational Therapy Goals:  Initiated 11/13/2023  1. Patient will perform grooming standing at sink with Supervision within 7 day(s). 2.  Patient will perform lower body dressing with Supervision using AE PRN within 7 day(s). 3.  Patient will perform bathing with Supervision within 7 day(s). 4.  Patient will perform toilet transfers with Supervision  within 7 day(s). 5.  Patient will perform all aspects of toileting with Supervision within 7 day(s). 6.  Patient will participate in upper extremity therapeutic exercise/activities with Supervision for 10 minutes within 7 day(s). 7.  Patient will utilize fall prevention techniques during functional activities with verbal cues within 7 day(s).     Outcome: Progressing   OCCUPATIONAL THERAPY TREATMENT  Patient: Ameena Sun (29 y.o. female)  Date: 11/15/2023  Primary Diagnosis: Septic arthritis ([address]) [M00.9]  Left elbow pain

## 2023-11-17 NOTE — ED NOTES
TRANSFER - OUT REPORT:    Verbal report given to Meryle Beecham, RN (name) on Edel Reynoso  being transferred to 5th Floor (unit) for routine progression of care       Report consisted of patients Situation, Background, Assessment and   Recommendations(SBAR). Information from the following report(s) SBAR, Kardex, ED Summary, Intake/Output, MAR and Recent Results was reviewed with the receiving nurse. Lines:   PICC Single Lumen 14/02/02 Right;Basilic (Active)       Peripheral IV 02/27/22 Right Forearm (Active)   Site Assessment Clean, dry, & intact 02/27/22 0433   Phlebitis Assessment 0 02/27/22 0433   Infiltration Assessment 0 02/27/22 0433   Dressing Status Clean, dry, & intact 02/27/22 0433   Dressing Type Transparent 02/27/22 0433   Hub Color/Line Status Pink;Flushed 02/27/22 0433   Action Taken Blood drawn 02/27/22 0433       Peripheral IV 02/27/22 Right Antecubital (Active)   Site Assessment Clean, dry, & intact 02/27/22 0610   Phlebitis Assessment 0 02/27/22 0610   Infiltration Assessment 0 02/27/22 0610   Dressing Status Clean, dry, & intact 02/27/22 0610   Hub Color/Line Status Pink 02/27/22 0610   Action Taken Blood drawn 02/27/22 0913        Opportunity for questions and clarification was provided.       Patient transported with:  [facility])

## 2023-12-01 ENCOUNTER — HOSPITAL ENCOUNTER (OUTPATIENT)
Facility: HOSPITAL | Age: 88
Setting detail: OBSERVATION
Discharge: HOME | End: 2023-12-03
Attending: STUDENT IN AN ORGANIZED HEALTH CARE EDUCATION/TRAINING PROGRAM | Admitting: INTERNAL MEDICINE
Payer: MEDICARE

## 2023-12-01 ENCOUNTER — APPOINTMENT (OUTPATIENT)
Facility: HOSPITAL | Age: 88
End: 2023-12-01
Payer: MEDICARE

## 2023-12-01 DIAGNOSIS — E87.1 HYPONATREMIA: ICD-10-CM

## 2023-12-01 DIAGNOSIS — K57.92 ACUTE DIVERTICULITIS: Primary | ICD-10-CM

## 2023-12-01 DIAGNOSIS — D72.829 LEUKOCYTOSIS, UNSPECIFIED TYPE: ICD-10-CM

## 2023-12-01 LAB
ALBUMIN SERPL-MCNC: 2.9 G/DL (ref 3.5–5)
ALBUMIN/GLOB SERPL: 0.8 (ref 1.1–2.2)
ALP SERPL-CCNC: 90 U/L (ref 45–117)
ALT SERPL-CCNC: 12 U/L (ref 12–78)
ANION GAP SERPL CALC-SCNC: 10 MMOL/L (ref 5–15)
APPEARANCE UR: ABNORMAL
AST SERPL-CCNC: 11 U/L (ref 15–37)
BACTERIA URNS QL MICRO: NEGATIVE /HPF
BASOPHILS # BLD: 0 K/UL (ref 0–0.1)
BASOPHILS NFR BLD: 0 % (ref 0–1)
BILIRUB SERPL-MCNC: 0.6 MG/DL (ref 0.2–1)
BILIRUB UR QL: NEGATIVE
BUN SERPL-MCNC: 19 MG/DL (ref 6–20)
BUN/CREAT SERPL: 22 (ref 12–20)
CALCIUM SERPL-MCNC: 8.9 MG/DL (ref 8.5–10.1)
CHLORIDE SERPL-SCNC: 99 MMOL/L (ref 97–108)
CO2 SERPL-SCNC: 23 MMOL/L (ref 21–32)
COLOR UR: ABNORMAL
CREAT SERPL-MCNC: 0.88 MG/DL (ref 0.55–1.02)
DIFFERENTIAL METHOD BLD: ABNORMAL
EOSINOPHIL # BLD: 0 K/UL (ref 0–0.4)
EOSINOPHIL NFR BLD: 0 % (ref 0–7)
EPITH CASTS URNS QL MICRO: ABNORMAL /LPF
ERYTHROCYTE [DISTWIDTH] IN BLOOD BY AUTOMATED COUNT: 15 % (ref 11.5–14.5)
GLOBULIN SER CALC-MCNC: 3.5 G/DL (ref 2–4)
GLUCOSE SERPL-MCNC: 129 MG/DL (ref 65–100)
GLUCOSE UR STRIP.AUTO-MCNC: NEGATIVE MG/DL
HCT VFR BLD AUTO: 30.9 % (ref 35–47)
HGB BLD-MCNC: 10.2 G/DL (ref 11.5–16)
HGB UR QL STRIP: ABNORMAL
IMM GRANULOCYTES # BLD AUTO: 0 K/UL (ref 0–0.04)
IMM GRANULOCYTES NFR BLD AUTO: 0 % (ref 0–0.5)
KETONES UR QL STRIP.AUTO: NEGATIVE MG/DL
LACTATE BLD-SCNC: 1.04 MMOL/L (ref 0.4–2)
LEUKOCYTE ESTERASE UR QL STRIP.AUTO: NEGATIVE
LIPASE SERPL-CCNC: 18 U/L (ref 13–75)
LYMPHOCYTES # BLD: 0.3 K/UL (ref 0.8–3.5)
LYMPHOCYTES NFR BLD: 2 % (ref 12–49)
MCH RBC QN AUTO: 30.2 PG (ref 26–34)
MCHC RBC AUTO-ENTMCNC: 33 G/DL (ref 30–36.5)
MCV RBC AUTO: 91.4 FL (ref 80–99)
MONOCYTES # BLD: 0.3 K/UL (ref 0–1)
MONOCYTES NFR BLD: 2 % (ref 5–13)
NEUTS SEG # BLD: 14.6 K/UL (ref 1.8–8)
NEUTS SEG NFR BLD: 96 % (ref 32–75)
NITRITE UR QL STRIP.AUTO: NEGATIVE
NRBC # BLD: 0 K/UL (ref 0–0.01)
NRBC BLD-RTO: 0 PER 100 WBC
PH UR STRIP: 6.5 (ref 5–8)
PLATELET # BLD AUTO: 272 K/UL (ref 150–400)
PMV BLD AUTO: 9.3 FL (ref 8.9–12.9)
POTASSIUM SERPL-SCNC: 3.9 MMOL/L (ref 3.5–5.1)
PROT SERPL-MCNC: 6.4 G/DL (ref 6.4–8.2)
PROT UR STRIP-MCNC: NEGATIVE MG/DL
RBC # BLD AUTO: 3.38 M/UL (ref 3.8–5.2)
RBC #/AREA URNS HPF: ABNORMAL /HPF (ref 0–5)
RBC MORPH BLD: ABNORMAL
SODIUM SERPL-SCNC: 132 MMOL/L (ref 136–145)
SP GR UR REFRACTOMETRY: 1.01 (ref 1–1.03)
UROBILINOGEN UR QL STRIP.AUTO: 0.2 EU/DL (ref 0.2–1)
WBC # BLD AUTO: 15.2 K/UL (ref 3.6–11)
WBC URNS QL MICRO: ABNORMAL /HPF (ref 0–4)

## 2023-12-01 PROCEDURE — 6360000004 HC RX CONTRAST MEDICATION: Performed by: STUDENT IN AN ORGANIZED HEALTH CARE EDUCATION/TRAINING PROGRAM

## 2023-12-01 PROCEDURE — 96374 THER/PROPH/DIAG INJ IV PUSH: CPT

## 2023-12-01 PROCEDURE — 80053 COMPREHEN METABOLIC PANEL: CPT

## 2023-12-01 PROCEDURE — 36415 COLL VENOUS BLD VENIPUNCTURE: CPT

## 2023-12-01 PROCEDURE — 96361 HYDRATE IV INFUSION ADD-ON: CPT

## 2023-12-01 PROCEDURE — 2580000003 HC RX 258: Performed by: STUDENT IN AN ORGANIZED HEALTH CARE EDUCATION/TRAINING PROGRAM

## 2023-12-01 PROCEDURE — 83690 ASSAY OF LIPASE: CPT

## 2023-12-01 PROCEDURE — 83605 ASSAY OF LACTIC ACID: CPT

## 2023-12-01 PROCEDURE — 1100000000 HC RM PRIVATE

## 2023-12-01 PROCEDURE — 0202U NFCT DS 22 TRGT SARS-COV-2: CPT

## 2023-12-01 PROCEDURE — 85025 COMPLETE CBC W/AUTO DIFF WBC: CPT

## 2023-12-01 PROCEDURE — 81001 URINALYSIS AUTO W/SCOPE: CPT

## 2023-12-01 PROCEDURE — 74177 CT ABD & PELVIS W/CONTRAST: CPT

## 2023-12-01 PROCEDURE — 6360000002 HC RX W HCPCS: Performed by: STUDENT IN AN ORGANIZED HEALTH CARE EDUCATION/TRAINING PROGRAM

## 2023-12-01 PROCEDURE — 96375 TX/PRO/DX INJ NEW DRUG ADDON: CPT

## 2023-12-01 PROCEDURE — 73110 X-RAY EXAM OF WRIST: CPT

## 2023-12-01 PROCEDURE — 99285 EMERGENCY DEPT VISIT HI MDM: CPT

## 2023-12-01 PROCEDURE — 96365 THER/PROPH/DIAG IV INF INIT: CPT

## 2023-12-01 RX ORDER — POTASSIUM CHLORIDE 750 MG/1
40 TABLET, FILM COATED, EXTENDED RELEASE ORAL PRN
Status: DISCONTINUED | OUTPATIENT
Start: 2023-12-01 | End: 2023-12-03 | Stop reason: HOSPADM

## 2023-12-01 RX ORDER — ONDANSETRON 2 MG/ML
4 INJECTION INTRAMUSCULAR; INTRAVENOUS
Status: COMPLETED | OUTPATIENT
Start: 2023-12-01 | End: 2023-12-01

## 2023-12-01 RX ORDER — 0.9 % SODIUM CHLORIDE 0.9 %
1000 INTRAVENOUS SOLUTION INTRAVENOUS ONCE
Status: COMPLETED | OUTPATIENT
Start: 2023-12-01 | End: 2023-12-01

## 2023-12-01 RX ORDER — LEVOTHYROXINE SODIUM 88 UG/1
88 TABLET ORAL
Status: DISCONTINUED | OUTPATIENT
Start: 2023-12-02 | End: 2023-12-03 | Stop reason: HOSPADM

## 2023-12-01 RX ORDER — SODIUM CHLORIDE 9 MG/ML
INJECTION, SOLUTION INTRAVENOUS CONTINUOUS
Status: DISCONTINUED | OUTPATIENT
Start: 2023-12-01 | End: 2023-12-03 | Stop reason: HOSPADM

## 2023-12-01 RX ORDER — SODIUM CHLORIDE 9 MG/ML
INJECTION, SOLUTION INTRAVENOUS PRN
Status: DISCONTINUED | OUTPATIENT
Start: 2023-12-01 | End: 2023-12-03 | Stop reason: HOSPADM

## 2023-12-01 RX ORDER — SODIUM CHLORIDE 0.9 % (FLUSH) 0.9 %
5-40 SYRINGE (ML) INJECTION EVERY 12 HOURS SCHEDULED
Status: DISCONTINUED | OUTPATIENT
Start: 2023-12-01 | End: 2023-12-03 | Stop reason: HOSPADM

## 2023-12-01 RX ORDER — ONDANSETRON 4 MG/1
4 TABLET, ORALLY DISINTEGRATING ORAL EVERY 8 HOURS PRN
Status: DISCONTINUED | OUTPATIENT
Start: 2023-12-01 | End: 2023-12-03 | Stop reason: HOSPADM

## 2023-12-01 RX ORDER — 0.9 % SODIUM CHLORIDE 0.9 %
1000 INTRAVENOUS SOLUTION INTRAVENOUS ONCE
Status: DISCONTINUED | OUTPATIENT
Start: 2023-12-01 | End: 2023-12-03 | Stop reason: HOSPADM

## 2023-12-01 RX ORDER — ACETAMINOPHEN 325 MG/1
650 TABLET ORAL EVERY 6 HOURS PRN
Status: DISCONTINUED | OUTPATIENT
Start: 2023-12-01 | End: 2023-12-03 | Stop reason: HOSPADM

## 2023-12-01 RX ORDER — MAGNESIUM SULFATE IN WATER 40 MG/ML
2000 INJECTION, SOLUTION INTRAVENOUS PRN
Status: DISCONTINUED | OUTPATIENT
Start: 2023-12-01 | End: 2023-12-03 | Stop reason: HOSPADM

## 2023-12-01 RX ORDER — AMLODIPINE BESYLATE 5 MG/1
10 TABLET ORAL DAILY
Status: DISCONTINUED | OUTPATIENT
Start: 2023-12-02 | End: 2023-12-03 | Stop reason: HOSPADM

## 2023-12-01 RX ORDER — METRONIDAZOLE 500 MG/100ML
500 INJECTION, SOLUTION INTRAVENOUS
Status: COMPLETED | OUTPATIENT
Start: 2023-12-01 | End: 2023-12-01

## 2023-12-01 RX ORDER — HEPARIN SODIUM 5000 [USP'U]/ML
5000 INJECTION, SOLUTION INTRAVENOUS; SUBCUTANEOUS 2 TIMES DAILY
Status: DISCONTINUED | OUTPATIENT
Start: 2023-12-01 | End: 2023-12-03 | Stop reason: HOSPADM

## 2023-12-01 RX ORDER — LISINOPRIL 5 MG/1
10 TABLET ORAL DAILY
Status: DISCONTINUED | OUTPATIENT
Start: 2023-12-02 | End: 2023-12-03 | Stop reason: HOSPADM

## 2023-12-01 RX ORDER — ENOXAPARIN SODIUM 100 MG/ML
40 INJECTION SUBCUTANEOUS DAILY
Status: DISCONTINUED | OUTPATIENT
Start: 2023-12-01 | End: 2023-12-01 | Stop reason: CLARIF

## 2023-12-01 RX ORDER — POLYETHYLENE GLYCOL 3350 17 G/17G
17 POWDER, FOR SOLUTION ORAL DAILY PRN
Status: DISCONTINUED | OUTPATIENT
Start: 2023-12-01 | End: 2023-12-03 | Stop reason: HOSPADM

## 2023-12-01 RX ORDER — METHOTREXATE 2.5 MG/1
20 TABLET ORAL WEEKLY
COMMUNITY

## 2023-12-01 RX ORDER — ACETAMINOPHEN 650 MG/1
650 SUPPOSITORY RECTAL EVERY 6 HOURS PRN
Status: DISCONTINUED | OUTPATIENT
Start: 2023-12-01 | End: 2023-12-03 | Stop reason: HOSPADM

## 2023-12-01 RX ORDER — ONDANSETRON 2 MG/ML
4 INJECTION INTRAMUSCULAR; INTRAVENOUS EVERY 6 HOURS PRN
Status: DISCONTINUED | OUTPATIENT
Start: 2023-12-01 | End: 2023-12-03 | Stop reason: HOSPADM

## 2023-12-01 RX ORDER — POTASSIUM CHLORIDE 7.45 MG/ML
10 INJECTION INTRAVENOUS PRN
Status: DISCONTINUED | OUTPATIENT
Start: 2023-12-01 | End: 2023-12-03 | Stop reason: HOSPADM

## 2023-12-01 RX ORDER — SODIUM CHLORIDE 0.9 % (FLUSH) 0.9 %
5-40 SYRINGE (ML) INJECTION PRN
Status: DISCONTINUED | OUTPATIENT
Start: 2023-12-01 | End: 2023-12-03 | Stop reason: HOSPADM

## 2023-12-01 RX ORDER — METRONIDAZOLE 500 MG/100ML
500 INJECTION, SOLUTION INTRAVENOUS EVERY 8 HOURS
Status: DISCONTINUED | OUTPATIENT
Start: 2023-12-01 | End: 2023-12-03 | Stop reason: HOSPADM

## 2023-12-01 RX ADMIN — WATER 2000 MG: 1 INJECTION INTRAMUSCULAR; INTRAVENOUS; SUBCUTANEOUS at 14:40

## 2023-12-01 RX ADMIN — SODIUM CHLORIDE: 9 INJECTION, SOLUTION INTRAVENOUS at 19:59

## 2023-12-01 RX ADMIN — IOPAMIDOL 100 ML: 755 INJECTION, SOLUTION INTRAVENOUS at 12:43

## 2023-12-01 RX ADMIN — METRONIDAZOLE 500 MG: 500 INJECTION, SOLUTION INTRAVENOUS at 23:19

## 2023-12-01 RX ADMIN — SODIUM CHLORIDE, PRESERVATIVE FREE 10 ML: 5 INJECTION INTRAVENOUS at 19:59

## 2023-12-01 RX ADMIN — SODIUM CHLORIDE 1000 ML: 9 INJECTION, SOLUTION INTRAVENOUS at 12:31

## 2023-12-01 RX ADMIN — ONDANSETRON 4 MG: 2 INJECTION INTRAMUSCULAR; INTRAVENOUS at 12:29

## 2023-12-01 RX ADMIN — METRONIDAZOLE 500 MG: 500 INJECTION, SOLUTION INTRAVENOUS at 14:49

## 2023-12-01 ASSESSMENT — PAIN - FUNCTIONAL ASSESSMENT: PAIN_FUNCTIONAL_ASSESSMENT: NONE - DENIES PAIN

## 2023-12-01 ASSESSMENT — PAIN SCALES - GENERAL: PAINLEVEL_OUTOF10: 0

## 2023-12-01 NOTE — ED PROVIDER NOTES
%    Platelets [phone] - 276 K/uL    MPV 9.3 8.9 - 12.9 FL    Nucleated RBCs 0.0 0.0  WBC    nRBC 0.00 0.00 - 0.01 K/uL    Neutrophils % 96 (H) 32 - 75 %    Lymphocytes % 2 (L) 12 - 49 %    Monocytes % 2 (L) 5 - 13 %    Eosinophils % 0 0 - 7 %    Basophils % 0 0 - 1 %    Immature Granulocytes 0 0 - 0.5 %    Neutrophils Absolute 14.6 (H) 1.8 - 8.0 K/UL    Lymphocytes Absolute 0.3 (L) 0.8 - 3.5 K/UL    Monocytes Absolute 0.3 0.0 - 1.0 K/UL    Eosinophils Absolute 0.0 0.0 - 0.4 K/UL    Basophils Absolute 0.0 0.0 - 0.1 K/UL    Absolute Immature Granulocyte 0.0 0.00 - 0.04 K/UL    Differential Type SMEAR SCANNED      RBC Comment ANISOCYTOSIS  1+       Comprehensive Metabolic Panel    Collection Time: 12/01/23 12:00 PM   Result Value Ref Range    Sodium 132 (L) 136 - 145 mmol/L    Potassium 3.9 3.5 - 5.1 mmol/L    Chloride 99 97 - 108 mmol/L    CO2 23 21 - 32 mmol/L    Anion Gap 10 5 - 15 mmol/L    Glucose 129 (H) 65 - 100 mg/dL    BUN 19 6 - 20 MG/DL    Creatinine 0.88 0.55 - 1.02 MG/DL    Bun/Cre Ratio 22 (H) 12 - 20      Est, Glom Filt Rate >60 >60 ml/min/1.73m2    Calcium 8.9 8.5 - 10.1 MG/DL    Total Bilirubin 0.6 0.2 - 1.0 MG/DL    ALT 12 12 - 78 U/L    AST 11 (L) 15 - 37 U/L    Alk Phosphatase 90 45 - 117 U/L    Total Protein 6.4 6.4 - 8.2 g/dL    Albumin 2.9 (L) 3.5 - 5.0 g/dL    Globulin 3.5 2.0 - 4.0 g/dL    Albumin/Globulin Ratio 0.8 (L) 1.1 - 2.2     Lipase    Collection Time: 12/01/23 12:00 PM   Result Value Ref Range    Lipase 18 13 - 75 U/L   POC Lactic Acid    Collection Time: 12/01/23  1:52 PM   Result Value Ref Range    POC Lactic Acid 1.04 0.40 - 2.00 mmol/L      CT ABDOMEN PELVIS W IV CONTRAST Additional Contrast? None   Final Result   Mild abnormality of the descending and proximal sigmoid colon as above. Severe   fecal stasis. Procedures - none unless documented below    [place] and imaging reviewed.   Presents with abdominal pain with positive SIRS

## 2023-12-01 NOTE — ED NOTES
Patient report given to [facility], to include SBAR, allergies, medications given, results, and vitals.       Daily, Miles Azevedo RN  12/01/23 7788

## 2023-12-01 NOTE — ED NOTES
Spoke to provider regarding R wrist pain complaints, and of recent blood pressure measurement.       Daily, Michelle Banuelos RN  12/01/23 3138

## 2023-12-01 NOTE — ED NOTES
Patient medicated per providers orders. Fluids infusing without difficulty, family at bedside.       Daily, Mary Boyd RN  12/01/23 0526

## 2023-12-01 NOTE — ED NOTES
Patient resting comfortably on stretcher, with pillow and blanket given for comfort.  socks placed on feet, and toileting offered.       Daily, Thalia Vaughan RN  12/01/23 4744

## 2023-12-01 NOTE — ED TRIAGE NOTES
Patient brought to ED via EMS from [facility], with complaint of abdominal cramping and diarrhea for 24hrs. EMS reported temp of 101 en route. Patient denies vomiting but reports diarrhea and nausea. Patient oriented to self and situation. Patient arrives with soiled diaper. Son may be arriving, per EMS.

## 2023-12-01 NOTE — ED NOTES
Patient report given to Ely Drake RN on the 5th floor. Patient report included SBAR, medications given, results, and vitals .      Daily, Darlene Carolina RN  12/01/23 5728

## 2023-12-02 LAB
ANION GAP SERPL CALC-SCNC: 6 MMOL/L (ref 5–15)
B PERT DNA SPEC QL NAA+PROBE: NOT DETECTED
BASOPHILS # BLD: 0 K/UL (ref 0–0.1)
BASOPHILS NFR BLD: 0 % (ref 0–1)
BORDETELLA PARAPERTUSSIS BY PCR: NOT DETECTED
BUN SERPL-MCNC: 19 MG/DL (ref 6–20)
BUN/CREAT SERPL: 24 (ref 12–20)
C PNEUM DNA SPEC QL NAA+PROBE: NOT DETECTED
CALCIUM SERPL-MCNC: 8 MG/DL (ref 8.5–10.1)
CHLORIDE SERPL-SCNC: 108 MMOL/L (ref 97–108)
CO2 SERPL-SCNC: 23 MMOL/L (ref 21–32)
CREAT SERPL-MCNC: 0.78 MG/DL (ref 0.55–1.02)
DIFFERENTIAL METHOD BLD: ABNORMAL
EOSINOPHIL # BLD: 0 K/UL (ref 0–0.4)
EOSINOPHIL NFR BLD: 0 % (ref 0–7)
ERYTHROCYTE [DISTWIDTH] IN BLOOD BY AUTOMATED COUNT: 15.4 % (ref 11.5–14.5)
FLUAV SUBTYP SPEC NAA+PROBE: NOT DETECTED
FLUBV RNA SPEC QL NAA+PROBE: NOT DETECTED
GLUCOSE SERPL-MCNC: 83 MG/DL (ref 65–100)
HADV DNA SPEC QL NAA+PROBE: NOT DETECTED
HCOV 229E RNA SPEC QL NAA+PROBE: NOT DETECTED
HCOV HKU1 RNA SPEC QL NAA+PROBE: NOT DETECTED
HCOV NL63 RNA SPEC QL NAA+PROBE: NOT DETECTED
HCOV OC43 RNA SPEC QL NAA+PROBE: NOT DETECTED
HCT VFR BLD AUTO: 26.3 % (ref 35–47)
HGB BLD-MCNC: 8.4 G/DL (ref 11.5–16)
HMPV RNA SPEC QL NAA+PROBE: NOT DETECTED
HPIV1 RNA SPEC QL NAA+PROBE: NOT DETECTED
HPIV2 RNA SPEC QL NAA+PROBE: NOT DETECTED
HPIV3 RNA SPEC QL NAA+PROBE: NOT DETECTED
HPIV4 RNA SPEC QL NAA+PROBE: NOT DETECTED
IMM GRANULOCYTES # BLD AUTO: 0 K/UL (ref 0–0.04)
IMM GRANULOCYTES NFR BLD AUTO: 0 % (ref 0–0.5)
LYMPHOCYTES # BLD: 0.4 K/UL (ref 0.8–3.5)
LYMPHOCYTES NFR BLD: 3 % (ref 12–49)
M PNEUMO DNA SPEC QL NAA+PROBE: NOT DETECTED
MAGNESIUM SERPL-MCNC: 2.2 MG/DL (ref 1.6–2.4)
MCH RBC QN AUTO: 30 PG (ref 26–34)
MCHC RBC AUTO-ENTMCNC: 31.9 G/DL (ref 30–36.5)
MCV RBC AUTO: 93.9 FL (ref 80–99)
MONOCYTES # BLD: 0.4 K/UL (ref 0–1)
MONOCYTES NFR BLD: 3 % (ref 5–13)
NEUTS SEG # BLD: 11.3 K/UL (ref 1.8–8)
NEUTS SEG NFR BLD: 94 % (ref 32–75)
NRBC # BLD: 0 K/UL (ref 0–0.01)
NRBC BLD-RTO: 0 PER 100 WBC
PHOSPHATE SERPL-MCNC: 3.5 MG/DL (ref 2.6–4.7)
PLATELET # BLD AUTO: 223 K/UL (ref 150–400)
PMV BLD AUTO: 10.1 FL (ref 8.9–12.9)
POTASSIUM SERPL-SCNC: 3.3 MMOL/L (ref 3.5–5.1)
RBC # BLD AUTO: 2.8 M/UL (ref 3.8–5.2)
RBC MORPH BLD: ABNORMAL
RSV RNA SPEC QL NAA+PROBE: NOT DETECTED
RV+EV RNA SPEC QL NAA+PROBE: NOT DETECTED
SARS-COV-2 RNA RESP QL NAA+PROBE: NOT DETECTED
SODIUM SERPL-SCNC: 137 MMOL/L (ref 136–145)
WBC # BLD AUTO: 12.1 K/UL (ref 3.6–11)

## 2023-12-02 PROCEDURE — 84100 ASSAY OF PHOSPHORUS: CPT

## 2023-12-02 PROCEDURE — 83735 ASSAY OF MAGNESIUM: CPT

## 2023-12-02 PROCEDURE — 97530 THERAPEUTIC ACTIVITIES: CPT

## 2023-12-02 PROCEDURE — 97161 PT EVAL LOW COMPLEX 20 MIN: CPT

## 2023-12-02 PROCEDURE — 85025 COMPLETE CBC W/AUTO DIFF WBC: CPT

## 2023-12-02 PROCEDURE — 2580000003 HC RX 258: Performed by: STUDENT IN AN ORGANIZED HEALTH CARE EDUCATION/TRAINING PROGRAM

## 2023-12-02 PROCEDURE — 6360000002 HC RX W HCPCS: Performed by: STUDENT IN AN ORGANIZED HEALTH CARE EDUCATION/TRAINING PROGRAM

## 2023-12-02 PROCEDURE — 94761 N-INVAS EAR/PLS OXIMETRY MLT: CPT

## 2023-12-02 PROCEDURE — 36415 COLL VENOUS BLD VENIPUNCTURE: CPT

## 2023-12-02 PROCEDURE — 80048 BASIC METABOLIC PNL TOTAL CA: CPT

## 2023-12-02 PROCEDURE — 1100000000 HC RM PRIVATE

## 2023-12-02 PROCEDURE — 6370000000 HC RX 637 (ALT 250 FOR IP): Performed by: INTERNAL MEDICINE

## 2023-12-02 PROCEDURE — 6370000000 HC RX 637 (ALT 250 FOR IP): Performed by: STUDENT IN AN ORGANIZED HEALTH CARE EDUCATION/TRAINING PROGRAM

## 2023-12-02 RX ORDER — ENEMA 19; 7 G/133ML; G/133ML
1 ENEMA RECTAL 2 TIMES DAILY
Status: DISCONTINUED | OUTPATIENT
Start: 2023-12-02 | End: 2023-12-03 | Stop reason: HOSPADM

## 2023-12-02 RX ORDER — LEVOTHYROXINE SODIUM 88 UG/1
88 TABLET ORAL DAILY
COMMUNITY

## 2023-12-02 RX ORDER — POLYETHYLENE GLYCOL 3350 17 G/17G
17 POWDER, FOR SOLUTION ORAL DAILY
Status: DISCONTINUED | OUTPATIENT
Start: 2023-12-02 | End: 2023-12-03 | Stop reason: HOSPADM

## 2023-12-02 RX ORDER — LACTULOSE 10 G/15ML
20 SOLUTION ORAL 2 TIMES DAILY
Status: DISCONTINUED | OUTPATIENT
Start: 2023-12-02 | End: 2023-12-03 | Stop reason: HOSPADM

## 2023-12-02 RX ADMIN — HEPARIN SODIUM 5000 UNITS: 5000 INJECTION INTRAVENOUS; SUBCUTANEOUS at 10:10

## 2023-12-02 RX ADMIN — SODIUM PHOSPHATE, DIBASIC AND SODIUM PHOSPHATE, MONOBASIC 1 ENEMA: 7; 19 ENEMA RECTAL at 15:57

## 2023-12-02 RX ADMIN — POLYETHYLENE GLYCOL 3350 17 G: 17 POWDER, FOR SOLUTION ORAL at 15:57

## 2023-12-02 RX ADMIN — METRONIDAZOLE 500 MG: 500 INJECTION, SOLUTION INTRAVENOUS at 23:00

## 2023-12-02 RX ADMIN — CEFTRIAXONE SODIUM 2000 MG: 2 INJECTION, POWDER, FOR SOLUTION INTRAMUSCULAR; INTRAVENOUS at 13:51

## 2023-12-02 RX ADMIN — SODIUM CHLORIDE, PRESERVATIVE FREE 10 ML: 5 INJECTION INTRAVENOUS at 10:10

## 2023-12-02 RX ADMIN — LISINOPRIL 10 MG: 5 TABLET ORAL at 10:10

## 2023-12-02 RX ADMIN — METRONIDAZOLE 500 MG: 500 INJECTION, SOLUTION INTRAVENOUS at 13:58

## 2023-12-02 RX ADMIN — METRONIDAZOLE 500 MG: 500 INJECTION, SOLUTION INTRAVENOUS at 06:50

## 2023-12-02 RX ADMIN — SODIUM CHLORIDE, PRESERVATIVE FREE 10 ML: 5 INJECTION INTRAVENOUS at 20:47

## 2023-12-02 RX ADMIN — HEPARIN SODIUM 5000 UNITS: 5000 INJECTION INTRAVENOUS; SUBCUTANEOUS at 20:47

## 2023-12-02 RX ADMIN — AMLODIPINE BESYLATE 10 MG: 5 TABLET ORAL at 10:09

## 2023-12-02 RX ADMIN — LACTULOSE 20 G: 20 SOLUTION ORAL at 20:47

## 2023-12-02 ASSESSMENT — PAIN SCALES - GENERAL
PAINLEVEL_OUTOF10: 0
PAINLEVEL_OUTOF10: 0

## 2023-12-02 NOTE — H&P
History and Physical    Date of Service:  12/1/2023  Primary Care Provider: Salvador Puga MD  Source of information: Patient    Chief Complaint: Abdominal Pain and Diarrhea (x24hrs)      History of Presenting Illness:   Viral Reddy is a 80 y.o. female with past medical history of hypertension, hypothyroidism, rheumatoid arthritis, CKD, T12 compression fracture who presents with abdominal pain and diarrhea. Patient reports acute onset of abdominal cramping, nausea and diarrhea since last night. Does not know if she had blood in her stools. Reports not being able to sleep all night due to diarrhea. Per chart review EMS reported temp of 101 on route. Patient states that the floor at the assisted living is carpeted and she has trouble ambulating on it with her walker and she thinks that she is has developed right wrist pain as a result of overuse. Patient also has left elbow pain and back pain since past admission causing difficulty with ambulation. The patient denies any headache, blurry vision, sore throat, trouble swallowing, trouble with speech, chest pain, SOB, cough, fever, chills, N/V, urinary symptoms, constipation, recent travels, sick contacts, focal or generalized neurological symptoms, falls, injuries, rashes, contact with COVID-19 diagnosed patients, hematemesis, melena, hemoptysis, hematuria, rashes, denies starting any new medications and denies any other concerns or problems besides as mentioned above. REVIEW OF SYSTEMS:       I am not able to complete the review of systems because:    The patient is intubated and sedated    The patient has altered mental status due to his acute medical problems    The patient has baseline aphasia from prior stroke(s)    The patient has baseline dementia and is not reliable historian    The patient is in acute medical distress and unable to provide information           Total of 12 systems reviewed as follows:       POSITIVE=

## 2023-12-03 ENCOUNTER — APPOINTMENT (OUTPATIENT)
Facility: HOSPITAL | Age: 88
End: 2023-12-03
Payer: MEDICARE

## 2023-12-03 VITALS
RESPIRATION RATE: 18 BRPM | BODY MASS INDEX: 20.09 KG/M2 | HEART RATE: 85 BPM | TEMPERATURE: 98 F | OXYGEN SATURATION: 96 % | WEIGHT: 99.65 LBS | HEIGHT: 59 IN | DIASTOLIC BLOOD PRESSURE: 54 MMHG | SYSTOLIC BLOOD PRESSURE: 130 MMHG

## 2023-12-03 LAB
ANION GAP SERPL CALC-SCNC: 5 MMOL/L (ref 5–15)
BUN SERPL-MCNC: 12 MG/DL (ref 6–20)
BUN/CREAT SERPL: 21 (ref 12–20)
CALCIUM SERPL-MCNC: 7.8 MG/DL (ref 8.5–10.1)
CHLORIDE SERPL-SCNC: 111 MMOL/L (ref 97–108)
CO2 SERPL-SCNC: 22 MMOL/L (ref 21–32)
CREAT SERPL-MCNC: 0.57 MG/DL (ref 0.55–1.02)
ERYTHROCYTE [DISTWIDTH] IN BLOOD BY AUTOMATED COUNT: 15.4 % (ref 11.5–14.5)
GLUCOSE SERPL-MCNC: 126 MG/DL (ref 65–100)
HCT VFR BLD AUTO: 26.4 % (ref 35–47)
HGB BLD-MCNC: 8.4 G/DL (ref 11.5–16)
MAGNESIUM SERPL-MCNC: 2.1 MG/DL (ref 1.6–2.4)
MCH RBC QN AUTO: 29.8 PG (ref 26–34)
MCHC RBC AUTO-ENTMCNC: 31.8 G/DL (ref 30–36.5)
MCV RBC AUTO: 93.6 FL (ref 80–99)
NRBC # BLD: 0 K/UL (ref 0–0.01)
NRBC BLD-RTO: 0 PER 100 WBC
PLATELET # BLD AUTO: 205 K/UL (ref 150–400)
PMV BLD AUTO: 10 FL (ref 8.9–12.9)
POTASSIUM SERPL-SCNC: 3 MMOL/L (ref 3.5–5.1)
RBC # BLD AUTO: 2.82 M/UL (ref 3.8–5.2)
SODIUM SERPL-SCNC: 138 MMOL/L (ref 136–145)
WBC # BLD AUTO: 6.8 K/UL (ref 3.6–11)

## 2023-12-03 PROCEDURE — 36415 COLL VENOUS BLD VENIPUNCTURE: CPT

## 2023-12-03 PROCEDURE — 96372 THER/PROPH/DIAG INJ SC/IM: CPT

## 2023-12-03 PROCEDURE — 83735 ASSAY OF MAGNESIUM: CPT

## 2023-12-03 PROCEDURE — 80048 BASIC METABOLIC PNL TOTAL CA: CPT

## 2023-12-03 PROCEDURE — 6360000002 HC RX W HCPCS: Performed by: STUDENT IN AN ORGANIZED HEALTH CARE EDUCATION/TRAINING PROGRAM

## 2023-12-03 PROCEDURE — 2580000003 HC RX 258: Performed by: STUDENT IN AN ORGANIZED HEALTH CARE EDUCATION/TRAINING PROGRAM

## 2023-12-03 PROCEDURE — 6370000000 HC RX 637 (ALT 250 FOR IP): Performed by: STUDENT IN AN ORGANIZED HEALTH CARE EDUCATION/TRAINING PROGRAM

## 2023-12-03 PROCEDURE — G0378 HOSPITAL OBSERVATION PER HR: HCPCS

## 2023-12-03 PROCEDURE — 85027 COMPLETE CBC AUTOMATED: CPT

## 2023-12-03 PROCEDURE — 94761 N-INVAS EAR/PLS OXIMETRY MLT: CPT

## 2023-12-03 PROCEDURE — 96361 HYDRATE IV INFUSION ADD-ON: CPT

## 2023-12-03 PROCEDURE — 74018 RADEX ABDOMEN 1 VIEW: CPT

## 2023-12-03 PROCEDURE — 6370000000 HC RX 637 (ALT 250 FOR IP): Performed by: INTERNAL MEDICINE

## 2023-12-03 RX ORDER — LACTULOSE 10 G/15ML
20 SOLUTION ORAL 2 TIMES DAILY
Qty: 237 ML | Refills: 0 | Status: SHIPPED | OUTPATIENT
Start: 2023-12-03

## 2023-12-03 RX ORDER — METRONIDAZOLE 500 MG/1
500 TABLET ORAL 2 TIMES DAILY
Qty: 14 TABLET | Refills: 0 | Status: SHIPPED | OUTPATIENT
Start: 2023-12-03 | End: 2023-12-10

## 2023-12-03 RX ORDER — LEVOTHYROXINE SODIUM 0.07 MG/1
75 TABLET ORAL
Qty: 30 TABLET | Refills: 3 | Status: SHIPPED | OUTPATIENT
Start: 2023-12-03

## 2023-12-03 RX ORDER — ENEMA 19; 7 G/133ML; G/133ML
1 ENEMA RECTAL
Refills: 1 | Status: SHIPPED | COMMUNITY
Start: 2023-12-03 | End: 2023-12-03

## 2023-12-03 RX ORDER — CEFDINIR 300 MG/1
300 CAPSULE ORAL 2 TIMES DAILY
Qty: 14 CAPSULE | Refills: 0 | Status: SHIPPED | OUTPATIENT
Start: 2023-12-03 | End: 2023-12-10

## 2023-12-03 RX ADMIN — ACETAMINOPHEN 650 MG: 325 TABLET ORAL at 03:59

## 2023-12-03 RX ADMIN — POTASSIUM CHLORIDE 40 MEQ: 750 TABLET, FILM COATED, EXTENDED RELEASE ORAL at 07:03

## 2023-12-03 RX ADMIN — SODIUM PHOSPHATE, DIBASIC AND SODIUM PHOSPHATE, MONOBASIC 1 ENEMA: 7; 19 ENEMA RECTAL at 12:39

## 2023-12-03 RX ADMIN — LEVOTHYROXINE SODIUM 88 MCG: 88 TABLET ORAL at 05:43

## 2023-12-03 RX ADMIN — HEPARIN SODIUM 5000 UNITS: 5000 INJECTION INTRAVENOUS; SUBCUTANEOUS at 08:51

## 2023-12-03 RX ADMIN — SODIUM CHLORIDE: 9 INJECTION, SOLUTION INTRAVENOUS at 06:26

## 2023-12-03 RX ADMIN — LACTULOSE 20 G: 20 SOLUTION ORAL at 08:52

## 2023-12-03 RX ADMIN — METRONIDAZOLE 500 MG: 500 INJECTION, SOLUTION INTRAVENOUS at 06:27

## 2023-12-03 RX ADMIN — POLYETHYLENE GLYCOL 3350 17 G: 17 POWDER, FOR SOLUTION ORAL at 08:50

## 2023-12-03 ASSESSMENT — PAIN DESCRIPTION - DESCRIPTORS: DESCRIPTORS: ACHING

## 2023-12-03 ASSESSMENT — PAIN SCALES - GENERAL
PAINLEVEL_OUTOF10: 0
PAINLEVEL_OUTOF10: 6
PAINLEVEL_OUTOF10: 0
PAINLEVEL_OUTOF10: 0

## 2023-12-03 ASSESSMENT — PAIN DESCRIPTION - LOCATION: LOCATION: BACK

## 2023-12-03 NOTE — DISCHARGE SUMMARY
Hospitalist Discharge Summary     Patient ID:  Cameron Valderrama  [id number]  77 y.o.  2/13/1930    Admit date: 12/1/2023    Discharge date and time: 12/3/2023    Admission Diagnoses: Colitis [K52.9]  Hyponatremia [E87.1]  Acute diverticulitis [K57.92]  Leukocytosis, unspecified type [D72.829]      Discharge Diagnoses:      Principal Problem:    Colitis  Resolved Problems:    * No resolved hospital problems. *       Acute colitis versus diverticulitis  Leukocytosis, improved  Severe constipation with fecal stasis  Right wrist pain  Rheumatoid arthritis  Hypertension  Hypothyroidism  Diabetes  T12 compression fracture, subacute        Hospital Course:     Cameron Valderrama  is a 80 y.o.  female with history of hypertension, hypothyroidism, rheumatoid arthritis, and recent admission for fall resulting in a T12 compression fracture presenting with acute onset of abdominal pain and diarrhea in the setting of severe fecal stasis and colitis. Acute colitis versus diverticulitis  Leukocytosis  Severe constipation with fecal stasis  -The patient was admitted to the hospitalist service and started on empiric Rocephin and Flagyl. She was started on IV fluids. She was initially kept n.p.o. and her diet was advanced to full liquids as tolerated. CT abdomen and pelvis showed severe fecal stasis and descending colon and proximal sigmoid colon wall thickening with minimal surrounding fat stranding nonspecific. Differential diagnosis included colitis, diverticulitis, or ischemia. The patient's lactic acid was within normal limits making ischemia less likely. She was having watery stool which was most likely flowing past her fecal obstruction. Patient was started on MiraLAX, lactulose twice daily, and fleets enemas were given. Subsequently the patient began to have semiformed bowel movements. Repeat KUB showed decrease stool burden. She was felt to be stable for discharge home. She is allergic to Augmentin.

## 2023-12-03 NOTE — DISCHARGE INSTRUCTIONS
Patient or Representative Signature                                                          Date/Time      Signed By: Dawna Blunt MD     December 3, 2023

## 2023-12-03 NOTE — PROGRESS NOTES
Order acknowledged and spoke with Physical Therapist.  Pt is at baseline with self-care tasks. Son lives in a apt down the [address] and helps with IADLs. No skilled acute OT needed. Will sign off.
Patient discharged at [phone], iv removed, reviewed discharge instructions and patient verbalized understanding. Son took her home.
colon as above. Severe   fecal stasis. CURRENT MEDICATION ORDERS:  Current Facility-Administered Medications: sodium chloride flush 0.9 % injection 5-40 mL, 5-40 mL, IntraVENous, 2 times per day  sodium chloride flush 0.9 % injection 5-40 mL, 5-40 mL, IntraVENous, PRN  0.9 % sodium chloride infusion, , IntraVENous, PRN  potassium chloride (KLOR-CON) extended release tablet 40 mEq, 40 mEq, Oral, PRN **OR** potassium bicarb-citric acid (EFFER-K) effervescent tablet 40 mEq, 40 mEq, Oral, PRN **OR** potassium chloride 10 mEq/100 mL IVPB (Peripheral Line), 10 mEq, IntraVENous, PRN  magnesium sulfate 2000 mg in 50 mL IVPB premix, 2,000 mg, IntraVENous, PRN  ondansetron (ZOFRAN-ODT) disintegrating tablet 4 mg, 4 mg, Oral, Q8H PRN **OR** ondansetron (ZOFRAN) injection 4 mg, 4 mg, IntraVENous, Q6H PRN  polyethylene glycol (GLYCOLAX) packet 17 g, 17 g, Oral, Daily PRN  acetaminophen (TYLENOL) tablet 650 mg, 650 mg, Oral, Q6H PRN **OR** acetaminophen (TYLENOL) suppository 650 mg, 650 mg, Rectal, Q6H PRN  heparin (porcine) injection 5,000 Units, 5,000 Units, SubCUTAneous, BID  sodium chloride 0.9 % bolus 1,000 mL, 1,000 mL, IntraVENous, Once  0.9 % sodium chloride infusion, , IntraVENous, Continuous  cefTRIAXone (ROCEPHIN) 2,000 mg in sterile water 20 mL IV syringe, 2,000 mg, IntraVENous, Q24H  metronidazole (FLAGYL) 500 mg in 0.9% NaCl 100 mL IVPB premix, 500 mg, IntraVENous, Q8H  amLODIPine (NORVASC) tablet 10 mg, 10 mg, Oral, Daily  levothyroxine (SYNTHROID) tablet 88 mcg, 88 mcg, Oral, QAM AC  lisinopril (PRINIVIL;ZESTRIL) tablet 10 mg, 10 mg, Oral, Daily       ASSESSMENT and PLAN:     Principal Problem:    Colitis  Resolved Problems:    * No resolved hospital problems. *         Acute colitis versus diverticulitis  Leukocytosis, improved  - CT abdomen and pelvis with Severe fecal stasis. Descending colon and proximal sigmoid colon show wall thickening minimal surrounding fat stranding nonspecific.  Could be

## 2023-12-03 NOTE — CARE COORDINATION
12/3/2023   CARE MANAGEMENT NOTE:  (weekend coverage)  CM met with pt who was her own historian for this needs assessment. Reportedly, pt resides at [facility] with an elevator. Her son lives in the same apt complex. Pt is ambulatory with a rolling walker, and she is indepn with ADLs although she has to \"go slow. \"  She obtains medications from [facility] pharmacy on [address]. She currently has home healthcare thru unknown agency. Pt is in OBS status so no resumption orders are necessary. Pt will discharge home with home health already established. Her son will transport pt home. No further post discharge needs indicated.   [place]   12/03/23 1223   Service Assessment   Patient Orientation Alert and Oriented   Cognition Alert   History Provided By Patient   Primary Caregiver Self   Support Systems Children  (son Charisse Hedrick (c [phone]))   PCP Verified by CM Yes  (Dr. Win Manzo)   Prior Functional Level Independent in ADLs/IADLs   Current Functional Level Independent in ADLs/IADLs   Can patient return to prior living arrangement Yes   Financial Resources Medicare [facility])   Social/Functional History   Lives With Alone   Type of [address] Walker, rolling  (shower chair)   ADL Assistance Independent   Ambulation Assistance Needs assistance  (uses a rolling walker)   Transfer Assistance Independent   Discharge Planning   Patient expects to be discharged to: [address] Discharge   Transition of Care Consult (CM Consult) Jake Vásquez  ([facility])   [address] Discharge Home Health   Mode of Transport at Discharge Other (see comment)  (son)

## 2023-12-03 NOTE — PLAN OF CARE
Problem: Skin/Tissue Integrity  Goal: Absence of new skin breakdown  Description: 1. Monitor for areas of redness and/or skin breakdown  2. Assess vascular access sites hourly  3. Every 4-6 hours minimum:  Change oxygen saturation probe site  4. Every 4-6 hours:  If on nasal continuous positive airway pressure, respiratory therapy assess nares and determine need for appliance change or resting period.   12/2/2023 0145 by Travis Perry LPN  Outcome: Progressing  12/2/2023 0039 by Thom Corley RN  Outcome: Progressing     Problem: Safety - Adult  Goal: Free from fall injury  12/2/2023 0145 by Travis Perry LPN  Outcome: Progressing  12/2/2023 0039 by Thom Corley RN  Outcome: Progressing     Problem: ABCDS Injury Assessment  Goal: Absence of physical injury  12/2/2023 0145 by Travis Perry LPN  Outcome: Progressing  12/2/2023 0039 by Thom Corley RN  Outcome: Progressing
Problem: Skin/Tissue Integrity  Goal: Absence of new skin breakdown  Description: 1. Monitor for areas of redness and/or skin breakdown  2. Assess vascular access sites hourly  3. Every 4-6 hours minimum:  Change oxygen saturation probe site  4. Every 4-6 hours:  If on nasal continuous positive airway pressure, respiratory therapy assess nares and determine need for appliance change or resting period.   Outcome: Progressing     Problem: Safety - Adult  Goal: Free from fall injury  Outcome: Progressing     Problem: ABCDS Injury Assessment  Goal: Absence of physical injury  Outcome: Progressing
Problem: Skin/Tissue Integrity  Goal: Absence of new skin breakdown  Description: 1. Monitor for areas of redness and/or skin breakdown  2. Assess vascular access sites hourly  3. Every 4-6 hours minimum:  Change oxygen saturation probe site  4. Every 4-6 hours:  If on nasal continuous positive airway pressure, respiratory therapy assess nares and determine need for appliance change or resting period. Outcome: Progressing     Problem: Safety - Adult  Goal: Free from fall injury  Outcome: Progressing     Problem: ABCDS Injury Assessment  Goal: Absence of physical injury  Outcome: Progressing     Problem: Physical Therapy - Adult  Goal: By Discharge: Performs mobility at highest level of function for planned discharge setting. See evaluation for individualized goals. Description: FUNCTIONAL STATUS PRIOR TO ADMISSION: Patient was modified independent using a rollator for functional mobility. HOME SUPPORT PRIOR TO ADMISSION: The patient lived alone with son (who lives in apartment down the [address]) to provide assistance. Son assisted with meals and iADLs but patient indep with ADLs (he would cut up some of more tough foods due to her arthritis). Physical Therapy Goals  Initiated 12/2/2023  1. Patient will move from supine to sit and sit to supine, scoot up and down, and roll side to side in bed with modified independence within 7 day(s). 2.  Patient will perform sit to stand with modified independence within 7 day(s). 3.  Patient will transfer from bed to chair and chair to bed with modified independence using the least restrictive device within 7 day(s). 4.  Patient will ambulate with modified independence for 150 feet with the least restrictive device within 7 day(s).      12/2/2023 1334 by Kristyn Ngo PT  Outcome: Progressing
UNKNOWN)      THYROIDECTOMY         Home Situation:  Social/Functional History  Lives With: Alone  Type of Home: Senior housing apartment  Home Layout: One level  Home Access: Level entry  Bathroom Shower/Tub: Tub/Shower unit  Bathroom Equipment: Shower chair  Home Equipment: [address]  Has the patient had two or more falls in the past year or any fall with injury in the past year?: Yes  Receives Help From: Family  ADL Assistance: Needs assistance  Ambulation Assistance: Independent  Transfer Assistance: Independent  Active : No    Cognitive/Behavioral Status:  Orientation  Orientation Level: Oriented X4       Hearing:   Hearing  Hearing: Exceptions to [employer] [employer] [facility]  Hearing Exceptions: Hard of hearing/hearing concerns    Vision/Perceptual:                  Strength:    Strength: Generally decreased, functional    Tone & Sensation:   Tone: Normal  Sensation: Intact    Coordination:  Coordination: Within functional limits    Range Of Motion:  AROM: Generally decreased, functional (fingers severely limited due to arthritis)       Functional Mobility:  Bed Mobility:     Bed Mobility Training  Bed Mobility Training: Yes  Rolling: Stand-by assistance  Supine to Sit: Stand-by assistance  Scooting: Stand-by assistance  Transfers:     Transfer Training  Transfer Training: Yes  Sit to Stand: Stand-by assistance  Stand to Sit: Stand-by assistance  Bed to Chair: Stand-by assistance  Balance:               Balance  Sitting: Intact  Standing: With support  Ambulation/Gait Training:                       Gait  Overall Level of Assistance: Stand-by assistance  Distance (ft): 25 Feet (x 2)  Assistive Device: Gait belt;Walker, rolling  Interventions: Verbal cues; Safety awareness training  Base of Support: Narrowed  Speed/Irais: Pace decreased (< 100 feet/min)  Gait Abnormalities: Decreased step clearance  Distance (ft): 25 Feet (x 2)

## 2024-02-18 ENCOUNTER — HOSPITAL ENCOUNTER (INPATIENT)
Facility: HOSPITAL | Age: 89
LOS: 4 days | Discharge: NURSING, REHAB OR LONG-TERM CARE | DRG: 522 | End: 2024-02-22
Attending: EMERGENCY MEDICINE | Admitting: STUDENT IN AN ORGANIZED HEALTH CARE EDUCATION/TRAINING PROGRAM
Payer: MEDICARE

## 2024-02-18 ENCOUNTER — APPOINTMENT (OUTPATIENT)
Facility: HOSPITAL | Age: 89
DRG: 522 | End: 2024-02-18
Payer: MEDICARE

## 2024-02-18 ENCOUNTER — ANESTHESIA (OUTPATIENT)
Facility: HOSPITAL | Age: 89
DRG: 522 | End: 2024-02-18
Payer: MEDICARE

## 2024-02-18 ENCOUNTER — ANESTHESIA EVENT (OUTPATIENT)
Facility: HOSPITAL | Age: 89
DRG: 522 | End: 2024-02-18
Payer: MEDICARE

## 2024-02-18 DIAGNOSIS — S72.002A CLOSED FRACTURE OF LEFT HIP, INITIAL ENCOUNTER (HCC): Primary | ICD-10-CM

## 2024-02-18 PROBLEM — S72.002G: Status: ACTIVE | Noted: 2024-02-18

## 2024-02-18 LAB
ABO + RH BLD: NORMAL
ALBUMIN SERPL-MCNC: 3.4 G/DL (ref 3.5–5)
ALBUMIN/GLOB SERPL: 0.9 (ref 1.1–2.2)
ALP SERPL-CCNC: 82 U/L (ref 45–117)
ALT SERPL-CCNC: 14 U/L (ref 12–78)
ANION GAP SERPL CALC-SCNC: 9 MMOL/L (ref 5–15)
AST SERPL-CCNC: 15 U/L (ref 15–37)
BASOPHILS # BLD: 0 K/UL (ref 0–0.1)
BASOPHILS NFR BLD: 1 % (ref 0–1)
BILIRUB SERPL-MCNC: 0.4 MG/DL (ref 0.2–1)
BLOOD GROUP ANTIBODIES SERPL: NORMAL
BUN SERPL-MCNC: 13 MG/DL (ref 6–20)
BUN/CREAT SERPL: 17 (ref 12–20)
CALCIUM SERPL-MCNC: 9.4 MG/DL (ref 8.5–10.1)
CHLORIDE SERPL-SCNC: 102 MMOL/L (ref 97–108)
CK SERPL-CCNC: 35 U/L (ref 26–192)
CO2 SERPL-SCNC: 25 MMOL/L (ref 21–32)
CREAT SERPL-MCNC: 0.75 MG/DL (ref 0.55–1.02)
DIFFERENTIAL METHOD BLD: ABNORMAL
EOSINOPHIL # BLD: 0.2 K/UL (ref 0–0.4)
EOSINOPHIL NFR BLD: 2 % (ref 0–7)
ERYTHROCYTE [DISTWIDTH] IN BLOOD BY AUTOMATED COUNT: 14.2 % (ref 11.5–14.5)
GLOBULIN SER CALC-MCNC: 3.8 G/DL (ref 2–4)
GLUCOSE SERPL-MCNC: 129 MG/DL (ref 65–100)
HCT VFR BLD AUTO: 31.9 % (ref 35–47)
HGB BLD-MCNC: 10.9 G/DL (ref 11.5–16)
IMM GRANULOCYTES # BLD AUTO: 0 K/UL (ref 0–0.04)
IMM GRANULOCYTES NFR BLD AUTO: 0 % (ref 0–0.5)
LYMPHOCYTES # BLD: 1.4 K/UL (ref 0.8–3.5)
LYMPHOCYTES NFR BLD: 17 % (ref 12–49)
MAGNESIUM SERPL-MCNC: 2 MG/DL (ref 1.6–2.4)
MCH RBC QN AUTO: 30.3 PG (ref 26–34)
MCHC RBC AUTO-ENTMCNC: 34.2 G/DL (ref 30–36.5)
MCV RBC AUTO: 88.6 FL (ref 80–99)
MONOCYTES # BLD: 0.4 K/UL (ref 0–1)
MONOCYTES NFR BLD: 5 % (ref 5–13)
NEUTS SEG # BLD: 6.3 K/UL (ref 1.8–8)
NEUTS SEG NFR BLD: 75 % (ref 32–75)
NRBC # BLD: 0 K/UL (ref 0–0.01)
NRBC BLD-RTO: 0 PER 100 WBC
PLATELET # BLD AUTO: 325 K/UL (ref 150–400)
PMV BLD AUTO: 9.9 FL (ref 8.9–12.9)
POTASSIUM SERPL-SCNC: 4.1 MMOL/L (ref 3.5–5.1)
PROT SERPL-MCNC: 7.2 G/DL (ref 6.4–8.2)
RBC # BLD AUTO: 3.6 M/UL (ref 3.8–5.2)
SODIUM SERPL-SCNC: 136 MMOL/L (ref 136–145)
SPECIMEN EXP DATE BLD: NORMAL
TSH SERPL DL<=0.05 MIU/L-ACNC: 0.13 UIU/ML (ref 0.36–3.74)
WBC # BLD AUTO: 8.4 K/UL (ref 3.6–11)

## 2024-02-18 PROCEDURE — 73502 X-RAY EXAM HIP UNI 2-3 VIEWS: CPT

## 2024-02-18 PROCEDURE — 2580000003 HC RX 258: Performed by: STUDENT IN AN ORGANIZED HEALTH CARE EDUCATION/TRAINING PROGRAM

## 2024-02-18 PROCEDURE — 3700000001 HC ADD 15 MINUTES (ANESTHESIA): Performed by: ORTHOPAEDIC SURGERY

## 2024-02-18 PROCEDURE — 2500000003 HC RX 250 WO HCPCS: Performed by: EMERGENCY MEDICINE

## 2024-02-18 PROCEDURE — 96374 THER/PROPH/DIAG INJ IV PUSH: CPT

## 2024-02-18 PROCEDURE — APPNB15 APP NON BILLABLE TIME 0-15 MINS: Performed by: PHYSICIAN ASSISTANT

## 2024-02-18 PROCEDURE — 85025 COMPLETE CBC W/AUTO DIFF WBC: CPT

## 2024-02-18 PROCEDURE — 86850 RBC ANTIBODY SCREEN: CPT

## 2024-02-18 PROCEDURE — 84443 ASSAY THYROID STIM HORMONE: CPT

## 2024-02-18 PROCEDURE — 80053 COMPREHEN METABOLIC PANEL: CPT

## 2024-02-18 PROCEDURE — 6360000002 HC RX W HCPCS: Performed by: EMERGENCY MEDICINE

## 2024-02-18 PROCEDURE — 86900 BLOOD TYPING SEROLOGIC ABO: CPT

## 2024-02-18 PROCEDURE — 6360000002 HC RX W HCPCS: Performed by: STUDENT IN AN ORGANIZED HEALTH CARE EDUCATION/TRAINING PROGRAM

## 2024-02-18 PROCEDURE — 2709999900 HC NON-CHARGEABLE SUPPLY: Performed by: ORTHOPAEDIC SURGERY

## 2024-02-18 PROCEDURE — 82550 ASSAY OF CK (CPK): CPT

## 2024-02-18 PROCEDURE — 2580000003 HC RX 258: Performed by: PHYSICIAN ASSISTANT

## 2024-02-18 PROCEDURE — 3600000005 HC SURGERY LEVEL 5 BASE: Performed by: ORTHOPAEDIC SURGERY

## 2024-02-18 PROCEDURE — 3600000015 HC SURGERY LEVEL 5 ADDTL 15MIN: Performed by: ORTHOPAEDIC SURGERY

## 2024-02-18 PROCEDURE — 73562 X-RAY EXAM OF KNEE 3: CPT

## 2024-02-18 PROCEDURE — 3700000000 HC ANESTHESIA ATTENDED CARE: Performed by: ORTHOPAEDIC SURGERY

## 2024-02-18 PROCEDURE — 72125 CT NECK SPINE W/O DYE: CPT

## 2024-02-18 PROCEDURE — 86901 BLOOD TYPING SEROLOGIC RH(D): CPT

## 2024-02-18 PROCEDURE — 99285 EMERGENCY DEPT VISIT HI MDM: CPT

## 2024-02-18 PROCEDURE — 70450 CT HEAD/BRAIN W/O DYE: CPT

## 2024-02-18 PROCEDURE — 73552 X-RAY EXAM OF FEMUR 2/>: CPT

## 2024-02-18 PROCEDURE — 0SRS01A REPLACEMENT OF LEFT HIP JOINT, FEMORAL SURFACE WITH METAL SYNTHETIC SUBSTITUTE, UNCEMENTED, OPEN APPROACH: ICD-10-PCS | Performed by: ORTHOPAEDIC SURGERY

## 2024-02-18 PROCEDURE — 71045 X-RAY EXAM CHEST 1 VIEW: CPT

## 2024-02-18 PROCEDURE — 72192 CT PELVIS W/O DYE: CPT

## 2024-02-18 PROCEDURE — C1776 JOINT DEVICE (IMPLANTABLE): HCPCS | Performed by: ORTHOPAEDIC SURGERY

## 2024-02-18 PROCEDURE — 6360000002 HC RX W HCPCS: Performed by: NURSE ANESTHETIST, CERTIFIED REGISTERED

## 2024-02-18 PROCEDURE — 83735 ASSAY OF MAGNESIUM: CPT

## 2024-02-18 PROCEDURE — 2500000003 HC RX 250 WO HCPCS: Performed by: NURSE ANESTHETIST, CERTIFIED REGISTERED

## 2024-02-18 PROCEDURE — 2060000000 HC ICU INTERMEDIATE R&B

## 2024-02-18 PROCEDURE — 7100000001 HC PACU RECOVERY - ADDTL 15 MIN: Performed by: ORTHOPAEDIC SURGERY

## 2024-02-18 PROCEDURE — 2580000003 HC RX 258: Performed by: NURSE ANESTHETIST, CERTIFIED REGISTERED

## 2024-02-18 PROCEDURE — 94761 N-INVAS EAR/PLS OXIMETRY MLT: CPT

## 2024-02-18 PROCEDURE — 7100000000 HC PACU RECOVERY - FIRST 15 MIN: Performed by: ORTHOPAEDIC SURGERY

## 2024-02-18 PROCEDURE — 73030 X-RAY EXAM OF SHOULDER: CPT

## 2024-02-18 PROCEDURE — 36415 COLL VENOUS BLD VENIPUNCTURE: CPT

## 2024-02-18 PROCEDURE — 96376 TX/PRO/DX INJ SAME DRUG ADON: CPT

## 2024-02-18 DEVICE — IMPL CAPPED H6 HEMI UNI/BIPOLAR STRYKER  PRIMARY STEM: Type: IMPLANTABLE DEVICE | Site: HIP | Status: FUNCTIONAL

## 2024-02-18 DEVICE — LFIT V40 FEMORAL HEAD
Type: IMPLANTABLE DEVICE | Site: HIP | Status: FUNCTIONAL
Brand: V40 HEAD

## 2024-02-18 DEVICE — BIPOLAR COMPONENT
Type: IMPLANTABLE DEVICE | Site: HIP | Status: FUNCTIONAL
Brand: UHR

## 2024-02-18 DEVICE — 127 DEGREE NECK ANGLE HIP STEM
Type: IMPLANTABLE DEVICE | Site: HIP | Status: FUNCTIONAL
Brand: ACCOLADE

## 2024-02-18 RX ORDER — SODIUM CHLORIDE, SODIUM LACTATE, POTASSIUM CHLORIDE, CALCIUM CHLORIDE 600; 310; 30; 20 MG/100ML; MG/100ML; MG/100ML; MG/100ML
INJECTION, SOLUTION INTRAVENOUS CONTINUOUS PRN
Status: DISCONTINUED | OUTPATIENT
Start: 2024-02-18 | End: 2024-02-18 | Stop reason: SDUPTHER

## 2024-02-18 RX ORDER — LEVOTHYROXINE SODIUM 0.07 MG/1
75 TABLET ORAL
Status: DISCONTINUED | OUTPATIENT
Start: 2024-02-19 | End: 2024-02-22 | Stop reason: HOSPADM

## 2024-02-18 RX ORDER — POTASSIUM CHLORIDE 7.45 MG/ML
10 INJECTION INTRAVENOUS PRN
Status: DISCONTINUED | OUTPATIENT
Start: 2024-02-18 | End: 2024-02-22 | Stop reason: HOSPADM

## 2024-02-18 RX ORDER — ONDANSETRON 2 MG/ML
4 INJECTION INTRAMUSCULAR; INTRAVENOUS EVERY 6 HOURS PRN
Status: DISCONTINUED | OUTPATIENT
Start: 2024-02-18 | End: 2024-02-19

## 2024-02-18 RX ORDER — EPHEDRINE SULFATE/0.9% NACL/PF 50 MG/5 ML
SYRINGE (ML) INTRAVENOUS PRN
Status: DISCONTINUED | OUTPATIENT
Start: 2024-02-18 | End: 2024-02-18 | Stop reason: SDUPTHER

## 2024-02-18 RX ORDER — ONDANSETRON 2 MG/ML
INJECTION INTRAMUSCULAR; INTRAVENOUS PRN
Status: DISCONTINUED | OUTPATIENT
Start: 2024-02-18 | End: 2024-02-18 | Stop reason: SDUPTHER

## 2024-02-18 RX ORDER — SODIUM CHLORIDE 9 MG/ML
INJECTION, SOLUTION INTRAVENOUS CONTINUOUS
Status: DISCONTINUED | OUTPATIENT
Start: 2024-02-18 | End: 2024-02-22 | Stop reason: HOSPADM

## 2024-02-18 RX ORDER — FOLIC ACID 1 MG/1
1 TABLET ORAL DAILY
Status: DISCONTINUED | OUTPATIENT
Start: 2024-02-18 | End: 2024-02-18

## 2024-02-18 RX ORDER — CEFAZOLIN SODIUM 1 G/3ML
INJECTION, POWDER, FOR SOLUTION INTRAMUSCULAR; INTRAVENOUS PRN
Status: DISCONTINUED | OUTPATIENT
Start: 2024-02-18 | End: 2024-02-18 | Stop reason: SDUPTHER

## 2024-02-18 RX ORDER — SUCCINYLCHOLINE CHLORIDE 20 MG/ML
INJECTION INTRAMUSCULAR; INTRAVENOUS PRN
Status: DISCONTINUED | OUTPATIENT
Start: 2024-02-18 | End: 2024-02-18 | Stop reason: SDUPTHER

## 2024-02-18 RX ORDER — PHENYLEPHRINE HCL IN 0.9% NACL 0.4MG/10ML
SYRINGE (ML) INTRAVENOUS PRN
Status: DISCONTINUED | OUTPATIENT
Start: 2024-02-18 | End: 2024-02-18 | Stop reason: SDUPTHER

## 2024-02-18 RX ORDER — SODIUM CHLORIDE 0.9 % (FLUSH) 0.9 %
5-40 SYRINGE (ML) INJECTION EVERY 12 HOURS SCHEDULED
Status: DISCONTINUED | OUTPATIENT
Start: 2024-02-18 | End: 2024-02-19

## 2024-02-18 RX ORDER — TRANEXAMIC ACID 10 MG/ML
1000 INJECTION, SOLUTION INTRAVENOUS
Status: DISCONTINUED | OUTPATIENT
Start: 2024-02-18 | End: 2024-02-18 | Stop reason: HOSPADM

## 2024-02-18 RX ORDER — AMLODIPINE BESYLATE 5 MG/1
10 TABLET ORAL DAILY
Status: DISCONTINUED | OUTPATIENT
Start: 2024-02-18 | End: 2024-02-18

## 2024-02-18 RX ORDER — SODIUM CHLORIDE 0.9 % (FLUSH) 0.9 %
5-40 SYRINGE (ML) INJECTION PRN
Status: DISCONTINUED | OUTPATIENT
Start: 2024-02-18 | End: 2024-02-19

## 2024-02-18 RX ORDER — ACETAMINOPHEN 325 MG/1
650 TABLET ORAL EVERY 4 HOURS PRN
Status: DISCONTINUED | OUTPATIENT
Start: 2024-02-18 | End: 2024-02-22 | Stop reason: HOSPADM

## 2024-02-18 RX ORDER — SODIUM CHLORIDE 9 MG/ML
INJECTION, SOLUTION INTRAVENOUS PRN
Status: DISCONTINUED | OUTPATIENT
Start: 2024-02-18 | End: 2024-02-22 | Stop reason: HOSPADM

## 2024-02-18 RX ORDER — METHOTREXATE 2.5 MG/1
20 TABLET ORAL WEEKLY
Status: DISCONTINUED | OUTPATIENT
Start: 2024-02-21 | End: 2024-02-22 | Stop reason: HOSPADM

## 2024-02-18 RX ORDER — OXYCODONE HYDROCHLORIDE 5 MG/1
5 TABLET ORAL EVERY 4 HOURS PRN
Status: DISCONTINUED | OUTPATIENT
Start: 2024-02-18 | End: 2024-02-20

## 2024-02-18 RX ORDER — POLYETHYLENE GLYCOL 3350 17 G/17G
17 POWDER, FOR SOLUTION ORAL DAILY PRN
Status: DISCONTINUED | OUTPATIENT
Start: 2024-02-18 | End: 2024-02-22 | Stop reason: HOSPADM

## 2024-02-18 RX ORDER — AMLODIPINE BESYLATE 5 MG/1
10 TABLET ORAL DAILY
Status: DISCONTINUED | OUTPATIENT
Start: 2024-02-19 | End: 2024-02-22 | Stop reason: HOSPADM

## 2024-02-18 RX ORDER — FENTANYL CITRATE 50 UG/ML
INJECTION, SOLUTION INTRAMUSCULAR; INTRAVENOUS PRN
Status: DISCONTINUED | OUTPATIENT
Start: 2024-02-18 | End: 2024-02-18 | Stop reason: SDUPTHER

## 2024-02-18 RX ORDER — HYDROMORPHONE HYDROCHLORIDE 2 MG/ML
INJECTION, SOLUTION INTRAMUSCULAR; INTRAVENOUS; SUBCUTANEOUS PRN
Status: DISCONTINUED | OUTPATIENT
Start: 2024-02-18 | End: 2024-02-18 | Stop reason: SDUPTHER

## 2024-02-18 RX ORDER — FOLIC ACID 1 MG/1
1 TABLET ORAL DAILY
Status: DISCONTINUED | OUTPATIENT
Start: 2024-02-19 | End: 2024-02-22 | Stop reason: HOSPADM

## 2024-02-18 RX ORDER — OXYCODONE HYDROCHLORIDE 5 MG/1
10 TABLET ORAL EVERY 4 HOURS PRN
Status: DISCONTINUED | OUTPATIENT
Start: 2024-02-18 | End: 2024-02-20

## 2024-02-18 RX ORDER — ONDANSETRON 4 MG/1
4 TABLET, ORALLY DISINTEGRATING ORAL EVERY 8 HOURS PRN
Status: DISCONTINUED | OUTPATIENT
Start: 2024-02-18 | End: 2024-02-19

## 2024-02-18 RX ORDER — POTASSIUM CHLORIDE 750 MG/1
40 TABLET, FILM COATED, EXTENDED RELEASE ORAL PRN
Status: DISCONTINUED | OUTPATIENT
Start: 2024-02-18 | End: 2024-02-22 | Stop reason: HOSPADM

## 2024-02-18 RX ORDER — LISINOPRIL 5 MG/1
10 TABLET ORAL DAILY
Status: DISCONTINUED | OUTPATIENT
Start: 2024-02-18 | End: 2024-02-18

## 2024-02-18 RX ORDER — TRANEXAMIC ACID 100 MG/ML
INJECTION, SOLUTION INTRAVENOUS PRN
Status: DISCONTINUED | OUTPATIENT
Start: 2024-02-18 | End: 2024-02-18 | Stop reason: SDUPTHER

## 2024-02-18 RX ORDER — PROPOFOL 10 MG/ML
INJECTION, EMULSION INTRAVENOUS PRN
Status: DISCONTINUED | OUTPATIENT
Start: 2024-02-18 | End: 2024-02-18 | Stop reason: SDUPTHER

## 2024-02-18 RX ORDER — HYDROMORPHONE HYDROCHLORIDE 1 MG/ML
1 INJECTION, SOLUTION INTRAMUSCULAR; INTRAVENOUS; SUBCUTANEOUS ONCE
Status: COMPLETED | OUTPATIENT
Start: 2024-02-18 | End: 2024-02-18

## 2024-02-18 RX ORDER — DEXMEDETOMIDINE HYDROCHLORIDE 100 UG/ML
INJECTION, SOLUTION INTRAVENOUS PRN
Status: DISCONTINUED | OUTPATIENT
Start: 2024-02-18 | End: 2024-02-18 | Stop reason: SDUPTHER

## 2024-02-18 RX ORDER — MAGNESIUM SULFATE IN WATER 40 MG/ML
2000 INJECTION, SOLUTION INTRAVENOUS PRN
Status: DISCONTINUED | OUTPATIENT
Start: 2024-02-18 | End: 2024-02-22 | Stop reason: HOSPADM

## 2024-02-18 RX ORDER — LISINOPRIL 5 MG/1
10 TABLET ORAL DAILY
Status: DISCONTINUED | OUTPATIENT
Start: 2024-02-19 | End: 2024-02-22 | Stop reason: HOSPADM

## 2024-02-18 RX ORDER — HYDRALAZINE HYDROCHLORIDE 20 MG/ML
10 INJECTION INTRAMUSCULAR; INTRAVENOUS EVERY 6 HOURS PRN
Status: DISCONTINUED | OUTPATIENT
Start: 2024-02-18 | End: 2024-02-22 | Stop reason: HOSPADM

## 2024-02-18 RX ADMIN — Medication 80 MCG: at 16:34

## 2024-02-18 RX ADMIN — SODIUM CHLORIDE, PRESERVATIVE FREE 10 ML: 5 INJECTION INTRAVENOUS at 20:11

## 2024-02-18 RX ADMIN — Medication 10 MG: at 16:24

## 2024-02-18 RX ADMIN — HYDROMORPHONE HYDROCHLORIDE 0.2 MG: 2 INJECTION, SOLUTION INTRAMUSCULAR; INTRAVENOUS; SUBCUTANEOUS at 15:54

## 2024-02-18 RX ADMIN — SODIUM CHLORIDE, PRESERVATIVE FREE 10 ML: 5 INJECTION INTRAVENOUS at 20:12

## 2024-02-18 RX ADMIN — SODIUM CHLORIDE, POTASSIUM CHLORIDE, SODIUM LACTATE AND CALCIUM CHLORIDE: 600; 310; 30; 20 INJECTION, SOLUTION INTRAVENOUS at 15:33

## 2024-02-18 RX ADMIN — HYDROMORPHONE HYDROCHLORIDE 0.2 MG: 2 INJECTION, SOLUTION INTRAMUSCULAR; INTRAVENOUS; SUBCUTANEOUS at 16:02

## 2024-02-18 RX ADMIN — LIDOCAINE HYDROCHLORIDE 60 MG: 20 INJECTION, SOLUTION EPIDURAL; INFILTRATION; INTRACAUDAL; PERINEURAL at 15:36

## 2024-02-18 RX ADMIN — SODIUM CHLORIDE: 9 INJECTION, SOLUTION INTRAVENOUS at 18:17

## 2024-02-18 RX ADMIN — SODIUM CHLORIDE: 9 INJECTION, SOLUTION INTRAVENOUS at 13:51

## 2024-02-18 RX ADMIN — ONDANSETRON HYDROCHLORIDE 4 MG: 2 SOLUTION INTRAMUSCULAR; INTRAVENOUS at 15:32

## 2024-02-18 RX ADMIN — HYDROMORPHONE HYDROCHLORIDE 0.5 MG: 1 INJECTION, SOLUTION INTRAMUSCULAR; INTRAVENOUS; SUBCUTANEOUS at 17:47

## 2024-02-18 RX ADMIN — PROPOFOL 25 MCG/KG/MIN: 10 INJECTION, EMULSION INTRAVENOUS at 15:39

## 2024-02-18 RX ADMIN — SUCCINYLCHOLINE CHLORIDE 80 MG: 20 INJECTION, SOLUTION INTRAMUSCULAR; INTRAVENOUS at 15:36

## 2024-02-18 RX ADMIN — CEFAZOLIN 1 G: 1 INJECTION, POWDER, FOR SOLUTION INTRAMUSCULAR; INTRAVENOUS at 15:48

## 2024-02-18 RX ADMIN — HYDROMORPHONE HYDROCHLORIDE 0.5 MG: 1 INJECTION, SOLUTION INTRAMUSCULAR; INTRAVENOUS; SUBCUTANEOUS at 13:26

## 2024-02-18 RX ADMIN — PROPOFOL 100 MG: 10 INJECTION, EMULSION INTRAVENOUS at 15:36

## 2024-02-18 RX ADMIN — FENTANYL CITRATE 50 MCG: 50 INJECTION, SOLUTION INTRAMUSCULAR; INTRAVENOUS at 15:35

## 2024-02-18 RX ADMIN — Medication 80 MCG: at 16:10

## 2024-02-18 RX ADMIN — HYDROMORPHONE HYDROCHLORIDE 1 MG: 1 INJECTION, SOLUTION INTRAMUSCULAR; INTRAVENOUS; SUBCUTANEOUS at 12:34

## 2024-02-18 RX ADMIN — DEXMEDETOMIDINE 4 MCG: 100 INJECTION, SOLUTION INTRAVENOUS at 16:01

## 2024-02-18 RX ADMIN — Medication 10 MG: at 16:15

## 2024-02-18 RX ADMIN — FENTANYL CITRATE 50 MCG: 50 INJECTION, SOLUTION INTRAMUSCULAR; INTRAVENOUS at 15:55

## 2024-02-18 RX ADMIN — TRANEXAMIC ACID 1000 MG: 100 INJECTION, SOLUTION INTRAVENOUS at 16:36

## 2024-02-18 ASSESSMENT — PAIN DESCRIPTION - DESCRIPTORS: DESCRIPTORS: ACHING;DISCOMFORT

## 2024-02-18 ASSESSMENT — PAIN SCALES - GENERAL
PAINLEVEL_OUTOF10: 0
PAINLEVEL_OUTOF10: 7
PAINLEVEL_OUTOF10: 10

## 2024-02-18 ASSESSMENT — PAIN DESCRIPTION - PAIN TYPE: TYPE: SURGICAL PAIN

## 2024-02-18 ASSESSMENT — PAIN DESCRIPTION - LOCATION: LOCATION: HIP

## 2024-02-18 ASSESSMENT — PAIN DESCRIPTION - ORIENTATION: ORIENTATION: LEFT

## 2024-02-18 ASSESSMENT — PAIN - FUNCTIONAL ASSESSMENT: PAIN_FUNCTIONAL_ASSESSMENT: 0-10

## 2024-02-18 NOTE — CONSULTS
Ortho Consult    Full note dictated: [id number]    89 yo w/ LEFT fem neck fx    Pt admitted to Hospitalist  Informed consent obtained  Type and screen  Pain control  NPO and bedrest  Plan OR today for LEFT hip bipolar hemiarthroplasty

## 2024-02-18 NOTE — ED TRIAGE NOTES
Patient arrives to the ED via POV with complaints of a GLF that occurred 30 minutes ago. Patient slipped on a shoe and landed on L side. Patient was found by son 20 minutes later and called EMS.    Patient arrives in C collar. C collar removed by MD. Confirms hitting head.    100 mcg fentanyl giving intranasal    Patient reports R knee pain, L hip and L shoulder pain.

## 2024-02-18 NOTE — ED NOTES
TRANSFER - OUT REPORT:    Verbal report given to 3rd floor RN on Annie Crespo  being transferred to [facility] for routine progression of patient care       Report consisted of patient's Situation, Background, Assessment and   Recommendations(SBAR).     Information from the following report(s) Nurse Handoff Report, Index, Intake/Output, MAR, and Recent Results was reviewed with the receiving nurse.    [place] Fall Assessment:    Presents to emergency department  because of falls (Syncope, seizure, or loss of consciousness): Yes  Age > 70: Yes  Altered Mental Status, Intoxication with alcohol or substance confusion (Disorientation, impaired judgment, poor safety awaremess, or inability to follow instructions): No  Impaired Mobility: Ambulates or transfers with assistive devices or assistance; Unable to ambulate or transer.: Yes  Nursing Judgement: Yes          Lines:   Peripheral IV 02/18/24 Left Antecubital (Active)   Site Assessment Clean, dry & intact 02/18/24 1218   Phlebitis Assessment No symptoms 02/18/24 1218   Infiltration Assessment 0 02/18/24 1218        Opportunity for questions and clarification was provided.      Patient transported with:  Tech

## 2024-02-18 NOTE — ACP (ADVANCE CARE PLANNING)
[facility] Medicine                                   Advance Care Planning Note    Name: Annie Crespo  YOB: 1930  MRN: [id number]  Admission Date: 2/18/2024 11:55 AM    Date of discussion: 2/18/2024    Active Diagnoses:        These active diagnoses are of sufficient risk that focused discussion on advance care planning is indicated in order to allow the patient to thoughtfully consider personal goals of care, and if situations arise that prevent the ability to personally give input, to ensure appropriate representation of their personal desires for different levels and aggressiveness of care.     Discussion:     Persons present and participating in discussion: Annie Crespo, Jorge Strickland MD, Pt's son     Topics Discussed:  Patient's medical condition and diagnosis: [  x] yes [  ] no   Surrogate decision maker: [  x] yes [  ] no   Patient's current physical function/cognitive function/frailty: [  x] yes [  ] no   Code Status: [ x ] yes [  ] no   Artificial Nutrition / Dialysis / Non-Invasive Ventilation / Blood Transfusion: [x  ] yes [  ] no  Potential Resources for home (durable medical equipment, home nursing, home O2): [  x] yes [  ] no    Overview of Discussion: Pt's son reported pt is DNR    Time Spent:     Total time spent face-to-face in education and discussion: 35 minutes.     Jorge Strickland MD  Date of Service:  2/18/2024  2:42 PM

## 2024-02-18 NOTE — ED PROVIDER NOTES
Status:  Full Code  Readmission: No  Isolation Requirements: no  Recommended Level of Care: telemetry  Department: [place] ED - [phone]    Other: Orthopedic surgery on consult        Amount and/or Complexity of Data Reviewed  Labs: ordered.  Radiology: ordered.    Risk  Prescription drug management.  Decision regarding hospitalization.         EKG: All EKG's are interpreted by the Emergency Department Physician who either signs or Co-signs this chart in the absence of a cardiologist.         CRITICAL CARE TIME   Total Critical Care time was 39 minutes, excluding separately reportable procedures.  There was a high probability of clinically significant/life threatening deterioration in the patient's condition which required my urgent intervention.     CONSULTS:  IP CONSULT TO ORTHOPEDIC SURGERY    PROCEDURES:  Unless otherwise noted below, none     Procedures    FINAL IMPRESSION      1. Closed fracture of left hip, initial encounter ([facility])          DISPOSITION/PLAN   DISPOSITION Admitted 02/18/2024 01:25:58 PM    PATIENT REFERRED TO:  No follow-up provider specified.    DISCHARGE MEDICATIONS:  New Prescriptions    No medications on file     Controlled Substances Monitoring:          No data to display                (Please note that portions of this note were completed with a voice recognition program.  Efforts were made to edit the dictations but occasionally words are mis-transcribed.)    George Chou MD (electronically signed)  Attending Emergency Physician           George Chou MD  02/18/24 5964

## 2024-02-18 NOTE — H&P
Hospitalist Admission Note    NAME:  Annie Crespo   :  1930   MRN:  [id number]     Date/Time:  2024 1:31 PM    Patient PCP: Tami Su MD  ________________________________________________________________________    Given the patient's current clinical presentation, I have a high level of concern for decompensation if discharged from the emergency department.  Complex decision making was performed, which includes reviewing the patient's available past medical records, laboratory results, and x-ray films.       My assessment of this patient's clinical condition and my plan of care is as follows.    Assessment / Plan:    89 y.o. female with past medical history of hypertension, hypothyroidism, rheumatoid arthritis, CKD, T12 compression fracture who presents with left hip pain after mechanical fall at home, was found to have left hip fracture    # Left Impacted and displaced left femoral neck fracture.   -S/p traumatic fall  -Ortho consulted, evaluated at bedside with plan for left hip hemiarthroplasty today around 1600  Plan  -Continue patient n.p.o.  -Pain management    # Traumatic fall  -Follow-up on CT head, CT cervical, CT pelvis  -PT/OT consult    # Normocytic anemia  Hemoglobin 10.9/31.9, MCV 88.6  Most likely anemia due to chronic illness    #Rheumatoid arthritis  On methotrexate  -  continue  folic acid.     #Hypertension:   - continue Norvasc and lisinopril when tolerating p.o.  -Will add hydralazine as needed     #Hypothyroidism:  -Follow-up on TSH- continue Synthroid.     #Diabetes:   not on home meds. Monitor      # CKD: Stable/at baseline    #T12 compression fracture:   due to fall.    Diagnosed during previous admission ortho rec conservative treatment. PT/OT      I have personally reviewed the radiographs, laboratory data in Epic and decisions and statements above are based partially on this personal interpretation.    Code Status: Full Code  DVT Prophylaxis: SCD's  GI

## 2024-02-18 NOTE — BRIEF OP NOTE
Brief Postoperative Note      Patient: Annie Crespo  YOB: 1930  MRN: [id number]    Date of Procedure: 2/18/2024    Preop Diagnosis:  CLOSED LEFT femoral neck fracture    Post-Op Diagnosis: Same       Procedure:  LEFT hip bipolar hemiarthroplasty    Surgeon(s):  Raciel Rubio MD    Assistant:  Surgical Assistant: Billy Nazario  Physician Assistant: Carl Asher PA-C    During the procedure Carl Asher PA-C performed the duties of positioning, retraction, assistance with limb and implant management, closure and dressing application      Anesthesia: general    Estimated Blood Loss (mL): 75cc    Complications: None    Specimens:   * No specimens in log *    Implants:  * No implants in log *      [place] Accolade II pressfit 4 femur, HO neck, +4 28mm head, 46mm shell    Drains: * No LDAs found *    Findings: LEFT femoral neck fracture      Electronically signed by Raciel Rubio MD on 2/18/2024 at 4:31 PM

## 2024-02-18 NOTE — ANESTHESIA PRE PROCEDURE
0.9 % injection 5-40 mL  5-40 mL IntraVENous 2 times per day Jorge Strickland MD        sodium chloride flush 0.9 % injection 5-40 mL  5-40 mL IntraVENous PRN Jorge Strickland MD        0.9 % sodium chloride infusion   IntraVENous PRN Jorge Strickland MD        potassium chloride (KLOR-CON) extended release tablet 40 mEq  40 mEq Oral PRN Jorge Strickland MD        Or    potassium bicarb-citric acid (EFFER-K) effervescent tablet 40 mEq  40 mEq Oral PRN Jorge Strickland MD        Or    potassium chloride 10 mEq/100 mL IVPB (Peripheral Line)  10 mEq IntraVENous PRN Jorge Strickland MD        magnesium sulfate 2000 mg in 50 mL IVPB premix  2,000 mg IntraVENous PRN Jorge Strickland MD        polyethylene glycol (GLYCOLAX) packet 17 g  17 g Oral Daily PRN Jorge Strickland MD        ondansetron (ZOFRAN-ODT) disintegrating tablet 4 mg  4 mg Oral Q8H PRN Jorge Strickland MD        Or    ondansetron (ZOFRAN) injection 4 mg  4 mg IntraVENous Q6H PRN Jorge Strickland MD        tranexamic acid-NaCl IVPB premix 1,000 mg  1,000 mg IntraVENous On Call to OR Jorge Strickland MD        0.9 % sodium chloride infusion   IntraVENous Continuous Jorge Strickland  mL/hr at 02/18/24 1351 New Bag at 02/18/24 1351    acetaminophen (TYLENOL) tablet 650 mg  650 mg Oral Q4H PRN Jorge Strickland MD        oxyCODONE (ROXICODONE) immediate release tablet 5 mg  5 mg Oral Q4H PRN Jorge Strickland MD        Or    oxyCODONE (ROXICODONE) immediate release tablet 10 mg  10 mg Oral Q4H PRN Jorge Strickland MD        HYDROmorphone (DILAUDID) injection 0.25 mg  0.25 mg IntraVENous Q3H PRN Jorge Strickland MD        Or    HYDROmorphone (DILAUDID) injection 0.5 mg  0.5 mg IntraVENous Q3H PRN Jorge Strickland MD        [START ON 2/19/2024] levothyroxine (SYNTHROID) tablet 75 mcg  75 mcg Oral QAM AC Jorge Strickland MD        methotrexate (RHEUMATREX) chemo tablet 20 mg  20 mg Oral Weekly Jorge Strickland MD        sodium chloride flush 0.9 % injection 5-40 mL  5-40 mL

## 2024-02-19 LAB
ALBUMIN SERPL-MCNC: 2.7 G/DL (ref 3.5–5)
ALBUMIN/GLOB SERPL: 0.8 (ref 1.1–2.2)
ALP SERPL-CCNC: 70 U/L (ref 45–117)
ALT SERPL-CCNC: 14 U/L (ref 12–78)
ANION GAP SERPL CALC-SCNC: 5 MMOL/L (ref 5–15)
AST SERPL-CCNC: 20 U/L (ref 15–37)
BASOPHILS # BLD: 0 K/UL (ref 0–0.1)
BASOPHILS NFR BLD: 0 % (ref 0–1)
BILIRUB SERPL-MCNC: 0.5 MG/DL (ref 0.2–1)
BUN SERPL-MCNC: 10 MG/DL (ref 6–20)
BUN/CREAT SERPL: 19 (ref 12–20)
CALCIUM SERPL-MCNC: 8.7 MG/DL (ref 8.5–10.1)
CHLORIDE SERPL-SCNC: 105 MMOL/L (ref 97–108)
CO2 SERPL-SCNC: 27 MMOL/L (ref 21–32)
CREAT SERPL-MCNC: 0.53 MG/DL (ref 0.55–1.02)
DIFFERENTIAL METHOD BLD: ABNORMAL
EOSINOPHIL # BLD: 0.1 K/UL (ref 0–0.4)
EOSINOPHIL NFR BLD: 1 % (ref 0–7)
ERYTHROCYTE [DISTWIDTH] IN BLOOD BY AUTOMATED COUNT: 14.3 % (ref 11.5–14.5)
GLOBULIN SER CALC-MCNC: 3.4 G/DL (ref 2–4)
GLUCOSE SERPL-MCNC: 110 MG/DL (ref 65–100)
HCT VFR BLD AUTO: 30.3 % (ref 35–47)
HGB BLD-MCNC: 9.9 G/DL (ref 11.5–16)
IMM GRANULOCYTES # BLD AUTO: 0.1 K/UL (ref 0–0.04)
IMM GRANULOCYTES NFR BLD AUTO: 1 % (ref 0–0.5)
LYMPHOCYTES # BLD: 0.7 K/UL (ref 0.8–3.5)
LYMPHOCYTES NFR BLD: 7 % (ref 12–49)
MCH RBC QN AUTO: 30.2 PG (ref 26–34)
MCHC RBC AUTO-ENTMCNC: 32.7 G/DL (ref 30–36.5)
MCV RBC AUTO: 92.4 FL (ref 80–99)
MONOCYTES # BLD: 0.4 K/UL (ref 0–1)
MONOCYTES NFR BLD: 4 % (ref 5–13)
NEUTS SEG # BLD: 8.9 K/UL (ref 1.8–8)
NEUTS SEG NFR BLD: 87 % (ref 32–75)
NRBC # BLD: 0 K/UL (ref 0–0.01)
NRBC BLD-RTO: 0 PER 100 WBC
PLATELET # BLD AUTO: 251 K/UL (ref 150–400)
PMV BLD AUTO: 10.1 FL (ref 8.9–12.9)
POTASSIUM SERPL-SCNC: 4.1 MMOL/L (ref 3.5–5.1)
PROT SERPL-MCNC: 6.1 G/DL (ref 6.4–8.2)
RBC # BLD AUTO: 3.28 M/UL (ref 3.8–5.2)
RBC MORPH BLD: ABNORMAL
SODIUM SERPL-SCNC: 137 MMOL/L (ref 136–145)
WBC # BLD AUTO: 10.2 K/UL (ref 3.6–11)

## 2024-02-19 PROCEDURE — 80053 COMPREHEN METABOLIC PANEL: CPT

## 2024-02-19 PROCEDURE — 6370000000 HC RX 637 (ALT 250 FOR IP): Performed by: STUDENT IN AN ORGANIZED HEALTH CARE EDUCATION/TRAINING PROGRAM

## 2024-02-19 PROCEDURE — 2580000003 HC RX 258: Performed by: PHYSICIAN ASSISTANT

## 2024-02-19 PROCEDURE — 97116 GAIT TRAINING THERAPY: CPT

## 2024-02-19 PROCEDURE — 99024 POSTOP FOLLOW-UP VISIT: CPT | Performed by: PHYSICIAN ASSISTANT

## 2024-02-19 PROCEDURE — 6370000000 HC RX 637 (ALT 250 FOR IP): Performed by: PHYSICIAN ASSISTANT

## 2024-02-19 PROCEDURE — 85025 COMPLETE CBC W/AUTO DIFF WBC: CPT

## 2024-02-19 PROCEDURE — 97161 PT EVAL LOW COMPLEX 20 MIN: CPT

## 2024-02-19 PROCEDURE — 36415 COLL VENOUS BLD VENIPUNCTURE: CPT

## 2024-02-19 PROCEDURE — 97530 THERAPEUTIC ACTIVITIES: CPT

## 2024-02-19 PROCEDURE — 97535 SELF CARE MNGMENT TRAINING: CPT

## 2024-02-19 PROCEDURE — 6360000002 HC RX W HCPCS: Performed by: STUDENT IN AN ORGANIZED HEALTH CARE EDUCATION/TRAINING PROGRAM

## 2024-02-19 PROCEDURE — 2580000003 HC RX 258: Performed by: STUDENT IN AN ORGANIZED HEALTH CARE EDUCATION/TRAINING PROGRAM

## 2024-02-19 PROCEDURE — 97110 THERAPEUTIC EXERCISES: CPT

## 2024-02-19 PROCEDURE — 94761 N-INVAS EAR/PLS OXIMETRY MLT: CPT

## 2024-02-19 PROCEDURE — 97165 OT EVAL LOW COMPLEX 30 MIN: CPT

## 2024-02-19 PROCEDURE — 1100000000 HC RM PRIVATE

## 2024-02-19 PROCEDURE — 6360000002 HC RX W HCPCS: Performed by: PHYSICIAN ASSISTANT

## 2024-02-19 RX ORDER — SODIUM CHLORIDE 0.9 % (FLUSH) 0.9 %
5-40 SYRINGE (ML) INJECTION PRN
Status: DISCONTINUED | OUTPATIENT
Start: 2024-02-19 | End: 2024-02-22 | Stop reason: HOSPADM

## 2024-02-19 RX ORDER — ONDANSETRON 2 MG/ML
4 INJECTION INTRAMUSCULAR; INTRAVENOUS EVERY 6 HOURS PRN
Status: DISCONTINUED | OUTPATIENT
Start: 2024-02-19 | End: 2024-02-22 | Stop reason: HOSPADM

## 2024-02-19 RX ORDER — OXYCODONE HYDROCHLORIDE 5 MG/1
5 TABLET ORAL EVERY 4 HOURS PRN
Status: DISCONTINUED | OUTPATIENT
Start: 2024-02-19 | End: 2024-02-19

## 2024-02-19 RX ORDER — BISACODYL 5 MG/1
5 TABLET, DELAYED RELEASE ORAL DAILY
Status: DISCONTINUED | OUTPATIENT
Start: 2024-02-19 | End: 2024-02-22 | Stop reason: HOSPADM

## 2024-02-19 RX ORDER — ENOXAPARIN SODIUM 100 MG/ML
30 INJECTION SUBCUTANEOUS DAILY
Status: DISCONTINUED | OUTPATIENT
Start: 2024-02-19 | End: 2024-02-19

## 2024-02-19 RX ORDER — MORPHINE SULFATE 2 MG/ML
2 INJECTION, SOLUTION INTRAMUSCULAR; INTRAVENOUS
Status: DISCONTINUED | OUTPATIENT
Start: 2024-02-19 | End: 2024-02-20

## 2024-02-19 RX ORDER — SODIUM CHLORIDE 9 MG/ML
INJECTION, SOLUTION INTRAVENOUS PRN
Status: DISCONTINUED | OUTPATIENT
Start: 2024-02-19 | End: 2024-02-22 | Stop reason: HOSPADM

## 2024-02-19 RX ORDER — ACETAMINOPHEN 325 MG/1
650 TABLET ORAL EVERY 4 HOURS PRN
Status: DISCONTINUED | OUTPATIENT
Start: 2024-02-19 | End: 2024-02-22 | Stop reason: HOSPADM

## 2024-02-19 RX ORDER — ENOXAPARIN SODIUM 100 MG/ML
30 INJECTION SUBCUTANEOUS DAILY
Status: DISCONTINUED | OUTPATIENT
Start: 2024-02-19 | End: 2024-02-22 | Stop reason: HOSPADM

## 2024-02-19 RX ORDER — ONDANSETRON 4 MG/1
4 TABLET, ORALLY DISINTEGRATING ORAL EVERY 8 HOURS PRN
Status: DISCONTINUED | OUTPATIENT
Start: 2024-02-19 | End: 2024-02-22 | Stop reason: HOSPADM

## 2024-02-19 RX ORDER — SODIUM CHLORIDE 0.9 % (FLUSH) 0.9 %
5-40 SYRINGE (ML) INJECTION EVERY 12 HOURS SCHEDULED
Status: DISCONTINUED | OUTPATIENT
Start: 2024-02-19 | End: 2024-02-22 | Stop reason: HOSPADM

## 2024-02-19 RX ADMIN — SODIUM CHLORIDE: 9 INJECTION, SOLUTION INTRAVENOUS at 23:23

## 2024-02-19 RX ADMIN — BISACODYL 5 MG: 5 TABLET, COATED ORAL at 10:49

## 2024-02-19 RX ADMIN — ENOXAPARIN SODIUM 30 MG: 100 INJECTION SUBCUTANEOUS at 08:17

## 2024-02-19 RX ADMIN — WATER 2000 MG: 1 INJECTION INTRAMUSCULAR; INTRAVENOUS; SUBCUTANEOUS at 10:40

## 2024-02-19 RX ADMIN — ACETAMINOPHEN 650 MG: 325 TABLET ORAL at 15:16

## 2024-02-19 RX ADMIN — WATER 2000 MG: 1 INJECTION INTRAMUSCULAR; INTRAVENOUS; SUBCUTANEOUS at 17:34

## 2024-02-19 RX ADMIN — ACETAMINOPHEN 650 MG: 325 TABLET ORAL at 08:00

## 2024-02-19 RX ADMIN — FOLIC ACID 1 MG: 1 TABLET ORAL at 08:01

## 2024-02-19 RX ADMIN — AMLODIPINE BESYLATE 10 MG: 5 TABLET ORAL at 08:01

## 2024-02-19 RX ADMIN — LISINOPRIL 10 MG: 5 TABLET ORAL at 08:01

## 2024-02-19 RX ADMIN — SODIUM CHLORIDE, PRESERVATIVE FREE 10 ML: 5 INJECTION INTRAVENOUS at 08:18

## 2024-02-19 RX ADMIN — LEVOTHYROXINE SODIUM 75 MCG: 0.07 TABLET ORAL at 05:48

## 2024-02-19 RX ADMIN — SODIUM CHLORIDE, PRESERVATIVE FREE 10 ML: 5 INJECTION INTRAVENOUS at 21:48

## 2024-02-19 ASSESSMENT — PAIN SCALES - GENERAL
PAINLEVEL_OUTOF10: 10
PAINLEVEL_OUTOF10: 5
PAINLEVEL_OUTOF10: 0
PAINLEVEL_OUTOF10: 3

## 2024-02-19 ASSESSMENT — PAIN DESCRIPTION - DESCRIPTORS: DESCRIPTORS: NAGGING

## 2024-02-19 ASSESSMENT — PAIN DESCRIPTION - LOCATION
LOCATION: HIP
LOCATION: HIP

## 2024-02-19 ASSESSMENT — PAIN DESCRIPTION - ORIENTATION: ORIENTATION: LEFT

## 2024-02-19 NOTE — ANESTHESIA POSTPROCEDURE EVALUATION
Department of Anesthesiology  Postprocedure Note    Patient: Annie Crespo  MRN: [id number]  YOB: 1930  Date of evaluation: 2/19/2024    Procedure Summary       Date: 02/18/24 Room / Location: [facility] MAIN OR  / [facility] MAIN OR    Anesthesia Start: 1530 Anesthesia Stop: 1715    Procedure: HIP HEMIARTHROPLASTY (Left: Hip) Diagnosis:       Type I or II open fracture of left hip, initial encounter ([facility])      (Type I or II open fracture of left hip, initial encounter ([facility]) [S72.002B])    Surgeons: Raciel Rubio MD Responsible Provider: Gama Curry MD    Anesthesia Type: general ASA Status: 3            Anesthesia Type: No value filed.    Hortensia Phase I: Hortensia Score: 10    Hortensia Phase II:      Anesthesia Post Evaluation    Patient location during evaluation: PACU  Patient participation: complete - patient participated  Level of consciousness: awake  Airway patency: patent  Nausea & Vomiting: no vomiting and no nausea  Cardiovascular status: hemodynamically stable  Respiratory status: acceptable  Hydration status: stable  Pain management: adequate    No notable events documented.

## 2024-02-19 NOTE — CONSULTS
NELLY LOVELACE [facility]  CONSULTATION    Name:  DEANDRE LEA  MR#:  [id number]  :  1930  ACCOUNT #:  [id number]  DATE OF SERVICE:  2024    REASON FOR CONSULTATION:  Left hip fracture.    CONSULTING PHYSICIAN:  George Chou MD, from the emergency department.    HISTORY OF PRESENT ILLNESS:  This is a 89-year-old female who had a mechanical fall at home and had immediate left hip pain.  She was taken to [place] emergency department and an x-ray showed a left femoral neck fracture.  She has been admitted to the hospitalist service and I was consulted for further management of her hip fracture.  She complains of sharp stabbing hip pain, worse with activity and better with rest.  It started after her fall.    PAST MEDICAL HISTORY:  Arthritis, diabetes, diverticulitis, hypertension, hypothyroidism, osteoporosis, and history of septic arthritis in her left elbow as well as compression fractures in her spine.    PAST SURGICAL HISTORY:  Thyroidectomy, hysterectomy, and hemorrhoidectomy.    SOCIAL HISTORY:  Former smoker.  Currently uses alcohol and denies using drugs.    FAMILY HISTORY:  Cancer, heart disease and diabetes.    ALLERGIES:  TO ALENDRONATE, HYDROCHLOROTHIAZIDE, PENICILLIN AND AMOXICILLIN.    HOME MEDICATIONS:  See medication reconciliation form.    REVIEW OF SYSTEMS:  Positive for a left hip pain.  Otherwise, a 10-point review of systems is negative.    LABORATORY EVALUATION:  White blood cell count of 8.4, hemoglobin is 10.9, hematocrit 31.9 and platelets are 325.  Sodium of 136, potassium 4.1, chloride 102, CO2 is 25, BUN 13, creatinine 0.75, and glucose is 129.    PHYSICAL EXAMINATION:  VITAL SIGNS:  Temperature 97.5, pulse 108, blood pressure 165/63, respiratory rate 20, and oxygen saturation 100% on room air.  GENERAL:  This is an elderly  female, in no acute distress.  She is alert and cooperative on examination.  HEENT:  Her head is normocephalic,

## 2024-02-19 NOTE — OP NOTE
weightbearing as tolerated to the left lower extremity on postop day #1.  She will start Lovenox 40 mg subcu daily for DVT prophylaxis on the morning of postop day #1 and will continue this for four weeks postoperatively.  She will use the hip abduction pillow for approximately six weeks and maintain hip precautions for six weeks.    She will follow up with Carl Asher in the [facility] in two to three weeks, [id number].      MD JOSE [email or web address]  D:  02/18/2024 16:36  T:  02/18/2024 21:33  JOB #:  [id number]

## 2024-02-20 LAB
ANION GAP SERPL CALC-SCNC: 5 MMOL/L (ref 5–15)
BUN SERPL-MCNC: 9 MG/DL (ref 6–20)
BUN/CREAT SERPL: 18 (ref 12–20)
CALCIUM SERPL-MCNC: 8.1 MG/DL (ref 8.5–10.1)
CHLORIDE SERPL-SCNC: 108 MMOL/L (ref 97–108)
CO2 SERPL-SCNC: 22 MMOL/L (ref 21–32)
CREAT SERPL-MCNC: 0.49 MG/DL (ref 0.55–1.02)
GLUCOSE SERPL-MCNC: 103 MG/DL (ref 65–100)
POTASSIUM SERPL-SCNC: 3.4 MMOL/L (ref 3.5–5.1)
SODIUM SERPL-SCNC: 135 MMOL/L (ref 136–145)

## 2024-02-20 PROCEDURE — 97535 SELF CARE MNGMENT TRAINING: CPT

## 2024-02-20 PROCEDURE — 99024 POSTOP FOLLOW-UP VISIT: CPT | Performed by: PHYSICIAN ASSISTANT

## 2024-02-20 PROCEDURE — 1100000000 HC RM PRIVATE

## 2024-02-20 PROCEDURE — 6370000000 HC RX 637 (ALT 250 FOR IP): Performed by: STUDENT IN AN ORGANIZED HEALTH CARE EDUCATION/TRAINING PROGRAM

## 2024-02-20 PROCEDURE — 2580000003 HC RX 258: Performed by: STUDENT IN AN ORGANIZED HEALTH CARE EDUCATION/TRAINING PROGRAM

## 2024-02-20 PROCEDURE — 2580000003 HC RX 258: Performed by: PHYSICIAN ASSISTANT

## 2024-02-20 PROCEDURE — 80048 BASIC METABOLIC PNL TOTAL CA: CPT

## 2024-02-20 PROCEDURE — 94761 N-INVAS EAR/PLS OXIMETRY MLT: CPT

## 2024-02-20 PROCEDURE — 36415 COLL VENOUS BLD VENIPUNCTURE: CPT

## 2024-02-20 PROCEDURE — 6360000002 HC RX W HCPCS: Performed by: STUDENT IN AN ORGANIZED HEALTH CARE EDUCATION/TRAINING PROGRAM

## 2024-02-20 PROCEDURE — 97530 THERAPEUTIC ACTIVITIES: CPT

## 2024-02-20 PROCEDURE — 97116 GAIT TRAINING THERAPY: CPT

## 2024-02-20 PROCEDURE — 6370000000 HC RX 637 (ALT 250 FOR IP): Performed by: PHYSICIAN ASSISTANT

## 2024-02-20 RX ADMIN — BISACODYL 5 MG: 5 TABLET, COATED ORAL at 08:06

## 2024-02-20 RX ADMIN — ENOXAPARIN SODIUM 30 MG: 100 INJECTION SUBCUTANEOUS at 08:06

## 2024-02-20 RX ADMIN — SODIUM CHLORIDE, PRESERVATIVE FREE 10 ML: 5 INJECTION INTRAVENOUS at 08:06

## 2024-02-20 RX ADMIN — SODIUM CHLORIDE: 9 INJECTION, SOLUTION INTRAVENOUS at 12:52

## 2024-02-20 RX ADMIN — AMLODIPINE BESYLATE 10 MG: 5 TABLET ORAL at 08:06

## 2024-02-20 RX ADMIN — FOLIC ACID 1 MG: 1 TABLET ORAL at 08:06

## 2024-02-20 RX ADMIN — LEVOTHYROXINE SODIUM 75 MCG: 0.07 TABLET ORAL at 06:25

## 2024-02-20 RX ADMIN — LISINOPRIL 10 MG: 5 TABLET ORAL at 08:06

## 2024-02-20 RX ADMIN — ACETAMINOPHEN 650 MG: 325 TABLET ORAL at 12:50

## 2024-02-20 ASSESSMENT — PAIN DESCRIPTION - LOCATION: LOCATION: HIP

## 2024-02-20 ASSESSMENT — PAIN SCALES - GENERAL
PAINLEVEL_OUTOF10: 0
PAINLEVEL_OUTOF10: 2
PAINLEVEL_OUTOF10: 3

## 2024-02-20 ASSESSMENT — PAIN DESCRIPTION - ORIENTATION: ORIENTATION: LEFT

## 2024-02-21 LAB
ANION GAP SERPL CALC-SCNC: 5 MMOL/L (ref 5–15)
BUN SERPL-MCNC: 9 MG/DL (ref 6–20)
BUN/CREAT SERPL: 21 (ref 12–20)
CALCIUM SERPL-MCNC: 8 MG/DL (ref 8.5–10.1)
CHLORIDE SERPL-SCNC: 108 MMOL/L (ref 97–108)
CO2 SERPL-SCNC: 25 MMOL/L (ref 21–32)
CREAT SERPL-MCNC: 0.43 MG/DL (ref 0.55–1.02)
GLUCOSE SERPL-MCNC: 103 MG/DL (ref 65–100)
POTASSIUM SERPL-SCNC: 3.3 MMOL/L (ref 3.5–5.1)
SODIUM SERPL-SCNC: 138 MMOL/L (ref 136–145)

## 2024-02-21 PROCEDURE — 36415 COLL VENOUS BLD VENIPUNCTURE: CPT

## 2024-02-21 PROCEDURE — 2580000003 HC RX 258: Performed by: STUDENT IN AN ORGANIZED HEALTH CARE EDUCATION/TRAINING PROGRAM

## 2024-02-21 PROCEDURE — 6370000000 HC RX 637 (ALT 250 FOR IP): Performed by: PHYSICIAN ASSISTANT

## 2024-02-21 PROCEDURE — 6370000000 HC RX 637 (ALT 250 FOR IP): Performed by: STUDENT IN AN ORGANIZED HEALTH CARE EDUCATION/TRAINING PROGRAM

## 2024-02-21 PROCEDURE — 6360000002 HC RX W HCPCS: Performed by: STUDENT IN AN ORGANIZED HEALTH CARE EDUCATION/TRAINING PROGRAM

## 2024-02-21 PROCEDURE — 1100000000 HC RM PRIVATE

## 2024-02-21 PROCEDURE — 80048 BASIC METABOLIC PNL TOTAL CA: CPT

## 2024-02-21 PROCEDURE — 97530 THERAPEUTIC ACTIVITIES: CPT

## 2024-02-21 PROCEDURE — 2580000003 HC RX 258: Performed by: PHYSICIAN ASSISTANT

## 2024-02-21 PROCEDURE — 97116 GAIT TRAINING THERAPY: CPT

## 2024-02-21 PROCEDURE — 97535 SELF CARE MNGMENT TRAINING: CPT

## 2024-02-21 PROCEDURE — 94761 N-INVAS EAR/PLS OXIMETRY MLT: CPT

## 2024-02-21 PROCEDURE — APPNB30 APP NON BILLABLE TIME 0-30 MINS: Performed by: NURSE PRACTITIONER

## 2024-02-21 RX ORDER — POTASSIUM CHLORIDE 750 MG/1
40 TABLET, FILM COATED, EXTENDED RELEASE ORAL ONCE
Status: COMPLETED | OUTPATIENT
Start: 2024-02-21 | End: 2024-02-21

## 2024-02-21 RX ADMIN — SODIUM CHLORIDE: 9 INJECTION, SOLUTION INTRAVENOUS at 04:03

## 2024-02-21 RX ADMIN — SODIUM CHLORIDE, PRESERVATIVE FREE 10 ML: 5 INJECTION INTRAVENOUS at 09:33

## 2024-02-21 RX ADMIN — LISINOPRIL 10 MG: 5 TABLET ORAL at 09:33

## 2024-02-21 RX ADMIN — ENOXAPARIN SODIUM 30 MG: 100 INJECTION SUBCUTANEOUS at 09:33

## 2024-02-21 RX ADMIN — LEVOTHYROXINE SODIUM 75 MCG: 0.07 TABLET ORAL at 07:03

## 2024-02-21 RX ADMIN — POTASSIUM CHLORIDE 40 MEQ: 750 TABLET, EXTENDED RELEASE ORAL at 07:03

## 2024-02-21 RX ADMIN — AMLODIPINE BESYLATE 10 MG: 5 TABLET ORAL at 09:32

## 2024-02-21 RX ADMIN — FOLIC ACID 1 MG: 1 TABLET ORAL at 09:33

## 2024-02-21 RX ADMIN — BISACODYL 5 MG: 5 TABLET, COATED ORAL at 09:33

## 2024-02-22 VITALS
BODY MASS INDEX: 19.76 KG/M2 | HEART RATE: 86 BPM | HEIGHT: 59 IN | DIASTOLIC BLOOD PRESSURE: 78 MMHG | TEMPERATURE: 98.1 F | RESPIRATION RATE: 16 BRPM | SYSTOLIC BLOOD PRESSURE: 134 MMHG | OXYGEN SATURATION: 99 % | WEIGHT: 98 LBS

## 2024-02-22 LAB
ANION GAP SERPL CALC-SCNC: 5 MMOL/L (ref 5–15)
BUN SERPL-MCNC: 11 MG/DL (ref 6–20)
BUN/CREAT SERPL: 26 (ref 12–20)
CALCIUM SERPL-MCNC: 8.1 MG/DL (ref 8.5–10.1)
CHLORIDE SERPL-SCNC: 108 MMOL/L (ref 97–108)
CO2 SERPL-SCNC: 24 MMOL/L (ref 21–32)
CREAT SERPL-MCNC: 0.42 MG/DL (ref 0.55–1.02)
ERYTHROCYTE [DISTWIDTH] IN BLOOD BY AUTOMATED COUNT: 14.4 % (ref 11.5–14.5)
GLUCOSE SERPL-MCNC: 105 MG/DL (ref 65–100)
HCT VFR BLD AUTO: 25 % (ref 35–47)
HGB BLD-MCNC: 8.1 G/DL (ref 11.5–16)
MCH RBC QN AUTO: 29.9 PG (ref 26–34)
MCHC RBC AUTO-ENTMCNC: 32.4 G/DL (ref 30–36.5)
MCV RBC AUTO: 92.3 FL (ref 80–99)
NRBC # BLD: 0 K/UL (ref 0–0.01)
NRBC BLD-RTO: 0 PER 100 WBC
PLATELET # BLD AUTO: 233 K/UL (ref 150–400)
PMV BLD AUTO: 10 FL (ref 8.9–12.9)
POTASSIUM SERPL-SCNC: 3.6 MMOL/L (ref 3.5–5.1)
RBC # BLD AUTO: 2.71 M/UL (ref 3.8–5.2)
SODIUM SERPL-SCNC: 137 MMOL/L (ref 136–145)
WBC # BLD AUTO: 10 K/UL (ref 3.6–11)

## 2024-02-22 PROCEDURE — 97535 SELF CARE MNGMENT TRAINING: CPT

## 2024-02-22 PROCEDURE — 36415 COLL VENOUS BLD VENIPUNCTURE: CPT

## 2024-02-22 PROCEDURE — 85027 COMPLETE CBC AUTOMATED: CPT

## 2024-02-22 PROCEDURE — 6370000000 HC RX 637 (ALT 250 FOR IP): Performed by: PHYSICIAN ASSISTANT

## 2024-02-22 PROCEDURE — 6370000000 HC RX 637 (ALT 250 FOR IP): Performed by: STUDENT IN AN ORGANIZED HEALTH CARE EDUCATION/TRAINING PROGRAM

## 2024-02-22 PROCEDURE — 97116 GAIT TRAINING THERAPY: CPT

## 2024-02-22 PROCEDURE — 80048 BASIC METABOLIC PNL TOTAL CA: CPT

## 2024-02-22 PROCEDURE — 94761 N-INVAS EAR/PLS OXIMETRY MLT: CPT

## 2024-02-22 RX ORDER — ENOXAPARIN SODIUM 100 MG/ML
30 INJECTION SUBCUTANEOUS DAILY
Qty: 9 ML | Refills: 0 | Status: SHIPPED | OUTPATIENT
Start: 2024-02-22 | End: 2024-03-23

## 2024-02-22 RX ORDER — ONDANSETRON 4 MG/1
4 TABLET, ORALLY DISINTEGRATING ORAL EVERY 8 HOURS PRN
Qty: 15 TABLET | Refills: 0 | Status: SHIPPED | OUTPATIENT
Start: 2024-02-22

## 2024-02-22 RX ADMIN — LEVOTHYROXINE SODIUM 75 MCG: 0.07 TABLET ORAL at 06:17

## 2024-02-22 RX ADMIN — ACETAMINOPHEN 650 MG: 325 TABLET ORAL at 02:51

## 2024-02-22 RX ADMIN — AMLODIPINE BESYLATE 10 MG: 5 TABLET ORAL at 09:32

## 2024-02-22 RX ADMIN — ACETAMINOPHEN 650 MG: 325 TABLET ORAL at 09:32

## 2024-02-22 RX ADMIN — LISINOPRIL 10 MG: 5 TABLET ORAL at 09:32

## 2024-02-22 RX ADMIN — BISACODYL 5 MG: 5 TABLET, COATED ORAL at 09:32

## 2024-02-22 RX ADMIN — FOLIC ACID 1 MG: 1 TABLET ORAL at 09:32

## 2024-02-22 ASSESSMENT — PAIN SCALES - GENERAL: PAINLEVEL_OUTOF10: 7

## 2024-02-22 ASSESSMENT — PAIN DESCRIPTION - ORIENTATION: ORIENTATION: LEFT

## 2024-02-22 ASSESSMENT — PAIN DESCRIPTION - LOCATION: LOCATION: HIP

## 2024-02-22 NOTE — DISCHARGE SUMMARY
Hospitalist Discharge Summary     Patient ID:  Annie Crespo  [id number]  89 y.o.  2/13/1930    Admit date: 2/18/2024    Discharge date and time: 2/22/2024    Admission Diagnoses: Closed fracture of left hip, initial encounter ([facility]) [S72.002A]  Closed fracture dislocation of left hip joint with delayed healing [S72.002G]    Discharge Diagnoses:    Principal Problem:    Closed fracture dislocation of left hip joint with delayed healing  Resolved Problems:    * No resolved hospital problems. *         Hospital Course:   89 y.o. female with past medical history of hypertension, hypothyroidism, rheumatoid arthritis, CKD, T12 compression fracture who presents with left hip pain after mechanical fall at home, was found to have left hip fracture POD3, pt agreed to inpatient rehab     # Left Impacted and displaced left femoral neck fracture.   -S/p Left hip bipolar hemiarthroplasty day 1  Plan  -Pain management  Post operative care per ortho recommendation   ==The patient will be weightbearing as tolerated to the left lower extremity on postop day 1, tolerating with no complications   ==She will start Lovenox 30 mg subcu daily for DVT prophylaxis on the morning of postop day #2 and will continue this for four weeks postoperatively.   ==She will use the hip abduction pillow for approximately six weeks and maintain hip precautions for six weeks.   -Continue PT  -CM working on placement     # Traumatic fall  -CT head, CT cervical, CT pelvis: unremarkable   -Continue PT as tolerated      # Normocytic anemia/ stable  Hemoglobin 10.9/31.9, MCV 88.6 > CBC tomorrow   Most likely anemia due to chronic illness     #Rheumatoid arthritis  On methotrexate, hold and resume in 2 weeks   -  continue  folic acid.     #Hypertension:   - continue Norvasc and lisinopril when tolerating p.o.  -Will add hydralazine as needed     #Hypothyroidism:  -Follow-up on TSH- continue Synthroid.     #Diabetes:   not on home meds. Monitor      #

## 2024-02-22 NOTE — PROGRESS NOTES
Orthopaedic Progress Note  Post Op day: 2 Days Post-Op    February 20, 2024 1:53 PM     Patient: Annie Crespo MRN: [id number]  SSN: [id number]    YOB: 1930  Age: 89 y.o.  Sex: female      Admit date:  2/18/2024  Procedures:  Procedure(s):  HIP HEMIARTHROPLASTY  Admitting Physician:  Jorge Strickland MD   Surgeon:  Surgeon(s) and Role:     * Raciel Rubio MD - Primary    Consulting Physician(s): Treatment Team: Attending Provider: Jorge Strickland MD; Consulting Physician: Ludin Gray PA; Surgeon: Raciel Rubio MD; Registered Nurse: Carrie Solo RN; Utilization Reviewer: Ct Ritchie; : Kym Butcher; Patient Care Tech: Anahi Rea; Registered Nurse: Raciel Pierre RN; Occupational Therapist Assistant: Tabby Donovan OTA; Physical Therapist Assistant: Eva Arrington PTA    SUBJECTIVE:     Annie Crespo is a 89 y.o. female is 2 Days Post-Op s/p Procedure(s):  HIP HEMIARTHROPLASTY with an appropriate level of post-operative pain.  She had some hallucinations last night.  No complaints of nausea, vomiting, dizziness, lightheadedness, chest pain, or shortness of breath.    OBJECTIVE:       Physical Exam:  General: Alert, cooperative, no distress.    Respiratory: Respirations unlabored  Neurological:  Neurovascular exam within normal limits.  Motor: + DF/PF.   Musculoskeletal: Left thigh is soft and compressible.  +PF and DF.  Moves ankles ok.   Calves soft, supple, non-tender upon palpation.    Dressing/Wound:  Clean, dry and intact. No significant erythema or swelling.         Vital Signs:      Patient Vitals for the past 8 hrs:   BP Temp Temp src Pulse Resp SpO2   02/20/24 1143 -- -- -- (!) 120 -- --   02/20/24 1115 (!) 149/72 -- -- (!) 103 -- --   02/20/24 1100 (!) 132/56 98.4 °F (36.9 °C) Oral 94 16 99 %   02/20/24 0745 (!) 145/75 98.6 °F (37 °C) Oral 100 14 99 %                                          Temp (24hrs), 
                 Orthopaedic Progress Note  Post Op day: 1 Day Post-Op    2024 11:20 PM     Patient: Annie Crespo MRN: [id number]  SSN: [id number]    YOB: 1930  Age: 89 y.o.  Sex: female      Admit date:  2024  Procedures:  Procedure(s):  HIP HEMIARTHROPLASTY  Admitting Physician:  Jorge Strickland MD   Surgeon:  Surgeon(s) and Role:     * Raciel Rubio MD - Primary    Consulting Physician(s): Treatment Team: Attending Provider: Jorge Strickland MD; Consulting Physician: Ludin Gray PA; Surgeon: Raciel Rubio MD; Registered Nurse: Carrie Solo, RN; Utilization Reviewer: Ct Ritchie; : Kym Butcher; Registered Nurse: Radha Mcmahon, RN; Registered Nurse: Radha Myles RN; Patient Care Tech: Nguyen Angeles    SUBJECTIVE:     Annie Crespo is a 89 y.o. female is 1 Day Post-Op s/p Procedure(s):  HIP HEMIARTHROPLASTY with an appropriate level of post-operative pain.  She is resting in a chair.  Son at bedside.  She had a restful night.   No complaints of nausea, vomiting, dizziness, lightheadedness, chest pain, or shortness of breath.    OBJECTIVE:       Physical Exam:  General: Alert, cooperative, no distress.    Respiratory: Respirations unlabored  Neurological:  Neurovascular exam within normal limits.  Motor: + DF/PF.   Musculoskeletal: Left thigh is soft and compressible.  +PF and DF.  Moves ankles ok.   Calves soft, supple, non-tender upon palpation.    Dressing/Wound:  Clean, dry and intact. No significant erythema or swelling.      Vital Signs:      Patient Vitals for the past 8 hrs:   BP Temp Temp src Pulse Resp SpO2   24 2256 -- -- -- (!) 101 -- --   24 (!) 135/55 98.1 °F (36.7 °C) Oral (!) 101 14 97 %                                          Temp (24hrs), Av.2 °F (36.8 °C), Min:97.5 °F (36.4 °C), Max:98.8 °F (37.1 °C)      Labs:        Recent Labs     24  0359   HCT 30.3*   HGB 9.9*     Lab 
    Hospitalist Progress Note      NAME:  Annie Crespo   :  1930  MRM:  [id number]    Date/Time: 2024  11:02 AM           Assessment / Plan:     89 y.o. female with past medical history of hypertension, hypothyroidism, rheumatoid arthritis, CKD, T12 compression fracture who presents with left hip pain after mechanical fall at home, was found to have left hip fracture POD2     # Left Impacted and displaced left femoral neck fracture.   -S/p Left hip bipolar hemiarthroplasty day 1  Plan  -Pain management  Post operative care per ortho recommendation   ==The patient will be weightbearing as tolerated to the left lower extremity on postop day   ==She will start Lovenox 30 mg subcu daily for DVT prophylaxis on the morning of postop day #2 and will continue this for four weeks postoperatively.   ==She will use the hip abduction pillow for approximately six weeks and maintain hip precautions for six weeks.   -Continue PT  -CM working on placement, pending auth      # Traumatic fall  -CT head, CT cervical, CT pelvis: unremarkable   -Continue PT as tolerated      # Normocytic anemia/ stable  Hemoglobin 10.9/31.9, MCV 88.6  Most likely anemia due to chronic illness     #Rheumatoid arthritis  On methotrexate  -  continue  folic acid.     #Hypertension:   - continue Norvasc and lisinopril when tolerating p.o.  -Will add hydralazine as needed     #Hypothyroidism:  -Follow-up on TSH- continue Synthroid.     #Diabetes:   not on home meds. Monitor      # CKD: Stable/at baseline     #T12 compression fracture:   due to fall.    Diagnosed during previous admission ortho rec conservative treatment. PT/OT       I have personally reviewed the radiographs, laboratory data in Epic and decisions and statements above are based partially on this personal interpretation.                 Care Plan discussed with: Patient    Discussed:  Care Plan    Prophylaxis:  Lovenox    Disposition:  
    Hospitalist Progress Note      NAME:  Annie Crespo   :  1930  MRM:  [id number]    Date/Time: 2024  11:33 AM           Assessment / Plan:     89 y.o. female with past medical history of hypertension, hypothyroidism, rheumatoid arthritis, CKD, T12 compression fracture who presents with left hip pain after mechanical fall at home, was found to have left hip fracture POD3, pt agreed to inpatient rehab     # Left Impacted and displaced left femoral neck fracture.   -S/p Left hip bipolar hemiarthroplasty day 1  Plan  -Pain management  Post operative care per ortho recommendation   ==The patient will be weightbearing as tolerated to the left lower extremity on postop day 1, tolerating with no complications   ==She will start Lovenox 30 mg subcu daily for DVT prophylaxis on the morning of postop day #2 and will continue this for four weeks postoperatively.   ==She will use the hip abduction pillow for approximately six weeks and maintain hip precautions for six weeks.   -Continue PT  -CM working on placement     # Traumatic fall  -CT head, CT cervical, CT pelvis: unremarkable   -Continue PT as tolerated      # Normocytic anemia/ stable  Hemoglobin 10.9/31.9, MCV 88.6 > CBC tomorrow   Most likely anemia due to chronic illness     #Rheumatoid arthritis  On methotrexate, hold and resume in 2 weeks   -  continue  folic acid.     #Hypertension:   - continue Norvasc and lisinopril when tolerating p.o.  -Will add hydralazine as needed     #Hypothyroidism:  -Follow-up on TSH- continue Synthroid.     #Diabetes:   not on home meds. Monitor      # CKD: Stable/at baseline     #T12 compression fracture:   due to fall.    Diagnosed during previous admission ortho rec conservative treatment. PT/OT       I have personally reviewed the radiographs, laboratory data in Epic and decisions and statements above are based partially on this personal interpretation.                 Care Plan discussed with: 
    Pharmacist Review and Automatic Dose Adjustment of Prophylactic Enoxaparin    *Review reason for admission/hospital problem list*    The reviewing pharmacist has made an adjustment to the ordered enoxaparin dose or converted to UFH per the approved [facility] protocol and table as identified below.        Annie Crespo is a 89 y.o. female.     Recent Labs     02/18/24  1215 02/19/24  0359   CREATININE 0.75 0.53*       Estimated Creatinine Clearance: 46 mL/min (A) (based on SCr of 0.53 mg/dL (L)).    Height:   Ht Readings from Last 1 Encounters:   02/18/24 1.499 m (4' 11\")     Weight:  Wt Readings from Last 1 Encounters:   02/18/24 44.5 kg (98 lb)               Plan: Based upon the patient's weight and renal function, the ordered enoxaparin dose of 40mg q24h has been changed/converted to 30 mg q 24 h      Thank you,  Barney Cotto, [facility]    
  Physician Progress Note      PATIENT:               DEANDRE LEA  [facility] #:                  [id number]  :                       1930  ADMIT DATE:       2024 11:55 AM  DISCH DATE:  RESPONDING  PROVIDER #:        Jorge Strickland MD        QUERY TEXT:    Stage of Chronic Kidney Disease: Please provide further specificity, if known.    Clinical indicators include: ckd, bun  Options provided:  -- Chronic kidney disease stage 1  -- Chronic kidney disease stage 2  -- Chronic kidney disease stage 3  -- Chronic kidney disease stage 3a  -- Chronic kidney disease stage 3b  -- Chronic kidney disease stage 4  -- Chronic kidney disease stage 5  -- Chronic kidney disease stage 5, requiring dialysis  -- End stage renal disease  -- Other - I will add my own diagnosis  -- Disagree - Not applicable / Not valid  -- Disagree - Clinically Unable to determine / Unknown        PROVIDER RESPONSE TEXT:    The patient has chronic kidney disease stage 1.          QUERY TEXT:    Good Afternoon    This patient admitted on 2024- for Left Impacted and displaced left   femoral neck fracture.    The Xray of the left hip on  notes \"Severe osteopenia\".    If possible, can you please further clarify the fracture and please document   in progress notes and discharge summary if you are evaluating and/or treating   any of the following:    The medical record reflects the following:  Risk Factors: Age (89)- RA, Hx of compression fractures  Clinical Indicators: Pt to ER after son found pt on the ground, Left hip Xray   noted with \"Severe osteopenia\", Left impacted and displaced and left femoral   neck fracture.  Treatment: Xray of left hip, Ortho, To OR for left Hip hemiarthroplasty    Thank you  Shari Lopez <BSN,RN, CPHQ , CCDS, SMART  Options provided:  -- Pathological left femoral neck fracture due to osteopenia following fall   which would not usually break a normal, healthy bone  -- Traumatic Left femoral neck  fracture  -- 
Admit: 02/18/2024  Code: DNR       Present to ER / left-hip pain after mechanical fall at home --> on ground for 20 min - CT head + pelvis + cervical pending  - for any bleeding in head or chest --> found by son called EMS brought to ER --> d/t fall going for left-hip hemiarthroplasty 02/18 --> POD #1 --> no pain indicated -   -- conservative tx - possible tx to med-surg - got rehab then home           Past Medical History:   Diagnosis Date    Arthritis, rheumatoid (HCC)      Diabetes (HCC)      Diverticulitis      Hard of hearing      History of vascular access device 03/04/2022     4 feench single lumen PICC line right basilic    History of vascular access device 04/01/2021     [facility] VAT 4 FR R Basilic 37/0 LTAbx    HTN (hypertension)      Hypertension      Hypothyroid      Osteoporosis, post-menopausal      Septic arthritis of elbow, left (HCC)      Neuro:    [place]  Ao x 4  Bedrest       Resp:    RA - clear    Cardio:    NSR   + pulses  - edema  MAP >65       GI:    LBM: PTA   Regular Diet      :    [place]    Skin:    Left hip surgical   WNL - scattered brusiing       Ivs:    Left-AC 20g IV           Latest Reference Range & Units 02/19/24 03:59   Sodium 136 - 145 mmol/L 137   Potassium 3.5 - 5.1 mmol/L 4.1   Chloride 97 - 108 mmol/L 105   CO2 21 - 32 mmol/L 27   BUN,BUNPL 6 - 20 MG/DL 10   Creatinine 0.55 - 1.02 MG/DL 0.53 (L)   Bun/Cre Ratio 12 - 20   19   Anion Gap 5 - 15 mmol/L 5   Est, Glom Filt Rate >60 ml/min/1.73m2 >60   Glucose, Random 65 - 100 mg/dL 110 (H)   CALCIUM, SERUM, 658132 8.5 - 10.1 MG/DL 8.7   ALBUMIN/GLOBULIN RATIO 1.1 - 2.2   0.8 (L)   Total Protein 6.4 - 8.2 g/dL 6.1 (L)   Albumin 3.5 - 5.0 g/dL 2.7 (L)   Globulin 2.0 - 4.0 g/dL 3.4   Alk Phos 45 - 117 U/L 70   ALT 12 - 78 U/L 14   AST 15 - 37 U/L 20   BILIRUBIN TOTAL 0.2 - 1.0 MG/DL 0.5   WBC 3.6 - 11.0 K/uL 10.2   RBC 3.80 - 5.20 M/uL 3.28 (L)   Hemoglobin Quant 11.5 - 16.0 g/dL 9.9 (L)   Hematocrit 35.0 - 47.0 % 30.3 (L)   MCV 80.0 - 
Called and gave report to : Katy BURNS   
Comprehensive Nutrition Assessment    Type and Reason for Visit: Initial, Positive Nutrition Screen    Nutrition Recommendations/Plan:   Continue Regular diet - added chop meats and pt preferences  Continue Ensure Enlive TID - Ask if pt needs help opening bottle.   Assist pt with set up - pt has limited hand dexterity and poor vision.         Malnutrition Assessment:  Malnutrition Status:  Moderate malnutrition (02/19/24 1443)    Context:  Chronic Illness     Findings of the 6 clinical characteristics of malnutrition:  Energy Intake:  No significant decrease in energy intake  Weight Loss:  Mild weight loss (specify amount and time period)     Body Fat Loss:  Mild body fat loss Orbital, Buccal region   Muscle Mass Loss:  Mild muscle mass loss Clavicles (pectoralis & deltoids), Hand (interosseous), Temples (temporalis)  Fluid Accumulation:  No significant fluid accumulation     Strength:  Not Performed       Nutrition Assessment:    Past medical hx:       Diagnosis Date    Arthritis, rheumatoid (HCC)     Diabetes (HCC)     Diverticulitis     Hard of hearing     History of vascular access device 03/04/2022    4 feench single lumen PICC line right basilic    History of vascular access device 04/01/2021    [facility] VAT 4 FR R Basilic 37/0 LTAbx    HTN (hypertension)     Hypertension     Hypothyroid     Osteoporosis, post-menopausal     Septic arthritis of elbow, left (HCC)        Pt screened for MST - reported wt loss PTA. Pt reports hating to waste food and requesting smaller portions. Discussed protein and extra kcal for wound healing and prevent weight loss. Pt needs assistance with meal set up. Kitchen should chop up meats. Pt needs help with tray placement, finding items 2/2 poor vision and poor hand strength/arthritis. Denies food allergies, no c/d, no n/v at this time. Monitor PO, bowel regimen.       Current Nutrition Therapies:  ADULT ORAL NUTRITION SUPPLEMENT; Breakfast, Lunch, Dinner; Standard High 
Orthopedic NP Progress Note  Post Op Day: 3 Days Post-Op    February 21, 2024 9:54 AM     Annie Crespo    Attending Physician: Treatment Team: Attending Provider: Jorge Strickland MD; Consulting Physician: Ludin Gray PA; Surgeon: Raciel Rubio MD; Registered Nurse: Carrie Solo, RN; Utilization Reviewer: Ct Ritchie; : Kym Butcher; Registered Nurse: Raciel Pierre, RN; Registered Nurse: Aristides Carlin, RN; Occupational Therapist Assistant: Tabby Donovan OTA; Physical Therapist Assistant: Prabhakar Tyler PTA     Vital Signs:    Patient Vitals for the past 8 hrs:   BP Temp Temp src Pulse Resp SpO2   02/21/24 0815 (!) 136/59 98.1 °F (36.7 °C) -- 89 16 97 %   02/21/24 0807 135/64 98.1 °F (36.7 °C) -- 90 20 98 %   02/21/24 0700 -- -- -- 88 -- --   02/21/24 0400 (!) 129/55 98.5 °F (36.9 °C) Oral 84 18 96 %          Intake/Output:  No intake/output data recorded.  No intake/output data recorded.    Pain Control:        LAB:    Recent Labs     02/19/24  0359   HCT 30.3*   HGB 9.9*     Lab Results   Component Value Date/Time     02/21/2024 01:59 AM    K 3.3 02/21/2024 01:59 AM     02/21/2024 01:59 AM    CO2 25 02/21/2024 01:59 AM    BUN 9 02/21/2024 01:59 AM       Subjective:  Annie Crespo is a 89 y.o. female s/p a  Procedure(s):  HIP HEMIARTHROPLASTY   Procedure(s):  HIP HEMIARTHROPLASTY. Tolerating diet. Pain/ soreness in L thigh is well managed this AM       Objective: General: alert, cooperative, no distress.    Neuro/Vascular: CNS Intact.  Sensation stable. Brisk cap refill, 2+ pulses UE/LE  Musculoskeletal:  + ROM bilat UE/LE with expected post op edema, +DF/PF, NVI     Skin: warm and dry   Dressing - clean, dry and intact.              PT/OT:   Gait:                      Assessment:    s/p Procedure(s):  HIP HEMIARTHROPLASTY    Principal Problem:    Closed fracture dislocation of left hip joint with delayed healing  Resolved Problems:    * No 
Orthopedic surgery is aware of the patient.  Plan for left hip hemiarthroplasty today around 1600; if cleared.  Keep NPO.  Full note to follow.      MARIBEL Chow-FERNY  Orthopaedic Surgery PA  [facility]  [facility]      
Reviewed chart, noted that patient may have surgery this afternoon.  Will check on patient again tomorrow.    
injection 0.25 mg  0.25 mg IntraVENous Q3H PRN    Or    HYDROmorphone (DILAUDID) injection 0.5 mg  0.5 mg IntraVENous Q3H PRN    levothyroxine (SYNTHROID) tablet 75 mcg  75 mcg Oral QAM AC    [START ON 2/21/2024] methotrexate (RHEUMATREX) chemo tablet 20 mg  20 mg Oral Weekly    sodium chloride flush 0.9 % injection 5-40 mL  5-40 mL IntraVENous 2 times per day    sodium chloride flush 0.9 % injection 5-40 mL  5-40 mL IntraVENous PRN    0.9 % sodium chloride infusion   IntraVENous PRN    hydrALAZINE (APRESOLINE) injection 10 mg  10 mg IntraVENous Q6H PRN    amLODIPine (NORVASC) tablet 10 mg  10 mg Oral Daily    folic acid (FOLVITE) tablet 1 mg  1 mg Oral Daily    lisinopril (PRINIVIL;ZESTRIL) tablet 10 mg  10 mg Oral Daily            Lab Review:     Recent Labs     02/18/24  1215 02/19/24  0359   WBC 8.4 10.2   HGB 10.9* 9.9*   HCT 31.9* 30.3*    251     Recent Labs     02/18/24  1215 02/19/24  0359    137   K 4.1 4.1    105   CO2 25 27   BUN 13 10   MG 2.0  --    ALT 14 14     No components found for: \"GLPOC\"

## 2024-02-22 NOTE — DISCHARGE INSTRUCTIONS
HOSPITALIST DISCHARGE INSTRUCTIONS  NAME:  Annie Crespo   :  1930   MRN:  [id number]     Date/Time:  2024 8:28 AM    ADMIT DATE: 2024     DISCHARGE DATE: 2024     DISCHARGE DIAGNOSIS:  L hip fracture     DISCHARGE INSTRUCTIONS:  Thank you for allowing us to participate in your care. Your discharging Hospitalist is Jorge Strickland MD. You were admitted for evaluation and treatment of the above.       MEDICATIONS:    It is important that you take the medication exactly as they are prescribed.   Keep your medication in the bottles provided by the pharmacist and keep a list of the medication names, dosages, and times to be taken in your wallet.   Do not take other medications without consulting your doctor.             If you experience any of the following symptoms then please call your primary care physician or return to the emergency room if you cannot get hold of your doctor:  Fever, chills, nausea, vomiting, diarrhea, change in mentation, falling, bleeding, shortness of breath    Follow Up:  Please call the below provider to arrange hospital follow up appointment      Tami Su MD  [id number] [facility] [id number] [phone]    Follow up in 1 week(s)        For questions regarding your Hospitalization or to contact the Hospital Medicine team, please call [phone].      Information obtained by :  I understand that if any problems occur once I am at home I am to contact my physician.    I understand and acknowledge receipt of the instructions indicated above.                                                                                                                                           Physician's or R.N.'s Signature                                                                  Date/Time                                                                                                                                              Patient or

## 2024-02-22 NOTE — PLAN OF CARE
Problem: Occupational Therapy - Adult  Goal: By Discharge: Performs self-care activities at highest level of function for planned discharge setting.  See evaluation for individualized goals.  Description:   FUNCTIONAL STATUS PRIOR TO ADMISSION:  Patient reports able to perform ADL tasks with some assistance.   Patient uses rollator outside home.   , ADL Assistance: Independent,  ,  ,  ,  ,  , Homemaking Assistance: Needs assistance, Ambulation Assistance: Independent, Transfer Assistance: Independent, Active : No         HOME SUPPORT: Patient lived alone but son in nearby apartment and comes to check in multiple times daily.     Occupational Therapy Goals  Initiated 2/19/2024    1. Patient will perform lower body dressing with AE PRN with Minimal Assist within 7 day(s).  2. Patient will perform upper body ADLS standing for 5 minutes without fatigue or LOB with supervision within 7 days.  3. Patient will perform toilet transfers with Contact Guard Assist within 7 days.  4. Patient will perform all aspects of toileting at Supervision within 7 days.  5. Patient will utilize energy conservation techniques during functional activities without cues within 7 day(s).  6. Patient will adhere to posterior hip precautions without cues within 7 days.       Outcome: Progressing   OCCUPATIONAL THERAPY TREATMENT  Patient: Annie Crespo (89 y.o. female)  Date: 2/20/2024  Primary Diagnosis: Closed fracture of left hip, initial encounter ([facility]) [S72.002A]  Closed fracture dislocation of left hip joint with delayed healing [S72.002G]  Procedure(s) (LRB):  HIP HEMIARTHROPLASTY (Left) 2 Days Post-Op   Precautions: Weight Bearing, ROM Restrictions   Left Lower Extremity Weight Bearing: Weight Bearing As Tolerated         Hip Precautions: No hip flexion > 90 degrees, No ADduction, No hip internal rotation, Posterior hip precautions  Chart, occupational therapy assessment, plan of care, and goals were 
  Problem: Occupational Therapy - Adult  Goal: By Discharge: Performs self-care activities at highest level of function for planned discharge setting.  See evaluation for individualized goals.  Description:   FUNCTIONAL STATUS PRIOR TO ADMISSION:  Patient reports able to perform ADL tasks with some assistance.   Patient uses rollator outside home.   , ADL Assistance: Independent,  ,  ,  ,  ,  , Homemaking Assistance: Needs assistance, Ambulation Assistance: Independent, Transfer Assistance: Independent, Active : No         HOME SUPPORT: Patient lived alone but son in nearby apartment and comes to check in multiple times daily.     Occupational Therapy Goals  Initiated 2/19/2024    1. Patient will perform lower body dressing with AE PRN with Minimal Assist within 7 day(s).  2. Patient will perform upper body ADLS standing for 5 minutes without fatigue or LOB with supervision within 7 days.  3. Patient will perform toilet transfers with Contact Guard Assist within 7 days.  4. Patient will perform all aspects of toileting at Supervision within 7 days.  5. Patient will utilize energy conservation techniques during functional activities without cues within 7 day(s).  6. Patient will adhere to posterior hip precautions without cues within 7 days.     OCCUPATIONAL THERAPY TREATMENT  Patient: Annie Crespo (89 y.o. female)  Date: 2/22/2024  Primary Diagnosis: Closed fracture of left hip, initial encounter ([facility]) [S72.002A]  Closed fracture dislocation of left hip joint with delayed healing [S72.002G]  Procedure(s) (LRB):  HIP HEMIARTHROPLASTY (Left) 4 Days Post-Op   Precautions: Weight Bearing, ROM Restrictions   Left Lower Extremity Weight Bearing: Weight Bearing As Tolerated         Hip Precautions: No hip flexion > 90 degrees, No ADduction, No hip internal rotation, Posterior hip precautions  Chart, occupational therapy assessment, plan of care, and goals were reviewed.    ASSESSMENT  Patient continues to 
  Problem: Pain  Goal: Verbalizes/displays adequate comfort level or baseline comfort level  2/18/2024 2142 by Carrie Solo RN  Outcome: Progressing  2/18/2024 2142 by Carrie Solo RN  Outcome: Progressing  Flowsheets (Taken 2/18/2024 1710 by Luz Tidwell, RN)  Verbalizes/displays adequate comfort level or baseline comfort level:   Encourage patient to monitor pain and request assistance   Assess pain using appropriate pain scale   Implement non-pharmacological measures as appropriate and evaluate response   Administer analgesics based on type and severity of pain and evaluate response   Consider cultural and social influences on pain and pain management   Notify Licensed Independent Practitioner if interventions unsuccessful or patient reports new pain     Problem: Chronic Conditions and Co-morbidities  Goal: Patient's chronic conditions and co-morbidity symptoms are monitored and maintained or improved  2/18/2024 2142 by Carrie Solo RN  Outcome: Progressing  2/18/2024 2142 by Carrie Solo RN  Outcome: Progressing     
  Problem: Pain  Goal: Verbalizes/displays adequate comfort level or baseline comfort level  Outcome: Completed     Problem: Chronic Conditions and Co-morbidities  Goal: Patient's chronic conditions and co-morbidity symptoms are monitored and maintained or improved  Outcome: Completed     Problem: Safety - Adult  Goal: Free from fall injury  Outcome: Completed     Problem: Discharge Planning  Goal: Discharge to home or other facility with appropriate resources  Outcome: Completed     Problem: Skin/Tissue Integrity  Goal: Absence of new skin breakdown  Description: 1.  Monitor for areas of redness and/or skin breakdown  2.  Assess vascular access sites hourly  3.  Every 4-6 hours minimum:  Change oxygen saturation probe site  4.  Every 4-6 hours:  If on nasal continuous positive airway pressure, respiratory therapy assess nares and determine need for appliance change or resting period.  Outcome: Completed     Problem: ABCDS Injury Assessment  Goal: Absence of physical injury  Outcome: Completed     Problem: Nutrition Deficit:  Goal: Optimize nutritional status  Outcome: Completed     
  Problem: Pain  Goal: Verbalizes/displays adequate comfort level or baseline comfort level  Outcome: Progressing     Problem: Chronic Conditions and Co-morbidities  Goal: Patient's chronic conditions and co-morbidity symptoms are monitored and maintained or improved  Outcome: Progressing     Problem: Safety - Adult  Goal: Free from fall injury  Outcome: Progressing     Problem: Discharge Planning  Goal: Discharge to home or other facility with appropriate resources  Outcome: Progressing     Problem: Skin/Tissue Integrity  Goal: Absence of new skin breakdown  Description: 1.  Monitor for areas of redness and/or skin breakdown  2.  Assess vascular access sites hourly  3.  Every 4-6 hours minimum:  Change oxygen saturation probe site  4.  Every 4-6 hours:  If on nasal continuous positive airway pressure, respiratory therapy assess nares and determine need for appliance change or resting period.  Outcome: Progressing     Problem: ABCDS Injury Assessment  Goal: Absence of physical injury  Outcome: Progressing     Problem: Occupational Therapy - Adult  Goal: By Discharge: Performs self-care activities at highest level of function for planned discharge setting.  See evaluation for individualized goals.  Description:   FUNCTIONAL STATUS PRIOR TO ADMISSION:  Patient reports able to perform ADL tasks with some assistance.   Patient uses rollator outside home.   , ADL Assistance: Independent,  ,  ,  ,  ,  , Homemaking Assistance: Needs assistance, Ambulation Assistance: Independent, Transfer Assistance: Independent, Active : No         HOME SUPPORT: Patient lived alone but son in nearby apartment and comes to check in multiple times daily.     Occupational Therapy Goals  Initiated 2/19/2024    1. Patient will perform lower body dressing with AE PRN with Minimal Assist within 7 day(s).  2. Patient will perform upper body ADLS standing for 5 minutes without fatigue or LOB with supervision within 7 days.  3. Patient will 
  Problem: Pain  Goal: Verbalizes/displays adequate comfort level or baseline comfort level  Outcome: Progressing  Flowsheets (Taken 2/18/2024 1710 by Luz Tidwell RN)  Verbalizes/displays adequate comfort level or baseline comfort level:   Encourage patient to monitor pain and request assistance   Assess pain using appropriate pain scale   Implement non-pharmacological measures as appropriate and evaluate response   Administer analgesics based on type and severity of pain and evaluate response   Consider cultural and social influences on pain and pain management   Notify Licensed Independent Practitioner if interventions unsuccessful or patient reports new pain     Problem: Chronic Conditions and Co-morbidities  Goal: Patient's chronic conditions and co-morbidity symptoms are monitored and maintained or improved  Outcome: Progressing     
  Problem: Physical Therapy - Adult  Goal: By Discharge: Performs mobility at highest level of function for planned discharge setting.  See evaluation for individualized goals.  Description: FUNCTIONAL STATUS PRIOR TO ADMISSION: Patient was independent and active without use of DME.    HOME SUPPORT PRIOR TO ADMISSION: The patient lived alone in the second level of a senior-living apartment complex with her son living a few doors down.    Physical Therapy Goals  Initiated 2/19/2024  1.  Patient will move from supine to sit and sit to supine, scoot up and down, and roll side to side in bed with modified independence within 4 day(s).    2.  Patient will perform sit to stand with modified independence within 4 day(s).  3.  Patient will transfer from bed to chair and chair to bed with modified independence using the least restrictive device within 4 day(s).  4.  Patient will ambulate with modified independence for 150 feet with the least restrictive device within 4 day(s).   5.  Patient will perform KEILA home exercise program per protocol with independence within 4 days.  6. Patient will verbalize and demonstrate understanding of posterior hip precautions per protocol within 4 days.     2/19/2024 1530 by Zaid Kim, PT  Outcome: Progressing     PHYSICAL THERAPY TREATMENT    Patient: Annie Crespo (89 y.o. female)  Date: 2/19/2024  Diagnosis: Closed fracture of left hip, initial encounter ([facility]) [S72.002A]  Closed fracture dislocation of left hip joint with delayed healing [S72.002G] Closed fracture dislocation of left hip joint with delayed healing  Procedure(s) (LRB):  HIP HEMIARTHROPLASTY (Left) 1 Day Post-Op  Precautions: Weight Bearing, ROM Restrictions   Left Lower Extremity Weight Bearing: Weight Bearing As Tolerated         Hip Precautions: No hip flexion > 90 degrees, No ADduction, No hip internal rotation, Posterior hip precautions        ASSESSMENT:  Patient continues to benefit from skilled PT 
  Problem: Physical Therapy - Adult  Goal: By Discharge: Performs mobility at highest level of function for planned discharge setting.  See evaluation for individualized goals.  Description: FUNCTIONAL STATUS PRIOR TO ADMISSION: Patient was independent and active without use of DME.    HOME SUPPORT PRIOR TO ADMISSION: The patient lived alone in the second level of a senior-living apartment complex with her son living a few doors down.    Physical Therapy Goals  Initiated 2/19/2024  1.  Patient will move from supine to sit and sit to supine, scoot up and down, and roll side to side in bed with modified independence within 4 day(s).    2.  Patient will perform sit to stand with modified independence within 4 day(s).  3.  Patient will transfer from bed to chair and chair to bed with modified independence using the least restrictive device within 4 day(s).  4.  Patient will ambulate with modified independence for 150 feet with the least restrictive device within 4 day(s).   5.  Patient will perform KEILA home exercise program per protocol with independence within 4 days.  6. Patient will verbalize and demonstrate understanding of posterior hip precautions per protocol within 4 days.     2/20/2024 1636 by Eva Arrington, PTA  Outcome: Progressing  2/20/2024 1221 by Eva Arrington, PTA  Outcome: Progressing   PHYSICAL THERAPY TREATMENT    Patient: Annie Crespo (89 y.o. female)  Date: 2/20/2024  Diagnosis: Closed fracture of left hip, initial encounter ([facility]) [S72.002A]  Closed fracture dislocation of left hip joint with delayed healing [S72.002G] Closed fracture dislocation of left hip joint with delayed healing  Procedure(s) (LRB):  HIP HEMIARTHROPLASTY (Left) 2 Days Post-Op  Precautions: Weight Bearing, ROM Restrictions   Left Lower Extremity Weight Bearing: Weight Bearing As Tolerated         Hip Precautions: No hip flexion > 90 degrees, No ADduction, No hip internal rotation, Posterior hip precautions  
  Problem: Physical Therapy - Adult  Goal: By Discharge: Performs mobility at highest level of function for planned discharge setting.  See evaluation for individualized goals.  Description: FUNCTIONAL STATUS PRIOR TO ADMISSION: Patient was independent and active without use of DME.    HOME SUPPORT PRIOR TO ADMISSION: The patient lived alone in the second level of a senior-living apartment complex with her son living a few doors down.    Physical Therapy Goals  Initiated 2/19/2024  1.  Patient will move from supine to sit and sit to supine, scoot up and down, and roll side to side in bed with modified independence within 4 day(s).    2.  Patient will perform sit to stand with modified independence within 4 day(s).  3.  Patient will transfer from bed to chair and chair to bed with modified independence using the least restrictive device within 4 day(s).  4.  Patient will ambulate with modified independence for 150 feet with the least restrictive device within 4 day(s).   5.  Patient will perform KEILA home exercise program per protocol with independence within 4 days.  6. Patient will verbalize and demonstrate understanding of posterior hip precautions per protocol within 4 days.     2/21/2024 1520 by Prabhakar Tyler, PTA  Outcome: Progressing  2/21/2024 1422 by Prabhakar Tyler, PTA  Outcome: Progressing   PHYSICAL THERAPY TREATMENT    Patient: Annie Crespo (89 y.o. female)  Date: 2/21/2024  Diagnosis: Closed fracture of left hip, initial encounter ([facility]) [S72.002A]  Closed fracture dislocation of left hip joint with delayed healing [S72.002G] Closed fracture dislocation of left hip joint with delayed healing  Procedure(s) (LRB):  HIP HEMIARTHROPLASTY (Left) 3 Days Post-Op  Precautions: Weight Bearing, ROM Restrictions   Left Lower Extremity Weight Bearing: Weight Bearing As Tolerated         Hip Precautions: No hip flexion > 90 degrees, No ADduction, No hip internal rotation, Posterior hip precautions  
  Problem: Physical Therapy - Adult  Goal: By Discharge: Performs mobility at highest level of function for planned discharge setting.  See evaluation for individualized goals.  Description: FUNCTIONAL STATUS PRIOR TO ADMISSION: Patient was independent and active without use of DME.    HOME SUPPORT PRIOR TO ADMISSION: The patient lived alone in the second level of a senior-living apartment complex with her son living a few doors down.    Physical Therapy Goals  Initiated 2/19/2024  1.  Patient will move from supine to sit and sit to supine, scoot up and down, and roll side to side in bed with modified independence within 4 day(s).    2.  Patient will perform sit to stand with modified independence within 4 day(s).  3.  Patient will transfer from bed to chair and chair to bed with modified independence using the least restrictive device within 4 day(s).  4.  Patient will ambulate with modified independence for 150 feet with the least restrictive device within 4 day(s).   5.  Patient will perform KEILA home exercise program per protocol with independence within 4 days.  6. Patient will verbalize and demonstrate understanding of posterior hip precautions per protocol within 4 days.     Outcome: Progressing     PHYSICAL THERAPY EVALUATION    Patient: Annie Crespo (89 y.o. female)  Date: 2/19/2024  Primary Diagnosis: Closed fracture of left hip, initial encounter ([facility]) [S72.002A]  Closed fracture dislocation of left hip joint with delayed healing [S72.002G]  Procedure(s) (LRB):  HIP HEMIARTHROPLASTY (Left) 1 Day Post-Op   Precautions: Restrictions/Precautions: Weight Bearing, ROM Restrictions   Lower Extremity Weight Bearing Restrictions  Left Lower Extremity Weight Bearing: Weight Bearing As Tolerated         Hip Precautions: No hip flexion > 90 degrees, No ADduction, No hip internal rotation, Posterior hip precautions        ASSESSMENT :   DEFICITS/IMPAIRMENTS:   The patient is limited by decreased functional 
  Problem: Physical Therapy - Adult  Goal: By Discharge: Performs mobility at highest level of function for planned discharge setting.  See evaluation for individualized goals.  Description: FUNCTIONAL STATUS PRIOR TO ADMISSION: Patient was independent and active without use of DME.    HOME SUPPORT PRIOR TO ADMISSION: The patient lived alone in the second level of a senior-living apartment complex with her son living a few doors down.    Physical Therapy Goals  Initiated 2/19/2024  1.  Patient will move from supine to sit and sit to supine, scoot up and down, and roll side to side in bed with modified independence within 4 day(s).    2.  Patient will perform sit to stand with modified independence within 4 day(s).  3.  Patient will transfer from bed to chair and chair to bed with modified independence using the least restrictive device within 4 day(s).  4.  Patient will ambulate with modified independence for 150 feet with the least restrictive device within 4 day(s).   5.  Patient will perform KEILA home exercise program per protocol with independence within 4 days.  6. Patient will verbalize and demonstrate understanding of posterior hip precautions per protocol within 4 days.     Outcome: Progressing   PHYSICAL THERAPY TREATMENT    Patient: Annie Crespo (89 y.o. female)  Date: 2/21/2024  Diagnosis: Closed fracture of left hip, initial encounter ([facility]) [S72.002A]  Closed fracture dislocation of left hip joint with delayed healing [S72.002G] Closed fracture dislocation of left hip joint with delayed healing  Procedure(s) (LRB):  HIP HEMIARTHROPLASTY (Left) 3 Days Post-Op  Precautions: Weight Bearing, ROM Restrictions   Left Lower Extremity Weight Bearing: Weight Bearing As Tolerated         Hip Precautions: No hip flexion > 90 degrees, No ADduction, No hip internal rotation, Posterior hip precautions        ASSESSMENT:  Patient continues to benefit from skilled PT services.Pt seen this AM.Pt supine to sit 
Patient this morning with good participation and tolerance to physical therapy session. Patient requires Ani for bed mobility and CGA for transfers and short distance ambulation to bedside chair. Patient with good upright mobility tolerance despite reporting 7/10 pain. She is accurate for 2/3 posterior hip precautions when prompted. Continue to recommend rehab upon d/c          PLAN:  Patient continues to benefit from skilled intervention to address the above impairments.  Continue treatment per established plan of care.    Recommendation for discharge: (in order for the patient to meet his/her long term goals): Therapy 3 hours/day 5-7 days/week    Other factors to consider for discharge: poor safety awareness, impaired cognition, and concern for safely navigating or managing the home environment    IF patient discharges home will need the following DME: continuing to assess with progress       SUBJECTIVE:   Patient stated, \"I haven't seen the nurse or even had breakfast yet.\"    OBJECTIVE DATA SUMMARY:   Patient received supine in bed and was agreeable to participate in PT session.   Patient was cleared by nursing to participate in PT session.     Critical Behavior:  Orientation  Overall Orientation Status: Within Normal Limits  Cognition  Overall Cognitive Status: WNL    Functional Mobility Training:  Bed Mobility:  Bed Mobility Training  Bed Mobility Training: Yes  Supine to Sit: Minimum assistance;Assist X1;Additional time  Scooting: Stand-by assistance;Additional time  Transfers:  Transfer Training  Transfer Training: Yes  Sit to Stand: Contact-guard assistance;Additional time  Stand to Sit: Contact-guard assistance;Additional time  Bed to Chair: Contact-guard assistance;Additional time  Balance:  Balance  Sitting: Intact  Standing: Impaired  Standing - Static: Good  Standing - Dynamic: Fair   Ambulation/Gait Training:     Gait  Overall Level of Assistance: Contact-guard assistance;Additional time  Distance 
goals. Pt received sitting EOB with OT, limted by impaired balance, L knee buckling with WB requiring Mod A x 1 fro SPT to BSC and then to chair. Reviewed seated HEP and hip precautions. No family present during this session, desire is for pt to return home with family support- pt is not safe at current level of function to return home.          PLAN:  Patient continues to benefit from skilled intervention to address the above impairments.  Continue treatment per established plan of care.    Recommendation for discharge: (in order for the patient to meet his/her long term goals): Therapy 3 hours/day 5-7 days/week    Other factors to consider for discharge: patient's current support system is unable to meet their requirements for physical assistance, poor safety awareness, high risk for falls, and concern for safely navigating or managing the home environment    IF patient discharges home will need the following DME: bedside commode and rolling walker       SUBJECTIVE:   Patient stated, \"it wad better yesteday.\"    OBJECTIVE DATA SUMMARY:   Critical Behavior:     Cognition  Overall Cognitive Status: Exceptions    Functional Mobility Training:  Bed Mobility:  Bed Mobility Training  Supine to Sit: Minimum assistance  Scooting: Minimum assistance  Transfers:  Transfer Training  Sit to Stand: Minimum assistance;Moderate assistance  Stand to Sit: Minimum assistance;Assist X1;Additional time  Bed to Chair: Moderate assistance;Assist X1  Balance:  Balance  Sitting: Intact  Standing: Impaired  Standing - Static: Constant support;Fair  Standing - Dynamic: Constant support;Fair;Poor   Ambulation/Gait Training:     Gait  Overall Level of Assistance: Moderate assistance;Assist X1  Distance (ft): 4 Feet  Assistive Device: Gait belt;Walker, rolling  Interventions: Safety awareness training;Weight shifting training/pressure relief;Verbal cues  Base of Support: Widened  Speed/Irais: Slow  Gait Abnormalities: Antalgic;Step to 
reviewed.    ASSESSMENT  Patient continues to benefit from skilled OT services and is progressing towards goals. Pt transfers to chair min assist in prep for Adl's. She was able to manage grooming with set-up seated in chair. Pt with improved activity tolerance today. She was able to recall 2/3 hip precautions and educated as to all 3.              PLAN :  Patient continues to benefit from skilled intervention to address the above impairments.  Continue treatment per established plan of care to address goals.    Recommend with staff: ADL's, there ex, there act    Recommend next OT session: cont towards goals    Recommendation for discharge: (in order for the patient to meet his/her long term goals): Skilled nursing facility    Other factors to consider for discharge: concern for safely navigating or managing the home environment    IF patient discharges home will need the following DME:        SUBJECTIVE:   Patient stated “I can get up.”    OBJECTIVE DATA SUMMARY:   Cognitive/Behavioral Status:     Cognition  Following Commands: Follows one step commands consistently    Functional Mobility and Transfers for ADLs:  Bed Mobility:    Mn assist supine to sit    Transfers:    Min assist transfer to chair           Balance:   Impaired standing balance         ADL Intervention:       Grooming: Contact guard assistance   Grooming Skilled Clinical Factors: sitting in chair to brush her teeth    UE Bathing: Minimal assistance       LE Bathing: Maximum assistance       UE Dressing: Minimal assistance       LE Dressing: Maximum assistance       Activity Tolerance:   Fair   Please refer to the flowsheet for vital signs taken during this treatment.    After treatment:   Patient left in no apparent distress sitting up in chair and Call bell within reach    COMMUNICATION/EDUCATION:   The patient's plan of care was discussed with: physical therapy assistant, occupational therapist, and registered nurse    Patient 
Arthritis, rheumatoid (HCC)     Diabetes (HCC)     Diverticulitis     Hard of hearing     History of vascular access device 03/04/2022    4 feench single lumen PICC line right basilic    History of vascular access device 04/01/2021    [facility] VAT 4 FR R Basilic 37/0 LTAbx    HTN (hypertension)     Hypertension     Hypothyroid     Osteoporosis, post-menopausal     Septic arthritis of elbow, left (HCC)      Past Surgical History:   Procedure Laterality Date    FLEXIBLE SIGMOIDOSCOPY N/A 12/2/2020    SIGMOIDOSCOPY FLEXIBLE performed by Chico Taylor MD at [facility] ENDOSCOPY    HEMORRHOID SURGERY      HIP SURGERY Left 2/18/2024    HIP HEMIARTHROPLASTY performed by Raciel Rubio MD at [facility] MAIN OR    HYSTERECTOMY (CERVIX STATUS UNKNOWN)      THYROIDECTOMY            Expanded or extensive additional review of patient history:   Social/Functional History  Lives With: Alone (Son lives 2 doors down)  Type of Home: Apartment  Home Layout: One level  Home Access: Elevator  Bathroom Equipment: Toilet raiser, Grab bars around toilet, Grab bars in shower, Built-in shower seat  Home Equipment: Rollator, Cane (Uses walker outside)  Has the patient had two or more falls in the past year or any fall with injury in the past year?: Yes (Only the fall leading to admission)  ADL Assistance: Independent  Homemaking Assistance: Needs assistance  Ambulation Assistance: Independent  Transfer Assistance: Independent  Active : No      Hand Dominance: right     EXAMINATION OF PERFORMANCE DEFICITS:    Cognitive/Behavioral Status:  Orientation  Orientation Level: Oriented to place;Oriented to time;Oriented to situation;Oriented to person       Skin: intact as seen    Edema: LLE    Hearing:     [place]    Vision/Perceptual:       Macular degeneration- impaired vision          Range of Motion:   AROM: Generally decreased, functional         Strength:  Strength: Within functional limits      Coordination:  Coordination: Generally decreased, functional

## 2024-02-22 NOTE — CARE COORDINATION
2/21/2024    3:38 PM  CM notified [facility] has accepted pt, has received auth, and can admit pt in the morning. [facility] stretcher transport tentitively scheduled for 10:00 AM. Pt's son, Felix, notified via p/c.    11:45 AM  Care Management Progress Note      ICD-10-CM    1. Closed fracture of left hip, initial encounter ([facility])  S72.002A           RUR:  16%  Risk Level: []Low [x]Moderate []High    Transition of care plan:  Awaiting medical clearance and DC order. Ortho following. PT/OT treating.   SNF - CM met with pt following attending's visit. Pt now agreeable to SNF, and indicated [facility] as preference. CM submitted referral via [employer]Clink, and is awaiting response.   Outpatient follow-up.  Transport need TBD.     
2/22/2024  9:07 AM  Transition of Care Plan to SNF/Rehab    Communication to Patient/Family:  Met with patient and family and they are agreeable to the transition plan. The Plan for Transition of Care is related to the following treatment goals: Hip fracture    The Patient and/or patient representative was provided with a choice of provider and agrees  with the discharge plan.      Yes [x] No []    A Freedom of choice list was provided with basic dialogue that supports the patient's individualized plan of care/goals and shares the quality data associated with the providers.       Yes [x] No []    SNF/Rehab Transition:  Patient has been accepted to [facility] and meets criteria for admission.     SNF reports auth has been received? [x]  Medicare 3 night stay satisfied? []    Patient will transported by [facility] transport and expected to leave at 10:00 AM. PCS completed, and packet in chart.     Communication to SNF/Rehab:  Bedside RN, [facility], has been notified to update the transition plan to the facility and call report (phone number , [phone]).  Discharge information has been updated on the AVS. And communicated to facility via [employer]/All Scripts, or CC link.     Discharge instructions to be fax'd to facility via [] AllScripts [x] CCLink      Nursing Please include all hard scripts for controlled substances, med rec and dc summary, and AVS in packet.       Nursing, please discuss the following applicable information with [facility]'s nursing during report:     Rip with (X) only those applicable:  Medication:  []Medications are available at the facility  []IV Antibiotics    []Controlled Substance - hard copies available sent.  []Weekly Labs    Equipment:  []CPAP/BiPAP  []Wound Vacuum  []Maldonado or Urinary Device  []PICC/Central Line  []Nebulizer  []Ventilator    Treatment:  []Isolation (for MRSA, VRE, etc.)  []Surgical Drain Management  []Tracheostomy 
Name:       The Patient and/or Patient Representative Agree with the Discharge Plan?      Kym Butcher  Case Management Department  Ph: [phone] Fax: [phone]       02/19/24 1338   Service Assessment   Patient Orientation Alert and Oriented   Cognition Alert   History Provided By Patient   Primary Caregiver Self   Support Systems Children   Patient's Healthcare Decision Maker is: Legal Next of Kin   PCP Verified by CM Yes   Last Visit to PCP Within last 3 months   Prior Functional Level Assistance with the following:   Current Functional Level Assistance with the following:   Can patient return to prior living arrangement Unknown at present   Ability to make needs known: Fair   Family able to assist with home care needs: Yes  (Pt's son lives 2 apartments over.)   Would you like for me to discuss the discharge plan with any other family members/significant others, and if so, who? Yes   Financial Resources Medicare   Community Resources None   Social/Functional History   Lives With Alone   Type of Home Apartment   Home Layout One level   Home Access Elevator   Bathroom Equipment Shower chair   Home Equipment Walker, 4 wheeled   Receives Help From Family  (Pt's son lives in apartment 2 doors down. He is able to provide pt care.)   ADL Assistance Needs assistance   Toileting Needs assistance   Homemaking Assistance Needs assistance   Ambulation Assistance Needs assistance  (Rollator in apartment)   Transfer Assistance Needs assistance   Active  No   Discharge Planning   Type of Residence Apartment   Living Arrangements Alone   Current Services Prior To Admission None  (Pt just completed HH services.)   DME Ordered? No   Potential Assistance Purchasing Medications No

## 2024-09-29 NOTE — TELEPHONE ENCOUNTER
Left message on patient's daughter Juno Collins) phone calling to check on patient, and to please call the office. Patients' phone message says she is not available ( have tried twice). Please inform pt that his Testosterone is low. So order Androderm patch 4mg q24hr, and please ask my colleague to sign for me. Also order PSA, Hemoglobin/Hct and Testosterone and Prolactin level 1 week before next visit. otherwise nothing urgent to discuss, will discuss further on your next visit, please let us know if you have any questions.  Dr. Ruiz. show

## 2025-03-01 ENCOUNTER — HOSPITAL ENCOUNTER (EMERGENCY)
Facility: HOSPITAL | Age: 89
Discharge: HOME | End: 2025-03-01
Attending: EMERGENCY MEDICINE
Payer: MEDICARE

## 2025-03-01 ENCOUNTER — APPOINTMENT (OUTPATIENT)
Facility: HOSPITAL | Age: 89
End: 2025-03-01
Payer: MEDICARE

## 2025-03-01 VITALS
OXYGEN SATURATION: 100 % | BODY MASS INDEX: 19.76 KG/M2 | HEART RATE: 83 BPM | WEIGHT: 98 LBS | HEIGHT: 59 IN | DIASTOLIC BLOOD PRESSURE: 73 MMHG | TEMPERATURE: 98.2 F | SYSTOLIC BLOOD PRESSURE: 224 MMHG | RESPIRATION RATE: 18 BRPM

## 2025-03-01 DIAGNOSIS — S22.080S COMPRESSION FRACTURE OF T12 VERTEBRA, SEQUELA: ICD-10-CM

## 2025-03-01 DIAGNOSIS — M54.50 LUMBAR PAIN: Primary | ICD-10-CM

## 2025-03-01 DIAGNOSIS — M06.9 RHEUMATOID ARTHRITIS, INVOLVING UNSPECIFIED SITE, UNSPECIFIED WHETHER RHEUMATOID FACTOR PRESENT (HCC): ICD-10-CM

## 2025-03-01 LAB
APPEARANCE UR: CLEAR
BACTERIA URNS QL MICRO: NEGATIVE /HPF
BILIRUB UR QL: NEGATIVE
COLOR UR: ABNORMAL
EPITH CASTS URNS QL MICRO: ABNORMAL /LPF
GLUCOSE UR STRIP.AUTO-MCNC: NEGATIVE MG/DL
HGB UR QL STRIP: NEGATIVE
KETONES UR QL STRIP.AUTO: NEGATIVE MG/DL
LEUKOCYTE ESTERASE UR QL STRIP.AUTO: NEGATIVE
NITRITE UR QL STRIP.AUTO: NEGATIVE
PH UR STRIP: 8 (ref 5–8)
PROT UR STRIP-MCNC: 30 MG/DL
RBC #/AREA URNS HPF: ABNORMAL /HPF (ref 0–5)
SP GR UR REFRACTOMETRY: 1.01 (ref 1–1.03)
URINE CULTURE IF INDICATED: ABNORMAL
UROBILINOGEN UR QL STRIP.AUTO: 0.2 EU/DL (ref 0.2–1)
WBC URNS QL MICRO: ABNORMAL /HPF (ref 0–4)

## 2025-03-01 PROCEDURE — 81001 URINALYSIS AUTO W/SCOPE: CPT

## 2025-03-01 PROCEDURE — 74176 CT ABD & PELVIS W/O CONTRAST: CPT

## 2025-03-01 PROCEDURE — 99284 EMERGENCY DEPT VISIT MOD MDM: CPT

## 2025-03-01 PROCEDURE — 51701 INSERT BLADDER CATHETER: CPT

## 2025-03-01 RX ORDER — LIDOCAINE 50 MG/G
1 PATCH TOPICAL DAILY
Qty: 10 PATCH | Refills: 0 | Status: SHIPPED | OUTPATIENT
Start: 2025-03-01 | End: 2025-03-11

## 2025-03-01 RX ORDER — METHOCARBAMOL 500 MG/1
1000 TABLET, FILM COATED ORAL 3 TIMES DAILY
Qty: 42 TABLET | Refills: 0 | Status: SHIPPED | OUTPATIENT
Start: 2025-03-01 | End: 2025-03-08

## 2025-03-01 RX ORDER — PREDNISONE 5 MG/1
5 TABLET ORAL DAILY
COMMUNITY
Start: 2024-10-28

## 2025-03-01 RX ORDER — PREDNISONE 20 MG/1
20 TABLET ORAL DAILY
Qty: 5 TABLET | Refills: 0 | Status: SHIPPED | OUTPATIENT
Start: 2025-03-01 | End: 2025-03-06

## 2025-03-01 RX ORDER — CALCITONIN SALMON 200 [IU]/.09ML
1 SPRAY, METERED NASAL DAILY
Qty: 3.7 ML | Refills: 0 | Status: SHIPPED | OUTPATIENT
Start: 2025-03-01 | End: 2025-03-29

## 2025-03-01 RX ORDER — MELOXICAM 7.5 MG/1
7.5 TABLET ORAL DAILY PRN
Qty: 10 TABLET | Refills: 0 | Status: SHIPPED | OUTPATIENT
Start: 2025-03-01 | End: 2025-03-11

## 2025-03-01 ASSESSMENT — PAIN DESCRIPTION - LOCATION: LOCATION: BACK

## 2025-03-01 ASSESSMENT — PAIN DESCRIPTION - ORIENTATION: ORIENTATION: LOWER

## 2025-03-01 ASSESSMENT — PAIN - FUNCTIONAL ASSESSMENT: PAIN_FUNCTIONAL_ASSESSMENT: 0-10

## 2025-03-01 ASSESSMENT — PAIN SCALES - GENERAL: PAINLEVEL_OUTOF10: 10

## 2025-03-01 ASSESSMENT — PAIN DESCRIPTION - DESCRIPTORS: DESCRIPTORS: ACHING;SHOOTING

## 2025-03-01 NOTE — ED NOTES
Went to discharge patient, pt blood pressure high with two readings, informed Dr Rose of blood pressure reading, per MD okay to discharge and have patient take home medications.  Pt is asymptomatic.

## 2025-03-01 NOTE — ED TRIAGE NOTES
Pt arrives by ems wit the c.c. of back pain, pt reports had fall over a year ago and since had back pain, pt reports back pain progressed the last couple days, pt reports was having a hard time walking, pain is worse with movement, denies any urinary symptoms.

## 2025-03-01 NOTE — DISCHARGE INSTRUCTIONS
**DISCHARGE INSTRUCTIONS**    **Diagnosis:** Lumbar Pain    **What Happened:**    You came to the emergency department because of worsening back pain. This pain has been ongoing since you had a fall one year ago and has gotten worse over the past few days. 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 You also have rheumatoid arthritis, which can cause joint pain and stiffness.    **What We Did:**    In the emergency department, we did a thorough examination and took some pictures of your back to understand what was causing your pain. We found that your back pain is likely due to the chronic T12 compression fracture and your rheumatoid arthritis. We also checked for other possible causes like kidney stones or intestinal problems, but those were not present.    **Treatment and Care at Home:**    - **Medications:** You have been given new prescriptions to help manage your pain and inflammation. These include:    - Meloxicam 7.5 mg once daily    - Robaxin 1000 mg three times a day    - Prednisone 20 mg once daily for 5 days    - Lidoderm patches for pain relief    - Calcitonin nasal spray for 28 days    - **Self-Care:** Take your medications as prescribed. Use the Lidoderm patches on your back where it hurts the most. Try to rest and avoid activities that make your pain worse. Drink plenty of water and eat a balanced diet to help with your overall health.    **Follow-Up Care:**    - Please make an appointment with your primary care doctor, Dr. Tami Su, to discuss your ongoing care.  - You should also follow up with [facility] as needed for your chronic T12 fracture.    **When to Return to the Emergency Department:**    - If your pain gets much worse and does not improve with medication  - If you have new symptoms like numbness or weakness in your legs  - If you have trouble controlling your bowel or

## 2025-03-01 NOTE — ED PROVIDER NOTES
treatment plan with the patient and her son, ensuring understanding and agreement.  - Recommended follow-up with PCP and [facility] for ongoing management of chronic T12 fracture.  - No acute intervention required for the abdominal aortic aneurysm at this time, but monitoring advised.  - Discharged patient with instructions for home care and follow-up appointments.    DIFFERENTIAL DIAGNOSES:    - Osteoarthritis: Considered due to the patient's age and history of joint issues, but less likely as the primary cause of the acute worsening of back pain, given the absence of typical osteoarthritic symptoms such as joint stiffness or crepitus.  - Spinal stenosis: Considered because of the patient's age and symptoms of back pain, but less likely due to the absence of neurogenic claudication symptoms such as leg weakness or numbness.  - Vertebral compression fracture: Considered due to the patient's age and history of a fall, but less likely given the absence of acute trauma   - Constipation: Considered because of the patient's reported difficulty with bowel movements, but less likely to be the primary cause of back pain given the chronicity and worsening of symptoms without interval trauma.  - Rheumatoid arthritis flare: Considered due to the patient's history of rheumatoid arthritis, and may be contributing to symptoms, but less likely to be the sole cause of the acute worsening of back pain.    DISPOSITION:    DISCHARGE: HOME    The patient was discharged home with new prescriptions for Meloxicam 7.5 mg daily, Robaxin 1000 mg TID, Prednisone burst 20 mg daily for 5 days, Lidoderm patches, and Calcitonin nasal spray for 28 days. Follow-up was recommended with her primary care physician, Dr. Tami Su, and as needed with [facility] for management of her chronic T12 fracture. It was explained to the patient and her son that if she is not improving as expected or if new symptoms or signs of concern develop, other  CT, Ultrasound and MRI are read by the radiologist. Plain radiographic images are visualized and preliminarily interpreted by the emergency physician.    Interpretation per the Radiologist below, if available at the time of this note:    CT ABDOMEN PELVIS WO CONTRAST Additional Contrast? None   Final Result   1.  Severe T12 compression fracture is again visualized. There has been further   loss of height since the prior study.   2.  There are no other acute abnormalities.      Electronically signed by FABIAN Balderas           LABS:  Labs Reviewed - No data to display    CONSULTS:  None    PROCEDURES:     Procedures      FINAL IMPRESSION      1. Lumbar pain    2. Compression fracture of T12 vertebra, sequela    3. Rheumatoid arthritis, involving unspecified site, unspecified whether rheumatoid factor present (HCC)          DISPOSITION/PLAN   DISPOSITION Decision To Discharge 03/01/2025 10:10:17 AM      PATIENT REFERRED TO:  Tami Su MD  [id number]  [facility] [address] [phone]    Schedule an appointment as soon as possible for a visit       [address] [address] [phone]    If symptoms worsen or not improving      DISCHARGE MEDICATIONS:  New Prescriptions    CALCITONIN (MIACALCIN) 200 UNIT/ACT NASAL SPRAY    1 spray by Nasal route daily for 28 days    LIDOCAINE (LIDODERM) 5 %    Place 1 patch onto the skin daily for 10 days 12 hours on, 12 hours off.    MELOXICAM (MOBIC) 7.5 MG TABLET    Take 1 tablet by mouth daily as needed for Pain    METHOCARBAMOL (ROBAXIN) 500 MG TABLET    Take 2 tablets by mouth 3 times daily for 7 days    PREDNISONE (DELTASONE) 20 MG TABLET    Take 1 tablet by mouth daily for 5 days         (Please note that portions of this note were completed with a transcription program.  Efforts were made to edit the dictations but occasionally words are mis-transcribed.)    Anthony Rose MD (electronically

## 2025-04-27 ENCOUNTER — APPOINTMENT (OUTPATIENT)
Facility: HOSPITAL | Age: 89
End: 2025-04-27
Payer: MEDICARE

## 2025-04-27 ENCOUNTER — HOSPITAL ENCOUNTER (EMERGENCY)
Facility: HOSPITAL | Age: 89
Discharge: HOME | End: 2025-04-27
Attending: EMERGENCY MEDICINE
Payer: MEDICARE

## 2025-04-27 VITALS
OXYGEN SATURATION: 94 % | SYSTOLIC BLOOD PRESSURE: 220 MMHG | RESPIRATION RATE: 22 BRPM | TEMPERATURE: 98.9 F | HEART RATE: 101 BPM | DIASTOLIC BLOOD PRESSURE: 100 MMHG | HEIGHT: 59 IN | BODY MASS INDEX: 19.03 KG/M2 | WEIGHT: 94.4 LBS
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
## 2025-04-27 DIAGNOSIS — S09.90XA CLOSED HEAD INJURY, INITIAL ENCOUNTER: ICD-10-CM

## 2025-04-27 DIAGNOSIS — S01.81XA FACIAL LACERATION, INITIAL ENCOUNTER: Primary | ICD-10-CM

## 2025-04-27 DIAGNOSIS — I10 HYPERTENSION, UNSPECIFIED TYPE: ICD-10-CM

## 2025-04-27 LAB
COMMENT:: NORMAL
SPECIMEN HOLD: NORMAL

## 2025-04-27 PROCEDURE — 72125 CT NECK SPINE W/O DYE: CPT

## 2025-04-27 PROCEDURE — 99284 EMERGENCY DEPT VISIT MOD MDM: CPT

## 2025-04-27 PROCEDURE — 12013 RPR F/E/E/N/L/M 2.6-5.0 CM: CPT

## 2025-04-27 PROCEDURE — 93005 ELECTROCARDIOGRAM TRACING: CPT | Performed by: EMERGENCY MEDICINE

## 2025-04-27 PROCEDURE — 6370000000 HC RX 637 (ALT 250 FOR IP): Performed by: EMERGENCY MEDICINE

## 2025-04-27 PROCEDURE — 70450 CT HEAD/BRAIN W/O DYE: CPT

## 2025-04-27 RX ORDER — CLONIDINE HYDROCHLORIDE 0.1 MG/1
0.1 TABLET ORAL ONCE
Status: COMPLETED | OUTPATIENT
Start: 2025-04-27 | End: 2025-04-27

## 2025-04-27 RX ORDER — GINSENG 100 MG
CAPSULE ORAL
Status: COMPLETED | OUTPATIENT
Start: 2025-04-27 | End: 2025-04-27

## 2025-04-27 RX ADMIN — BACITRACIN 1 EACH: 500 OINTMENT TOPICAL at 22:02

## 2025-04-27 RX ADMIN — Medication 3 ML: at 20:35

## 2025-04-27 RX ADMIN — CLONIDINE HYDROCHLORIDE 0.1 MG: 0.1 TABLET ORAL at 22:01

## 2025-04-27 NOTE — ED TRIAGE NOTES
Patient to ED via EMS for GLF in her bathroom at approx 1730. Pt has laceration to R side of forehead. Pt denies LOC, pt does not take blood thinners. Pt denying neck pain at this time. Pt endorsing pain at site of laceration and L knee which is chronic.      Pt hypertensive en route for EMS.

## 2025-04-28 LAB
EKG ATRIAL RATE: 92 BPM
EKG DIAGNOSIS: NORMAL
EKG P AXIS: 26 DEGREES
EKG P-R INTERVAL: 206 MS
EKG Q-T INTERVAL: 348 MS
EKG QRS DURATION: 70 MS
EKG QTC CALCULATION (BAZETT): 430 MS
EKG R AXIS: 39 DEGREES
EKG T AXIS: 61 DEGREES
EKG VENTRICULAR RATE: 92 BPM

## 2025-04-28 PROCEDURE — 93010 ELECTROCARDIOGRAM REPORT: CPT | Performed by: INTERNAL MEDICINE

## 2025-04-28 NOTE — ED NOTES
Blood pressure consistently elevated SBP >200, pt states she takes blood pressure medicine but blood pressure is consistently elevated. Provider notified, RN asked to do manual /100. Provider notified clonidine ordered and plan to discharge. No further orders at this time.

## 2025-04-28 NOTE — ED PROVIDER NOTES
[facility] EMERGENCY DEPARTMENT  EMERGENCY DEPARTMENT ENCOUNTER      Pt Name: Annie Crespo  MRN: [id number]  Birthdate 2/13/1930  Date of evaluation: 4/27/2025  Provider: Gladys Saldivar MD    CHIEF COMPLAINT       Chief Complaint   Patient presents with    Fall         HISTORY OF PRESENT ILLNESS   (Location/Symptom, Timing/Onset, Context/Setting, Quality, Duration, Modifying Factors, Severity)  Note limiting factors.   Patient is a 89-year-old who comes into the emergency department after a fall.  She reports that she usually walks with a walker and when she was getting up from the toilet the walker slipped and she fell forward, striking her head on the sink.  She called her son for help who assisted her and brought her in for evaluation.  She did not lose consciousness and has not had any confusion or vomiting.  She reports left-sided knee pain that is chronic and unchanged from baseline.    The history is provided by the patient.         Review of External Medical Records:     Nursing Notes were reviewed.    REVIEW OF SYSTEMS    (2-9 systems for level 4, 10 or more for level 5)     Review of Systems    Except as noted above the remainder of the review of systems was reviewed and negative.       PAST MEDICAL HISTORY     Past Medical History:   Diagnosis Date    Arthritis, rheumatoid (HCC)     Diabetes (HCC)     Diverticulitis     Hard of hearing     History of vascular access device 03/04/2022    4 feench single lumen PICC line right basilic    History of vascular access device 04/01/2021    [facility] VAT 4 FR R Basilic 37/0 LTAbx    HTN (hypertension)     Hypertension     Hypothyroid     Osteoporosis, post-menopausal     Septic arthritis of elbow, left (HCC)          SURGICAL HISTORY       Past Surgical History:   Procedure Laterality Date    FLEXIBLE SIGMOIDOSCOPY N/A 12/2/2020    SIGMOIDOSCOPY FLEXIBLE performed by Chico Taylor MD at [facility] ENDOSCOPY    HEMORRHOID SURGERY      HIP SURGERY  Close  Repair type:     Repair type:  Simple  Post-procedure details:     Dressing:  Antibiotic ointment    Procedure completion:  Tolerated well, no immediate complications        FINAL IMPRESSION      1. Facial laceration, initial encounter    2. Closed head injury, initial encounter    3. Fall, initial encounter          DISPOSITION/PLAN   DISPOSITION Decision To Discharge 04/27/2025 09:39:44 PM      PATIENT REFERRED TO:  Tami Su MD  [address] [address] [address] [phone]    Schedule an appointment as soon as possible for a visit in 3 days  For wound re-check      DISCHARGE MEDICATIONS:  New Prescriptions    No medications on file         (Please note that portions of this note were completed with a voice recognition program.  Efforts were made to edit the dictations but occasionally words are mis-transcribed.)    Gladys Saldivar MD (electronically signed)  Emergency Attending Physician / Physician Assistant / Nurse Practitioner             Gladys Saldivar MD  04/27/25 [id number]       Gladys Saldivar MD  04/27/25 2938

## (undated) DEVICE — SWAB CULT DBL W/O CHAR RAYON TIP AMIES GEL CLMN FOR COLL

## (undated) DEVICE — SUT ETHLN 3-0 18IN PS1 BLK --

## (undated) DEVICE — SPONGE LAP 18X18IN STRL -- 5/PK

## (undated) DEVICE — SPONGE GZ W4XL4IN COT 12 PLY TYP VII WVN C FLD DSGN

## (undated) DEVICE — SOL IRR SOD CL 0.9% 1000ML BTL --

## (undated) DEVICE — INTENDED FOR TISSUE SEPARATION, AND OTHER PROCEDURES THAT REQUIRE A SHARP SURGICAL BLADE TO PUNCTURE OR CUT.: Brand: BARD-PARKER ® CARBON RIB-BACK BLADES

## (undated) DEVICE — BNDG ELAS HK LOOP 3X5YD NS -- MATRIX

## (undated) DEVICE — SUTURE ABSRB BRAID COAT UD CT NO 1 36IN VCRL J959H

## (undated) DEVICE — GLOVE ORTHO 8   MSG9480

## (undated) DEVICE — PADDING CST 4INX4YD --

## (undated) DEVICE — DRAPE,REIN 53X77,STERILE: Brand: MEDLINE

## (undated) DEVICE — SOLUTION IRRIG 1000ML 0.9% SOD CHL USP POUR PLAS BTL

## (undated) DEVICE — GLOVE SURG SZ 8 L12IN FNGR THK79MIL GRN LTX FREE

## (undated) DEVICE — CANN NASAL O2 CAPNOGRAPHY AD -- FILTERLINE

## (undated) DEVICE — TOWEL SURG W17XL27IN STD BLU COT NONFENESTRATED PREWASHED

## (undated) DEVICE — CANISTER, RIGID, 3000CC: Brand: MEDLINE INDUSTRIES, INC.

## (undated) DEVICE — STERILE POLYISOPRENE POWDER-FREE SURGICAL GLOVES: Brand: PROTEXIS

## (undated) DEVICE — GOWN,SIRUS,NONRNF,SETINSLV,2XL,18/CS: Brand: MEDLINE

## (undated) DEVICE — DRAPE,EXTREMITY,89X128,STERILE: Brand: MEDLINE

## (undated) DEVICE — 1200 GUARD II KIT W/5MM TUBE W/O VAC TUBE: Brand: GUARDIAN

## (undated) DEVICE — TOTAL JOINT-SFMC: Brand: MEDLINE INDUSTRIES, INC.

## (undated) DEVICE — BANDAGE,ELASTIC,ESMARK,STERILE,4"X9',LF: Brand: MEDLINE

## (undated) DEVICE — HOOD, PEEL-AWAY: Brand: FLYTE

## (undated) DEVICE — ELECTRODE,RADIOTRANSLUCENT,FOAM,3PK: Brand: MEDLINE

## (undated) DEVICE — DRESSING,GAUZE,XEROFORM,CURAD,5"X9",ST: Brand: CURAD

## (undated) DEVICE — ALCOHOL RUBBING ISO 16OZ 70%

## (undated) DEVICE — SUTURE VCRL SZ 2-0 L36IN ABSRB UD L40MM CT 1/2 CIR J957H

## (undated) DEVICE — INTENDED TO AID IN THE PASSING OF SUTURES THROUGH BONE AND SOFT TISSUE DURING ORTHOPEDIC SURGERY: Brand: HOFFEE SUTURE RETRIEVER

## (undated) DEVICE — COVER LT HNDL PLAS RIG 1 PER PK

## (undated) DEVICE — SET GRAV CK VLV NEEDLESS ST 3 GANGED 4WAY STPCOCK HI FLO 10

## (undated) DEVICE — SUTURE STRATAFIX SPRL SZ 1 L14IN ABSRB VLT L48CM CTX 1/2 SXPD2B405

## (undated) DEVICE — YANKAUER,OPEN TIP,W/O VENT,STERILE: Brand: MEDLINE INDUSTRIES, INC.

## (undated) DEVICE — SUTURE MCRYL SZ 2-0 L27IN ABSRB UD SH L26MM TAPERPOINT NDL Y417H

## (undated) DEVICE — STRYKER PERFORMANCE SERIES SAGITTAL BLADE: Brand: STRYKER PERFORMANCE SERIES

## (undated) DEVICE — Device

## (undated) DEVICE — GLOVE ORANGE PI 7 1/2   MSG9075

## (undated) DEVICE — COVER,MAYO STAND,STERILE: Brand: MEDLINE

## (undated) DEVICE — BANDAGE COBAN 4 IN COMPR W4INXL5YD FOAM COHESIVE QUIK STK SELF ADH SFT

## (undated) DEVICE — BASIN ST MAJOR-NO CAUTERY: Brand: MEDLINE INDUSTRIES, INC.

## (undated) DEVICE — INFECTION CONTROL KIT SYS

## (undated) DEVICE — SOLUTION IRRIG 3000ML 0.9% SOD CHL USP UROMATIC PLAS CONT

## (undated) DEVICE — DRESSING BORDERED ADH GZ UNIV GEN USE 8INX4IN AND 6INX2IN

## (undated) DEVICE — GOWN,PREVENTION PLUS,XLN/XL,ST,24/CS: Brand: MEDLINE

## (undated) DEVICE — SUTURE PDS II SZ 2-0 L27IN ABSRB VLT SH L26MM 1/2 CIR Z317H

## (undated) DEVICE — SOLUTION SCRB 4% CHG RED ANTIMIC SKIN CLN PREOPERATIVE DISP

## (undated) DEVICE — SOLUTION IRRIG 1000ML STRL H2O USP PLAS POUR BTL

## (undated) DEVICE — 1010 S-DRAPE TOWEL DRAPE 10/BX: Brand: STERI-DRAPE™

## (undated) DEVICE — EXTREMITY-SFMCASU: Brand: MEDLINE INDUSTRIES, INC.

## (undated) DEVICE — SOLIDIFIER MEDC 1200ML -- CONVERT TO 356117

## (undated) DEVICE — PREP SKN CHLRAPRP APL 26ML STR --

## (undated) DEVICE — SUTURE MCRYL SZ 4-0 L27IN ABSRB UD L19MM PS-2 1/2 CIR PRIM Y426H

## (undated) DEVICE — DRAPE,HIP,W/POUCHES,STERILE: Brand: MEDLINE

## (undated) DEVICE — REM POLYHESIVE ADULT PATIENT RETURN ELECTRODE: Brand: VALLEYLAB

## (undated) DEVICE — SUTURE VCRL SZ 1 L36IN ABSRB UD L36MM CT-1 1/2 CIR J947H

## (undated) DEVICE — PACK,BASIC,SIRUS,V: Brand: MEDLINE

## (undated) DEVICE — ZIMMER® STERILE DISPOSABLE TOURNIQUET CUFF WITH PROTECTIVE SLEEVE AND PLC, DUAL PORT, SINGLE BLADDER, 18 IN. (46 CM)

## (undated) DEVICE — ROCKER SWITCH PENCIL BLADE ELECTRODE, HOLSTER: Brand: EDGE

## (undated) DEVICE — KIT COLON W/ 1.1OZ LUB AND 2 END

## (undated) DEVICE — SIMPLICITY FLUFF UNDERPAD 23X36, MODERATE: Brand: SIMPLICITY

## (undated) DEVICE — PILLOW POS W15XH6XL22IN RASPBERRY FOAM ABD W/ STRP DISP FOR

## (undated) DEVICE — C-ARM: Brand: UNBRANDED

## (undated) DEVICE — SUTURE VCRL SZ 0 L36IN ABSRB UD L40MM CT 1/2 CIR TAPERPOINT J958H

## (undated) DEVICE — 3M™ IOBAN™ 2 ANTIMICROBIAL INCISE DRAPE 6651EZ: Brand: IOBAN™ 2

## (undated) DEVICE — SUTURE ABSORBABLE BRAIDED 2-0 CT-1 27 IN UD VICRYL J259H

## (undated) DEVICE — STOCKINETTE,IMPERVIOUS,12X48,STERILE: Brand: MEDLINE

## (undated) DEVICE — PATIENT PROTECTIVE PAD FOR IMP UNIVERSAL LATERAL HIP POSITIONER (ULP) (6/CASE): Brand: PATIENT PROTECTIVE PAD

## (undated) DEVICE — DRESSING FOAM W4XL12IN AG SIL ADH ANTIMIC POSTOP OPTIFOAM

## (undated) DEVICE — DRAPE,U/ SHT,SPLIT,PLAS,STERIL: Brand: MEDLINE

## (undated) DEVICE — MARKER,SKIN,WI/RULER AND LABELS: Brand: MEDLINE

## (undated) DEVICE — JELLY,LUBE,STERILE,FLIP TOP,TUBE,4-OZ: Brand: MEDLINE

## (undated) DEVICE — SURGICAL PROCEDURE PACK BASIN MAJ SET CUST NO CAUT

## (undated) DEVICE — SOLUTION SURG PREP 26 CC PURPREP

## (undated) DEVICE — PENCIL SMK EVAC 10 FT BLADE ELECTRD ROCKER FOR TELSCP

## (undated) DEVICE — SUTURE FIBERWIRE SZ 5 L38IN NONABSORBABLE BLU L48MM 1/2 AR7211